# Patient Record
Sex: FEMALE | Race: WHITE | NOT HISPANIC OR LATINO | Employment: FULL TIME | URBAN - METROPOLITAN AREA
[De-identification: names, ages, dates, MRNs, and addresses within clinical notes are randomized per-mention and may not be internally consistent; named-entity substitution may affect disease eponyms.]

---

## 2017-02-08 ENCOUNTER — OFFICE VISIT (OUTPATIENT)
Dept: INTERNAL MEDICINE | Facility: CLINIC | Age: 32
End: 2017-02-08
Payer: COMMERCIAL

## 2017-02-08 VITALS
TEMPERATURE: 98 F | BODY MASS INDEX: 41.73 KG/M2 | DIASTOLIC BLOOD PRESSURE: 90 MMHG | SYSTOLIC BLOOD PRESSURE: 154 MMHG | HEIGHT: 63 IN | WEIGHT: 235.5 LBS | OXYGEN SATURATION: 97 % | HEART RATE: 100 BPM

## 2017-02-08 DIAGNOSIS — I10 HYPERTENSION GOAL BP (BLOOD PRESSURE) < 140/90: ICD-10-CM

## 2017-02-08 PROCEDURE — 99214 OFFICE O/P EST MOD 30 MIN: CPT | Performed by: INTERNAL MEDICINE

## 2017-02-08 ASSESSMENT — PAIN SCALES - GENERAL: PAINLEVEL: NO PAIN (0)

## 2017-02-08 NOTE — PROGRESS NOTES
"INTERNAL MEDICINE    Medical Weight Loss - Follow-up Visit    Chief Complaint:   Chief Complaint   Patient presents with     Weight Problem     follow-up       Last visit on 12/9/16:  Start adding a little more food to your diet in the mid morning - 200 calorie protein/carb snack.  Start consuming between 1700 and 2000 calories per day rather than the 1500 to 1800 you have been doing.    Research High Intensity Interval Training (HIIT) online (google \"free beginner online HIIT workouts\") that you can start doing in 15 minutes or less.    Fitness  and Be Fit have a lot of workout ideas for HIIT.  Keep Topamax at 50 mg twice daily.    Wt Readings from Last 5 Encounters:   02/08/17 106.822 kg (235 lb 8 oz)   12/09/16 106.595 kg (235 lb)   11/02/16 107.276 kg (236 lb 8 oz)   08/10/16 107.049 kg (236 lb)   08/10/16 107.276 kg (236 lb 8 oz)       Update - Has been doing HIIT workouts for 20-25 minutes. When she eats she is full but still does not feel like she is eating a lot or getting enough calories in. She is food logging and eats a lot of the same foods.    Food Log Averages:  20-40% carbohydrates  40-50% fat  25-35% protein   3860-7844 calories daily    Blood pressure - Feels her BP is elevated due to stress at work and just coming from work. She takes her BP medication at night so has yet to take it today. Denies chest pain, SOB, or headache.      HISTORY OF PRESENT ILLNESS (HPI):    Patient  returns today for medical weight loss follow-up visit. Patient was last seen by me on 12/9/2016 and has gained 0.8 oz and is down 2% body fat.    Patient is not seeing dietitian    Patient is not seeing PT       Today we reviewed patient's lab results for labs ordered at last visit and answered patient's questions regarding lab results.     Today patient expresses interest in beginning weight loss medication.  Discussed risks vs benefits of  Victoza  with patient in detail. Also discussed dosing of this medication. " "    She didn't increase her calories as we discussed.  She has not increased her carbohydrates in the form of fruits/veggies/grains as suggested in her last appointment with sanju.      Patient denies any exertional chest pain, dyspnea, palpitations, syncope, orthopnea, edema or paroxysmal nocturnal dyspnea.     MEDICATIONS:   Current Outpatient Prescriptions   Medication Sig Dispense Refill     traMADol (ULTRAM) 50 MG tablet Take 1-2 tablets ( mg) by mouth every 6 hours as needed for pain 20 tablet 0     hydrochlorothiazide (HYDRODIURIL) 25 MG tablet Take 1 tablet (25 mg) by mouth daily 90 tablet 3     citalopram (CELEXA) 20 MG tablet Take 1 tablet (20 mg) by mouth daily 90 tablet 3     etonogestrel (NEXPLANON) 68 MG IMPL One device, subdermally, for up to 3 years. 1 each 0       ALLERGIES:   Allergies   Allergen Reactions     Lisinopril Cough     Zoloft      tired       PHYSICAL EXAMINATION:    VITALS: /90 mmHg  Pulse 100  Temp(Src) 98  F (36.7  C) (Oral)  Ht 1.6 m (5' 3\")  Wt 106.822 kg (235 lb 8 oz)  BMI 41.73 kg/m2  SpO2 97%  LMP 01/16/2017 (Approximate)  Breastfeeding? No  GENERAL: Patient is a 31 year old year old female  in no acute distress.  Patient is alert and orientated x 4, pleasant and cooperative with exam.     CARDIOVASCULAR:  Tachycardia and regular rhythm without murmurs, rubs, or gallops.  RESPIRATORY:  Lungs are clear to auscultation bilaterally, respiratory effort is normal.   LOWER EXTREMITIES:  No edema noted bilaterally  PSYCH: mentation appears normal, affect normal/bright    LAB RESULTS:   Office Visit on 12/09/2016   Component Date Value Ref Range Status     Sodium 12/09/2016 140  133 - 144 mmol/L Final     Potassium 12/09/2016 4.3  3.4 - 5.3 mmol/L Final     Chloride 12/09/2016 109  94 - 109 mmol/L Final     Carbon Dioxide 12/09/2016 21  20 - 32 mmol/L Final     Anion Gap 12/09/2016 10  3 - 14 mmol/L Final     Glucose 12/09/2016 91  70 - 99 mg/dL Final     Urea " Nitrogen 12/09/2016 12  7 - 30 mg/dL Final     Creatinine 12/09/2016 0.77  0.52 - 1.04 mg/dL Final     GFR Estimate 12/09/2016 88  >60 mL/min/1.7m2 Final    Non  GFR Calc     GFR Estimate If Black 12/09/2016   >60 mL/min/1.7m2 Final                    Value:>90   GFR Calc       Calcium 12/09/2016 9.0  8.5 - 10.1 mg/dL Final       ASSESSMENT/PLAN:    1. Hypertension goal BP (blood pressure) < 140/90  Uncontrolled. Patient's BP was high twice today in clinic. She will return for a BP check in clinic and follow up with her primary care provider.    2. BMI 40.0-44.9, adult (H)  Ongoing. No weight loss success on Topamax, will taper off. Discussed risks and benefits of Victoza and I don't feel it would be helpful since she is already underconsuming calories. Her BP and pulse is too high for a stimulant. She will continues to monitor her diet, exercise, and meet with Ambar Solano to discuss dietary modifications. I didn't schedule a follow up with me.  It truly depends on how she does with dietary modification.      PLAN:    Discontinue  Topiramate. Patient is to return to the clinic in Other: after you see your primary care provider. Patient is advised to call clinic if she experiences any adverse reaction(s).       ORDERS PLACED IN TODAY'S VISIT:  No orders of the defined types were placed in this encounter.       Patient Instructions     You can add in more carbs if you want and try eating more fruits, vegetables, and whole grains.  Snack Ideas: half an apple + nuts    half an apple + peanut butter    cracker, tuna fish, cheese  For now taper off the Topamax: one tablet at bedtime for 2 weeks then stop it.  I recommend meeting with Ambar Solano again to help redefine your diet.  Continue doing your HIIT workouts.  Return to the pharmacy for a BP check and consider following up with your primary.        [facility]    If you have any questions regarding to your visit  please contact your care team:     Team Pink:   Clinic Hours Telephone Number   Internal Medicine:  Dr. Lorraine Villegas, NP       7am-7pm  Monday - Thursday   7am-5pm  Fridays  [phone]  (Appointment scheduling available 24/7)    Questions about your visit?  Team Line  [phone]   [facility] [place] [facility] [place] 11am-9pm Monday-Friday Saturday-Sunday- 9am-5pm   [place] - 5pm-9pm Monday-Friday Saturday-Sunday- 9am-5pm  [phone] - [place]  [phone] - [place]       What options do I have for visits at the clinic other than the traditional office visit?  To expand how we care for you, many of our providers are utilizing electronic visits (e-visits) and telephone visits, when medically appropriate, for interactions with their patients rather than a visit in the clinic.   We also offer nurse visits for many medical concerns. Just like any other service, we will bill your insurance company for this type of visit based on time spent on the phone with your provider. Not all insurance companies cover these visits. Please check with your medical insurance if this type of visit is covered. You will be responsible for any charges that are not paid by your insurance.      E-visits via "[employer]hart:  generally incur a $35.00 fee.  Telephone visits:  Time spent on the phone: *charged based on time that is spent on the phone in increments of 10 minutes. Estimated cost:   5-10 mins $30.00   11-20 mins. $59.00   21-30 mins. $85.00   Use "[employer]hart (secure email communication and access to your chart) to send your primary care provider a message or make an appointment. Ask someone on your Team how to sign up for [employer].    For a Price Quote for your services, please call our Consumer Price Line at [phone].    As always, Thank you for trusting us with your health care needs!    Discharged by Fadia SOARES CMA ([facility])        I spent 18 minutes of time with the  patient and >50% of it was in education and counseling regarding weight management.     The information in this document, created by the medical scribe for me, accurately reflects the services I personally performed and the decisions made by me. I have reviewed and approved this document for accuracy prior to leaving the patient care area.  Lorraine Johnson MD  2:44 PM, 02/08/20    Start 2:38 PM  End 2:56 PM

## 2017-02-08 NOTE — MR AVS SNAPSHOT
After Visit Summary   2/8/2017    Екатерина Ramon    MRN: [id number]           Patient Information     Date Of Birth          1985        Visit Information        Provider Department      2/8/2017 2:30 PM Lorraine Johnson MD [facility]        Today's Diagnoses     BMI 40.0-44.9, adult (H)    -  1     Hypertension goal BP (blood pressure) < 140/90           Care Instructions    You can add in more carbs if you want and try eating more fruits, vegetables, and whole grains.  Snack Ideas: half an apple + nuts    half an apple + peanut butter    cracker, tuna fish, cheese  For now taper off the Topamax: one tablet at bedtime for 2 weeks then stop it.  I recommend meeting with Ambar Solano again to help redefine your diet.  Continue doing your HIIT workouts.  Return to the pharmacy for a BP check and consider following up with your primary.        [facility]    If you have any questions regarding to your visit please contact your care team:     Team Pink:   Clinic Hours Telephone Number   Internal Medicine:  Dr. Lorraine Villegas, NP       7am-7pm  Monday - Thursday   7am-5pm  Fridays  [phone]  (Appointment scheduling available 24/7)    Questions about your visit?  Team Line  [phone]   Urgent Care - [place] and [place] - 11am-9pm Monday-Friday Saturday-Sunday- 9am-5pm   [place] - 5pm-9pm Monday-Friday Saturday-Sunday- 9am-5pm  [phone] - [place]   [phone] - [place]       What options do I have for visits at the clinic other than the traditional office visit?  To expand how we care for you, many of our providers are utilizing electronic visits (e-visits) and telephone visits, when medically appropriate, for interactions with their patients rather than a visit in the clinic.   We also offer nurse visits for many medical concerns. Just like any other service, we will bill your insurance company for  this type of visit based on time spent on the phone with your provider. Not all insurance companies cover these visits. Please check with your medical insurance if this type of visit is covered. You will be responsible for any charges that are not paid by your insurance.      E-visits via [employer]:  generally incur a $35.00 fee.  Telephone visits:  Time spent on the phone: *charged based on time that is spent on the phone in increments of 10 minutes. Estimated cost:   5-10 mins $30.00   11-20 mins. $59.00   21-30 mins. $85.00   Use [employer] (secure email communication and access to your chart) to send your primary care provider a message or make an appointment. Ask someone on your Team how to sign up for [employer].    For a Price Quote for your services, please call our [employer] Line at [phone].    As always, Thank you for trusting us with your health care needs!    Discharged by Fadia SOARES CMA ([facility])          Follow-ups after your visit        Who to contact     If you have questions or need follow up information about today's clinic visit or your schedule please contact [facility] directly at [phone].  Normal or non-critical lab and imaging results will be communicated to you by MyChart, letter or phone within 4 business days after the clinic has received the results. If you do not hear from us within 7 days, please contact the clinic through MyChart or phone. If you have a critical or abnormal lab result, we will notify you by phone as soon as possible.  Submit refill requests through [employer] or call your pharmacy and they will forward the refill request to us. Please allow 3 business days for your refill to be completed.          Additional Information About Your Visit        [employer] Information     [employer] gives you secure access to your electronic health record. If you see a primary care provider, you can also send messages to your care team and make appointments. If you have  "questions, please call your primary care clinic.  If you do not have a primary care provider, please call [phone] and they will assist you.        Care EveryWhere ID     This is your Care EveryWhere ID. This could be used by other organizations to access your [place] medical records  [id number]        Your Vitals Were     Pulse Temperature Height    100 98  F (36.7  C) (Oral) 5' 3\" (1.6 m)    BMI (Body Mass Index) Pulse Oximetry Last Period    41.73 kg/m2 97% 01/16/2017 (Approximate)    Breastfeeding?          No         Blood Pressure from Last 3 Encounters:   02/08/17 154/90   12/09/16 124/86   11/02/16 130/76    Weight from Last 3 Encounters:   02/08/17 235 lb 8 oz (106.822 kg)   12/09/16 235 lb (106.595 kg)   11/02/16 236 lb 8 oz (107.276 kg)              Today, you had the following     No orders found for display         Today's Medication Changes          These changes are accurate as of: 2/8/17  2:57 PM.  If you have any questions, ask your nurse or doctor.               Stop taking these medicines if you haven't already. Please contact your care team if you have questions.     topiramate 50 MG tablet   Commonly known as:  TOPAMAX   Stopped by:  Lorraine Johnson MD                    Primary Care Provider Office Phone # Fax #    Anusha ESTUARDO [facility] [phone]       [address] [address]        Thank you!     Thank you for choosing [facility]  for your care. Our goal is always to provide you with excellent care. Hearing back from our patients is one way we can continue to improve our services. Please take a few minutes to complete the written survey that you may receive in the mail after your visit with us. Thank you!             Your Updated Medication List - Protect others around you: Learn how to safely use, store and throw away your medicines at www.disposemymeds.org.          This list is accurate as of: 2/8/17  " 2:57 PM.  Always use your most recent med list.                   Brand Name Dispense Instructions for use    citalopram 20 MG tablet    celeXA    90 tablet    Take 1 tablet (20 mg) by mouth daily       etonogestrel 68 MG Impl    NEXPLANON    1 each    One device, subdermally, for up to 3 years.       hydrochlorothiazide 25 MG tablet    HYDRODIURIL    90 tablet    Take 1 tablet (25 mg) by mouth daily       traMADol 50 MG tablet    ULTRAM    20 tablet    Take 1-2 tablets ( mg) by mouth every 6 hours as needed for pain

## 2017-02-08 NOTE — Clinical Note
I asked her to follow up with you for blood pressure.  Can you make sure she does this? Thanks, Lorraine

## 2017-02-08 NOTE — PATIENT INSTRUCTIONS
You can add in more carbs if you want and try eating more fruits, vegetables, and whole grains.  Snack Ideas: half an apple + nuts    half an apple + peanut butter    cracker, tuna fish, cheese  For now taper off the Topamax: one tablet at bedtime for 2 weeks then stop it.  I recommend meeting with Ambarvirgen Solano again to help redefine your diet.  Continue doing your HIIT workouts.  Return to the pharmacy for a BP check and consider following up with your primary.        [facility]    If you have any questions regarding to your visit please contact your care team:     Team Pink:   Clinic Hours Telephone Number   Internal Medicine:  Dr. Lorraine Villegas, NP       7am-7pm  Monday - Thursday   7am-5pm  Fridays  [phone]  (Appointment scheduling available 24/7)    Questions about your visit?  Team Line  [phone]   Urgent [facility] - [place] [facility] - 11am-9pm Monday-Friday Saturday-Sunday- 9am-5pm   [place] - 5pm-9pm Monday-Friday Saturday-Sunday- 9am-5pm  [phone] - [facility]  [phone] - [place]       What options do I have for visits at the clinic other than the traditional office visit?  To expand how we care for you, many of our providers are utilizing electronic visits (e-visits) and telephone visits, when medically appropriate, for interactions with their patients rather than a visit in the clinic.   We also offer nurse visits for many medical concerns. Just like any other service, we will bill your insurance company for this type of visit based on time spent on the phone with your provider. Not all insurance companies cover these visits. Please check with your medical insurance if this type of visit is covered. You will be responsible for any charges that are not paid by your insurance.      E-visits via [employer]:  generally incur a $35.00 fee.  Telephone visits:  Time spent on the phone: *charged based on time that is spent on  the phone in increments of 10 minutes. Estimated cost:   5-10 mins $30.00   11-20 mins. $59.00   21-30 mins. $85.00   Use [employer] (secure email communication and access to your chart) to send your primary care provider a message or make an appointment. Ask someone on your Team how to sign up for [employer].    For a Price Quote for your services, please call our Predect Line at [phone].    As always, Thank you for trusting us with your health care needs!    Discharged by Fadia SOARES CMA ([facility])

## 2017-02-20 ENCOUNTER — TELEPHONE (OUTPATIENT)
Dept: FAMILY MEDICINE | Facility: CLINIC | Age: 32
End: 2017-02-20

## 2017-02-20 NOTE — TELEPHONE ENCOUNTER
Please call patient- her BP was high when she saw Dr. Johnson. Please assist her to schedule OV with me.    Anusha Wolff, CNP

## 2017-02-20 NOTE — LETTER
[facility]  [address] [place] [address]. [place] [address]    February 24, 2017    Екатерина Ramon  [address]  [facility] [address]      Dear Екатерина,    We have been unsuccessful reaching you by telephone. I saw your Blood Presser was high when she saw Dr. Johnson. Please schedule an office visit regarding this with me.     You can schedule this one of a few ways. First using My Chart Scheduling, second is calling our Main number [phone] this line is open for scheduling 24 hours 7 days a week.       Sincerely,        Anusha Wolff CNP/dt

## 2017-02-24 NOTE — TELEPHONE ENCOUNTER
Left message to call TC line at [phone] to schedule a follow up. Please help the patient schedule for Blood Presser/ Hypertension follow up. Rosemarie Mitchell,     A letter was sent in the mail and a My Chart message was sent for Екатерина to follow up with Anusha Wolff CNP.

## 2017-03-03 ENCOUNTER — OFFICE VISIT (OUTPATIENT)
Dept: FAMILY MEDICINE | Facility: CLINIC | Age: 32
End: 2017-03-03
Payer: COMMERCIAL

## 2017-03-03 VITALS
BODY MASS INDEX: 41.82 KG/M2 | TEMPERATURE: 98.6 F | WEIGHT: 236 LBS | HEIGHT: 63 IN | RESPIRATION RATE: 28 BRPM | DIASTOLIC BLOOD PRESSURE: 93 MMHG | HEART RATE: 105 BPM | OXYGEN SATURATION: 100 % | SYSTOLIC BLOOD PRESSURE: 143 MMHG

## 2017-03-03 DIAGNOSIS — G43.109 MIGRAINE WITH AURA AND WITHOUT STATUS MIGRAINOSUS, NOT INTRACTABLE: ICD-10-CM

## 2017-03-03 DIAGNOSIS — I10 HYPERTENSION GOAL BP (BLOOD PRESSURE) < 140/90: Primary | ICD-10-CM

## 2017-03-03 PROCEDURE — 99213 OFFICE O/P EST LOW 20 MIN: CPT | Performed by: NURSE PRACTITIONER

## 2017-03-03 RX ORDER — VERAPAMIL HYDROCHLORIDE 120 MG/1
120 CAPSULE, EXTENDED RELEASE ORAL DAILY
Qty: 30 CAPSULE | Refills: 0 | Status: SHIPPED | OUTPATIENT
Start: 2017-03-03 | End: 2017-08-29

## 2017-03-03 NOTE — PATIENT INSTRUCTIONS
[facility]    If you have any questions regarding to your visit please contact your care team:       Team Red:   Clinic Hours Telephone Number   Dr. Ernestina Wolff, NP   7am-7pm  Monday - Thursday   7am-5pm  Fridays  [phone]  (Appointment scheduling available 24/7)    Questions about your visit?   Team Line  [phone]   Urgent Care - [place] - 11am-9pm Monday-Friday Saturday-Sunday- 9am-5pm   [place] - 5pm-9pm Monday-Friday Saturday-Sunday- 9am-5pm  [phone] - [place]   [phone] - [place]       What options do I have for visits at the clinic other than the traditional office visit?  To expand how we care for you, many of our providers are utilizing electronic visits (e-visits) and telephone visits, when medically appropriate, for interactions with their patients rather than a visit in the clinic.   We also offer nurse visits for many medical concerns. Just like any other service, we will bill your insurance company for this type of visit based on time spent on the phone with your provider. Not all insurance companies cover these visits. Please check with your medical insurance if this type of visit is covered. You will be responsible for any charges that are not paid by your insurance.      E-visits via [employer]:  generally incur a $35.00 fee.  Telephone visits:  Time spent on the phone: *charged based on time that is spent on the phone in increments of 10 minutes. Estimated cost:   5-10 mins $30.00   11-20 mins. $59.00   21-30 mins. $85.00     Use [employer] (secure email communication and access to your chart) to send your primary care provider a message or make an appointment. Ask someone on your Team how to sign up for [employer].  For a Price Quote for your services, please call our Consumer Price Line at [phone].      As always, Thank you for trusting us with your health care needs!  Davi ESTES  [place]

## 2017-03-03 NOTE — NURSING NOTE
"Chief Complaint   Patient presents with     Hypertension       Initial BP (!) 143/93 (BP Location: Left arm, Patient Position: Chair, Cuff Size: Adult Regular)  Pulse 105  Temp 98.6  F (37  C) (Oral)  Resp 28  Ht 5' 3\" (1.6 m)  Wt 236 lb (107 kg)  LMP 01/16/2017 (Approximate)  SpO2 100%  Breastfeeding? No  BMI 41.81 kg/m2 Estimated body mass index is 41.81 kg/(m^2) as calculated from the following:    Height as of this encounter: 5' 3\" (1.6 m).    Weight as of this encounter: 236 lb (107 kg).  Medication Reconciliation: complete    "

## 2017-03-03 NOTE — MR AVS SNAPSHOT
After Visit Summary   3/3/2017    Екатерина Ramon    MRN: [id number]           Patient Information     Date Of Birth          1985        Visit Information        Provider Department      3/3/2017 4:00 PM Anusha Wolff APRN [facility]        Today's Diagnoses     Hypertension goal BP (blood pressure) < 140/90    -  1    Migraine with aura and without status migrainosus, not intractable          Care Instructions    [facility]    If you have any questions regarding to your visit please contact your care team:       Team Red:   Clinic Hours Telephone Number   Dr. Ernestina Wolff, NP   7am-7pm  Monday - Thursday   7am-5pm  Fridays  [phone]  (Appointment scheduling available 24/7)    Questions about your visit?   Team Line  [phone]   [facility] [place] [facility] 11am-9pm Monday-Friday Saturday-Sunday- 9am-5pm   [place] - 5pm-9pm Monday-Friday Saturday-Sunday- 9am-5pm  [phone] - [facility]  [phone] - [place]       What options do I have for visits at the clinic other than the traditional office visit?  To expand how we care for you, many of our providers are utilizing electronic visits (e-visits) and telephone visits, when medically appropriate, for interactions with their patients rather than a visit in the clinic.   We also offer nurse visits for many medical concerns. Just like any other service, we will bill your insurance company for this type of visit based on time spent on the phone with your provider. Not all insurance companies cover these visits. Please check with your medical insurance if this type of visit is covered. You will be responsible for any charges that are not paid by your insurance.      E-visits via [employer]:  generally incur a $35.00 fee.  Telephone visits:  Time spent on the phone: *charged based on time that is spent on the phone in  "increments of 10 minutes. Estimated cost:   5-10 mins $30.00   11-20 mins. $59.00   21-30 mins. $85.00     Use IDX Corpt (secure email communication and access to your chart) to send your primary care provider a message or make an appointment. Ask someone on your Team how to sign up for [employer].  For a Price Quote for your services, please call our CareFlash Line at [phone].      As always, Thank you for trusting us with your health care needs!  Davi Monge            Follow-ups after your visit        Who to contact     If you have questions or need follow up information about today's clinic visit or your schedule please contact [facility] directly at [phone].  Normal or non-critical lab and imaging results will be communicated to you by [employer], letter or phone within 4 business days after the clinic has received the results. If you do not hear from us within 7 days, please contact the clinic through [employer] or phone. If you have a critical or abnormal lab result, we will notify you by phone as soon as possible.  Submit refill requests through [employer] or call your pharmacy and they will forward the refill request to us. Please allow 3 business days for your refill to be completed.          Additional Information About Your Visit        [employer] Information     [employer] gives you secure access to your electronic health record. If you see a primary care provider, you can also send messages to your care team and make appointments. If you have questions, please call your primary care clinic.  If you do not have a primary care provider, please call [phone] and they will assist you.        Care EveryWhere ID     This is your Care EveryWhere ID. This could be used by other organizations to access your [facility] medical records  [id number]        Your Vitals Were     Pulse Temperature Respirations Height Last Period Pulse Oximetry    105 98.6  F (37  C) (Oral) 28 5' 3\" (1.6 m) " 01/16/2017 (Approximate) 100%    Breastfeeding? BMI (Body Mass Index)                No 41.81 kg/m2           Blood Pressure from Last 3 Encounters:   03/03/17 (!) 143/93   02/08/17 154/90   12/09/16 124/86    Weight from Last 3 Encounters:   03/03/17 236 lb (107 kg)   02/08/17 235 lb 8 oz (106.8 kg)   12/09/16 235 lb (106.6 kg)              Today, you had the following     No orders found for display         Today's Medication Changes          These changes are accurate as of: 3/3/17  4:32 PM.  If you have any questions, ask your nurse or doctor.               Start taking these medicines.        Dose/Directions    verapamil 120 MG 24 hr capsule   Commonly known as:  VERELAN   Used for:  Hypertension goal BP (blood pressure) < 140/90, Migraine with aura and without status migrainosus, not intractable   Started by:  Anusha Wolff APRN CNP        Dose:  120 mg   Take 1 capsule (120 mg) by mouth daily   Quantity:  30 capsule   Refills:  0            Where to get your medicines      These medications were sent to [id number] - [facility] [address] [facility]  [address] [facility] [address]     Phone:  [phone]     verapamil 120 MG 24 hr capsule                Primary Care Provider Office Phone # Fax #    ESTUARDO Jain [phone]       [address] [address]        Thank you!     Thank you for choosing [facility]  for your care. Our goal is always to provide you with excellent care. Hearing back from our patients is one way we can continue to improve our services. Please take a few minutes to complete the written survey that you may receive in the mail after your visit with us. Thank you!             Your Updated Medication List - Protect others around you: Learn how to safely use, store and throw away your medicines at www.disposemymeds.org.          This list is accurate as of: 3/3/17   4:32 PM.  Always use your most recent med list.                   Brand Name Dispense Instructions for use    citalopram 20 MG tablet    celeXA    90 tablet    Take 1 tablet (20 mg) by mouth daily       etonogestrel 68 MG Impl    NEXPLANON    1 each    One device, subdermally, for up to 3 years.       hydrochlorothiazide 25 MG tablet    HYDRODIURIL    90 tablet    Take 1 tablet (25 mg) by mouth daily       traMADol 50 MG tablet    ULTRAM    20 tablet    Take 1-2 tablets ( mg) by mouth every 6 hours as needed for pain       verapamil 120 MG 24 hr capsule    VERELAN    30 capsule    Take 1 capsule (120 mg) by mouth daily

## 2017-03-03 NOTE — PROGRESS NOTES
SUBJECTIVE:                                                    Екатерина Ramon is a 31 year old female who presents to clinic today for the following health issues:    Hypertension Follow-up      Outpatient blood pressures are not being checked.    Low Salt Diet: no added salt       Amount of exercise or physical activity: 4-5 days/week for an average of 15-30 minutes    Problems taking medications regularly: No    Medication side effects: none  Diet: regular (no restrictions) and low salt    Was on Topamax prescribed by Dr. Johnson for weight loss- was discontinued and now her migraines have worsened.    Problem list and histories reviewed & adjusted, as indicated.  Additional history: as documented    Patient Active Problem List   Diagnosis     Adult BMI 30+     CARDIOVASCULAR SCREENING; LDL GOAL LESS THAN 160     Family history of diabetes mellitus     History of depression     Sprain and strain of shoulder and upper arm     Hypertension goal BP (blood pressure) < 140/90     CTS (carpal tunnel syndrome)     S/P carpal tunnel release     Anxiety     BMI 40.0-44.9, adult (H)     Chronic bilateral thoracic back pain     Chronic midline low back pain without sciatica     Migraine with aura and without status migrainosus, not intractable     Morbid obesity due to excess calories (H)     Low serum HDL     Past Surgical History   Procedure Laterality Date     Hc revise median n/carpal tunnel surg Left 6/5/15     Primary, not revision     Release carpal tunnel Left 6/5/2015     Procedure: RELEASE CARPAL TUNNEL;  Surgeon: Juan Jose Joaquin MD;  Location:  OR       Social History   Substance Use Topics     Smoking status: Former Smoker     Types: Cigarettes     Quit date: 5/1/2010     Smokeless tobacco: Never Used     Alcohol use Yes      Comment: rare     Family History   Problem Relation Age of Onset     DIABETES Mother      Hypertension Mother      CANCER Mother      skin     DIABETES Father      CEREBROVASCULAR  "DISEASE No family hx of      Thyroid Disease No family hx of      Glaucoma No family hx of      Macular Degeneration No family hx of            Reviewed and updated as needed this visit by clinical staff  Tobacco  Allergies  Meds  Med Hx  Surg Hx  Fam Hx  Soc Hx      Reviewed and updated as needed this visit by Provider         ROS:  Constitutional, HEENT, cardiovascular, pulmonary systems are negative, except as otherwise noted.    OBJECTIVE:                                                    BP (!) 143/93 (BP Location: Left arm, Patient Position: Chair, Cuff Size: Adult Regular)  Pulse 105  Temp 98.6  F (37  C) (Oral)  Resp 28  Ht 5' 3\" (1.6 m)  Wt 236 lb (107 kg)  LMP 01/16/2017 (Approximate)  SpO2 100%  Breastfeeding? No  BMI 41.81 kg/m2  Body mass index is 41.81 kg/(m^2).  GENERAL: healthy, alert and no distress  RESP: lungs clear to auscultation - no rales, rhonchi or wheezes  CV: regular rate and rhythm, normal S1 S2, no S3 or S4, no murmur, click or rub, no peripheral edema and peripheral pulses strong    Diagnostic Test Results:  none      ASSESSMENT/PLAN:                                                        1. Hypertension goal BP (blood pressure) < 140/90  Above goal- Add verapamil to achieve dual benefit with better migraine control. Side effects discussed  - verapamil (VERELAN) 120 MG 24 hr capsule; Take 1 capsule (120 mg) by mouth daily  Dispense: 30 capsule; Refill: 0    2. Migraine with aura and without status migrainosus, not intractable    - verapamil (VERELAN) 120 MG 24 hr capsule; Take 1 capsule (120 mg) by mouth daily  Dispense: 30 capsule; Refill: 0    Follow up ancillary BP check in 2 weeks    ESTUARDO Jain [facility]    "

## 2017-03-17 ENCOUNTER — ALLIED HEALTH/NURSE VISIT (OUTPATIENT)
Dept: NURSING | Facility: CLINIC | Age: 32
End: 2017-03-17

## 2017-03-17 VITALS — SYSTOLIC BLOOD PRESSURE: 132 MMHG | HEART RATE: 86 BPM | DIASTOLIC BLOOD PRESSURE: 82 MMHG

## 2017-03-17 DIAGNOSIS — I10 HYPERTENSION GOAL BP (BLOOD PRESSURE) < 140/90: Primary | ICD-10-CM

## 2017-03-17 NOTE — MR AVS SNAPSHOT
After Visit Summary   3/17/2017    Екатерина Ramon    MRN: [id number]           Patient Information     Date Of Birth          1985        Visit Information        Provider Department      3/17/2017 4:15 PM [facility] ANCILLARY [facility]        Today's Diagnoses     Hypertension goal BP (blood pressure) < 140/90    -  1       Follow-ups after your visit        Who to contact     If you have questions or need follow up information about today's clinic visit or your schedule please contact [facility] directly at [phone].  Normal or non-critical lab and imaging results will be communicated to you by InLive Interactivehart, letter or phone within 4 business days after the clinic has received the results. If you do not hear from us within 7 days, please contact the clinic through HiringSolvedt or phone. If you have a critical or abnormal lab result, we will notify you by phone as soon as possible.  Submit refill requests through Let[employer] or call your pharmacy and they will forward the refill request to us. Please allow 3 business days for your refill to be completed.          Additional Information About Your Visit        MyChart Information     [employer] gives you secure access to your electronic health record. If you see a primary care provider, you can also send messages to your care team and make appointments. If you have questions, please call your primary care clinic.  If you do not have a primary care provider, please call [phone] and they will assist you.        Care EveryWhere ID     This is your Care EveryWhere ID. This could be used by other organizations to access your [place] medical records  [id number]        Your Vitals Were     Pulse                   86            Blood Pressure from Last 3 Encounters:   03/17/17 132/82   03/03/17 (!) 143/93   02/08/17 154/90    Weight from Last 3 Encounters:   03/03/17 236 lb (107 kg)   02/08/17 235 lb 8 oz (106.8 kg)   12/09/16 235 lb  (106.6 kg)              Today, you had the following     No orders found for display       Primary Care Provider Office Phone # Fax #    ESTUARDO Jain [facility] [phone]       [facility] [address] [place] [address]        Thank you!     Thank you for choosing [facility]  for your care. Our goal is always to provide you with excellent care. Hearing back from our patients is one way we can continue to improve our services. Please take a few minutes to complete the written survey that you may receive in the mail after your visit with us. Thank you!             Your Updated Medication List - Protect others around you: Learn how to safely use, store and throw away your medicines at www.disposemymeds.org.          This list is accurate as of: 3/17/17  4:27 PM.  Always use your most recent med list.                   Brand Name Dispense Instructions for use    citalopram 20 MG tablet    celeXA    90 tablet    Take 1 tablet (20 mg) by mouth daily       etonogestrel 68 MG Impl    NEXPLANON    1 each    One device, subdermally, for up to 3 years.       hydrochlorothiazide 25 MG tablet    HYDRODIURIL    90 tablet    Take 1 tablet (25 mg) by mouth daily       traMADol 50 MG tablet    ULTRAM    20 tablet    Take 1-2 tablets ( mg) by mouth every 6 hours as needed for pain       verapamil 120 MG 24 hr capsule    VERELAN    30 capsule    Take 1 capsule (120 mg) by mouth daily

## 2017-03-17 NOTE — PROGRESS NOTES
Chief Complaint   Patient presents with     Allied Health Visit     Hypertension     Екатерина Ramon is a 31 year old female who comes in today for a Blood Pressure check because of medication change.    *Document pulse and BP  *Use new set of vitals button for multiple readings.  *Use extended vitals for orthostatic    Vitals as recorded, a large cuff was used.    Patient is taking medication as prescribed  Patient is tolerating medications well.  Patient is not monitoring Blood Pressure at home.  Average readings if yes are na    Current complaints: none    Disposition: follow-up as indicated by MD/AP and patient to continue with the same medication

## 2017-04-24 ENCOUNTER — MYC MEDICAL ADVICE (OUTPATIENT)
Dept: INTERNAL MEDICINE | Facility: CLINIC | Age: 32
End: 2017-04-24

## 2017-05-16 ENCOUNTER — OFFICE VISIT (OUTPATIENT)
Dept: INTERNAL MEDICINE | Facility: CLINIC | Age: 32
End: 2017-05-16
Payer: COMMERCIAL

## 2017-05-16 ENCOUNTER — TELEPHONE (OUTPATIENT)
Dept: FAMILY MEDICINE | Facility: CLINIC | Age: 32
End: 2017-05-16

## 2017-05-16 VITALS
HEART RATE: 105 BPM | HEIGHT: 63 IN | OXYGEN SATURATION: 96 % | DIASTOLIC BLOOD PRESSURE: 90 MMHG | WEIGHT: 237.5 LBS | TEMPERATURE: 97.3 F | SYSTOLIC BLOOD PRESSURE: 134 MMHG | BODY MASS INDEX: 42.08 KG/M2

## 2017-05-16 DIAGNOSIS — E66.01 MORBID OBESITY DUE TO EXCESS CALORIES (H): Primary | ICD-10-CM

## 2017-05-16 DIAGNOSIS — E66.01 MORBID OBESITY DUE TO EXCESS CALORIES (H): ICD-10-CM

## 2017-05-16 DIAGNOSIS — Z83.3 FAMILY HISTORY OF DIABETES MELLITUS: ICD-10-CM

## 2017-05-16 DIAGNOSIS — I10 HYPERTENSION GOAL BP (BLOOD PRESSURE) < 140/90: ICD-10-CM

## 2017-05-16 PROCEDURE — 99213 OFFICE O/P EST LOW 20 MIN: CPT | Performed by: INTERNAL MEDICINE

## 2017-05-16 RX ORDER — LIRAGLUTIDE 6 MG/ML
INJECTION SUBCUTANEOUS
Qty: 3 ML | Refills: 1 | Status: SHIPPED | OUTPATIENT
Start: 2017-05-16 | End: 2017-06-19

## 2017-05-16 NOTE — TELEPHONE ENCOUNTER
Per Dr. Johnson: this was for 3ml of 1 pen. Just to get her started and see how she does on the med. Please ask pharmacy to dispense per prescription     Keyon Milton RN

## 2017-05-16 NOTE — NURSING NOTE
"Chief Complaint   Patient presents with     Weight Problem       Initial /88 (BP Location: Left arm, Patient Position: Chair, Cuff Size: Adult Regular)  Pulse 105  Temp 97.3  F (36.3  C) (Oral)  Ht 5' 3\" (1.6 m)  Wt 237 lb 8 oz (107.7 kg)  LMP 05/02/2017  SpO2 96%  BMI 42.07 kg/m2 Estimated body mass index is 42.07 kg/(m^2) as calculated from the following:    Height as of this encounter: 5' 3\" (1.6 m).    Weight as of this encounter: 237 lb 8 oz (107.7 kg).  Medication Reconciliation: complete   Fadia SOARES CMA ([facility])      "

## 2017-05-16 NOTE — PROGRESS NOTES
Medical Weight Loss - Return Visit      CHIEF COMPLAINT:     Chief Complaint   Patient presents with     Weight Problem       HISTORY OF PRESENT ILLNESS (HPI):    Patient  returns today for medical weight loss follow-up visit. Patient was last seen by me on 2/8/2017 and has gained 1 pound and is at 49.8% body fat.    She continues dietary efforts and exercise program.       Diet: Her food diary today indicates that she has been eating around 17-35% protein, 40-50% carbs, and 25-35% fat, with 8998-2644 calories per day. She explains that she has been feeling full, and that meeting her caloric requirement can be hard because she is not used to eating so much. She feels that she eats less on the weekends than she does on the weekdays because she keeps herself busier on those days.      Exercise: She has been using the treadmill and doing regular HIIT workouts. She has also enrolled herself in a 30 day challenge.     WL Surgery: She has been looking into surgical options for weight loss, but would like to reserve this as a last resort. She is concerned for her risk of developing diabetes like her parents.       MEDICATIONS:   Current Outpatient Prescriptions   Medication Sig Dispense Refill     verapamil (VERELAN) 120 MG 24 hr capsule Take 1 capsule (120 mg) by mouth daily 30 capsule 0     traMADol (ULTRAM) 50 MG tablet Take 1-2 tablets ( mg) by mouth every 6 hours as needed for pain 20 tablet 0     hydrochlorothiazide (HYDRODIURIL) 25 MG tablet Take 1 tablet (25 mg) by mouth daily 90 tablet 3     citalopram (CELEXA) 20 MG tablet Take 1 tablet (20 mg) by mouth daily 90 tablet 3     etonogestrel (NEXPLANON) 68 MG IMPL One device, subdermally, for up to 3 years. 1 each 0       REVIEW OF SYSTEMS:   10 point ROS of systems including Constitutional, Eyes, Respiratory, Cardiovascular, Gastroenterology, Genitourinary, Integumentary, Muscularskeletal, Psychiatric were all negative except for pertinent positives noted in  "my HPI.    This document serves as a record of the services and decisions personally performed and made by Lorraine Johnson MD. It was created on his/her behalf by Reta Abbott, a trained medical scribe. The creation of this document is based the provider's statements to the medical scribe.    Danny Abbott 11:00 AM, May 16, 2017      ALLERGIES:   Allergies   Allergen Reactions     Lisinopril Cough     Zoloft      tired       PHYSICAL EXAMINATION:    VITALS: /90 (BP Location: Left arm, Patient Position: Chair, Cuff Size: Adult Large)  Pulse 105  Temp 97.3  F (36.3  C) (Oral)  Ht 1.6 m (5' 3\")  Wt 107.7 kg (237 lb 8 oz)  LMP 05/02/2017  SpO2 96%  BMI 42.07 kg/m2  GENERAL: Patient is a 31 year old year old female  in no acute distress.  Patient is alert and orientated x 4, pleasant and cooperative with exam.     CARDIOVASCULAR:  Regular rate and rhythm without murmurs, rubs, or gallops.  RESPIRATORY:  Lungs are clear to auscultation bilaterally, respiratory effort is normal.   ABDOMEN: soft, nontender, no hepatosplenomegaly, no masses and bowel sounds normal  No edema    LAB RESULTS:   Office Visit on 12/09/2016   Component Date Value Ref Range Status     Sodium 12/09/2016 140  133 - 144 mmol/L Final     Potassium 12/09/2016 4.3  3.4 - 5.3 mmol/L Final     Chloride 12/09/2016 109  94 - 109 mmol/L Final     Carbon Dioxide 12/09/2016 21  20 - 32 mmol/L Final     Anion Gap 12/09/2016 10  3 - 14 mmol/L Final     Glucose 12/09/2016 91  70 - 99 mg/dL Final     Urea Nitrogen 12/09/2016 12  7 - 30 mg/dL Final     Creatinine 12/09/2016 0.77  0.52 - 1.04 mg/dL Final     GFR Estimate 12/09/2016 88  >60 mL/min/1.7m2 Final    Non  GFR Calc     GFR Estimate If Black 12/09/2016   >60 mL/min/1.7m2 Final                    Value:>90   GFR Calc       Calcium 12/09/2016 9.0  8.5 - 10.1 mg/dL Final       ASSESSMENT/PLAN:    I spent 17 minutes of time with the patient and >50% of it " was in education and counseling regarding weight loss management.     1. Morbid obesity due to excess calories (H)  Liraglutide is her last medication option.  Her current medications and elevated blood pressure make other choices not an option.    She is aware and agrees that surgery is her last option if liraglutide doesn't work for her.      She will meet with [facility] pharmacist to learn how to use medication. She will follow up with me three weeks after starting, so I can see her on the highest dose.     No family history or personal history of MTC or pancreatic cancer or pancreatitis.    Per patient instructions.       - liraglutide (VICTOZA) 18 MG/3ML soln; Inject 0.6 mg daily for 1 week, then 1.2 mg daily for 1 week and then 1.8 mg daily.  Dispense: 3 mL; Refill: 1  - insulin pen needle (BD TALHA U/F) 32G X 4 MM; Use once daily or as directed.  Dispense: 100 each; Refill: 3    2. Hypertension goal BP (blood pressure) < 140/90  Borderline control remains    3. Family history of diabetes mellitus  Patient understands the need for weight loss to prevent diabetes.       Patient is to call the clinic if she experiences any adverse reactions.     Patient Instructions   - Start taking Victoza injections daily in the abdomen. The [facility] pharmacist will teach you how to correctly administer the injections.   - You can consider the ketogenic diet. This involves an excessive amount of fat, with moderate protein. We would do this if Liraglutide doesn't work out.  I will send you to a colleague for this diet.    - Follow up with me in about 3 weeks after starting Victoza (so about 4 weeks from now)       The information in this document, created by the medical scribe for me, accurately reflects the services I personally performed and the decisions made by me. I have reviewed and approved this document for accuracy prior to leaving the patient care area.  Lorraine Johnson MD  10:59 AM, 05/16/17    Lorraine Johnson MD  Internal Medicine     American Board of Obesity Medicine Diplomate     Start: 11:03 AM   End: 11:20 AM

## 2017-05-16 NOTE — PATIENT INSTRUCTIONS
- Start taking Victoza injections daily in the abdomen. The [facility] pharmacist will teach you how to correctly administer the injections.   - You can consider the ketogenic diet. This involves an excessive amount of fat, with moderate protein. We would do this if Liraglutide doesn't work out.  I will send you to a colleague for this diet.    - Follow up with me in about 3 weeks after starting Victoza (so about 4 weeks from now)      [facility]    If you have any questions regarding to your visit please contact your care team:     Team Pink:   Clinic Hours Telephone Number   Internal Medicine:  Dr. Lorraine Villegas, NP       7am-7pm  Monday - Thursday   7am-5pm  Fridays  [phone]  (Appointment scheduling available 24/7)    Questions about your visit?  Team Line  [phone]   Urgent Care - [place] - 11am-9pm Monday-Friday Saturday-Sunday- 9am-5pm   [place] - 5pm-9pm Monday-Friday Saturday-Sunday- 9am-5pm  [phone] - [place]   [phone] - [place]       What options do I have for visits at the clinic other than the traditional office visit?  To expand how we care for you, many of our providers are utilizing electronic visits (e-visits) and telephone visits, when medically appropriate, for interactions with their patients rather than a visit in the clinic.   We also offer nurse visits for many medical concerns. Just like any other service, we will bill your insurance company for this type of visit based on time spent on the phone with your provider. Not all insurance companies cover these visits. Please check with your medical insurance if this type of visit is covered. You will be responsible for any charges that are not paid by your insurance.      E-visits via [employer]:  generally incur a $35.00 fee.  Telephone visits:  Time spent on the phone: *charged based on time that is spent on the phone in increments of 10 minutes.  Estimated cost:   5-10 mins $30.00   11-20 mins. $59.00   21-30 mins. $85.00   Use Pocket Conciergehart (secure email communication and access to your chart) to send your primary care provider a message or make an appointment. Ask someone on your Team how to sign up for [employer] Wellnes[employer].    For a Price Quote for your services, please call our AppBrick Line at [phone].    As always, Thank you for trusting us with your health care needs!    Discharged by Fadia SOARES CMA ([facility])

## 2017-05-16 NOTE — MR AVS SNAPSHOT
After Visit Summary   5/16/2017    Екатерина Ramon    MRN: [id number]           Patient Information     Date Of Birth          1985        Visit Information        Provider Department      5/16/2017 10:45 AM Lorraine Johnson MD [facility]        Today's Diagnoses     Morbid obesity due to excess calories (H)    -  1    Hypertension goal BP (blood pressure) < 140/90        Family history of diabetes mellitus          Care Instructions    - Start taking Victoza injections daily in the abdomen. The [facility] pharmacist will teach you how to correctly administer the injections.   - You can consider the ketogenic diet. This involves an excessive amount of fat, with moderate protein. We would do this if Liraglutide doesn't work out.  I will send you to a colleague for this diet.    - Follow up with me in about 3 weeks after starting Victoza (so about 4 weeks from now)      [facility]    If you have any questions regarding to your visit please contact your care team:     Team Pink:   Clinic Hours Telephone Number   Internal Medicine:  Dr. Lorraine Villegas NP       7am-7pm  Monday - Thursday   7am-5pm  Fridays  [phone]  (Appointment scheduling available 24/7)    Questions about your visit?  Team Line  [phone]   Urgent Care - [place] - 11am-9pm Monday-Friday Saturday-Sunday- 9am-5pm   [place] - 5pm-9pm Monday-Friday Saturday-Sunday- 9am-5pm  [phone] - [place]   [phone] - [place]       What options do I have for visits at the clinic other than the traditional office visit?  To expand how we care for you, many of our providers are utilizing electronic visits (e-visits) and telephone visits, when medically appropriate, for interactions with their patients rather than a visit in the clinic.   We also offer nurse visits for many medical concerns. Just like any other service, we will bill your  insurance company for this type of visit based on time spent on the phone with your provider. Not all insurance companies cover these visits. Please check with your medical insurance if this type of visit is covered. You will be responsible for any charges that are not paid by your insurance.      E-visits via [employer]:  generally incur a $35.00 fee.  Telephone visits:  Time spent on the phone: *charged based on time that is spent on the phone in increments of 10 minutes. Estimated cost:   5-10 mins $30.00   11-20 mins. $59.00   21-30 mins. $85.00   Use [employer] (secure email communication and access to your chart) to send your primary care provider a message or make an appointment. Ask someone on your Team how to sign up for [employer].    For a Price Quote for your services, please call our [email or web address] Price Line at [phone].    As always, Thank you for trusting us with your health care needs!    Discharged by Fadia SOARES CMA ([facility])          Follow-ups after your visit        Additional Services     MED THERAPY MANAGE REFERRAL       Your provider has referred you to: **[place] [facility] Management Scheduling (numerous locations) [phone]   [email or web address]    Reason for Referral: liraglutide start for weight loss but max dose of 1.8 mg (starting at 0.6 mg and titrating up weekly)    The [place] Medication Therapy Management department will contact you to schedule an appointment.  You may also schedule the appointment by calling [phone].  For [facility]   [place] patients, please call [phone] to confirm/schedule your appointment on the next business day.    This service is designed to help you get the most from your medications.  A specially trained Pharmacist will work closely with you and your providers to solve any questions, concerns, issues or problems related to your medications.    Please bring all of your prescription and  "non-prescription medications (such as vitamins, over-the-counter medications, and herbals) or a detailed medication list to your appointment.    If you have a glucose meter or other home monitoring information, please also bring this to your appointment (i.e. blood glucose log, blood pressure log, pain log, etc.).                  Who to contact     If you have questions or need follow up information about today's clinic visit or your schedule please contact [facility] directly at [phone].  Normal or non-critical lab and imaging results will be communicated to you by Post-ihart, letter or phone within 4 business days after the clinic has received the results. If you do not hear from us within 7 days, please contact the clinic through [employer] or phone. If you have a critical or abnormal lab result, we will notify you by phone as soon as possible.  Submit refill requests through [employer] or call your pharmacy and they will forward the refill request to us. Please allow 3 business days for your refill to be completed.          Additional Information About Your Visit        Post-ihart Information     [employer] gives you secure access to your electronic health record. If you see a primary care provider, you can also send messages to your care team and make appointments. If you have questions, please call your primary care clinic.  If you do not have a primary care provider, please call [phone] and they will assist you.        Care EveryWhere ID     This is your Care EveryWhere ID. This could be used by other organizations to access your [place] medical records  [id number]        Your Vitals Were     Pulse Temperature Height Last Period Pulse Oximetry BMI (Body Mass Index)    105 97.3  F (36.3  C) (Oral) 5' 3\" (1.6 m) 05/02/2017 96% 42.07 kg/m2       Blood Pressure from Last 3 Encounters:   05/16/17 134/90   03/17/17 132/82   03/03/17 (!) 143/93    Weight from Last 3 Encounters:   05/16/17 237 lb 8 oz " (107.7 kg)   03/03/17 236 lb (107 kg)   02/08/17 235 lb 8 oz (106.8 kg)              We Performed the Following     MED THERAPY MANAGE REFERRAL          Today's Medication Changes          These changes are accurate as of: 5/16/17 11:22 AM.  If you have any questions, ask your nurse or doctor.               Start taking these medicines.        Dose/Directions    insulin pen needle 32G X 4 MM   Commonly known as:  BD TALHA U/F   Used for:  Morbid obesity due to excess calories (H)   Started by:  Lorraine Johnson MD        Use once daily or as directed.   Quantity:  100 each   Refills:  3       liraglutide 18 MG/3ML soln   Commonly known as:  VICTOZA   Used for:  Morbid obesity due to excess calories (H)   Started by:  Lorraine Johnson MD        Inject 0.6 mg daily for 1 week, then 1.2 mg daily for 1 week and then 1.8 mg daily.   Quantity:  3 mL   Refills:  1            Where to get your medicines      These medications were sent to [id number] - [facility] [address]  [address] [place] [id number]     Phone:  [phone]     insulin pen needle 32G X 4 MM    liraglutide 18 MG/3ML soln                Primary Care Provider Office Phone # Fax #    ESTUARDO Jain [facility] [phone]       [address] [address]        Thank you!     Thank you for choosing [facility]  for your care. Our goal is always to provide you with excellent care. Hearing back from our patients is one way we can continue to improve our services. Please take a few minutes to complete the written survey that you may receive in the mail after your visit with us. Thank you!             Your Updated Medication List - Protect others around you: Learn how to safely use, store and throw away your medicines at www.disposemymeds.org.          This list is accurate as of: 5/16/17 11:22 AM.  Always use your most recent med list.                    Brand Name Dispense Instructions for use    citalopram 20 MG tablet    celeXA    90 tablet    Take 1 tablet (20 mg) by mouth daily       etonogestrel 68 MG Impl    NEXPLANON    1 each    One device, subdermally, for up to 3 years.       hydrochlorothiazide 25 MG tablet    HYDRODIURIL    90 tablet    Take 1 tablet (25 mg) by mouth daily       insulin pen needle 32G X 4 MM    BD TALHA U/F    100 each    Use once daily or as directed.       liraglutide 18 MG/3ML soln    VICTOZA    3 mL    Inject 0.6 mg daily for 1 week, then 1.2 mg daily for 1 week and then 1.8 mg daily.       traMADol 50 MG tablet    ULTRAM    20 tablet    Take 1-2 tablets ( mg) by mouth every 6 hours as needed for pain       verapamil 120 MG 24 hr capsule    VERELAN    30 capsule    Take 1 capsule (120 mg) by mouth daily

## 2017-05-16 NOTE — TELEPHONE ENCOUNTER
Called pharmacy and cleared with Dr. Johnson that 6ml for 2 pen pack is ok for now.     Fadia SOARES CMA ([facility])

## 2017-05-27 DIAGNOSIS — F41.9 ANXIETY: ICD-10-CM

## 2017-05-30 ENCOUNTER — OFFICE VISIT (OUTPATIENT)
Dept: PHARMACY | Facility: CLINIC | Age: 32
End: 2017-05-30
Payer: COMMERCIAL

## 2017-05-30 VITALS — DIASTOLIC BLOOD PRESSURE: 84 MMHG | BODY MASS INDEX: 42.58 KG/M2 | WEIGHT: 240.4 LBS | SYSTOLIC BLOOD PRESSURE: 128 MMHG

## 2017-05-30 DIAGNOSIS — G89.29 CHRONIC BILATERAL THORACIC BACK PAIN: ICD-10-CM

## 2017-05-30 DIAGNOSIS — M54.6 CHRONIC BILATERAL THORACIC BACK PAIN: ICD-10-CM

## 2017-05-30 DIAGNOSIS — I10 HYPERTENSION GOAL BP (BLOOD PRESSURE) < 140/90: ICD-10-CM

## 2017-05-30 DIAGNOSIS — E66.01 MORBID OBESITY DUE TO EXCESS CALORIES (H): Primary | ICD-10-CM

## 2017-05-30 DIAGNOSIS — F41.9 ANXIETY: ICD-10-CM

## 2017-05-30 PROCEDURE — 99607 MTMS BY PHARM ADDL 15 MIN: CPT | Performed by: PHARMACIST

## 2017-05-30 PROCEDURE — 99605 MTMS BY PHARM NP 15 MIN: CPT | Performed by: PHARMACIST

## 2017-05-30 RX ORDER — CITALOPRAM HYDROBROMIDE 20 MG/1
TABLET ORAL
Qty: 30 TABLET | Refills: 4 | Status: SHIPPED | OUTPATIENT
Start: 2017-05-30 | End: 2017-08-29

## 2017-05-30 NOTE — MR AVS SNAPSHOT
After Visit Summary   5/30/2017    Екатерина Ramon    MRN: [id number]           Patient Information     Date Of Birth          1985        Visit Information        Provider Department      5/30/2017 8:00 AM Williams Diaz, [facility] MTM        Care Instructions    Recommendations from today's MTM visit:                                                    MTM (medication therapy management) is a service provided by a clinical pharmacist designed to help you get the most of out of your medicines.   Today we reviewed what your medicines are for, how to know if they are working, that your medicines are safe and how to make your medicine regimen as easy as possible.     1. Start Victoza 0.6 mg for one week, if tolerating increase to 1.2 mg daily for at least 1-2 weeks, then if tolerating the 1.2 mg dose you can increase to 1.8 mg daily.    2.  If you need a refresher on how to use the pen you can find videos for this on www.GoGoPin.org/diabetes    3. https://www.[employer]/get-started-using-victoza-/activate-your-victoza--instant-savings-card.html    Next MTM visit: I will MyChart you in 1 month to see how things are going.      To schedule another MTM appointment, please call the clinic directly or you may call the MTM scheduling line at [phone] or toll-free at [phone].     My Clinical Pharmacist's contact information:                                                      It was a pleasure seeing you today!  Please feel free to contact me with any questions or concerns you have.      Jorge Diaz, Shobha  Medication Therapy Management Provider  Pager (voicemail): [phone]    You may receive a survey about the MTM services you received.  I would appreciate your feedback to help me serve you better in the future. Please fill it out and return it when you can. Your comments will be anonymous.                  Follow-ups after your visit        Your next 10  appointments already scheduled     Jun 13, 2017 10:30 AM CDT   SHORT with Lorraine Johnson MD   [facility] ([facility])    [address] [address] [phone]              Who to contact     If you have questions or need follow up information about today's clinic visit or your schedule please contact [facility] MTM directly at [phone].  Normal or non-critical lab and imaging results will be communicated to you by Open Garden[employer], letter or phone within 4 business days after the clinic has received the results. If you do not hear from us within 7 days, please contact the clinic through [employer] or phone. If you have a critical or abnormal lab result, we will notify you by phone as soon as possible.  Submit refill requests through [employer] or call your pharmacy and they will forward the refill request to us. Please allow 3 business days for your refill to be completed.          Additional Information About Your Visit        Buz Information     [employer] gives you secure access to your electronic health record. If you see a primary care provider, you can also send messages to your care team and make appointments. If you have questions, please call your primary care clinic.  If you do not have a primary care provider, please call [phone] and they will assist you.        Care EveryWhere ID     This is your Care EveryWhere ID. This could be used by other organizations to access your [place] medical records  [id number]        Your Vitals Were     Last Period                   05/02/2017            Blood Pressure from Last 3 Encounters:   05/16/17 134/90   03/17/17 132/82   03/03/17 (!) 143/93    Weight from Last 3 Encounters:   05/16/17 237 lb 8 oz (107.7 kg)   03/03/17 236 lb (107 kg)   02/08/17 235 lb 8 oz (106.8 kg)              Today, you had the following     No orders found for display       Primary Care Provider Office Phone # Fax #    [place]  Makayla Wolff, ESTUARDO DELGADILLO [phone]       [facility] [address] [address]        Thank you!     Thank you for choosing [facility]  for your care. Our goal is always to provide you with excellent care. Hearing back from our patients is one way we can continue to improve our services. Please take a few minutes to complete the written survey that you may receive in the mail after your visit with us. Thank you!             Your Updated Medication List - Protect others around you: Learn how to safely use, store and throw away your medicines at www.disposemymeds.org.          This list is accurate as of: 5/30/17  8:24 AM.  Always use your most recent med list.                   Brand Name Dispense Instructions for use    citalopram 20 MG tablet    celeXA    90 tablet    Take 1 tablet (20 mg) by mouth daily       etonogestrel 68 MG Impl    NEXPLANON    1 each    One device, subdermally, for up to 3 years.       hydrochlorothiazide 25 MG tablet    HYDRODIURIL    90 tablet    Take 1 tablet (25 mg) by mouth daily       insulin pen needle 32G X 4 MM    BD TALHA U/F    100 each    Use once daily or as directed.       liraglutide 18 MG/3ML soln    VICTOZA    3 mL    Inject 0.6 mg daily for 1 week, then 1.2 mg daily for 1 week and then 1.8 mg daily.       traMADol 50 MG tablet    ULTRAM    20 tablet    Take 1-2 tablets ( mg) by mouth every 6 hours as needed for pain       verapamil 120 MG 24 hr capsule    VERELAN    30 capsule    Take 1 capsule (120 mg) by mouth daily

## 2017-05-30 NOTE — TELEPHONE ENCOUNTER
Prescription approved per [facility] Refill Protocol.  Ghislaine Hwang, RN - BC

## 2017-05-30 NOTE — PROGRESS NOTES
SUBJECTIVE/OBJECTIVE:                           Екатерина Ramon is a 31 year old female coming in for an initial visit for Medication Therapy Management.  She was referred to me from Dr. Johnson.     Chief Complaint: Weight loss - going to start Victoza.  Personal Healthcare Goals: lose at least 50 pounds/get under 200 pounds    Allergies/ADRs: Reviewed in Epic  Tobacco: History of tobacco dependence - quit 8 years ago   Alcohol: Less than 1 beverages / week  Caffeine: 1 large cups/day of coffee  Activity: Four days of week - working out at home.  PMH: Reviewed in Epic    Medication Adherence: no issues reported    Obesity: Pt was prescribed Victoza pen for weight loss, but has yet to start the medication.  She is limited for other medications due to her other medications/elevated blood pressure.  No family history or personal history of MTC or pancreatitic cancer or pancreatitis.  Here today to learn how to use pen - states that her father uses insulin pens so she has some familiarity.    Hypertension: Current medications include verapamil 120 mg daily and hydrochlorothiazide 25 mg daily.  Patient does not self-monitor BP.  Patient reports no current medication side effects.    Anxiety: Pt is taking citalopram 20 mg daily. States she can tell her anxiety is much better controlled on this than before she started it. Denies any issues.    Pain: Pt is taking tramadol PRN (very rare use). Pt finds effective when used and denies any issues.    Current labs include:  BP Readings from Last 3 Encounters:   05/16/17 134/90   03/17/17 132/82   03/03/17 (!) 143/93     Today's Vitals: /84  Wt 240 lb 6.4 oz (109 kg)  LMP 05/02/2017  BMI 42.58 kg/m2    Most Recent Immunizations   Administered Date(s) Administered     HPV Quadrivalent 08/13/2009     Hepatitis A Vac Ped/Adol-2 Dose 02/11/2010     Influenza (IIV3) 09/25/2015     Influenza vaccine ages 6-35 months 10/01/2016     TDAP Vaccine (Adacel) 02/11/2010     ASSESSMENT:                              Current medications were reviewed today.     Medication Adherence: no issues identified    Obesity: Needs Improvement. Pt would benefit from further education on Victoza pen/what to expect from starting medication.    Hypertension: Stable. Patient is meeting BP goal of < 140/90mmHg.     Anxiety: Stable per patient.    Pain: Stable.     PLAN:                            1. Educated patient on Victoza pen use and side effects.      I spent 30 minutes with this patient today. A copy of the visit note was provided to the patient's primary care provider.    Will follow up in 1 month or sooner if needed.    The patient was given a summary of these recommendations as an after visit summary.     Jorge Diaz, Shobha  Medication Therapy Management Provider  Pager (voicemail): [phone]

## 2017-05-30 NOTE — TELEPHONE ENCOUNTER
citalopram (CELEXA) 20 MG tablet     Last Written Prescription Date: 5/6/16  Last Fill Quantity: 90, # refills: 3  [facility] Office Visit with [facility] care provider:  5/16/17   Next 5 appointments (look out 90 days)     Jun 13, 2017 10:30 AM CDT   SHORT with Lorraine Johnson MD   [facility]    [address] [address] [phone]                   Last PHQ-9 score on record=   PHQ-9 SCORE 5/6/2016   Total Score -   Total Score 2

## 2017-05-30 NOTE — PATIENT INSTRUCTIONS
Recommendations from today's MTM visit:                                                    MTM (medication therapy management) is a service provided by a clinical pharmacist designed to help you get the most of out of your medicines.   Today we reviewed what your medicines are for, how to know if they are working, that your medicines are safe and how to make your medicine regimen as easy as possible.     1. Start Victoza 0.6 mg for one week, if tolerating increase to 1.2 mg daily for at least 1-2 weeks, then if tolerating the 1.2 mg dose you can increase to 1.8 mg daily.    2.  If you need a refresher on how to use the pen you can find videos for this on www.SafetyTat.org/diabetes    3. [email or web address]/get-[email or web address]-using-victoza-/activate-your-victoza--instant-savings-card.html    Next MTM visit: I will MyChart you in 1 month to see how things are going.      To schedule another MTM appointment, please call the clinic directly or you may call the MTM scheduling line at [phone] or toll-free at [phone].     My Clinical Pharmacist's contact information:                                                      It was a pleasure seeing you today!  Please feel free to contact me with any questions or concerns you have.      Jorge Diaz, Shobha  Medication Therapy Management Provider  Pager (voicemail): [phone]    You may receive a survey about the MTM services you received.  I would appreciate your feedback to help me serve you better in the future. Please fill it out and return it when you can. Your comments will be anonymous.

## 2017-06-19 ENCOUNTER — OFFICE VISIT (OUTPATIENT)
Dept: INTERNAL MEDICINE | Facility: CLINIC | Age: 32
End: 2017-06-19
Payer: COMMERCIAL

## 2017-06-19 VITALS
BODY MASS INDEX: 41.37 KG/M2 | TEMPERATURE: 98.3 F | HEIGHT: 63 IN | HEART RATE: 110 BPM | WEIGHT: 233.5 LBS | OXYGEN SATURATION: 98 % | SYSTOLIC BLOOD PRESSURE: 148 MMHG | DIASTOLIC BLOOD PRESSURE: 92 MMHG

## 2017-06-19 DIAGNOSIS — Z79.899 HIGH RISK MEDICATION USE: ICD-10-CM

## 2017-06-19 DIAGNOSIS — E66.01 MORBID OBESITY DUE TO EXCESS CALORIES (H): Primary | ICD-10-CM

## 2017-06-19 PROCEDURE — 99213 OFFICE O/P EST LOW 20 MIN: CPT | Performed by: INTERNAL MEDICINE

## 2017-06-19 PROCEDURE — 36415 COLL VENOUS BLD VENIPUNCTURE: CPT | Performed by: INTERNAL MEDICINE

## 2017-06-19 PROCEDURE — 82565 ASSAY OF CREATININE: CPT | Performed by: INTERNAL MEDICINE

## 2017-06-19 PROCEDURE — 84443 ASSAY THYROID STIM HORMONE: CPT | Performed by: INTERNAL MEDICINE

## 2017-06-19 RX ORDER — LIRAGLUTIDE 6 MG/ML
1.8 INJECTION SUBCUTANEOUS DAILY
Qty: 36 ML | Refills: 1 | Status: SHIPPED | OUTPATIENT
Start: 2017-06-19 | End: 2017-09-11

## 2017-06-19 NOTE — NURSING NOTE
"Chief Complaint   Patient presents with     Weight Check     follow-up for weight meds       Initial BP (!) 148/92 (BP Location: Right arm, Patient Position: Chair, Cuff Size: Adult Regular)  Pulse 110  Temp 98.3  F (36.8  C) (Oral)  Ht 5' 3\" (1.6 m)  Wt 233 lb 8 oz (105.9 kg)  LMP 05/29/2017 (Approximate)  SpO2 98%  BMI 41.36 kg/m2 Estimated body mass index is 41.36 kg/(m^2) as calculated from the following:    Height as of this encounter: 5' 3\" (1.6 m).    Weight as of this encounter: 233 lb 8 oz (105.9 kg).  Medication Reconciliation: complete   Fadia SOARES CMA ([facility])      "

## 2017-06-19 NOTE — MR AVS SNAPSHOT
After Visit Summary   6/19/2017    Екатерина Ramon    MRN: [id number]           Patient Information     Date Of Birth          1985        Visit Information        Provider Department      6/19/2017 2:45 PM Lorraine Johnson MD [facility]        Today's Diagnoses     Morbid obesity due to excess calories (H)    -  1    High risk medication use          Care Instructions    Continue Victoza at 1.8 mg daily.  Follow up in 6 weeks.    [facility]    If you have any questions regarding to your visit please contact your care team:     Team Pink:   Clinic Hours Telephone Number   Internal Medicine:  Dr. Lorraine Villegas, NP       7am-7pm  Monday - Thursday   7am-5pm  Fridays  [phone]  (Appointment scheduling available 24/7)    Questions about your visit?  Team Line  [phone]   [facility] [place] [facility] 11am-9pm Monday-Friday Saturday-Sunday- 9am-5pm   [place] - 5pm-9pm Monday-Friday Saturday-Sunday- 9am-5pm  [phone] - [facility]  [phone] - [place]       What options do I have for visits at the clinic other than the traditional office visit?  To expand how we care for you, many of our providers are utilizing electronic visits (e-visits) and telephone visits, when medically appropriate, for interactions with their patients rather than a visit in the clinic.   We also offer nurse visits for many medical concerns. Just like any other service, we will bill your insurance company for this type of visit based on time spent on the phone with your provider. Not all insurance companies cover these visits. Please check with your medical insurance if this type of visit is covered. You will be responsible for any charges that are not paid by your insurance.      E-visits via [employer]:  generally incur a $35.00 fee.  Telephone visits:  Time spent on the phone: *charged based on time that is spent on the  phone in increments of 10 minutes. Estimated cost:   5-10 mins $30.00   11-20 mins. $59.00   21-30 mins. $85.00   Use [employer] (secure email communication and access to your chart) to send your primary care provider a message or make an appointment. Ask someone on your Team how to sign up for [employer].    For a Price Quote for your services, please call our [employer] Line at [phone].    As always, Thank you for trusting us with your health care needs!      Alessandra Gonzalez, [facility]            Follow-ups after your visit        Who to contact     If you have questions or need follow up information about today's clinic visit or your schedule please contact [facility] directly at [phone].  Normal or non-critical lab and imaging results will be communicated to you by [employer], letter or phone within 4 business days after the clinic has received the results. If you do not hear from us within 7 days, please contact the clinic through [employer] or phone. If you have a critical or abnormal lab result, we will notify you by phone as soon as possible.  Submit refill requests through [employer] or call your pharmacy and they will forward the refill request to us. Please allow 3 business days for your refill to be completed.          Additional Information About Your Visit        Desal[employer]chhart Information     [employer] gives you secure access to your electronic health record. If you see a primary care provider, you can also send messages to your care team and make appointments. If you have questions, please call your primary care clinic.  If you do not have a primary care provider, please call [phone] and they will assist you.        Care EveryWhere ID     This is your Care EveryWhere ID. This could be used by other organizations to access your [place] medical records  [id number]        Your Vitals Were     Pulse Temperature Height Last Period Pulse Oximetry BMI (Body Mass Index)    110 98.3  F (36.8  C) (Oral)  "5' 3\" (1.6 m) 05/29/2017 (Approximate) 98% 41.36 kg/m2       Blood Pressure from Last 3 Encounters:   06/19/17 (!) 148/92   05/30/17 128/84   05/16/17 134/90    Weight from Last 3 Encounters:   06/19/17 233 lb 8 oz (105.9 kg)   05/30/17 240 lb 6.4 oz (109 kg)   05/16/17 237 lb 8 oz (107.7 kg)              We Performed the Following     Creatinine     TSH with free T4 reflex          Today's Medication Changes          These changes are accurate as of: 6/19/17  3:17 PM.  If you have any questions, ask your nurse or doctor.               These medicines have changed or have updated prescriptions.        Dose/Directions    liraglutide 18 MG/3ML soln   Commonly known as:  VICTOZA   This may have changed:    - how much to take  - how to take this  - when to take this  - additional instructions   Used for:  Morbid obesity due to excess calories (H)   Changed by:  Lorraine Johnson MD        Dose:  1.8 mg   Inject 1.8 mg Subcutaneous daily   Quantity:  36 mL   Refills:  1            Where to get your medicines      These medications were sent to [id number] - [facility] [address]  [address] [address]     Phone:  [phone]     liraglutide 18 MG/3ML soln                Primary Care Provider Office Phone # Fax #    Anuhsa Wolff, [facility] [phone]       [address] [address]        Thank you!     Thank you for choosing [facility]  for your care. Our goal is always to provide you with excellent care. Hearing back from our patients is one way we can continue to improve our services. Please take a few minutes to complete the written survey that you may receive in the mail after your visit with us. Thank you!             Your Updated Medication List - Protect others around you: Learn how to safely use, store and throw away your medicines at www.disposemymeds.org.          This list is accurate as " of: 6/19/17  3:17 PM.  Always use your most recent med list.                   Brand Name Dispense Instructions for use    citalopram 20 MG tablet    celeXA    30 tablet    TAKE ONE TABLET BY MOUTH EVERY DAY       etonogestrel 68 MG Impl    NEXPLANON    1 each    One device, subdermally, for up to 3 years.       hydrochlorothiazide 25 MG tablet    HYDRODIURIL    90 tablet    Take 1 tablet (25 mg) by mouth daily       insulin pen needle 32G X 4 MM    BD TALHA U/F    100 each    Use once daily or as directed.       liraglutide 18 MG/3ML soln    VICTOZA    36 mL    Inject 1.8 mg Subcutaneous daily       traMADol 50 MG tablet    ULTRAM    20 tablet    Take 1-2 tablets ( mg) by mouth every 6 hours as needed for pain       verapamil 120 MG 24 hr capsule    VERELAN    30 capsule    Take 1 capsule (120 mg) by mouth daily

## 2017-06-19 NOTE — PROGRESS NOTES
"INTERNAL MEDICINE  Medical Weight Loss - Follow-up Visit    Chief Complaint:   Chief Complaint   Patient presents with     Weight Check     follow-up for weight meds     Wt Readings from Last 5 Encounters:   06/19/17 105.9 kg (233 lb 8 oz)   05/30/17 109 kg (240 lb 6.4 oz)   05/16/17 107.7 kg (237 lb 8 oz)   03/03/17 107 kg (236 lb)   02/08/17 106.8 kg (235 lb 8 oz)     Last visit 5/16/17:   \"- Start taking Victoza injections daily in the abdomen. The [facility] pharmacist will teach you how to correctly administer the injections.   - You can consider the ketogenic diet. This involves an excessive amount of fat, with moderate protein. We would do this if Liraglutide doesn't work out.  I will send you to a colleague for this diet.    - Follow up with me in about 3 weeks after starting Victoza (so about 4 weeks from now)\"      HISTORY OF PRESENT ILLNESS (HPI):    Patient  returns today for medical weight loss follow-up visit. Patient was last seen by me on 5/16/2017 and has lost 4 pounds and roughly 1.5 % body fat.    Update - She is on Victoza 1.8 mg daily and is tolerating the dose. She feels less hungry, is eating smaller portions, and is not eating as often. She still has energy to do things. Denies change in bowel movements, or feeling nauseated. Patient has not been food logging.    Exercise - She is walking more now that it's nice out. In the past she's done online workout videos. She thinks she will be able to keep her workouts going through the colder months.      Patient is not seeing dietitian    Patient is not seeing PT       Today we reviewed patient's lab results for labs ordered at last visit and answered patient's questions regarding lab results.     Today patient expresses interest in beginning weight loss medication.  Discussed risks vs benefits of  Victoza with patient in detail. Also discussed dosing of this medication.     This document serves as a record of the services and decisions personally " "performed and made by Lorraine Johnson MD. It was created on his/her behalf by Gretchen Gabriel, trained medical scribe. The creation of this document is based the provider's statements to the medical scribes.    Scribshireen Gabriel 3:09 PM, June 19, 2017    MEDICATIONS:   Current Outpatient Prescriptions   Medication Sig Dispense Refill     liraglutide (VICTOZA) 18 MG/3ML soln Inject 1.8 mg Subcutaneous daily 36 mL 1     citalopram (CELEXA) 20 MG tablet TAKE ONE TABLET BY MOUTH EVERY DAY 30 tablet 4     insulin pen needle (BD TALHA U/F) 32G X 4 MM Use once daily or as directed. 100 each 3     verapamil (VERELAN) 120 MG 24 hr capsule Take 1 capsule (120 mg) by mouth daily 30 capsule 0     traMADol (ULTRAM) 50 MG tablet Take 1-2 tablets ( mg) by mouth every 6 hours as needed for pain 20 tablet 0     hydrochlorothiazide (HYDRODIURIL) 25 MG tablet Take 1 tablet (25 mg) by mouth daily 90 tablet 3     etonogestrel (NEXPLANON) 68 MG IMPL One device, subdermally, for up to 3 years. 1 each 0     [DISCONTINUED] liraglutide (VICTOZA) 18 MG/3ML soln Inject 0.6 mg daily for 1 week, then 1.2 mg daily for 1 week and then 1.8 mg daily. 3 mL 1       ALLERGIES:   Allergies   Allergen Reactions     Lisinopril Cough     Zoloft      tired       PHYSICAL EXAMINATION:    VITALS: BP (!) 148/92 (BP Location: Right arm, Patient Position: Chair, Cuff Size: Adult Regular)  Pulse 110  Temp 98.3  F (36.8  C) (Oral)  Ht 1.6 m (5' 3\")  Wt 105.9 kg (233 lb 8 oz)  LMP 05/29/2017 (Approximate)  SpO2 98%  BMI 41.36 kg/m2  GENERAL: Patient is a 31 year old year old female  in no acute distress.  Patient is alert and orientated x 4, pleasant and cooperative with exam.     CARDIOVASCULAR:  Regular rate and rhythm without murmurs, rubs, or gallops.  RESPIRATORY:  Lungs are clear to auscultation bilaterally, respiratory effort is normal.   LOWER EXTREMITIES:  No edema noted bilaterally  PSYCH: mentation appears normal, affect " normal/bright    LAB RESULTS:   Office Visit on 12/09/2016   Component Date Value Ref Range Status     Sodium 12/09/2016 140  133 - 144 mmol/L Final     Potassium 12/09/2016 4.3  3.4 - 5.3 mmol/L Final     Chloride 12/09/2016 109  94 - 109 mmol/L Final     Carbon Dioxide 12/09/2016 21  20 - 32 mmol/L Final     Anion Gap 12/09/2016 10  3 - 14 mmol/L Final     Glucose 12/09/2016 91  70 - 99 mg/dL Final     Urea Nitrogen 12/09/2016 12  7 - 30 mg/dL Final     Creatinine 12/09/2016 0.77  0.52 - 1.04 mg/dL Final     GFR Estimate 12/09/2016 88  >60 mL/min/1.7m2 Final    Non  GFR Calc     GFR Estimate If Black 12/09/2016   >60 mL/min/1.7m2 Final                    Value:>90   GFR Calc       Calcium 12/09/2016 9.0  8.5 - 10.1 mg/dL Final       ASSESSMENT/PLAN:    1. Morbid obesity due to excess calories (H)  Victoza is working well to control her cravings and food intake. She is going on long walks for exercise. Discussed if her weight loss stalls she will have to start food logging again.  Continues on Vicoza 1.8 MG daily. Follow up in 6 weeks.   - liraglutide (VICTOZA) 18 MG/3ML soln; Inject 1.8 mg Subcutaneous daily  Dispense: 36 mL; Refill: 1  - TSH with free T4 reflex    2. High risk medication use  Pt aware of risks and benefits of medication.  - Creatinine      PLAN:    Continue  Victoza.  Prescription was given for Victoza Patient is to return to the clinic in 4-6 Weeks. Patient is advised to call clinic if she experiences any adverse reaction(s).       ORDERS PLACED IN TODAY'S VISIT:  Orders Placed This Encounter   Procedures     Creatinine     TSH with free T4 reflex       Patient Instructions     Continue Victoza at 1.8 mg daily.  Follow up in 6 weeks.    [facility]    If you have any questions regarding to your visit please contact your care team:     Team Pink:   Clinic Hours Telephone Number   Internal Medicine:  Dr. Lorraine Villegas,  NP       7am-7pm  Monday - Thursday   7am-5pm  Fridays  [phone]  (Appointment scheduling available 24/7)    Questions about your visit?  Team Line  [phone]   Urgent Care - [place] and [place] - 11am-9pm Monday-Friday Saturday-Sunday- 9am-5pm   [place] - 5pm-9pm Monday-Friday Saturday-Sunday- 9am-5pm  [phone] - [place]   [phone] - [place]       What options do I have for visits at the clinic other than the traditional office visit?  To expand how we care for you, many of our providers are utilizing electronic visits (e-visits) and telephone visits, when medically appropriate, for interactions with their patients rather than a visit in the clinic.   We also offer nurse visits for many medical concerns. Just like any other service, we will bill your insurance company for this type of visit based on time spent on the phone with your provider. Not all insurance companies cover these visits. Please check with your medical insurance if this type of visit is covered. You will be responsible for any charges that are not paid by your insurance.      E-visits via [employer]:  generally incur a $35.00 fee.  Telephone visits:  Time spent on the phone: *charged based on time that is spent on the phone in increments of 10 minutes. Estimated cost:   5-10 mins $30.00   11-20 mins. $59.00   21-30 mins. $85.00   Use [employer] (secure email communication and access to your chart) to send your primary care provider a message or make an appointment. Ask someone on your Team how to sign up for [employer].    For a Price Quote for your services, please call our Consumer Price Line at [phone].    As always, Thank you for trusting us with your health care needs!      Alessandra Gonzalez CMA        I spent 7 minutes of time with the patient and >50% of it was in education and counseling regarding weight management.     The information in this document, created by the medical scribe for me, accurately  reflects the services I personally performed and the decisions made by me. I have reviewed and approved this document for accuracy prior to leaving the patient care area.  Lorraine Johnson MD  3:07 PM, 06/19/17    Start 3:07 PM  End 3:14 PM

## 2017-06-19 NOTE — PATIENT INSTRUCTIONS
Continue Victoza at 1.8 mg daily.  Follow up in 6 weeks.    [facility]    If you have any questions regarding to your visit please contact your care team:     Team Pink:   Clinic Hours Telephone Number   Internal Medicine:  Dr. Lorraine Villegas NP       7am-7pm  Monday - Thursday   7am-5pm  Fridays  [phone]  (Appointment scheduling available 24/7)    Questions about your visit?  Team Line  [phone]   Urgent Care - [place] and [place] - 11am-9pm Monday-Friday Saturday-Sunday- 9am-5pm   [place] - 5pm-9pm Monday-Friday Saturday-Sunday- 9am-5pm  [phone] - [place]   [phone] - [place]       What options do I have for visits at the clinic other than the traditional office visit?  To expand how we care for you, many of our providers are utilizing electronic visits (e-visits) and telephone visits, when medically appropriate, for interactions with their patients rather than a visit in the clinic.   We also offer nurse visits for many medical concerns. Just like any other service, we will bill your insurance company for this type of visit based on time spent on the phone with your provider. Not all insurance companies cover these visits. Please check with your medical insurance if this type of visit is covered. You will be responsible for any charges that are not paid by your insurance.      E-visits via [employer]:  generally incur a $35.00 fee.  Telephone visits:  Time spent on the phone: *charged based on time that is spent on the phone in increments of 10 minutes. Estimated cost:   5-10 mins $30.00   11-20 mins. $59.00   21-30 mins. $85.00   Use [employer] (secure email communication and access to your chart) to send your primary care provider a message or make an appointment. Ask someone on your Team how to sign up for [employer].    For a Price Quote for your services, please call our Consumer Price Line at [phone].    As always,  Thank you for trusting us with your health care needs!      Alessandra Gonzalez, CMA

## 2017-06-20 LAB
CREAT SERPL-MCNC: 0.7 MG/DL (ref 0.52–1.04)
GFR SERPL CREATININE-BSD FRML MDRD: >90 ML/MIN/1.7M2
TSH SERPL DL<=0.005 MIU/L-ACNC: 0.55 MU/L (ref 0.4–4)

## 2017-07-21 ENCOUNTER — TELEPHONE (OUTPATIENT)
Dept: INTERNAL MEDICINE | Facility: CLINIC | Age: 32
End: 2017-07-21

## 2017-07-21 ENCOUNTER — TELEPHONE (OUTPATIENT)
Dept: FAMILY MEDICINE | Facility: CLINIC | Age: 32
End: 2017-07-21

## 2017-07-21 NOTE — LETTER
[address]  [place] [address]  Phone: [phone]            July 21, 2017          Екатерина Ramon,  [address]  [facility] [address]        Dear Екатерина Ramon      Monitoring and managing your preventative and chronic health conditions are very important to us. Our records indicate that you have not scheduled for Appointment with your provider and Depression Check Blood pressurewhich was recommended by Anusha Wolff      If you have received your health care elsewhere, please call the clinic so the information can be documented in your chart.    Please call [phone] or message us through your [employer] account to schedule an appointment or provide information for your chart.     Feel free to contact us if you have any questions or concerns!    I look forward to seeing you and working with you on your health care needs.     Sincerely,         Anusha Wolff / TIAN/MA

## 2017-07-21 NOTE — TELEPHONE ENCOUNTER
Panel Management Review      Patient has the following on her problem list:     Depression / Dysthymia review  PHQ-9 SCORE 2/25/2016 3/31/2016 5/6/2016   Total Score - - -   Total Score 2 2 2      Patient is due for:  PHQ9 and DAP    Hypertension   Last three blood pressure readings:  BP Readings from Last 3 Encounters:   06/19/17 (!) 148/92   05/30/17 128/84   05/16/17 134/90     Blood pressure: FAILED    HTN Guidelines:  Age 18-59 BP range:  Less than 140/90  Age 60-85 with Diabetes:  Less than 140/90  Age 60-85 without Diabetes:  less than 150/90          Composite cancer screening  Chart review shows that this patient is due/due soon for the following None  Summary:    Patient is due/failing the following:   BP CHECK, DAP and PHQ9    Action needed:   Patient needs office visit forBP CHECK, DAP and PHQ9 .    Type of outreach:    Sent letter.    Questions for provider review:    None                                                                                                                                    TIAN/MA       Chart routed to.

## 2017-08-29 RX ORDER — LIRAGLUTIDE 6 MG/ML
1.8 INJECTION SUBCUTANEOUS DAILY
Qty: 36 ML | Refills: 1 | Status: CANCELLED | OUTPATIENT
Start: 2017-08-29

## 2017-08-29 NOTE — PATIENT INSTRUCTIONS
[facility]    If you have any questions regarding to your visit please contact your care team:       Team Red:   Clinic Hours Telephone Number   Dr. Ernestina Coulter  (pediatrics)  Anusha Wolff NP 7am-7pm  Monday - Thursday   7am-5pm  Fridays  [phone] (fax)    Shahram HIDALGO  [phone]   Urgent Care - [place] and [place] Monday-Friday  [place] - 11am-8pm  Saturday-Sunday  Both sites - 9am-5pm  [phone] - [facility]  [phone] - [place]       What options do I have for visits at the clinic other than the traditional office visit?  To expand how we care for you, many of our providers are utilizing electronic visits (e-visits) and telephone visits, when medically appropriate, for interactions with their patients rather than a visit in the clinic.   We also offer nurse visits for many medical concerns. Just like any other service, we will bill your insurance company for this type of visit based on time spent on the phone with your provider. Not all insurance companies cover these visits. Please check with your medical insurance if this type of visit is covered. You will be responsible for any charges that are not paid by your insurance.      E-visits via [employer]:  generally incur a $35.00 fee.  Telephone visits:  Time spent on the phone: *charged based on time that is spent on the phone in increments of 10 minutes. Estimated cost:   5-10 mins $30.00   11-20 mins. $59.00   21-30 mins. $85.00     As always, Thank you for trusting us with your health care needs!              Preventive Health Recommendations  Female Ages 26 - 39  Yearly exam:   See your health care provider every year in order to    Review health changes.     Discuss preventive care.      Review your medicines if you your doctor has prescribed any.    Until age 30: Get a Pap test every three years (more often if you have had an abnormal result).    After age 30:  Talk to your doctor about whether you should have a Pap test every 3 years or have a Pap test with HPV screening every 5 years.   You do not need a Pap test if your uterus was removed (hysterectomy) and you have not had cancer.  You should be tested each year for STDs (sexually transmitted diseases), if you're at risk.   Talk to your provider about how often to have your cholesterol checked.  If you are at risk for diabetes, you should have a diabetes test (fasting glucose).  Shots: Get a flu shot each year. Get a tetanus shot every 10 years.   Nutrition:     Eat at least 5 servings of fruits and vegetables each day.    Eat whole-grain bread, whole-wheat pasta and brown rice instead of white grains and rice.    Talk to your provider about Calcium and Vitamin D.     Lifestyle    Exercise at least 150 minutes a week (30 minutes a day, 5 days of the week). This will help you control your weight and prevent disease.    Limit alcohol to one drink per day.    No smoking.     Wear sunscreen to prevent skin cancer.    See your dentist every six months for an exam and cleaning.    Discharged by Tara Lee MA.

## 2017-09-08 NOTE — PROGRESS NOTES
"INTERNAL MEDICINE  Medical Weight Loss - Follow-up Visit    Chief Complaint:   Chief Complaint   Patient presents with     Weight Problem     Wt Readings from Last 5 Encounters:   09/11/17 98 kg (216 lb)   09/11/17 99.7 kg (219 lb 12.8 oz)   06/19/17 105.9 kg (233 lb 8 oz)   05/30/17 109 kg (240 lb 6.4 oz)   05/16/17 107.7 kg (237 lb 8 oz)     Clinic on 6/19/17:  \"Continue Victoza at 1.8 mg daily.  Follow up in 6 weeks.\"    HISTORY OF PRESENT ILLNESS (HPI):    Patient  returns today for medical weight loss follow-up visit. Patient was last seen by me on 6/19/2017 and has lost 17 pounds since then. Her body fat has also decreased.     updates: she has been increasing her exercise. She continues to not eat very much. Calorie intake are under 1000 typically.     Meds: she denies abdominal pain with the injections.     Patient is not seeing dietitian    Patient is not seeing PT       Today we reviewed patient's lab results for labs ordered at last visit and answered patient's questions regarding lab results.     Today patient expresses interest in continuing weight loss medication.  Discussed risks vs benefits of  liraglutide  with patient in detail. Also discussed dosing of this medication.     MEDICATIONS:   Current Outpatient Prescriptions   Medication Sig Dispense Refill     citalopram (CELEXA) 20 MG tablet Take 1 tablet (20 mg) by mouth daily 90 tablet 3     verapamil (VERELAN) 120 MG 24 hr capsule Take 1 capsule (120 mg) by mouth daily 90 capsule 0     hydrochlorothiazide (HYDRODIURIL) 25 MG tablet Take 1 tablet (25 mg) by mouth daily 90 tablet 3     liraglutide (VICTOZA) 18 MG/3ML soln Inject 1.8 mg Subcutaneous daily Patient doesn't need this right now. 36 mL 1     insulin pen needle (BD TALHA U/F) 32G X 4 MM Use once daily or as directed. 100 each 3     etonogestrel (NEXPLANON) 68 MG IMPL One device, subdermally, for up to 3 years. 1 each 0     [DISCONTINUED] liraglutide (VICTOZA) 18 MG/3ML soln Inject 1.8 mg " "Subcutaneous daily 36 mL 1     traMADol (ULTRAM) 50 MG tablet Take 1-2 tablets ( mg) by mouth every 6 hours as needed for pain (Patient not taking: Reported on 9/11/2017) 20 tablet 0       ALLERGIES:   Allergies   Allergen Reactions     Lisinopril Cough     Zoloft      tired       PHYSICAL EXAMINATION:    VITALS: /84  Pulse 96  Temp 99.7  F (37.6  C) (Oral)  Ht 1.588 m (5' 2.5\")  Wt 98 kg (216 lb)  LMP 09/01/2017 (Exact Date)  SpO2 98%  BMI 38.88 kg/m2  GENERAL: Patient is a 31 year old year old female  in no acute distress.  Patient is alert and orientated x 4, pleasant and cooperative with exam.     ABDOMINAL: soft, non tender, small lump at site of last liraglutide injection.  PSYCH: mentation appears normal, affect normal/bright    LAB RESULTS:   Office Visit on 06/19/2017   Component Date Value Ref Range Status     Creatinine 06/19/2017 0.70  0.52 - 1.04 mg/dL Final     GFR Estimate 06/19/2017 >90  >60 mL/min/1.7m2 Final     GFR Estimate If Black 06/19/2017 >90  >60 mL/min/1.7m2 Final     TSH 06/19/2017 0.55  0.40 - 4.00 mU/L Final       ASSESSMENT/PLAN:    1. BMI 40.0-44.9, adult (H)  Continues to lose weight  Recommended increasing protein intake to 80 g   Recommended increasing HIIT or other strength training to 2x per week    2. High risk medication use  Discussed risks    3. Morbid obesity due to excess calories (H)  Will continue with current dose.   - liraglutide (VICTOZA) 18 MG/3ML soln; Inject 1.8 mg Subcutaneous daily Patient doesn't need this right now.  Dispense: 36 mL; Refill: 1    PLAN:    Continue  With liraglutide.  Prescription was given for liraglutide.  Patient is to return to the clinic in . Patient is advised to call clinic if she experiences any adverse reaction(s).     ORDERS PLACED IN TODAY'S VISIT:  No orders of the defined types were placed in this encounter.    Follow up in 6-8 weeks.     I spent 18 minutes of time with the patient and >50% of it was in education " and counseling regarding weight loss, medications, exercise, and diet.       The information in this document, created by the medical scribe for me, accurately reflects the services I personally performed and the decisions made by me. I have reviewed and approved this document for accuracy prior to leaving the patient care area.  9/11/2017 2:53 PM     Patient Instructions     I would like you to eat approx 80 grams of protein daily (more is ok). You can try eating a mozzarella cheese stick, some nuts, Fairlife milk, tuna, etc. This might be difficult, but you can slowly work your way up to it. Protein powder or Premier Protein shakes are fine too.    Try doing strength training 2x per week whether this is HIIT or yoga or pilaties    Follow up in 6 weeks, or sooner if needed.         Start 2:34 PM   End 2:52 PM

## 2017-09-11 ENCOUNTER — OFFICE VISIT (OUTPATIENT)
Dept: INTERNAL MEDICINE | Facility: CLINIC | Age: 32
End: 2017-09-11
Payer: COMMERCIAL

## 2017-09-11 ENCOUNTER — OFFICE VISIT (OUTPATIENT)
Dept: FAMILY MEDICINE | Facility: CLINIC | Age: 32
End: 2017-09-11
Payer: COMMERCIAL

## 2017-09-11 VITALS
BODY MASS INDEX: 38.27 KG/M2 | WEIGHT: 216 LBS | DIASTOLIC BLOOD PRESSURE: 84 MMHG | SYSTOLIC BLOOD PRESSURE: 122 MMHG | HEART RATE: 96 BPM | TEMPERATURE: 99.7 F | HEIGHT: 63 IN | OXYGEN SATURATION: 98 %

## 2017-09-11 VITALS
SYSTOLIC BLOOD PRESSURE: 131 MMHG | WEIGHT: 219.8 LBS | BODY MASS INDEX: 38.95 KG/M2 | TEMPERATURE: 98.2 F | HEIGHT: 63 IN | RESPIRATION RATE: 26 BRPM | HEART RATE: 105 BPM | DIASTOLIC BLOOD PRESSURE: 91 MMHG | OXYGEN SATURATION: 99 %

## 2017-09-11 DIAGNOSIS — Z12.4 SCREENING FOR MALIGNANT NEOPLASM OF CERVIX: ICD-10-CM

## 2017-09-11 DIAGNOSIS — E66.01 MORBID OBESITY DUE TO EXCESS CALORIES (H): ICD-10-CM

## 2017-09-11 DIAGNOSIS — Z00.00 ROUTINE GENERAL MEDICAL EXAMINATION AT A HEALTH CARE FACILITY: Primary | ICD-10-CM

## 2017-09-11 DIAGNOSIS — G43.109 MIGRAINE WITH AURA AND WITHOUT STATUS MIGRAINOSUS, NOT INTRACTABLE: ICD-10-CM

## 2017-09-11 DIAGNOSIS — Z79.899 HIGH RISK MEDICATION USE: ICD-10-CM

## 2017-09-11 DIAGNOSIS — I10 HYPERTENSION GOAL BP (BLOOD PRESSURE) < 140/90: ICD-10-CM

## 2017-09-11 DIAGNOSIS — Z23 NEED FOR PROPHYLACTIC VACCINATION AND INOCULATION AGAINST INFLUENZA: ICD-10-CM

## 2017-09-11 DIAGNOSIS — F41.9 ANXIETY: ICD-10-CM

## 2017-09-11 DIAGNOSIS — Z11.3 SCREEN FOR STD (SEXUALLY TRANSMITTED DISEASE): ICD-10-CM

## 2017-09-11 LAB
ANION GAP SERPL CALCULATED.3IONS-SCNC: 6 MMOL/L (ref 3–14)
BUN SERPL-MCNC: 11 MG/DL (ref 7–30)
CALCIUM SERPL-MCNC: 9.3 MG/DL (ref 8.5–10.1)
CHLORIDE SERPL-SCNC: 104 MMOL/L (ref 94–109)
CO2 SERPL-SCNC: 27 MMOL/L (ref 20–32)
CREAT SERPL-MCNC: 0.8 MG/DL (ref 0.52–1.04)
GFR SERPL CREATININE-BSD FRML MDRD: 83 ML/MIN/1.7M2
GLUCOSE SERPL-MCNC: 79 MG/DL (ref 70–99)
POTASSIUM SERPL-SCNC: 4 MMOL/L (ref 3.4–5.3)
SODIUM SERPL-SCNC: 137 MMOL/L (ref 133–144)

## 2017-09-11 PROCEDURE — G0145 SCR C/V CYTO,THINLAYER,RESCR: HCPCS | Performed by: NURSE PRACTITIONER

## 2017-09-11 PROCEDURE — 36415 COLL VENOUS BLD VENIPUNCTURE: CPT | Performed by: NURSE PRACTITIONER

## 2017-09-11 PROCEDURE — 87591 N.GONORRHOEAE DNA AMP PROB: CPT | Performed by: NURSE PRACTITIONER

## 2017-09-11 PROCEDURE — 99213 OFFICE O/P EST LOW 20 MIN: CPT | Mod: 25 | Performed by: NURSE PRACTITIONER

## 2017-09-11 PROCEDURE — 80048 BASIC METABOLIC PNL TOTAL CA: CPT | Performed by: NURSE PRACTITIONER

## 2017-09-11 PROCEDURE — 99213 OFFICE O/P EST LOW 20 MIN: CPT | Performed by: INTERNAL MEDICINE

## 2017-09-11 PROCEDURE — 99395 PREV VISIT EST AGE 18-39: CPT | Performed by: NURSE PRACTITIONER

## 2017-09-11 PROCEDURE — 87624 HPV HI-RISK TYP POOLED RSLT: CPT | Performed by: NURSE PRACTITIONER

## 2017-09-11 PROCEDURE — 87491 CHLMYD TRACH DNA AMP PROBE: CPT | Performed by: NURSE PRACTITIONER

## 2017-09-11 RX ORDER — LIRAGLUTIDE 6 MG/ML
1.8 INJECTION SUBCUTANEOUS DAILY
Qty: 36 ML | Refills: 1 | Status: SHIPPED | OUTPATIENT
Start: 2017-09-11 | End: 2017-11-14

## 2017-09-11 RX ORDER — VERAPAMIL HYDROCHLORIDE 120 MG/1
120 CAPSULE, EXTENDED RELEASE ORAL DAILY
Qty: 90 CAPSULE | Refills: 0 | Status: SHIPPED | OUTPATIENT
Start: 2017-09-11 | End: 2017-09-20

## 2017-09-11 RX ORDER — HYDROCHLOROTHIAZIDE 25 MG/1
25 TABLET ORAL DAILY
Qty: 90 TABLET | Refills: 3 | Status: SHIPPED | OUTPATIENT
Start: 2017-09-11 | End: 2018-08-01

## 2017-09-11 RX ORDER — CITALOPRAM HYDROBROMIDE 20 MG/1
20 TABLET ORAL DAILY
Qty: 90 TABLET | Refills: 3 | Status: SHIPPED | OUTPATIENT
Start: 2017-09-11 | End: 2018-01-26 | Stop reason: ALTCHOICE

## 2017-09-11 ASSESSMENT — PATIENT HEALTH QUESTIONNAIRE - PHQ9
SUM OF ALL RESPONSES TO PHQ QUESTIONS 1-9: 4
5. POOR APPETITE OR OVEREATING: NOT AT ALL

## 2017-09-11 ASSESSMENT — PAIN SCALES - GENERAL: PAINLEVEL: NO PAIN (0)

## 2017-09-11 ASSESSMENT — ANXIETY QUESTIONNAIRES
1. FEELING NERVOUS, ANXIOUS, OR ON EDGE: NOT AT ALL
5. BEING SO RESTLESS THAT IT IS HARD TO SIT STILL: NOT AT ALL
2. NOT BEING ABLE TO STOP OR CONTROL WORRYING: NOT AT ALL
GAD7 TOTAL SCORE: 0
IF YOU CHECKED OFF ANY PROBLEMS ON THIS QUESTIONNAIRE, HOW DIFFICULT HAVE THESE PROBLEMS MADE IT FOR YOU TO DO YOUR WORK, TAKE CARE OF THINGS AT HOME, OR GET ALONG WITH OTHER PEOPLE: NOT DIFFICULT AT ALL
6. BECOMING EASILY ANNOYED OR IRRITABLE: NOT AT ALL
7. FEELING AFRAID AS IF SOMETHING AWFUL MIGHT HAPPEN: NOT AT ALL
3. WORRYING TOO MUCH ABOUT DIFFERENT THINGS: NOT AT ALL

## 2017-09-11 NOTE — MR AVS SNAPSHOT
After Visit Summary   9/11/2017    Екатерина Ramon    MRN: [id number]           Patient Information     Date Of Birth          1985        Visit Information        Provider Department      9/11/2017 2:30 PM Lorraine Johnson MD [facility]        Today's Diagnoses     BMI 40.0-44.9, adult (H)    -  1    High risk medication use        Morbid obesity due to excess calories (H)          Care Instructions    I would like you to eat approx 80 grams of protein daily (more is ok). You can try eating a mozzarella cheese stick, some nuts, Fairlife milk, tuna, etc. This might be difficult, but you can slowly work your way up to it. Protein powder or Premier Protein shakes are fine too.    Try doing strength training 2x per week whether this is HIIT or yoga or pilaties    Follow up in 6 weeks, or sooner if needed.     [facility]    If you have any questions regarding to your visit please contact your care team:     Team Pink:   Clinic Hours Telephone Number   Internal Medicine:  Dr. Lorraine Villegas, NP       7am-7pm  Monday - Thursday   7am-5pm  Fridays  [phone]  (Appointment scheduling available 24/7)    Questions about your visit?  Team Line  [phone]   Urgent Care - [place] - 11am-9pm Monday-Friday Saturday-Sunday- 9am-5pm   [place] - 5pm-9pm Monday-Friday Saturday-Sunday- 9am-5pm  [phone] - [place]   [phone] - [place]       What options do I have for visits at the clinic other than the traditional office visit?  To expand how we care for you, many of our providers are utilizing electronic visits (e-visits) and telephone visits, when medically appropriate, for interactions with their patients rather than a visit in the clinic.   We also offer nurse visits for many medical concerns. Just like any other service, we will bill your insurance company for this type of visit based on time  spent on the phone with your provider. Not all insurance companies cover these visits. Please check with your medical insurance if this type of visit is covered. You will be responsible for any charges that are not paid by your insurance.      E-visits via [employer]:  generally incur a $35.00 fee.  Telephone visits:  Time spent on the phone: *charged based on time that is spent on the phone in increments of 10 minutes. Estimated cost:   5-10 mins $30.00   11-20 mins. $59.00   21-30 mins. $85.00   Use [employer] (secure email communication and access to your chart) to send your primary care provider a message or make an appointment. Ask someone on your Team how to sign up for [employer].    For a Price Quote for your services, please call our [employer] Line at [phone].    As always, Thank you for trusting us with your health care needs!    Discharged By:  SARAH DEAN            Follow-ups after your visit        Your next 10 appointments already scheduled     Sep 12, 2017  3:00 PM CDT   Office Visit with Joel Michelle MD   [facility] ([facility])    [address] [place] [id number] [phone]           Bring a current list of meds and any records pertaining to this visit. For Physicals, please bring immunization records and any forms needing to be filled out. Please arrive 10 minutes early to complete paperwork.              Who to contact     If you have questions or need follow up information about today's clinic visit or your schedule please contact [facility] directly at [phone].  Normal or non-critical lab and imaging results will be communicated to you by MyChart, letter or phone within 4 business days after the clinic has received the results. If you do not hear from us within 7 days, please contact the clinic through MyChart or phone. If you have a critical or abnormal lab result, we will notify you by phone as soon as possible.  Submit  "refill requests through [employer] or call your pharmacy and they will forward the refill request to us. Please allow 3 business days for your refill to be completed.          Additional Information About Your Visit        [employer] Information     [employer] gives you secure access to your electronic health record. If you see a primary care provider, you can also send messages to your care team and make appointments. If you have questions, please call your primary care clinic.  If you do not have a primary care provider, please call [phone] and they will assist you.        Care EveryWhere ID     This is your Care EveryWhere ID. This could be used by other organizations to access your [facility] medical records  [id number]        Your Vitals Were     Pulse Temperature Height Last Period Pulse Oximetry BMI (Body Mass Index)    96 99.7  F (37.6  C) (Oral) 5' 2.5\" (1.588 m) 09/01/2017 (Exact Date) 98% 38.88 kg/m2       Blood Pressure from Last 3 Encounters:   09/11/17 122/84   09/11/17 (!) 131/91   06/19/17 (!) 148/92    Weight from Last 3 Encounters:   09/11/17 216 lb (98 kg)   09/11/17 219 lb 12.8 oz (99.7 kg)   06/19/17 233 lb 8 oz (105.9 kg)              Today, you had the following     No orders found for display         Today's Medication Changes          These changes are accurate as of: 9/11/17  2:52 PM.  If you have any questions, ask your nurse or doctor.               These medicines have changed or have updated prescriptions.        Dose/Directions    citalopram 20 MG tablet   Commonly known as:  celeXA   This may have changed:  See the new instructions.   Used for:  Anxiety   Changed by:  Anusha Wolff APRN CNP        Dose:  20 mg   Take 1 tablet (20 mg) by mouth daily   Quantity:  90 tablet   Refills:  3       liraglutide 18 MG/3ML soln   Commonly known as:  VICTOZA   This may have changed:  additional instructions   Used for:  Morbid obesity due to excess calories (H)   Changed by:  Lorraine Johnson " MD Marisa        Dose:  1.8 mg   Inject 1.8 mg Subcutaneous daily Patient doesn't need this right now.   Quantity:  36 mL   Refills:  1            Where to get your medicines      These medications were sent to [id number] - [place], [place] - [address]  [address], [facility] [address]     Phone:  [phone]     citalopram 20 MG tablet    hydrochlorothiazide 25 MG tablet    liraglutide 18 MG/3ML soln    verapamil 120 MG 24 hr capsule                Primary Care Provider Office Phone # Fax #    Anusha Benavides New, APRN [phone] [address] [address]        Equal Access to Services     [facility] : Hadii flori whitney hadasho Sohardyali, waaxda luqadaha, qaybta kaalmada adeegyada, lasha georgein dillon lin . So [facility] [phone].    ATENCIÓN: Si habla español, tiene a ramirez disposición servicios gratuitos de asistencia lingüística. Ll[facility]tering [facility] Hamilton [phone].    We comply with applicable federal civil rights laws and [place] laws. We do not discriminate on the basis of race, color, national origin, age, disability sex, sexual orientation or gender identity.            Thank you!     Thank you for choosing [facility]  for your care. Our goal is always to provide you with excellent care. Hearing back from our patients is one way we can continue to improve our services. Please take a few minutes to complete the written survey that you may receive in the mail after your visit with us. Thank you!             Your Updated Medication List - Protect others around you: Learn how to safely use, store and throw away your medicines at www.disposemymeds.org.          This list is accurate as of: 9/11/17  2:52 PM.  Always use your most recent med list.                   Brand Name Dispense Instructions for use Diagnosis    citalopram 20 MG tablet    celeXA    90 tablet    Take 1 tablet (20 mg) by mouth daily    Anxiety       etonogestrel  68 MG Impl    NEXPLANON    1 each    One device, subdermally, for up to 3 years.    Insertion of Nexplanon, Encounter for counseling regarding contraception       hydrochlorothiazide 25 MG tablet    HYDRODIURIL    90 tablet    Take 1 tablet (25 mg) by mouth daily    Hypertension goal BP (blood pressure) < 140/90       insulin pen needle 32G X 4 MM    BD TALHA U/F    100 each    Use once daily or as directed.    Morbid obesity due to excess calories (H)       liraglutide 18 MG/3ML soln    VICTOZA    36 mL    Inject 1.8 mg Subcutaneous daily Patient doesn't need this right now.    Morbid obesity due to excess calories (H)       traMADol 50 MG tablet    ULTRAM    20 tablet    Take 1-2 tablets ( mg) by mouth every 6 hours as needed for pain    Chronic midline low back pain without sciatica, Chronic bilateral thoracic back pain       verapamil 120 MG 24 hr capsule    VERELAN    90 capsule    Take 1 capsule (120 mg) by mouth daily    Hypertension goal BP (blood pressure) < 140/90, Migraine with aura and without status migrainosus, not intractable

## 2017-09-11 NOTE — NURSING NOTE
"Chief Complaint   Patient presents with     Physical       Initial BP (!) 131/91 (BP Location: Left arm, Patient Position: Chair, Cuff Size: Adult Regular)  Pulse 105  Temp 98.2  F (36.8  C) (Oral)  Resp 26  Ht 5' 3\" (1.6 m)  Wt 219 lb 12.8 oz (99.7 kg)  LMP 09/01/2017 (Exact Date)  SpO2 99%  Breastfeeding? No  BMI 38.94 kg/m2 Estimated body mass index is 38.94 kg/(m^2) as calculated from the following:    Height as of this encounter: 5' 3\" (1.6 m).    Weight as of this encounter: 219 lb 12.8 oz (99.7 kg).  Medication Reconciliation: complete     Davi Monge      "

## 2017-09-11 NOTE — PROGRESS NOTES
SUBJECTIVE:   CC: Екатерина Ramon is an 31 year old woman who presents for preventive health visit.     Physical   Annual:     Getting at least 3 servings of Calcium per day::  NO    Bi-annual eye exam::  NO    Dental care twice a year::  Yes    Sleep apnea or symptoms of sleep apnea::  Daytime drowsiness    Diet::  Other    Frequency of exercise::  4-5 days/week    Duration of exercise::  30-45 minutes    Taking medications regularly::  Yes    Medication side effects::  None    Additional concerns today::  No    Concerns: none   Depression and Anxiety Follow-Up    Status since last visit: No change    Other associated symptoms:None    Complicating factors:     Significant life event: No     Current substance abuse: None    PHQ-9 SCORE 2/25/2016 3/31/2016 5/6/2016   Total Score - - -   Total Score 2 2 2     BERENICE-7 SCORE 2/25/2016 3/31/2016 5/6/2016   Total Score - - -   Total Score 6 4 1       PHQ-9  English  PHQ-9   Any Language  GAD7    History of migraines and has been having headaches twice/week, but she has been out of her Verapamil for several months. BPs are also elevated today due to this.    Today's PHQ-2 Score: PHQ-2 ( 1999 Pfizer) 9/8/2017   Q1: Little interest or pleasure in doing things 0   Q2: Feeling down, depressed or hopeless 0   PHQ-2 Score 0   Q1: Little interest or pleasure in doing things Not at all   Q2: Feeling down, depressed or hopeless Not at all   PHQ-2 Score 0     Abuse: Current or Past(Physical, Sexual or Emotional)- No  Do you feel safe in your environment - Yes    Social History   Substance Use Topics     Smoking status: Former Smoker     Types: Cigarettes     Quit date: 5/1/2010     Smokeless tobacco: Never Used     Alcohol use Yes      Comment: rare     The patient does not drink >3 drinks per day nor >7 drinks per week.    Reviewed orders with patient.  Reviewed health maintenance and updated orders accordingly - Yes  Davi Monge    Labs reviewed in EPIC    Mammogram not  "appropriate for this patient based on age.    Pertinent mammograms are reviewed under the imaging tab.  History of abnormal Pap smear: NO - age 30-65 PAP every 5 years with negative HPV co-testing recommended    Reviewed and updated as needed this visit by clinical staff  Tobacco  Allergies  Meds  Med Hx  Surg Hx  Fam Hx  Soc Hx      Reviewed and updated as needed this visit by Provider              ROS:  C: NEGATIVE for fever, chills, change in weight  I: NEGATIVE for worrisome rashes, moles or lesions  E: NEGATIVE for vision changes or irritation  ENT: NEGATIVE for ear, mouth and throat problems  R: NEGATIVE for significant cough or SOB  B: NEGATIVE for masses, tenderness or discharge  CV: NEGATIVE for chest pain, palpitations or peripheral edema  GI: NEGATIVE for nausea, abdominal pain, heartburn, or change in bowel habits  : NEGATIVE for unusual urinary or vaginal symptoms. Periods are irregular due to Nexplanon implant.  M: NEGATIVE for significant arthralgias or myalgia  NEURO: as above  PSYCHIATRIC: as above     OBJECTIVE:   BP (!) 131/91 (BP Location: Left arm, Patient Position: Chair, Cuff Size: Adult Regular)  Pulse 105  Temp 98.2  F (36.8  C) (Oral)  Resp 26  Ht 5' 3\" (1.6 m)  Wt 219 lb 12.8 oz (99.7 kg)  LMP 09/01/2017 (Exact Date)  SpO2 99%  Breastfeeding? No  BMI 38.94 kg/m2  EXAM:  GENERAL: healthy, alert and no distress  EYES: Eyes grossly normal to inspection, PERRL and conjunctivae and sclerae normal  HENT: ear canals and TM's normal, nose and mouth without ulcers or lesions  NECK: no adenopathy, no asymmetry, masses, or scars and thyroid normal to palpation  RESP: lungs clear to auscultation - no rales, rhonchi or wheezes  BREAST: normal without masses, tenderness or nipple discharge and no palpable axillary masses or adenopathy  CV: regular rate and rhythm, normal S1 S2, no S3 or S4, no murmur, click or rub, no peripheral edema and peripheral pulses strong  ABDOMEN: soft, " nontender, no hepatosplenomegaly, no masses and bowel sounds normal   (female): normal female external genitalia, normal urethral meatus, vaginal mucosa pink, moist, well rugated, and normal cervix/adnexa/uterus without masses or discharge  MS: no gross musculoskeletal defects noted, no edema  SKIN: no suspicious lesions or rashes  NEURO: Normal strength and tone, mentation intact and speech normal  PSYCH: mentation appears normal, affect normal/bright    ASSESSMENT/PLAN:   1. Routine general medical examination at a health care facility      2. Hypertension goal BP (blood pressure) < 140/90  Restart Verapamil. Follow up in 2-4 weeks for ancillary BP check. If controlled, will send additional refills of medication.  - verapamil (VERELAN) 120 MG 24 hr capsule; Take 1 capsule (120 mg) by mouth daily  Dispense: 90 capsule; Refill: 0  - hydrochlorothiazide (HYDRODIURIL) 25 MG tablet; Take 1 tablet (25 mg) by mouth daily  Dispense: 90 tablet; Refill: 3    3. Migraine with aura and without status migrainosus, not intractable  Uncontrolled- restart Verapamil.  - verapamil (VERELAN) 120 MG 24 hr capsule; Take 1 capsule (120 mg) by mouth daily  Dispense: 90 capsule; Refill: 0    4. Anxiety  Stable, patient desires to continue medication  - citalopram (CELEXA) 20 MG tablet; Take 1 tablet (20 mg) by mouth daily  Dispense: 90 tablet; Refill: 3    5. Morbid obesity due to excess calories (H)  Following with Dr. Johnson- has lost 20 lbs!    6. Screening for malignant neoplasm of cervix    - Basic metabolic panel  - Pap imaged thin layer screen with HPV - recommended age 30 - 65 years (select HPV order below)  - HPV High Risk Types DNA Cervical    7. Need for prophylactic vaccination and inoculation against influenza        COUNSELING:  Reviewed preventive health counseling, as reflected in patient instructions       Regular exercise       Healthy diet/nutrition                 reports that she quit smoking about 7 years ago. Her  "smoking use included Cigarettes. She has never used smokeless tobacco.    Estimated body mass index is 38.94 kg/(m^2) as calculated from the following:    Height as of this encounter: 5' 3\" (1.6 m).    Weight as of this encounter: 219 lb 12.8 oz (99.7 kg).   Weight management plan: Patient referred to endocrine and/or weight management specialty    Counseling Resources:  ATP IV Guidelines  Pooled Cohorts Equation Calculator  Breast Cancer Risk Calculator  FRAX Risk Assessment  ICSI Preventive Guidelines  Dietary Guidelines for Americans, 2010  USDA's MyPlate  ASA Prophylaxis  Lung CA Screening    ESTUARDO [facility] [place]  Answers for HPI/ROS submitted by the patient on 9/8/2017   PHQ-2 Score: 0    "

## 2017-09-11 NOTE — NURSING NOTE
"Chief Complaint   Patient presents with     Weight Problem       Initial /84  Pulse 96  Temp 99.7  F (37.6  C) (Oral)  Ht 5' 2.5\" (1.588 m)  Wt 216 lb (98 kg)  LMP 09/01/2017 (Exact Date)  SpO2 98%  BMI 38.88 kg/m2 Estimated body mass index is 38.88 kg/(m^2) as calculated from the following:    Height as of this encounter: 5' 2.5\" (1.588 m).    Weight as of this encounter: 216 lb (98 kg).  Medication Reconciliation: complete   Farida Power MA    "

## 2017-09-11 NOTE — MR AVS SNAPSHOT
After Visit Summary   9/11/2017    Екатерина Ramon    MRN: [id number]           Patient Information     Date Of Birth          1985        Visit Information        Provider Department      9/11/2017 1:40 PM Anusha Wolff APRN [facility]        Today's Diagnoses     Routine general medical examination at a health care facility    -  1    Hypertension goal BP (blood pressure) < 140/90        Migraine with aura and without status migrainosus, not intractable        Anxiety        Morbid obesity due to excess calories (H)        Screening for malignant neoplasm of cervix        Need for prophylactic vaccination and inoculation against influenza          Care Instructions    [facility]    If you have any questions regarding to your visit please contact your care team:       Team Red:   Clinic Hours Telephone Number   Dr. Ernestina Coulter  (pediatrics)  Anusha Wolff NP 7am-7pm  Monday - Thursday   7am-5pm  Fridays  [phone] (fax)    Shahram HIDALGO  [phone]   Urgent Care - [place] and [place] Monday-Friday  [place] - 11am-8pm  Saturday-Sunday  Both sites - 9am-5pm  [phone] - [facility]  [phone] [facility]       What options do I have for visits at the clinic other than the traditional office visit?  To expand how we care for you, many of our providers are utilizing electronic visits (e-visits) and telephone visits, when medically appropriate, for interactions with their patients rather than a visit in the clinic.   We also offer nurse visits for many medical concerns. Just like any other service, we will bill your insurance company for this type of visit based on time spent on the phone with your provider. Not all insurance companies cover these visits. Please check with your medical insurance if this type of visit is covered. You will be responsible for any charges that are not  paid by your insurance.      E-visits via [employer]:  generally incur a $35.00 fee.  Telephone visits:  Time spent on the phone: *charged based on time that is spent on the phone in increments of 10 minutes. Estimated cost:   5-10 mins $30.00   11-20 mins. $59.00   21-30 mins. $85.00     As always, Thank you for trusting us with your health care needs!              Preventive Health Recommendations  Female Ages 26 - 39  Yearly exam:   See your health care provider every year in order to    Review health changes.     Discuss preventive care.      Review your medicines if you your doctor has prescribed any.    Until age 30: Get a Pap test every three years (more often if you have had an abnormal result).    After age 30: Talk to your doctor about whether you should have a Pap test every 3 years or have a Pap test with HPV screening every 5 years.   You do not need a Pap test if your uterus was removed (hysterectomy) and you have not had cancer.  You should be tested each year for STDs (sexually transmitted diseases), if you're at risk.   Talk to your provider about how often to have your cholesterol checked.  If you are at risk for diabetes, you should have a diabetes test (fasting glucose).  Shots: Get a flu shot each year. Get a tetanus shot every 10 years.   Nutrition:     Eat at least 5 servings of fruits and vegetables each day.    Eat whole-grain bread, whole-wheat pasta and brown rice instead of white grains and rice.    Talk to your provider about Calcium and Vitamin D.     Lifestyle    Exercise at least 150 minutes a week (30 minutes a day, 5 days of the week). This will help you control your weight and prevent disease.    Limit alcohol to one drink per day.    No smoking.     Wear sunscreen to prevent skin cancer.    See your dentist every six months for an exam and cleaning.    Discharged by Tara Lee MA.            Follow-ups after your visit        Your next 10 appointments already scheduled     Sep  11, 2017  2:30 PM CDT   [facility]raine Johnson MD   [facility]    [address] [address] [phone]            Sep 12, 2017  3:00 PM CDT   Office Visit with Joel Michelle MD   [facility]    [facility] [facility] [address] [phone]           Bring a current list of meds and any records pertaining to this visit. For Physicals, please bring immunization records and any forms needing to be filled out. Please arrive 10 minutes early to complete paperwork.              Who to contact     If you have questions or need follow up information about today's clinic visit or your schedule please contact [facility] directly at [phone].  Normal or non-critical lab and imaging results will be communicated to you by MyChart, letter or phone within 4 business days after the clinic has received the results. If you do not hear from us within 7 days, please contact the clinic through [employer] or phone. If you have a critical or abnormal lab result, we will notify you by phone as soon as possible.  Submit refill requests through [employer] or call your pharmacy and they will forward the refill request to us. Please allow 3 business days for your refill to be completed.          Additional Information About Your Visit        MyChart Information     [employer] gives you secure access to your electronic health record. If you see a primary care provider, you can also send messages to your care team and make appointments. If you have questions, please call your primary care clinic.  If you do not have a primary care provider, please call [phone] and they will assist you.        Care EveryWhere ID     This is your Care EveryWhere ID. This could be used by other organizations to access your [place] medical records  [id number]        Your Vitals Were     Pulse Temperature  "Respirations Height Last Period Pulse Oximetry    105 98.2  F (36.8  C) (Oral) 26 5' 3\" (1.6 m) 09/01/2017 (Exact Date) 99%    Breastfeeding? BMI (Body Mass Index)                No 38.94 kg/m2           Blood Pressure from Last 3 Encounters:   09/11/17 (!) 131/91   06/19/17 (!) 148/92   05/30/17 128/84    Weight from Last 3 Encounters:   09/11/17 219 lb 12.8 oz (99.7 kg)   06/19/17 233 lb 8 oz (105.9 kg)   05/30/17 240 lb 6.4 oz (109 kg)              We Performed the Following     Basic metabolic panel     HPV High Risk Types DNA Cervical     Pap imaged thin layer screen with HPV - recommended age 30 - 65 years (select HPV order below)          Today's Medication Changes          These changes are accurate as of: 9/11/17  2:21 PM.  If you have any questions, ask your nurse or doctor.               These medicines have changed or have updated prescriptions.        Dose/Directions    citalopram 20 MG tablet   Commonly known as:  celeXA   This may have changed:  See the new instructions.   Used for:  Anxiety   Changed by:  Anusha Wolff APRN CNP        Dose:  20 mg   Take 1 tablet (20 mg) by mouth daily   Quantity:  90 tablet   Refills:  3            Where to get your medicines      These medications were sent to [id number] [address]  [address] [facility] [address]     Phone:  [phone]     citalopram 20 MG tablet    hydrochlorothiazide 25 MG tablet    verapamil 120 MG 24 hr capsule                Primary Care Provider Office Phone # Fax #    ESTUARDO Jain [phone]       [address] [facility] [address]        Equal Access to Services     CHRIS WALTERS : Susi Isabel, ky bourne, qazeina kaalmada renay, lasha apyne. Viji [facility] [phone].    ATENCIÓN: Si habla español, tiene a ramirez disposición servicios gratuitos de asistencia lingüística. Llame al " [phone].    We comply with applicable federal civil rights laws and [place] laws. We do not discriminate on the basis of race, color, national origin, age, disability sex, sexual orientation or gender identity.            Thank you!     Thank you for choosing [facility]  for your care. Our goal is always to provide you with excellent care. Hearing back from our patients is one way we can continue to improve our services. Please take a few minutes to complete the written survey that you may receive in the mail after your visit with us. Thank you!             Your Updated Medication List - Protect others around you: Learn how to safely use, store and throw away your medicines at www.disposemymeds.org.          This list is accurate as of: 9/11/17  2:21 PM.  Always use your most recent med list.                   Brand Name Dispense Instructions for use Diagnosis    citalopram 20 MG tablet    celeXA    90 tablet    Take 1 tablet (20 mg) by mouth daily    Anxiety       etonogestrel 68 MG Impl    NEXPLANON    1 each    One device, subdermally, for up to 3 years.    Insertion of Nexplanon, Encounter for counseling regarding contraception       hydrochlorothiazide 25 MG tablet    HYDRODIURIL    90 tablet    Take 1 tablet (25 mg) by mouth daily    Hypertension goal BP (blood pressure) < 140/90       insulin pen needle 32G X 4 MM    BD TALHA U/F    100 each    Use once daily or as directed.    Morbid obesity due to excess calories (H)       liraglutide 18 MG/3ML soln    VICTOZA    36 mL    Inject 1.8 mg Subcutaneous daily    Morbid obesity due to excess calories (H)       traMADol 50 MG tablet    ULTRAM    20 tablet    Take 1-2 tablets ( mg) by mouth every 6 hours as needed for pain    Chronic midline low back pain without sciatica, Chronic bilateral thoracic back pain       verapamil 120 MG 24 hr capsule    VERELAN    90 capsule    Take 1 capsule (120 mg) by mouth daily    Hypertension goal BP  (blood pressure) < 140/90, Migraine with aura and without status migrainosus, not intractable

## 2017-09-12 ENCOUNTER — OFFICE VISIT (OUTPATIENT)
Dept: OBGYN | Facility: CLINIC | Age: 32
End: 2017-09-12
Payer: COMMERCIAL

## 2017-09-12 VITALS
OXYGEN SATURATION: 96 % | HEART RATE: 108 BPM | SYSTOLIC BLOOD PRESSURE: 126 MMHG | DIASTOLIC BLOOD PRESSURE: 82 MMHG | WEIGHT: 218 LBS | BODY MASS INDEX: 39.24 KG/M2

## 2017-09-12 DIAGNOSIS — Z30.46 ENCOUNTER FOR NEXPLANON REMOVAL: Primary | ICD-10-CM

## 2017-09-12 DIAGNOSIS — Z30.017 NEXPLANON INSERTION: ICD-10-CM

## 2017-09-12 LAB
C TRACH DNA SPEC QL NAA+PROBE: NEGATIVE
N GONORRHOEA DNA SPEC QL NAA+PROBE: NEGATIVE
SPECIMEN SOURCE: NORMAL
SPECIMEN SOURCE: NORMAL

## 2017-09-12 PROCEDURE — 11983 REMOVE/INSERT DRUG IMPLANT: CPT | Performed by: OBSTETRICS & GYNECOLOGY

## 2017-09-12 PROCEDURE — 99213 OFFICE O/P EST LOW 20 MIN: CPT | Mod: 25 | Performed by: OBSTETRICS & GYNECOLOGY

## 2017-09-12 ASSESSMENT — ANXIETY QUESTIONNAIRES: GAD7 TOTAL SCORE: 0

## 2017-09-12 NOTE — PROGRESS NOTES
Екатерина is a 31 year old  female  who presents today for consultation regarding contraceptive options.  She has been using the Nexplanon for the past two years.    Together, we reviewed the contraceptive options available.  We reviewed the success rates and the pregnancy rates of the options.  These include but are not limited to the male and female sterilization procedures, barrier methods and spermicides. Hormonal methods were reviewed: Nexplanon, Mirena IUD (as well as the ParaGard IUD, although not hormonal), Injections, Rings, Oral contraceptives. Natural family planning also discussed.  We reviewed the R/B/A for abstinence, OCP, Patch, Nuva Ring, Nexplanon, Depo-Provera, IUD, condoms, and permanent sterilization.  I reviewed with her, the potential side effects of hormonal contraception including but not limited to thromboembolic events, hypertension, breakthrough bleeding, GI upset, and headaches.  Proper usage was also reviewed. We also reviewed need for back-up contraception for the first month of hormonal methods. Combination hormone contraception are relatively contraindicated due to the hypertension diagnosis.  Questions seemed to be answered  Patient desires to use the Nexplanon for birth control.      Past Medical History:   Diagnosis Date     Family history of diabetes mellitus      Hypertension      Migraine with aura and without status migrainosus, not intractable 2016     PID (acute pelvic inflammatory disease) 2010     Past Surgical History:   Procedure Laterality Date     HC REVISE MEDIAN N/CARPAL TUNNEL SURG Left 6/5/15    Primary, not revision     RELEASE CARPAL TUNNEL Left 2015    Procedure: RELEASE CARPAL TUNNEL;  Surgeon: Juan Jose Joaquin MD;  Location: [facility] OR        Allergies   Allergen Reactions     Lisinopril Cough     Zoloft      tired     Current Outpatient Prescriptions   Medication Sig Dispense Refill     levonorgestrel (MIRENA) 20 MCG/24HR IUD One device,  subdermally, for 2 years. 1 each 0     citalopram (CELEXA) 20 MG tablet Take 1 tablet (20 mg) by mouth daily 90 tablet 3     verapamil (VERELAN) 120 MG 24 hr capsule Take 1 capsule (120 mg) by mouth daily 90 capsule 0     hydrochlorothiazide (HYDRODIURIL) 25 MG tablet Take 1 tablet (25 mg) by mouth daily 90 tablet 3     liraglutide (VICTOZA) 18 MG/3ML soln Inject 1.8 mg Subcutaneous daily Patient doesn't need this right now. 36 mL 1     insulin pen needle (BD TALHA U/F) 32G X 4 MM Use once daily or as directed. 100 each 3     etonogestrel (NEXPLANON) 68 MG IMPL One device, subdermally, for up to 3 years. 1 each 0     traMADol (ULTRAM) 50 MG tablet Take 1-2 tablets ( mg) by mouth every 6 hours as needed for pain (Patient not taking: Reported on 9/11/2017) 20 tablet 0     Social History     Social History     Marital status: Single     Spouse name: N/A     Number of children: 1     Years of education: 12     Occupational History     [employer]     Social History Main Topics     Smoking status: Former Smoker     Types: Cigarettes     Quit date: 5/1/2010     Smokeless tobacco: Never Used     Alcohol use Yes      Comment: rare     Drug use: No     Sexual activity: Not Currently     Partners: Male     Birth control/ protection: Implant     Other Topics Concern     Parent/Sibling W/ Cabg, Mi Or Angioplasty Before 65f 55m? No     Social History Narrative       Family History   Problem Relation Age of Onset     DIABETES Mother      Hypertension Mother      CANCER Mother      skin     DIABETES Father      CEREBROVASCULAR DISEASE No family hx of      Thyroid Disease No family hx of      Glaucoma No family hx of      Macular Degeneration No family hx of        Review of Systems:  10 point ROS of systems including Constitutional, Eyes, Respiratory, Cardiovascular, Gastroenterology, Genitourinary, Integumentary, Muscularskeletal, Psychiatric were all negative except for pertinent positives noted in my HPI and in the  PMH.      Exam  /82 (BP Location: Right arm, Cuff Size: Adult Large)  Pulse 108  Wt 218 lb (98.9 kg)  LMP 09/01/2017 (Exact Date)  SpO2 96%  BMI 39.24 kg/m2  General:  WNWD female, NAD  Alert  Oriented x 3  Gait:  Normal  Skin:  Normal skin turgor  HEENT:  NC/AT, EOMI  Abdomen:  Non-tender, non-distended.  Pelvic exam:  Not performed  Extremities:  No clubbing, no cyanosis and no edema.      Assessment  Encounter for counseling regarding contraception  Encounter for Nexplanon removal and reinsertion    Plan  Remove and insert Nexplanon  She has been working on weight loss.  We discussed the time for replacement; if her weight decreases to less than 200 pounds, then she may use the product for up to 3 years.      Joel Michelle MD      PROCEDURE:    Екатерина was counseled about removal. She voiced her understanding and informed consent was obtained.  Patient was placed in a supine position. Left arm was flexed at the elbow and externally rotated. The implant was located by palpation and marked at the end closest to the elbow with a sterile marker. The implant was palpated by both myself and the patient.    The area was cleaned and antiseptic applied. I injected sufficient anesthetic, 1cc of 1% Lidocaine just underneath the end of the implant closest to the elbow.  I pressed down on the end of the implant closest to the axilla and made a 2 mm incision in the longitudinal direction  Of the arm at the tip of the implant closest to the elbow. I gently pushed the implant toward the incision until the tip was visible and grasped the implant with sterile mosquito forceps and gently removed it.  The Lidocaine 1% was used along the planned insertion site. The Nexplanon was removed from its packaging. The skin was stretched at the insertion site. The needle was inserted with beveled side up just underneath the skin. Tenting was undertaken while the insertion was being performed. The seal of the applicator was broken  and the obturator was retracted. After the insertion, the implant was palpated by both myself and the patient. The betadine was removed from her arm. Benzoin and Steri Strips were applied.  Telfa was applied to site, along with pressure dressing and sterile gauze.  Aseptic conditions were maintained throughout the procedure  The patient was given aftercare instructions, her user card with the lot number. She tolerated the procedure well.  No apparent complications.    Lot Number [id number]  Expiration date 08/2019  NDC 0009-0463-55    Joel Michelle MD

## 2017-09-12 NOTE — MR AVS SNAPSHOT
After Visit Summary   9/12/2017    Екатерина Ramon    MRN: [id number]           Patient Information     Date Of Birth          1985        Visit Information        Provider Department      9/12/2017 3:00 PM Joel Michelle MD [facility]        Today's Diagnoses     Encounter for Nexplanon removal    -  1    Nexplanon insertion           Follow-ups after your visit        Your next 10 appointments already scheduled     Oct 04, 2017  7:15 AM CDT   Nurse Only with [facility] ANCILLARY   [facility] ([facility])    [address] [id number] [phone]              Who to contact     If you have questions or need follow up information about today's clinic visit or your schedule please contact [facility] directly at [phone].  Normal or non-critical lab and imaging results will be communicated to you by [employer], letter or phone within 4 business days after the clinic has received the results. If you do not hear from us within 7 days, please contact the clinic through MyChart or phone. If you have a critical or abnormal lab result, we will notify you by phone as soon as possible.  Submit refill requests through [employer] or call your pharmacy and they will forward the refill request to us. Please allow 3 business days for your refill to be completed.          Additional Information About Your Visit        MyChart Information     [employer] gives you secure access to your electronic health record. If you see a primary care provider, you can also send messages to your care team and make appointments. If you have questions, please call your primary care clinic.  If you do not have a primary care provider, please call [phone] and they will assist you.        Care EveryWhere ID     This is your Care EveryWhere ID. This could be used by other organizations to access your [place] medical records  [id number]        Your Vitals  Were     Pulse Last Period Pulse Oximetry BMI (Body Mass Index)          108 09/01/2017 (Exact Date) 96% 39.24 kg/m2         Blood Pressure from Last 3 Encounters:   09/12/17 126/82   09/11/17 122/84   09/11/17 (!) 131/91    Weight from Last 3 Encounters:   09/12/17 218 lb (98.9 kg)   09/11/17 216 lb (98 kg)   09/11/17 219 lb 12.8 oz (99.7 kg)              We Performed the Following     INSERTION NON-BIODEGRADABLE DRUG DELIVERY IMPLANT     REMOVAL IMPLATABLE CONTRACEPTIVE CAPSULE          Today's Medication Changes          These changes are accurate as of: 9/12/17  4:03 PM.  If you have any questions, ask your nurse or doctor.               Start taking these medicines.        Dose/Directions    levonorgestrel 20 MCG/24HR IUD   Commonly known as:  MIRENA   Used for:  Encounter for Nexplanon removal, Nexplanon insertion   Started by:  Joel Michelle MD        One device, subdermally, for 2 years.   Quantity:  1 each   Refills:  0            Where to get your medicines      Some of these will need a paper prescription and others can be bought over the counter.  Ask your nurse if you have questions.     You don't need a prescription for these medications     levonorgestrel 20 MCG/24HR IUD                Primary Care Provider Office Phone # Fax #    ESTUARDO Jain [facility] [phone] [address] [facility] [address]        Equal Access to Services     [facility]DAV[facility] AH: Hadii flori ku hadasho Soomaali, waaxda luqadaha, qaybta kaalmada adeegyada, waxay joseph lin ah. So [facility] [phone].    ATENCIÓN: Si habla español, tiene a ramirez disposición servicios gratuitos de asistencia lingüística. Llame al [phone].    We comply with applicable federal civil rights laws and [place] laws. We do not discriminate on the basis of race, color, national origin, age, disability sex, sexual orientation or gender identity.            Thank you!     Thank you for choosing  [facility]  for your care. Our goal is always to provide you with excellent care. Hearing back from our patients is one way we can continue to improve our services. Please take a few minutes to complete the written survey that you may receive in the mail after your visit with us. Thank you!             Your Updated Medication List - Protect others around you: Learn how to safely use, store and throw away your medicines at www.disposemymeds.org.          This list is accurate as of: 9/12/17  4:03 PM.  Always use your most recent med list.                   Brand Name Dispense Instructions for use Diagnosis    citalopram 20 MG tablet    celeXA    90 tablet    Take 1 tablet (20 mg) by mouth daily    Anxiety       etonogestrel 68 MG Impl    NEXPLANON    1 each    One device, subdermally, for up to 3 years.    Insertion of Nexplanon, Encounter for counseling regarding contraception       hydrochlorothiazide 25 MG tablet    HYDRODIURIL    90 tablet    Take 1 tablet (25 mg) by mouth daily    Hypertension goal BP (blood pressure) < 140/90       insulin pen needle 32G X 4 MM    BD TALHA U/F    100 each    Use once daily or as directed.    Morbid obesity due to excess calories (H)       levonorgestrel 20 MCG/24HR IUD    MIRENA    1 each    One device, subdermally, for 2 years.    Encounter for Nexplanon removal, Nexplanon insertion       liraglutide 18 MG/3ML soln    VICTOZA    36 mL    Inject 1.8 mg Subcutaneous daily Patient doesn't need this right now.    Morbid obesity due to excess calories (H)       traMADol 50 MG tablet    ULTRAM    20 tablet    Take 1-2 tablets ( mg) by mouth every 6 hours as needed for pain    Chronic midline low back pain without sciatica, Chronic bilateral thoracic back pain       verapamil 120 MG 24 hr capsule    VERELAN    90 capsule    Take 1 capsule (120 mg) by mouth daily    Hypertension goal BP (blood pressure) < 140/90, Migraine with aura and without status migrainosus,  not intractable

## 2017-09-12 NOTE — NURSING NOTE
"Chief Complaint   Patient presents with     Procedure     Nexplanon remove and re insert       Initial /82 (BP Location: Right arm, Cuff Size: Adult Large)  Pulse 108  Wt 218 lb (98.9 kg)  LMP 09/01/2017 (Exact Date)  SpO2 96%  BMI 39.24 kg/m2 Estimated body mass index is 39.24 kg/(m^2) as calculated from the following:    Height as of 9/11/17: 5' 2.5\" (1.588 m).    Weight as of this encounter: 218 lb (98.9 kg).  Medication Reconciliation: complete   SARAH Merino 9/12/2017         "

## 2017-09-13 LAB
COPATH REPORT: NORMAL
PAP: NORMAL

## 2017-09-14 ENCOUNTER — MYC MEDICAL ADVICE (OUTPATIENT)
Dept: FAMILY MEDICINE | Facility: CLINIC | Age: 32
End: 2017-09-14

## 2017-09-15 LAB
FINAL DIAGNOSIS: NORMAL
HPV HR 12 DNA CVX QL NAA+PROBE: NEGATIVE
HPV16 DNA SPEC QL NAA+PROBE: NEGATIVE
HPV18 DNA SPEC QL NAA+PROBE: NEGATIVE
SPECIMEN DESCRIPTION: NORMAL

## 2017-09-20 RX ORDER — LIRAGLUTIDE 6 MG/ML
1.8 INJECTION SUBCUTANEOUS DAILY
Qty: 36 ML | Refills: 1 | Status: CANCELLED | OUTPATIENT
Start: 2017-09-20

## 2017-09-20 NOTE — PATIENT INSTRUCTIONS
[facility]    If you have any questions regarding to your visit please contact your care team:       Team Red:   Clinic Hours Telephone Number   Dr. Ernestina Coulter  (pediatrics)  Anusha Wolff NP 7am-7pm  Monday - Thursday   7am-5pm  Fridays  [phone] (fax)    Shahram HIDALGO  [phone]   Urgent Care - [place] and [place] Monday-Friday  [place] - 11am-8pm  Saturday-Sunday  Both sites - 9am-5pm  [phone] - [facility]  [phone] - [place]       What options do I have for visits at the clinic other than the traditional office visit?  To expand how we care for you, many of our providers are utilizing electronic visits (e-visits) and telephone visits, when medically appropriate, for interactions with their patients rather than a visit in the clinic.   We also offer nurse visits for many medical concerns. Just like any other service, we will bill your insurance company for this type of visit based on time spent on the phone with your provider. Not all insurance companies cover these visits. Please check with your medical insurance if this type of visit is covered. You will be responsible for any charges that are not paid by your insurance.      E-visits via [employer]:  generally incur a $35.00 fee.  Telephone visits:  Time spent on the phone: *charged based on time that is spent on the phone in increments of 10 minutes. Estimated cost:   5-10 mins $30.00   11-20 mins. $59.00   21-30 mins. $85.00     As always, Thank you for trusting us with your health care needs!    Davi Monge

## 2017-09-20 NOTE — PROGRESS NOTES
SUBJECTIVE:   Екатерина Ramon is a 31 year old female who presents to clinic today for the following health issues:    Chief Complaint   Patient presents with     Letter Request     Hypertension Follow-up      Outpatient blood pressures are not being checked.    Low Salt Diet: no added salt    Anxiety Follow-Up    Status since last visit: No change    Other associated symptoms:None    Complicating factors:   Significant life event: Yes-  school   Current substance abuse: None  Depression symptoms: No  BERENICE-7 SCORE 3/31/2016 5/6/2016 9/11/2017   Total Score - - -   Total Score 4 1 0       GAD7      Anxiety has been stable on Celexa, but patient notes that test-taking in her college courses triggers her anxiety and limits her performance on exams. She wonders about having a letter written to low her accommodations in school.      Problem list and histories reviewed & adjusted, as indicated.  Additional history: as documented    Patient Active Problem List   Diagnosis     Adult BMI 30+     CARDIOVASCULAR SCREENING; LDL GOAL LESS THAN 160     Family history of diabetes mellitus     History of depression     Sprain and strain of shoulder and upper arm     Hypertension goal BP (blood pressure) < 140/90     CTS (carpal tunnel syndrome)     S/P carpal tunnel release     Anxiety     BMI 40.0-44.9, adult (H)     Chronic bilateral thoracic back pain     Chronic midline low back pain without sciatica     Migraine with aura and without status migrainosus, not intractable     Morbid obesity due to excess calories (H)     Low serum HDL     Past Surgical History:   Procedure Laterality Date     HC REVISE MEDIAN N/CARPAL TUNNEL SURG Left 6/5/15    Primary, not revision     RELEASE CARPAL TUNNEL Left 6/5/2015    Procedure: RELEASE CARPAL TUNNEL;  Surgeon: Juan Jose Joaquin MD;  Location:  OR       Social History   Substance Use Topics     Smoking status: Former Smoker     Types: Cigarettes     Quit date: 5/1/2010     Smokeless  "tobacco: Never Used     Alcohol use Yes      Comment: rare     Family History   Problem Relation Age of Onset     DIABETES Mother      Hypertension Mother      CANCER Mother      skin     DIABETES Father      CEREBROVASCULAR DISEASE No family hx of      Thyroid Disease No family hx of      Glaucoma No family hx of      Macular Degeneration No family hx of              Reviewed and updated as needed this visit by clinical staff       Reviewed and updated as needed this visit by Provider       ROS:  Constitutional, cardiovascular, pulmonary, psych systems are negative, except as otherwise noted.      OBJECTIVE:   /82 (BP Location: Left arm, Patient Position: Chair, Cuff Size: Adult Regular)  Pulse 105  Temp 97.6  F (36.4  C) (Oral)  Resp 15  Ht 5' 3\" (1.6 m)  Wt 218 lb (98.9 kg)  LMP 09/01/2017 (Exact Date)  SpO2 96%  Breastfeeding? No  BMI 38.62 kg/m2  Body mass index is 38.62 kg/(m^2).  GENERAL: healthy, alert and no distress  PSYCH: mentation appears normal, affect normal/bright    Diagnostic Test Results:  none     ASSESSMENT/PLAN:       1. Anxiety  No medication changes today.  Letter written for school to support her diagnosis and allow for accommodations for exams.    2. Hypertension goal BP (blood pressure) < 140/90  Controlled on medications, continue without change.  - verapamil (VERELAN) 120 MG 24 hr capsule; Take 1 capsule (120 mg) by mouth daily  Dispense: 90 capsule; Refill: 3    3. Migraine with aura and without status migrainosus, not intractable  Controlled on medications, continue without change.  - verapamil (VERELAN) 120 MG 24 hr capsule; Take 1 capsule (120 mg) by mouth daily  Dispense: 90 capsule; Refill: 3    Follow up as needed    ESTUARDO Jain [facility]  "

## 2017-09-22 ENCOUNTER — OFFICE VISIT (OUTPATIENT)
Dept: FAMILY MEDICINE | Facility: CLINIC | Age: 32
End: 2017-09-22
Payer: COMMERCIAL

## 2017-09-22 VITALS
HEART RATE: 105 BPM | HEIGHT: 63 IN | TEMPERATURE: 97.6 F | SYSTOLIC BLOOD PRESSURE: 124 MMHG | RESPIRATION RATE: 15 BRPM | WEIGHT: 218 LBS | OXYGEN SATURATION: 96 % | BODY MASS INDEX: 38.62 KG/M2 | DIASTOLIC BLOOD PRESSURE: 82 MMHG

## 2017-09-22 DIAGNOSIS — I10 HYPERTENSION GOAL BP (BLOOD PRESSURE) < 140/90: ICD-10-CM

## 2017-09-22 DIAGNOSIS — G43.109 MIGRAINE WITH AURA AND WITHOUT STATUS MIGRAINOSUS, NOT INTRACTABLE: ICD-10-CM

## 2017-09-22 DIAGNOSIS — F41.9 ANXIETY: Primary | ICD-10-CM

## 2017-09-22 PROCEDURE — 99213 OFFICE O/P EST LOW 20 MIN: CPT | Performed by: NURSE PRACTITIONER

## 2017-09-22 RX ORDER — VERAPAMIL HYDROCHLORIDE 120 MG/1
120 CAPSULE, EXTENDED RELEASE ORAL DAILY
Qty: 90 CAPSULE | Refills: 3 | Status: SHIPPED | OUTPATIENT
Start: 2017-09-22 | End: 2018-01-26

## 2017-09-22 NOTE — LETTER
[facility]  [address]  [place] [address]  [phone]          September 22, 2017    Екатерина Ramon                                                                                                                     [address]  [place] [address]            To Whom It May Concern,    Екатерина Ramon is under my care for the treatment of her Anxiety. Test-taking triggers anxiety symptoms worsening her performance. Anxiety causes impairment in concentration and social relationships. Anxiety has been present for year and is anticipated to be life-long. Appropriate accommodations may include additional time to take tests, taking tests early, or taking exams in a low stimulation environment as arranged between student and professor.        Sincerely,         Anusha Wolff CNP

## 2017-09-22 NOTE — NURSING NOTE
"Chief Complaint   Patient presents with     Letter Request       Initial /82 (BP Location: Left arm, Patient Position: Chair, Cuff Size: Adult Regular)  Pulse 105  Temp 97.6  F (36.4  C) (Oral)  Resp 15  Ht 5' 3\" (1.6 m)  Wt 218 lb (98.9 kg)  LMP 09/01/2017 (Exact Date)  SpO2 96%  Breastfeeding? No  BMI 38.62 kg/m2 Estimated body mass index is 38.62 kg/(m^2) as calculated from the following:    Height as of this encounter: 5' 3\" (1.6 m).    Weight as of this encounter: 218 lb (98.9 kg).  Medication Reconciliation: complete     Davi Monge      "

## 2017-09-22 NOTE — MR AVS SNAPSHOT
After Visit Summary   9/22/2017    Екатерина Ramon    MRN: [id number]           Patient Information     Date Of Birth          1985        Visit Information        Provider Department      9/22/2017 4:00 PM Anusha Wolff APRN [facility]        Today's Diagnoses     Anxiety    -  1    Hypertension goal BP (blood pressure) < 140/90        Migraine with aura and without status migrainosus, not intractable        Need for prophylactic vaccination and inoculation against influenza          Care Instructions    [facility]    If you have any questions regarding to your visit please contact your care team:       Team Red:   Clinic Hours Telephone Number   Dr. Ernestina Coulter  (pediatrics)  Anusha Wolff NP 7am-7pm  Monday - Thursday   7am-5pm  Fridays  [phone] (fax)    Shahram HIDALGO  [phone]   Urgent Care - [place] and [place] Monday-Friday  [place] - 11am-8pm  Saturday-Sunday  Both sites - 9am-5pm  [phone] - [facility]  [phone] - [place]       What options do I have for visits at the clinic other than the traditional office visit?  To expand how we care for you, many of our providers are utilizing electronic visits (e-visits) and telephone visits, when medically appropriate, for interactions with their patients rather than a visit in the clinic.   We also offer nurse visits for many medical concerns. Just like any other service, we will bill your insurance company for this type of visit based on time spent on the phone with your provider. Not all insurance companies cover these visits. Please check with your medical insurance if this type of visit is covered. You will be responsible for any charges that are not paid by your insurance.      E-visits via [employer]:  generally incur a $35.00 fee.  Telephone visits:  Time spent on the phone: *charged based on time that is spent on the  "phone in increments of 10 minutes. Estimated cost:   5-10 mins $30.00   11-20 mins. $59.00   21-30 mins. $85.00     As always, Thank you for trusting us with your health care needs!    Davi Monge            Follow-ups after your visit        Your next 10 appointments already scheduled     Oct 04, 2017  7:15 AM CDT   Nurse Only with [facility]   [facility] ([facility])    [address] [address] [phone]              Who to contact     If you have questions or need follow up information about today's clinic visit or your schedule please contact [facility] directly at [phone].  Normal or non-critical lab and imaging results will be communicated to you by MyChart, letter or phone within 4 business days after the clinic has received the results. If you do not hear from us within 7 days, please contact the clinic through [employer] or phone. If you have a critical or abnormal lab result, we will notify you by phone as soon as possible.  Submit refill requests through [employer] or call your pharmacy and they will forward the refill request to us. Please allow 3 business days for your refill to be completed.          Additional Information About Your Visit        [employer] Information     [employer] gives you secure access to your electronic health record. If you see a primary care provider, you can also send messages to your care team and make appointments. If you have questions, please call your primary care clinic.  If you do not have a primary care provider, please call [phone] and they will assist you.        Care EveryWhere ID     This is your Care EveryWhere ID. This could be used by other organizations to access your [facility] medical records  [id number]        Your Vitals Were     Pulse Temperature Respirations Height Last Period Pulse Oximetry    105 97.6  F (36.4  C) (Oral) 15 5' 3\" (1.6 m) 09/01/2017 (Exact Date) 96%    " Breastfeeding? BMI (Body Mass Index)                No 38.62 kg/m2           Blood Pressure from Last 3 Encounters:   09/22/17 124/82   09/12/17 126/82   09/11/17 122/84    Weight from Last 3 Encounters:   09/22/17 218 lb (98.9 kg)   09/12/17 218 lb (98.9 kg)   09/11/17 216 lb (98 kg)              Today, you had the following     No orders found for display         Where to get your medicines      These medications were sent to [id number] - [place], [place] - [address]  [address], [place] [address]     Phone:  [phone]     verapamil 120 MG 24 hr capsule          Primary Care Provider Office Phone # Fax #    ESTUARDO Jain [facility] [phone]       [address] [address]        Equal Access to Services     [facility]: Hadii aad ku hadasho Sobelkys, waaxda luqadaha, qaybta kaalmada adeegyada, lasha lin . So [facility] [phone].    ATENCIÓN: Si habla español, tiene a ramirez disposición servicios gratuitos de asistencia lingüística. Llame al [phone].    We comply with applicable federal civil rights laws and [place] laws. We do not discriminate on the basis of race, color, national origin, age, disability sex, sexual orientation or gender identity.            Thank you!     Thank you for choosing [facility]  for your care. Our goal is always to provide you with excellent care. Hearing back from our patients is one way we can continue to improve our services. Please take a few minutes to complete the written survey that you may receive in the mail after your visit with us. Thank you!             Your Updated Medication List - Protect others around you: Learn how to safely use, store and throw away your medicines at www.disposemymeds.org.          This list is accurate as of: 9/22/17  4:25 PM.  Always use your most recent med list.                   Brand Name Dispense Instructions for use  Diagnosis    citalopram 20 MG tablet    celeXA    90 tablet    Take 1 tablet (20 mg) by mouth daily    Anxiety       hydrochlorothiazide 25 MG tablet    HYDRODIURIL    90 tablet    Take 1 tablet (25 mg) by mouth daily    Hypertension goal BP (blood pressure) < 140/90       insulin pen needle 32G X 4 MM    BD TALHA U/F    100 each    Use once daily or as directed.    Morbid obesity due to excess calories (H)       levonorgestrel 20 MCG/24HR IUD    MIRENA    1 each    One device, subdermally, for 2 years.    Encounter for Nexplanon removal, Nexplanon insertion       liraglutide 18 MG/3ML soln    VICTOZA    36 mL    Inject 1.8 mg Subcutaneous daily Patient doesn't need this right now.    Morbid obesity due to excess calories (H)       traMADol 50 MG tablet    ULTRAM    20 tablet    Take 1-2 tablets ( mg) by mouth every 6 hours as needed for pain    Chronic midline low back pain without sciatica, Chronic bilateral thoracic back pain       verapamil 120 MG 24 hr capsule    VERELAN    90 capsule    Take 1 capsule (120 mg) by mouth daily    Hypertension goal BP (blood pressure) < 140/90, Migraine with aura and without status migrainosus, not intractable

## 2017-10-03 ENCOUNTER — TELEPHONE (OUTPATIENT)
Dept: NURSING | Facility: CLINIC | Age: 32
End: 2017-10-03

## 2017-10-03 DIAGNOSIS — I10 HYPERTENSION GOAL BP (BLOOD PRESSURE) < 140/90: ICD-10-CM

## 2017-10-03 NOTE — TELEPHONE ENCOUNTER
Per Dr. Johnson: ok to sign the Health  referral as joanne'd up by Health     Referral signed.    Keyon Milton RN

## 2017-10-30 ENCOUNTER — TELEPHONE (OUTPATIENT)
Dept: NURSING | Facility: CLINIC | Age: 32
End: 2017-10-30

## 2017-11-14 ENCOUNTER — OFFICE VISIT (OUTPATIENT)
Dept: INTERNAL MEDICINE | Facility: CLINIC | Age: 32
End: 2017-11-14
Payer: COMMERCIAL

## 2017-11-14 VITALS
SYSTOLIC BLOOD PRESSURE: 116 MMHG | OXYGEN SATURATION: 98 % | DIASTOLIC BLOOD PRESSURE: 86 MMHG | WEIGHT: 206 LBS | TEMPERATURE: 98.5 F | HEART RATE: 110 BPM | BODY MASS INDEX: 36.49 KG/M2

## 2017-11-14 DIAGNOSIS — I10 HYPERTENSION GOAL BP (BLOOD PRESSURE) < 140/90: ICD-10-CM

## 2017-11-14 DIAGNOSIS — E66.01 MORBID OBESITY DUE TO EXCESS CALORIES (H): ICD-10-CM

## 2017-11-14 PROCEDURE — 99213 OFFICE O/P EST LOW 20 MIN: CPT | Performed by: INTERNAL MEDICINE

## 2017-11-14 RX ORDER — LIRAGLUTIDE 6 MG/ML
1.8 INJECTION SUBCUTANEOUS DAILY
Qty: 36 ML | Refills: 1 | Status: SHIPPED | OUTPATIENT
Start: 2017-11-14 | End: 2018-03-02

## 2017-11-14 NOTE — PROGRESS NOTES
INTERNAL MEDICINE  Medical Weight Loss - Return Visit    CHIEF COMPLAINT:  Recheck weight      Wt Readings from Last 5 Encounters:   11/14/17 93.4 kg (206 lb)   09/22/17 98.9 kg (218 lb)   09/12/17 98.9 kg (218 lb)   09/11/17 98 kg (216 lb)   09/11/17 99.7 kg (219 lb 12.8 oz)     HISTORY OF PRESENT ILLNESS (HPI):    Patient returns today for medical weight loss follow-up visit. Patient was last seen by me on 10/3/2017 and has lost 12 pounds and 6 % body fat.     Food logging - She has not been eating as much due to taking care of her son and trauma that he has been going through. Calories consistently under 1000. 50% carbs, 24% fat, 27% protein  Exercise - Still doing the treadmill and wants to start HIIT workouts again. Health  reached out to her.   Medication - Has missed a dose a couple times. Denies feeling sick when she started back up. Denies stomach pain, nausea, vomiting. No family history of pancreatitis.       MEDICATIONS:   Current Outpatient Prescriptions   Medication Sig Dispense Refill     liraglutide (VICTOZA) 18 MG/3ML soln Inject 1.8 mg Subcutaneous daily 36 mL 1     insulin pen needle (BD TALHA U/F) 32G X 4 MM Use once daily or as directed. 100 each 3     verapamil (VERELAN) 120 MG 24 hr capsule Take 1 capsule (120 mg) by mouth daily 90 capsule 3     levonorgestrel (MIRENA) 20 MCG/24HR IUD One device, subdermally, for 2 years. 1 each 0     citalopram (CELEXA) 20 MG tablet Take 1 tablet (20 mg) by mouth daily 90 tablet 3     hydrochlorothiazide (HYDRODIURIL) 25 MG tablet Take 1 tablet (25 mg) by mouth daily 90 tablet 3     traMADol (ULTRAM) 50 MG tablet Take 1-2 tablets ( mg) by mouth every 6 hours as needed for pain 20 tablet 0     [DISCONTINUED] liraglutide (VICTOZA) 18 MG/3ML soln Inject 1.8 mg Subcutaneous daily Patient doesn't need this right now. 36 mL 1       ALLERGIES:   Allergies   Allergen Reactions     Lisinopril Cough     Zoloft      tired     This document serves as a record  of the services and decisions personally performed and made by Lorraine Johnson MD. It was created on his/her behalf by Alberta Pratt, trained medical scribe. The creation of this document is based the provider's statements to the medical scribes.    Danny Pratt 2:51 PM, November 14, 2017    PHYSICAL EXAMINATION:    VITALS: /86  Pulse 110  Temp 98.5  F (36.9  C) (Oral)  Wt 93.4 kg (206 lb)  SpO2 98%  BMI 36.49 kg/m2  GENERAL: Patient is a 32 year old year old female  in no acute distress.  Patient is alert and orientated x 4, pleasant and cooperative with exam.     CARDIOVASCULAR:  Regular rate and rhythm without murmurs, rubs, or gallops.  RESPIRATORY:  Lungs are clear to auscultation bilaterally, respiratory effort is normal.   No edema  GASTROINTESTINAL:  Abdomen is obese, soft, nontender, without obvious organomegaly or masses.  PSYCH: mentation appears normal, affect normal and bright.    ASSESSMENT/PLAN:    1. BMI 36.0-36.9,adult  Doing great with the Victoza.  Needs to increase her workouts.  Needs to eat at least 1000 calories per day.  Ratios of macronutrients are in line however.      2. Hypertension goal BP (blood pressure) < 140/90  The current medical regimen is effective;  continue present plan and medications.     3. Morbid obesity due to excess calories (H)    - liraglutide (VICTOZA) 18 MG/3ML soln; Inject 1.8 mg Subcutaneous daily  Dispense: 36 mL; Refill: 1  - insulin pen needle (BD TALHA U/F) 32G X 4 MM; Use once daily or as directed.  Dispense: 100 each; Refill: 3       Patient is to call the clinic if she experiences any adverse reactions.     Patient Instructions   Continue Victoza unchanged.    Start trying some HIIT workouts.    Utilize the health .    Follow up with me in 2 months.    The information in this document, created by a scribe for me, accurately reflects the services I personally performed and the decisions made by me. I have reviewed and approved this document for  accuracy.     I spent 9 minutes of time with the patient and >50% of it was in education and counseling regarding weight management.     Start: 2:48 PM  End 2:57 PM      Lorraine Johnson MD  Internal Medicine    American Board of Obesity Medicine Diplomate

## 2017-11-14 NOTE — MR AVS SNAPSHOT
After Visit Summary   11/14/2017    Екатерина Ramon    MRN: [id number]           Patient Information     Date Of Birth          1985        Visit Information        Provider Department      11/14/2017 2:30 PM Lorraine Johnson MD [facility]        Today's Diagnoses     BMI 36.0-36.9,adult    -  1    Hypertension goal BP (blood pressure) < 140/90        Morbid obesity due to excess calories (H)          Care Instructions    Continue Victoza unchanged.    Start trying some HIIT workouts.    Utilize the health .    Follow up with me in 2 months.    [facility]    If you have any questions regarding to your visit please contact your care team:     Team Pink:   Clinic Hours Telephone Number   Internal Medicine:  Dr. Lorraine Villegas, NP       7am-7pm  Monday - Thursday   7am-5pm  Fridays  [phone]  (Appointment scheduling available 24/7)    Questions about your visit?  Team Line  [phone]   Urgent Care - [place] - 11am-9pm Monday-Friday Saturday-Sunday- 9am-5pm   [place] - 5pm-9pm Monday-Friday Saturday-Sunday- 9am-5pm  [phone] - [place]   [phone] - [place]       What options do I have for visits at the clinic other than the traditional office visit?  To expand how we care for you, many of our providers are utilizing electronic visits (e-visits) and telephone visits, when medically appropriate, for interactions with their patients rather than a visit in the clinic.   We also offer nurse visits for many medical concerns. Just like any other service, we will bill your insurance company for this type of visit based on time spent on the phone with your provider. Not all insurance companies cover these visits. Please check with your medical insurance if this type of visit is covered. You will be responsible for any charges that are not paid by your insurance.      E-visits via "[employer]:   generally incur a $35.00 fee.  Telephone visits:  Time spent on the phone: *charged based on time that is spent on the phone in increments of 10 minutes. Estimated cost:   5-10 mins $30.00   11-20 mins. $59.00   21-30 mins. $85.00   Use myContactCardhart (secure email communication and access to your chart) to send your primary care provider a message or make an appointment. Ask someone on your Team how to sign up for [employer].    For a Price Quote for your services, please call our TeraFirrma Line at [phone].    As always, Thank you for trusting us with your health care needs!    Hollie Rosas CMA          Follow-ups after your visit        Follow-up notes from your care team     Return in about 2 months (around 1/14/2018).      Who to contact     If you have questions or need follow up information about today's clinic visit or your schedule please contact [facility] directly at [phone].  Normal or non-critical lab and imaging results will be communicated to you by MyChart, letter or phone within 4 business days after the clinic has received the results. If you do not hear from us within 7 days, please contact the clinic through myContactCardhart or phone. If you have a critical or abnormal lab result, we will notify you by phone as soon as possible.  Submit refill requests through CaseRev or call your pharmacy and they will forward the refill request to us. Please allow 3 business days for your refill to be completed.          Additional Information About Your Visit        myContactCardhart Information     CaseRev gives you secure access to your electronic health record. If you see a primary care provider, you can also send messages to your care team and make appointments. If you have questions, please call your primary care clinic.  If you do not have a primary care provider, please call [phone] and they will assist you.        Care EveryWhere ID     This is your Care EveryWhere ID. This could be used by other  organizations to access your [place] medical records  [id number]        Your Vitals Were     Pulse Temperature Pulse Oximetry BMI (Body Mass Index)          110 98.5  F (36.9  C) (Oral) 98% 36.49 kg/m2         Blood Pressure from Last 3 Encounters:   11/14/17 116/86   09/22/17 124/82   09/12/17 126/82    Weight from Last 3 Encounters:   11/14/17 206 lb (93.4 kg)   09/22/17 218 lb (98.9 kg)   09/12/17 218 lb (98.9 kg)              Today, you had the following     No orders found for display         Today's Medication Changes          These changes are accurate as of: 11/14/17  3:03 PM.  If you have any questions, ask your nurse or doctor.               These medicines have changed or have updated prescriptions.        Dose/Directions    liraglutide 18 MG/3ML soln   Commonly known as:  VICTOZA   This may have changed:  additional instructions   Used for:  Morbid obesity due to excess calories (H)   Changed by:  Lorraine Johnson MD        Dose:  1.8 mg   Inject 1.8 mg Subcutaneous daily   Quantity:  36 mL   Refills:  1            Where to get your medicines      These medications were sent to [id number] - [facility] [address]  [address] [address]     Phone:  [phone]     insulin pen needle 32G X 4 MM    liraglutide 18 MG/3ML soln                Primary Care Provider Office Phone # Fax #    Anusha Makayla Wolff, APRN [facility] [phone]       [address] [place] [address]        Equal Access to Services     [facility]: Hadii flori whitney hadasho Soomaali, waaxda luqadaha, qaybta kaalmada adeegyaalisson, lasha idiin hayaan adeeg kharash la'aan . So [facility] [phone].    ATENCIÓN: Si habla español, tiene a ramirez disposición servicios gratuitos de asistencia lingüística. Llame al [phone].    We comply with applicable federal civil rights laws and [place] laws. We do not discriminate on the basis of race, color, national origin, age, disability,  sex, sexual orientation, or gender identity.            Thank you!     Thank you for choosing [facility]  for your care. Our goal is always to provide you with excellent care. Hearing back from our patients is one way we can continue to improve our services. Please take a few minutes to complete the written survey that you may receive in the mail after your visit with us. Thank you!             Your Updated Medication List - Protect others around you: Learn how to safely use, store and throw away your medicines at www.disposemymeds.org.          This list is accurate as of: 11/14/17  3:03 PM.  Always use your most recent med list.                   Brand Name Dispense Instructions for use Diagnosis    citalopram 20 MG tablet    celeXA    90 tablet    Take 1 tablet (20 mg) by mouth daily    Anxiety       hydrochlorothiazide 25 MG tablet    HYDRODIURIL    90 tablet    Take 1 tablet (25 mg) by mouth daily    Hypertension goal BP (blood pressure) < 140/90       insulin pen needle 32G X 4 MM    BD TALHA U/F    100 each    Use once daily or as directed.    Morbid obesity due to excess calories (H)       levonorgestrel 20 MCG/24HR IUD    MIRENA    1 each    One device, subdermally, for 2 years.    Encounter for Nexplanon removal, Nexplanon insertion       liraglutide 18 MG/3ML soln    VICTOZA    36 mL    Inject 1.8 mg Subcutaneous daily    Morbid obesity due to excess calories (H)       traMADol 50 MG tablet    ULTRAM    20 tablet    Take 1-2 tablets ( mg) by mouth every 6 hours as needed for pain    Chronic midline low back pain without sciatica, Chronic bilateral thoracic back pain       verapamil 120 MG 24 hr capsule    VERELAN    90 capsule    Take 1 capsule (120 mg) by mouth daily    Hypertension goal BP (blood pressure) < 140/90, Migraine with aura and without status migrainosus, not intractable

## 2017-11-14 NOTE — PATIENT INSTRUCTIONS
Continue Victoza unchanged.    Start trying some HIIT workouts.    Utilize the health .    Follow up with me in 2 months.    [facility]    If you have any questions regarding to your visit please contact your care team:     Team Pink:   Clinic Hours Telephone Number   Internal Medicine:  Dr. Lorraine Villegas, NP       7am-7pm  Monday - Thursday   7am-5pm  Fridays  [phone]  (Appointment scheduling available 24/7)    Questions about your visit?  Team Line  [phone]   Urgent Care - [place] and [facility] - 11am-9pm Monday-Friday Saturday-Sunday- 9am-5pm   [place] - 5pm-9pm Monday-Friday Saturday-Sunday- 9am-5pm  [phone] - [facility]  [phone] - [place]       What options do I have for visits at the clinic other than the traditional office visit?  To expand how we care for you, many of our providers are utilizing electronic visits (e-visits) and telephone visits, when medically appropriate, for interactions with their patients rather than a visit in the clinic.   We also offer nurse visits for many medical concerns. Just like any other service, we will bill your insurance company for this type of visit based on time spent on the phone with your provider. Not all insurance companies cover these visits. Please check with your medical insurance if this type of visit is covered. You will be responsible for any charges that are not paid by your insurance.      E-visits via [employer]:  generally incur a $35.00 fee.  Telephone visits:  Time spent on the phone: *charged based on time that is spent on the phone in increments of 10 minutes. Estimated cost:   5-10 mins $30.00   11-20 mins. $59.00   21-30 mins. $85.00   Use [employer] (secure email communication and access to your chart) to send your primary care provider a message or make an appointment. Ask someone on your Team how to sign up for [employer].    For a Price Quote for your services,  please call our Consumer Price Line at [phone].    As always, Thank you for trusting us with your health care needs!    Hollie Rosas, CMA

## 2017-12-27 ENCOUNTER — TELEPHONE (OUTPATIENT)
Dept: NURSING | Facility: CLINIC | Age: 32
End: 2017-12-27

## 2018-01-02 ENCOUNTER — ALLIED HEALTH/NURSE VISIT (OUTPATIENT)
Dept: NURSING | Facility: CLINIC | Age: 33
End: 2018-01-02

## 2018-01-02 DIAGNOSIS — E66.01 MORBID OBESITY DUE TO EXCESS CALORIES (H): Primary | ICD-10-CM

## 2018-01-02 PROCEDURE — 99207 ZZC HEALTH COACHING, NO CHARGE: CPT

## 2018-01-02 NOTE — PROGRESS NOTES
January 2, 2018    [facility]  [address]  [place] [address]  [phone]  Health Coaching Progress Note    Patient Name: Екатерина Ramon Date: January 2, 2018      Session Length: 60      DATA    PRM Master Survey Scores Reviewed: Yes    Core Healthy Days Survey:    Would you say that in general your health is: : Fair    JO ANN Score (Last Two) 9/11/2017 1/2/2018   JO ANN Raw Score 35 32   Activation Score 72.1 62.6   JO ANN Level 3 3       PHQ-2 Score 1/2/2018 9/8/2017   PHQ-2 Total Score Interpretation - Positive if 3 or more points; Administer PHQ-9 if positive 0 0   MyC PHQ-2 Score - 0       PHQ-9 SCORE 5/6/2016 9/11/2017   Total Score - -   Total Score 2 4       Treatment Objective(s) Addressed in This Session:  Target Behavior(s): diet/weight loss    Current Stressors / Issues:  Екатерина shares that it is hard to balance all that she has to manage with work, school, being a mom, and still having time/energy to exercise.    What Patient Does Well:   Екатерина has already begun making changes to her diet and has started working to increase her exercise on her own but is not having a lot of success so far.    Previous Successes:   Екатерина has lost over 10# recently.     Areas in Need of Improvement:   Екатерина wants to become more consistent with her exercise habits.    Barriers to Change:   Екатерина has a lot of demands on her time. We talk today about how easily this can become a barrier if we don't make a plan for when exercise happens.    Reasons for Change:   Екатерина wants to make changes for her health and self-esteem and also to be a good role model for her son.    Plan/Goal for the Next 4 Weeks:     GOAL #1: Begin trying to exercise 3x/week  GOAL #1 Progress Toward Goal: 0%    Intervention:  Motivational Interviewing    MI Intervention: Expressed Empathy/Understanding, Supported Autonomy, Collaboration, Evocation, Permission to raise concern  or advise, Open-ended questions, Reflections: simple and complex, Importance Scale (1-10) 10 Confidence Scale (1-10) 7 , Change talk (evoked) and Reframe     Change Talk Expressed by the Patient: Desire to change Ability to change Reasons to change Committment to change Activation Taking steps    Provider Response to Change Talk: E - Evoked more info from patient about behavior change, A - Affirmed patient's thoughts, decisions, or attempts at behavior change, R - Reflected patient's change talk and S - Summarized patient's change talk statements    Assessment / Progress on Treatment Objective(s) / Homework:    New Objective established this session - ACTION (Actively working towards change); Intervened by reinforcing change plan / affirming steps taken         Plan: (Homework, other):  Patient was encouraged to continue to seek condition-related information and education, as well as schedule a follow up appointment with the Health  in 4 weeks. Patient has set self-identified goals and will monitor progress until the next appointment.  Next visit scheduled for February 6 at 11AM over the phone.      Eb Blackburn

## 2018-01-02 NOTE — MR AVS SNAPSHOT
After Visit Summary   1/2/2018    Екатерина Ramon    MRN: [id number]           Patient Information     Date Of Birth          1985        Visit Information        Provider Department      1/2/2018 11:00 AM HEALTH  - REGION 2 [facility]        Today's Diagnoses     Morbid obesity due to excess calories (H)    -  1       Follow-ups after your visit        Your next 10 appointments already scheduled     Jan 16, 2018  2:45 PM CST   SHORT with Lorraine Johnson MD   [facility] ([facility])    [address] [id number] [phone]              Who to contact     If you have questions or need follow up information about today's clinic visit or your schedule please contact [facility] directly at [phone].  Normal or non-critical lab and imaging results will be communicated to you by [employer], letter or phone within 4 business days after the clinic has received the results. If you do not hear from us within 7 days, please contact the clinic through [employer] or phone. If you have a critical or abnormal lab result, we will notify you by phone as soon as possible.  Submit refill requests through Feed[employer]fm or call your pharmacy and they will forward the refill request to us. Please allow 3 business days for your refill to be completed.          Additional Information About Your Visit        MyChart Information     Feed[employer] gives you secure access to your electronic health record. If you see a primary care provider, you can also send messages to your care team and make appointments. If you have questions, please call your primary care clinic.  If you do not have a primary care provider, please call [phone] and they will assist you.        Care EveryWhere ID     This is your Care EveryWhere ID. This could be used by other organizations to access your [place] medical records  [id number]         Blood Pressure from Last 3  Encounters:   11/14/17 116/86   09/22/17 124/82   09/12/17 126/82    Weight from Last 3 Encounters:   11/14/17 206 lb (93.4 kg)   09/22/17 218 lb (98.9 kg)   09/12/17 218 lb (98.9 kg)              Today, you had the following     No orders found for display       Primary Care Provider Office Phone # Fax #    Anusha Wolff, ESTUARDO [facility] [phone] [address] [address]        Equal Access to Services     [facility]: Hadii aad ku hadasho Soomaali, waaxda luqadaha, qaybta kaalmada adeegyada, lasha lin . So [facility] [phone].    ATENCIÓN: Si habla español, tiene a ramirez disposición servicios gratuitos de asistencia lingüística. LlSelect [facility] [phone].    We comply with applicable federal civil rights laws and [place] laws. We do not discriminate on the basis of race, color, national origin, age, disability, sex, sexual orientation, or gender identity.            Thank you!     Thank you for choosing [facility]  for your care. Our goal is always to provide you with excellent care. Hearing back from our patients is one way we can continue to improve our services. Please take a few minutes to complete the written survey that you may receive in the mail after your visit with us. Thank you!             Your Updated Medication List - Protect others around you: Learn how to safely use, store and throw away your medicines at www.disposemymeds.org.          This list is accurate as of: 1/2/18  3:14 PM.  Always use your most recent med list.                   Brand Name Dispense Instructions for use Diagnosis    citalopram 20 MG tablet    celeXA    90 tablet    Take 1 tablet (20 mg) by mouth daily    Anxiety       hydrochlorothiazide 25 MG tablet    HYDRODIURIL    90 tablet    Take 1 tablet (25 mg) by mouth daily    Hypertension goal BP (blood pressure) < 140/90       insulin pen needle 32G X 4 MM    BD TALHA U/F    100 each    Use once daily  or as directed.    Morbid obesity due to excess calories (H)       levonorgestrel 20 MCG/24HR IUD    MIRENA    1 each    One device, subdermally, for 2 years.    Encounter for Nexplanon removal, Nexplanon insertion       liraglutide 18 MG/3ML soln    VICTOZA    36 mL    Inject 1.8 mg Subcutaneous daily    Morbid obesity due to excess calories (H)       traMADol 50 MG tablet    ULTRAM    20 tablet    Take 1-2 tablets ( mg) by mouth every 6 hours as needed for pain    Chronic midline low back pain without sciatica, Chronic bilateral thoracic back pain       verapamil 120 MG 24 hr capsule    VERELAN    90 capsule    Take 1 capsule (120 mg) by mouth daily    Hypertension goal BP (blood pressure) < 140/90, Migraine with aura and without status migrainosus, not intractable

## 2018-01-10 NOTE — PROGRESS NOTES
INTERNAL MEDICINE  Medical Weight Loss - Follow-up Visit    Chief Complaint:   Chief Complaint   Patient presents with     Follow Up     2 month      Health Maintenance     DAP, Migraine Action Plan, Urine     Wt Readings from Last 5 Encounters:   01/16/18 93.4 kg (206 lb)   11/14/17 93.4 kg (206 lb)   09/22/17 98.9 kg (218 lb)   09/12/17 98.9 kg (218 lb)   09/11/17 98 kg (216 lb)     HISTORY OF PRESENT ILLNESS (HPI):    Patient  returns today for medical weight loss follow-up visit. Patient was last seen by me on 11/14/2017 and has lost 0 pounds and 0.7% body fat.    Update - She met with health  and they made a workout plan for her while she is in school and getting her son involved. She will be working out on Tuesday, Thursday, and Friday. It is a quick workout on Tuesday and downloaded another workout juanito.     Diet - She had a pizza and had a terrible stomachache. This has happened a few times in the past month when she eats. She will either throw up or have diarrhea but feels better afterwards. She has not had other side effects from Victoza. Also inquires about phentermine. Patient was also food prepping and making overnight oatmeal.      Patient is not seeing dietitian.   Patient is not seeing PT.     Today we reviewed patient's lab results for labs ordered at last visit and answered patient's questions regarding lab results.     Today patient expresses interest in beginning weight loss medication.  Discussed risks vs benefits of  Victoza  with patient in detail. Also discussed dosing of this medication.     MEDICATIONS:   Current Outpatient Prescriptions   Medication Sig Dispense Refill     liraglutide (VICTOZA) 18 MG/3ML soln Inject 1.8 mg Subcutaneous daily 36 mL 1     insulin pen needle (BD TALHA U/F) 32G X 4 MM Use once daily or as directed. 100 each 3     verapamil (VERELAN) 120 MG 24 hr capsule Take 1 capsule (120 mg) by mouth daily 90 capsule 3     levonorgestrel (MIRENA) 20 MCG/24HR IUD One  "device, subdermally, for 2 years. 1 each 0     citalopram (CELEXA) 20 MG tablet Take 1 tablet (20 mg) by mouth daily 90 tablet 3     hydrochlorothiazide (HYDRODIURIL) 25 MG tablet Take 1 tablet (25 mg) by mouth daily 90 tablet 3     traMADol (ULTRAM) 50 MG tablet Take 1-2 tablets ( mg) by mouth every 6 hours as needed for pain 20 tablet 0       ALLERGIES:   Allergies   Allergen Reactions     Lisinopril Cough     Zoloft      tired     This document serves as a record of the services and decisions personally performed and made by Lorraine Johnson MD. It was created on his/her behalf by Alberta Pratt, trained medical scribe. The creation of this document is based the provider's statements to the medical scribes.    Scribshireen Pratt 3:06 PM, January 16, 2018    PHYSICAL EXAMINATION:    VITALS: /86  Pulse 120  Temp 98.4  F (36.9  C) (Oral)  Resp 14  Ht 1.6 m (5' 3\")  Wt 93.4 kg (206 lb)  LMP 01/13/2018  SpO2 100%  BMI 36.49 kg/m2  GENERAL: Patient is a 32 year old year old female  in no acute distress.  Patient is alert and orientated x 4, pleasant and cooperative with exam.     CARDIOVASCULAR:  Tachycardia, regular rhythm without murmurs, rubs, or gallops.  RESPIRATORY:  Lungs are clear to auscultation bilaterally, respiratory effort is normal.   LOWER EXTREMITIES:  No edema noted bilaterally  PSYCH: mentation appears normal, affect normal/bright    LAB RESULTS:   Office Visit on 09/11/2017   Component Date Value Ref Range Status     Sodium 09/11/2017 137  133 - 144 mmol/L Final     Potassium 09/11/2017 4.0  3.4 - 5.3 mmol/L Final     Chloride 09/11/2017 104  94 - 109 mmol/L Final     Carbon Dioxide 09/11/2017 27  20 - 32 mmol/L Final     Anion Gap 09/11/2017 6  3 - 14 mmol/L Final     Glucose 09/11/2017 79  70 - 99 mg/dL Final     Urea Nitrogen 09/11/2017 11  7 - 30 mg/dL Final     Creatinine 09/11/2017 0.80  0.52 - 1.04 mg/dL Final     GFR Estimate 09/11/2017 83  >60 mL/min/1.7m2 Final    Non  " American GFR Calc     GFR Estimate If Black 09/11/2017 >90  >60 mL/min/1.7m2 Final    African American GFR Calc     Calcium 09/11/2017 9.3  8.5 - 10.1 mg/dL Final     PAP 09/11/2017 NIL   Final     Copath Report 09/11/2017    Final                    Value:  Patient Name: MED MELÉNDEZ  MR#: [id number]  Specimen #: [id number]  Collected: 9/11/2017  Received: 9/12/2017  Reported: 9/13/2017 15:31  Ordering Phy(s): CARLENE CARRASCO    For improved result formatting, select 'View Enhanced Report Format'  under Linked Documents section.    SPECIMEN/STAIN PROCESS:  Pap imaged thin layer prep screening (Surepath, FocalPoint with guided  screening)       Pap-Cyto x 1, HPV ordered x 1    SOURCE: Cervical, endocervical  ----------------------------------------------------------------   Pap imaged thin layer prep screening (Surepath, FocalPoint with guided  screening)  SPECIMEN ADEQUACY:  Satisfactory for evaluation.  -Transformation zone component present.    CYTOLOGIC INTERPRETATION:    Negative for intraepithelial lesion or malignancy    Electronically signed out by:  RESHMA Viveros (ASCP)    Processed and screened at [facility]    CLINICAL HISTORY:  LMP: 9/1/2017  Previous nor                          mal pap  Date of Last Pap: 3/3/2014,    Papanicolaou Test Limitations:  Cervical cytology is a screening test  with limited sensitivity; regular screening is critical for cancer  prevention; Pap tests are primarily effective for the  diagnosis/prevention of squamous cell carcinoma, not adenocarcinomas or  other cancers.    TESTING LAB LOCATION:  [address]  [phone]    COLLECTION SITE:  Client:  [facility]  Location: [facility]Z (B)       HPV 16 DNA 09/11/2017 Negative  NEG^Negative Final     HPV 18 DNA 09/11/2017 Negative  NEG^Negative Final     Other HR  HPV 09/11/2017 Negative  NEG^Negative Final     Final Diagnosis 09/11/2017 This patient's sample is negative for HPV DNA.   Final    Comment: (Note)  METHODOLOGY:  The Roche lizzie 4800 system uses automated extraction,   simultaneous amplification of HPV (L1 region) and beta-globin,    followed by  real time detection of fluorescent labeled HPV and beta   globin using specific oligonucleotide probes . The test specifically   identifies types HPV 16 DNA and HPV 18 DNA while concurrently   detecting the rest of the high risk types (31, 33, 35, 39, 45, 51,   52, 56, 58, 59, 66 or 68).  COMMENTS:  This test is not intended for use as a screening device   for women under age 30 with normal cervical cytology.  Results should   be correlated with cytologic and histologic findings. Close clinical   followup is recommended.  This test was developed and its performance characteristics   determined by the [facility], Molecular   Diagnostics Laboratory. It has not been cleared or approved by the   FDA. The laboratory is regulated under CLIA as qualified to perform   high-complexity testing. This test is used                            for clinical purposes. It   should not be regarded as investigational or for research.       Specimen Description 09/11/2017 Cervical Cells   Final    [id number]     Specimen Descrip 09/11/2017 Cervix   Final     N Gonorrhea PCR 09/11/2017 Negative  NEG^Negative Final    Comment: Negative for N. gonorrhoeae rRNA by transcription mediated amplification.  A negative result by transcription mediated amplification does not preclude   the presence of N. gonorrhoeae infection because results are dependent on   proper and adequate collection, absence of inhibitors, and sufficient rRNA to   be detected.       Specimen Description 09/11/2017 Cervix   Final     Chlamydia Trachomatis PCR 09/11/2017 Negative  NEG^Negative Final    Comment: Negative for C. trachomatis rRNA by  transcription mediated amplification.  A negative result by transcription mediated amplification does not preclude   the presence of C. trachomatis infection because results are dependent on   proper and adequate collection, absence of inhibitors, and sufficient rRNA to   be detected.         ASSESSMENT/PLAN:    1. BMI 36.0-36.9,adult  Patient has established a workout plan with the health  and will work on continuing the frequency of her exercise. She will consider switching to Saxenda but doesn't appear it is covered by insurance.   - Liraglutide -Weight Management (SAXENDA) 18 MG/3ML pen; Inject 3 mg Subcutaneous daily  Dispense: 3 pen; Refill: 3    2. Tachycardia  Patient was asking about phentermine.  Given her tachycardia, phentermine is contraindicated.      PLAN:    Continue  liraglutide.  Prescription was given for liraglutide Patient is to return to the clinic in Other: 6 weeks. Patient is advised to call clinic if she experiences any adverse reaction(s).       ORDERS PLACED IN TODAY'S VISIT:  No orders of the defined types were placed in this encounter.      Patient Instructions     Consider Saxenda. You can go to their website and print out coupons too.    Try putting tofu in your stir ayon.    Saxenda would be started at 2.4 mg daily for 1 week and then up to the 3 mg dose.    Continue to increase the frequency of your workouts.    Follow up in 6 weeks.    [facility]    If you have any questions regarding to your visit please contact your care team:     Team Pink:   Clinic Hours Telephone Number   Internal Medicine:  Dr. Lorraine Villegas, NP       7am-7pm  Monday - Thursday   7am-5pm  Fridays  [phone]  (Appointment scheduling available 24/7)    Questions about your visit?  Team Line  [phone]   Urgent Care - [place] - 11am-9pm Monday-Friday Saturday-Sunday- 9am-5pm   [place] - 5pm-9pm Monday-Friday  Saturday-Sunday- 9am-5pm  [phone] - [place]   [phone] - [place]       What options do I have for visits at the clinic other than the traditional office visit?  To expand how we care for you, many of our providers are utilizing electronic visits (e-visits) and telephone visits, when medically appropriate, for interactions with their patients rather than a visit in the clinic.   We also offer nurse visits for many medical concerns. Just like any other service, we will bill your insurance company for this type of visit based on time spent on the phone with your provider. Not all insurance companies cover these visits. Please check with your medical insurance if this type of visit is covered. You will be responsible for any charges that are not paid by your insurance.      E-visits via [employer]:  generally incur a $35.00 fee.  Telephone visits:  Time spent on the phone: *charged based on time that is spent on the phone in increments of 10 minutes. Estimated cost:   5-10 mins $30.00   11-20 mins. $59.00   21-30 mins. $85.00   Use [employer] (secure email communication and access to your chart) to send your primary care provider a message or make an appointment. Ask someone on your Team how to sign up for [employer].    For a Price Quote for your services, please call our Consumer Price Line at [phone].    As always, Thank you for trusting us with your health care needs!    Hollie Rosas CMA          The information in this document, created by a scribe for me, accurately reflects the services I personally performed and the decisions made by me. I have reviewed and approved this document for accuracy.     I spent 17 minutes of time with the patient and >50% of it was in education and counseling regarding weight management.     Start: 2:57 PM  End 3:14 PM      Lorraine Johnson MD  Internal Medicine    American Board of Obesity Medicine Diplomate

## 2018-01-16 ENCOUNTER — OFFICE VISIT (OUTPATIENT)
Dept: INTERNAL MEDICINE | Facility: CLINIC | Age: 33
End: 2018-01-16
Payer: COMMERCIAL

## 2018-01-16 VITALS
OXYGEN SATURATION: 100 % | RESPIRATION RATE: 14 BRPM | SYSTOLIC BLOOD PRESSURE: 130 MMHG | BODY MASS INDEX: 36.5 KG/M2 | DIASTOLIC BLOOD PRESSURE: 86 MMHG | HEART RATE: 120 BPM | TEMPERATURE: 98.4 F | HEIGHT: 63 IN | WEIGHT: 206 LBS

## 2018-01-16 DIAGNOSIS — R00.0 TACHYCARDIA: ICD-10-CM

## 2018-01-16 PROCEDURE — 99213 OFFICE O/P EST LOW 20 MIN: CPT | Performed by: INTERNAL MEDICINE

## 2018-01-16 NOTE — MR AVS SNAPSHOT
After Visit Summary   1/16/2018    Екатерина Ramon    MRN: [id number]           Patient Information     Date Of Birth          1985        Visit Information        Provider Department      1/16/2018 2:45 PM Lorraine Johnson MD [facility]        Today's Diagnoses     BMI 36.0-36.9,adult    -  1    Tachycardia          Care Instructions    Consider Saxenda. You can go to their website and print out coupons too.    Try putting tofu in your stir ayon.    Saxenda would be started at 2.4 mg daily for 1 week and then up to the 3 mg dose.    Continue to increase the frequency of your workouts.    Follow up in 6 weeks.    [facility]    If you have any questions regarding to your visit please contact your care team:     Team Pink:   Clinic Hours Telephone Number   Internal Medicine:  Dr. Lorraine Villegas, NP       7am-7pm  Monday - Thursday   7am-5pm  Fridays  [phone]  (Appointment scheduling available 24/7)    Questions about your visit?  Team Line  [phone]   Urgent Care - [place] [facility] - 11am-9pm Monday-Friday Saturday-Sunday- 9am-5pm   [place] - 5pm-9pm Monday-Friday Saturday-Sunday- 9am-5pm  [phone] - [place]   [phone] - [place]       What options do I have for visits at the clinic other than the traditional office visit?  To expand how we care for you, many of our providers are utilizing electronic visits (e-visits) and telephone visits, when medically appropriate, for interactions with their patients rather than a visit in the clinic.   We also offer nurse visits for many medical concerns. Just like any other service, we will bill your insurance company for this type of visit based on time spent on the phone with your provider. Not all insurance companies cover these visits. Please check with your medical insurance if this type of visit is covered. You will be responsible for any charges  that are not paid by your insurance.      E-visits via discoapihart:  generally incur a $35.00 fee.  Telephone visits:  Time spent on the phone: *charged based on time that is spent on the phone in increments of 10 minutes. Estimated cost:   5-10 mins $30.00   11-20 mins. $59.00   21-30 mins. $85.00   Use discoapihart (secure email communication and access to your chart) to send your primary care provider a message or make an appointment. Ask someone on your Team how to sign up for Smartiot.    For a Price Quote for your services, please call our [employer] Line at [phone].    As always, Thank you for trusting us with your health care needs!    Hollie Rosas, CMA            Follow-ups after your visit        Who to contact     If you have questions or need follow up information about today's clinic visit or your schedule please contact [facility] directly at [phone].  Normal or non-critical lab and imaging results will be communicated to you by discoapihart, letter or phone within 4 business days after the clinic has received the results. If you do not hear from us within 7 days, please contact the clinic through Smartiot or phone. If you have a critical or abnormal lab result, we will notify you by phone as soon as possible.  Submit refill requests through PlaceFull or call your pharmacy and they will forward the refill request to us. Please allow 3 business days for your refill to be completed.          Additional Information About Your Visit        discoapihart Information     PlaceFull gives you secure access to your electronic health record. If you see a primary care provider, you can also send messages to your care team and make appointments. If you have questions, please call your primary care clinic.  If you do not have a primary care provider, please call [phone] and they will assist you.        Care EveryWhere ID     This is your Care EveryWhere ID. This could be used by other organizations to access  "your [place] medical records  [id number]        Your Vitals Were     Pulse Temperature Respirations Height Last Period Pulse Oximetry    120 98.4  F (36.9  C) (Oral) 14 5' 3\" (1.6 m) 01/13/2018 100%    BMI (Body Mass Index)                   36.49 kg/m2            Blood Pressure from Last 3 Encounters:   01/16/18 130/86   11/14/17 116/86   09/22/17 124/82    Weight from Last 3 Encounters:   01/16/18 206 lb (93.4 kg)   11/14/17 206 lb (93.4 kg)   09/22/17 218 lb (98.9 kg)              Today, you had the following     No orders found for display         Today's Medication Changes          These changes are accurate as of: 1/16/18  3:15 PM.  If you have any questions, ask your nurse or doctor.               Start taking these medicines.        Dose/Directions    Liraglutide -Weight Management 18 MG/3ML pen   Commonly known as:  SAXENDA   Used for:  BMI 36.0-36.9,adult   Started by:  Lorraine Johnson MD        Dose:  3 mg   Inject 3 mg Subcutaneous daily   Quantity:  3 pen   Refills:  3            Where to get your medicines      Some of these will need a paper prescription and others can be bought over the counter.  Ask your nurse if you have questions.     Bring a paper prescription for each of these medications     Liraglutide -Weight Management 18 MG/3ML pen                Primary Care Provider Office Phone # Fax #    Anusha Makayla Wolff, APRN [facility] [phone] [address] [facility] [address]        Equal Access to Services     DOUGLAS WALTERS AH: Hadii flori burcho Sohardyali, waaxda luqadaha, qaybta kaalmada adeegyada, lasha payne. So [facility] [phone].    ATENCIÓN: Si habla español, tiene a ramirez disposición servicios gratuitos de asistencia lingüística. Llame al [phone].    We comply with applicable federal civil rights laws and [place] laws. We do not discriminate on the basis of race, color, national origin, age, disability, sex, sexual orientation, " or gender identity.            Thank you!     Thank you for choosing [facility]  for your care. Our goal is always to provide you with excellent care. Hearing back from our patients is one way we can continue to improve our services. Please take a few minutes to complete the written survey that you may receive in the mail after your visit with us. Thank you!             Your Updated Medication List - Protect others around you: Learn how to safely use, store and throw away your medicines at www.disposemymeds.org.          This list is accurate as of: 1/16/18  3:15 PM.  Always use your most recent med list.                   Brand Name Dispense Instructions for use Diagnosis    citalopram 20 MG tablet    celeXA    90 tablet    Take 1 tablet (20 mg) by mouth daily    Anxiety       hydrochlorothiazide 25 MG tablet    HYDRODIURIL    90 tablet    Take 1 tablet (25 mg) by mouth daily    Hypertension goal BP (blood pressure) < 140/90       insulin pen needle 32G X 4 MM    BD TALHA U/F    100 each    Use once daily or as directed.    Morbid obesity due to excess calories (H)       levonorgestrel 20 MCG/24HR IUD    MIRENA    1 each    One device, subdermally, for 2 years.    Encounter for Nexplanon removal, Nexplanon insertion       Liraglutide -Weight Management 18 MG/3ML pen    SAXENDA    3 pen    Inject 3 mg Subcutaneous daily    BMI 36.0-36.9,adult       liraglutide 18 MG/3ML soln    VICTOZA    36 mL    Inject 1.8 mg Subcutaneous daily    Morbid obesity due to excess calories (H)       traMADol 50 MG tablet    ULTRAM    20 tablet    Take 1-2 tablets ( mg) by mouth every 6 hours as needed for pain    Chronic midline low back pain without sciatica, Chronic bilateral thoracic back pain       verapamil 120 MG 24 hr capsule    VERELAN    90 capsule    Take 1 capsule (120 mg) by mouth daily    Hypertension goal BP (blood pressure) < 140/90, Migraine with aura and without status migrainosus, not intractable

## 2018-01-16 NOTE — PATIENT INSTRUCTIONS
Consider Saxenda. You can go to their website and print out coupons too.    Try putting tofu in your stir ayon.    Saxenda would be started at 2.4 mg daily for 1 week and then up to the 3 mg dose.    Continue to increase the frequency of your workouts.    Follow up in 6 weeks.    [facility]    If you have any questions regarding to your visit please contact your care team:     Team Pink:   Clinic Hours Telephone Number   Internal Medicine:  Dr. Lorraine Villegas, NP       7am-7pm  Monday - Thursday   7am-5pm  Fridays  [phone]  (Appointment scheduling available 24/7)    Questions about your visit?  Team Line  [phone]   Urgent Care - [place] and [facility] - 11am-9pm Monday-Friday Saturday-Sunday- 9am-5pm   [place] - 5pm-9pm Monday-Friday Saturday-Sunday- 9am-5pm  [phone] - [place]   [phone] - [place]       What options do I have for visits at the clinic other than the traditional office visit?  To expand how we care for you, many of our providers are utilizing electronic visits (e-visits) and telephone visits, when medically appropriate, for interactions with their patients rather than a visit in the clinic.   We also offer nurse visits for many medical concerns. Just like any other service, we will bill your insurance company for this type of visit based on time spent on the phone with your provider. Not all insurance companies cover these visits. Please check with your medical insurance if this type of visit is covered. You will be responsible for any charges that are not paid by your insurance.      E-visits via [email or web address]:  generally incur a $35.00 fee.  Telephone visits:  Time spent on the phone: *charged based on time that is spent on the phone in increments of 10 minutes. Estimated cost:   5-10 mins $30.00   11-20 mins. $59.00   21-30 mins. $85.00   Use [email or web address] (secure email communication and access to your chart) to send  your primary care provider a message or make an appointment. Ask someone on your Team how to sign up for TwtBkshart.    For a Price Quote for your services, please call our Consumer Price Line at [phone].    As always, Thank you for trusting us with your health care needs!    Hollie Rosas CMA

## 2018-01-24 ENCOUNTER — TELEPHONE (OUTPATIENT)
Dept: FAMILY MEDICINE | Facility: CLINIC | Age: 33
End: 2018-01-24

## 2018-01-24 ENCOUNTER — MYC MEDICAL ADVICE (OUTPATIENT)
Dept: FAMILY MEDICINE | Facility: CLINIC | Age: 33
End: 2018-01-24

## 2018-01-24 NOTE — TELEPHONE ENCOUNTER
Reason for Call:  Other appointment    Detailed comments:  Patient calling to schedule a phone visit. Please let her know via my chart.     Phone Number Patient can be reached at: Home number on file [phone] (home)    Best Time:  Any     Can we leave a detailed message on this number? YES    Call taken on 1/24/2018 at 3:05 PM by Nancy Barger

## 2018-01-25 ENCOUNTER — TELEPHONE (OUTPATIENT)
Dept: PHARMACY | Facility: CLINIC | Age: 33
End: 2018-01-25

## 2018-01-25 NOTE — TELEPHONE ENCOUNTER
Yes, a phone visit with me is fine. Can schedule this for tomorrow- please put is in on my schedule in one of my provider slots (don't use a regular appointment slot).  Anusha Wolff, CNP

## 2018-01-25 NOTE — TELEPHONE ENCOUNTER
We have been unable to reach this patient for MTM follow-up after several attempts. We will stop reaching out to the patient at this time. Please let us know if we can assist in this patient's care in the future.    Routing to PCP as Kashmir McgowanD, [place]  Medication Therapy Management Provider  Pager: [phone]

## 2018-01-25 NOTE — TELEPHONE ENCOUNTER
Called patient and informed her that Anusha Wolff is out today, and that I wanted to make sure that she (Anusha Wolff) would be okay with just doing a phone visit before scheduling.  Informed patient that we would call her tomorrow and give her further direction, patient is fine with this.  Tara Lee MA

## 2018-01-25 NOTE — TELEPHONE ENCOUNTER
Spoke to patient schedule her with Anusha for a telephone visit 1/26/18 @ 8:40am    Agnes Arevalo MA

## 2018-01-26 ENCOUNTER — VIRTUAL VISIT (OUTPATIENT)
Dept: FAMILY MEDICINE | Facility: CLINIC | Age: 33
End: 2018-01-26
Payer: COMMERCIAL

## 2018-01-26 DIAGNOSIS — F41.9 ANXIETY: Primary | ICD-10-CM

## 2018-01-26 DIAGNOSIS — G43.109 MIGRAINE WITH AURA AND WITHOUT STATUS MIGRAINOSUS, NOT INTRACTABLE: ICD-10-CM

## 2018-01-26 DIAGNOSIS — I10 HYPERTENSION GOAL BP (BLOOD PRESSURE) < 140/90: ICD-10-CM

## 2018-01-26 PROCEDURE — 99441 ZZC PHYSICIAN TELEPHONE EVALUATION 5-10 MIN: CPT | Performed by: NURSE PRACTITIONER

## 2018-01-26 RX ORDER — VERAPAMIL HYDROCHLORIDE 120 MG/1
120 CAPSULE, EXTENDED RELEASE ORAL DAILY
Qty: 90 CAPSULE | Refills: 3 | Status: SHIPPED | OUTPATIENT
Start: 2018-01-26 | End: 2019-03-08

## 2018-01-26 ASSESSMENT — ANXIETY QUESTIONNAIRES
5. BEING SO RESTLESS THAT IT IS HARD TO SIT STILL: SEVERAL DAYS
1. FEELING NERVOUS, ANXIOUS, OR ON EDGE: NEARLY EVERY DAY
GAD7 TOTAL SCORE: 16
2. NOT BEING ABLE TO STOP OR CONTROL WORRYING: MORE THAN HALF THE DAYS
3. WORRYING TOO MUCH ABOUT DIFFERENT THINGS: NEARLY EVERY DAY
6. BECOMING EASILY ANNOYED OR IRRITABLE: NEARLY EVERY DAY
7. FEELING AFRAID AS IF SOMETHING AWFUL MIGHT HAPPEN: SEVERAL DAYS
IF YOU CHECKED OFF ANY PROBLEMS ON THIS QUESTIONNAIRE, HOW DIFFICULT HAVE THESE PROBLEMS MADE IT FOR YOU TO DO YOUR WORK, TAKE CARE OF THINGS AT HOME, OR GET ALONG WITH OTHER PEOPLE: VERY DIFFICULT

## 2018-01-26 ASSESSMENT — PATIENT HEALTH QUESTIONNAIRE - PHQ9: 5. POOR APPETITE OR OVEREATING: NEARLY EVERY DAY

## 2018-01-26 NOTE — MR AVS SNAPSHOT
After Visit Summary   1/26/2018    Екатерина Ramon    MRN: [id number]           Patient Information     Date Of Birth          1985        Visit Information        Provider Department      1/26/2018 8:40 AM Anusha Wolff APRN CNP [facility]        Today's Diagnoses     Anxiety    -  1    Hypertension goal BP (blood pressure) < 140/90        Migraine with aura and without status migrainosus, not intractable           Follow-ups after your visit        Your next 10 appointments already scheduled     Mar 02, 2018  2:45 PM CST   SHORT with Lorraine Johnson MD   [facility] ([facility])    [address] [address] [phone]              Who to contact     If you have questions or need follow up information about today's clinic visit or your schedule please contact [facility] directly at [phone].  Normal or non-critical lab and imaging results will be communicated to you by MyChart, letter or phone within 4 business days after the clinic has received the results. If you do not hear from us within 7 days, please contact the clinic through [employer] or phone. If you have a critical or abnormal lab result, we will notify you by phone as soon as possible.  Submit refill requests through [employer] or call your pharmacy and they will forward the refill request to us. Please allow 3 business days for your refill to be completed.          Additional Information About Your Visit        StayClass[employer]hart Information     [employer] gives you secure access to your electronic health record. If you see a primary care provider, you can also send messages to your care team and make appointments. If you have questions, please call your primary care clinic.  If you do not have a primary care provider, please call [phone] and they will assist you.        Care EveryWhere ID     This is your Care EveryWhere ID. This could be used by  other organizations to access your [place] medical records  [id number]        Your Vitals Were     Last Period                   01/13/2018            Blood Pressure from Last 3 Encounters:   01/16/18 130/86   11/14/17 116/86   09/22/17 124/82    Weight from Last 3 Encounters:   01/16/18 206 lb (93.4 kg)   11/14/17 206 lb (93.4 kg)   09/22/17 218 lb (98.9 kg)              Today, you had the following     No orders found for display         Today's Medication Changes          These changes are accurate as of 1/26/18  1:13 PM.  If you have any questions, ask your nurse or doctor.               Start taking these medicines.        Dose/Directions    FLUoxetine 20 MG capsule   Commonly known as:  PROzac   Used for:  Anxiety   Started by:  Anusha Wolff APRN CNP        Dose:  20 mg   Take 1 capsule (20 mg) by mouth daily   Quantity:  90 capsule   Refills:  1         Stop taking these medicines if you haven't already. Please contact your care team if you have questions.     citalopram 20 MG tablet   Commonly known as:  celeXA   Stopped by:  Anusha Wolff APRN CNP                Where to get your medicines      These medications were sent to [id number] - [facility] [address]  [address] [address] [address]     Phone:  [phone]     FLUoxetine 20 MG capsule    verapamil 120 MG 24 hr capsule                Primary Care Provider Office Phone # Fax #    ESTUARDO Jain [phone]       [address] [facility] [address]        Equal Access to Services     [facility]: Hadii aad ku hadasho Soomaali, waaxda luqadaha, qaybta kaalmada adeegyaalisson, waxay idiin hayaan adeeg kharash la'aan . So [facility] [phone].    ATENCIÓN: Si habla español, tiene a ramirez disposición servicios gratuitos de asistencia lingüística. Llame al [phone].    We comply with applicable federal civil rights laws and [place] laws. We do not discriminate  on the basis of race, color, national origin, age, disability, sex, sexual orientation, or gender identity.            Thank you!     Thank you for choosing [facility] [place]  for your care. Our goal is always to provide you with excellent care. Hearing back from our patients is one way we can continue to improve our services. Please take a few minutes to complete the written survey that you may receive in the mail after your visit with us. Thank you!             Your Updated Medication List - Protect others around you: Learn how to safely use, store and throw away your medicines at www.disposemymeds.org.          This list is accurate as of 1/26/18  1:13 PM.  Always use your most recent med list.                   Brand Name Dispense Instructions for use Diagnosis    FLUoxetine 20 MG capsule    PROzac    90 capsule    Take 1 capsule (20 mg) by mouth daily    Anxiety       hydrochlorothiazide 25 MG tablet    HYDRODIURIL    90 tablet    Take 1 tablet (25 mg) by mouth daily    Hypertension goal BP (blood pressure) < 140/90       insulin pen needle 32G X 4 MM    BD TALHA U/F    100 each    Use once daily or as directed.    Morbid obesity due to excess calories (H)       levonorgestrel 20 MCG/24HR IUD    MIRENA    1 each    One device, subdermally, for 2 years.    Encounter for Nexplanon removal, Nexplanon insertion       Liraglutide -Weight Management 18 MG/3ML pen    SAXENDA    3 pen    Inject 3 mg Subcutaneous daily    BMI 36.0-36.9,adult       liraglutide 18 MG/3ML soln    VICTOZA    36 mL    Inject 1.8 mg Subcutaneous daily    Morbid obesity due to excess calories (H)       traMADol 50 MG tablet    ULTRAM    20 tablet    Take 1-2 tablets ( mg) by mouth every 6 hours as needed for pain    Chronic midline low back pain without sciatica, Chronic bilateral thoracic back pain       verapamil 120 MG 24 hr capsule    VERELAN    90 capsule    Take 1 capsule (120 mg) by mouth daily    Hypertension goal BP (blood  pressure) < 140/90, Migraine with aura and without status migrainosus, not intractable

## 2018-01-26 NOTE — PROGRESS NOTES
"Екатерина Ramon is a 32 year old female who is being evaluated via a telephone visit.      The patient has been notified of following (by CHIO Rosenberg. MA       \"We have found that certain health care needs can be provided without the need for a physical exam.  This service lets us provide the care you need with a short phone conversation.  If a prescription is necessary we can send it directly to your pharmacy.  If lab work is needed we can place an order for that and you can then stop by our lab to have the test done at a later time.    This telephone visit will be conducted via 3 way call with the you (the patient) , the physician/provider, and a me all on the line at the same time.  This allows your physician/provider to have the phone conversation with you while I will be taking notes for your medical record.  We will have full access to your [place] medical record during this entire phone call.    Since this is like an office visit,  will bill your insurance company for this service.  Please check with your medical insurance if this type of telephone/virtual is covered . You may be responsible for the cost of this service if insurance coverage is denied.  The typical cost is $30 (10min), $59(11-20min) and $85 (21-30min)     If during the course of the call the physician/provider feels a telephone visit is not appropriate, you will not be charged for this service\"    Consent has been obtained for this service by 2 care team members: no. See the scanned image in the medical record.    S:     Pertinent parts of the the patient's medical history reviewed and confirmed by the provider included :    Patient complains of worsening anxiety and daily headache for the last 3 weeks. Coworker is talking nonstop all day long; other employees are coming to her with problems at work and this environment is triggering her anxiety. Remodel project at work is coming and will be in tight spaces so she anticipates this " will worsen. Talked to her supervisor about the situation but this is not helpful. Taking college courses and notes school going ok- Passed class at school but has 2 more to go.  A nurse at work told her she uses Prozac and patient wonders if this might be more effective than her Celexa.   Notes that she has been off her Verapamil and is taking Naproxen daily for headaches.    Assessment/Plan:    (F41.9) Anxiety  (primary encounter diagnosis)  Comment: Stop Celexa and start Prozac the following day. Reviewed common side effects. Allow up to 6 weeks for full benefit of the medication. Follow up with me via [employer] message in 1-2 months.  Plan: FLUoxetine (PROZAC) 20 MG capsule            (I10) Hypertension goal BP (blood pressure) < 140/90  Comment:   Plan: verapamil (VERELAN) 120 MG 24 hr capsule        Restart Verapamil.     (G43.109) Migraine with aura and without status migrainosus, not intractable  Comment: as above  Plan: verapamil (VERELAN) 120 MG 24 hr capsule            Total time of call between patient and provider was 6 minutes

## 2018-01-27 ASSESSMENT — ANXIETY QUESTIONNAIRES: GAD7 TOTAL SCORE: 16

## 2018-02-06 ENCOUNTER — VIRTUAL VISIT (OUTPATIENT)
Dept: NURSING | Facility: CLINIC | Age: 33
End: 2018-02-06

## 2018-02-06 DIAGNOSIS — E66.01 MORBID OBESITY DUE TO EXCESS CALORIES (H): Primary | ICD-10-CM

## 2018-02-06 PROCEDURE — 99207 ZZC HEALTH COACHING, NO CHARGE: CPT

## 2018-02-06 NOTE — PROGRESS NOTES
February 6, 2018    [facility]  [address]  [place] [address]  [phone]  [phone]  Health Coaching Progress Note    Patient Name: Екатерина Ramon Date: February 6, 2018      Session Length: 20      DATA    PRM Master Survey Scores Reviewed: Yes    Core Healthy Days Survey:         JO ANN Score (Last Two) 9/11/2017 1/2/2018   JO ANN Raw Score 35 32   Activation Score 72.1 62.6   JO ANN Level 3 3       PHQ-2 Score 1/2/2018 9/8/2017   PHQ-2 Total Score Interpretation - Positive if 3 or more points; Administer PHQ-9 if positive 0 0   MyC PHQ-2 Score - 0       PHQ-9 SCORE 5/6/2016 9/11/2017   Total Score - -   Total Score 2 4       Treatment Objective(s) Addressed in This Session:  Target Behavior(s): diet/weight loss and disease management/lifestyle changes increase exercise    Current Stressors / Issues:  Екатерина shares that Tuesdays didn't work out well for her to exercise so she only exercised 2x/week.    What Patient Does Well:   Екатерина has been working on her goal since our last visit.  Previous Successes:   Екатерина figured out Tuesdays don't work well with her current schedule  Areas in Need of Improvement:   Екатерина states the area in need of improvement at this time is trying another day to get in 3x/week for exercise. Екатерина thinks that Maybe Sunday would work and thinks maybe she can even do it in the morning that day (would prefer this she thinks). Екатерина plans to give this a try until our next follow up.  Barriers to Change:   Tuesday didn't work well because Екатерина didn't feel she could pull away from school work those days.  Reasons for Change:   Екатерина wants to lose weight for her health and self-esteem.  Plan/Goal for the Next 4 Weeks:     GOAL #1: Continue trying to exercise 3x/week-happening 2x now  GOAL #1 Progress Toward Goal: 25%    Intervention:  Motivational Interviewing    MI Intervention: Expressed Empathy/Understanding,  Supported Autonomy, Collaboration, Evocation, Permission to raise concern or advise, Open-ended questions, Reflections: simple and complex, Change talk (evoked) and Reframe     Change Talk Expressed by the Patient: Desire to change Ability to change Committment to change Activation Taking steps    Provider Response to Change Talk: E - Evoked more info from patient about behavior change, A - Affirmed patient's thoughts, decisions, or attempts at behavior change, R - Reflected patient's change talk and S - Summarized patient's change talk statements    Assessment / Progress on Treatment Objective(s) / Homework:    Satisfactory progress - ACTION (Actively working towards change); Intervened by reinforcing change plan / affirming steps taken         Plan: (Homework, other):  Patient was encouraged to continue to seek condition-related information and education, as well as schedule a follow up appointment with the Health  in 4 weeks. Patient has set self-identified goals and will monitor progress until the next appointment.  Next visit scheduled for March 6 at 11AM.      Eb Blackburn

## 2018-02-06 NOTE — MR AVS SNAPSHOT
After Visit Summary   2/6/2018    Екатерина Ramon    MRN: [id number]           Patient Information     Date Of Birth          1985        Visit Information        Provider Department      2/6/2018 11:00 AM HEALTH  - [address]        Today's Diagnoses     Morbid obesity due to excess calories (H)    -  1       Follow-ups after your visit        Your next 10 appointments already scheduled     Mar 02, 2018  2:45 PM CST   SHORT with Lorraine Johnson MD   [facility] ([address] [address] [phone]            Mar 06, 2018 11:00 AM CST   Telephone Visit with HEALTH  - [address] ([address] [id number] [phone]           Note: this is not an onsite visit; there is no need to come to the facility.              Who to contact     If you have questions or need follow up information about today's clinic visit or your schedule please contact [facility] directly at [phone].  Normal or non-critical lab and imaging results will be communicated to you by [employer], letter or phone within 4 business days after the clinic has received the results. If you do not hear from us within 7 days, please contact the clinic through [employer] or phone. If you have a critical or abnormal lab result, we will notify you by phone as soon as possible.  Submit refill requests through [employer] or call your pharmacy and they will forward the refill request to us. Please allow 3 business days for your refill to be completed.          Additional Information About Your Visit        MyChart Information     [employer] gives you secure access to your electronic health record. If you see a primary care provider, you can also send messages to your care team and make appointments. If you have questions, please call your primary care clinic.   If you do not have a primary care provider, please call [phone] and they will assist you.        Care EveryWhere ID     This is your Care EveryWhere ID. This could be used by other organizations to access your [place] medical records  [id number]        Your Vitals Were     Last Period                   01/13/2018            Blood Pressure from Last 3 Encounters:   01/16/18 130/86   11/14/17 116/86   09/22/17 124/82    Weight from Last 3 Encounters:   01/16/18 206 lb (93.4 kg)   11/14/17 206 lb (93.4 kg)   09/22/17 218 lb (98.9 kg)              Today, you had the following     No orders found for display       Primary Care Provider Office Phone # Fax #    ESTUARDO Jain [facility] [phone]       [address] [facility] [address]        Equal Access to Services     [facility]: Hadii aad ku hadasho Soomaali, waaxda luqadaha, qaybta kaalmada adeegyada, waxay idiin hayaan adejeffry kharash la'aan . So [facility] [phone].    ATENCIÓN: Si habla español, tiene a ramirez disposición servicios gratuitos de asistencia lingüística. Karina al [phone].    We comply with applicable federal civil rights laws and [place] laws. We do not discriminate on the basis of race, color, national origin, age, disability, sex, sexual orientation, or gender identity.            Thank you!     Thank you for choosing [facility]  for your care. Our goal is always to provide you with excellent care. Hearing back from our patients is one way we can continue to improve our services. Please take a few minutes to complete the written survey that you may receive in the mail after your visit with us. Thank you!             Your Updated Medication List - Protect others around you: Learn how to safely use, store and throw away your medicines at www.disposemymeds.org.          This list is accurate as of 2/6/18  2:00 PM.  Always use your most recent med list.                   Brand Name Dispense  Instructions for use Diagnosis    FLUoxetine 20 MG capsule    PROzac    90 capsule    Take 1 capsule (20 mg) by mouth daily    Anxiety       hydrochlorothiazide 25 MG tablet    HYDRODIURIL    90 tablet    Take 1 tablet (25 mg) by mouth daily    Hypertension goal BP (blood pressure) < 140/90       insulin pen needle 32G X 4 MM    BD TALHA U/F    100 each    Use once daily or as directed.    Morbid obesity due to excess calories (H)       levonorgestrel 20 MCG/24HR IUD    MIRENA    1 each    One device, subdermally, for 2 years.    Encounter for Nexplanon removal, Nexplanon insertion       Liraglutide -Weight Management 18 MG/3ML pen    SAXENDA    3 pen    Inject 3 mg Subcutaneous daily    BMI 36.0-36.9,adult       liraglutide 18 MG/3ML soln    VICTOZA    36 mL    Inject 1.8 mg Subcutaneous daily    Morbid obesity due to excess calories (H)       traMADol 50 MG tablet    ULTRAM    20 tablet    Take 1-2 tablets ( mg) by mouth every 6 hours as needed for pain    Chronic midline low back pain without sciatica, Chronic bilateral thoracic back pain       verapamil 120 MG 24 hr capsule    VERELAN    90 capsule    Take 1 capsule (120 mg) by mouth daily    Hypertension goal BP (blood pressure) < 140/90, Migraine with aura and without status migrainosus, not intractable

## 2018-02-28 NOTE — PROGRESS NOTES
INTERNAL MEDICINE  Medical Weight Loss - Follow-up Visit    Chief Complaint:   Chief Complaint   Patient presents with     Weight Check     Wt Readings from Last 5 Encounters:   03/02/18 90 kg (198 lb 5 oz)   01/16/18 93.4 kg (206 lb)   11/14/17 93.4 kg (206 lb)   09/22/17 98.9 kg (218 lb)   09/12/17 98.9 kg (218 lb)     HISTORY OF PRESENT ILLNESS (HPI):    Patient  returns today for medical weight loss follow-up visit. Patient was last seen by me on 1/16/2018 and has lost 8 pounds and down 0.5% body fat.    Update - She said Saxenda still would have cost her $700 so she is not taking it. She had some constipation on Victoza but is stable now. She had an irritated hemorrhoid but it is fine now.    Exercise - She still struggles with working out. She has been walking outside and walks during her lunch breaks. After coming home from work, she feels tired and has no motivation to work out. She has considered working out in the morning but makes excuses.    Diet - She did not change her diet. She is full after eating breakfast. Usually eats oatmeal or drinks a shake. She tries to eat a smaller lunch.     Additional notes  She was prescribed tramadol for her headaches but they helped her back as well. She has not had it refilled yet.       Patient is not seeing dietitian.  Patient is not seeing PT.     MEDICATIONS:   Current Outpatient Prescriptions   Medication Sig Dispense Refill     liraglutide (VICTOZA) 18 MG/3ML soln Inject 1.8 mg Subcutaneous daily 36 mL 1     verapamil (VERELAN) 120 MG 24 hr capsule Take 1 capsule (120 mg) by mouth daily 90 capsule 3     FLUoxetine (PROZAC) 20 MG capsule Take 1 capsule (20 mg) by mouth daily 90 capsule 1     insulin pen needle (BD TALHA U/F) 32G X 4 MM Use once daily or as directed. 100 each 3     levonorgestrel (MIRENA) 20 MCG/24HR IUD One device, subdermally, for 2 years. 1 each 0     hydrochlorothiazide (HYDRODIURIL) 25 MG tablet Take 1 tablet (25 mg) by mouth daily 90 tablet 3  "    [DISCONTINUED] liraglutide (VICTOZA) 18 MG/3ML soln Inject 1.8 mg Subcutaneous daily 36 mL 1       ALLERGIES:   Allergies   Allergen Reactions     Lisinopril Cough     Zoloft      tired     This document serves as a record of the services and decisions personally performed and made by Lorraine Johnson MD. It was created on his/her behalf by Alberta Pratt, trained medical scribe. The creation of this document is based the provider's statements to the medical scribes.    Scribe Alberta Pratt 2:58 PM, March 2, 2018    PHYSICAL EXAMINATION:    VITALS: /80  Pulse 114  Temp 97.7  F (36.5  C) (Oral)  Resp 18  Ht 1.6 m (5' 3\")  Wt 90 kg (198 lb 5 oz)  SpO2 97%  BMI 35.13 kg/m2  GENERAL: Patient is a 32 year old year old female  in no acute distress.  Patient is alert and orientated x 4, pleasant and cooperative with exam.     CARDIOVASCULAR: Regular rate and rhythm without murmurs, rubs, or gallops.  RESPIRATORY:  Lungs are clear to auscultation bilaterally, respiratory effort is normal.   LOWER EXTREMITIES:  No edema noted bilaterally  PSYCH: mentation appears normal, affect normal/bright      ASSESSMENT/PLAN:    1. Morbid obesity due to excess calories (H)  She was not able to get Saxenda fully covered and will continue on Victoza. She tolerates Victoza well. She has been walking during her lunch break but still has a hard time going to the gym and incorporating strength training. I gave her some strength training options to look into.  - liraglutide (VICTOZA) 18 MG/3ML soln; Inject 1.8 mg Subcutaneous daily  Dispense: 36 mL; Refill: 1  - Basic metabolic panel    2. Hypertension goal BP (blood pressure) < 140/90    - Basic metabolic panel    3. High risk medication use    - Basic metabolic panel        PLAN:    Continue  Victoza.  Prescription was given for Victoza. Patient is to return to the clinic in Other: 3 months. Patient is advised to call clinic if she experiences any adverse reaction(s).       ORDERS PLACED " IN TODAY'S VISIT:  Orders Placed This Encounter   Procedures     Basic metabolic panel       Patient Instructions     Buy a resistance band. You can find free workout videos online or get strength training apps (BeFit). Some workouts you can consider are Lavonne and Tamy Raymundo.    Come in for a monthly weigh-in with the nurse. Send me exercise updates at that time.    See me in 3 months.    [facility]    If you have any questions regarding to your visit please contact your care team:     Team Pink:   Clinic Hours Telephone Number   Internal Medicine:  Dr. Lorraine Villegas, NP       7am-7pm  Monday - Thursday   7am-5pm  Fridays  [phone]  (Appointment scheduling available 24/7)    Questions about your visit?  Team Line  [phone]   Urgent Care - [place] [facility] - 11am-9pm Monday-Friday Saturday-Sunday- 9am-5pm   [place] - 5pm-9pm Monday-Friday Saturday-Sunday- 9am-5pm  [phone] - [facility]  [phone] - [place]       What options do I have for visits at the clinic other than the traditional office visit?  To expand how we care for you, many of our providers are utilizing electronic visits (e-visits) and telephone visits, when medically appropriate, for interactions with their patients rather than a visit in the clinic.   We also offer nurse visits for many medical concerns. Just like any other service, we will bill your insurance company for this type of visit based on time spent on the phone with your provider. Not all insurance companies cover these visits. Please check with your medical insurance if this type of visit is covered. You will be responsible for any charges that are not paid by your insurance.      E-visits via [employer]:  generally incur a $35.00 fee.  Telephone visits:  Time spent on the phone: *charged based on time that is spent on the phone in increments of 10 minutes. Estimated cost:   5-10 mins $30.00    11-20 mins. $59.00   21-30 mins. $85.00   Use Ti Knighthart (secure email communication and access to your chart) to send your primary care provider a message or make an appointment. Ask someone on your Team how to sign up for Ti Knighthart.    For a Price Quote for your services, please call our Consumer Price Line at [phone].    As always, Thank you for trusting us with your health care needs!    Discharged By:  SARAH DEAN      I spent 14 minutes of time with the patient and >50% of it was in education and counseling regarding weight management.     The information in this document, created by a scribe for me, accurately reflects the services I personally performed and the decisions made by me. I have reviewed and approved this document for accuracy.     Lorraine Johnson MD  Internal Medicine    American Board of Obesity Medicine Diplomate     Start 2:49 PM  End 3:03 PM

## 2018-03-02 ENCOUNTER — OFFICE VISIT (OUTPATIENT)
Dept: INTERNAL MEDICINE | Facility: CLINIC | Age: 33
End: 2018-03-02
Payer: COMMERCIAL

## 2018-03-02 VITALS
TEMPERATURE: 97.7 F | BODY MASS INDEX: 35.14 KG/M2 | OXYGEN SATURATION: 97 % | RESPIRATION RATE: 18 BRPM | SYSTOLIC BLOOD PRESSURE: 138 MMHG | HEART RATE: 114 BPM | WEIGHT: 198.31 LBS | HEIGHT: 63 IN | DIASTOLIC BLOOD PRESSURE: 80 MMHG

## 2018-03-02 DIAGNOSIS — Z79.899 HIGH RISK MEDICATION USE: ICD-10-CM

## 2018-03-02 DIAGNOSIS — E66.01 MORBID OBESITY DUE TO EXCESS CALORIES (H): Primary | ICD-10-CM

## 2018-03-02 DIAGNOSIS — I10 HYPERTENSION GOAL BP (BLOOD PRESSURE) < 140/90: ICD-10-CM

## 2018-03-02 LAB
ANION GAP SERPL CALCULATED.3IONS-SCNC: 10 MMOL/L (ref 3–14)
BUN SERPL-MCNC: 15 MG/DL (ref 7–30)
CALCIUM SERPL-MCNC: 9.1 MG/DL (ref 8.5–10.1)
CHLORIDE SERPL-SCNC: 105 MMOL/L (ref 94–109)
CO2 SERPL-SCNC: 25 MMOL/L (ref 20–32)
CREAT SERPL-MCNC: 1.08 MG/DL (ref 0.52–1.04)
GFR SERPL CREATININE-BSD FRML MDRD: 59 ML/MIN/1.7M2
GLUCOSE SERPL-MCNC: 86 MG/DL (ref 70–99)
POTASSIUM SERPL-SCNC: 4.1 MMOL/L (ref 3.4–5.3)
SODIUM SERPL-SCNC: 140 MMOL/L (ref 133–144)

## 2018-03-02 PROCEDURE — 80048 BASIC METABOLIC PNL TOTAL CA: CPT | Performed by: INTERNAL MEDICINE

## 2018-03-02 PROCEDURE — 99213 OFFICE O/P EST LOW 20 MIN: CPT | Performed by: INTERNAL MEDICINE

## 2018-03-02 PROCEDURE — 36415 COLL VENOUS BLD VENIPUNCTURE: CPT | Performed by: INTERNAL MEDICINE

## 2018-03-02 RX ORDER — LIRAGLUTIDE 6 MG/ML
1.8 INJECTION SUBCUTANEOUS DAILY
Qty: 36 ML | Refills: 1 | Status: SHIPPED | OUTPATIENT
Start: 2018-03-02 | End: 2018-12-14

## 2018-03-02 ASSESSMENT — PAIN SCALES - GENERAL: PAINLEVEL: NO PAIN (1)

## 2018-03-02 NOTE — PATIENT INSTRUCTIONS
Buy a resistance band. You can find free workout videos online or get strength training apps (BeFit). Some workouts you can consider are Lavonne and Tamy Raymundo.    Come in for a monthly weigh-in with the nurse. Send me exercise updates at that time.    See me in 3 months.    [facility]    If you have any questions regarding to your visit please contact your care team:     Team Pink:   Clinic Hours Telephone Number   Internal Medicine:  Dr. Lorraine Villegas NP       7am-7pm  Monday - Thursday   7am-5pm  Fridays  [phone]  (Appointment scheduling available 24/7)    Questions about your visit?  Team Line  [phone]   Urgent Care - [place] and [facility] - 11am-9pm Monday-Friday Saturday-Sunday- 9am-5pm   [place] - 5pm-9pm Monday-Friday Saturday-Sunday- 9am-5pm  [phone] - [place]   [phone] - [place]       What options do I have for visits at the clinic other than the traditional office visit?  To expand how we care for you, many of our providers are utilizing electronic visits (e-visits) and telephone visits, when medically appropriate, for interactions with their patients rather than a visit in the clinic.   We also offer nurse visits for many medical concerns. Just like any other service, we will bill your insurance company for this type of visit based on time spent on the phone with your provider. Not all insurance companies cover these visits. Please check with your medical insurance if this type of visit is covered. You will be responsible for any charges that are not paid by your insurance.      E-visits via [employer]:  generally incur a $35.00 fee.  Telephone visits:  Time spent on the phone: *charged based on time that is spent on the phone in increments of 10 minutes. Estimated cost:   5-10 mins $30.00   11-20 mins. $59.00   21-30 mins. $85.00   Use [employer] (secure email communication and access to your chart) to send  your primary care provider a message or make an appointment. Ask someone on your Team how to sign up for [employer].    For a Price Quote for your services, please call our Consumer Price Line at [phone].    As always, Thank you for trusting us with your health care needs!    Discharged By:  SARAH DEAN

## 2018-03-02 NOTE — MR AVS SNAPSHOT
After Visit Summary   3/2/2018    Екатерина Ramon    MRN: [id number]           Patient Information     Date Of Birth          1985        Visit Information        Provider Department      3/2/2018 2:45 PM Lorraine Johnson MD [facility]        Today's Diagnoses     Morbid obesity due to excess calories (H)    -  1    Hypertension goal BP (blood pressure) < 140/90        High risk medication use          Care Instructions    Buy a resistance band. You can find free workout videos online or get strength training apps (BeFit). Some workouts you can consider are [place] and Tamy Raymundo.    Come in for a monthly weigh-in with the nurse. Send me exercise updates at that time.    See me in 3 months.    [facility]    If you have any questions regarding to your visit please contact your care team:     Team Pink:   Clinic Hours Telephone Number   Internal Medicine:  Dr. Lorraine Villegas, NP       7am-7pm  Monday - Thursday   7am-5pm  Fridays  [phone]  (Appointment scheduling available 24/7)    Questions about your visit?  Team Line  [phone]   Urgent Care - [place] and [place] - 11am-9pm Monday-Friday Saturday-Sunday- 9am-5pm   [place] - 5pm-9pm Monday-Friday Saturday-Sunday- 9am-5pm  [phone] - [place]   [phone] - [place]       What options do I have for visits at the clinic other than the traditional office visit?  To expand how we care for you, many of our providers are utilizing electronic visits (e-visits) and telephone visits, when medically appropriate, for interactions with their patients rather than a visit in the clinic.   We also offer nurse visits for many medical concerns. Just like any other service, we will bill your insurance company for this type of visit based on time spent on the phone with your provider. Not all insurance companies cover these visits. Please check with your  medical insurance if this type of visit is covered. You will be responsible for any charges that are not paid by your insurance.      E-visits via [employer]bo [employer]hart:  generally incur a $35.00 fee.  Telephone visits:  Time spent on the phone: *charged based on time that is spent on the phone in increments of 10 minutes. Estimated cost:   5-10 mins $30.00   11-20 mins. $59.00   21-30 mins. $85.00   Use Fridget (secure email communication and access to your chart) to send your primary care provider a message or make an appointment. Ask someone on your Team how to sign up for Fridget.    For a Price Quote for your services, please call our Indix Line at [phone].    As always, Thank you for trusting us with your health care needs!    Discharged By:  SARAH DEAN            Follow-ups after your visit        Your next 10 appointments already scheduled     Mar 06, 2018 11:00 AM CST   Telephone Visit with HEALTH  - REG[facility]   [facility] [place] ([address]  [place] [id number] [phone]           Note: this is not an onsite visit; there is no need to come to the facility.              Who to contact     If you have questions or need follow up information about today's clinic visit or your schedule please contact [facility] directly at [phone].  Normal or non-critical lab and imaging results will be communicated to you by MyChart, letter or phone within 4 business days after the clinic has received the results. If you do not hear from us within 7 days, please contact the clinic through MyChart or phone. If you have a critical or abnormal lab result, we will notify you by phone as soon as possible.  Submit refill requests through [employer] Kang or call your pharmacy and they will forward the refill request to us. Please allow 3 business days for your refill to be completed.          Additional Information About Your Visit        [email or web address] Information      "[employer] gives you secure access to your electronic health record. If you see a primary care provider, you can also send messages to your care team and make appointments. If you have questions, please call your primary care clinic.  If you do not have a primary care provider, please call [phone] and they will assist you.        Care EveryWhere ID     This is your Care EveryWhere ID. This could be used by other organizations to access your [facility] medical records  [id number]        Your Vitals Were     Pulse Temperature Respirations Height Pulse Oximetry BMI (Body Mass Index)    114 97.7  F (36.5  C) (Oral) 18 5' 3\" (1.6 m) 97% 35.13 kg/m2       Blood Pressure from Last 3 Encounters:   03/02/18 138/80   01/16/18 130/86   11/14/17 116/86    Weight from Last 3 Encounters:   03/02/18 198 lb 5 oz (90 kg)   01/16/18 206 lb (93.4 kg)   11/14/17 206 lb (93.4 kg)              We Performed the Following     Basic metabolic panel          Today's Medication Changes          These changes are accurate as of 3/2/18  3:04 PM.  If you have any questions, ask your nurse or doctor.               Stop taking these medicines if you haven't already. Please contact your care team if you have questions.     Liraglutide -Weight Management 18 MG/3ML pen   Commonly known as:  SAXENDA   Stopped by:  Lorraine Johnson MD                Where to get your medicines      These medications were sent to [id number] [address] [facility]  [address] [facility] [address]     Phone:  [phone]     liraglutide 18 MG/3ML soln                Primary Care Provider Office Phone # Fax #    ESTUARDO Jain [phone] [address] [facility] [address]        Equal Access to Services     CHRIS WALTERS : Susi Isabel, ky bourne, qazeina kalasha malcolm. [facility] [phone].    ATENCIÓN: Si habla " español, tiene a ramirez disposición servicios gratuitos de asistencia lingüística. Karina macias [phone].    We comply with applicable federal civil rights laws and [place] laws. We do not discriminate on the basis of race, color, national origin, age, disability, sex, sexual orientation, or gender identity.            Thank you!     Thank you for choosing [facility]  for your care. Our goal is always to provide you with excellent care. Hearing back from our patients is one way we can continue to improve our services. Please take a few minutes to complete the written survey that you may receive in the mail after your visit with us. Thank you!             Your Updated Medication List - Protect others around you: Learn how to safely use, store and throw away your medicines at www.disposemymeds.org.          This list is accurate as of 3/2/18  3:04 PM.  Always use your most recent med list.                   Brand Name Dispense Instructions for use Diagnosis    FLUoxetine 20 MG capsule    PROzac    90 capsule    Take 1 capsule (20 mg) by mouth daily    Anxiety       hydrochlorothiazide 25 MG tablet    HYDRODIURIL    90 tablet    Take 1 tablet (25 mg) by mouth daily    Hypertension goal BP (blood pressure) < 140/90       insulin pen needle 32G X 4 MM    BD TALHA U/F    100 each    Use once daily or as directed.    Morbid obesity due to excess calories (H)       levonorgestrel 20 MCG/24HR IUD    MIRENA    1 each    One device, subdermally, for 2 years.    Encounter for Nexplanon removal, Nexplanon insertion       liraglutide 18 MG/3ML soln    VICTOZA    36 mL    Inject 1.8 mg Subcutaneous daily    Morbid obesity due to excess calories (H)       verapamil 120 MG 24 hr capsule    VERELAN    90 capsule    Take 1 capsule (120 mg) by mouth daily    Hypertension goal BP (blood pressure) < 140/90, Migraine with aura and without status migrainosus, not intractable

## 2018-03-05 ENCOUNTER — TELEPHONE (OUTPATIENT)
Dept: INTERNAL MEDICINE | Facility: CLINIC | Age: 33
End: 2018-03-05

## 2018-03-05 ENCOUNTER — MYC MEDICAL ADVICE (OUTPATIENT)
Dept: INTERNAL MEDICINE | Facility: CLINIC | Age: 33
End: 2018-03-05

## 2018-03-05 DIAGNOSIS — N28.9 RENAL INSUFFICIENCY: Primary | ICD-10-CM

## 2018-03-05 DIAGNOSIS — M54.6 CHRONIC BILATERAL THORACIC BACK PAIN: ICD-10-CM

## 2018-03-05 DIAGNOSIS — G89.29 CHRONIC BILATERAL THORACIC BACK PAIN: ICD-10-CM

## 2018-03-05 DIAGNOSIS — G89.29 CHRONIC MIDLINE LOW BACK PAIN WITHOUT SCIATICA: Primary | ICD-10-CM

## 2018-03-05 DIAGNOSIS — M54.50 CHRONIC MIDLINE LOW BACK PAIN WITHOUT SCIATICA: Primary | ICD-10-CM

## 2018-03-05 NOTE — TELEPHONE ENCOUNTER
Patient notified of Provider's message as written.  Patient verbalized understanding.    Lab appointment made for 3/20/18    Keyon Milton RN

## 2018-03-05 NOTE — PROGRESS NOTES
Normal electrolytes. Slight decline in kidney function.  Please increase your water intake and avoid all NSAID use (ibuprofen, aleve, Advil, etc) as this can be harmful to the kidneys.  I would like a repeat kidney test in 2 weeks at the lab.    Dr. Johnson    BMP - renal insufficiency

## 2018-03-05 NOTE — TELEPHONE ENCOUNTER
Per Dr. Johnson: needs to avoid Excedrin. Tramadol refill request to be sent to PCP  MAINtag message sent    Routing to Anusha Wolff CNP regarding Tramadol refill request    Keyon Milton RN

## 2018-03-05 NOTE — TELEPHONE ENCOUNTER
Patient was advised to avoid NSAIDs d/t recent labs showing decline in kidney funtion.  Per patient, Excedrin seems to be the only thing that works for her headaches.    Keyon Milton RN

## 2018-03-05 NOTE — TELEPHONE ENCOUNTER
Notes Recorded by Krystle Peñaloza RN on 3/5/2018 at 1:32 PM  Message left on  to return call to  hotline at [phone].   Krystle Peñaloza RN     Telephone encounter started.  ------    Notes Recorded by Krystle Peñaloza RN on 3/5/2018 at 1:31 PM  Lab order placed.    Krystle Peñaloza RN     ------    Notes Recorded by Lorraine Johnson MD on 3/5/2018 at 9:53 AM  Normal electrolytes. Slight decline in kidney function.  Please increase your water intake and avoid all NSAID use (ibuprofen, aleve, Advil, etc) as this can be harmful to the kidneys.  I would like a repeat kidney test in 2 weeks at the lab.    Dr. Johnson    BMP - renal insufficiency

## 2018-03-06 ENCOUNTER — VIRTUAL VISIT (OUTPATIENT)
Dept: NURSING | Facility: CLINIC | Age: 33
End: 2018-03-06

## 2018-03-06 ENCOUNTER — E-VISIT (OUTPATIENT)
Dept: FAMILY MEDICINE | Facility: CLINIC | Age: 33
End: 2018-03-06
Payer: COMMERCIAL

## 2018-03-06 ENCOUNTER — MYC MEDICAL ADVICE (OUTPATIENT)
Dept: FAMILY MEDICINE | Facility: CLINIC | Age: 33
End: 2018-03-06

## 2018-03-06 DIAGNOSIS — M54.6 CHRONIC BILATERAL THORACIC BACK PAIN: ICD-10-CM

## 2018-03-06 DIAGNOSIS — G89.29 CHRONIC MIDLINE LOW BACK PAIN WITHOUT SCIATICA: ICD-10-CM

## 2018-03-06 DIAGNOSIS — M54.50 CHRONIC MIDLINE LOW BACK PAIN WITHOUT SCIATICA: ICD-10-CM

## 2018-03-06 DIAGNOSIS — G89.29 CHRONIC BILATERAL THORACIC BACK PAIN: ICD-10-CM

## 2018-03-06 DIAGNOSIS — F41.9 ANXIETY: ICD-10-CM

## 2018-03-06 DIAGNOSIS — E66.01 MORBID OBESITY DUE TO EXCESS CALORIES (H): Primary | ICD-10-CM

## 2018-03-06 PROCEDURE — 99444 ZZC PHYSICIAN ONLINE EVALUATION & MANAGEMENT SERVICE: CPT | Performed by: NURSE PRACTITIONER

## 2018-03-06 PROCEDURE — 99207 ZZC HEALTH COACHING, NO CHARGE: CPT

## 2018-03-06 RX ORDER — TRAMADOL HYDROCHLORIDE 50 MG/1
50-100 TABLET ORAL EVERY 6 HOURS PRN
Qty: 20 TABLET | Refills: 0 | Status: CANCELLED | OUTPATIENT
Start: 2018-03-06

## 2018-03-06 ASSESSMENT — ANXIETY QUESTIONNAIRES
6. BECOMING EASILY ANNOYED OR IRRITABLE: NEARLY EVERY DAY
4. TROUBLE RELAXING: MORE THAN HALF THE DAYS
2. NOT BEING ABLE TO STOP OR CONTROL WORRYING: MORE THAN HALF THE DAYS
7. FEELING AFRAID AS IF SOMETHING AWFUL MIGHT HAPPEN: SEVERAL DAYS
GAD7 TOTAL SCORE: 13
5. BEING SO RESTLESS THAT IT IS HARD TO SIT STILL: SEVERAL DAYS
3. WORRYING TOO MUCH ABOUT DIFFERENT THINGS: MORE THAN HALF THE DAYS
7. FEELING AFRAID AS IF SOMETHING AWFUL MIGHT HAPPEN: SEVERAL DAYS
GAD7 TOTAL SCORE: 13
1. FEELING NERVOUS, ANXIOUS, OR ON EDGE: MORE THAN HALF THE DAYS

## 2018-03-06 NOTE — TELEPHONE ENCOUNTER
[employer] message sent and routing to provider with Tramadol T'd up for provider to sign.     Krystle Peñaloza RN

## 2018-03-06 NOTE — MR AVS SNAPSHOT
After Visit Summary   3/6/2018    Екатерина Ramon    MRN: [id number]           Patient Information     Date Of Birth          1985        Visit Information        Provider Department      3/6/2018 11:00 AM HEALTH  - REGION 2 [facility]        Today's Diagnoses     Morbid obesity due to excess calories (H)    -  1       Follow-ups after your visit        Follow-up notes from your care team     Return in 4 weeks (on 4/3/2018).      Your next 10 appointments already scheduled     Mar 20, 2018  5:45 PM CDT   LAB with FZ LAB   [facility] ([facility])    [address] [place] [id number]   [phone]           Please do not eat 10-12 hours before your appointment if you are coming in fasting for labs on lipids, cholesterol, or glucose (sugar). This does not apply to pregnant women. Water, hot tea and black coffee (with nothing added) are okay. Do not drink other fluids, diet soda or chew gum.            Apr 06, 2018  9:00 AM CDT   Nurse Only with FZ ANCILLARY   [facility] ([facility])    [address] [place] [address]   [phone]            May 04, 2018  4:15 PM CDT   Nurse Only with FZ ANCILLARY   [facility] ([facility])    [address] [place] [id number] [phone]            Jun 01, 2018  2:45 PM CDT   SHORT with Lorraine Johnson MD   [facility] ([facility])    [address] [place] [id number] [phone]              Who to contact     If you have questions or need follow up information about today's clinic visit or your schedule please contact [facility] directly at [phone].  Normal or non-critical lab and imaging results will be communicated to you by MyChart, letter or phone within 4 business days after the clinic has received the results. If you do not hear from us within 7 days,  please contact the clinic through Derby[employer] or phone. If you have a critical or abnormal lab result, we will notify you by phone as soon as possible.  Submit refill requests through Derby[employer] or call your pharmacy and they will forward the refill request to us. Please allow 3 business days for your refill to be completed.          Additional Information About Your Visit        [employer] Information     [employer]by[employer] gives you secure access to your electronic health record. If you see a primary care provider, you can also send messages to your care team and make appointments. If you have questions, please call your primary care clinic.  If you do not have a primary care provider, please call [phone] and they will assist you.        Care EveryWhere ID     This is your Care EveryWhere ID. This could be used by other organizations to access your [place] medical records  [id number]         Blood Pressure from Last 3 Encounters:   03/02/18 138/80   01/16/18 130/86   11/14/17 116/86    Weight from Last 3 Encounters:   03/02/18 198 lb 5 oz (90 kg)   01/16/18 206 lb (93.4 kg)   11/14/17 206 lb (93.4 kg)              Today, you had the following     No orders found for display         Today's Medication Changes          These changes are accurate as of 3/6/18 11:59 PM.  If you have any questions, ask your nurse or doctor.               These medicines have changed or have updated prescriptions.        Dose/Directions    FLUoxetine 10 MG capsule   Commonly known as:  PROzac   This may have changed:    - medication strength  - how much to take   Used for:  Anxiety   Changed by:  Anusha Wolff APRN CNP        Dose:  30 mg   Take 3 capsules (30 mg) by mouth daily   Quantity:  90 capsule   Refills:  1            Where to get your medicines      These medications were sent to [id number] - [place], [place] - [address]  [address] [facility] [place] [address]     Phone:  [phone]     FLUoxetine 10 MG  capsule                Primary Care Provider Office Phone # Fax #    Anusha Wolff, ESTUARDO [facility] [phone] [address] [facility] [address]        Equal Access to Services     CHRIS WALTERS : Hadii aad ku hadtayloro Soomaali, waaxda luqadaha, qaybta kaalmada adeegyada, waxollie idiin hayoliverion jimmy case laVenitagavino payne. So [facility] [phone].    ATENCIÓN: Si habla español, tiene a ramirez disposición servicios gratuitos de asistencia lingüística. Llame al [phone].    We comply with applicable federal civil rights laws and [place] laws. We do not discriminate on the basis of race, color, national origin, age, disability, sex, sexual orientation, or gender identity.            Thank you!     Thank you for choosing [facility]  for your care. Our goal is always to provide you with excellent care. Hearing back from our patients is one way we can continue to improve our services. Please take a few minutes to complete the written survey that you may receive in the mail after your visit with us. Thank you!             Your Updated Medication List - Protect others around you: Learn how to safely use, store and throw away your medicines at www.disposemymeds.org.          This list is accurate as of 3/6/18 11:59 PM.  Always use your most recent med list.                   Brand Name Dispense Instructions for use Diagnosis    FLUoxetine 10 MG capsule    PROzac    90 capsule    Take 3 capsules (30 mg) by mouth daily    Anxiety       hydrochlorothiazide 25 MG tablet    HYDRODIURIL    90 tablet    Take 1 tablet (25 mg) by mouth daily    Hypertension goal BP (blood pressure) < 140/90       insulin pen needle 32G X 4 MM    BD TALHA U/F    100 each    Use once daily or as directed.    Morbid obesity due to excess calories (H)       levonorgestrel 20 MCG/24HR IUD    MIRENA    1 each    One device, subdermally, for 2 years.    Encounter for Nexplanon removal, Nexplanon insertion       liraglutide 18  MG/3ML soln    VICTOZA    36 mL    Inject 1.8 mg Subcutaneous daily    Morbid obesity due to excess calories (H)       verapamil 120 MG 24 hr capsule    VERELAN    90 capsule    Take 1 capsule (120 mg) by mouth daily    Hypertension goal BP (blood pressure) < 140/90, Migraine with aura and without status migrainosus, not intractable

## 2018-03-06 NOTE — TELEPHONE ENCOUNTER
Please see patients MyChart message.    Per E-Visit note with Anusha:  Assessment/Plan:     (F41.9) Anxiety  (primary encounter diagnosis)  Comment: Stop Celexa and start Prozac the following day. Reviewed common side effects. Allow up to 6 weeks for full benefit of the medication. Follow up with me via Logi-Servet message in 1-2 months.  Plan: FLUoxetine (PROZAC) 20 MG capsule     (G43.109) Migraine with aura and without status migrainosus, not intractable  Comment: as above  Plan: verapamil (VERELAN) 120 MG 24 hr capsule    Advise on prozac and tramadol.  OV? Phone visit?    Krystle Peñaloza RN

## 2018-03-06 NOTE — TELEPHONE ENCOUNTER
Ok for Tramadol- can have another provider sign if she needs today. Otherwise route back to me to do tomorrow if she can wait.    She will need OV with me prior to getting additional refills.    Anusha Wolff, CNP

## 2018-03-06 NOTE — PROGRESS NOTES
"March 6, 2018    [address]  [place] [address] [phone]  Health Coaching Progress Note    Patient Name: Екатерина Ramon Date: March 6, 2018      Session Length: 30      DATA    PRM Master Survey Scores Reviewed: Yes    Core Healthy Days Survey:         JO ANN Score (Last Two) 9/11/2017 1/2/2018   JO ANN Raw Score 35 32   Activation Score 72.1 62.6   JO ANN Level 3 3       PHQ-2 Score 1/2/2018 9/8/2017   PHQ-2 Total Score Interpretation - Positive if 3 or more points; Administer PHQ-9 if positive 0 0   MyC PHQ-2 Score - 0       PHQ-9 SCORE 5/6/2016 9/11/2017   Total Score - -   Total Score 2 4       Treatment Objective(s) Addressed in This Session:  Target Behavior(s): diet/weight loss    Current Stressors / Issues:  Екатерина reports she is still just exercising 2x per week and has not increased to 3 as planned.    What Patient Does Well:   Екатерина states that she continues to exercise 2x/week consistently.  Previous Successes:   Екатерина lost 8# and has decreased her body fat percentage by 0.5%. Екатерина attributes this mainly to consistently exercising 2x/week.  Areas in Need of Improvement:   Екатерина shares that she continues to struggle to fit in a third workout during the week with all of her other responsibilities to balance also and we discuss this more today. Writer asks if there may be potential to \"kill 2 birds with 1 stone\" some how and gives the example of cleaning house Saturday steadily-1 task to the next with an urgency that raises the heart rate some, and Екатерина responds that she has not thought of this and that she could give it a try as she usually does her housework on the weekend.     Екатерина also shares that she did get some resistance bands at [employer] recently and plans to use them as recommended by Dr. Johnson. Writer affirms this plan and asks Екатерина if she needs any resources around using these and we discuss some " ideas/places to look for band workouts (Sitem[employer]her, [employer], [employer], [employer] befit channel, etc). Екатерина feels confident that she will be able to work with some of these or others to get started.  Barriers to Change:   See above.  Reasons for Change:   Екатерина wants to lose weight for her health and self-esteem.  Plan/Goal for the Next 4 Weeks:     GOAL #1: Continue trying to exercise 3x/week-happening 2x currently-still working on this 3/6/18  GOAL #1 Progress Toward Goal: 50%    Intervention:  Motivational Interviewing    MI Intervention: Expressed Empathy/Understanding, Supported Autonomy, Collaboration, Evocation, Permission to raise concern or advise, Open-ended questions, Reflections: simple and complex, Change talk (evoked) and Reframe     Change Talk Expressed by the Patient: Desire to change Ability to change Reasons to change Need to change Committment to change Activation Taking steps    Provider Response to Change Talk: E - Evoked more info from patient about behavior change, A - Affirmed patient's thoughts, decisions, or attempts at behavior change, R - Reflected patient's change talk and S - Summarized patient's change talk statements    Assessment / Progress on Treatment Objective(s) / Homework:    Satisfactory progress - ACTION (Actively working towards change); Intervened by reinforcing change plan / affirming steps taken         Plan: (Homework, other):  Patient was encouraged to continue to seek condition-related information and education, as well as schedule a follow up appointment with the Health  in 4 weeks. Patient has set self-identified goals and will monitor progress until the next appointment.  Next visit scheduled for April 3 at 11AM.      Eb Blackburn

## 2018-03-06 NOTE — TELEPHONE ENCOUNTER
Needs e-visit, phone visit, or, OV for the anxiety.  I addressed the tramadol in a separate encounter (ok for 1 refill but needs OFFICE visit for addt'l fills).  Anusha Wolff, CNP

## 2018-03-07 RX ORDER — TRAMADOL HYDROCHLORIDE 50 MG/1
50-100 TABLET ORAL EVERY 6 HOURS PRN
Qty: 20 TABLET | Refills: 0 | Status: SHIPPED | OUTPATIENT
Start: 2018-03-07 | End: 2018-12-14

## 2018-03-07 RX ORDER — FLUOXETINE 10 MG/1
30 CAPSULE ORAL DAILY
Qty: 90 CAPSULE | Refills: 1 | Status: SHIPPED | OUTPATIENT
Start: 2018-03-07 | End: 2018-04-27

## 2018-03-07 ASSESSMENT — ANXIETY QUESTIONNAIRES: GAD7 TOTAL SCORE: 13

## 2018-03-07 NOTE — TELEPHONE ENCOUNTER
ibabybox message sent to patient updating her that Anusha Wolff CNP will refill Tramadol but will need appointment for any additional refills    Routing to Anusha Wolff CNP to please refill Tramadol    Keyon Milton RN

## 2018-03-07 NOTE — TELEPHONE ENCOUNTER
Confirmed prescription faxed to Pharmacy. Rosemarie Mitchell,     traMADol (ULTRAM) 50 MG tablet    [id number] - [address]

## 2018-03-23 DIAGNOSIS — N28.9 RENAL INSUFFICIENCY: ICD-10-CM

## 2018-03-23 LAB
ANION GAP SERPL CALCULATED.3IONS-SCNC: 8 MMOL/L (ref 3–14)
BUN SERPL-MCNC: 13 MG/DL (ref 7–30)
CALCIUM SERPL-MCNC: 9.3 MG/DL (ref 8.5–10.1)
CHLORIDE SERPL-SCNC: 103 MMOL/L (ref 94–109)
CO2 SERPL-SCNC: 27 MMOL/L (ref 20–32)
CREAT SERPL-MCNC: 0.73 MG/DL (ref 0.52–1.04)
GFR SERPL CREATININE-BSD FRML MDRD: >90 ML/MIN/1.7M2
GLUCOSE SERPL-MCNC: 83 MG/DL (ref 70–99)
POTASSIUM SERPL-SCNC: 3.8 MMOL/L (ref 3.4–5.3)
SODIUM SERPL-SCNC: 138 MMOL/L (ref 133–144)

## 2018-03-23 PROCEDURE — 36415 COLL VENOUS BLD VENIPUNCTURE: CPT | Performed by: INTERNAL MEDICINE

## 2018-03-23 PROCEDURE — 80048 BASIC METABOLIC PNL TOTAL CA: CPT | Performed by: INTERNAL MEDICINE

## 2018-03-26 NOTE — PROGRESS NOTES
Dear Екатерина,    Your recent test results are attached.      Normal kidney function and electrolytes.      If you have any questions please feel free to contact [phone] or myself via DCITSt.    Sincerely,  Magaly Villegas, CNP

## 2018-04-06 ENCOUNTER — ALLIED HEALTH/NURSE VISIT (OUTPATIENT)
Dept: NURSING | Facility: CLINIC | Age: 33
End: 2018-04-06
Payer: COMMERCIAL

## 2018-04-06 VITALS — WEIGHT: 192.31 LBS | HEIGHT: 63 IN | BODY MASS INDEX: 34.07 KG/M2

## 2018-04-06 DIAGNOSIS — E66.01 MORBID OBESITY DUE TO EXCESS CALORIES (H): Primary | ICD-10-CM

## 2018-04-24 ENCOUNTER — TRANSFERRED RECORDS (OUTPATIENT)
Dept: HEALTH INFORMATION MANAGEMENT | Facility: CLINIC | Age: 33
End: 2018-04-24

## 2018-04-24 ENCOUNTER — MYC MEDICAL ADVICE (OUTPATIENT)
Dept: FAMILY MEDICINE | Facility: CLINIC | Age: 33
End: 2018-04-24

## 2018-04-27 ENCOUNTER — TELEPHONE (OUTPATIENT)
Dept: NURSING | Facility: CLINIC | Age: 33
End: 2018-04-27

## 2018-04-27 ENCOUNTER — OFFICE VISIT (OUTPATIENT)
Dept: FAMILY MEDICINE | Facility: CLINIC | Age: 33
End: 2018-04-27
Payer: COMMERCIAL

## 2018-04-27 VITALS
RESPIRATION RATE: 15 BRPM | HEIGHT: 63 IN | HEART RATE: 107 BPM | OXYGEN SATURATION: 99 % | BODY MASS INDEX: 34.91 KG/M2 | DIASTOLIC BLOOD PRESSURE: 84 MMHG | SYSTOLIC BLOOD PRESSURE: 122 MMHG | WEIGHT: 197 LBS | TEMPERATURE: 98.2 F

## 2018-04-27 DIAGNOSIS — F41.9 ANXIETY: ICD-10-CM

## 2018-04-27 DIAGNOSIS — N93.8 DYSFUNCTIONAL UTERINE BLEEDING: Primary | ICD-10-CM

## 2018-04-27 LAB
BASOPHILS # BLD AUTO: 0.1 10E9/L (ref 0–0.2)
BASOPHILS NFR BLD AUTO: 0.6 %
DIFFERENTIAL METHOD BLD: ABNORMAL
EOSINOPHIL # BLD AUTO: 0.1 10E9/L (ref 0–0.7)
EOSINOPHIL NFR BLD AUTO: 1.1 %
ERYTHROCYTE [DISTWIDTH] IN BLOOD BY AUTOMATED COUNT: 13.3 % (ref 10–15)
HCT VFR BLD AUTO: 37.4 % (ref 35–47)
HGB BLD-MCNC: 12.4 G/DL (ref 11.7–15.7)
LYMPHOCYTES # BLD AUTO: 2.9 10E9/L (ref 0.8–5.3)
LYMPHOCYTES NFR BLD AUTO: 23.7 %
MCH RBC QN AUTO: 30.2 PG (ref 26.5–33)
MCHC RBC AUTO-ENTMCNC: 33.2 G/DL (ref 31.5–36.5)
MCV RBC AUTO: 91 FL (ref 78–100)
MONOCYTES # BLD AUTO: 1.1 10E9/L (ref 0–1.3)
MONOCYTES NFR BLD AUTO: 9.1 %
NEUTROPHILS # BLD AUTO: 8.1 10E9/L (ref 1.6–8.3)
NEUTROPHILS NFR BLD AUTO: 65.5 %
PLATELET # BLD AUTO: 330 10E9/L (ref 150–450)
RBC # BLD AUTO: 4.1 10E12/L (ref 3.8–5.2)
WBC # BLD AUTO: 12.4 10E9/L (ref 4–11)

## 2018-04-27 PROCEDURE — 36415 COLL VENOUS BLD VENIPUNCTURE: CPT | Performed by: NURSE PRACTITIONER

## 2018-04-27 PROCEDURE — 99214 OFFICE O/P EST MOD 30 MIN: CPT | Performed by: NURSE PRACTITIONER

## 2018-04-27 PROCEDURE — 85025 COMPLETE CBC W/AUTO DIFF WBC: CPT | Performed by: NURSE PRACTITIONER

## 2018-04-27 RX ORDER — NORGESTIMATE AND ETHINYL ESTRADIOL 7DAYSX3 28
2 KIT ORAL DAILY
COMMUNITY
Start: 2018-04-24 | End: 2018-06-11

## 2018-04-27 RX ORDER — FLUOXETINE 10 MG/1
30 CAPSULE ORAL DAILY
Qty: 90 CAPSULE | Refills: 5 | Status: SHIPPED | OUTPATIENT
Start: 2018-04-27 | End: 2018-12-23

## 2018-04-27 NOTE — MR AVS SNAPSHOT
After Visit Summary   4/27/2018    Екатерина Ramon    MRN: [id number]           Patient Information     Date Of Birth          1985        Visit Information        Provider Department      4/27/2018 2:00 PM Anusha Wolff APRN CNP [facility]        Today's Diagnoses     Dysfunctional uterine bleeding    -  1    Anxiety          Care Instructions    [facility]    If you have any questions regarding to your visit please contact your care team:       Team Red:   Clinic Hours Telephone Number   Dr. Ernestina Wolff, NP   7am-7pm  Monday - Thursday   7am-5pm  Fridays  [phone]  (Appointment scheduling available 24/7)    Questions about your recent visit?   Team Line  [phone]   Urgent Care - [place] [facility] - 11am-9pm Monday-Friday Saturday-Sunday- 9am-5pm   [place] - 5pm-9pm Monday-Friday Saturday-Sunday- 9am-5pm  [phone] - [place]  [phone] - [place]       What options do I have for a visit other than an office visit? We offer electronic visits (e-visits) and telephone visits, when medically appropriate.  Please check with your medical insurance to see if these types of visits are covered, as you will be responsible for any charges that are not paid by your insurance.      You can use [employer] (secure electronic communication) to access to your chart, send your primary care provider a message, or make an appointment. Ask a team member how to get started.     For a price quote for your services, please call our Consumer Price Line at [phone] or our Imaging Cost estimation line at [phone] (for imaging tests).    Discharged by Tara Lee MA.            Follow-ups after your visit        Your next 10 appointments already scheduled     May 03, 2018 11:00 AM CDT   Telephone Visit with HEALTH  - REGION 3   [facility] ([facility])     [address] [place] [address]   [phone]           Note: this is not an onsite visit; there is no need to come to the facility.            May 04, 2018  4:15 PM CDT   Nurse Only with FZ ANCILLARY   [facility] ([facility])    [address]  [place] [address] [phone]            Jun 01, 2018  2:45 PM CDT   SHORT with Lorraine Johnson MD   [facility] [place] ([facility])    [address] [place] [address]  [place] [address] [phone]              Future tests that were ordered for you today     Open Future Orders        Priority Expected Expires Ordered    US Pelvic Complete with Transvaginal Routine 7/26/2018 10/24/2018 4/27/2018            Who to contact     If you have questions or need follow up information about today's clinic visit or your schedule please contact [facility] directly at [phone].  Normal or non-critical lab and imaging results will be communicated to you by TickPickhart, letter or phone within 4 business days after the clinic has received the results. If you do not hear from us within 7 days, please contact the clinic through Zymeworkst or phone. If you have a critical or abnormal lab result, we will notify you by phone as soon as possible.  Submit refill requests through [employer] or call your pharmacy and they will forward the refill request to us. Please allow 3 business days for your refill to be completed.          Additional Information About Your Visit        [employer] Information     [employer] gives you secure access to your electronic health record. If you see a primary care provider, you can also send messages to your care team and make appointments. If you have questions, please call your primary care clinic.  If you do not have a primary care provider, please call [phone] and they will assist you.        Care EveryWhere ID     This is your Care EveryWhere ID. This could be used by  "other organizations to access your [place] medical records  [id number]        Your Vitals Were     Pulse Temperature Respirations Height Pulse Oximetry BMI (Body Mass Index)    107 98.2  F (36.8  C) 15 5' 3\" (1.6 m) 99% 34.9 kg/m2       Blood Pressure from Last 3 Encounters:   04/27/18 122/84   03/02/18 138/80   01/16/18 130/86    Weight from Last 3 Encounters:   04/27/18 197 lb (89.4 kg)   04/06/18 192 lb 5 oz (87.2 kg)   03/02/18 198 lb 5 oz (90 kg)              We Performed the Following     CBC with platelets differential          Where to get your medicines      These medications were sent to [id number] - [place], [place] - [address]  [address] [facility] [address]     Phone:  [phone]     FLUoxetine 10 MG capsule          Primary Care Provider Office Phone # Fax #    Anusha Makayla Wolff, APRN [facility] [phone]       [address] [place] [address]        Equal Access to Services     [facility]OUGL[facility] : Hadii aad ku hadasho Soomaali, waaxda luqadaha, qaybta kaalmada adeegyada, lasha lin . So [facility] [phone].    ATENCIÓN: Si habla español, tiene a ramirez disposición servicios gratuitos de asistencia lingüística. [facility] [phone].    We comply with applicable federal civil rights laws and [place] laws. We do not discriminate on the basis of race, color, national origin, age, disability, sex, sexual orientation, or gender identity.            Thank you!     Thank you for choosing [facility]  for your care. Our goal is always to provide you with excellent care. Hearing back from our patients is one way we can continue to improve our services. Please take a few minutes to complete the written survey that you may receive in the mail after your visit with us. Thank you!             Your Updated Medication List - Protect others around you: Learn how to safely use, store and throw away your medicines at " www.disposemymeds.org.          This list is accurate as of 4/27/18  2:54 PM.  Always use your most recent med list.                   Brand Name Dispense Instructions for use Diagnosis    FLUoxetine 10 MG capsule    PROzac    90 capsule    Take 3 capsules (30 mg) by mouth daily    Anxiety       hydrochlorothiazide 25 MG tablet    HYDRODIURIL    90 tablet    Take 1 tablet (25 mg) by mouth daily    Hypertension goal BP (blood pressure) < 140/90       insulin pen needle 32G X 4 MM    BD TALHA U/F    100 each    Use once daily or as directed.    Morbid obesity due to excess calories (H)       levonorgestrel 20 MCG/24HR IUD    MIRENA    1 each    One device, subdermally, for 2 years.    Encounter for Nexplanon removal, Nexplanon insertion       liraglutide 18 MG/3ML soln    VICTOZA    36 mL    Inject 1.8 mg Subcutaneous daily    Morbid obesity due to excess calories (H)       norgestim-eth estrad triphasic 0.18/0.215/0.25 MG-35 MCG per tablet    ORTHO TRI-CYCLEN, TRI-SPRINTEC     Take 2 tablets by mouth daily        traMADol 50 MG tablet    ULTRAM    20 tablet    Take 1-2 tablets ( mg) by mouth every 6 hours as needed for pain Max of 4 tabs per day    Chronic midline low back pain without sciatica, Chronic bilateral thoracic back pain       verapamil 120 MG 24 hr capsule    VERELAN    90 capsule    Take 1 capsule (120 mg) by mouth daily    Hypertension goal BP (blood pressure) < 140/90, Migraine with aura and without status migrainosus, not intractable

## 2018-04-27 NOTE — PATIENT INSTRUCTIONS
[facility]    If you have any questions regarding to your visit please contact your care team:       Team Red:   Clinic Hours Telephone Number   Dr. Ernestina Wolff, NP   7am-7pm  Monday - Thursday   7am-5pm  Fridays  [phone]  (Appointment scheduling available 24/7)    Questions about your recent visit?   Team Line  [phone]   Urgent Care - [place] and [facility] - 11am-9pm Monday-Friday Saturday-Sunday- 9am-5pm   [place] - 5pm-9pm Monday-Friday Saturday-Sunday- 9am-5pm  [phone] - [place]  [phone] - [place]       What options do I have for a visit other than an office visit? We offer electronic visits (e-visits) and telephone visits, when medically appropriate.  Please check with your medical insurance to see if these types of visits are covered, as you will be responsible for any charges that are not paid by your insurance.      You can use [employer] (secure electronic communication) to access to your chart, send your primary care provider a message, or make an appointment. Ask a team member how to get started.     For a price quote for your services, please call our Consumer Price Line at [phone] or our Imaging Cost estimation line at [phone] (for imaging tests).    Discharged by Tara Lee MA.

## 2018-04-27 NOTE — PROGRESS NOTES
SUBJECTIVE:   Екатерина Ramon is a 32 year old female who presents to clinic today for the following health issues:      ED/UC Followup:    Facility:  [facility]  Date of visit: 04/24/2018  Reason for visit: Vaginal Bleeding  Current Status: still bleeding      Has had heavy vaginal bleeding for 10 days. Nexplanon in place. Does feel mildly SOB, fatigued. Using 2 tampons/hour- has been through 2 boxes of super plus tampons. Uses an overnight pad and changing it at least once overnight. Has some small clots. Was seen in Emergency Room 3 days ago and started on OCP although bleeding has not improved yet.    Anxiety Follow-Up    Status since last visit: Improved     Other associated symptoms:None    Complicating factors:   Significant life event: No   Current substance abuse: None  Depression symptoms: No  BERENICE-7 SCORE 9/11/2017 1/26/2018 3/6/2018   Total Score - - -   Total Score - - 13 (moderate anxiety)   Total Score 0 16 13       BERENICE-7    Problem list and histories reviewed & adjusted, as indicated.  Additional history: as documented    Patient Active Problem List   Diagnosis     Adult BMI 30+     CARDIOVASCULAR SCREENING; LDL GOAL LESS THAN 160     Family history of diabetes mellitus     History of depression     Sprain and strain of shoulder and upper arm     Hypertension goal BP (blood pressure) < 140/90     CTS (carpal tunnel syndrome)     S/P carpal tunnel release     Anxiety     BMI 40.0-44.9, adult (H)     Chronic bilateral thoracic back pain     Chronic midline low back pain without sciatica     Migraine with aura and without status migrainosus, not intractable     Morbid obesity due to excess calories (H)     Low serum HDL     Past Surgical History:   Procedure Laterality Date     HC REVISE MEDIAN N/CARPAL TUNNEL SURG Left 6/5/15    Primary, not revision     RELEASE CARPAL TUNNEL Left 6/5/2015    Procedure: RELEASE CARPAL TUNNEL;  Surgeon: Juan Jose Joaquin MD;  Location:  OR       Social History  "  Substance Use Topics     Smoking status: Former Smoker     Types: Cigarettes     Quit date: 5/1/2010     Smokeless tobacco: Never Used     Alcohol use Yes      Comment: rare     Family History   Problem Relation Age of Onset     DIABETES Mother      Hypertension Mother      CANCER Mother      skin     DIABETES Father      CEREBROVASCULAR DISEASE No family hx of      Thyroid Disease No family hx of      Glaucoma No family hx of      Macular Degeneration No family hx of            Reviewed and updated as needed this visit by clinical staff       Reviewed and updated as needed this visit by Provider         ROS:  Constitutional, gi, gu, psych systems are negative, except as otherwise noted.    OBJECTIVE:     /84  Pulse 107  Temp 98.2  F (36.8  C)  Resp 15  Ht 5' 3\" (1.6 m)  Wt 197 lb (89.4 kg)  SpO2 99%  BMI 34.9 kg/m2  Body mass index is 34.9 kg/(m^2).  GENERAL: healthy, alert and no distress  ABDOMEN: soft, nontender, no hepatosplenomegaly, no masses and bowel sounds normal  PSYCH: mentation appears normal, affect normal/bright    Diagnostic Test Results:  No results found for this or any previous visit (from the past 24 hour(s)).    ASSESSMENT/PLAN:       1. Dysfunctional uterine bleeding  Repeat labs today. If stable, continue on the birth control. Recommend US to r/o fibroid or other causes of bleeding. Then follow up with GYN to review results. May need to discontinue Nexplanon if bleeding does not respond to the OCPs.  - CBC with platelets differential  - US Pelvic Complete with Transvaginal; Future    2. Anxiety  Doing well- continue current medications without change.  - FLUoxetine (PROZAC) 10 MG capsule; Take 3 capsules (30 mg) by mouth daily  Dispense: 90 capsule; Refill: 5    Follow up as 6 months for anxiety    ESTUARDO Jain [facility]    "

## 2018-05-04 ENCOUNTER — ALLIED HEALTH/NURSE VISIT (OUTPATIENT)
Dept: NURSING | Facility: CLINIC | Age: 33
End: 2018-05-04
Payer: COMMERCIAL

## 2018-05-04 ENCOUNTER — RADIANT APPOINTMENT (OUTPATIENT)
Dept: ULTRASOUND IMAGING | Facility: CLINIC | Age: 33
End: 2018-05-04
Attending: NURSE PRACTITIONER
Payer: COMMERCIAL

## 2018-05-04 VITALS — BODY MASS INDEX: 34.55 KG/M2 | WEIGHT: 195 LBS | HEIGHT: 63 IN

## 2018-05-04 DIAGNOSIS — Z00.00 PREVENTATIVE HEALTH CARE: Primary | ICD-10-CM

## 2018-05-04 DIAGNOSIS — N93.8 DYSFUNCTIONAL UTERINE BLEEDING: ICD-10-CM

## 2018-05-04 PROCEDURE — 76856 US EXAM PELVIC COMPLETE: CPT

## 2018-05-04 PROCEDURE — 76830 TRANSVAGINAL US NON-OB: CPT

## 2018-05-04 NOTE — MR AVS SNAPSHOT
After Visit Summary   5/4/2018    Екатерина Ramon    MRN: [id number]           Patient Information     Date Of Birth          1985        Visit Information        Provider Department      5/4/2018 4:15 PM [facility]        Today's Diagnoses     Preventative health care    -  1       Follow-ups after your visit        Your next 10 appointments already scheduled     May 04, 2018  4:15 PM CDT   [facility]    [address] [place] [id number] [phone]            May 09, 2018 11:15 AM CDT   [facility]    [address] [place] [id number] [phone]           Bring a current list of meds and any records pertaining to this visit. For Physicals, please bring immunization records and any forms needing to be filled out. Please arrive 10 minutes early to complete paperwork.            Jun 01, 2018  2:45 PM CDT   [facility]    [address] [place] [id number] [phone]              Who to contact     If you have questions or need follow up information about today's clinic visit or your schedule please contact [facility] directly at [phone].  Normal or non-critical lab and imaging results will be communicated to you by MyChart, letter or phone within 4 business days after the clinic has received the results. If you do not hear from us within 7 days, please contact the clinic through MyChart or phone. If you have a critical or abnormal lab result, we will notify you by phone as soon as possible.  Submit refill requests through [employer] or call your pharmacy and they will forward the refill request to us. Please allow 3 business days for your refill to be completed.          Additional  "Information About Your Visit        MyChart Information     [employer] gives you secure access to your electronic health record. If you see a primary care provider, you can also send messages to your care team and make appointments. If you have questions, please call your primary care clinic.  If you do not have a primary care provider, please call [phone] and they will assist you.        Care EveryWhere ID     This is your Care EveryWhere ID. This could be used by other organizations to access your [place] medical records  [id number]        Your Vitals Were     Height BMI (Body Mass Index)                5' 3\" (1.6 m) 34.54 kg/m2           Blood Pressure from Last 3 Encounters:   04/27/18 122/84   03/02/18 138/80   01/16/18 130/86    Weight from Last 3 Encounters:   05/04/18 195 lb (88.5 kg)   04/27/18 197 lb (89.4 kg)   04/06/18 192 lb 5 oz (87.2 kg)              Today, you had the following     No orders found for display       Primary Care Provider Office Phone # Fax #    Anusha Makayla Wolff, APRN [facility] [phone]       [address] [facility] [address]        Equal Access to Services     DOUGLAS WALTERS : Hadii aad ku hadasho Soomaali, waaxda luqadaha, qaybta kaalmada adeegyada, lasha georgein hayoliverion jimmy case lajessenia . So [facility] [place] [facility] [phone].    ATENCIÓN: Si habla español, tiene a ramirez disposición servicios gratuitos de asistencia lingüística. Llame al [phone].    We comply with applicable federal civil rights laws and [place] laws. We do not discriminate on the basis of race, color, national origin, age, disability, sex, sexual orientation, or gender identity.            Thank you!     Thank you for choosing [facility]  for your care. Our goal is always to provide you with excellent care. Hearing back from our patients is one way we can continue to improve our services. Please take a few minutes to complete the written survey that you may receive in the mail after " your visit with us. Thank you!             Your Updated Medication List - Protect others around you: Learn how to safely use, store and throw away your medicines at www.disposemymeds.org.          This list is accurate as of 5/4/18  4:12 PM.  Always use your most recent med list.                   Brand Name Dispense Instructions for use Diagnosis    etonogestrel 68 MG Impl    IMPLANON/NEXPLANON     1 each by Subdermal route once        FLUoxetine 10 MG capsule    PROzac    90 capsule    Take 3 capsules (30 mg) by mouth daily    Anxiety       hydrochlorothiazide 25 MG tablet    HYDRODIURIL    90 tablet    Take 1 tablet (25 mg) by mouth daily    Hypertension goal BP (blood pressure) < 140/90       insulin pen needle 32G X 4 MM    BD TALHA U/F    100 each    Use once daily or as directed.    Morbid obesity due to excess calories (H)       liraglutide 18 MG/3ML soln    VICTOZA    36 mL    Inject 1.8 mg Subcutaneous daily    Morbid obesity due to excess calories (H)       norgestim-eth estrad triphasic 0.18/0.215/0.25 MG-35 MCG per tablet    ORTHO TRI-CYCLEN, TRI-SPRINTEC     Take 2 tablets by mouth daily        traMADol 50 MG tablet    ULTRAM    20 tablet    Take 1-2 tablets ( mg) by mouth every 6 hours as needed for pain Max of 4 tabs per day    Chronic midline low back pain without sciatica, Chronic bilateral thoracic back pain       verapamil 120 MG 24 hr capsule    VERELAN    90 capsule    Take 1 capsule (120 mg) by mouth daily    Hypertension goal BP (blood pressure) < 140/90, Migraine with aura and without status migrainosus, not intractable

## 2018-05-04 NOTE — NURSING NOTE
Patient came in for weight check. Weight check completed using tanita body composition analyzer.  Erin COLLIER CMA ([facility])

## 2018-05-07 ENCOUNTER — MYC MEDICAL ADVICE (OUTPATIENT)
Dept: FAMILY MEDICINE | Facility: CLINIC | Age: 33
End: 2018-05-07

## 2018-05-09 ENCOUNTER — OFFICE VISIT (OUTPATIENT)
Dept: OBGYN | Facility: CLINIC | Age: 33
End: 2018-05-09
Payer: COMMERCIAL

## 2018-05-09 VITALS
SYSTOLIC BLOOD PRESSURE: 126 MMHG | OXYGEN SATURATION: 96 % | DIASTOLIC BLOOD PRESSURE: 88 MMHG | BODY MASS INDEX: 35.61 KG/M2 | WEIGHT: 201 LBS | HEART RATE: 89 BPM

## 2018-05-09 DIAGNOSIS — N92.1 BREAKTHROUGH BLEEDING ON NEXPLANON: Primary | ICD-10-CM

## 2018-05-09 DIAGNOSIS — Z97.5 BREAKTHROUGH BLEEDING ON NEXPLANON: Primary | ICD-10-CM

## 2018-05-09 PROCEDURE — 99214 OFFICE O/P EST MOD 30 MIN: CPT | Performed by: OBSTETRICS & GYNECOLOGY

## 2018-05-09 NOTE — MR AVS SNAPSHOT
After Visit Summary   5/9/2018    Екатерина Ramon    MRN: [id number]           Patient Information     Date Of Birth          1985        Visit Information        Provider Department      5/9/2018 11:15 AM Joel Michelle MD [facility]        Today's Diagnoses     Breakthrough bleeding on Nexplanon    -  1       Follow-ups after your visit        Follow-up notes from your care team     Return if symptoms worsen or fail to improve.      Your next 10 appointments already scheduled     Jun 01, 2018  2:45 PM CDT   SHORT with Lorraine Johnson MD   [facility]    [address] [facility] [address] [phone]              Who to contact     If you have questions or need follow up information about today's clinic visit or your schedule please contact [facility] directly at [phone].  Normal or non-critical lab and imaging results will be communicated to you by MyChart, letter or phone within 4 business days after the clinic has received the results. If you do not hear from us within 7 days, please contact the clinic through MyChart or phone. If you have a critical or abnormal lab result, we will notify you by phone as soon as possible.  Submit refill requests through [employer] or call your pharmacy and they will forward the refill request to us. Please allow 3 business days for your refill to be completed.          Additional Information About Your Visit        MyChart Information     [employer] gives you secure access to your electronic health record. If you see a primary care provider, you can also send messages to your care team and make appointments. If you have questions, please call your primary care clinic.  If you do not have a primary care provider, please call [phone] and they will assist you.        Care EveryWhere ID     This is your Care EveryWhere ID. This could be used by other organizations  to access your [place] medical records  [id number]        Your Vitals Were     Pulse Last Period Pulse Oximetry Breastfeeding? BMI (Body Mass Index)       89 04/17/2018 96% No 35.61 kg/m2        Blood Pressure from Last 3 Encounters:   05/09/18 126/88   04/27/18 122/84   03/02/18 138/80    Weight from Last 3 Encounters:   05/09/18 201 lb (91.2 kg)   05/04/18 195 lb (88.5 kg)   04/27/18 197 lb (89.4 kg)              Today, you had the following     No orders found for display       Primary Care Provider Office Phone # Fax #    Anusha Makaylakelly Wolff, APRN [facility] [phone]       [address] [facility] [address]        Equal Access to Services     [facility] Center[facility] : Hadii aad chelo hadasho Soomaali, waaxda luqadaha, qaybta kaalmada adeegyada, waxay joseph haygavino lin . So [facility] [phone].    ATENCIÓN: Si habla español, tiene a ramirez disposición servicios gratuitos de asistencia lingüística. Karina al [phone].    We comply with applicable federal civil rights laws and [place] laws. We do not discriminate on the basis of race, color, national origin, age, disability, sex, sexual orientation, or gender identity.            Thank you!     Thank you for choosing [facility]  for your care. Our goal is always to provide you with excellent care. Hearing back from our patients is one way we can continue to improve our services. Please take a few minutes to complete the written survey that you may receive in the mail after your visit with us. Thank you!             Your Updated Medication List - Protect others around you: Learn how to safely use, store and throw away your medicines at www.disposemymeds.org.          This list is accurate as of 5/9/18 11:59 PM.  Always use your most recent med list.                   Brand Name Dispense Instructions for use Diagnosis    etonogestrel 68 MG Impl    IMPLANON/NEXPLANON     1 each by Subdermal route once        FLUoxetine 10 MG  capsule    PROzac    90 capsule    Take 3 capsules (30 mg) by mouth daily    Anxiety       hydrochlorothiazide 25 MG tablet    HYDRODIURIL    90 tablet    Take 1 tablet (25 mg) by mouth daily    Hypertension goal BP (blood pressure) < 140/90       insulin pen needle 32G X 4 MM    BD TALHA U/F    100 each    Use once daily or as directed.    Morbid obesity due to excess calories (H)       liraglutide 18 MG/3ML soln    VICTOZA    36 mL    Inject 1.8 mg Subcutaneous daily    Morbid obesity due to excess calories (H)       norgestim-eth estrad triphasic 0.18/0.215/0.25 MG-35 MCG per tablet    ORTHO TRI-CYCLEN, TRI-SPRINTEC     Take 2 tablets by mouth daily        traMADol 50 MG tablet    ULTRAM    20 tablet    Take 1-2 tablets ( mg) by mouth every 6 hours as needed for pain Max of 4 tabs per day    Chronic midline low back pain without sciatica, Chronic bilateral thoracic back pain       verapamil 120 MG 24 hr capsule    VERELAN    90 capsule    Take 1 capsule (120 mg) by mouth daily    Hypertension goal BP (blood pressure) < 140/90, Migraine with aura and without status migrainosus, not intractable

## 2018-05-13 NOTE — PROGRESS NOTES
Екатерина Ramon is a 32 year old female, , who has been on Nexplanon since .  She had been doing well with it, until about 3 weeks ago when she started having heavy bleeding, with clots.  She has used 4 boxes super plus tampons.  In addition to the tampons, she uses regular pads and regular overnight pads.  She using 2 tampons an hour and she then went to [facility] and she was placed on OCPs.  She was Ortho Tri-cyclen and she was to use the 2 packs simultaneously.  She was to take a tablet from each pack each day.  Once the bleeding stops, she was to discard one of the packs and then continue with one tablet a day from the remaining pack.    Over the past 3 weeks, she has had some shortness of breath, that has resolved.  She has not had any abdominal pain, fever, chills, pelvic pain.    She states that today, her bleeding has improved.      She did not have anemia when seen at [facility]:  CBC with differential: WBC 12.6 (high), RBC 3.95 (low) o/w WNL (HGB 12.0, )    She has had an ultrasound and we reviewed the ultrasound report and the films:  PELVIC ULTRASOUND WITH ENDOVAGINAL TRANSDUCER IMAGING   2018 1:57 PM   HISTORY: Dysfunctional uterine bleeding.  TECHNIQUE:  Endovaginal sonography was added to the transabdominal exam to better evaluate the uterus and ovaries.  COMPARISON: None.  FINDINGS:   Endometrium: Endometrium is 2 mm thick.  Uterus: 7.4 x 3 x 3.2 cm.  Right ovary: Normal.  Left ovary: Normal.  Additional findings: None.  IMPRESSION:  Normal examination. Normal-appearing endometrium.      Past Medical History:   Diagnosis Date     Family history of diabetes mellitus      Hypertension      Migraine with aura and without status migrainosus, not intractable 2016     PID (acute pelvic inflammatory disease) 2010       Past Surgical History:   Procedure Laterality Date     HC REVISE MEDIAN N/CARPAL TUNNEL SURG Left 6/5/15    Primary, not revision     RELEASE CARPAL TUNNEL Left 2015     Procedure: RELEASE CARPAL TUNNEL;  Surgeon: Juan Jose Joaquin MD;  Location: [facility] OR          Allergies   Allergen Reactions     Lisinopril Cough     Zoloft      tired       Current Outpatient Prescriptions   Medication Sig Dispense Refill     etonogestrel (IMPLANON/NEXPLANON) 68 MG IMPL 1 each by Subdermal route once       FLUoxetine (PROZAC) 10 MG capsule Take 3 capsules (30 mg) by mouth daily 90 capsule 5     hydrochlorothiazide (HYDRODIURIL) 25 MG tablet Take 1 tablet (25 mg) by mouth daily 90 tablet 3     insulin pen needle (BD TALHA U/F) 32G X 4 MM Use once daily or as directed. 100 each 3     liraglutide (VICTOZA) 18 MG/3ML soln Inject 1.8 mg Subcutaneous daily 36 mL 1     norgestim-eth estrad triphasic (ORTHO TRI-CYCLEN, TRI-SPRINTEC) 0.18/0.215/0.25 MG-35 MCG per tablet Take 2 tablets by mouth daily       verapamil (VERELAN) 120 MG 24 hr capsule Take 1 capsule (120 mg) by mouth daily 90 capsule 3     traMADol (ULTRAM) 50 MG tablet Take 1-2 tablets ( mg) by mouth every 6 hours as needed for pain Max of 4 tabs per day 20 tablet 0       Social History     Social History     Marital status: Single     Spouse name: N/A     Number of children: 1     Years of education: 12     Occupational History     [employer]     Social History Main Topics     Smoking status: Former Smoker     Types: Cigarettes     Quit date: 5/1/2010     Smokeless tobacco: Never Used     Alcohol use Yes      Comment: rare     Drug use: No     Sexual activity: Not Currently     Partners: Male     Birth control/ protection: Implant     Other Topics Concern     Parent/Sibling W/ Cabg, Mi Or Angioplasty Before 65f 55m? No     Social History Narrative       Family History   Problem Relation Age of Onset     DIABETES Mother      Hypertension Mother      CANCER Mother      skin     DIABETES Father      CEREBROVASCULAR DISEASE No family hx of      Thyroid Disease No family hx of      Glaucoma No family hx of      Macular Degeneration  No family hx of        Review of Systems:  10 point ROS of systems including Constitutional, Eyes, Respiratory, Cardiovascular, Gastroenterology, Genitourinary, Integumentary, Muscularskeletal, Psychiatric were all negative except for pertinent positives noted in my HPI and in the PMH.      Exam  /88 (BP Location: Right arm, Cuff Size: Adult Regular)  Pulse 89  Wt 201 lb (91.2 kg)  LMP 04/17/2018  SpO2 96%  Breastfeeding? No  BMI 35.61 kg/m2  General:  WNWD female, NAD  Alert  Oriented x 3  Gait:  Normal  Skin:  Normal skin turgor  HEENT:  NC/AT, EOMI  Abdomen:  Non-tender, non-distended.  Pelvic exam:  Not performed  Extremities:  No clubbing, no cyanosis and no edema.      Assessment  Breakthrough bleeding on Nexplanon      Plan  We discussed options.  Since the bleeding has improved, she is willing to continue with the plan that was developed at [facility].  If the bleeding stops, she will monitor and if needed another prescription for OCPs may be given.  I would suggest to use a monophasic OCP for the breakthrough bleeding in the future.    If the bleeding continues, then she might consider discontinuation of the Nexplanon.      TT 30 min  CT and review of records and films:  Greater than 50%    Joel Michelle MD

## 2018-05-31 ENCOUNTER — TELEPHONE (OUTPATIENT)
Dept: NURSING | Facility: CLINIC | Age: 33
End: 2018-05-31

## 2018-06-07 ENCOUNTER — MYC MEDICAL ADVICE (OUTPATIENT)
Dept: FAMILY MEDICINE | Facility: CLINIC | Age: 33
End: 2018-06-07

## 2018-06-08 NOTE — PROGRESS NOTES
SUBJECTIVE:   Екатерина Ramon is a 32 year old female who presents to clinic today for the following health issues:      Chief Complaint   Patient presents with     Bunion     Discuss Surgery     Patient presents for evaluation of bilateral bunions ongoing for years. Work recently implemented a closed-toe shoe policy and all of her shoes are rubbing on the bunions, causing pain. Wears wide shoes. Family history of bunions and grandmother had surgery.    Problem list and histories reviewed & adjusted, as indicated.  Additional history: as documented    Patient Active Problem List   Diagnosis     Adult BMI 30+     CARDIOVASCULAR SCREENING; LDL GOAL LESS THAN 160     Family history of diabetes mellitus     History of depression     Sprain and strain of shoulder and upper arm     Hypertension goal BP (blood pressure) < 140/90     CTS (carpal tunnel syndrome)     S/P carpal tunnel release     Anxiety     BMI 40.0-44.9, adult (H)     Chronic bilateral thoracic back pain     Chronic midline low back pain without sciatica     Migraine with aura and without status migrainosus, not intractable     Morbid obesity due to excess calories (H)     Low serum HDL     Past Surgical History:   Procedure Laterality Date     HC REVISE MEDIAN N/CARPAL TUNNEL SURG Left 6/5/15    Primary, not revision     RELEASE CARPAL TUNNEL Left 6/5/2015    Procedure: RELEASE CARPAL TUNNEL;  Surgeon: Juan Jose Joaquin MD;  Location:  OR       Social History   Substance Use Topics     Smoking status: Former Smoker     Types: Cigarettes     Quit date: 5/1/2010     Smokeless tobacco: Never Used     Alcohol use Yes      Comment: rare     Family History   Problem Relation Age of Onset     DIABETES Mother      Hypertension Mother      CANCER Mother      skin     DIABETES Father      CEREBROVASCULAR DISEASE No family hx of      Thyroid Disease No family hx of      Glaucoma No family hx of      Macular Degeneration No family hx of            Reviewed  "and updated as needed this visit by clinical staff  Tobacco  Allergies  Meds  Med Hx  Surg Hx  Fam Hx  Soc Hx      Reviewed and updated as needed this visit by Provider         ROS:  Constitutional, MS, skin, neuro systems are negative, except as otherwise noted.    OBJECTIVE:     /88 (BP Location: Left arm, Patient Position: Chair, Cuff Size: Adult Regular)  Pulse 92  Temp 98.1  F (36.7  C) (Oral)  Resp 16  Ht 5' 3\" (1.6 m)  Wt 202 lb (91.6 kg)  SpO2 100%  BMI 35.78 kg/m2  Body mass index is 35.78 kg/(m^2).  GENERAL: healthy, alert and no distress  MS: Bilateral bunion deformity of feet of moderate severity  SKIN: no suspicious lesions or rashes  NEURO: Normal strength and tone, mentation intact and speech normal    Diagnostic Test Results:  none     ASSESSMENT/PLAN:     1. Bilateral bunions  Patient primarily interested in surgery, but will need a trial of conservative therapy before this can be considered. Recommend shoes with low heel, wide toe-box; the ones she is wearing today are much too narrow. Gave suggestions for shoe stores that can help find better fitting shoes. Use ice and tylenol/ibuprofen as needed.  If not improving with this measures, follow up with podiatry.    - PODIATRY/FOOT & ANKLE SURGERY REFERRAL    Follow up as needed    EST[facility]ARDO [facility]ain [facility]    "

## 2018-06-11 ENCOUNTER — OFFICE VISIT (OUTPATIENT)
Dept: FAMILY MEDICINE | Facility: CLINIC | Age: 33
End: 2018-06-11
Payer: COMMERCIAL

## 2018-06-11 VITALS
HEART RATE: 92 BPM | SYSTOLIC BLOOD PRESSURE: 120 MMHG | BODY MASS INDEX: 35.79 KG/M2 | TEMPERATURE: 98.1 F | OXYGEN SATURATION: 100 % | HEIGHT: 63 IN | RESPIRATION RATE: 16 BRPM | WEIGHT: 202 LBS | DIASTOLIC BLOOD PRESSURE: 88 MMHG

## 2018-06-11 DIAGNOSIS — M21.611 BILATERAL BUNIONS: Primary | ICD-10-CM

## 2018-06-11 DIAGNOSIS — M21.612 BILATERAL BUNIONS: Primary | ICD-10-CM

## 2018-06-11 PROCEDURE — 99213 OFFICE O/P EST LOW 20 MIN: CPT | Performed by: NURSE PRACTITIONER

## 2018-06-11 ASSESSMENT — PAIN SCALES - GENERAL: PAINLEVEL: NO PAIN (0)

## 2018-06-11 NOTE — PATIENT INSTRUCTIONS
"Go to [employer] and find shoes with a wide toebox      Treating Bunions  Although a bunion won t go away, wearing shoes that fit properly will often relieve the pain. Padding and icing the bunion may also help. Bunions that remain painful may need surgery.     Heels: Heel height should be low. The back of the shoe should  your heel firmly so the shoe doesn't flop when you walk.       Toes: There should be 1/2\" between your longest toe and the tip of the shoe. The shoe should be wide enough for you to wiggle your toes.    Shoes  To relieve a bunion, you don t have to buy shoes that are ugly or out of fashion. But follow these tips:    Shop for shoes late in the day. This is when your feet are the largest.    Have both feet measured. Fit shoes to your larger foot.    Look for shoes that have the same shape as your foot but are slightly wider across the toes.    Choose low-heeled shoes.    Always try shoes on. Stand up and walk around. If the shoes aren t comfortable, don t buy them.  Ice massage  To help relieve a painful bunion, put an ice cube in a plastic bag. Rub the ice on the bunion for 5 minutes. Repeat 2 to 3 times a day.  Pads  You may want to put a pad over the bunion to cushion it. You can buy bunion pads at most [employer].  Surgery  Wearing wider shoes and padding the bunion may not relieve the pain. Your healthcare provider may then suggest surgery. During surgery, the bunion is shaved away and the bones are put back in a straight line.   Date Last Reviewed: 1/1/2018 2000-2017 The [employer]. [address] [place] [address], [place] [address]. All rights reserved. This information is not intended as a substitute for professional medical care. Always follow your healthcare professional's instructions.        What Are Bunions?  The bone at the base of your big toe connects to a bone in the ball of your foot. The joint is where the 2 bones connect. Normally, the 2 bones lie almost in a " straight line, with your big toe pointing straight ahead. But sometimes the big toe starts to turn in towards the smaller toes. This pushes the joint out to the side, causing a bony bump called a bunion. Bunions can be mild, moderate, or severe.     A bunion is a small bump on the side of the foot at the base of the big toe. It forms when the big toe turns in toward the second toe. This pushes the joint at the base of the big toe out to the inside.      Symptoms of bunions  A bunion often causes pain and swelling around the joint at the base of the big toe. The skin may become red or warm. If the big toe pushes under the second toe, a painful corn may form on the top of or inside the second toe. In some cases, bunions cause no symptoms other than making the foot harder to fit in a shoe.  What can be done    Wear comfortable shoes. Wearing shoes that are roomy across the toes and that have low heels (less than 2 inches) can help keep a bunion from getting worse or causing pain.    Ask your healthcare provider about pads and inserts to help prevent corns and to cushion the bunion.    Talk to your healthcare provider about bunion surgery and whether it is right for you.  Note  Your feet tend to get larger as you age. That means you may need to increase your shoe size from time to time to make sure that your shoes fit your feet comfortably.   Date Last Reviewed: 10/1/2017    5910-7350 [employer]. [address] [place] [address], [place] [address]. All rights reserved. This information is not intended as a substitute for professional medical care. Always follow your healthcare professional's instructions.      Discharged by Tara Lee MA.

## 2018-06-11 NOTE — MR AVS SNAPSHOT
"              After Visit Summary   6/11/2018    Екатерина Ramon    MRN: [id number]           Patient Information     Date Of Birth          1985        Visit Information        Provider Department      6/11/2018 7:20 AM Anusha Wolff APRN [facility]        Today's Diagnoses     Bilateral bunions    -  1      Care Instructions    Go to [employer] and find shoes with a wide toebox      Treating Bunions  Although a bunion won t go away, wearing shoes that fit properly will often relieve the pain. Padding and icing the bunion may also help. Bunions that remain painful may need surgery.     Heels: Heel height should be low. The back of the shoe should  your heel firmly so the shoe doesn't flop when you walk.       Toes: There should be 1/2\" between your longest toe and the tip of the shoe. The shoe should be wide enough for you to wiggle your toes.    Shoes  To relieve a bunion, you don t have to buy shoes that are ugly or out of fashion. But follow these tips:    Shop for shoes late in the day. This is when your feet are the largest.    Have both feet measured. Fit shoes to your larger foot.    Look for shoes that have the same shape as your foot but are slightly wider across the toes.    Choose low-heeled shoes.    Always try shoes on. Stand up and walk around. If the shoes aren t comfortable, don t buy them.  Ice massage  To help relieve a painful bunion, put an ice cube in a plastic bag. Rub the ice on the bunion for 5 minutes. Repeat 2 to 3 times a day.  Pads  You may want to put a pad over the bunion to cushion it. You can buy bunion pads at most [employer].  Surgery  Wearing wider shoes and padding the bunion may not relieve the pain. Your healthcare provider may then suggest surgery. During surgery, the bunion is shaved away and the bones are put back in a straight line.   Date Last Reviewed: 1/1/2018 2000-2017 The [employer]. [address] [facility], " [place] [address]. All rights reserved. This information is not intended as a substitute for professional medical care. Always follow your healthcare professional's instructions.        What Are Bunions?  The bone at the base of your big toe connects to a bone in the ball of your foot. The joint is where the 2 bones connect. Normally, the 2 bones lie almost in a straight line, with your big toe pointing straight ahead. But sometimes the big toe starts to turn in towards the smaller toes. This pushes the joint out to the side, causing a bony bump called a bunion. Bunions can be mild, moderate, or severe.     A bunion is a small bump on the side of the foot at the base of the big toe. It forms when the big toe turns in toward the second toe. This pushes the joint at the base of the big toe out to the inside.      Symptoms of bunions  A bunion often causes pain and swelling around the joint at the base of the big toe. The skin may become red or warm. If the big toe pushes under the second toe, a painful corn may form on the top of or inside the second toe. In some cases, bunions cause no symptoms other than making the foot harder to fit in a shoe.  What can be done    Wear comfortable shoes. Wearing shoes that are roomy across the toes and that have low heels (less than 2 inches) can help keep a bunion from getting worse or causing pain.    Ask your healthcare provider about pads and inserts to help prevent corns and to cushion the bunion.    Talk to your healthcare provider about bunion surgery and whether it is right for you.  Note  Your feet tend to get larger as you age. That means you may need to increase your shoe size from time to time to make sure that your shoes fit your feet comfortably.   Date Last Reviewed: 10/1/2017    3743-3889 [employer]. [address] [facility], [place] [address]. All rights reserved. This information is not intended as a substitute for professional medical care. Always  follow your healthcare professional's instructions.      Discharged by Tara Lee MA.            Follow-ups after your visit        Additional Services     PODIATRY/FOOT & ANKLE SURGERY REFERRAL       Your provider has referred you to: [facility]: [facility] [phone]   [email or web address]    Please be aware that coverage of these services is subject to the terms and limitations of your health insurance plan.  Call member services at your health plan with any benefit or coverage questions.      Please bring the following to your appointment:  >>   Any x-rays, CTs or MRIs which have been performed.  Contact the facility where they were done to arrange for  prior to your scheduled appointment.    >>   List of current medications   >>   This referral request   >>   Any documents/labs given to you for this referral                  Your next 10 appointments already scheduled     Aug 01, 2018 11:30 AM CDT   SHORT with Lorraine Johnson MD   [facility] ([facility])    [address] [place] [id number] [phone]              Who to contact     If you have questions or need follow up information about today's clinic visit or your schedule please contact [facility] directly at [phone].  Normal or non-critical lab and imaging results will be communicated to you by MyChart, letter or phone within 4 business days after the clinic has received the results. If you do not hear from us within 7 days, please contact the clinic through MyChart or phone. If you have a critical or abnormal lab result, we will notify you by phone as soon as possible.  Submit refill requests through [employer] or call your pharmacy and they will forward the refill request to us. Please allow 3 business days for your refill to be completed.          Additional Information About Your Visit        [employer] Information     [employer] gives you  "secure access to your electronic health record. If you see a primary care provider, you can also send messages to your care team and make appointments. If you have questions, please call your primary care clinic.  If you do not have a primary care provider, please call [phone] and they will assist you.        Care EveryWhere ID     This is your Care EveryWhere ID. This could be used by other organizations to access your [facility] medical records  [id number]        Your Vitals Were     Pulse Temperature Respirations Height Pulse Oximetry BMI (Body Mass Index)    92 98.1  F (36.7  C) (Oral) 16 5' 3\" (1.6 m) 100% 35.78 kg/m2       Blood Pressure from Last 3 Encounters:   06/11/18 120/88   05/09/18 126/88   04/27/18 122/84    Weight from Last 3 Encounters:   06/11/18 202 lb (91.6 kg)   05/09/18 201 lb (91.2 kg)   05/04/18 195 lb (88.5 kg)              We Performed the Following     PODIATRY/FOOT & ANKLE SURGERY REFERRAL        Primary Care Provider Office Phone # Fax #    Anusha Wolff, ESTUARDO [facility] [phone]       [address] [place] [address]        Equal Access to Services     [facility] Center[facility] : Hadii aad ku hadasho Soomaali, waaxda luqadaha, qaybta kaalmada adeegyada, waxay idiin hayaan adeeg kharash la'oliverion . So [facility] [phone].    ATENCIÓN: Si habla español, tiene a ramirez disposición servicios gratuitos de asistencia lingüística. Llame al [phone].    We comply with applicable federal civil rights laws and [place] laws. We do not discriminate on the basis of race, color, national origin, age, disability, sex, sexual orientation, or gender identity.            Thank you!     Thank you for choosing [facility]  for your care. Our goal is always to provide you with excellent care. Hearing back from our patients is one way we can continue to improve our services. Please take a few minutes to complete the written survey that you may receive in the mail after your visit " with us. Thank you!             Your Updated Medication List - Protect others around you: Learn how to safely use, store and throw away your medicines at www.disposemymeds.org.          This list is accurate as of 6/11/18  7:40 AM.  Always use your most recent med list.                   Brand Name Dispense Instructions for use Diagnosis    etonogestrel 68 MG Impl    IMPLANON/NEXPLANON     1 each by Subdermal route once        FLUoxetine 10 MG capsule    PROzac    90 capsule    Take 3 capsules (30 mg) by mouth daily    Anxiety       hydrochlorothiazide 25 MG tablet    HYDRODIURIL    90 tablet    Take 1 tablet (25 mg) by mouth daily    Hypertension goal BP (blood pressure) < 140/90       insulin pen needle 32G X 4 MM    BD TALHA U/F    100 each    Use once daily or as directed.    Morbid obesity due to excess calories (H)       liraglutide 18 MG/3ML soln    VICTOZA    36 mL    Inject 1.8 mg Subcutaneous daily    Morbid obesity due to excess calories (H)       traMADol 50 MG tablet    ULTRAM    20 tablet    Take 1-2 tablets ( mg) by mouth every 6 hours as needed for pain Max of 4 tabs per day    Chronic midline low back pain without sciatica, Chronic bilateral thoracic back pain       verapamil 120 MG 24 hr capsule    VERELAN    90 capsule    Take 1 capsule (120 mg) by mouth daily    Hypertension goal BP (blood pressure) < 140/90, Migraine with aura and without status migrainosus, not intractable

## 2018-06-12 ASSESSMENT — PATIENT HEALTH QUESTIONNAIRE - PHQ9: SUM OF ALL RESPONSES TO PHQ QUESTIONS 1-9: 4

## 2018-06-19 ENCOUNTER — RADIANT APPOINTMENT (OUTPATIENT)
Dept: GENERAL RADIOLOGY | Facility: CLINIC | Age: 33
End: 2018-06-19
Attending: PODIATRIST
Payer: COMMERCIAL

## 2018-06-19 ENCOUNTER — OFFICE VISIT (OUTPATIENT)
Dept: PODIATRY | Facility: CLINIC | Age: 33
End: 2018-06-19
Payer: COMMERCIAL

## 2018-06-19 VITALS
HEART RATE: 90 BPM | DIASTOLIC BLOOD PRESSURE: 92 MMHG | OXYGEN SATURATION: 100 % | TEMPERATURE: 98.3 F | RESPIRATION RATE: 18 BRPM | WEIGHT: 204 LBS | BODY MASS INDEX: 36.14 KG/M2 | SYSTOLIC BLOOD PRESSURE: 127 MMHG

## 2018-06-19 DIAGNOSIS — M21.6X2 PRONATION OF BOTH FEET: ICD-10-CM

## 2018-06-19 DIAGNOSIS — M21.619 BUNION: ICD-10-CM

## 2018-06-19 DIAGNOSIS — M21.6X1 PRONATION OF BOTH FEET: ICD-10-CM

## 2018-06-19 DIAGNOSIS — M21.619 BUNION: Primary | ICD-10-CM

## 2018-06-19 DIAGNOSIS — M25.376 INSTABILITY OF FOOT JOINT, UNSPECIFIED LATERALITY: ICD-10-CM

## 2018-06-19 PROCEDURE — 73630 X-RAY EXAM OF FOOT: CPT | Mod: LT

## 2018-06-19 PROCEDURE — 99244 OFF/OP CNSLTJ NEW/EST MOD 40: CPT | Performed by: PODIATRIST

## 2018-06-19 PROCEDURE — 73630 X-RAY EXAM OF FOOT: CPT | Mod: RT

## 2018-06-19 ASSESSMENT — PAIN SCALES - GENERAL: PAINLEVEL: MODERATE PAIN (4)

## 2018-06-19 NOTE — LETTER
6/19/2018         RE: Екатерина Ramon  [address]  [place] [address]        Dear Colleague,    Thank you for referring your patient, Екатерина Ramon, to the [facility] (formerly [facility] [place]. Please see a copy of my visit note below.    Subjective:    Pt is seen today in consult from Anusha Wolff with the chief complaint of right and left foot pain.  Points to medial bump of bunion and states that has been bothersome for years.  Has tried shoegear changes with no improvement.  Bothersome both in joint and along medial aspect of 1st MPJ right foot.  Describes as burning pain.  Pain with ambulation and shoewear and difficulty working secondary to pain.  Positive family history of eliz and her grandma had surgery.  Has noticed deformity worsening over the years.  At times standing at work.  Denies numbness.  Denies weakness.  Denies erythema.  Quit smoking in 2010.  Family history of diabetes.        ROS:  A 10-point review of systems was performed and is positive for that noted in the HPI and as seen below.  All other areas are negative.          Allergies   Allergen Reactions     Lisinopril Cough     Zoloft      tired       Current Outpatient Prescriptions   Medication Sig Dispense Refill     etonogestrel (IMPLANON/NEXPLANON) 68 MG IMPL 1 each by Subdermal route once       FLUoxetine (PROZAC) 10 MG capsule Take 3 capsules (30 mg) by mouth daily 90 capsule 5     hydrochlorothiazide (HYDRODIURIL) 25 MG tablet Take 1 tablet (25 mg) by mouth daily 90 tablet 3     insulin pen needle (BD TALHA U/F) 32G X 4 MM Use once daily or as directed. 100 each 3     liraglutide (VICTOZA) 18 MG/3ML soln Inject 1.8 mg Subcutaneous daily 36 mL 1     traMADol (ULTRAM) 50 MG tablet Take 1-2 tablets ( mg) by mouth every 6 hours as needed for pain Max of 4 tabs per day 20 tablet 0     verapamil (VERELAN) 120 MG 24 hr capsule Take 1 capsule (120 mg) by mouth daily 90 capsule 3       Patient Active Problem List   Diagnosis      Adult BMI 30+     CARDIOVASCULAR SCREENING; LDL GOAL LESS THAN 160     Family history of diabetes mellitus     History of depression     Sprain and strain of shoulder and upper arm     Hypertension goal BP (blood pressure) < 140/90     CTS (carpal tunnel syndrome)     S/P carpal tunnel release     Anxiety     BMI 40.0-44.9, adult (H)     Chronic bilateral thoracic back pain     Chronic midline low back pain without sciatica     Migraine with aura and without status migrainosus, not intractable     Morbid obesity due to excess calories (H)     Low serum HDL       Past Medical History:   Diagnosis Date     Family history of diabetes mellitus      Hypertension      Migraine with aura and without status migrainosus, not intractable 11/2/2016     PID (acute pelvic inflammatory disease) 5/2010       Past Surgical History:   Procedure Laterality Date     HC REVISE MEDIAN N/CARPAL TUNNEL SURG Left 6/5/15    Primary, not revision     RELEASE CARPAL TUNNEL Left 6/5/2015    Procedure: RELEASE CARPAL TUNNEL;  Surgeon: Juan Jose Joaquin MD;  Location: [facility] OR       Family History   Problem Relation Age of Onset     Diabetes Mother      Hypertension Mother      Cancer Mother      skin     Diabetes Father      Cerebrovascular Disease No family hx of      Thyroid Disease No family hx of      Glaucoma No family hx of      Macular Degeneration No family hx of        Social History   Substance Use Topics     Smoking status: Former Smoker     Types: Cigarettes     Quit date: 5/1/2010     Smokeless tobacco: Never Used     Alcohol use Yes      Comment: rare         Objective:    Vitals: BP (!) 127/92  Pulse 90  Temp 98.3  F (36.8  C) (Oral)  Resp 18  Wt 204 lb (92.5 kg)  SpO2 100%  BMI 36.14 kg/m2  BMI: Body mass index is 36.14 kg/(m^2).  Height: Data Unavailable    Constitutional/ general:  Pt is in no apparent distress, appears well-nourished.  Cooperative with history and physical exam.     Psych:  The patient answered  questions appropriately.  Normal affect.  Seems to have reasonable expectations, in terms of treatment.    Eyes:  Visual scanning/ tracking without deficit.    Ears:  Response to auditory stimuli is normal.   negative hearing aid devices.  Auricles in proper alignment.     Lymphatic:  Popliteal lymph nodes not enlarged.     Lungs:  Non labored breathing, non labored speech. No cough.  No audible wheezing. Even, quiet breathing.      Vascular:  Pedal pulses are palpable bilaterally for both the DP and PT arteries.  CFT < 3 sec.  negative edema.  negative varicosities.  positive pedal hair growth noted.     Neuro:  Alert and oriented x 3. Coordinated gait.  Light touch sensation is intact to the L4, L5, S1 distributions. No obvious deficits.  No evidence of neurological-based weakness, spasticity, or contracture in the lower extremities.     Derm: Normal texture and turgor.  No erythema, ecchymosis, or cyanosis.  No open lesions.     Musculoskeletal:    Lower extremity muscle strength is normal.  Patient is ambulatory without an assistive device or brace.   Mild reactive hyperemia over 1st MPJ of the right and left foot.  No underlying bursa noted.  Pronated arch with weightbearing.  Hypermobility noted at TMTJ.  Valgus rotation of hallux at MPJs. negative crepitus with range of motion of the first MPJ right and left foot.          Radiographic Exam:  Xrays, three views right and left foot weight bearing obtained today.  This demonstrated an intermetatarsal angle of 16 degrees bilateral.  Sesamoid position appears to be a 4 bilateral.  First MPJ joint space deviated.  positive metatarsus adductus.      ASSESSMENT:    Painful Hallux Valgus deformity right and left foot.  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  Discussed with patient a bunion is caused by a muscle imbalance. The big toe is pulled toward the smaller toes. The lump is created by a bone pushing outward.  Also  explained that hypermobility and midfoot instability can contribute to this and other problems such as pronation.     Bunion pain is usually a combination of shoes rubbing on the skin, nerve irritation, compression between the toes, joint misalignment, arthritis, and altered gait.   Most bunion pain can be improved by wearing compatible shoes. People with bunions cannot choose footwear just because they like the style. Your bunion should determine what shoe should be worn. Wide shoes with non-irritating seams, soft leather, and a square toe box are most compatible. Shoes should fit well out of the box but may need to be professionally stretched and modified to accommodate the bump. Heels, dress shoes, and pointed toes will not provide comfort.   Shoe inserts or orthotics will often times help with the bunion pain, however, inserts make a shoe fit more challenging. Pads placed around the bunion may help.   Bunion surgery involves cutting and repositioning the bones surrounding the bunion. Pins and screws are used to hold the bones in place during the healing process. The goal of bunion surgery is to reduce the size of the bunion bump. Realignment of the toe and joint is attempted. Most first toes can not be forced back into a normal alignment even with surgery.   Healing after surgery requires about 6 weeks of protection. This allows the bone to heal. Maximum recovery takes about one year. The scar tissue and joint structures require this amount of time to finish the healing process. Expect stiffness, swelling, and numbness during that time frame.   Bunion surgery does involve side effects. Some side effects are predictable and others are less common but do occur. A scar will be visible and may be irritated by shoes. The shoe may rub on the screw or internal pin requiring surgical removal of the fixation devices. The screw and pin would like be in place for one year. The first toe may loose motion after surgery. The  amount of stiffness is variable. Some people never regain motion of their big toe. This is due to scar tissue that develops with any surgery. The first toe may drift towards or away from the second toe. Spreading of the first and second toe is a rare occurrence after bunion surgery. This can be bothersome and may require surgical repair. Toe drift toward the second toe may result in a recurrent bunion and revision surgery. Joint fusion is one option to correct and unstable, drifting toe. This procedure straightens the toe, however, no motion remains. Fusion may be necessary to correct complications of bunion surgery or as the original procedure in severe cases.   All surgical procedures involve the risk of infection, numbness, pain, delayed bone healing, dislocation, blood clots, and continued foot pain. Bunion surgery is complex and should not be taken lightly.  Also discussed improving midfoot instability would involve fusing midfoot joint.     Any skin incision can cause infection. Deep infection might involve the bone. If this occurs, repeat surgery and antibiotics through an IV for 6 weeks may be needed. Scar tissue from dissection and retraction can cause nerve pain or numbness. This is generally temporary but may be permanent. We do not have treatments that cure nerve problems. Pain could develop in the second toe due to a change in pressure. In addition, the cut in the bone may displace and require additional surgery.   Delayed healing would lengthen the healing time. Some bones occasionally do not heal. If this occurs, repeat surgery, extended protection, or electronic bone stimulation may be needed. Smokers have a 20 % of the bone not healing; this is 2 times the risk of people who do not smoke.   Foot pain is complex. Most feet hurt for more than one reason. Fixing the bunion would not necessarily create a pain free foot. Appropriate shoes, healthy body weight, avoidance of bare foot walking, and  moderation of activity will always be necessary to enjoy foot comfort. Your bunion may involve arthritis, or loss of the cartilage in the joint. This is incurable even with surgery. Also, long standing bunions often involve chronic irritation to the surrounding nerves. Nerve pain may not resolve even with reducing the bunion bump since permanent nerve damage may be present.   Bunion surgery is actually quite successful. Most surgical patients are pleased with their foot following bunion surgery. Many of the issues described above can be controlled by taking proper care of your foot during the healing process.   Discussed surgical as well as conservative options for her current foot pathology at length.  She will get better shoes and I made suggestions.  Also discussed orthotics and toe spreaders.        Patient wondering about surgical correction and wheat this would involve.  A right and left modified Jackosn bunionectomy as well as a midfoot fusion will be done on an outpatient basis under MAC sedation.  Benefits and risks again discussed at length. These include, but are not limited to overcorrection, undercorrection, infection, numbness, scar formation, decreased ROM or painful ROM, loss of limb, chronic pain,need for further procedures.   NWB for 6 weeks, cam walker for three weeks, and then will start walking in a shoe.  She would need a Rx for knee walker and crutches.  No guarantees of outcome were given. Patient gave verbal understanding. Will need pre-op history and physical within 7 days of the planned procedure. After care instructions, and the procedure were discussed at length.  If patient decides to have this done she will call to set this up and will do right foot first.  Thank you for allowing me participate in the care of this patient.        Zack Healy DPM, FACFAS          Again, thank you for allowing me to participate in the care of your patient.        Sincerely,        Zack Healy,  JOAN

## 2018-06-19 NOTE — PATIENT INSTRUCTIONS
We wish you continued good healing. If you have any questions or concerns, please do not hesitate to contact us at [phone]    Please remember to call and schedule a follow up appointment if one was recommended at your earliest convenience.   PODIATRY CLINIC HOURS  TELEPHONE NUMBER    Dr. Zack Healy D.P.M [facility]    Clinics:  [facility]    Carlita Handy [facility]   Tuesday 1PM-6PM  [place]  Wednesday 7AM-2PM  [facility]  Thursday 10AM-6PM  [place]  Friday 7AM-3PM  [place]  Specialty schedulers:   [phone] to make an appointment with any Specialty Provider.        Urgent Care locations:    [facility] Monday-Friday 5 pm - 9 pm. Saturday-Sunday 9 am -5pm    Monday-Friday 11 am - 9 pm Saturday 9 am - 5 pm     Monday-Sunday 12 noon-8PM [phone]     If you need a medication refill, please contact us you may need lab work and/or a follow up visit prior to your refill (i.e. Antifungal medications).    [employer] (secure e-mail communication and access to your chart) to send a message or to make an appointment.    If MRI needed please call [facility] at [phone]        Weight management plan: Patient was referred to their PCP to discuss a diet and exercise plan.    Patient to follow up with Primary Care provider regarding elevated blood pressure.

## 2018-06-20 ENCOUNTER — TELEPHONE (OUTPATIENT)
Dept: PODIATRY | Facility: CLINIC | Age: 33
End: 2018-06-20

## 2018-06-20 NOTE — PROGRESS NOTES
Subjective:    Pt is seen today in consult from Anusha Wolff with the chief complaint of right and left foot pain.  Points to medial bump of bunion and states that has been bothersome for years.  Has tried shoegear changes with no improvement.  Bothersome both in joint and along medial aspect of 1st MPJ right foot.  Describes as burning pain.  Pain with ambulation and shoewear and difficulty working secondary to pain.  Positive family history of eliz and her grandma had surgery.  Has noticed deformity worsening over the years.  At times standing at work.  Denies numbness.  Denies weakness.  Denies erythema.  Quit smoking in 2010.  Family history of diabetes.        ROS:  A 10-point review of systems was performed and is positive for that noted in the HPI and as seen below.  All other areas are negative.          Allergies   Allergen Reactions     Lisinopril Cough     Zoloft      tired       Current Outpatient Prescriptions   Medication Sig Dispense Refill     etonogestrel (IMPLANON/NEXPLANON) 68 MG IMPL 1 each by Subdermal route once       FLUoxetine (PROZAC) 10 MG capsule Take 3 capsules (30 mg) by mouth daily 90 capsule 5     hydrochlorothiazide (HYDRODIURIL) 25 MG tablet Take 1 tablet (25 mg) by mouth daily 90 tablet 3     insulin pen needle (BD TALHA U/F) 32G X 4 MM Use once daily or as directed. 100 each 3     liraglutide (VICTOZA) 18 MG/3ML soln Inject 1.8 mg Subcutaneous daily 36 mL 1     traMADol (ULTRAM) 50 MG tablet Take 1-2 tablets ( mg) by mouth every 6 hours as needed for pain Max of 4 tabs per day 20 tablet 0     verapamil (VERELAN) 120 MG 24 hr capsule Take 1 capsule (120 mg) by mouth daily 90 capsule 3       Patient Active Problem List   Diagnosis     Adult BMI 30+     CARDIOVASCULAR SCREENING; LDL GOAL LESS THAN 160     Family history of diabetes mellitus     History of depression     Sprain and strain of shoulder and upper arm     Hypertension goal BP (blood pressure) < 140/90     CTS  (carpal tunnel syndrome)     S/P carpal tunnel release     Anxiety     BMI 40.0-44.9, adult (H)     Chronic bilateral thoracic back pain     Chronic midline low back pain without sciatica     Migraine with aura and without status migrainosus, not intractable     Morbid obesity due to excess calories (H)     Low serum HDL       Past Medical History:   Diagnosis Date     Family history of diabetes mellitus      Hypertension      Migraine with aura and without status migrainosus, not intractable 11/2/2016     PID (acute pelvic inflammatory disease) 5/2010       Past Surgical History:   Procedure Laterality Date     HC REVISE MEDIAN N/CARPAL TUNNEL SURG Left 6/5/15    Primary, not revision     RELEASE CARPAL TUNNEL Left 6/5/2015    Procedure: RELEASE CARPAL TUNNEL;  Surgeon: Juan Jose Joaquin MD;  Location: [facility] OR       Family History   Problem Relation Age of Onset     Diabetes Mother      Hypertension Mother      Cancer Mother      skin     Diabetes Father      Cerebrovascular Disease No family hx of      Thyroid Disease No family hx of      Glaucoma No family hx of      Macular Degeneration No family hx of        Social History   Substance Use Topics     Smoking status: Former Smoker     Types: Cigarettes     Quit date: 5/1/2010     Smokeless tobacco: Never Used     Alcohol use Yes      Comment: rare         Objective:    Vitals: BP (!) 127/92  Pulse 90  Temp 98.3  F (36.8  C) (Oral)  Resp 18  Wt 204 lb (92.5 kg)  SpO2 100%  BMI 36.14 kg/m2  BMI: Body mass index is 36.14 kg/(m^2).  Height: Data Unavailable    Constitutional/ general:  Pt is in no apparent distress, appears well-nourished.  Cooperative with history and physical exam.     Psych:  The patient answered questions appropriately.  Normal affect.  Seems to have reasonable expectations, in terms of treatment.    Eyes:  Visual scanning/ tracking without deficit.    Ears:  Response to auditory stimuli is normal.   negative hearing aid devices.   Auricles in proper alignment.     Lymphatic:  Popliteal lymph nodes not enlarged.     Lungs:  Non labored breathing, non labored speech. No cough.  No audible wheezing. Even, quiet breathing.      Vascular:  Pedal pulses are palpable bilaterally for both the DP and PT arteries.  CFT < 3 sec.  negative edema.  negative varicosities.  positive pedal hair growth noted.     Neuro:  Alert and oriented x 3. Coordinated gait.  Light touch sensation is intact to the L4, L5, S1 distributions. No obvious deficits.  No evidence of neurological-based weakness, spasticity, or contracture in the lower extremities.     Derm: Normal texture and turgor.  No erythema, ecchymosis, or cyanosis.  No open lesions.     Musculoskeletal:    Lower extremity muscle strength is normal.  Patient is ambulatory without an assistive device or brace.   Mild reactive hyperemia over 1st MPJ of the right and left foot.  No underlying bursa noted.  Pronated arch with weightbearing.  Hypermobility noted at TMTJ.  Valgus rotation of hallux at MPJs. negative crepitus with range of motion of the first MPJ right and left foot.          Radiographic Exam:  Xrays, three views right and left foot weight bearing obtained today.  This demonstrated an intermetatarsal angle of 16 degrees bilateral.  Sesamoid position appears to be a 4 bilateral.  First MPJ joint space deviated.  positive metatarsus adductus.      ASSESSMENT:    Painful Hallux Valgus deformity right and left foot.  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  Discussed with patient a bunion is caused by a muscle imbalance. The big toe is pulled toward the smaller toes. The lump is created by a bone pushing outward.  Also explained that hypermobility and midfoot instability can contribute to this and other problems such as pronation.     Bunion pain is usually a combination of shoes rubbing on the skin, nerve irritation, compression between the toes, joint  misalignment, arthritis, and altered gait.   Most bunion pain can be improved by wearing compatible shoes. People with bunions cannot choose footwear just because they like the style. Your bunion should determine what shoe should be worn. Wide shoes with non-irritating seams, soft leather, and a square toe box are most compatible. Shoes should fit well out of the box but may need to be professionally stretched and modified to accommodate the bump. Heels, dress shoes, and pointed toes will not provide comfort.   Shoe inserts or orthotics will often times help with the bunion pain, however, inserts make a shoe fit more challenging. Pads placed around the bunion may help.   Bunion surgery involves cutting and repositioning the bones surrounding the bunion. Pins and screws are used to hold the bones in place during the healing process. The goal of bunion surgery is to reduce the size of the bunion bump. Realignment of the toe and joint is attempted. Most first toes can not be forced back into a normal alignment even with surgery.   Healing after surgery requires about 6 weeks of protection. This allows the bone to heal. Maximum recovery takes about one year. The scar tissue and joint structures require this amount of time to finish the healing process. Expect stiffness, swelling, and numbness during that time frame.   Bunion surgery does involve side effects. Some side effects are predictable and others are less common but do occur. A scar will be visible and may be irritated by shoes. The shoe may rub on the screw or internal pin requiring surgical removal of the fixation devices. The screw and pin would like be in place for one year. The first toe may loose motion after surgery. The amount of stiffness is variable. Some people never regain motion of their big toe. This is due to scar tissue that develops with any surgery. The first toe may drift towards or away from the second toe. Spreading of the first and second  toe is a rare occurrence after bunion surgery. This can be bothersome and may require surgical repair. Toe drift toward the second toe may result in a recurrent bunion and revision surgery. Joint fusion is one option to correct and unstable, drifting toe. This procedure straightens the toe, however, no motion remains. Fusion may be necessary to correct complications of bunion surgery or as the original procedure in severe cases.   All surgical procedures involve the risk of infection, numbness, pain, delayed bone healing, dislocation, blood clots, and continued foot pain. Bunion surgery is complex and should not be taken lightly.  Also discussed improving midfoot instability would involve fusing midfoot joint.     Any skin incision can cause infection. Deep infection might involve the bone. If this occurs, repeat surgery and antibiotics through an IV for 6 weeks may be needed. Scar tissue from dissection and retraction can cause nerve pain or numbness. This is generally temporary but may be permanent. We do not have treatments that cure nerve problems. Pain could develop in the second toe due to a change in pressure. In addition, the cut in the bone may displace and require additional surgery.   Delayed healing would lengthen the healing time. Some bones occasionally do not heal. If this occurs, repeat surgery, extended protection, or electronic bone stimulation may be needed. Smokers have a 20 % of the bone not healing; this is 2 times the risk of people who do not smoke.   Foot pain is complex. Most feet hurt for more than one reason. Fixing the bunion would not necessarily create a pain free foot. Appropriate shoes, healthy body weight, avoidance of bare foot walking, and moderation of activity will always be necessary to enjoy foot comfort. Your bunion may involve arthritis, or loss of the cartilage in the joint. This is incurable even with surgery. Also, long standing bunions often involve chronic irritation to  the surrounding nerves. Nerve pain may not resolve even with reducing the bunion bump since permanent nerve damage may be present.   Bunion surgery is actually quite successful. Most surgical patients are pleased with their foot following bunion surgery. Many of the issues described above can be controlled by taking proper care of your foot during the healing process.   Discussed surgical as well as conservative options for her current foot pathology at length.  She will get better shoes and I made suggestions.  Also discussed orthotics and toe spreaders.        Patient wondering about surgical correction and wheat this would involve.  A right and left modified Jackson bunionectomy as well as a midfoot fusion will be done on an outpatient basis under MAC sedation.  Benefits and risks again discussed at length. These include, but are not limited to overcorrection, undercorrection, infection, numbness, scar formation, decreased ROM or painful ROM, loss of limb, chronic pain,need for further procedures.   NWB for 6 weeks, cam walker for three weeks, and then will start walking in a shoe.  She would need a Rx for knee walker and crutches.  No guarantees of outcome were given. Patient gave verbal understanding. Will need pre-op history and physical within 7 days of the planned procedure. After care instructions, and the procedure were discussed at length.  If patient decides to have this done she will call to set this up and will do right foot first.  Thank you for allowing me participate in the care of this patient.        Zack Healy DPM, FACFAS

## 2018-07-30 ENCOUNTER — MYC MEDICAL ADVICE (OUTPATIENT)
Dept: PODIATRY | Facility: CLINIC | Age: 33
End: 2018-07-30

## 2018-07-30 NOTE — PROGRESS NOTES
SUBJECTIVE:   Екатерина Ramon is a 32 year old female who presents to clinic today for the following health issues:      She has been busy with school and this affected her exercise.  She didn't do much.  Weight is up 2 pounds and fat percentage is the same.  Eating is small, she eats breakfast and dinner.  She isn't hungry at all.  She continues on Victoza as well.  She thinks it has been helpful overall with weight loss but really hasn't had any appetite from the very beginning  She has exercised more in the past but wasn't more hungry then either.  Tried topamax in the past alone but didn't find it helpful.     Patient denies any exertional chest pain, dyspnea, palpitations, syncope, orthopnea, edema or paroxysmal nocturnal dyspnea.   The patient denies abdominal or flank pain, anorexia, nausea or vomiting, dysphagia, change in bowel habits or black or bloody stools or weight loss.         Problem list and histories reviewed & adjusted, as indicated.  Additional history: as documented    Patient Active Problem List   Diagnosis     Adult BMI 30+     CARDIOVASCULAR SCREENING; LDL GOAL LESS THAN 160     Family history of diabetes mellitus     History of depression     Sprain and strain of shoulder and upper arm     Hypertension goal BP (blood pressure) < 140/90     CTS (carpal tunnel syndrome)     S/P carpal tunnel release     Anxiety     BMI 40.0-44.9, adult (H)     Chronic bilateral thoracic back pain     Chronic midline low back pain without sciatica     Migraine with aura and without status migrainosus, not intractable     Morbid obesity due to excess calories (H)     Low serum HDL     Past Surgical History:   Procedure Laterality Date     HC REVISE MEDIAN N/CARPAL TUNNEL SURG Left 6/5/15    Primary, not revision     RELEASE CARPAL TUNNEL Left 6/5/2015    Procedure: RELEASE CARPAL TUNNEL;  Surgeon: Juan Jose Joaquin MD;  Location:  OR       Social History   Substance Use Topics     Smoking status: Former  "Smoker     Types: Cigarettes     Quit date: 5/1/2010     Smokeless tobacco: Never Used     Alcohol use Yes      Comment: rare     Family History   Problem Relation Age of Onset     Diabetes Mother      Hypertension Mother      Cancer Mother      skin     Diabetes Father      Cerebrovascular Disease No family hx of      Thyroid Disease No family hx of      Glaucoma No family hx of      Macular Degeneration No family hx of          Current Outpatient Prescriptions   Medication Sig Dispense Refill     etonogestrel (IMPLANON/NEXPLANON) 68 MG IMPL 1 each by Subdermal route once       FLUoxetine (PROZAC) 10 MG capsule Take 3 capsules (30 mg) by mouth daily 90 capsule 5     insulin pen needle (BD TALHA U/F) 32G X 4 MM Use once daily or as directed. 100 each 3     liraglutide (VICTOZA) 18 MG/3ML soln Inject 1.8 mg Subcutaneous daily 36 mL 1     topiramate (TOPAMAX) 25 MG tablet Take 1 tablet (25 mg) at bedtime for 1 week, then 1 tablet twice daily for 1 week, then 1 tablet in AM and 2 in PM for 1 week, then 2 tablets twice daily. 70 tablet 3     traMADol (ULTRAM) 50 MG tablet Take 1-2 tablets ( mg) by mouth every 6 hours as needed for pain Max of 4 tabs per day 20 tablet 0     verapamil (VERELAN) 120 MG 24 hr capsule Take 1 capsule (120 mg) by mouth daily 90 capsule 3     Allergies   Allergen Reactions     Lisinopril Cough     Zoloft      tired     BP Readings from Last 3 Encounters:   08/01/18 124/86   08/01/18 124/86   06/19/18 (!) 127/92    Wt Readings from Last 3 Encounters:   08/01/18 197 lb 8 oz (89.6 kg)   08/01/18 197 lb 8 oz (89.6 kg)   06/19/18 204 lb (92.5 kg)                  Labs reviewed in EPIC    Reviewed and updated as needed this visit by clinical staff       Reviewed and updated as needed this visit by Provider         ROS:   ROS: 4 point ROS neg other than the symptoms noted above in the HPI.      OBJECTIVE:     /86  Pulse 91  Temp 98.5  F (36.9  C) (Oral)  Resp 19  Ht 5' 3\" (1.6 m)  Wt " 197 lb 8 oz (89.6 kg)  SpO2 99%  BMI 34.99 kg/m2  Body mass index is 34.99 kg/(m^2).  GENERAL APPEARANCE: healthy, alert and no distress  RESP: lungs clear to auscultation - no rales, rhonchi or wheezes  CV: regular rates and rhythm and normal S1 S2, no S3 or S4  PSYCH: mentation appears normal. and affect normal/bright  The abdomen is soft without tenderness, guarding, mass, rebound or organomegaly. Bowel sounds are normal. Diagnostic Test Results:  Results for orders placed or performed in visit on 06/19/18   XR Foot Right G/E 3 Views    Narrative    FOOT THREE VIEWS RIGHT  6/19/2018 5:57 PM     HISTORY: Bunion.    COMPARISON: None.    FINDINGS: Mild-moderate hallux valgus deformity. The Lisfranc joint  appears unremarkable in these views. The plantar arch is unremarkable  in these views. There is no acute fracture or dislocation. There are  no worrisome bony lesions.      Impression    IMPRESSION: No acute osseous abnormality demonstrated.    FILEMON WYATT MD       ASSESSMENT/PLAN:             1. BMI 35.0-35.9,adult  Her weight is stabilized.  She had good weight loss with the victoza initially but now has stalled.  Patient is fearful she will regain her lost weight if she stops victoza and that is likely correct.  She would like to see if she has any benefit with the addition of Topamax to Victoza.  Unfortunately she continues to not have any appetite and I think that part of this is because she is largely sedentary.  She will continue to work on increasing her exercise levels.  She is skipping meals and knows that she needs to try to eat on a regular basis.  - topiramate (TOPAMAX) 25 MG tablet; Take 1 tablet (25 mg) at bedtime for 1 week, then 1 tablet twice daily for 1 week, then 1 tablet in AM and 2 in PM for 1 week, then 2 tablets twice daily.  Dispense: 70 tablet; Refill: 3    2. High risk medication use  She is aware that Victoza and Topamax are not to use during pregnancy.  She does use a regular form  of birth control.  - **Basic metabolic panel FUTURE anytime; Future    Patient Instructions     Semaglutide would possibly be an off label choice for your to replace Liraglutide.  Keep Liraglutide the same for now.    Topamax per bottle directions.  Kidney blood test (non fasting) in 1 month.  Increase exercise.  Follow up a week later with myself or Magaly Villegas.     [facility] (formerly [facility]3]    If you have any questions regarding to your visit please contact your care team:     Team Pink:   Clinic Hours Telephone Number   Internal Medicine:  Dr. Lorraine Villegas, NP       7am-7pm  Monday - Thursday   7am-5pm  Fridays  [phone]  (Appointment scheduling available 24/7)    Questions about your recent visit?  Team Line  [phone]   Urgent Care - [place] and [facility] - 11am-9pm Monday-Friday Saturday-Sunday- 9am-5pm   [place] - 5pm-9pm Monday-Friday Saturday-Sunday- 9am-5pm  [phone] - [place]  [phone] - [place]       What options do I have for a visit other than an office visit? We offer electronic visits (e-visits) and telephone visits, when medically appropriate.  Please check with your medical insurance to see if these types of visits are covered, as you will be responsible for any charges that are not paid by your insurance.      You can use [employer] (secure electronic communication) to access to your chart, send your primary care provider a message, or make an appointment. Ask a team member how to get started.     For a price quote for your services, please call our Consumer Price Line at [phone] or our Imaging Cost estimation line at [phone] (for imaging tests).        Lorraine Johnson MD  [facility]

## 2018-08-01 ENCOUNTER — OFFICE VISIT (OUTPATIENT)
Dept: FAMILY MEDICINE | Facility: CLINIC | Age: 33
End: 2018-08-01
Payer: COMMERCIAL

## 2018-08-01 ENCOUNTER — OFFICE VISIT (OUTPATIENT)
Dept: INTERNAL MEDICINE | Facility: CLINIC | Age: 33
End: 2018-08-01
Payer: COMMERCIAL

## 2018-08-01 ENCOUNTER — TELEPHONE (OUTPATIENT)
Dept: PODIATRY | Facility: CLINIC | Age: 33
End: 2018-08-01

## 2018-08-01 ENCOUNTER — MYC MEDICAL ADVICE (OUTPATIENT)
Dept: PODIATRY | Facility: CLINIC | Age: 33
End: 2018-08-01

## 2018-08-01 VITALS
RESPIRATION RATE: 19 BRPM | OXYGEN SATURATION: 99 % | HEIGHT: 63 IN | DIASTOLIC BLOOD PRESSURE: 86 MMHG | SYSTOLIC BLOOD PRESSURE: 124 MMHG | HEART RATE: 91 BPM | BODY MASS INDEX: 34.99 KG/M2 | WEIGHT: 197.5 LBS | TEMPERATURE: 98.5 F

## 2018-08-01 VITALS
RESPIRATION RATE: 19 BRPM | BODY MASS INDEX: 34.99 KG/M2 | HEIGHT: 63 IN | DIASTOLIC BLOOD PRESSURE: 86 MMHG | HEART RATE: 91 BPM | OXYGEN SATURATION: 99 % | SYSTOLIC BLOOD PRESSURE: 124 MMHG | WEIGHT: 197.5 LBS | TEMPERATURE: 98.5 F

## 2018-08-01 DIAGNOSIS — M25.376 INSTABILITY OF FOOT JOINT, UNSPECIFIED LATERALITY: ICD-10-CM

## 2018-08-01 DIAGNOSIS — Z79.899 HIGH RISK MEDICATION USE: ICD-10-CM

## 2018-08-01 DIAGNOSIS — Z01.818 PREOP GENERAL PHYSICAL EXAM: Primary | ICD-10-CM

## 2018-08-01 DIAGNOSIS — I10 HYPERTENSION GOAL BP (BLOOD PRESSURE) < 140/90: ICD-10-CM

## 2018-08-01 PROCEDURE — 99214 OFFICE O/P EST MOD 30 MIN: CPT | Performed by: FAMILY MEDICINE

## 2018-08-01 PROCEDURE — 99213 OFFICE O/P EST LOW 20 MIN: CPT | Performed by: INTERNAL MEDICINE

## 2018-08-01 RX ORDER — TOPIRAMATE 25 MG/1
TABLET, FILM COATED ORAL
Qty: 70 TABLET | Refills: 3 | Status: SHIPPED | OUTPATIENT
Start: 2018-08-01 | End: 2018-12-14

## 2018-08-01 NOTE — NURSING NOTE
"Chief Complaint   Patient presents with     Pre-Op Exam     Initial /86 (BP Location: Right arm, Patient Position: Chair, Cuff Size: Adult Large)  Pulse 91  Temp 98.5  F (36.9  C) (Oral)  Resp 19  Ht 5' 3\" (1.6 m)  Wt 197 lb 8 oz (89.6 kg)  LMP  (LMP Unknown)  SpO2 99%  Breastfeeding? No  BMI 34.99 kg/m2 Estimated body mass index is 34.99 kg/(m^2) as calculated from the following:    Height as of this encounter: 5' 3\" (1.6 m).    Weight as of this encounter: 197 lb 8 oz (89.6 kg).  BP completed using cuff size: leanna Monge  "

## 2018-08-01 NOTE — PATIENT INSTRUCTIONS
Semaglutide would possibly be an off label choice for your to replace Liraglutide.  Keep Liraglutide the same for now.    Topamax per bottle directions.  Kidney blood test (non fasting) in 1 month.  Increase exercise.  Follow up a week later with myself or Magaly Villegas.     [facility] (formerly [facility])    If you have any questions regarding to your visit please contact your care team:     Team Pink:   Clinic Hours Telephone Number   Internal Medicine:  Dr. Lorraine Villegas, NP       7am-7pm  Monday - Thursday   7am-5pm  Fridays  [phone]  (Appointment scheduling available 24/7)    Questions about your recent visit?  Team Line  [phone]   Urgent Care - [place] and [place] - 11am-9pm Monday-Friday Saturday-Sunday- 9am-5pm   [place] - 5pm-9pm Monday-Friday Saturday-Sunday- 9am-5pm  [phone] - [place]  [phone] - [place]       What options do I have for a visit other than an office visit? We offer electronic visits (e-visits) and telephone visits, when medically appropriate.  Please check with your medical insurance to see if these types of visits are covered, as you will be responsible for any charges that are not paid by your insurance.      You can use [employer] (secure electronic communication) to access to your chart, send your primary care provider a message, or make an appointment. Ask a team member how to get started.     For a price quote for your services, please call our Consumer Price Line at [phone] or our Imaging Cost estimation line at [phone] (for imaging tests).

## 2018-08-01 NOTE — MR AVS SNAPSHOT
After Visit Summary   8/1/2018    Екатерина Ramon    MRN: [id number]           Patient Information     Date Of Birth          1985        Visit Information        Provider Department      8/1/2018 11:30 AM Lorraine Johnson MD [facility]        Today's Diagnoses     BMI 35.0-35.9,adult    -  1    High risk medication use          Care Instructions    Semaglutide would possibly be an off label choice for your to replace Liraglutide.  Keep Liraglutide the same for now.    Topamax per bottle directions.  Kidney blood test (non fasting) in 1 month.  Increase exercise.  Follow up a week later with myself or Magaly Villegas.     [facility]    If you have any questions regarding to your visit please contact your care team:     Team Pink:   Clinic Hours Telephone Number   Internal Medicine:  Dr. Lorraine Villegas, NP       7am-7pm  Monday - Thursday   7am-5pm  Fridays  [phone]  (Appointment scheduling available 24/7)    Questions about your recent visit?  Team Line  [phone]   Urgent Care - [place] and [facility] - 11am-9pm Monday-Friday Saturday-Sunday- 9am-5pm   [place] - 5pm-9pm Monday-Friday Saturday-Sunday- 9am-5pm  [phone] - [place]  [phone] - [place]       What options do I have for a visit other than an office visit? We offer electronic visits (e-visits) and telephone visits, when medically appropriate.  Please check with your medical insurance to see if these types of visits are covered, as you will be responsible for any charges that are not paid by your insurance.      You can use [employer] (secure electronic communication) to access to your chart, send your primary care provider a message, or make an appointment. Ask a team member how to get started.     For a price quote for your services, please call our Consumer Price Line at [phone] or our Imaging Cost estimation line at  [phone] (for imaging tests).            Follow-ups after your visit        Your next 10 appointments already scheduled     Aug 01, 2018 12:20 PM CDT   Pre-Op physical with Kevon Corbett MD   [facility] ([facility]    [address]  [place] [address] [phone]            Aug 07, 2018   Procedure with Zack Healy DPM   [facility] (--)    [address] [place] [address]   [phone]            Aug 10, 2018  9:00 AM CDT   Return Visit with Zack Healy DPM   [facility] ([facility])    [address] [facility] [id number] [phone]              Future tests that were ordered for you today     Open Future Orders        Priority Expected Expires Ordered    **Basic metabolic panel FUTURE anytime Routine 9/1/2018 8/1/2019 8/1/2018            Who to contact     If you have questions or need follow up information about today's clinic visit or your schedule please contact [facility] directly at [phone].  Normal or non-critical lab and imaging results will be communicated to you by MyChart, letter or phone within 4 business days after the clinic has received the results. If you do not hear from us within 7 days, please contact the clinic through [employer] or phone. If you have a critical or abnormal lab result, we will notify you by phone as soon as possible.  Submit refill requests through Ex[employer] or call your pharmacy and they will forward the refill request to us. Please allow 3 business days for your refill to be completed.          Additional Information About Your Visit        [email or web address] Information     Ex[employer]et gives you secure access to your electronic health record. If you see a primary care provider, you can also send messages to your care team and make appointments. If you have questions, please call your primary care clinic.  If  "you do not have a primary care provider, please call [phone] and they will assist you.        Care EveryWhere ID     This is your Care EveryWhere ID. This could be used by other organizations to access your [facility] medical records  [id number]        Your Vitals Were     Pulse Temperature Respirations Height Pulse Oximetry BMI (Body Mass Index)    91 98.5  F (36.9  C) (Oral) 19 5' 3\" (1.6 m) 99% 34.99 kg/m2       Blood Pressure from Last 3 Encounters:   08/01/18 124/86   06/19/18 (!) 127/92   06/11/18 120/88    Weight from Last 3 Encounters:   08/01/18 197 lb 8 oz (89.6 kg)   06/19/18 204 lb (92.5 kg)   06/11/18 202 lb (91.6 kg)                 Today's Medication Changes          These changes are accurate as of 8/1/18 12:14 PM.  If you have any questions, ask your nurse or doctor.               Start taking these medicines.        Dose/Directions    topiramate 25 MG tablet   Commonly known as:  TOPAMAX   Used for:  BMI 35.0-35.9,adult   Started by:  Lorraine Johnson MD        Take 1 tablet (25 mg) at bedtime for 1 week, then 1 tablet twice daily for 1 week, then 1 tablet in AM and 2 in PM for 1 week, then 2 tablets twice daily.   Quantity:  70 tablet   Refills:  3            Where to get your medicines      Some of these will need a paper prescription and others can be bought over the counter.  Ask your nurse if you have questions.     Bring a paper prescription for each of these medications     topiramate 25 MG tablet                Primary Care Provider Office Phone # Fax #    ESTUARDO Jain [phone] [address] [facility]  [facility] [address]        Equal Access to Services     CHRIS WALTERS AH: Susi Isabel, ky bourne, qazeina kaaljoe niño, lasha payne. So [facility] [phone].    ATENCIÓN: Si habla español, tiene a ramirez disposición servicios gratuitos de asistencia lingüística. Llame al [phone].    We comply " with applicable federal civil rights laws and [place] laws. We do not discriminate on the basis of race, color, national origin, age, disability, sex, sexual orientation, or gender identity.            Thank you!     Thank you for choosing [facility]  for your care. Our goal is always to provide you with excellent care. Hearing back from our patients is one way we can continue to improve our services. Please take a few minutes to complete the written survey that you may receive in the mail after your visit with us. Thank you!             Your Updated Medication List - Protect others around you: Learn how to safely use, store and throw away your medicines at www.disposemymeds.org.          This list is accurate as of 8/1/18 12:14 PM.  Always use your most recent med list.                   Brand Name Dispense Instructions for use Diagnosis    etonogestrel 68 MG Impl    IMPLANON/NEXPLANON     1 each by Subdermal route once        FLUoxetine 10 MG capsule    PROzac    90 capsule    Take 3 capsules (30 mg) by mouth daily    Anxiety       insulin pen needle 32G X 4 MM    BD TALHA U/F    100 each    Use once daily or as directed.    Morbid obesity due to excess calories (H)       liraglutide 18 MG/3ML soln    VICTOZA    36 mL    Inject 1.8 mg Subcutaneous daily    Morbid obesity due to excess calories (H)       topiramate 25 MG tablet    TOPAMAX    70 tablet    Take 1 tablet (25 mg) at bedtime for 1 week, then 1 tablet twice daily for 1 week, then 1 tablet in AM and 2 in PM for 1 week, then 2 tablets twice daily.    BMI 35.0-35.9,adult       traMADol 50 MG tablet    ULTRAM    20 tablet    Take 1-2 tablets ( mg) by mouth every 6 hours as needed for pain Max of 4 tabs per day    Chronic midline low back pain without sciatica, Chronic bilateral thoracic back pain       verapamil 120 MG 24 hr capsule    VERELAN    90 capsule    Take 1 capsule (120 mg) by mouth daily    Hypertension goal BP (blood  pressure) < 140/90, Migraine with aura and without status migrainosus, not intractable

## 2018-08-01 NOTE — MR AVS SNAPSHOT
After Visit Summary   8/1/2018    Екатерина Ramon    MRN: [id number]           Patient Information     Date Of Birth          1985        Visit Information        Provider Department      8/1/2018 12:20 PM Kevon Corbett MD [facility]        Today's Diagnoses     Preop general physical exam    -  1    Instability of foot joint, unspecified laterality        Hypertension goal BP (blood pressure) < 140/90        BMI 34.0-34.9,adult          Care Instructions      Before Your Surgery      Call your surgeon if there is any change in your health. This includes signs of a cold or flu (such as a sore throat, runny nose, cough, rash or fever).    Do not smoke, drink alcohol or take over the counter medicine (unless your surgeon or primary care doctor tells you to) for the 24 hours before and after surgery.    If you take prescribed drugs: Follow your doctor s orders about which medicines to take and which to stop until after surgery.    Eating and drinking prior to surgery: follow the instructions from your surgeon    Take a shower or bath the night before surgery. Use the soap your surgeon gave you to gently clean your skin. If you do not have soap from your surgeon, use your regular soap. Do not shave or scrub the surgery site.  Wear clean pajamas and have clean sheets on your bed.     Recommendation:    --Patient is to take all scheduled medications on the day of surgery EXCEPT for modifications listed below.     None.    APPROVAL GIVEN to proceed with proposed procedure, without further diagnostic evaluation       Signed Electronically by: Kevon Corbett MD        [facility]    If you have any questions regarding to your visit please contact your care team:       Team Purple:   Clinic Hours Telephone Number   Dr. Candy Gaspar   7am-7pm  Monday - Thursday   7am-5pm  Fridays  [phone]  (Appointment scheduling  available 24/7)    Questions about your recent visit?   Team Line:  [phone]   Urgent Care - [place] - 11am-9pm Monday-Friday Saturday-Sunday- 9am-5pm   [place] - 5pm-9pm Monday-Friday Saturday-Sunday- 9am-5pm  [phone] - [place]  [phone] - [place]       What options do I have for a visit other than an office visit? We offer electronic visits (e-visits) and telephone visits, when medically appropriate.  Please check with your medical insurance to see if these types of visits are covered, as you will be responsible for any charges that are not paid by your insurance.      You can use [facility] (secure electronic communication) to access to your chart, send your primary care provider a message, or make an appointment. Ask a team member how to get started.     For a price quote for your services, please call our Consumer Price Line at [phone] or our Imaging Cost estimation line at [phone] (for imaging tests).      SARAH Zazueta            Follow-ups after your visit        Your next 10 appointments already scheduled     Aug 07, 2018   Procedure with Zack Healy DPM   [facility] (--)    [address]  [facility] [address] [phone]            Aug 10, 2018  9:00 AM CDT   [facility]    [address] [place] [address] [phone]              Future tests that were ordered for you today     Open Future Orders        Priority Expected Expires Ordered    **Basic metabolic panel FUTURE anytime Routine 9/1/2018 8/1/2019 8/1/2018            Who to contact     If you have questions or need follow up information about today's clinic visit or your schedule please contact [facility] directly at [phone].  Normal or non-critical lab and imaging results will be communicated to you by  "MyChart, letter or phone within 4 business days after the clinic has received the results. If you do not hear from us within 7 days, please contact the clinic through [employer] or phone. If you have a critical or abnormal lab result, we will notify you by phone as soon as possible.  Submit refill requests through [employer]erTrack or call your pharmacy and they will forward the refill request to us. Please allow 3 business days for your refill to be completed.          Additional Information About Your Visit        [employer] Information     [employer] gives you secure access to your electronic health record. If you see a primary care provider, you can also send messages to your care team and make appointments. If you have questions, please call your primary care clinic.  If you do not have a primary care provider, please call [phone] and they will assist you.        Care EveryWhere ID     This is your Care EveryWhere ID. This could be used by other organizations to access your [place] medical records  [id number]        Your Vitals Were     Pulse Temperature Respirations Height Last Period Pulse Oximetry    91 98.5  F (36.9  C) (Oral) 19 5' 3\" (1.6 m) (LMP Unknown) 99%    Breastfeeding? BMI (Body Mass Index)                No 34.99 kg/m2           Blood Pressure from Last 3 Encounters:   08/01/18 124/86   08/01/18 124/86   06/19/18 (!) 127/92    Weight from Last 3 Encounters:   08/01/18 197 lb 8 oz (89.6 kg)   08/01/18 197 lb 8 oz (89.6 kg)   06/19/18 204 lb (92.5 kg)              Today, you had the following     No orders found for display         Today's Medication Changes          These changes are accurate as of 8/1/18  1:01 PM.  If you have any questions, ask your nurse or doctor.               Start taking these medicines.        Dose/Directions    topiramate 25 MG tablet   Commonly known as:  TOPAMAX   Used for:  BMI 35.0-35.9,adult   Started by:  Lorraine Johnson MD        Take 1 tablet (25 mg) at bedtime for 1 week, " then 1 tablet twice daily for 1 week, then 1 tablet in AM and 2 in PM for 1 week, then 2 tablets twice daily.   Quantity:  70 tablet   Refills:  3            Where to get your medicines      Some of these will need a paper prescription and others can be bought over the counter.  Ask your nurse if you have questions.     Bring a paper prescription for each of these medications     topiramate 25 MG tablet                Primary Care Provider Office Phone # Fax #    Anusha Wolff, APRRUSS [phone] [address] [facility] [address]        Equal Access to Services     [facility]: Hadii aad ku hadasho Soomaali, waaxda luqadaha, qaybta kaalmada adeegyada, waxollie ruiz haygavino lin . So [facility] [phone].    ATENCIÓN: Si habla español, tiene a ramirez disposición servicios gratuitos de asistencia lingüística. LlSelect Medical [facility] [place] [phone].    We comply with applicable federal civil rights laws and [place] laws. We do not discriminate on the basis of race, color, national origin, age, disability, sex, sexual orientation, or gender identity.            Thank you!     Thank you for choosing [facility]  for your care. Our goal is always to provide you with excellent care. Hearing back from our patients is one way we can continue to improve our services. Please take a few minutes to complete the written survey that you may receive in the mail after your visit with us. Thank you!             Your Updated Medication List - Protect others around you: Learn how to safely use, store and throw away your medicines at www.disposemymeds.org.          This list is accurate as of 8/1/18  1:01 PM.  Always use your most recent med list.                   Brand Name Dispense Instructions for use Diagnosis    etonogestrel 68 MG Impl    IMPLANON/NEXPLANON     1 each by Subdermal route once        FLUoxetine 10 MG capsule    PROzac    90 capsule    Take 3 capsules (30 mg) by mouth daily     Anxiety       insulin pen needle 32G X 4 MM    BD TALHA U/F    100 each    Use once daily or as directed.    Morbid obesity due to excess calories (H)       liraglutide 18 MG/3ML soln    VICTOZA    36 mL    Inject 1.8 mg Subcutaneous daily    Morbid obesity due to excess calories (H)       topiramate 25 MG tablet    TOPAMAX    70 tablet    Take 1 tablet (25 mg) at bedtime for 1 week, then 1 tablet twice daily for 1 week, then 1 tablet in AM and 2 in PM for 1 week, then 2 tablets twice daily.    BMI 35.0-35.9,adult       traMADol 50 MG tablet    ULTRAM    20 tablet    Take 1-2 tablets ( mg) by mouth every 6 hours as needed for pain Max of 4 tabs per day    Chronic midline low back pain without sciatica, Chronic bilateral thoracic back pain       verapamil 120 MG 24 hr capsule    VERELAN    90 capsule    Take 1 capsule (120 mg) by mouth daily    Hypertension goal BP (blood pressure) < 140/90, Migraine with aura and without status migrainosus, not intractable

## 2018-08-01 NOTE — TELEPHONE ENCOUNTER
Type of surgery: right first tarsometatarsal joint fusion,right Jackson bunionectomy CPT code 08300  69468  Bunion [M21.619]  - Primary       Instability of foot joint, unspecified laterality [M25.376]       [facility] of surgery:  [facility]  Date and time of surgery: 8-7-18  TBD  Surgeon: Dr Healy  Pre-Op Appt Date: 8-1-18  Post-Op Appt Date: 8-10-18   Packet sent out: Yes  Pre-cert/Authorization completed:  Sent to [employer] and is pending.   Date: 08/01/2018    Called [facility] federal on surgery. Still pending on [employer] but nothing is showing as needed as far as prior auth when I called on it.   Nothing on [facility] federal list at [facility] site either.     Insurance is valid for August

## 2018-08-01 NOTE — PROGRESS NOTES
[facility]  [address] [place] [id number]  [phone]  Dept: [phone]    PRE-OP EVALUATION:  Today's date: 2018    Екатерина Ramon (: 1985) presents for pre-operative evaluation assessment as requested by Dr. Healy.  She requires evaluation and anesthesia risk assessment prior to undergoing surgery/procedure for treatment of Left foot.    Proposed Surgery/ Procedure: Left foot treatment, cutting bone and repairing: right first tarsometatarsal joint fusion,right Jackson bunionectomy  Date of Surgery/ Procedure: 2018  Time of Surgery/ Procedure: 600am arrival, 700 am surgery  Hospital/Surgical Facility: [facility]  Fax number for surgical facility:   Primary Physician: Anusha Wolff  Type of Anesthesia Anticipated: to be determined    Patient has a Health Care Directive or Living Will:  NO    1. NO - Do you have a history of heart attack, stroke, stent, bypass or surgery on an artery in the head, neck, heart or legs?  2. NO - Do you ever have any pain or discomfort in your chest?  3. NO - Do you have a history of  Heart Failure?  4. NO - Are you troubled by shortness of breath when: walking on the level, up a slight hill or at night?  5. NO - Do you currently have a cold, bronchitis or other respiratory infection?  6. NO - Do you have a cough, shortness of breath or wheezing?  7. NO - Do you sometimes get pains in the calves of your legs when you walk?  8. NO - Do you or anyone in your family have previous history of blood clots?  9. NO - Do you or does anyone in your family have a serious bleeding problem such as prolonged bleeding following surgeries or cuts?  10. NO - Have you ever had problems with anemia or been told to take iron pills?  11. NO - Have you had any abnormal blood loss such as black, tarry or bloody stools, or abnormal vaginal bleeding?  12. NO - Have you ever had a blood transfusion?  13. NO - Have you or any of  your relatives ever had problems with anesthesia?  14. NO - Do you have sleep apnea, excessive snoring or daytime drowsiness?  15. NO - Do you have any prosthetic heart valves?  16. NO - Do you have prosthetic joints?  17. NO - Is there any chance that you may be pregnant?  HPI:     HPI related to upcoming procedure:   -  Painful Hallux Valgus deformity right and left foot.  -  Midfoot instability right and left foot with pronation     MEDICAL HISTORY:     Patient Active Problem List    Diagnosis Date Noted     Migraine with aura and without status migrainosus, not intractable 11/02/2016     Priority: Medium     Morbid obesity due to excess calories (H) 11/02/2016     Priority: Medium     Low serum HDL 11/02/2016     Priority: Medium     Chronic bilateral thoracic back pain 08/10/2016     Priority: Medium     Chronic midline low back pain without sciatica 08/10/2016     Priority: Medium     BMI 40.0-44.9, adult (H) 07/20/2016     Priority: Medium     Anxiety 02/25/2016     Priority: Medium     S/P carpal tunnel release 07/06/2015     Priority: Medium     CTS (carpal tunnel syndrome) 04/27/2015     Priority: Medium     Hypertension goal BP (blood pressure) < 140/90 02/04/2015     Priority: Medium     Sprain and strain of shoulder and upper arm 06/09/2014     Priority: Medium     History of depression 02/20/2014     Priority: Medium     Around age 26, has been in remission since then       Family history of diabetes mellitus 02/22/2012     Priority: Medium     CARDIOVASCULAR SCREENING; LDL GOAL LESS THAN 160 10/31/2010     Priority: Medium     Adult BMI 30+ 05/05/2010     Priority: Medium      Past Medical History:   Diagnosis Date     Family history of diabetes mellitus      Hypertension      Migraine with aura and without status migrainosus, not intractable 11/2/2016     PID (acute pelvic inflammatory disease) 5/2010     Past Surgical History:   Procedure Laterality Date     HC REVISE MEDIAN N/CARPAL TUNNEL SURG  "Left 6/5/15    Primary, not revision     RELEASE CARPAL TUNNEL Left 6/5/2015    Procedure: RELEASE CARPAL TUNNEL;  Surgeon: Juan Jose Joaquin MD;  Location: [facility] OR     Current Outpatient Prescriptions   Medication Sig Dispense Refill     etonogestrel (IMPLANON/NEXPLANON) 68 MG IMPL 1 each by Subdermal route once       FLUoxetine (PROZAC) 10 MG capsule Take 3 capsules (30 mg) by mouth daily 90 capsule 5     insulin pen needle (BD TALHA U/F) 32G X 4 MM Use once daily or as directed. 100 each 3     liraglutide (VICTOZA) 18 MG/3ML soln Inject 1.8 mg Subcutaneous daily 36 mL 1     topiramate (TOPAMAX) 25 MG tablet Take 1 tablet (25 mg) at bedtime for 1 week, then 1 tablet twice daily for 1 week, then 1 tablet in AM and 2 in PM for 1 week, then 2 tablets twice daily. 70 tablet 3     traMADol (ULTRAM) 50 MG tablet Take 1-2 tablets ( mg) by mouth every 6 hours as needed for pain Max of 4 tabs per day 20 tablet 0     verapamil (VERELAN) 120 MG 24 hr capsule Take 1 capsule (120 mg) by mouth daily 90 capsule 3     OTC products: None, except as noted above    Allergies   Allergen Reactions     Lisinopril Cough     Zoloft      tired      Latex Allergy: NO    Social History   Substance Use Topics     Smoking status: Former Smoker     Types: Cigarettes     Quit date: 5/1/2010     Smokeless tobacco: Never Used     Alcohol use Yes      Comment: rare     History   Drug Use No       REVIEW OF SYSTEMS:   Constitutional, HEENT, cardiovascular, pulmonary, gi and gu systems are negative, except as otherwise noted.    EXAM:   /86 (BP Location: Right arm, Patient Position: Chair, Cuff Size: Adult Large)  Pulse 91  Temp 98.5  F (36.9  C) (Oral)  Resp 19  Ht 5' 3\" (1.6 m)  Wt 197 lb 8 oz (89.6 kg)  LMP  (LMP Unknown)  SpO2 99%  Breastfeeding? No  BMI 34.99 kg/m2    GENERAL APPEARANCE: healthy, alert and no distress     EYES: EOMI, PERRL     HENT: ear canals and TM's normal and nose and mouth without ulcers or lesions    "  NECK: no adenopathy and thyroid normal to palpation     RESP: lungs clear to auscultation - no rales, rhonchi or wheezes     CV: regular rates and rhythm, normal S1 S2, no S3 or S4 and no murmur, click or rub     ABDOMEN:  soft, nontender, no HSM or masses and bowel sounds wei     SKIN: no suspicious lesions or rashes     NEURO: Normal strength and tone, mentation intact and speech normal     PSYCH: mentation appears normal. and affect normal/bright    DIAGNOSTICS:   No labs or EKG required for low risk surgery (cataract, skin procedure, breast biopsy, etc)    Recent Labs   Lab Test  04/27/18   1422  03/23/18   1423  03/02/18   1511   05/06/16   1415   09/20/13   1539   HGB  12.4   --    --    --    --    --   13.5   PLT  330   --    --    --    --    --   300   NA   --   138  140   < >  139   < >   --    POTASSIUM   --   3.8  4.1   < >  4.0   < >   --    CR   --   0.73  1.08*   < >  0.69   < >   --    A1C   --    --    --    --   5.4   --    --     < > = values in this interval not displayed.     IMPRESSION:   Reason for surgery/procedure: Instability of foot joint, unspecified laterality /right first tarsometatarsal joint fusion,right Jackson bunionectomy  Diagnosis/reason for consult: Instability of foot joint, unspecified laterality/Pre OP    The proposed surgical procedure is considered LOW risk.    REVISED CARDIAC RISK INDEX  The patient has the following serious cardiovascular risks for perioperative complications such as (MI, PE, VFib and 3  AV Block):  No serious cardiac risks  INTERPRETATION: 0 risks: Class I (very low risk - 0.4% complication rate)    The patient has the following additional risks for perioperative complications:  No identified additional risks      ICD-10-CM    1. Preop general physical exam Z01.818    2. Instability of foot joint, unspecified laterality M25.376    3. Hypertension goal BP (blood pressure) < 140/90 I10    4. BMI 34.0-34.9,adult Z68.34        RECOMMENDATIONS:      --Patient is to take all scheduled medications on the day of surgery EXCEPT for modifications listed below.     None.    APPROVAL GIVEN to proceed with proposed procedure, without further diagnostic evaluation       Signed Electronically by: Kevon Corbett MD    Copy of this evaluation report is provided to requesting physician.    [facility] Preop Guidelines    Revised Cardiac Risk Index

## 2018-08-01 NOTE — PATIENT INSTRUCTIONS
Before Your Surgery      Call your surgeon if there is any change in your health. This includes signs of a cold or flu (such as a sore throat, runny nose, cough, rash or fever).    Do not smoke, drink alcohol or take over the counter medicine (unless your surgeon or primary care doctor tells you to) for the 24 hours before and after surgery.    If you take prescribed drugs: Follow your doctor s orders about which medicines to take and which to stop until after surgery.    Eating and drinking prior to surgery: follow the instructions from your surgeon    Take a shower or bath the night before surgery. Use the soap your surgeon gave you to gently clean your skin. If you do not have soap from your surgeon, use your regular soap. Do not shave or scrub the surgery site.  Wear clean pajamas and have clean sheets on your bed.     Recommendation:    --Patient is to take all scheduled medications on the day of surgery EXCEPT for modifications listed below.     None.    APPROVAL GIVEN to proceed with proposed procedure, without further diagnostic evaluation       Signed Electronically by: Kevon Corbett MD        [facility]    If you have any questions regarding to your visit please contact your care team:       Team Purple:   Clinic Hours Telephone Number   Dr. Candy Gaspar   7am-7pm  Monday - Thursday   7am-5pm  Fridays  [phone]  (Appointment scheduling available 24/7)    Questions about your recent visit?   Team Line:  [phone]   Urgent [facility] - [facility] - 11am-9pm Monday-Friday Saturday-Sunday- 9am-5pm   [place] - 5pm-9pm Monday-Friday Saturday-Sunday- 9am-5pm  [phone] - [place]  [phone] - [place]       What options do I have for a visit other than an office visit? We offer electronic visits (e-visits) and telephone visits, when medically appropriate.  Please check with your medical insurance to  see if these types of visits are covered, as you will be responsible for any charges that are not paid by your insurance.      You can use [employer] (secure electronic communication) to access to your chart, send your primary care provider a message, or make an appointment. Ask a team member how to get started.     For a price quote for your services, please call our Consumer Price Line at [phone] or our Imaging Cost estimation line at [phone] (for imaging tests).      SARAH Zazueta

## 2018-08-01 NOTE — TELEPHONE ENCOUNTER
Reason for Call:  Form, our goal is to have forms completed with 72 hours, however, some forms may require a visit or additional information.    Type of letter, form or note:  medical    Who is the form from?: Patient    Where did the form come from: Patient or family brought in       What clinic location was the form placed at?: [facility]    Where the form was placed: 's Box    What number is listed as a contact on the form?: [phone]       Additional comments: none    Call taken on 8/1/2018 at 11:34 AM by Yocasta Hills

## 2018-08-02 NOTE — TELEPHONE ENCOUNTER
Surgery date change due to a vacation     New surgery date is 08/14/2018  New postop is 08/17/2018

## 2018-08-03 ENCOUNTER — MYC MEDICAL ADVICE (OUTPATIENT)
Dept: PODIATRY | Facility: CLINIC | Age: 33
End: 2018-08-03

## 2018-08-03 NOTE — TELEPHONE ENCOUNTER
Form completed for: Екатерина Ramon  What was done with form: Patient will  form at  [facility]  patient informed of forms    Carlita Handy CMA

## 2018-08-06 ENCOUNTER — MYC MEDICAL ADVICE (OUTPATIENT)
Dept: PODIATRY | Facility: CLINIC | Age: 33
End: 2018-08-06

## 2018-08-09 NOTE — TELEPHONE ENCOUNTER
[facility] has sent documentation stating items do not need authorization. No authorization is required for new date of services.

## 2018-08-10 ENCOUNTER — TELEPHONE (OUTPATIENT)
Dept: PODIATRY | Facility: CLINIC | Age: 33
End: 2018-08-10

## 2018-08-10 DIAGNOSIS — M21.619 BUNION: Primary | ICD-10-CM

## 2018-08-10 NOTE — TELEPHONE ENCOUNTER
Patient returning call. Please contact isac Hines Her  Patient Representative   [facility]

## 2018-08-10 NOTE — TELEPHONE ENCOUNTER
[employer](left mess to call)  Have paper work for her work, and needs a knee walker    Carlita Handy, CMA

## 2018-08-10 NOTE — TELEPHONE ENCOUNTER
Will fax pt's work FMLA papers for her. Will bring Knee walker Rx to her at the surgery Center. Pt will get scooter next week.    Carlita Handy, CMA

## 2018-08-13 ENCOUNTER — MYC MEDICAL ADVICE (OUTPATIENT)
Dept: PODIATRY | Facility: CLINIC | Age: 33
End: 2018-08-13

## 2018-08-13 ENCOUNTER — ANESTHESIA EVENT (OUTPATIENT)
Dept: SURGERY | Facility: AMBULATORY SURGERY CENTER | Age: 33
End: 2018-08-13

## 2018-08-14 ENCOUNTER — RADIANT APPOINTMENT (OUTPATIENT)
Dept: GENERAL RADIOLOGY | Facility: CLINIC | Age: 33
End: 2018-08-14
Attending: PODIATRIST
Payer: COMMERCIAL

## 2018-08-14 ENCOUNTER — ANESTHESIA (OUTPATIENT)
Dept: SURGERY | Facility: AMBULATORY SURGERY CENTER | Age: 33
End: 2018-08-14

## 2018-08-14 ENCOUNTER — SURGERY (OUTPATIENT)
Age: 33
End: 2018-08-14

## 2018-08-14 ENCOUNTER — HOSPITAL ENCOUNTER (OUTPATIENT)
Facility: AMBULATORY SURGERY CENTER | Age: 33
Discharge: HOME | End: 2018-08-14
Attending: PODIATRIST | Admitting: PODIATRIST
Payer: COMMERCIAL

## 2018-08-14 VITALS
OXYGEN SATURATION: 99 % | RESPIRATION RATE: 16 BRPM | TEMPERATURE: 97.8 F | DIASTOLIC BLOOD PRESSURE: 76 MMHG | SYSTOLIC BLOOD PRESSURE: 120 MMHG

## 2018-08-14 DIAGNOSIS — M21.619 BUNION: Primary | ICD-10-CM

## 2018-08-14 DIAGNOSIS — M79.671 RIGHT FOOT PAIN: ICD-10-CM

## 2018-08-14 LAB — BETA HCG QUAL IFA URINE: NEGATIVE

## 2018-08-14 PROCEDURE — 84703 CHORIONIC GONADOTROPIN ASSAY: CPT | Performed by: ANESTHESIOLOGY

## 2018-08-14 PROCEDURE — G8916 PT W IV AB GIVEN ON TIME: HCPCS

## 2018-08-14 PROCEDURE — 28292 COR HLX VLGS RSC PRX PHLX BS: CPT | Mod: 59 | Performed by: PODIATRIST

## 2018-08-14 PROCEDURE — G8907 PT DOC NO EVENTS ON DISCHARG: HCPCS

## 2018-08-14 PROCEDURE — 28740 FUSION OF FOOT BONES: CPT | Mod: RT | Performed by: PODIATRIST

## 2018-08-14 PROCEDURE — 28292 COR HLX VLGS RSC PRX PHLX BS: CPT | Mod: T5

## 2018-08-14 PROCEDURE — 28740 FUSION OF FOOT BONES: CPT | Mod: RT

## 2018-08-14 RX ORDER — PROPOFOL 10 MG/ML
INJECTION, EMULSION INTRAVENOUS CONTINUOUS PRN
Status: DISCONTINUED | OUTPATIENT
Start: 2018-08-14 | End: 2018-08-14

## 2018-08-14 RX ORDER — CEFAZOLIN SODIUM 2 G/100ML
2 INJECTION, SOLUTION INTRAVENOUS
Status: COMPLETED | OUTPATIENT
Start: 2018-08-14 | End: 2018-08-14

## 2018-08-14 RX ORDER — SODIUM CHLORIDE, SODIUM LACTATE, POTASSIUM CHLORIDE, CALCIUM CHLORIDE 600; 310; 30; 20 MG/100ML; MG/100ML; MG/100ML; MG/100ML
INJECTION, SOLUTION INTRAVENOUS CONTINUOUS
Status: DISCONTINUED | OUTPATIENT
Start: 2018-08-14 | End: 2018-08-15 | Stop reason: HOSPADM

## 2018-08-14 RX ORDER — CEFAZOLIN SODIUM 1 G/3ML
1 INJECTION, POWDER, FOR SOLUTION INTRAMUSCULAR; INTRAVENOUS SEE ADMIN INSTRUCTIONS
Status: DISCONTINUED | OUTPATIENT
Start: 2018-08-14 | End: 2018-08-15 | Stop reason: HOSPADM

## 2018-08-14 RX ORDER — ONDANSETRON 2 MG/ML
4 INJECTION INTRAMUSCULAR; INTRAVENOUS EVERY 30 MIN PRN
Status: DISCONTINUED | OUTPATIENT
Start: 2018-08-14 | End: 2018-08-15 | Stop reason: HOSPADM

## 2018-08-14 RX ORDER — NALOXONE HYDROCHLORIDE 0.4 MG/ML
.1-.4 INJECTION, SOLUTION INTRAMUSCULAR; INTRAVENOUS; SUBCUTANEOUS
Status: DISCONTINUED | OUTPATIENT
Start: 2018-08-14 | End: 2018-08-15 | Stop reason: HOSPADM

## 2018-08-14 RX ORDER — FENTANYL CITRATE 50 UG/ML
25-50 INJECTION, SOLUTION INTRAMUSCULAR; INTRAVENOUS
Status: DISCONTINUED | OUTPATIENT
Start: 2018-08-14 | End: 2018-08-15 | Stop reason: HOSPADM

## 2018-08-14 RX ORDER — DEXAMETHASONE SODIUM PHOSPHATE 4 MG/ML
INJECTION, SOLUTION INTRA-ARTICULAR; INTRALESIONAL; INTRAMUSCULAR; INTRAVENOUS; SOFT TISSUE PRN
Status: DISCONTINUED | OUTPATIENT
Start: 2018-08-14 | End: 2018-08-14

## 2018-08-14 RX ORDER — BUPIVACAINE HYDROCHLORIDE 5 MG/ML
INJECTION, SOLUTION PERINEURAL PRN
Status: DISCONTINUED | OUTPATIENT
Start: 2018-08-14 | End: 2018-08-14 | Stop reason: HOSPADM

## 2018-08-14 RX ORDER — LIDOCAINE 40 MG/G
CREAM TOPICAL
Status: DISCONTINUED | OUTPATIENT
Start: 2018-08-14 | End: 2018-08-15 | Stop reason: HOSPADM

## 2018-08-14 RX ORDER — HYDROMORPHONE HYDROCHLORIDE 1 MG/ML
.3-.5 INJECTION, SOLUTION INTRAMUSCULAR; INTRAVENOUS; SUBCUTANEOUS EVERY 10 MIN PRN
Status: DISCONTINUED | OUTPATIENT
Start: 2018-08-14 | End: 2018-08-15 | Stop reason: HOSPADM

## 2018-08-14 RX ORDER — HYDROCODONE BITARTRATE AND ACETAMINOPHEN 5; 325 MG/1; MG/1
1-2 TABLET ORAL EVERY 4 HOURS PRN
Qty: 30 TABLET | Refills: 0 | Status: SHIPPED | OUTPATIENT
Start: 2018-08-14 | End: 2018-12-14

## 2018-08-14 RX ORDER — KETOROLAC TROMETHAMINE 30 MG/ML
30 INJECTION, SOLUTION INTRAMUSCULAR; INTRAVENOUS EVERY 6 HOURS PRN
Status: DISCONTINUED | OUTPATIENT
Start: 2018-08-14 | End: 2018-08-15 | Stop reason: HOSPADM

## 2018-08-14 RX ORDER — OXYCODONE HCL 5 MG/5 ML
5-10 SOLUTION, ORAL ORAL EVERY 4 HOURS PRN
Status: DISCONTINUED | OUTPATIENT
Start: 2018-08-14 | End: 2018-08-15 | Stop reason: HOSPADM

## 2018-08-14 RX ORDER — DEXAMETHASONE SODIUM PHOSPHATE 4 MG/ML
4 INJECTION, SOLUTION INTRA-ARTICULAR; INTRALESIONAL; INTRAMUSCULAR; INTRAVENOUS; SOFT TISSUE EVERY 10 MIN PRN
Status: DISCONTINUED | OUTPATIENT
Start: 2018-08-14 | End: 2018-08-15 | Stop reason: HOSPADM

## 2018-08-14 RX ORDER — ALBUTEROL SULFATE 0.83 MG/ML
2.5 SOLUTION RESPIRATORY (INHALATION) EVERY 4 HOURS PRN
Status: DISCONTINUED | OUTPATIENT
Start: 2018-08-14 | End: 2018-08-15 | Stop reason: HOSPADM

## 2018-08-14 RX ORDER — ACETAMINOPHEN 325 MG/1
975 TABLET ORAL ONCE
Status: COMPLETED | OUTPATIENT
Start: 2018-08-14 | End: 2018-08-14

## 2018-08-14 RX ORDER — PROPOFOL 10 MG/ML
INJECTION, EMULSION INTRAVENOUS PRN
Status: DISCONTINUED | OUTPATIENT
Start: 2018-08-14 | End: 2018-08-14

## 2018-08-14 RX ORDER — FENTANYL CITRATE 50 UG/ML
INJECTION, SOLUTION INTRAMUSCULAR; INTRAVENOUS PRN
Status: DISCONTINUED | OUTPATIENT
Start: 2018-08-14 | End: 2018-08-14

## 2018-08-14 RX ORDER — GABAPENTIN 300 MG/1
300 CAPSULE ORAL ONCE
Status: COMPLETED | OUTPATIENT
Start: 2018-08-14 | End: 2018-08-14

## 2018-08-14 RX ORDER — HYDROXYZINE HYDROCHLORIDE 25 MG/1
25 TABLET, FILM COATED ORAL EVERY 4 HOURS PRN
Qty: 30 TABLET | Refills: 0 | Status: SHIPPED | OUTPATIENT
Start: 2018-08-14 | End: 2018-12-14

## 2018-08-14 RX ORDER — MEPERIDINE HYDROCHLORIDE 25 MG/ML
12.5 INJECTION INTRAMUSCULAR; INTRAVENOUS; SUBCUTANEOUS
Status: DISCONTINUED | OUTPATIENT
Start: 2018-08-14 | End: 2018-08-15 | Stop reason: HOSPADM

## 2018-08-14 RX ORDER — KETOROLAC TROMETHAMINE 30 MG/ML
INJECTION, SOLUTION INTRAMUSCULAR; INTRAVENOUS PRN
Status: DISCONTINUED | OUTPATIENT
Start: 2018-08-14 | End: 2018-08-14

## 2018-08-14 RX ORDER — ONDANSETRON 2 MG/ML
INJECTION INTRAMUSCULAR; INTRAVENOUS PRN
Status: DISCONTINUED | OUTPATIENT
Start: 2018-08-14 | End: 2018-08-14

## 2018-08-14 RX ORDER — ONDANSETRON 4 MG/1
4 TABLET, ORALLY DISINTEGRATING ORAL EVERY 30 MIN PRN
Status: DISCONTINUED | OUTPATIENT
Start: 2018-08-14 | End: 2018-08-15 | Stop reason: HOSPADM

## 2018-08-14 RX ADMIN — FENTANYL CITRATE 50 MCG: 50 INJECTION, SOLUTION INTRAMUSCULAR; INTRAVENOUS at 10:30

## 2018-08-14 RX ADMIN — GABAPENTIN 300 MG: 300 CAPSULE ORAL at 09:50

## 2018-08-14 RX ADMIN — SODIUM CHLORIDE, SODIUM LACTATE, POTASSIUM CHLORIDE, CALCIUM CHLORIDE: 600; 310; 30; 20 INJECTION, SOLUTION INTRAVENOUS at 10:37

## 2018-08-14 RX ADMIN — FENTANYL CITRATE 50 MCG: 50 INJECTION, SOLUTION INTRAMUSCULAR; INTRAVENOUS at 10:40

## 2018-08-14 RX ADMIN — SODIUM CHLORIDE, SODIUM LACTATE, POTASSIUM CHLORIDE, CALCIUM CHLORIDE: 600; 310; 30; 20 INJECTION, SOLUTION INTRAVENOUS at 10:05

## 2018-08-14 RX ADMIN — FENTANYL CITRATE 50 MCG: 50 INJECTION, SOLUTION INTRAMUSCULAR; INTRAVENOUS at 12:55

## 2018-08-14 RX ADMIN — ONDANSETRON 4 MG: 2 INJECTION INTRAMUSCULAR; INTRAVENOUS at 10:57

## 2018-08-14 RX ADMIN — PROPOFOL 150 MCG/KG/MIN: 10 INJECTION, EMULSION INTRAVENOUS at 10:41

## 2018-08-14 RX ADMIN — BUPIVACAINE HYDROCHLORIDE 26 ML: 5 INJECTION, SOLUTION PERINEURAL at 11:12

## 2018-08-14 RX ADMIN — FENTANYL CITRATE 50 MCG: 50 INJECTION, SOLUTION INTRAMUSCULAR; INTRAVENOUS at 12:30

## 2018-08-14 RX ADMIN — CEFAZOLIN SODIUM 2 G: 2 INJECTION, SOLUTION INTRAVENOUS at 10:37

## 2018-08-14 RX ADMIN — ACETAMINOPHEN 975 MG: 325 TABLET ORAL at 09:50

## 2018-08-14 RX ADMIN — KETOROLAC TROMETHAMINE 30 MG: 30 INJECTION, SOLUTION INTRAMUSCULAR; INTRAVENOUS at 11:59

## 2018-08-14 RX ADMIN — PROPOFOL 100 MG: 10 INJECTION, EMULSION INTRAVENOUS at 10:37

## 2018-08-14 RX ADMIN — CEFAZOLIN SODIUM 1 G: 1 INJECTION, POWDER, FOR SOLUTION INTRAMUSCULAR; INTRAVENOUS at 12:37

## 2018-08-14 RX ADMIN — SODIUM CHLORIDE, SODIUM LACTATE, POTASSIUM CHLORIDE, CALCIUM CHLORIDE: 600; 310; 30; 20 INJECTION, SOLUTION INTRAVENOUS at 11:35

## 2018-08-14 RX ADMIN — DEXAMETHASONE SODIUM PHOSPHATE 4 MG: 4 INJECTION, SOLUTION INTRA-ARTICULAR; INTRALESIONAL; INTRAMUSCULAR; INTRAVENOUS; SOFT TISSUE at 10:57

## 2018-08-14 NOTE — IP AVS SNAPSHOT
[facility]    [address] [place] [address]    Phone:  [phone]                                       After Visit Summary   8/14/2018    Екатерина Ramon    MRN: [id number]           After Visit Summary Signature Page     I have received my discharge instructions, and my questions have been answered. I have discussed any challenges I see with this plan with the nurse or doctor.    ..........................................................................................................................................  Patient/Patient Representative Signature      ..........................................................................................................................................  Patient Representative Print Name and Relationship to Patient    ..................................................               ................................................  Date                                            Time    ..........................................................................................................................................  Reviewed by Signature/Title    ...................................................              ..............................................  Date                                                            Time

## 2018-08-14 NOTE — ANESTHESIA POSTPROCEDURE EVALUATION
Patient: Екатерина Ramon    Procedure(s):  Right first tarsometatarsal joint fusion, right Jackson bunionectomy - Wound Class: I-Clean    Diagnosis:Right mid foot instability, right bunion  Diagnosis Additional Information: No value filed.    Anesthesia Type:  MAC    Note:  Anesthesia Post Evaluation    Patient location during evaluation: PACU  Patient participation: Able to fully participate in evaluation  Level of consciousness: awake  Pain management: adequate  Airway patency: patent  Cardiovascular status: acceptable  Respiratory status: acceptable  Hydration status: acceptable  PONV: none     Anesthetic complications: None    Comments: Stable recovery noted.        Last vitals:  Vitals:    08/14/18 0956 08/14/18 1407   BP: 128/71    Resp: 16 16   Temp: 98  F (36.7  C) 98  F (36.7  C)   SpO2: 98%          Electronically Signed By: Juan Jose Shen MD  August 14, 2018  2:25 PM

## 2018-08-14 NOTE — ANESTHESIA CARE TRANSFER NOTE
Patient: Екатерина Ramon    Procedure(s):  Right first tarsometatarsal joint fusion, right Jackson bunionectomy - Wound Class: I-Clean    Diagnosis: Right mid foot instability, right bunion  Diagnosis Additional Information: No value filed.    Anesthesia Type:   MAC     Note:  Airway :Room Air  Patient transferred to:Phase II  Comments: Care of Transfer report given to RN.  Spont Respirations. Responds   Easy.Handoff Report: Identifed the Patient, Identified the Reponsible Provider, Reviewed the pertinent medical history, Discussed the surgical course, Reviewed Intra-OP anesthesia mangement and issues during anesthesia, Set expectations for post-procedure period and Allowed opportunity for questions and acknowledgement of understanding      Vitals: (Last set prior to Anesthesia Care Transfer)    CRNA VITALS  8/14/2018 1334 - 8/14/2018 1410      8/14/2018             Pulse: 105    SpO2: 98 %                Electronically Signed By: ESTUARDO JEAN-BAPTISTE CRNA  August 14, 2018  2:10 PM

## 2018-08-14 NOTE — IP AVS SNAPSHOT
MRN:[id number]                      After Visit Summary   8/14/2018    Екатерина Ramon    MRN: [id number]           Thank you!     Thank you for choosing [facility] for your care. Our goal is always to provide you with excellent care. Hearing back from our patients is one way we can continue to improve our services. Please take a few minutes to complete the written survey that you may receive in the mail after you visit with us. Thank you!        Patient Information     Date Of Birth          1985        About your hospital stay     You were admitted on:  August 14, 2018 You last received care in the:  [facility]    You were discharged on:  August 14, 2018       Who to Call     For medical emergencies, please call 911.  For non-urgent questions about your medical care, please call your primary care provider or clinic, [phone]  For questions related to your surgery, please call your surgery clinic        Attending Provider     Provider Specialty    Zack Healy DPM Podiatry       Primary Care Provider Office Phone # Fax #    Anusha ESTUARDO Cain [phone]      After Care Instructions     Discharge Instructions       Review discharge instructions as directed by Provider.            Elevate affected extremity           Ice to affected area       Ice pack to affected area PRN (as needed).            No dressing change       until follow up clinic appointment.            No weight bearing                 Your next 10 appointments already scheduled     Aug 17, 2018  9:00 AM CDT   Return Visit with Zack Healy DPM   [facility]    [address] [place] [address] [id number] [phone]              Further instructions from your care team       [facility] [place]  Same-Day Surgery   Adult Discharge Orders & Instructions   For 24 hours after  surgery  1. Get plenty of rest.  A responsible adult must stay with you for at least 24 hours after you leave the hospital.   2. Do not drive or use heavy equipment.  If you have weakness or tingling, don't drive or use heavy equipment until this feeling goes away.  3. Do not drink alcohol.  4. Avoid strenuous or risky activities.  Ask for help when climbing stairs.   5. You may feel lightheaded.  IF so, sit for a few minutes before standing.  Have someone help you get up.   6. If you have nausea (feel sick to your stomach): Drink only clear liquids such as apple juice, ginger ale, broth or 7-Up.  Rest may also help.  Be sure to drink enough fluids.  Move to a regular diet as you feel able.  7. You may have a slight fever. Call the doctor if your fever is over 100 F (37.7 C) (taken under the tongue) or lasts longer than 24 hours.  8. You may have a dry mouth, a sore throat, muscle aches or trouble sleeping.  These should go away after 24 hours.  9. Do not make important or legal decisions.   Call your doctor for any of the followin.  Signs of infection (fever, growing tenderness at the surgery site, a large amount of drainage or bleeding, severe pain, foul-smelling drainage, redness, swelling).    2. It has been over 8 to 10 hours since surgery and you are still not able to urinate (pass water).    3.  Headache for over 24 hours.   Tylenol 975 mg given at 10 AM       Pending Results     No orders found from 2018 to 8/15/2018.            Admission Information     Date & Time Provider Department Dept. Phone    2018 Zack Healy, JOAN [facility] [phone]      Your Vitals Were     Blood Pressure Temperature Respirations Last Period Pulse Oximetry       113/66 98  F (36.7  C) (Temporal) 16 2018 98%       MyChart Information     [employer] gives you secure access to your electronic health record. If you see a primary care provider, you can also send messages to your care team and  make appointments. If you have questions, please call your primary care clinic.  If you do not have a primary care provider, please call [phone] and they will assist you.        Care EveryWhere ID     This is your Care EveryWhere ID. This could be used by other organizations to access your [place] medical records  [id number]        Equal Access to Services     CHRIS WALTERS : Hadii flori ku hadasho Soomaali, waaxda luqadaha, qaybta kaalmada adejeffrylathada, lasha marquesmaryisaiah lin . So [facility] [phone].    ATENCIÓN: Si habla español, tiene a ramirez disposición servicios gratuitos de asistencia lingüística. Llame al [phone].    We comply with applicable federal civil rights laws and [place] laws. We do not discriminate on the basis of race, color, national origin, age, disability, sex, sexual orientation, or gender identity.               Review of your medicines      UNREVIEWED medicines. Ask your doctor about these medicines        Dose / Directions    etonogestrel 68 MG Impl   Commonly known as:  IMPLANON/NEXPLANON        Dose:  1 each   1 each by Subdermal route once   Refills:  0       FLUoxetine 10 MG capsule   Commonly known as:  PROzac   Used for:  Anxiety        Dose:  30 mg   Take 3 capsules (30 mg) by mouth daily   Quantity:  90 capsule   Refills:  5       liraglutide 18 MG/3ML soln   Commonly known as:  VICTOZA   Used for:  Morbid obesity due to excess calories (H)        Dose:  1.8 mg   Inject 1.8 mg Subcutaneous daily   Quantity:  36 mL   Refills:  1       topiramate 25 MG tablet   Commonly known as:  TOPAMAX   Used for:  BMI 35.0-35.9,adult        Take 1 tablet (25 mg) at bedtime for 1 week, then 1 tablet twice daily for 1 week, then 1 tablet in AM and 2 in PM for 1 week, then 2 tablets twice daily.   Quantity:  70 tablet   Refills:  3       traMADol 50 MG tablet   Commonly known as:  ULTRAM   Used for:  Chronic midline low back pain without sciatica, Chronic bilateral thoracic back  pain        Dose:   mg   Take 1-2 tablets ( mg) by mouth every 6 hours as needed for pain Max of 4 tabs per day   Quantity:  20 tablet   Refills:  0       verapamil 120 MG 24 hr capsule   Commonly known as:  VERELAN   Used for:  Hypertension goal BP (blood pressure) < 140/90, Migraine with aura and without status migrainosus, not intractable        Dose:  120 mg   Take 1 capsule (120 mg) by mouth daily   Quantity:  90 capsule   Refills:  3         START taking        Dose / Directions    HYDROcodone-acetaminophen 5-325 MG per tablet   Commonly known as:  NORCO   Used for:  Bunion        Dose:  1-2 tablet   Take 1-2 tablets by mouth every 4 hours as needed for other (Moderate to Severe Pain)   Quantity:  30 tablet   Refills:  0       hydrOXYzine 25 MG tablet   Commonly known as:  ATARAX   Used for:  Bunion        Dose:  25 mg   Take 1 tablet (25 mg) by mouth every 4 hours as needed for itching (and nausea)   Quantity:  30 tablet   Refills:  0         CONTINUE these medicines which have NOT CHANGED        Dose / Directions    insulin pen needle 32G X 4 MM   Commonly known as:  BD TALHA U/F   Used for:  Morbid obesity due to excess calories (H)        Use once daily or as directed.   Quantity:  100 each   Refills:  3       order for DME   Used for:  Bunion        Equipment being ordered: Knee Walker  HECTOR 6 weeks   Quantity:  1 Device   Refills:  0            Where to get your medicines      These medications were sent to [facility] - [place], [place] - [address], [address]  [address], [address], [facility] [address]     Phone:  [phone]     hydrOXYzine 25 MG tablet         Some of these will need a paper prescription and others can be bought over the counter. Ask your nurse if you have questions.     Bring a paper prescription for each of these medications     HYDROcodone-acetaminophen 5-325 MG per tablet                Protect others around you: Learn how to  safely use, store and throw away your medicines at www.disposemymeds.org.        Information about OPIOIDS     PRESCRIPTION OPIOIDS: WHAT YOU NEED TO KNOW   We gave you an opioid (narcotic) pain medicine. It is important to manage your pain, but opioids are not always the best choice. You should first try all the other options your care team gave you. Take this medicine for as short a time (and as few doses) as possible.    Some activities can increase your pain, such as bandage changes or therapy sessions. It may help to take your pain medicine 30 to 60 minutes before these activities. Reduce your stress by getting enough sleep, working on hobbies you enjoy and practicing relaxation or meditation. Talk to your care team about ways to manage your pain beyond prescription opioids.    These medicines have risks:    DO NOT drive when on new or higher doses of pain medicine. These medicines can affect your alertness and reaction times, and you could be arrested for driving under the influence (DUI). If you need to use opioids long-term, talk to your care team about driving.    DO NOT operate heavy machinery    DO NOT do any other dangerous activities while taking these medicines.    DO NOT drink any alcohol while taking these medicines.     If the opioid prescribed includes acetaminophen, DO NOT take with any other medicines that contain acetaminophen. Read all labels carefully. Look for the word  acetaminophen  or  Tylenol.  Ask your pharmacist if you have questions or are unsure.    You can get addicted to pain medicines, especially if you have a history of addiction (chemical, alcohol or substance dependence). Talk to your care team about ways to reduce this risk.    All opioids tend to cause constipation. Drink plenty of water and eat foods that have a lot of fiber, such as fruits, vegetables, prune juice, apple juice and high-fiber cereal. Take a laxative (Miralax, milk of magnesia, Colace, Senna) if you don t move  your bowels at least every other day. Other side effects include upset stomach, sleepiness, dizziness, throwing up, tolerance (needing more of the medicine to have the same effect), physical dependence and slowed breathing.    Store your pills in a secure place, locked if possible. We will not replace any lost or stolen medicine. If you don t finish your medicine, please throw away (dispose) as directed by your pharmacist. The [place] [employer] has more information about safe disposal: https://www.pca.[facility].mn.us/living-green/managing-unwanted-medications             Medication List: This is a list of all your medications and when to take them. Check marks below indicate your daily home schedule. Keep this list as a reference.      Medications           Morning Afternoon Evening Bedtime As Needed    etonogestrel 68 MG Impl   Commonly known as:  IMPLANON/NEXPLANON   1 each by Subdermal route once                                FLUoxetine 10 MG capsule   Commonly known as:  PROzac   Take 3 capsules (30 mg) by mouth daily                                HYDROcodone-acetaminophen 5-325 MG per tablet   Commonly known as:  NORCO   Take 1-2 tablets by mouth every 4 hours as needed for other (Moderate to Severe Pain)                                hydrOXYzine 25 MG tablet   Commonly known as:  ATARAX   Take 1 tablet (25 mg) by mouth every 4 hours as needed for itching (and nausea)                                insulin pen needle 32G X 4 MM   Commonly known as:  BD TALHA U/F   Use once daily or as directed.                                liraglutide 18 MG/3ML soln   Commonly known as:  VICTOZA   Inject 1.8 mg Subcutaneous daily                                order for DME   Equipment being ordered: Knee Walker  HECTOR 6 weeks                                topiramate 25 MG tablet   Commonly known as:  TOPAMAX   Take 1 tablet (25 mg) at bedtime for 1 week, then 1 tablet twice daily for 1 week, then 1 tablet  in AM and 2 in PM for 1 week, then 2 tablets twice daily.                                traMADol 50 MG tablet   Commonly known as:  ULTRAM   Take 1-2 tablets ( mg) by mouth every 6 hours as needed for pain Max of 4 tabs per day                                verapamil 120 MG 24 hr capsule   Commonly known as:  VERELAN   Take 1 capsule (120 mg) by mouth daily

## 2018-08-14 NOTE — ANESTHESIA PREPROCEDURE EVALUATION
Anesthesia Evaluation     . Pt has had prior anesthetic. Type: General and MAC    No history of anesthetic complications          ROS/MED HX    ENT/Pulmonary:  - neg pulmonary ROS     Neurologic:     (+)migraines,     Cardiovascular:     (+) hypertension----. : . . . :. .       METS/Exercise Tolerance:     Hematologic:  - neg hematologic  ROS       Musculoskeletal:  - neg musculoskeletal ROS       GI/Hepatic:  - neg GI/hepatic ROS       Renal/Genitourinary:  - ROS Renal section negative       Endo:  - neg endo ROS       Psychiatric:  - neg psychiatric ROS       Infectious Disease:  - neg infectious disease ROS       Malignancy:      - no malignancy   Other:    (+) No chance of pregnancy   - neg other ROS                 Physical Exam  Normal systems: cardiovascular, pulmonary and dental    Airway   Mallampati: I  TM distance: >3 FB  Neck ROM: full    Dental     Cardiovascular       Pulmonary    breath sounds clear to auscultation                    Anesthesia Plan      History & Physical Review  History and physical reviewed and following examination; no interval change.    ASA Status:  1 .    NPO Status:  > 8 hours    Plan for MAC with Intravenous and Propofol induction. Maintenance will be TIVA.  Reason for MAC:  Deep or markedly invasive procedure (G8)  PONV prophylaxis:  Ondansetron (or other 5HT-3) and Dexamethasone or Solumedrol       Postoperative Care  Postoperative pain management:  Multi-modal analgesia.      Consents                          .

## 2018-08-14 NOTE — OP NOTE
Surgeon:  Dr. Zack Healy    Date of Surgery: 8/14/18    Preopp diagnosis:   Right midfoot instability  [id number] bunion    Postopp diagnosis:   same    Procedure:   Right first TMTJ fusion  Right Jackson bunionectomy    Anesthesia: mac    Specimens: none    Findings: none    EBL: 5cc    Zack Healy DPM, FACFAS

## 2018-08-17 ENCOUNTER — OFFICE VISIT (OUTPATIENT)
Dept: PODIATRY | Facility: CLINIC | Age: 33
End: 2018-08-17
Payer: COMMERCIAL

## 2018-08-17 VITALS — DIASTOLIC BLOOD PRESSURE: 84 MMHG | OXYGEN SATURATION: 100 % | SYSTOLIC BLOOD PRESSURE: 130 MMHG | HEART RATE: 90 BPM

## 2018-08-17 DIAGNOSIS — M21.619 BUNION: Primary | ICD-10-CM

## 2018-08-17 DIAGNOSIS — M25.376 INSTABILITY OF FOOT JOINT, UNSPECIFIED LATERALITY: ICD-10-CM

## 2018-08-17 PROCEDURE — 99024 POSTOP FOLLOW-UP VISIT: CPT | Performed by: PODIATRIST

## 2018-08-17 NOTE — LETTER
8/17/2018         RE: Екатерина Ramon  [address]  [place] [address]        Dear Colleague,    Thank you for referring your patient, Екатерина Ramon, to the [facility]. Please see a copy of my visit note below.    See dictation      Again, thank you for allowing me to participate in the care of your patient.        Sincerely,        Zack Healy DPM

## 2018-08-17 NOTE — MR AVS SNAPSHOT
After Visit Summary   8/17/2018    Екатерина Ramon    MRN: [id number]           Patient Information     Date Of Birth          1985        Visit Information        Provider Department      8/17/2018 9:00 AM Zack Healy DPM [facility]        Today's Diagnoses     Bunion    -  1    Instability of foot joint, unspecified laterality          Care Instructions    We wish you continued good healing. If you have any questions or concerns, please do not hesitate to contact us at [phone]    Please remember to call and schedule a follow up appointment if one was recommended at your earliest convenience.   PODIATRY CLINIC HOURS  TELEPHONE NUMBER    Dr. Zack Healy D.P.M FAC FAS    Clinics:  [facility]    [facility]   Tuesday 1PM-6PM  [place]  Wednesday 7AM-2PM  [place]  Thursday 10AM-6PM  [place]  Friday 7AM-3PM  [place]  Specialty schedulers:   [phone] to make an appointment with any Specialty Provider.        Urgent Care locations:    [facility] Monday-Friday 5 pm - 9 pm. Saturday-Sunday 9 am -5pm    Monday-Friday 11 am - 9 pm Saturday 9 am - 5 pm     Monday-Sunday 12 noon-8PM [phone]     If you need a medication refill, please contact us you may need lab work and/or a follow up visit prior to your refill (i.e. Antifungal medications).    [employer] (secure e-mail communication and access to your chart) to send a message or to make an appointment.    If MRI needed please call Jai Chandra at [phone]        Weight management plan: Patient was referred to their PCP to discuss a diet and exercise plan.            Follow-ups after your visit        Your next 10 appointments already scheduled     Aug 28, 2018  5:00 PM CDT   [facility]    [address]  [place] [id number] [phone]              Who to contact     If you have questions or need follow up information about today's clinic visit or your schedule please contact [facility] directly at [phone].  Normal or non-critical lab and imaging results will be communicated to you by MyChart, letter or phone within 4 business days after the clinic has received the results. If you do not hear from us within 7 days, please contact the clinic through MyChart or phone. If you have a critical or abnormal lab result, we will notify you by phone as soon as possible.  Submit refill requests through [employer] or call your pharmacy and they will forward the refill request to us. Please allow 3 business days for your refill to be completed.          Additional Information About Your Visit        MyChart Information     [employer] gives you secure access to your electronic health record. If you see a primary care provider, you can also send messages to your care team and make appointments. If you have questions, please call your primary care clinic.  If you do not have a primary care provider, please call [phone] and they will assist you.        Care EveryWhere ID     This is your Care EveryWhere ID. This could be used by other organizations to access your [place] medical records  [id number]        Your Vitals Were     Pulse Last Period Pulse Oximetry             90 08/04/2018 100%          Blood Pressure from Last 3 Encounters:   08/17/18 130/84   08/14/18 120/76   08/01/18 124/86    Weight from Last 3 Encounters:   08/01/18 197 lb 8 oz (89.6 kg)   08/01/18 197 lb 8 oz (89.6 kg)   06/19/18 204 lb (92.5 kg)              Today, you had the following     No orders found for display       Primary Care Provider Office Phone # Fax #    ESTUARDO Jain [phone] [address]  [facility] [address]        Equal  Access to Services     [facility]: Hadii aad ku hadtaylormohit Pieroali, wafabiánda luqadaha, qaybta kaalmaalisson niño, lasha payne. So [facility] [phone].    ATENCIÓN: Si habla español, tiene a ramirez disposición servicios gratuitos de asistencia lingüística. Llame al [phone].    We comply with applicable federal civil rights laws and [place] laws. We do not discriminate on the basis of race, color, national origin, age, disability, sex, sexual orientation, or gender identity.            Thank you!     Thank you for choosing [facility]  for your care. Our goal is always to provide you with excellent care. Hearing back from our patients is one way we can continue to improve our services. Please take a few minutes to complete the written survey that you may receive in the mail after your visit with us. Thank you!             Your Updated Medication List - Protect others around you: Learn how to safely use, store and throw away your medicines at www.disposemymeds.org.          This list is accurate as of 8/17/18  9:46 AM.  Always use your most recent med list.                   Brand Name Dispense Instructions for use Diagnosis    etonogestrel 68 MG Impl    IMPLANON/NEXPLANON     1 each by Subdermal route once        FLUoxetine 10 MG capsule    PROzac    90 capsule    Take 3 capsules (30 mg) by mouth daily    Anxiety       HYDROcodone-acetaminophen 5-325 MG per tablet    NORCO    30 tablet    Take 1-2 tablets by mouth every 4 hours as needed for other (Moderate to Severe Pain)    Bunion       hydrOXYzine 25 MG tablet    ATARAX    30 tablet    Take 1 tablet (25 mg) by mouth every 4 hours as needed for itching (and nausea)    Bunion       insulin pen needle 32G X 4 MM    BD TALHA U/F    100 each    Use once daily or as directed.    Morbid obesity due to excess calories (H)       liraglutide 18 MG/3ML soln    VICTOZA    36 mL    Inject 1.8 mg Subcutaneous daily    Morbid  obesity due to excess calories (H)       order for DME     1 Device    Equipment being ordered: Knee Walker  HECTOR 6 weeks    Bunion       topiramate 25 MG tablet    TOPAMAX    70 tablet    Take 1 tablet (25 mg) at bedtime for 1 week, then 1 tablet twice daily for 1 week, then 1 tablet in AM and 2 in PM for 1 week, then 2 tablets twice daily.    BMI 35.0-35.9,adult       traMADol 50 MG tablet    ULTRAM    20 tablet    Take 1-2 tablets ( mg) by mouth every 6 hours as needed for pain Max of 4 tabs per day    Chronic midline low back pain without sciatica, Chronic bilateral thoracic back pain       verapamil 120 MG 24 hr capsule    VERELAN    90 capsule    Take 1 capsule (120 mg) by mouth daily    Hypertension goal BP (blood pressure) < 140/90, Migraine with aura and without status migrainosus, not intractable

## 2018-08-17 NOTE — PATIENT INSTRUCTIONS
We wish you continued good healing. If you have any questions or concerns, please do not hesitate to contact us at [phone]    Please remember to call and schedule a follow up appointment if one was recommended at your earliest convenience.   PODIATRY CLINIC HOURS  TELEPHONE NUMBER    Dr. Zack Healy D.P.M [facility]    Clinics:  [facility]    [facility]   Tuesday 1PM-6PM  [place]  Wednesday 7AM-2PM  [facility]  Thursday 10AM-6PM  [place]  Friday 7AM-3PM  [place]  Specialty schedulers:   [phone] to make an appointment with any Specialty Provider.        Urgent Care locations:    [facility] Monday-Friday 5 pm - 9 pm. Saturday-Sunday 9 am -5pm    Monday-Friday 11 am - 9 pm Saturday 9 am - 5 pm     Monday-Sunday 12 noon-8PM [phone]     If you need a medication refill, please contact us you may need lab work and/or a follow up visit prior to your refill (i.e. Antifungal medications).    [employer] (secure e-mail communication and access to your chart) to send a message or to make an appointment.    If MRI needed please call [facility] Chandra at [phone]        Weight management plan: Patient was referred to their PCP to discuss a diet and exercise plan.

## 2018-08-17 NOTE — PROGRESS NOTES
Visit Date:   2018      HISTORY OF PRESENT ILLNESS:  This patient is 3 days postop right first tarsometatarsal joint fusion, right Jackson bunionectomy.  She has been doing well.  She says she has only taken 3 pain pills.  She has been keeping it elevated.  She has been nonweightbearing on this.  Otherwise, she has been feeling good and has no new complaints.      PHYSICAL EXAMINATION:  Her incision is dry with no drainage.  There is very minimal bleeding on the dressing.  Her bunion is nicely reduced.  There is no more postoperative edema and ecchymosis.  There is no erythema.  She has no calf pain.  Hallux and good alignment.     ASSESSMENT:  Postop right first ray surgery.      PLAN:  We put a new dressing on her foot.  She will start range of motion of her right first MTPJ, and we instructed her on how to do this.  She will continue to be nonweightbearing at all times.  We gave her a plantar flexed CAM walker just to protect her foot.  She will move her ankle around.  We discussed the signs of infection.  If she notices any of these, she will call us right away.  We will have her return to clinic in 11 days for an x-ray and suture removal.         RACHANA PLATA DPM             D: 2018   T: 2018   MT: BHAVANI      Name:     MED MELÉNDEZ   MRN:      [id number]        Account:      [id number]   :      1985           Visit Date:   2018      Document: [id number]

## 2018-08-17 NOTE — OP NOTE
Procedure Date: 08/14/2018      PREOPERATIVE DIAGNOSES:   1.  Right midfoot instability.   2.  Right bunion.      POSTOPERATIVE DIAGNOSES:     1.  Right midfoot instability.   2.  Right bunion.      PROCEDURES:   1.  Right first tarsometatarsal joint fusion.   2.  Right Jackson bunionectomy.      ANESTHESIA:  MAC.      HEMOSTASIS:  Pneumatic ankle tourniquet at 250 mmHg.      INDICATIONS:  This patient has right foot deformities she would like to have surgically corrected.  She understands complications include continued pain, return of deformity, infection and numbness.      DESCRIPTION OF PROCEDURE:  The patient was brought to the operating table in a supine position.  She was given sedation by the anesthesiologist, and a right medial foot block was done.  The foot was then prepped and draped in the normal sterile manner.  Pneumatic ankle tourniquet was placed around the ankle with copious amounts of Webril padding.  The foot was then elevated for complete exsanguination.  The tourniquet was inflated.  The foot was brought on the operating table, where the following procedure was performed:  At this time, an incision was made on the dorsum of the right first tarsometatarsal joint.  This was brought down to the deep fascia utilizing blunt and sharp dissection techniques.  All neurovascular structures that were encountered were either Bovie tied or retracted to the side.  We dissected down to this joint, pulling the extensor hallucis longus tendon out of the way.  All soft tissue was removed off this joint and we resected this in toto.  We ensured that this was in the corrected position.  We confirmed this with fluoroscan.  Once good position was achieved, we did subchondral drilling to aid in bone healing.  We then fixated this with two 3.5 mm cortical screws from dorsal distal to proximal plantar and proximal dorsal to distal plantar, utilizing standard AO fixation techniques.  Placement was confirmed by  fluoroscan.  We then noted the bunion was still present.  We elected to do a Jackson bunionectomy.  We made an incision over the medial portion of the first MTPJ.  This was brought down to the deep fascia utilizing blunt and sharp dissection techniques.  All neurovascular structures that were encountered were either Bovie tied or retracted to the side.  At this time, we released the first interspace, only cutting the ligament.  With doing this, the sesamoids are in good position.  We then incised the periosteum over the first MTPJ and reflected this off medially and dorsally.  The medial bump was removed with a saw.  All sharp bony prominences were burred smooth.  We then flushed the wound.  We loaded the foot.  Excellent reduction of all deformities was noted.  Final pictures were taken with the fluoroscan.  Standard layered closure was done at this time.  We dressed this with Adaptic, 4 x 4s, Vidal, Kerlix and Coban.  The tourniquet was deflated.  All digits were noted to show preoperative levels of perfusion.      COMPLICATIONS:  None.      ESTIMATED BLOOD LOSS:  5 mL        The patient tolerated the procedure and anesthesia well.  She has then wheeled from the operating room to the recovery room with vital signs stable and an intact sterile dressing.         RACHANA PLATA DPM             D: 2018   T: 2018   MT: TRUNG      Name:     MED MELÉNDEZ   MRN:      [id number]        Account:        [id number]   :      1985           Procedure Date: 2018      Document: [id number]

## 2018-08-28 ENCOUNTER — RADIANT APPOINTMENT (OUTPATIENT)
Dept: GENERAL RADIOLOGY | Facility: CLINIC | Age: 33
End: 2018-08-28
Attending: PODIATRIST
Payer: COMMERCIAL

## 2018-08-28 ENCOUNTER — OFFICE VISIT (OUTPATIENT)
Dept: PODIATRY | Facility: CLINIC | Age: 33
End: 2018-08-28
Payer: COMMERCIAL

## 2018-08-28 VITALS — DIASTOLIC BLOOD PRESSURE: 86 MMHG | HEART RATE: 99 BPM | SYSTOLIC BLOOD PRESSURE: 126 MMHG | OXYGEN SATURATION: 100 %

## 2018-08-28 DIAGNOSIS — M21.619 BUNION: Primary | ICD-10-CM

## 2018-08-28 DIAGNOSIS — M25.376 INSTABILITY OF FOOT JOINT, UNSPECIFIED LATERALITY: ICD-10-CM

## 2018-08-28 DIAGNOSIS — M21.619 BUNION: ICD-10-CM

## 2018-08-28 PROCEDURE — 99024 POSTOP FOLLOW-UP VISIT: CPT | Performed by: PODIATRIST

## 2018-08-28 PROCEDURE — 73630 X-RAY EXAM OF FOOT: CPT | Mod: RT

## 2018-08-28 ASSESSMENT — PAIN SCALES - GENERAL: PAINLEVEL: NO PAIN (0)

## 2018-08-28 NOTE — PROGRESS NOTES
Visit Date:   08/17/2018      HISTORY OF PRESENT ILLNESS:  This patient is 14 days postop right first tarsometatarsal joint fusion, right Jackson bunionectomy done on August 14, 2018.  She has been doing well.  She is no longer taking any pain pills.  She has been keeping it elevated.  She has been nonweightbearing on this.  Has been doing range of motion.  Otherwise, she has been feeling good and has no new complaints.      PHYSICAL EXAMINATION:  Her incision is dry with no drainage or dehiscence with suture removal.    Her bunion is nicely reduced.  There is normal postoperative edema.  There is no erythema or ecchymosis.  She has no calf pain.  Hallux and good alignment.  Range of motion is 60-70% normal.  X-rays show the fusion site tight with the hardware in place and the joint well aligned     ASSESSMENT:  Postop right first ray surgery.      PLAN: Sutures removed.  Patient will continue range of motion.  Will call for physical therapy if she needs any help with this.  X-rays taken of her foot.  Gave patient 2 week warning.  Discussed importance of nonweightbearing and protecting her foot.  She will now be able to work out of her home but nonweightbearing at all times.  Return to the clinic in 4 weeks for x-rays.          RACHANA PLATA DPM

## 2018-08-28 NOTE — LETTER
[address]  [place] [address]  Phone: [phone]    August 28, 2018        Екатерина Ramon  [address]  [facility] [address]          To whom it may concern:    RE: Екатерина Ramon    Patient was seen and treated today at our clinic.  Patient can work from home for the next 4 weeks. She has an appointment in 4 weeks for a recheck.    Please contact me for questions or concerns.      Sincerely,        Zack Healy DPM

## 2018-08-28 NOTE — MR AVS SNAPSHOT
After Visit Summary   8/28/2018    Екатерина Ramon    MRN: [id number]           Patient Information     Date Of Birth          1985        Visit Information        Provider Department      8/28/2018 5:00 PM Zack Healy DPM [facility]        Today's Diagnoses     Bunion    -  1    Instability of foot joint, unspecified laterality          Care Instructions    Weight management plan: Patient was referred to their PCP to discuss a diet and exercise plan.            Follow-ups after your visit        Your next 10 appointments already scheduled     Sep 25, 2018  5:00 PM CDT   Return Visit with Zack Healy DPM   [facility] ([facility])    [address] [facility] [address] [phone]              Who to contact     If you have questions or need follow up information about today's clinic visit or your schedule please contact [facility] directly at [phone].  Normal or non-critical lab and imaging results will be communicated to you by Carticipatehart, letter or phone within 4 business days after the clinic has received the results. If you do not hear from us within 7 days, please contact the clinic through Fo[employer] or phone. If you have a critical or abnormal lab result, we will notify you by phone as soon as possible.  Submit refill requests through [employer] or call your pharmacy and they will forward the refill request to us. Please allow 3 business days for your refill to be completed.          Additional Information About Your Visit        MyChart Information     [employer] gives you secure access to your electronic health record. If you see a primary care provider, you can also send messages to your care team and make appointments. If you have questions, please call your primary care clinic.  If you do not have a primary care provider, please call [phone] and they will assist you.        Care EveryWhere ID     This is your  Care EveryWhere ID. This could be used by other organizations to access your [facility] medical records  [id number]        Your Vitals Were     Pulse Last Period Pulse Oximetry             99 08/04/2018 100%          Blood Pressure from Last 3 Encounters:   08/28/18 126/86   08/17/18 130/84   08/14/18 120/76    Weight from Last 3 Encounters:   08/01/18 197 lb 8 oz (89.6 kg)   08/01/18 197 lb 8 oz (89.6 kg)   06/19/18 204 lb (92.5 kg)               Primary Care Provider Office Phone # Fax #    Anusha Makayla Wolff, APRN [phone]       [address] [facility] [address]        Equal Access to Services     CHRIS WALTERS : Hadii flori whitney hadasho Soomaali, waaxda luqadaha, qaybta kaalmada adeegyada, waxay idiin haygavino lin . So [facility] [phone].    ATENCIÓN: Si habla español, tiene a ramirez disposición servicios gratuitos de asistencia lingüística. Llame al [phone].    We comply with applicable federal civil rights laws and [place] laws. We do not discriminate on the basis of race, color, national origin, age, disability, sex, sexual orientation, or gender identity.            Thank you!     Thank you for choosing [facility]  for your care. Our goal is always to provide you with excellent care. Hearing back from our patients is one way we can continue to improve our services. Please take a few minutes to complete the written survey that you may receive in the mail after your visit with us. Thank you!             Your Updated Medication List - Protect others around you: Learn how to safely use, store and throw away your medicines at www.disposemymeds.org.          This list is accurate as of 8/28/18  6:00 PM.  Always use your most recent med list.                   Brand Name Dispense Instructions for use Diagnosis    etonogestrel 68 MG Impl    IMPLANON/NEXPLANON     1 each by Subdermal route once        FLUoxetine 10 MG capsule    PROzac    90 capsule    Take 3  capsules (30 mg) by mouth daily    Anxiety       HYDROcodone-acetaminophen 5-325 MG per tablet    NORCO    30 tablet    Take 1-2 tablets by mouth every 4 hours as needed for other (Moderate to Severe Pain)    Bunion       hydrOXYzine 25 MG tablet    ATARAX    30 tablet    Take 1 tablet (25 mg) by mouth every 4 hours as needed for itching (and nausea)    Bunion       insulin pen needle 32G X 4 MM    BD TALHA U/F    100 each    Use once daily or as directed.    Morbid obesity due to excess calories (H)       liraglutide 18 MG/3ML soln    VICTOZA    36 mL    Inject 1.8 mg Subcutaneous daily    Morbid obesity due to excess calories (H)       order for DME     1 Device    Equipment being ordered: Knee Walker  HECTOR 6 weeks    Bunion       topiramate 25 MG tablet    TOPAMAX    70 tablet    Take 1 tablet (25 mg) at bedtime for 1 week, then 1 tablet twice daily for 1 week, then 1 tablet in AM and 2 in PM for 1 week, then 2 tablets twice daily.    BMI 35.0-35.9,adult       traMADol 50 MG tablet    ULTRAM    20 tablet    Take 1-2 tablets ( mg) by mouth every 6 hours as needed for pain Max of 4 tabs per day    Chronic midline low back pain without sciatica, Chronic bilateral thoracic back pain       verapamil 120 MG 24 hr capsule    VERELAN    90 capsule    Take 1 capsule (120 mg) by mouth daily    Hypertension goal BP (blood pressure) < 140/90, Migraine with aura and without status migrainosus, not intractable

## 2018-08-28 NOTE — LETTER
8/28/2018         RE: Екатерина Ramon  [address]  [place] [address]        Dear Colleague,    Thank you for referring your patient, Екатерина Ramon, to the [facility]. Please see a copy of my visit note below.    Visit Date:   08/17/2018      HISTORY OF PRESENT ILLNESS:  This patient is 14 days postop right first tarsometatarsal joint fusion, right Jackson bunionectomy done on August 14, 2018.  She has been doing well.  She is no longer taking any pain pills.  She has been keeping it elevated.  She has been nonweightbearing on this.  Has been doing range of motion.  Otherwise, she has been feeling good and has no new complaints.      PHYSICAL EXAMINATION:  Her incision is dry with no drainage or dehiscence with suture removal.    Her bunion is nicely reduced.  There is normal postoperative edema.  There is no erythema or ecchymosis.  She has no calf pain.  Hallux and good alignment.  Range of motion is 60-70% normal.  X-rays show the fusion site tight with the hardware in place and the joint well aligned     ASSESSMENT:  Postop right first ray surgery.      PLAN: Sutures removed.  Patient will continue range of motion.  Will call for physical therapy if she needs any help with this.  X-rays taken of her foot.  Gave patient 2 week warning.  Discussed importance of nonweightbearing and protecting her foot.  She will now be able to work out of her home but nonweightbearing at all times.  Return to the clinic in 4 weeks for x-rays.          RACHANA HEALY DPM                   Again, thank you for allowing me to participate in the care of your patient.        Sincerely,        Rachana Healy DPM

## 2018-09-12 ENCOUNTER — MYC MEDICAL ADVICE (OUTPATIENT)
Dept: PODIATRY | Facility: CLINIC | Age: 33
End: 2018-09-12

## 2018-09-25 ENCOUNTER — OFFICE VISIT (OUTPATIENT)
Dept: PODIATRY | Facility: CLINIC | Age: 33
End: 2018-09-25
Payer: COMMERCIAL

## 2018-09-25 ENCOUNTER — RADIANT APPOINTMENT (OUTPATIENT)
Dept: GENERAL RADIOLOGY | Facility: CLINIC | Age: 33
End: 2018-09-25
Attending: PODIATRIST
Payer: COMMERCIAL

## 2018-09-25 VITALS — SYSTOLIC BLOOD PRESSURE: 124 MMHG | HEART RATE: 84 BPM | DIASTOLIC BLOOD PRESSURE: 82 MMHG

## 2018-09-25 DIAGNOSIS — M21.619 BUNION: ICD-10-CM

## 2018-09-25 DIAGNOSIS — M25.376 INSTABILITY OF FOOT JOINT, UNSPECIFIED LATERALITY: Primary | ICD-10-CM

## 2018-09-25 DIAGNOSIS — M25.376 INSTABILITY OF FOOT JOINT, UNSPECIFIED LATERALITY: ICD-10-CM

## 2018-09-25 PROCEDURE — 73630 X-RAY EXAM OF FOOT: CPT | Mod: RT

## 2018-09-25 PROCEDURE — 99024 POSTOP FOLLOW-UP VISIT: CPT | Performed by: PODIATRIST

## 2018-09-25 NOTE — PATIENT INSTRUCTIONS
We wish you continued good healing. If you have any questions or concerns, please do not hesitate to contact us at [phone]    Please remember to call and schedule a follow up appointment if one was recommended at your earliest convenience.   PODIATRY CLINIC HOURS  TELEPHONE NUMBER    Dr. Zack Healy D.P.M [facility]    Clinics:  [facility]    [facility]   Tuesday 1PM-6PM  [place]  Wednesday 7AM-2PM  [facility]  Thursday 10AM-6PM  [place]  Friday 7AM-3PM  [place]  Specialty schedulers:   [phone] to make an appointment with any Specialty Provider.        Urgent Care locations:    [facility] Monday-Friday 5 pm - 9 pm. Saturday-Sunday 9 am -5pm    Monday-Friday 11 am - 9 pm Saturday 9 am - 5 pm     Monday-Sunday 12 noon-8PM [phone]     If you need a medication refill, please contact us you may need lab work and/or a follow up visit prior to your refill (i.e. Antifungal medications).    [employer] (secure e-mail communication and access to your chart) to send a message or to make an appointment.    If MRI needed please call [facility] at [phone]        Weight management plan: Patient was referred to their PCP to discuss a diet and exercise plan.

## 2018-09-25 NOTE — LETTER
9/25/2018         RE: Екатерина Ramon  [address]  [place] [address]        Dear Colleague,    Thank you for referring your patient, Екатерина Ramon, to the [facility]. Please see a copy of my visit note below.    Visit Date:   08/17/2018      HISTORY OF PRESENT ILLNESS:  This patient is 6 weeks postop right first tarsometatarsal joint fusion, right Jackson bunionectomy done on August 14, 2018.  She has been doing well.  She is no longer taking any pain pills.  She has been keeping it elevated.  She has been nonweightbearing on this.  Has been doing range of motion.  Otherwise, she has been feeling good and has no new complaints.      PHYSICAL EXAMINATION:  Her incision is well healed.    Her bunion is nicely reduced.  There is normal postoperative edema.  There is no erythema or ecchymosis.  She has no calf pain.  Hallux and good alignment.  Range of motion is 60-70% normal, especially dorsiflexion.  X-rays show the fusion site tight with the hardware in place and the joint well aligned.  No change since last x-ray     ASSESSMENT:  Postop right first ray surgery.      PLAN:  Patient will continue range of motion.  Will refer to physical therapy to help with this.  X-rays taken of her foot.  Patient will start tomorrow walking in cam walker.  If there is no pain or edema then will slowly increase time in cam walker 1-2 hours per day until walking all day.  If patient has pain or edema back to NWB for 5 days and then will try again.   Return to the clinic in 3 weeks for x-rays.          RACHANA HEALY DPM                   Again, thank you for allowing me to participate in the care of your patient.        Sincerely,        Rachana Healy DPM

## 2018-09-25 NOTE — PROGRESS NOTES
Visit Date:   08/17/2018      HISTORY OF PRESENT ILLNESS:  This patient is 6 weeks postop right first tarsometatarsal joint fusion, right Jackson bunionectomy done on August 14, 2018.  She has been doing well.  She is no longer taking any pain pills.  She has been keeping it elevated.  She has been nonweightbearing on this.  Has been doing range of motion.  Otherwise, she has been feeling good and has no new complaints.      PHYSICAL EXAMINATION:  Her incision is well healed.    Her bunion is nicely reduced.  There is normal postoperative edema.  There is no erythema or ecchymosis.  She has no calf pain.  Hallux and good alignment.  Range of motion is 60-70% normal, especially dorsiflexion.  X-rays show the fusion site tight with the hardware in place and the joint well aligned.  No change since last x-ray     ASSESSMENT:  Postop right first ray surgery.      PLAN:  Patient will continue range of motion.  Will refer to physical therapy to help with this.  X-rays taken of her foot.  Patient will start tomorrow walking in cam walker.  If there is no pain or edema then will slowly increase time in cam walker 1-2 hours per day until walking all day.  If patient has pain or edema back to NWB for 5 days and then will try again.   Return to the clinic in 3 weeks for x-rays.          RACHANA PLATA DPM

## 2018-09-25 NOTE — LETTER
[address]  [place] [address]  Phone: [phone]    September 25, 2018        Екатерина Ramon  [address] [facility] [address]          To whom it may concern:    RE: Екатерина Ramon    Patient was seen and treated today at our clinic and missed work.    When the patient returns to work, the following restrictions apply until 10/1/18-10/21/18:      SEATED WORK ONLY    Please contact me for questions or concerns.      Sincerely,        Dr. Zack Healy D.P.M [facility]

## 2018-09-25 NOTE — MR AVS SNAPSHOT
After Visit Summary   9/25/2018    Екатерина Ramon    MRN: [id number]           Patient Information     Date Of Birth          1985        Visit Information        Provider Department      9/25/2018 5:00 PM Zack Healy, DPM [facility]        Today's Diagnoses     Instability of foot joint, unspecified laterality    -  1    Bunion          Care Instructions    We wish you continued good healing. If you have any questions or concerns, please do not hesitate to contact us at [phone]    Please remember to call and schedule a follow up appointment if one was recommended at your earliest convenience.   PODIATRY CLINIC HOURS  TELEPHONE NUMBER    Dr. Zack IRENEPPEMA FAC FAS    Clinics:  [facility]    Carlita Handy [facility]   Tuesday 1PM-6PM  [place]  Wednesday 7AM-2PM  [facility]  Thursday 10AM-6PM  [place]  Friday 7AM-3PM  [place]  Specialty schedulers:   [phone] to make an appointment with any Specialty Provider.        Urgent Care locations:    [facility] Monday-Friday 5 pm - 9 pm. Saturday-Sunday 9 am -5pm    Monday-Friday 11 am - 9 pm Saturday 9 am - 5 pm     Monday-Sunday 12 noon-8PM [phone]     If you need a medication refill, please contact us you may need lab work and/or a follow up visit prior to your refill (i.e. Antifungal medications).    [employer] (secure e-mail communication and access to your chart) to send a message or to make an appointment.    If MRI needed please call [facility] at [phone]        Weight management plan: Patient was referred to their PCP to discuss a diet and exercise plan.               Follow-ups after your visit        Additional Services     [facility] PT, HAND, AND CHIROPRACTIC REFERRAL       Physical Therapy, Hand Therapy and Chiropractic Care are available through:  *[facility]  *Hand Therapy (Occupational Therapy or Physical Therapy)  *[facility]    Call one number to schedule at any of the above locations: [phone].    Physical therapy, Hand therapy and/or Chiropractic care has been recommended by your physician as an excellent treatment option to reduce pain and help people return to normal activities, including sports.  Therapy and/or chiropractic care services are a great complement or alternative to expensive and invasive surgery, injections, or long-term use of prescription medications. The primary goal is to identify the underlying problem and provide you the tools to manage your condition on your own.     Please be aware that coverage of these services is subject to the terms and limitations of your health insurance plan.  Call member services at your health plan with any benefit or coverage questions.      Please bring the following to your appointment:  *Your personal calendar for scheduling future appointments  *Comfortable clothing                  Future tests that were ordered for you today     Open Future Orders        Priority Expected Expires Ordered    AIDE PT, HAND, AND CHIROPRACTIC REFERRAL Routine  9/25/2019 9/25/2018            Who to contact     If you have questions or need follow up information about today's clinic visit or your schedule please contact [facility] directly at [phone].  Normal or non-critical lab and imaging results will be communicated to you by MyChart, letter or phone within 4 business days after the clinic has received the results. If you do not hear from us within 7 days, please contact the clinic through MyChart or phone. If you have a critical or abnormal lab result, we will notify you by phone as soon as possible.  Submit refill requests through [email or web address] or call your pharmacy and they will forward the refill request to us. Please allow 3 business days for your refill to be  completed.          Additional Information About Your Visit        D[employer] Information     [employer] gives you secure access to your electronic health record. If you see a primary care provider, you can also send messages to your care team and make appointments. If you have questions, please call your primary care clinic.  If you do not have a primary care provider, please call [phone] and they will assist you.        Care EveryWhere ID     This is your Care EveryWhere ID. This could be used by other organizations to access your [facility] medical records  [id number]        Your Vitals Were     Pulse                   84            Blood Pressure from Last 3 Encounters:   09/25/18 124/82   08/28/18 126/86   08/17/18 130/84    Weight from Last 3 Encounters:   08/01/18 197 lb 8 oz (89.6 kg)   08/01/18 197 lb 8 oz (89.6 kg)   06/19/18 204 lb (92.5 kg)               Primary Care Provider Office Phone # Fax #    Anusha Makayla Wolff, ESTUARDO [phone]       [address] [facility] [address]        Equal Access to Services     [facility]: Hadii aad ku hadasho Soomaali, waaxda luqadaha, qaybta kaalmada adeegyada, lasha lin . So [facility] [phone].    ATENCIÓN: Si habla español, tiene a ramirez disposición servicios gratuitos de asistencia lingüística. Karina al [phone].    We comply with applicable federal civil rights laws and [place] laws. We do not discriminate on the basis of race, color, national origin, age, disability, sex, sexual orientation, or gender identity.            Thank you!     Thank you for choosing [facility]  for your care. Our goal is always to provide you with excellent care. Hearing back from our patients is one way we can continue to improve our services. Please take a few minutes to complete the written survey that you may receive in the mail after your visit with us. Thank you!             Your Updated Medication List -  Protect others around you: Learn how to safely use, store and throw away your medicines at www.disposemymeds.org.          This list is accurate as of 9/25/18  5:33 PM.  Always use your most recent med list.                   Brand Name Dispense Instructions for use Diagnosis    etonogestrel 68 MG Impl    IMPLANON/NEXPLANON     1 each by Subdermal route once        FLUoxetine 10 MG capsule    PROzac    90 capsule    Take 3 capsules (30 mg) by mouth daily    Anxiety       HYDROcodone-acetaminophen 5-325 MG per tablet    NORCO    30 tablet    Take 1-2 tablets by mouth every 4 hours as needed for other (Moderate to Severe Pain)    Bunion       hydrOXYzine 25 MG tablet    ATARAX    30 tablet    Take 1 tablet (25 mg) by mouth every 4 hours as needed for itching (and nausea)    Bunion       insulin pen needle 32G X 4 MM    BD TALHA U/F    100 each    Use once daily or as directed.    Morbid obesity due to excess calories (H)       liraglutide 18 MG/3ML soln    VICTOZA    36 mL    Inject 1.8 mg Subcutaneous daily    Morbid obesity due to excess calories (H)       order for DME     1 Device    Equipment being ordered: Knee Walker  HECTOR 6 weeks    Bunion       topiramate 25 MG tablet    TOPAMAX    70 tablet    Take 1 tablet (25 mg) at bedtime for 1 week, then 1 tablet twice daily for 1 week, then 1 tablet in AM and 2 in PM for 1 week, then 2 tablets twice daily.    BMI 35.0-35.9,adult       traMADol 50 MG tablet    ULTRAM    20 tablet    Take 1-2 tablets ( mg) by mouth every 6 hours as needed for pain Max of 4 tabs per day    Chronic midline low back pain without sciatica, Chronic bilateral thoracic back pain       verapamil 120 MG 24 hr capsule    VERELAN    90 capsule    Take 1 capsule (120 mg) by mouth daily    Hypertension goal BP (blood pressure) < 140/90, Migraine with aura and without status migrainosus, not intractable

## 2018-10-12 ENCOUNTER — THERAPY VISIT (OUTPATIENT)
Dept: PHYSICAL THERAPY | Facility: CLINIC | Age: 33
End: 2018-10-12
Attending: PODIATRIST
Payer: COMMERCIAL

## 2018-10-12 DIAGNOSIS — M25.376 INSTABILITY OF FOOT JOINT, UNSPECIFIED LATERALITY: ICD-10-CM

## 2018-10-12 DIAGNOSIS — M21.619 BUNION: ICD-10-CM

## 2018-10-12 PROCEDURE — 97161 PT EVAL LOW COMPLEX 20 MIN: CPT | Mod: GP | Performed by: PHYSICAL THERAPIST

## 2018-10-12 PROCEDURE — 97110 THERAPEUTIC EXERCISES: CPT | Mod: GP | Performed by: PHYSICAL THERAPIST

## 2018-10-12 NOTE — MR AVS SNAPSHOT
After Visit Summary   10/12/2018    Екатерина Ramon    MRN: [id number]           Patient Information     Date Of Birth          1985        Visit Information        Provider Department      10/12/2018 4:40 PM Andrea Sherman, PT [facility]        Today's Diagnoses     Instability of foot joint, unspecified laterality        Bunion           Follow-ups after your visit        Your next 10 appointments already scheduled     Oct 18, 2018  5:00 PM CDT   Return Visit with Zack Healy DPM   [facility]    [address] [address]   [phone]            Oct 19, 2018  4:40 PM CDT   AIDE Extremity with Andrea Sherman, PT   [facility] ([facility] [place]    [address]  [address]  [place] [address] [phone]            Oct 25, 2018  4:40 PM CDT   AIDE Extremity with Andrea Sherman, PT   [facility] ([facility] [place])    [address]  [address]  [place] [address] [phone]              Who to contact     If you have questions or need follow up information about today's clinic visit or your schedule please contact [facility] directly at [phone].  Normal or non-critical lab and imaging results will be communicated to you by [employer], letter or phone within 4 business days after the clinic has received the results. If you do not hear from us within 7 days, please contact the clinic through [employer] or phone. If you have a critical or abnormal lab result, we will notify you by phone as soon as possible.  Submit refill requests through [employer] or call your pharmacy and they will forward the refill request to us. Please allow 3 business days for your refill to be completed.          Additional Information About Your Visit        [employer] Information     [employer] gives you secure access to your electronic health record. If you see a primary care provider, you can also send messages to your care  team and make appointments. If you have questions, please call your primary care clinic.  If you do not have a primary care provider, please call [phone] and they will assist you.        Care EveryWhere ID     This is your Care EveryWhere ID. This could be used by other organizations to access your [facility] medical records  [id number]         Blood Pressure from Last 3 Encounters:   09/25/18 124/82   08/28/18 126/86   08/17/18 130/84    Weight from Last 3 Encounters:   08/01/18 89.6 kg (197 lb 8 oz)   08/01/18 89.6 kg (197 lb 8 oz)   06/19/18 92.5 kg (204 lb)              We Performed the Following     HC PT EVAL, LOW COMPLEXITY     AIDE INITIAL EVAL REPORT     [facility] PT, HAND, AND CHIROPRACTIC REFERRAL     THERAPEUTIC EXERCISES        Primary Care Provider Office Phone # Fax #    Anusha Makayla Wolff, APRN [phone]       [address]  [place] [address]        Equal Access to Services     [facility] : Hadii aad ku hadasho Soomaali, waaxda luqadaha, qaybta kaalmada adeegyada, waxay idiin hayaan jimmy lin . So [facility] [phone].    ATENCIÓN: Si habla español, tiene a ramirez disposición servicios gratuitos de asistencia lingüística. Llame al [phone].    We comply with applicable federal civil rights laws and [place] laws. We do not discriminate on the basis of race, color, national origin, age, disability, sex, sexual orientation, or gender identity.            Thank you!     Thank you for choosing [facility] [place] [facility]  for your care. Our goal is always to provide you with excellent care. Hearing back from our patients is one way we can continue to improve our services. Please take a few minutes to complete the written survey that you may receive in the mail after your visit with us. Thank you!             Your Updated Medication List - Protect others around you: Learn how to safely use, store and throw away your medicines at www.disposemymeds.org.          This list is  accurate as of 10/12/18  5:18 PM.  Always use your most recent med list.                   Brand Name Dispense Instructions for use Diagnosis    etonogestrel 68 MG Impl    IMPLANON/NEXPLANON     1 each by Subdermal route once        FLUoxetine 10 MG capsule    PROzac    90 capsule    Take 3 capsules (30 mg) by mouth daily    Anxiety       HYDROcodone-acetaminophen 5-325 MG per tablet    NORCO    30 tablet    Take 1-2 tablets by mouth every 4 hours as needed for other (Moderate to Severe Pain)    Bunion       hydrOXYzine 25 MG tablet    ATARAX    30 tablet    Take 1 tablet (25 mg) by mouth every 4 hours as needed for itching (and nausea)    Bunion       insulin pen needle 32G X 4 MM    BD TALHA U/F    100 each    Use once daily or as directed.    Morbid obesity due to excess calories (H)       liraglutide 18 MG/3ML soln    VICTOZA    36 mL    Inject 1.8 mg Subcutaneous daily    Morbid obesity due to excess calories (H)       order for DME     1 Device    Equipment being ordered: Knee Walker  HECTOR 6 weeks    Bunion       topiramate 25 MG tablet    TOPAMAX    70 tablet    Take 1 tablet (25 mg) at bedtime for 1 week, then 1 tablet twice daily for 1 week, then 1 tablet in AM and 2 in PM for 1 week, then 2 tablets twice daily.    BMI 35.0-35.9,adult       traMADol 50 MG tablet    ULTRAM    20 tablet    Take 1-2 tablets ( mg) by mouth every 6 hours as needed for pain Max of 4 tabs per day    Chronic midline low back pain without sciatica, Chronic bilateral thoracic back pain       verapamil 120 MG 24 hr capsule    VERELAN    90 capsule    Take 1 capsule (120 mg) by mouth daily    Hypertension goal BP (blood pressure) < 140/90, Migraine with aura and without status migrainosus, not intractable

## 2018-10-12 NOTE — PROGRESS NOTES
[facility] Initial Evaluation  Subjective:  Patient is a 32 year old female presenting with rehab right ankle/foot hpi. The history is provided by the patient.   Екатерина Ramon is a 32 year old female with a right foot condition.  Condition occurred with:  Degenerative joint disease.  Condition occurred: other.  This is a new condition  Pt had Rt bunionectomy first ray, 8/14/18 by Dr Healy. She is ordered to PT for first ray ROM. She arrives today w/ walker boot in place.    Scar is well healed, minimal edema and good AROM. .    Patient reports pain:  Great toe (Pt denies pain today ).  Radiates to:  No radiation.      Associated symptoms:  Loss of motion/stiffness. Pain is the same all the time.  Exacerbated by: in boot  and relieved by rest.  Since onset symptoms are unchanged.        General health as reported by patient is fair.  Pertinent medical history includes:  High blood pressure, implanted devices, migraines/headaches and depression.    Other surgeries include:  Orthopedic surgery (CTS ).  Current medications:  High blood pressure medication.  Current occupation is  .  Patient is working in normal job with restrictions.      Barriers include:  None as reported by patient.    Red flags:  None as reported by patient.                        Objective:  System    Ankle/Foot Evaluation  ROM:    AROM:            Great toe flexion: Left:      Right:  4  Great Toe Extension: Left:      Right: 11  PROM:            Great Toe Flexion: Left:      Right:  7    Great Toe Extension: Left:        Right: 15      Strength is not assessed.        EDEMA: normal                                                              General     ROS    Assessment/Plan:    Patient is a 32 year old female with toe complaints.    Patient has the following significant findings with corresponding treatment plan.                Diagnosis 1:  S/p bunionectomy Rt first   Decreased ROM/flexibility  - manual therapy, therapeutic exercise and home program  Decreased joint mobility - manual therapy, therapeutic exercise and home program  Impaired muscle performance - neuro re-education  Decreased function - therapeutic activities    Therapy Evaluation Codes:   1) History comprised of:   Personal factors that impact the plan of care:      Coping style, Overall behavior pattern and Past/current experiences.    Comorbidity factors that impact the plan of care are:      Depression, High blood pressure, Implanted device and Overweight.     Medications impacting care: High blood pressure.  2) Examination of Body Systems comprised of:   Body structures and functions that impact the plan of care:      Toes.   Activity limitations that impact the plan of care are:      Cooking, Dressing, Running, Sports, Stairs, Standing, Walking and Working.  3) Clinical presentation characteristics are:   Stable/Uncomplicated.  4) Decision-Making    Low complexity using standardized patient assessment instrument and/or measureable assessment of functional outcome.  Cumulative Therapy Evaluation is: Low complexity.    Previous and current functional limitations:  (See Goal Flow Sheet for this information)    Short term and Long term goals: (See Goal Flow Sheet for this information)     Communication ability:  Patient appears to be able to clearly communicate and understand verbal and written communication and follow directions correctly.  Treatment Explanation - The following has been discussed with the patient:   RX ordered/plan of care  Anticipated outcomes  Possible risks and side effects  This patient would benefit from PT intervention to resume normal activities.   Rehab potential is good.    Frequency:  1 X week, once daily  Duration:  for 4 weeks  Discharge Plan:  Achieve all LTG.  Independent in home treatment program.  Reach maximal therapeutic benefit.    Please refer to the daily flowsheet for treatment today, total treatment  time and time spent performing 1:1 timed codes.

## 2018-10-12 NOTE — LETTER
[facility]  [address]  [address]  [place] [address]  [phone]    2018    Re: Екатерина Ramon   :   1985  MRN:  [id number]   REFERRING PHYSICIAN:   MD [facility]  Date of Initial Evaluation:  10/12/2018  Visits:  Rxs Used: 1  Reason for Referral:     Instability of foot joint, unspecified laterality  Bunion    EVALUATION SUMMARY    [facility] Initial Evaluation  Subjective:  Patient is a 32 year old female presenting with rehab right ankle/foot hpi. The history is provided by the patient.   Екатерина Ramon is a 32 year old female with a right foot condition.  Condition occurred with:  Degenerative joint disease.  Condition occurred: other.  This is a new condition  Pt had Rt bunionectomy first ray, 18 by Dr Healy. She is ordered to PT for first ray ROM. She arrives today w/ walker boot in place.  Scar is well healed, minimal edema and good AROM. .    Patient reports pain:  Great toe (Pt denies pain today ).  Radiates to:  No radiation.      Associated symptoms:  Loss of motion/stiffness. Pain is the same all the time.  Exacerbated by: in boot  and relieved by rest.  Since onset symptoms are unchanged.     General health as reported by patient is fair.  Pertinent medical history includes:  High blood pressure, implanted devices, migraines/headaches and depression.    Other surgeries include:  Orthopedic surgery (CTS ).  Current medications:  High blood pressure medication.  Current occupation is  .  Patient is working in normal job with restrictions.      Barriers include:  None as reported by patient.  Red flags:  None as reported by patient.    Objective:  Ankle/Foot Evaluation  ROM:    AROM:    Great toe flexion: Left:      Right:  4  Great Toe Extension: Left:      Right: 11    PROM:    Great Toe Flexion: Left:      Right:  7    Great Toe Extension: Left:        Right: 15  Strength is not  assessed.    Re: Екатерина Ramon   :   1985    EDEMA: normal    Assessment/Plan:    Patient is a 32 year old female with toe complaints.    Patient has the following significant findings with corresponding treatment plan.                Diagnosis 1:  S/p bunionectomy Rt first   Decreased ROM/flexibility - manual therapy, therapeutic exercise and home program  Decreased joint mobility - manual therapy, therapeutic exercise and home program  Impaired muscle performance - neuro re-education  Decreased function - therapeutic activities    Therapy Evaluation Codes:   1) History comprised of:   Personal factors that impact the plan of care:      Coping style, Overall behavior pattern and Past/current experiences.    Comorbidity factors that impact the plan of care are:      Depression, High blood pressure, Implanted device and Overweight.     Medications impacting care: High blood pressure.  2) Examination of Body Systems comprised of:   Body structures and functions that impact the plan of care:      Toes.   Activity limitations that impact the plan of care are:      Cooking, Dressing, Running, Sports, Stairs, Standing, Walking and Working.  3) Clinical presentation characteristics are:   Stable/Uncomplicated.  4) Decision-Making    Low complexity using standardized patient assessment instrument and/or measureable assessment of functional outcome.  Cumulative Therapy Evaluation is: Low complexity.    Previous and current functional limitations:  (See Goal Flow Sheet for this information)    Short term and Long term goals: (See Goal Flow Sheet for this information)     Communication ability:  Patient appears to be able to clearly communicate and understand verbal and written communication and follow directions correctly.  Treatment Explanation - The following has been discussed with the patient:   RX ordered/plan of care  Anticipated outcomes  Possible risks and side effects  This patient would benefit from PT  intervention to resume normal activities.   Rehab potential is good.    Frequency:  1 X week, once daily  Duration:  for 4 weeks  Discharge Plan:  Achieve all LTG.  Independent in home treatment program.  Reach maximal therapeutic benefit.    Please refer to the daily flowsheet for treatment today, total treatment time and time spent performing 1:1 timed codes.   Re: Екатерина Ramon   :   1985      Thank you for your referral.    INQUIRIES  Therapist: Andrea Sherman PT  [facility]  [address]  [address]  [place] [address]  Phone: [phone]  Fax: [phone]

## 2018-10-18 ENCOUNTER — OFFICE VISIT (OUTPATIENT)
Dept: PODIATRY | Facility: CLINIC | Age: 33
End: 2018-10-18
Payer: COMMERCIAL

## 2018-10-18 ENCOUNTER — MYC MEDICAL ADVICE (OUTPATIENT)
Dept: FAMILY MEDICINE | Facility: CLINIC | Age: 33
End: 2018-10-18

## 2018-10-18 ENCOUNTER — RADIANT APPOINTMENT (OUTPATIENT)
Dept: GENERAL RADIOLOGY | Facility: CLINIC | Age: 33
End: 2018-10-18
Attending: PODIATRIST
Payer: COMMERCIAL

## 2018-10-18 VITALS
SYSTOLIC BLOOD PRESSURE: 136 MMHG | BODY MASS INDEX: 35.07 KG/M2 | HEART RATE: 82 BPM | WEIGHT: 198 LBS | DIASTOLIC BLOOD PRESSURE: 86 MMHG

## 2018-10-18 DIAGNOSIS — M21.619 BUNION: Primary | ICD-10-CM

## 2018-10-18 DIAGNOSIS — M25.376: ICD-10-CM

## 2018-10-18 PROCEDURE — 73630 X-RAY EXAM OF FOOT: CPT | Mod: RT

## 2018-10-18 PROCEDURE — 99024 POSTOP FOLLOW-UP VISIT: CPT | Performed by: PODIATRIST

## 2018-10-18 NOTE — LETTER
10/18/2018         RE: Екатерина Ramon  [address]  [place] [address]        Dear Colleague,    Thank you for referring your patient, Екатерина Ramon, to the [facility]. Please see a copy of my visit note below.         HISTORY OF PRESENT ILLNESS:  This patient is 9 weeks 2 days postop right first tarsometatarsal joint fusion, right Jackson bunionectomy done on August 14, 2018.  She has been doing well.  She has no pain.  Walking in cam walker with no problems.  She states edema continues to reduce.  She has been going to physical therapy for her first MTPJ range of motion and she states this is going well.  She has no other new complaints.      PHYSICAL EXAMINATION:  Her incision is well healed.    Her bunion is nicely reduced.  There is normal postoperative edema, and almost no edema now.  There is no erythema or ecchymosis.  She has no calf pain.  Hallux and good alignment.  Range of motion is 80% normal, especially dorsiflexion.  No pain on palpation around the fusion site.  With loading the first metatarsal head vigorously there is no discomfort in all.  X-rays show the fusion site tight with the hardware in place and the joint well aligned.  No change since last x-ray     ASSESSMENT:  Postop right first ray surgery.      PLAN:  Patient will continue range of motion and has 2 more sessions of physical therapy for this.  X-rays taken of her foot.  Patient will start tomorrow walking in good supportive shoe.  If there is no pain or edema then will slowly increase time in 1-2 hours per day until walking all day.  If patient has pain or edema back to Cam walker for 5 days and then will try again.   Patient doing quite well.  She will return the clinic in 5 weeks if she has any other further concerns however she was walking with no problems then as needed        RACHANA PLATA DPM                   Again, thank you for allowing me to participate in the care of your patient.         Sincerely,        Zack Healy, JOAN

## 2018-10-18 NOTE — PROGRESS NOTES
HISTORY OF PRESENT ILLNESS:  This patient is 9 weeks 2 days postop right first tarsometatarsal joint fusion, right Jackson bunionectomy done on August 14, 2018.  She has been doing well.  She has no pain.  Walking in cam walker with no problems.  She states edema continues to reduce.  She has been going to physical therapy for her first MTPJ range of motion and she states this is going well.  She has no other new complaints.      PHYSICAL EXAMINATION:  Her incision is well healed.    Her bunion is nicely reduced.  There is normal postoperative edema, and almost no edema now.  There is no erythema or ecchymosis.  She has no calf pain.  Hallux and good alignment.  Range of motion is 80% normal, especially dorsiflexion.  No pain on palpation around the fusion site.  With loading the first metatarsal head vigorously there is no discomfort in all.  X-rays show the fusion site tight with the hardware in place and the joint well aligned.  No change since last x-ray     ASSESSMENT:  Postop right first ray surgery.      PLAN:  Patient will continue range of motion and has 2 more sessions of physical therapy for this.  X-rays taken of her foot.  Patient will start tomorrow walking in good supportive shoe.  If there is no pain or edema then will slowly increase time in 1-2 hours per day until walking all day.  If patient has pain or edema back to Cam walker for 5 days and then will try again.   Patient doing quite well.  She will return the clinic in 5 weeks if she has any other further concerns however she was walking with no problems then as needed        RACHANA PLATA DPM

## 2018-10-18 NOTE — PATIENT INSTRUCTIONS
We wish you continued good healing. If you have any questions or concerns, please do not hesitate to contact us at [phone]    Please remember to call and schedule a follow up appointment if one was recommended at your earliest convenience.   PODIATRY CLINIC HOURS  TELEPHONE NUMBER    Dr. Zack Healy D.P.M [facility]    Clinics:  [facility]    Carlita Handy [facility]   Tuesday 1PM-6PM  [place]  Wednesday 7AM-2PM  [facility]  Thursday 10AM-6PM  [place]  Friday 7AM-3PM  [place]  Specialty schedulers:   [phone] to make an appointment with any Specialty Provider.        Urgent Care locations:    [facility] Monday-Friday 5 pm - 9 pm. Saturday-Sunday 9 am -5pm    Monday-Friday 11 am - 9 pm Saturday 9 am - 5 pm     Monday-Sunday 12 noon-8PM [phone]     If you need a medication refill, please contact us you may need lab work and/or a follow up visit prior to your refill (i.e. Antifungal medications).    [employer] (secure e-mail communication and access to your chart) to send a message or to make an appointment.    If MRI needed please call [facility] Chandra at [phone]        Weight management plan: Patient was referred to their PCP to discuss a diet and exercise plan.     Patient to follow up with Primary Care provider regarding elevated blood pressure.

## 2018-10-18 NOTE — TELEPHONE ENCOUNTER
Patient sent messages:   Ricky Tavares,   My parents said I should message you and see what I should do. My vision has been blurry in my right eye, especially in the mornings. My left eye seems ok maybe a little blurry but not much. I know back when I had my vision checked for my headaches there was a slight change but nothing alarming. They wondered if I should make an appointment with you or with an eye doctor to have it checked out.   Thanks.  No I am not having any other symptoms. No injury s no pain, and pupils are the same size.     Please advise   Zofia Shields RN

## 2018-10-18 NOTE — MR AVS SNAPSHOT
After Visit Summary   10/18/2018    Екатерина Ramon    MRN: [id number]           Patient Information     Date Of Birth          1985        Visit Information        Provider Department      10/18/2018 5:00 PM Zack Healy, DPM [facility]        Today's Diagnoses     Bunion    -  1      Care Instructions    We wish you continued good healing. If you have any questions or concerns, please do not hesitate to contact us at [phone]    Please remember to call and schedule a follow up appointment if one was recommended at your earliest convenience.   PODIATRY CLINIC HOURS  TELEPHONE NUMBER    Dr. Zack Healy DIanPPEMA FAC FAS    Clinics:  [facility]    Carlita Handy [facility]   Tuesday 1PM-6PM  [place]  Wednesday 7AM-2PM  [place]  Thursday 10AM-6PM  [place]  Friday 7AM-3PM  [place]  Specialty schedulers:   [phone] to make an appointment with any Specialty Provider.        Urgent Care locations:    [facility] Monday-Friday 5 pm - 9 pm. Saturday-Sunday 9 am -5pm    Monday-Friday 11 am - 9 pm Saturday 9 am - 5 pm     Monday-Sunday 12 noon-8PM [phone]     If you need a medication refill, please contact us you may need lab work and/or a follow up visit prior to your refill (i.e. Antifungal medications).    [employer] (secure e-mail communication and access to your chart) to send a message or to make an appointment.    If MRI needed please call [facility] Chandra at [phone]        Weight management plan: Patient was referred to their PCP to discuss a diet and exercise plan.     Patient to follow up with Primary Care provider regarding elevated blood pressure.              Follow-ups after your visit        Your next 10 appointments already scheduled     Oct 19, 2018  4:40 PM CDT   AIDE Extremity with Andrea Sherman, PT   [facility]    [address] [facility]  [address]  [place] [address]   [phone]            Oct 25, 2018  4:40 PM CDT   AIDE Extremity with Andrea Sherman, PT   [facility] [place] ([facility] [place])    [address] [facility]  [address]  [place] [address]   [phone]            Nov 07, 2018  5:00 PM CST   New Visit with Kimber Power OD   [facility] ([facility])    [address]  [address]  [facility] [id number] [phone]              Who to contact     If you have questions or need follow up information about today's clinic visit or your schedule please contact [facility] directly at [phone].  Normal or non-critical lab and imaging results will be communicated to you by MyChart, letter or phone within 4 business days after the clinic has received the results. If you do not hear from us within 7 days, please contact the clinic through [employer] or phone. If you have a critical or abnormal lab result, we will notify you by phone as soon as possible.  Submit refill requests through [employer] or call your pharmacy and they will forward the refill request to us. Please allow 3 business days for your refill to be completed.          Additional Information About Your Visit        MyChart Information     [employer] gives you secure access to your electronic health record. If you see a primary care provider, you can also send messages to your care team and make appointments. If you have questions, please call your primary care clinic.  If you do not have a primary care provider, please call [phone] and they will assist you.        Care EveryWhere ID     This is your Care EveryWhere ID. This could be used by other organizations to access your [place] medical records  [id number]        Your Vitals Were     Pulse BMI (Body Mass Index)                82 35.07 kg/m2           Blood Pressure from Last 3 Encounters:   10/18/18 136/86    09/25/18 124/82   08/28/18 126/86    Weight from Last 3 Encounters:   10/18/18 198 lb (89.8 kg)   08/01/18 197 lb 8 oz (89.6 kg)   08/01/18 197 lb 8 oz (89.6 kg)               Primary Care Provider Office Phone # Fax #    Anusha Wolff, APRN [phone]       [address] [address]        Equal Access to Services     CHRIS WALTERS : Hadii aad ku hadasho Soomaali, waaxda luqadaha, qaybta kaalmada adeegyada, waxay idiin hayaan adeeg kharash riley . So [facility] [phone].    ATENCIÓN: Si habla español, tiene a ramirez disposición servicios gratuitos de asistencia lingüística. LlACMC Healthcare System [phone].    We comply with applicable federal civil rights laws and [place] laws. We do not discriminate on the basis of race, color, national origin, age, disability, sex, sexual orientation, or gender identity.            Thank you!     Thank you for choosing [facility]  for your care. Our goal is always to provide you with excellent care. Hearing back from our patients is one way we can continue to improve our services. Please take a few minutes to complete the written survey that you may receive in the mail after your visit with us. Thank you!             Your Updated Medication List - Protect others around you: Learn how to safely use, store and throw away your medicines at www.disposemymeds.org.          This list is accurate as of 10/18/18 11:59 PM.  Always use your most recent med list.                   Brand Name Dispense Instructions for use Diagnosis    etonogestrel 68 MG Impl    IMPLANON/NEXPLANON     1 each by Subdermal route once        FLUoxetine 10 MG capsule    PROzac    90 capsule    Take 3 capsules (30 mg) by mouth daily    Anxiety       HYDROcodone-acetaminophen 5-325 MG per tablet    NORCO    30 tablet    Take 1-2 tablets by mouth every 4 hours as needed for other (Moderate to Severe Pain)    Bunion       hydrOXYzine 25 MG tablet    ATARAX    30 tablet     Take 1 tablet (25 mg) by mouth every 4 hours as needed for itching (and nausea)    Bunion       insulin pen needle 32G X 4 MM    BD TALHA U/F    100 each    Use once daily or as directed.    Morbid obesity due to excess calories (H)       liraglutide 18 MG/3ML soln    VICTOZA    36 mL    Inject 1.8 mg Subcutaneous daily    Morbid obesity due to excess calories (H)       order for DME     1 Device    Equipment being ordered: Knee Walker  HECTOR 6 weeks    Bunion       topiramate 25 MG tablet    TOPAMAX    70 tablet    Take 1 tablet (25 mg) at bedtime for 1 week, then 1 tablet twice daily for 1 week, then 1 tablet in AM and 2 in PM for 1 week, then 2 tablets twice daily.    BMI 35.0-35.9,adult       traMADol 50 MG tablet    ULTRAM    20 tablet    Take 1-2 tablets ( mg) by mouth every 6 hours as needed for pain Max of 4 tabs per day    Chronic midline low back pain without sciatica, Chronic bilateral thoracic back pain       verapamil 120 MG 24 hr capsule    VERELAN    90 capsule    Take 1 capsule (120 mg) by mouth daily    Hypertension goal BP (blood pressure) < 140/90, Migraine with aura and without status migrainosus, not intractable

## 2018-10-19 ENCOUNTER — THERAPY VISIT (OUTPATIENT)
Dept: PHYSICAL THERAPY | Facility: CLINIC | Age: 33
End: 2018-10-19
Attending: PODIATRIST
Payer: COMMERCIAL

## 2018-10-19 DIAGNOSIS — Z98.890 S/P BUNIONECTOMY: Primary | ICD-10-CM

## 2018-10-19 PROCEDURE — 97140 MANUAL THERAPY 1/> REGIONS: CPT | Mod: GP | Performed by: PHYSICAL THERAPIST

## 2018-10-19 NOTE — PROGRESS NOTES
Subjective:  HPI                    Objective:  System    Physical Exam    General     ROS    Assessment/Plan:    DISCHARGE REPORT    Progress reporting period is from 10.12 to 10.19.     SUBJECTIVE  Subjective: No pain saw MD very pleased    Current Pain level: 0/10   Initial Pain level: 0/10   Changes in function: Yes, see goal flow sheet for change in function   Adverse reactions: None;   ,     The subjective and objective information are from the last SOAP note on this patient.    OBJECTIVE  Objective: hard end feel flexion, pt indep in HEP and scar management,       ASSESSMENT/PLAN  Updated problem list and treatment plan: Diagnosis 1:  S/p bunionectomy Rt first     STG/LTGs have been met or progress has been made towards goals:  Yes (See Goal flow sheet completed today.)  Assessment of Progress: The patient's condition is improving.  The patient has met all of their long term goals.  Self Management Plans:  Patient is independent in a home treatment program.  Patient is independent in self management of symptoms.    Екатерина continues to require the following intervention to meet STG and LTG's:   We will discharge this patient from PT.    Recommendations:  This patient is ready to be discharged from therapy and continue their home treatment program.  Please refer to the daily flowsheet for treatment today, total treatment time and time spent performing 1:1 timed codes.

## 2018-10-19 NOTE — MR AVS SNAPSHOT
After Visit Summary   10/19/2018    Екатерина Ramon    MRN: [id number]           Patient Information     Date Of Birth          1985        Visit Information        Provider Department      10/19/2018 4:40 PM Andrea Sherman PT [facility]        Today's Diagnoses     S/P bunionectomy    -  1       Follow-ups after your visit        Your next 10 appointments already scheduled     Oct 25, 2018  4:40 PM CDT   AIDE Extremity with Andrea Sherman PT   [facility] ([facility]    [address]  [address]  [place] [id number] [phone]            Nov 07, 2018  5:00 PM CST   New Visit with Kimber Power OD   [facility]    [address]  [address]  [facility] [address] [phone]              Who to contact     If you have questions or need follow up information about today's clinic visit or your schedule please contact [facility] directly at [phone].  Normal or non-critical lab and imaging results will be communicated to you by [employer], letter or phone within 4 business days after the clinic has received the results. If you do not hear from us within 7 days, please contact the clinic through [employer] or phone. If you have a critical or abnormal lab result, we will notify you by phone as soon as possible.  Submit refill requests through [employer] or call your pharmacy and they will forward the refill request to us. Please allow 3 business days for your refill to be completed.          Additional Information About Your Visit        [employer] Guest[employer] Information     [employer] gives you secure access to your electronic health record. If you see a primary care provider, you can also send messages to your care team and make appointments. If you have questions, please call your primary care clinic.  If you do not have a primary care provider, please call [phone] and they will assist you.        Care EveryWhere ID      This is your Care EveryWhere ID. This could be used by other organizations to access your [place] medical records  [id number]         Blood Pressure from Last 3 Encounters:   10/18/18 136/86   09/25/18 124/82   08/28/18 126/86    Weight from Last 3 Encounters:   10/18/18 89.8 kg (198 lb)   08/01/18 89.6 kg (197 lb 8 oz)   08/01/18 89.6 kg (197 lb 8 oz)              We Performed the Following     MANUAL THER TECH,1+REGIONS,EA 15 MIN        Primary Care Provider Office Phone # Fax #    Anusha Makayla Wolff, APRN [phone]       [address] [place] [address]        Equal Access to Services     CHRIS WALTERS : Hadii aad ku hadasho Soomaali, waaxda luqadaha, qaybta kaalmada adeegyada, waxay idiin hayaan jimmy kharaisaiah lin . So [facility] [phone].    ATENCIÓN: Si habla español, tiene a ramirez disposición servicios gratuitos de asistencia lingüística. Llame al [phone].    We comply with applicable federal civil rights laws and [place] laws. We do not discriminate on the basis of race, color, national origin, age, disability, sex, sexual orientation, or gender identity.            Thank you!     Thank you for choosing [facility]  for your care. Our goal is always to provide you with excellent care. Hearing back from our patients is one way we can continue to improve our services. Please take a few minutes to complete the written survey that you may receive in the mail after your visit with us. Thank you!             Your Updated Medication List - Protect others around you: Learn how to safely use, store and throw away your medicines at www.disposemymeds.org.          This list is accurate as of 10/19/18  5:13 PM.  Always use your most recent med list.                   Brand Name Dispense Instructions for use Diagnosis    etonogestrel 68 MG Impl    IMPLANON/NEXPLANON     1 each by Subdermal route once        FLUoxetine 10 MG capsule    PROzac    90 capsule    Take 3 capsules (30  mg) by mouth daily    Anxiety       HYDROcodone-acetaminophen 5-325 MG per tablet    NORCO    30 tablet    Take 1-2 tablets by mouth every 4 hours as needed for other (Moderate to Severe Pain)    Bunion       hydrOXYzine 25 MG tablet    ATARAX    30 tablet    Take 1 tablet (25 mg) by mouth every 4 hours as needed for itching (and nausea)    Bunion       insulin pen needle 32G X 4 MM    BD TALHA U/F    100 each    Use once daily or as directed.    Morbid obesity due to excess calories (H)       liraglutide 18 MG/3ML soln    VICTOZA    36 mL    Inject 1.8 mg Subcutaneous daily    Morbid obesity due to excess calories (H)       order for DME     1 Device    Equipment being ordered: Knee Walker  HECTOR 6 weeks    Bunion       topiramate 25 MG tablet    TOPAMAX    70 tablet    Take 1 tablet (25 mg) at bedtime for 1 week, then 1 tablet twice daily for 1 week, then 1 tablet in AM and 2 in PM for 1 week, then 2 tablets twice daily.    BMI 35.0-35.9,adult       traMADol 50 MG tablet    ULTRAM    20 tablet    Take 1-2 tablets ( mg) by mouth every 6 hours as needed for pain Max of 4 tabs per day    Chronic midline low back pain without sciatica, Chronic bilateral thoracic back pain       verapamil 120 MG 24 hr capsule    VERELAN    90 capsule    Take 1 capsule (120 mg) by mouth daily    Hypertension goal BP (blood pressure) < 140/90, Migraine with aura and without status migrainosus, not intractable

## 2018-11-07 ENCOUNTER — OFFICE VISIT (OUTPATIENT)
Dept: OPTOMETRY | Facility: CLINIC | Age: 33
End: 2018-11-07
Payer: COMMERCIAL

## 2018-11-07 DIAGNOSIS — Z01.00 EXAMINATION OF EYES AND VISION: Primary | ICD-10-CM

## 2018-11-07 DIAGNOSIS — H04.129 DRY EYE: ICD-10-CM

## 2018-11-07 PROCEDURE — 92004 COMPRE OPH EXAM NEW PT 1/>: CPT | Performed by: OPTOMETRIST

## 2018-11-07 PROCEDURE — 92015 DETERMINE REFRACTIVE STATE: CPT | Performed by: OPTOMETRIST

## 2018-11-07 ASSESSMENT — REFRACTION_MANIFEST
OD_CYLINDER: +0.25
OD_AXIS: 137
OS_CYLINDER: +0.25
OD_SPHERE: +0.25
OD_AXIS: 150
OS_AXIS: 31
METHOD_AUTOREFRACTION: 1
OS_SPHERE: PLANO
OD_SPHERE: -0.25
OD_CYLINDER: +0.25
OS_SPHERE: PLANO

## 2018-11-07 ASSESSMENT — VISUAL ACUITY
OD_SC: 20/20
METHOD: SNELLEN - LINEAR
OS_SC: 20/20
OS_SC: 20/20
OD_SC: 20/20
OS_SC+: -1

## 2018-11-07 ASSESSMENT — EXTERNAL EXAM - RIGHT EYE: OD_EXAM: NORMAL

## 2018-11-07 ASSESSMENT — TONOMETRY
IOP_METHOD: APPLANATION
OS_IOP_MMHG: 15
OD_IOP_MMHG: 15

## 2018-11-07 ASSESSMENT — EXTERNAL EXAM - LEFT EYE: OS_EXAM: NORMAL

## 2018-11-07 ASSESSMENT — CUP TO DISC RATIO
OD_RATIO: 0.2
OS_RATIO: 0.1

## 2018-11-07 ASSESSMENT — SLIT LAMP EXAM - LIDS
COMMENTS: MEIBOMIAN GLAND DYSFUNCTION
COMMENTS: MEIBOMIAN GLAND DYSFUNCTION

## 2018-11-07 ASSESSMENT — CONF VISUAL FIELD
OD_NORMAL: 1
OS_NORMAL: 1
METHOD: COUNTING FINGERS

## 2018-11-07 NOTE — PROGRESS NOTES
Chief Complaint   Patient presents with     COMPREHENSIVE EYE EXAM     4-5+ Years since last exam        Last Eye Exam: 4-5 years  Dilated Previously: No, side effects of dilation explained today    What are you currently using to see?  does not use glasses or contacts       Distance Vision Acuity: Noticed gradual change in right eye    Near Vision Acuity: Satisfied with vision while reading  unaided    Eye Comfort: dry  Do you use eye drops? : Yes: Visine as need  Occupation or Hobbies:     Latoya Robertson, Optometric Assistant          Medical, surgical and family histories reviewed and updated 11/7/2018.       OBJECTIVE: See Ophthalmology exam    ASSESSMENT:    ICD-10-CM    1. Examination of eyes and vision Z01.00 EYE EXAM (SIMPLE-NONBILLABLE)     REFRACTION   2. Dry eye H04.129 EYE EXAM (SIMPLE-NONBILLABLE)     REFRACTION      PLAN:   Discontinue visine  Warm compresses/ lid hygiene / artificial tears      Kimber Power OD

## 2018-11-07 NOTE — PATIENT INSTRUCTIONS
DRY EYE TREATMENT  Discontinue visine  I recommend using artificial tears for your dry eye. There are over the counter drops that work well and may be used up to 4x daily. ( systane balance, refresh optive, soothe xp)   If you need more than 4 drops daily, use a preservative free product which come in individual vials which may be used for 24 hours and discarded.     Artificial tears work best as a preventative and not as well after your eyes are starting to bother you.  It may take 4- 6 weeks of using the drops before you notice improvement.  If after that time you are still having problems schedule an appointment for an evaluation and discussion of different treatments.  Dry eyes are a chronic condition and you may have more symptoms at certain times of the year.      Additional recommended treatment:  Warm compresses once to twice daily for 5-10 minutes    Directions for warm soaks  There are few methods for hot compresses. Moisten a washcloth with hot water, or microwave for 10 seconds, being careful to not get the cloth too hot.   Then put the washcloth onto your eyelids for 5 minutes. It will cool quickly so a rice pack or eyemask that can be heated and laid on top of the washcloth will help retain the heat.          Omega 3 fatty acid supplements taken 1-2x daily  Recommend  at least  2000mg omega 3  800 EPA  600 DHA    Blink regularly  Stay hydrated/ increase humidity  Wear sunglasses

## 2018-11-07 NOTE — LETTER
11/7/2018         RE: Екатерина Ramon  [address] [place] [address]        Dear Colleague,    Thank you for referring your patient, [facility]ат[facility]ина [facility]. Please see a copy of my visit note below.    Chief Complaint   Patient presents with     COMPREHENSIVE EYE EXAM     4-5+ Years since last exam        Last Eye Exam: 4-5 years  Dilated Previously: No, side effects of dilation explained today    What are you currently using to see?  does not use glasses or contacts       Distance Vision Acuity: Noticed gradual change in right eye    Near Vision Acuity: Satisfied with vision while reading  unaided    Eye Comfort: dry  Do you use eye drops? : Yes: Visine as need  Occupation or Hobbies:     Latoya Robertson, Optometric Assistant          Medical, surgical and family histories reviewed and updated 11/7/2018.       OBJECTIVE: See Ophthalmology exam    ASSESSMENT:    ICD-10-CM    1. Examination of eyes and vision Z01.00 EYE EXAM (SIMPLE-NONBILLABLE)     REFRACTION   2. Dry eye H04.129 EYE EXAM (SIMPLE-NONBILLABLE)     REFRACTION      PLAN:   Discontinue visine  Warm compresses/ lid hygiene / artificial tears      Kimber Power OD     Again, thank you for allowing me to participate in the care of your patient.        Sincerely,        Kimber Power, OD

## 2018-11-07 NOTE — MR AVS SNAPSHOT
After Visit Summary   11/7/2018    Екатерина Ramon    MRN: [id number]           Patient Information     Date Of Birth          1985        Visit Information        Provider Department      11/7/2018 5:00 PM Kimber Power, [facility]        Today's Diagnoses     Examination of eyes and vision    -  1    Dry eye          Care Instructions     DRY EYE TREATMENT  Discontinue visine  I recommend using artificial tears for your dry eye. There are over the counter drops that work well and may be used up to 4x daily. ( systane balance, refresh optive, soothe xp)   If you need more than 4 drops daily, use a preservative free product which come in individual vials which may be used for 24 hours and discarded.     Artificial tears work best as a preventative and not as well after your eyes are starting to bother you.  It may take 4- 6 weeks of using the drops before you notice improvement.  If after that time you are still having problems schedule an appointment for an evaluation and discussion of different treatments.  Dry eyes are a chronic condition and you may have more symptoms at certain times of the year.      Additional recommended treatment:  Warm compresses once to twice daily for 5-10 minutes    Directions for warm soaks  There are few methods for hot compresses. Moisten a washcloth with hot water, or microwave for 10 seconds, being careful to not get the cloth too hot.   Then put the washcloth onto your eyelids for 5 minutes. It will cool quickly so a rice pack or eyemask that can be heated and laid on top of the washcloth will help retain the heat.          Omega 3 fatty acid supplements taken 1-2x daily  Recommend  at least  2000mg omega 3  800 EPA  600 DHA    Blink regularly  Stay hydrated/ increase humidity  Wear sunglasses            Follow-ups after your visit        Who to contact     If you have questions or need follow up information about today's clinic visit  or your schedule please contact [facility] directly at [phone].  Normal or non-critical lab and imaging results will be communicated to you by MyChart, letter or phone within 4 business days after the clinic has received the results. If you do not hear from us within 7 days, please contact the clinic through [employer] or phone. If you have a critical or abnormal lab result, we will notify you by phone as soon as possible.  Submit refill requests through [employer] or call your pharmacy and they will forward the refill request to us. Please allow 3 business days for your refill to be completed.          Additional Information About Your Visit        [employer] Information     The [employer] gives you secure access to your electronic health record. If you see a primary care provider, you can also send messages to your care team and make appointments. If you have questions, please call your primary care clinic.  If you do not have a primary care provider, please call [phone] and they will assist you.        Care EveryWhere ID     This is your Care EveryWhere ID. This could be used by other organizations to access your [place] medical records  [id number]         Blood Pressure from Last 3 Encounters:   10/18/18 136/86   09/25/18 124/82   08/28/18 126/86    Weight from Last 3 Encounters:   10/18/18 89.8 kg (198 lb)   08/01/18 89.6 kg (197 lb 8 oz)   08/01/18 89.6 kg (197 lb 8 oz)              We Performed the Following     EYE EXAM (SIMPLE-NONBILLABLE)     REFRACTION        Primary Care Provider Office Phone # Fax #    Anusha Makayla Wolff, ESTUARDO [phone]       [address] [place] [address] [place] [address]        Equal Access to Services     [facility]: Hadii aad ku hadasho Soomaali, waaxda luqadaha, qaybta kaalmada adeegyada, lasha lin . So [facility] [phone].    ATENCIÓN: Si habla español, tiene a ramirez disposición servicios gratuitos de asistencia lingüística.  Kehindesarina macias [phone].    We comply with applicable federal civil rights laws and [place] laws. We do not discriminate on the basis of race, color, national origin, age, disability, sex, sexual orientation, or gender identity.            Thank you!     Thank you for choosing [facility]  for your care. Our goal is always to provide you with excellent care. Hearing back from our patients is one way we can continue to improve our services. Please take a few minutes to complete the written survey that you may receive in the mail after your visit with us. Thank you!             Your Updated Medication List - Protect others around you: Learn how to safely use, store and throw away your medicines at www.disposemymeds.org.          This list is accurate as of 11/7/18  5:35 PM.  Always use your most recent med list.                   Brand Name Dispense Instructions for use Diagnosis    etonogestrel 68 MG Impl    IMPLANON/NEXPLANON     1 each by Subdermal route once        FLUoxetine 10 MG capsule    PROzac    90 capsule    Take 3 capsules (30 mg) by mouth daily    Anxiety       HYDROcodone-acetaminophen 5-325 MG per tablet    NORCO    30 tablet    Take 1-2 tablets by mouth every 4 hours as needed for other (Moderate to Severe Pain)    Bunion       hydrOXYzine 25 MG tablet    ATARAX    30 tablet    Take 1 tablet (25 mg) by mouth every 4 hours as needed for itching (and nausea)    Bunion       insulin pen needle 32G X 4 MM    BD TALHA U/F    100 each    Use once daily or as directed.    Morbid obesity due to excess calories (H)       liraglutide 18 MG/3ML soln    VICTOZA    36 mL    Inject 1.8 mg Subcutaneous daily    Morbid obesity due to excess calories (H)       order for DME     1 Device    Equipment being ordered: Knee Walker  HECTOR 6 weeks    Bunion       topiramate 25 MG tablet    TOPAMAX    70 tablet    Take 1 tablet (25 mg) at bedtime for 1 week, then 1 tablet twice daily for 1 week, then 1 tablet in  AM and 2 in PM for 1 week, then 2 tablets twice daily.    BMI 35.0-35.9,adult       traMADol 50 MG tablet    ULTRAM    20 tablet    Take 1-2 tablets ( mg) by mouth every 6 hours as needed for pain Max of 4 tabs per day    Chronic midline low back pain without sciatica, Chronic bilateral thoracic back pain       verapamil 120 MG 24 hr capsule    VERELAN    90 capsule    Take 1 capsule (120 mg) by mouth daily    Hypertension goal BP (blood pressure) < 140/90, Migraine with aura and without status migrainosus, not intractable

## 2018-12-14 ENCOUNTER — OFFICE VISIT (OUTPATIENT)
Dept: INTERNAL MEDICINE | Facility: CLINIC | Age: 33
End: 2018-12-14
Payer: COMMERCIAL

## 2018-12-14 VITALS
DIASTOLIC BLOOD PRESSURE: 96 MMHG | RESPIRATION RATE: 16 BRPM | OXYGEN SATURATION: 100 % | HEART RATE: 88 BPM | TEMPERATURE: 98.4 F | WEIGHT: 194 LBS | BODY MASS INDEX: 34.37 KG/M2 | SYSTOLIC BLOOD PRESSURE: 118 MMHG

## 2018-12-14 DIAGNOSIS — E66.01 MORBID OBESITY DUE TO EXCESS CALORIES (H): Primary | ICD-10-CM

## 2018-12-14 PROCEDURE — 99213 OFFICE O/P EST LOW 20 MIN: CPT | Performed by: INTERNAL MEDICINE

## 2018-12-14 RX ORDER — LIRAGLUTIDE 6 MG/ML
1.8 INJECTION SUBCUTANEOUS DAILY
Qty: 36 ML | Refills: 1 | Status: SHIPPED | OUTPATIENT
Start: 2018-12-14 | End: 2020-02-04

## 2018-12-14 RX ORDER — TOPIRAMATE 50 MG/1
50 TABLET, FILM COATED ORAL 2 TIMES DAILY
Qty: 180 TABLET | Refills: 1 | Status: SHIPPED | OUTPATIENT
Start: 2018-12-14 | End: 2019-05-23

## 2018-12-14 ASSESSMENT — PAIN SCALES - GENERAL: PAINLEVEL: NO PAIN (0)

## 2018-12-14 NOTE — PROGRESS NOTES
INTERNAL MEDICINE  Medical Weight Loss - Return Visit    CHIEF COMPLAINT:  Recheck weight      Wt Readings from Last 5 Encounters:   12/14/18 88 kg (194 lb)   10/18/18 89.8 kg (198 lb)   08/01/18 89.6 kg (197 lb 8 oz)   08/01/18 89.6 kg (197 lb 8 oz)   06/19/18 92.5 kg (204 lb)     HISTORY OF PRESENT ILLNESS (HPI):    Patient returns today for medical weight loss follow-up visit. Patient was last seen by me on 8/1/2018 and has lost 3 pounds and gained 1.2 % body fat.    Medications - Continues on Topamax and verapamil. Doing well. Notes that she had a 1 week period where her verapamil wasn't able to be filled because the pharmacy didn't have it.   Diet - Trying to eat breakfast during the day, occasionally not eating lunch. She notes just not being hungry.  Exercise - She has not been doing much. Had bunionectomy surgery in August 2018 and was told she had to wait 3 months before doing strenuous activities.      ROS: 4 point ROS neg other than the symptoms noted above in the HPI.      MEDICATIONS:   Current Outpatient Medications   Medication Sig Dispense Refill     etonogestrel (IMPLANON/NEXPLANON) 68 MG IMPL 1 each by Subdermal route once       FLUoxetine (PROZAC) 10 MG capsule Take 3 capsules (30 mg) by mouth daily 90 capsule 5     insulin pen needle (BD TALHA U/F) 32G X 4 MM Use once daily or as directed. 100 each 3     liraglutide (VICTOZA) 18 MG/3ML solution Inject 1.8 mg Subcutaneous daily 36 mL 1     topiramate (TOPAMAX) 50 MG tablet Take 1 tablet (50 mg) by mouth 2 times daily 180 tablet 1     verapamil (VERELAN) 120 MG 24 hr capsule Take 1 capsule (120 mg) by mouth daily 90 capsule 3       ALLERGIES:   Allergies   Allergen Reactions     Lisinopril Cough     Zoloft      tired       PHYSICAL EXAMINATION:    VITALS: BP (!) 118/96 (BP Location: Left arm, Cuff Size: Adult Regular)   Pulse 88   Temp 98.4  F (36.9  C) (Oral)   Resp 16   Wt 88 kg (194 lb)   LMP 12/08/2018 (Exact Date)   SpO2 100%    Breastfeeding? No   BMI 34.37 kg/m    GENERAL: Patient is a 33 year old year old female  in no acute distress.  Patient is alert and orientated x 4, pleasant and cooperative with exam.     CARDIOVASCULAR:  Regular rate and rhythm without murmurs, rubs, or gallops.  RESPIRATORY:  Lungs are clear to auscultation bilaterally, respiratory effort is normal.   No edema  PSYCH: mentation appears normal, affect normal and bright.    LAB RESULTS: None    ASSESSMENT/PLAN:    1. Morbid obesity due to excess calories (H)  Well controlled with medications without side effects. Her lack of exercise is a problem.    - liraglutide (VICTOZA) 18 MG/3ML solution; Inject 1.8 mg Subcutaneous daily  Dispense: 36 mL; Refill: 1    2. BMI 35.0-35.9,adult    - topiramate (TOPAMAX) 50 MG tablet; Take 1 tablet (50 mg) by mouth 2 times daily  Dispense: 180 tablet; Refill: 1        Patient is to call the clinic if she experiences any adverse reactions.     Patient Instructions     Diet and exercise  Continue working on eating nutritious meals.  Work on exercise:   Try free weights.    Upper body: Push ups, sit ups, triceps dips   Lower body: Squats and lunges   Try other exercises such as Yoga, Pilates, barre exercises etc.    Medication  - Continue on your Topamax medication. Will switch to 50 mg tablets. Take this 1 tablet, twice daily.  - Check to see if in 2019, Saxenda will be covered.  - If your pharmacy does not have your medication in stock, ask them to look if another pharmacy has it and send the prescription there.     I spent 11 minutes of time with the patient and >50% of it was in education and counseling regarding weight loss follow up.     Start: 11:56 AM   End 12:07 PM    Lorraine Johnson MD  Internal Medicine    American Board of Obesity Medicine Diplomate

## 2018-12-14 NOTE — PATIENT INSTRUCTIONS
Diet and exercise  Continue working on eating nutritious meals.  Work on exercise:   Try free weights.    Upper body: Push ups, sit ups, triceps dips   Lower body: Squats and lunges   Try other exercises such as Yoga, Pilates, barre exercises etc.    Medication  - Continue on your Topamax medication. Will switch to 50 mg tablets. Take this 1 tablet, twice daily.  - Check to see if in 2019, Saxenda will be covered.  - If your pharmacy does not have your medication in stock, ask them to look if another pharmacy has it and send the prescription there.     [facility]    If you have any questions regarding to your visit please contact your care team:     Team Pink:   Clinic Hours Telephone Number   Internal Medicine:  Dr. Lorraine Villegas NP 7am-7pm  Monday - Thursday   7am-5pm  Fridays  [phone]  (Appointment scheduling available 24/7)   Urgent Care - [place] and [facility] - 11am-9pm Monday-Friday Saturday-Sunday- 9am-5pm   [place] - 5pm-9pm Monday-Friday Saturday-Sunday- 9am-5pm  [phone] - [place]  [phone] - [place]       What options do I have for a visit other than an office visit? We offer electronic visits (e-visits) and telephone visits, when medically appropriate.  Please check with your medical insurance to see if these types of visits are covered, as you will be responsible for any charges that are not paid by your insurance.      You can use Optasite (secure electronic communication) to access to your chart, send your primary care provider a message, or make an appointment. Ask a team member how to get started.     For a price quote for your services, please call our Consumer Price Line at [phone] or our Imaging Cost estimation line at [phone] (for imaging tests).    Hollie Rosas, [facility]

## 2018-12-23 ENCOUNTER — MYC REFILL (OUTPATIENT)
Dept: FAMILY MEDICINE | Facility: CLINIC | Age: 33
End: 2018-12-23

## 2018-12-23 DIAGNOSIS — F41.9 ANXIETY: ICD-10-CM

## 2018-12-26 RX ORDER — FLUOXETINE 10 MG/1
30 CAPSULE ORAL DAILY
Qty: 90 CAPSULE | Refills: 0 | Status: SHIPPED | OUTPATIENT
Start: 2018-12-26 | End: 2019-05-29

## 2018-12-26 NOTE — TELEPHONE ENCOUNTER
Pending Prescriptions:                       Disp   Refills    FLUoxetine (PROZAC) 10 MG capsule         90 cap*5            Sig: Take 3 capsules (30 mg) by mouth daily    RN refilled medication per [facility] Refill Protocol.     Betsy Chatman RN

## 2019-02-06 ENCOUNTER — OFFICE VISIT (OUTPATIENT)
Dept: FAMILY MEDICINE | Facility: CLINIC | Age: 34
End: 2019-02-06
Payer: COMMERCIAL

## 2019-02-06 VITALS
HEIGHT: 63 IN | DIASTOLIC BLOOD PRESSURE: 84 MMHG | TEMPERATURE: 98 F | OXYGEN SATURATION: 100 % | WEIGHT: 194 LBS | SYSTOLIC BLOOD PRESSURE: 118 MMHG | BODY MASS INDEX: 34.38 KG/M2 | HEART RATE: 114 BPM

## 2019-02-06 DIAGNOSIS — A08.4 VIRAL GASTROENTERITIS: ICD-10-CM

## 2019-02-06 DIAGNOSIS — J06.9 VIRAL URI WITH COUGH: Primary | ICD-10-CM

## 2019-02-06 LAB
FLUAV+FLUBV AG SPEC QL: NEGATIVE
FLUAV+FLUBV AG SPEC QL: NEGATIVE
SPECIMEN SOURCE: NORMAL

## 2019-02-06 PROCEDURE — 99213 OFFICE O/P EST LOW 20 MIN: CPT | Performed by: NURSE PRACTITIONER

## 2019-02-06 PROCEDURE — 87804 INFLUENZA ASSAY W/OPTIC: CPT | Performed by: NURSE PRACTITIONER

## 2019-02-06 ASSESSMENT — PAIN SCALES - GENERAL: PAINLEVEL: MILD PAIN (3)

## 2019-02-06 ASSESSMENT — MIFFLIN-ST. JEOR: SCORE: 1559.11

## 2019-02-06 ASSESSMENT — PATIENT HEALTH QUESTIONNAIRE - PHQ9: SUM OF ALL RESPONSES TO PHQ QUESTIONS 1-9: 1

## 2019-02-06 NOTE — PROGRESS NOTES
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 Drinking ok, no appetite.   Frequent, watery diarrhea for last 2 days.  Son also had a few days of diarrhea.      Problem list and histories reviewed & adjusted, as indicated.  Additional history: as documented    Patient Active Problem List   Diagnosis     Adult BMI 30+     CARDIOVASCULAR SCREENING; LDL GOAL LESS THAN 160     Family history of diabetes mellitus     History of depression     Sprain and strain of shoulder and upper arm     Hypertension goal BP (blood pressure) < 140/90     CTS (carpal tunnel syndrome)     S/P carpal tunnel release     Anxiety     BMI 40.0-44.9, adult (H)     Chronic bilateral thoracic back pain     Chronic midline low back pain without sciatica     Migraine with aura and without status migrainosus, not intractable     Morbid obesity due to excess calories (H)     Low serum HDL     Counseling for parent-child problem     Dysthymic disorder     Past Surgical History:   Procedure Laterality Date     BUNIONECTOMY LAPIDUS WITH TARSAL METATARSAL (TMT) FUSION Right 8/14/2018    Procedure: BUNIONECTOMY LAPIDUS WITH TARSAL METATARSAL (TMT) FUSION;  Right first tarsometatarsal joint fusion, right Jackson bunionectomy;  Surgeon: Zack Healy DPM;  Location:  OR      REVISE MEDIAN N/CARPAL TUNNEL SURG Left 6/5/15    Primary, not revision     RELEASE CARPAL TUNNEL Left 6/5/2015    Procedure: RELEASE CARPAL TUNNEL;  Surgeon: Juan Jose Joaquin MD;  Location:  OR      "  Social History     Tobacco Use     Smoking status: Former Smoker     Types: Cigarettes     Last attempt to quit: 2010     Years since quittin.7     Smokeless tobacco: Never Used   Substance Use Topics     Alcohol use: Yes     Comment: rare     Family History   Problem Relation Age of Onset     Diabetes Mother      Hypertension Mother      Cancer Mother         skin     Diabetes Father      Glaucoma No family hx of      Macular Degeneration No family hx of            Reviewed and updated as needed this visit by clinical staff       Reviewed and updated as needed this visit by Provider         ROS:  Constitutional, HEENT, cardiovascular, pulmonary, gi and gu systems are negative, except as otherwise noted.    OBJECTIVE:     /84 (BP Location: Right arm, Patient Position: Chair, Cuff Size: Adult Large)   Pulse 114   Temp 98  F (36.7  C) (Oral)   Ht 1.6 m (5' 3\")   Wt 88 kg (194 lb)   SpO2 100%   BMI 34.37 kg/m    Body mass index is 34.37 kg/m .  GENERAL: healthy, alert and no distress  EYES: Eyes grossly normal to inspection, PERRL and conjunctivae and sclerae normal  HENT: ear canals and TM's normal, nose and mouth without ulcers or lesions  NECK: no adenopathy, no asymmetry, masses, or scars and thyroid normal to palpation  RESP: lungs clear to auscultation - no rales, rhonchi or wheezes  CV: regular rate and rhythm, normal S1 S2, no S3 or S4, no murmur, click or rub, no peripheral edema and peripheral pulses strong  ABDOMEN: mild tenderness throughout, no organomegaly or masses and bowel sounds normal    Diagnostic Test Results:  Results for orders placed or performed in visit on 19   Influenza A/B antigen   Result Value Ref Range    Influenza A/B Agn Specimen Nasopharyngeal     Influenza A Negative NEG^Negative    Influenza B Negative NEG^Negative       ASSESSMENT/PLAN:     1. Viral URI with cough  Supportive cares- push fluids and rest.  - Influenza A/B antigen    2. Viral " gastroenteritis  Mildly tachycardiac but tolerating PO fluids. If diarrhea not improving by tomorrow morning, start Imodium. Rehydrate with water, Gatorade, Pedialyte. Reviewed warning signs of dehydration and when to present to Emergency Room for IV fluids if needed.  - Influenza A/B antigen    See Patient Instructions    ESTUARDO Jain [facility]

## 2019-02-06 NOTE — LETTER
February 6, 2019      Екатерина Ramon  [address]  [place] [address]        To Whom It May Concern:    Екатерина Ramon  was seen on 02/06/19.  Please excuse her  until Friday due to illness.        Sincerely,        ESTUARDO Jain CNP

## 2019-02-08 ENCOUNTER — MYC MEDICAL ADVICE (OUTPATIENT)
Dept: FAMILY MEDICINE | Facility: CLINIC | Age: 34
End: 2019-02-08

## 2019-02-08 NOTE — TELEPHONE ENCOUNTER
Calling to check to see if patient already received this. She has checked the [employer] message.     There is no documentation after Anusha Wolff CNP sending it to the pool that it was completed.     Awaiting a call from the patient.    A copy has been placed a the [facility] . Rosemarie Mitchell,

## 2019-02-08 NOTE — LETTER
February 6, 2019      Екатерина Ramon  [address]  [place] [address]        To Whom It May Concern:    Екатерина Ramon  was seen on 02/06/19.  Please excuse her  until Monday due to illness.        Sincerely,        ESTUARDO Jain CNP

## 2019-02-08 NOTE — TELEPHONE ENCOUNTER
Huddled with the provider an MA brought it down to the [facility]  and gave it to the patient. Rosemarie Mitchell,       Called and spoke with Екатерина. Let her know to disregard the message I left. She understood. Rosemarie Mitchell,

## 2019-02-10 ENCOUNTER — OFFICE VISIT (OUTPATIENT)
Dept: URGENT CARE | Facility: URGENT CARE | Age: 34
End: 2019-02-10
Payer: COMMERCIAL

## 2019-02-10 VITALS
WEIGHT: 197.6 LBS | OXYGEN SATURATION: 96 % | SYSTOLIC BLOOD PRESSURE: 118 MMHG | TEMPERATURE: 98.7 F | BODY MASS INDEX: 35 KG/M2 | DIASTOLIC BLOOD PRESSURE: 84 MMHG | HEART RATE: 96 BPM

## 2019-02-10 DIAGNOSIS — R05.8 PRODUCTIVE COUGH: Primary | ICD-10-CM

## 2019-02-10 PROCEDURE — 99213 OFFICE O/P EST LOW 20 MIN: CPT | Performed by: FAMILY MEDICINE

## 2019-02-10 RX ORDER — DOXYCYCLINE 100 MG/1
100 CAPSULE ORAL 2 TIMES DAILY
Qty: 20 CAPSULE | Refills: 0 | Status: SHIPPED | OUTPATIENT
Start: 2019-02-10 | End: 2019-06-05

## 2019-02-10 RX ORDER — ALBUTEROL SULFATE 90 UG/1
1-2 AEROSOL, METERED RESPIRATORY (INHALATION) EVERY 4 HOURS PRN
Qty: 1 INHALER | Refills: 0 | Status: SHIPPED | OUTPATIENT
Start: 2019-02-10 | End: 2019-12-13

## 2019-02-10 NOTE — PROGRESS NOTES
SUBJECTIVE:  Chief Complaint   Patient presents with     Cough     Patient is here for cough      Екатерина Ramon is a 33 year old female who presents to the clinic today with a chief complaint of cough , shortness of breath. and central chest pain. for 1 week(s).  Patient denies pleuritic chest pain and wheezing.  Her cough is described as persistent, daytime, nightime and productive mucoid.    The patient's symptoms are moderate and worsening.  Associated symptoms include fever, malaise and had diarrhea 2 days, now resolved. The patient's symptoms are exacerbated by exercise  Patient has been using OTC cough suppressants  to improve symptoms.    Past Medical History:   Diagnosis Date     Family history of diabetes mellitus      Hypertension      Migraine with aura and without status migrainosus, not intractable 11/2/2016     PID (acute pelvic inflammatory disease) 5/2010     Patient Active Problem List   Diagnosis     Adult BMI 30+     CARDIOVASCULAR SCREENING; LDL GOAL LESS THAN 160     Family history of diabetes mellitus     History of depression     Sprain and strain of shoulder and upper arm     Hypertension goal BP (blood pressure) < 140/90     CTS (carpal tunnel syndrome)     S/P carpal tunnel release     Anxiety     BMI 40.0-44.9, adult (H)     Chronic bilateral thoracic back pain     Chronic midline low back pain without sciatica     Migraine with aura and without status migrainosus, not intractable     Morbid obesity due to excess calories (H)     Low serum HDL     Counseling for parent-child problem     Dysthymic disorder       ALLERGIES:  Lisinopril and Zoloft      Current Outpatient Medications on File Prior to Visit:  etonogestrel (IMPLANON/NEXPLANON) 68 MG IMPL 1 each by Subdermal route once   FLUoxetine (PROZAC) 10 MG capsule Take 3 capsules (30 mg) by mouth daily   insulin pen needle (BD TALHA U/F) 32G X 4 MM Use once daily or as directed.   liraglutide (VICTOZA) 18 MG/3ML solution Inject 1.8 mg  Subcutaneous daily   topiramate (TOPAMAX) 50 MG tablet Take 1 tablet (50 mg) by mouth 2 times daily   verapamil (VERELAN) 120 MG 24 hr capsule Take 1 capsule (120 mg) by mouth daily     No current facility-administered medications on file prior to visit.     Social History     Tobacco Use     Smoking status: Former Smoker     Types: Cigarettes     Last attempt to quit: 2010     Years since quittin.7     Smokeless tobacco: Never Used   Substance Use Topics     Alcohol use: Yes     Comment: rare       Family History   Problem Relation Age of Onset     Diabetes Mother      Hypertension Mother      Cancer Mother         skin     Diabetes Father      Glaucoma No family hx of      Macular Degeneration No family hx of          ROS  CONSTITUTIONAL:NEGATIVE for fever, chills, currently  INTEGUMENTARY/SKIN: NEGATIVE for worrisome rashes,   EYES: NEGATIVE for vision changes or irritation  ENT/MOUTH: NEGATIVE for ear, mouth and throat problems  GI: NEGATIVE for nausea, abdominal pain,     OBJECTIVE:  BP (!) 144/91 (BP Location: Left arm, Patient Position: Chair, Cuff Size: Adult Regular)   Pulse 96   Temp 98.7  F (37.1  C) (Oral)   Wt 89.6 kg (197 lb 9.6 oz)   SpO2 96%   BMI 35.00 kg/m    GENERAL APPEARANCE: alert, moderate distress and cooperative, frequent congested cough  EYES: EOMI,  PERRL, conjunctiva clear  HENT: ear canals and TM's normal.  Nose and mouth without ulcers, erythema or lesions  NECK: supple, nontender, no lymphadenopathy  RESP: lungs clear to auscultation - no rales, rhonchi or wheezes  CV: regular rates and rhythm, normal S1 S2, no murmur noted  NEURO: Normal strength and tone, sensory exam grossly normal,  normal speech and mentation  SKIN: no suspicious lesions or rashes       ASSESSMENT:    Productive cough     - doxycycline hyclate (VIBRAMYCIN) 100 MG capsule; Take 1 capsule (100 mg) by mouth 2 times daily for 10 days  - albuterol (PROAIR HFA/PROVENTIL HFA/VENTOLIN HFA) 108 (90 Base)  MCG/ACT inhaler; Inhale 1-2 puffs into the lungs every 4 hours as needed for shortness of breath / dyspnea or wheezing      We discussed that based on the patient's description of symptoms, history and physical exam, that a bacterial infection has likely developed in the chest requiring treatment with antibiotics.     We discussed that the chest infection often starts as a viral illness, however, over time, the secretions in the chest can start to grow bacteria, with increased chest discomfort , productive sputum.  Antibiotics are only helpful when bacteria has started to grow in the respiratory secretions.   Any residual symptoms from a viral illness will not respond to the antibiotic.    The patient is advised to monitor the symptoms of the illness, and if worsening,  worse chest pain, increased productive sputum, persistent fevers/ chills, shortness of breath, then seek re-evaluation with primary care, UC or ER     Symptomatic measures encouraged, humidified air, plenty of fluids.  Patient may consider OTC expectorant and/or cough suppressant to treat symptoms.   acetaminophen, ibuprofen for pain and fever    Return if worsening

## 2019-02-10 NOTE — PATIENT INSTRUCTIONS

## 2019-03-08 DIAGNOSIS — G43.109 MIGRAINE WITH AURA AND WITHOUT STATUS MIGRAINOSUS, NOT INTRACTABLE: ICD-10-CM

## 2019-03-08 DIAGNOSIS — I10 HYPERTENSION GOAL BP (BLOOD PRESSURE) < 140/90: ICD-10-CM

## 2019-03-08 RX ORDER — VERAPAMIL HYDROCHLORIDE 120 MG/1
120 CAPSULE, EXTENDED RELEASE ORAL DAILY
Qty: 90 CAPSULE | Refills: 2 | Status: SHIPPED | OUTPATIENT
Start: 2019-03-08 | End: 2019-06-05

## 2019-03-08 NOTE — TELEPHONE ENCOUNTER
"Routing refill request to provider for review/approval because:  Labs not current:        Requested Prescriptions   Pending Prescriptions Disp Refills     verapamil ER (VERELAN) 120 MG 24 hr capsule [Pharmacy Med Name: Verapamil HCl ER Oral Capsule Extended Release 24 Hour 120 MG]  Last Written Prescription Date:  1/26/18  Last Fill Quantity: 90,  # refills: 3   Last office visit: 2/6/2019 with prescribing provider:     Future Office Visit:     90 capsule 2     Sig: Take 1 capsule (120 mg) by mouth daily    Calcium Channel Blockers Protocol  Failed - 3/8/2019  8:12 AM       Failed - Normal ALT in past 12 months    No lab results found.         Passed - Blood pressure under 140/90 in past 12 months    BP Readings from Last 3 Encounters:   02/10/19 118/84   02/06/19 118/84   12/14/18 (!) 118/96                Passed - Recent (12 mo) or future (30 days) visit within the authorizing provider's specialty    Patient had office visit in the last 12 months or has a visit in the next 30 days with authorizing provider or within the authorizing provider's specialty.  See \"Patient Info\" tab in inbasket, or \"Choose Columns\" in Meds & Orders section of the refill encounter.             Passed - Medication is active on med list       Passed - Patient is age 18 or older       Passed - No active pregnancy on record       Passed - Normal serum creatinine on file in past 12 months    Recent Labs   Lab Test 03/23/18  1423   CR 0.73            Passed - No positive pregnancy test in past 12 months        Ghislaine Hwang RN - [facility]      "

## 2019-05-24 NOTE — TELEPHONE ENCOUNTER
Patient is due for appointment. Last seen 12/14/18 and was to be seen in 2 months (around 2/14/19). Please call and schedule.    Marla Macdonald RN

## 2019-05-28 RX ORDER — TOPIRAMATE 50 MG/1
50 TABLET, FILM COATED ORAL 2 TIMES DAILY
Qty: 60 TABLET | Refills: 0 | Status: SHIPPED | OUTPATIENT
Start: 2019-05-28 | End: 2019-12-13

## 2019-05-29 NOTE — PATIENT INSTRUCTIONS
The anxiety and phobia workbook, by Eren Salvador      Preventive Health Recommendations  Female Ages 26 - 39  Yearly exam:   See your health care provider every year in order to    Review health changes.     Discuss preventive care.      Review your medicines if you your doctor has prescribed any.    Until age 30: Get a Pap test every three years (more often if you have had an abnormal result).    After age 30: Talk to your doctor about whether you should have a Pap test every 3 years or have a Pap test with HPV screening every 5 years.   You do not need a Pap test if your uterus was removed (hysterectomy) and you have not had cancer.  You should be tested each year for STDs (sexually transmitted diseases), if you're at risk.   Talk to your provider about how often to have your cholesterol checked.  If you are at risk for diabetes, you should have a diabetes test (fasting glucose).  Shots: Get a flu shot each year. Get a tetanus shot every 10 years.   Nutrition:     Eat at least 5 servings of fruits and vegetables each day.    Eat whole-grain bread, whole-wheat pasta and brown rice instead of white grains and rice.    Get adequate Calcium and Vitamin D.     Lifestyle    Exercise at least 150 minutes a week (30 minutes a day, 5 days of the week). This will help you control your weight and prevent disease.    Limit alcohol to one drink per day.    No smoking.     Wear sunscreen to prevent skin cancer.    See your dentist every six months for an exam and cleaning.

## 2019-06-05 ENCOUNTER — OFFICE VISIT (OUTPATIENT)
Dept: FAMILY MEDICINE | Facility: CLINIC | Age: 34
End: 2019-06-05
Payer: COMMERCIAL

## 2019-06-05 VITALS
OXYGEN SATURATION: 99 % | SYSTOLIC BLOOD PRESSURE: 118 MMHG | HEART RATE: 84 BPM | TEMPERATURE: 99.1 F | WEIGHT: 193 LBS | DIASTOLIC BLOOD PRESSURE: 76 MMHG | HEIGHT: 63 IN | BODY MASS INDEX: 34.2 KG/M2

## 2019-06-05 DIAGNOSIS — G43.109 MIGRAINE WITH AURA AND WITHOUT STATUS MIGRAINOSUS, NOT INTRACTABLE: ICD-10-CM

## 2019-06-05 DIAGNOSIS — I10 HYPERTENSION GOAL BP (BLOOD PRESSURE) < 140/90: ICD-10-CM

## 2019-06-05 DIAGNOSIS — Z13.6 CARDIOVASCULAR SCREENING; LDL GOAL LESS THAN 160: ICD-10-CM

## 2019-06-05 DIAGNOSIS — F41.9 ANXIETY: ICD-10-CM

## 2019-06-05 DIAGNOSIS — Z00.00 ROUTINE HISTORY AND PHYSICAL EXAMINATION OF ADULT: Primary | ICD-10-CM

## 2019-06-05 PROCEDURE — 99213 OFFICE O/P EST LOW 20 MIN: CPT | Mod: 25 | Performed by: NURSE PRACTITIONER

## 2019-06-05 PROCEDURE — 36415 COLL VENOUS BLD VENIPUNCTURE: CPT | Performed by: NURSE PRACTITIONER

## 2019-06-05 PROCEDURE — 90714 TD VACC NO PRESV 7 YRS+ IM: CPT | Performed by: NURSE PRACTITIONER

## 2019-06-05 PROCEDURE — 90471 IMMUNIZATION ADMIN: CPT | Performed by: NURSE PRACTITIONER

## 2019-06-05 PROCEDURE — 99395 PREV VISIT EST AGE 18-39: CPT | Mod: 25 | Performed by: NURSE PRACTITIONER

## 2019-06-05 PROCEDURE — 80061 LIPID PANEL: CPT | Performed by: NURSE PRACTITIONER

## 2019-06-05 PROCEDURE — 80048 BASIC METABOLIC PNL TOTAL CA: CPT | Performed by: NURSE PRACTITIONER

## 2019-06-05 RX ORDER — VERAPAMIL HYDROCHLORIDE 180 MG/1
180 CAPSULE, EXTENDED RELEASE ORAL DAILY
Qty: 90 CAPSULE | Refills: 3 | Status: SHIPPED | OUTPATIENT
Start: 2019-06-05 | End: 2021-02-05

## 2019-06-05 ASSESSMENT — ANXIETY QUESTIONNAIRES
5. BEING SO RESTLESS THAT IT IS HARD TO SIT STILL: NEARLY EVERY DAY
3. WORRYING TOO MUCH ABOUT DIFFERENT THINGS: NEARLY EVERY DAY
IF YOU CHECKED OFF ANY PROBLEMS ON THIS QUESTIONNAIRE, HOW DIFFICULT HAVE THESE PROBLEMS MADE IT FOR YOU TO DO YOUR WORK, TAKE CARE OF THINGS AT HOME, OR GET ALONG WITH OTHER PEOPLE: EXTREMELY DIFFICULT
GAD7 TOTAL SCORE: 21
1. FEELING NERVOUS, ANXIOUS, OR ON EDGE: NEARLY EVERY DAY
2. NOT BEING ABLE TO STOP OR CONTROL WORRYING: NEARLY EVERY DAY
6. BECOMING EASILY ANNOYED OR IRRITABLE: NEARLY EVERY DAY
7. FEELING AFRAID AS IF SOMETHING AWFUL MIGHT HAPPEN: NEARLY EVERY DAY

## 2019-06-05 ASSESSMENT — ENCOUNTER SYMPTOMS
CONSTIPATION: 0
PALPITATIONS: 0
FEVER: 0
BREAST MASS: 0
DYSURIA: 0
WEAKNESS: 0
EYE PAIN: 0
HEMATURIA: 0
SHORTNESS OF BREATH: 0
DIARRHEA: 0
HEMATOCHEZIA: 0
DIZZINESS: 0
ARTHRALGIAS: 0
NAUSEA: 0
ABDOMINAL PAIN: 0
HEADACHES: 1
COUGH: 0
MYALGIAS: 1
FREQUENCY: 0
SORE THROAT: 0
HEARTBURN: 0
PARESTHESIAS: 0
JOINT SWELLING: 0
CHILLS: 0
NERVOUS/ANXIOUS: 1

## 2019-06-05 ASSESSMENT — MIFFLIN-ST. JEOR: SCORE: 1554.57

## 2019-06-05 ASSESSMENT — PATIENT HEALTH QUESTIONNAIRE - PHQ9: 5. POOR APPETITE OR OVEREATING: NEARLY EVERY DAY

## 2019-06-05 ASSESSMENT — PAIN SCALES - GENERAL: PAINLEVEL: NO PAIN (0)

## 2019-06-05 NOTE — PROGRESS NOTES
SUBJECTIVE:   CC: Екатерина Ramon is an 33 year old woman who presents for preventive health visit.     Healthy Habits:     Getting at least 3 servings of Calcium per day:  NO    Bi-annual eye exam:  Yes    Dental care twice a year:  Yes    Sleep apnea or symptoms of sleep apnea:  Daytime drowsiness    Diet:  Regular (no restrictions)    Frequency of exercise:  2-3 days/week    Duration of exercise:  30-45 minutes    Taking medications regularly:  Yes    Medication side effects:  None    PHQ-2 Total Score: 2    Additional concerns today:  No    Anxiety is severe- increased Prozac 1 week ago. Work very stressful lately.    Migraines occur once/month and less severe headaches once/week. Tylenol is effective for the less severe headaches. Excedrin Migraine for migraines is helpful.      Today's PHQ-2 Score:   PHQ-2 (  [employer]) 2019   Q1: Little interest or pleasure in doing things 1   Q2: Feeling down, depressed or hopeless 1   PHQ-2 Score 2   Q1: Little interest or pleasure in doing things Several days   Q2: Feeling down, depressed or hopeless Several days   PHQ-2 Score 2       Abuse: Current or Past(Physical, Sexual or Emotional)- No  Do you feel safe in your environment? Yes    Social History     Tobacco Use     Smoking status: Former Smoker     Types: Cigarettes     Last attempt to quit: 2010     Years since quittin.1     Smokeless tobacco: Never Used   Substance Use Topics     Alcohol use: Yes     Comment: rare     If you drink alcohol do you typically have >3 drinks per day or >7 drinks per week? No    Alcohol Use 2019   Prescreen: >3 drinks/day or >7 drinks/week? No   Prescreen: >3 drinks/day or >7 drinks/week? -       Reviewed orders with patient.  Reviewed health maintenance and updated orders accordingly - Yes  Labs reviewed in EPIC    Mammogram not appropriate for this patient based on age.    Pertinent mammograms are reviewed under the imaging tab.  History of abnormal Pap smear: NO -  "age 30-65 PAP every 5 years with negative HPV co-testing recommended  PAP / HPV Latest Ref Rng & Units 9/11/2017 3/3/2014 2/22/2012   PAP - NIL NIL NIL   HPV 16 DNA NEG:Negative Negative - -   HPV 18 DNA NEG:Negative Negative - -   OTHER HR HPV NEG:Negative Negative - -     Reviewed and updated as needed this visit by clinical staff  Tobacco  Allergies  Meds         Reviewed and updated as needed this visit by Provider            Review of Systems   Constitutional: Negative for chills and fever.   HENT: Negative for congestion, ear pain, hearing loss and sore throat.    Eyes: Negative for pain and visual disturbance.   Respiratory: Negative for cough and shortness of breath.    Cardiovascular: Negative for chest pain, palpitations and peripheral edema.   Gastrointestinal: Negative for abdominal pain, constipation, diarrhea, heartburn, hematochezia and nausea.   Breasts:  Negative for tenderness, breast mass and discharge.   Genitourinary: Negative for dysuria, frequency, genital sores, hematuria, pelvic pain, urgency, vaginal bleeding and vaginal discharge.   Musculoskeletal: Positive for myalgias. Negative for arthralgias and joint swelling.   Skin: Negative for rash.   Neurological: Positive for headaches. Negative for dizziness, weakness and paresthesias.   Psychiatric/Behavioral: Negative for mood changes. The patient is nervous/anxious.           OBJECTIVE:   /76 (BP Location: Right arm, Patient Position: Chair, Cuff Size: Adult Large)   Pulse 84   Temp 99.1  F (37.3  C) (Oral)   Ht 1.6 m (5' 3\")   Wt 87.5 kg (193 lb)   LMP 05/25/2019   SpO2 99%   BMI 34.19 kg/m    Physical Exam  GENERAL: healthy, alert and no distress  EYES: Eyes grossly normal to inspection, PERRL and conjunctivae and sclerae normal  HENT: ear canals and TM's normal, nose and mouth without ulcers or lesions  NECK: no adenopathy, no asymmetry, masses, or scars and thyroid normal to palpation  RESP: lungs clear to auscultation - " no rales, rhonchi or wheezes  BREAST: normal without masses, tenderness or nipple discharge and no palpable axillary masses or adenopathy  CV: regular rate and rhythm, normal S1 S2, no S3 or S4, no murmur, click or rub, no peripheral edema and peripheral pulses strong  ABDOMEN: soft, nontender, no hepatosplenomegaly, no masses and bowel sounds normal  MS: no gross musculoskeletal defects noted, no edema  SKIN: no suspicious lesions or rashes  NEURO: Normal strength and tone, mentation intact and speech normal  PSYCH: mentation appears normal, affect normal/bright    Diagnostic Test Results:  Labs reviewed in Epic  No results found for this or any previous visit (from the past 24 hour(s)).    ASSESSMENT/PLAN:   1. Routine history and physical examination of adult      2. Anxiety  Poorly controlled- Prozac increased one week ago so will defer further med changes. Encouraged patient to consider mental health therapy. Can call for referral if she opts to pursue. Consider Anxiety and Phobia Workbook in the meantime.    3. Hypertension goal BP (blood pressure) < 140/90  Well controlled, labs today  - BASIC METABOLIC PANEL  - verapamil ER (VERELAN) 180 MG 24 hr capsule; Take 1 capsule (180 mg) by mouth daily  Dispense: 90 capsule; Refill: 3    4. Migraine with aura and without status migrainosus, not intractable  Has both migraines and milder chronic headaches. Migraines adequately controlled but continues to have frequent headaches- will increase Verapamil dose for better headache prophylaxis.   - verapamil ER (VERELAN) 180 MG 24 hr capsule; Take 1 capsule (180 mg) by mouth daily  Dispense: 90 capsule; Refill: 3    5. CARDIOVASCULAR SCREENING; LDL GOAL LESS THAN 160    - Lipid panel reflex to direct LDL Non-fasting    COUNSELING:  Reviewed preventive health counseling, as reflected in patient instructions       Regular exercise       Healthy diet/nutrition       Vision screening       Contraception       Family  "planning    Estimated body mass index is 34.19 kg/m  as calculated from the following:    Height as of this encounter: 1.6 m (5' 3\").    Weight as of this encounter: 87.5 kg (193 lb).    Weight management plan: Discussed healthy diet and exercise guidelines     reports that she quit smoking about 9 years ago. Her smoking use included cigarettes. She has never used smokeless tobacco.      Counseling Resources:  ATP IV Guidelines  Pooled Cohorts Equation Calculator  Breast Cancer Risk Calculator  FRAX Risk Assessment  ICSI Preventive Guidelines  Dietary Guidelines for Americans, 2010  USDA's MyPlate  ASA Prophylaxis  Lung CA Screening    ESTUARDO Jain [facility]  "

## 2019-06-06 ENCOUNTER — MYC MEDICAL ADVICE (OUTPATIENT)
Dept: FAMILY MEDICINE | Facility: CLINIC | Age: 34
End: 2019-06-06

## 2019-06-06 LAB
ANION GAP SERPL CALCULATED.3IONS-SCNC: 6 MMOL/L (ref 3–14)
BUN SERPL-MCNC: 13 MG/DL (ref 7–30)
CALCIUM SERPL-MCNC: 9.1 MG/DL (ref 8.5–10.1)
CHLORIDE SERPL-SCNC: 105 MMOL/L (ref 94–109)
CHOLEST SERPL-MCNC: 204 MG/DL
CO2 SERPL-SCNC: 27 MMOL/L (ref 20–32)
CREAT SERPL-MCNC: 0.87 MG/DL (ref 0.52–1.04)
GFR SERPL CREATININE-BSD FRML MDRD: 88 ML/MIN/{1.73_M2}
GLUCOSE SERPL-MCNC: 76 MG/DL (ref 70–99)
HDLC SERPL-MCNC: 56 MG/DL
LDLC SERPL CALC-MCNC: 113 MG/DL
NONHDLC SERPL-MCNC: 148 MG/DL
POTASSIUM SERPL-SCNC: 3.8 MMOL/L (ref 3.4–5.3)
SODIUM SERPL-SCNC: 138 MMOL/L (ref 133–144)
TRIGL SERPL-MCNC: 176 MG/DL

## 2019-06-06 ASSESSMENT — ANXIETY QUESTIONNAIRES: GAD7 TOTAL SCORE: 21

## 2019-06-06 NOTE — TELEPHONE ENCOUNTER
Please notify pt via mychart. Thanks.    Erin Bass RN  [facility], [place]

## 2019-06-06 NOTE — TELEPHONE ENCOUNTER
Sent [employer] message. Awaiting to hear back from patient on how she would like this to be returned to her.     Letter in [employer] out box. Rosemarie Mitchell,

## 2019-06-06 NOTE — TELEPHONE ENCOUNTER
Anusha,  Please see [employer] message below and advise.    Erin Bass RN  [facility], [place]

## 2019-06-06 NOTE — LETTER
June 6, 2019      Екатерина Ramon  [address]  [place] [address]        To Whom It May Concern:    Екатерина Ramon  was seen on 6/5/19.  Please excuse her on 6/7/19 and 06/10/19 due to illness.        Sincerely,        ESTUARDO Jain CNP

## 2019-06-06 NOTE — TELEPHONE ENCOUNTER
That's fine- letter written. Follow up asap if unable to return to work as planned.  Anusha Wolff, CNP

## 2019-06-10 NOTE — TELEPHONE ENCOUNTER
I can extend through today. Needs to be seen for any further requested days off.  Anusha Wolff, CNP

## 2019-06-10 NOTE — TELEPHONE ENCOUNTER
Let patient know via [employer] message that her letter was updated and the providers message. Patient prints letters from [employer]. Rosemarie Mitchell,

## 2019-06-14 ENCOUNTER — VIRTUAL VISIT (OUTPATIENT)
Dept: FAMILY MEDICINE | Facility: CLINIC | Age: 34
End: 2019-06-14
Payer: COMMERCIAL

## 2019-06-14 DIAGNOSIS — F51.04 PSYCHOPHYSIOLOGICAL INSOMNIA: ICD-10-CM

## 2019-06-14 DIAGNOSIS — F41.9 ANXIETY: Primary | ICD-10-CM

## 2019-06-14 PROCEDURE — 99441 ZZC PHYSICIAN TELEPHONE EVALUATION 5-10 MIN: CPT | Performed by: NURSE PRACTITIONER

## 2019-06-14 RX ORDER — TRAZODONE HYDROCHLORIDE 50 MG/1
50 TABLET, FILM COATED ORAL
Qty: 30 TABLET | Refills: 5 | Status: SHIPPED | OUTPATIENT
Start: 2019-06-14 | End: 2019-11-15

## 2019-06-14 RX ORDER — TRAMADOL HYDROCHLORIDE 50 MG/1
50 TABLET ORAL EVERY 6 HOURS PRN
Qty: 30 TABLET | Refills: 5 | Status: SHIPPED | OUTPATIENT
Start: 2019-06-14 | End: 2019-06-14

## 2019-06-14 NOTE — PROGRESS NOTES
"Екатерина Ramon is a 33 year old female who is being evaluated via a telephone visit.      The patient has been notified of following (by CHIO Hutchinson     \"We have found that certain health care needs can be provided without the need for a physical exam.  This service lets us provide the care you need with a short phone conversation.  If a prescription is necessary we can send it directly to your pharmacy.  If lab work is needed we can place an order for that and you can then stop by our lab to have the test done at a later time.    This telephone visit will be conducted via 3 way call with the you (the patient) , the physician/provider, and a me all on the line at the same time.  This allows your physician/provider to have the phone conversation with you while I will be taking notes for your medical record.  We will have full access to your [facility] medical record during this entire phone call.    Since this is like an office visit,  will bill your insurance company for this service.  Please check with your medical insurance if this type of telephone/virtual is covered . You may be responsible for the cost of this service if insurance coverage is denied.  The typical cost is $30 (10min), $59(11-20min) and $85 (21-30min)     If during the course of the call the physician/provider feels a telephone visit is not appropriate, you will not be charged for this service\"    Consent has been obtained for this service by care team member: yes.  See the scanned image in the medical record.    S: The history as provided by the patient to the provider during this call include:    No change since increasing Prozac.  Work is main trigger for anxiety.  [employer] denied leave despite work note from me. Boss blaming her for things outside her control.   School stressful- did take the summer off.  Not sleeping much at night. Tried Melatonin without improvement.   Through work found out she can get 5 free mental health therapy sessions & " has contacted them to schedule.    Pertinent parts of the the patient's medical history reviewed and confirmed by the provider included : Problem list, medication list     Total time of call between patient and provider was 10 minutes     Anusha Wolff CNP   (MD signature)  ===================================================    I have reviewed the note as documented above.  This accurately captures the substance of my conversation with the patient,    Additional provider notes: as above    Assessment/Plan:  (F41.9) Anxiety  (primary encounter diagnosis)  Comment: Continue Prozac at current dose, as dose was just increased 2 weeks ago. Will add Trazodone for insomnia.   Plan: FMLA forms filled out for leave for intermittent flare-ups and therapy sessions.

## 2019-06-24 ENCOUNTER — OFFICE VISIT (OUTPATIENT)
Dept: URGENT CARE | Facility: URGENT CARE | Age: 34
End: 2019-06-24
Payer: COMMERCIAL

## 2019-06-24 VITALS
TEMPERATURE: 97.4 F | WEIGHT: 198.8 LBS | BODY MASS INDEX: 35.22 KG/M2 | SYSTOLIC BLOOD PRESSURE: 122 MMHG | HEART RATE: 80 BPM | OXYGEN SATURATION: 100 % | DIASTOLIC BLOOD PRESSURE: 81 MMHG

## 2019-06-24 DIAGNOSIS — M62.830 BACK MUSCLE SPASM: Primary | ICD-10-CM

## 2019-06-24 PROCEDURE — 99213 OFFICE O/P EST LOW 20 MIN: CPT | Performed by: PHYSICIAN ASSISTANT

## 2019-06-24 RX ORDER — CYCLOBENZAPRINE HCL 10 MG
10 TABLET ORAL 3 TIMES DAILY PRN
Qty: 30 TABLET | Refills: 0 | Status: SHIPPED | OUTPATIENT
Start: 2019-06-24 | End: 2019-12-13

## 2019-06-24 ASSESSMENT — ENCOUNTER SYMPTOMS
HEADACHES: 0
MYALGIAS: 0
RESPIRATORY NEGATIVE: 1
FEVER: 0
NAUSEA: 0
JOINT SWELLING: 0
ARTHRALGIAS: 0
EYES NEGATIVE: 1
ENDOCRINE NEGATIVE: 1
NECK PAIN: 0
DIARRHEA: 0
BACK PAIN: 1
WOUND: 0
PALPITATIONS: 0
COUGH: 0
RHINORRHEA: 0
DIZZINESS: 0
WEAKNESS: 0
HEMATOLOGIC/LYMPHATIC NEGATIVE: 1
LIGHT-HEADEDNESS: 0
SORE THROAT: 0
BRUISES/BLEEDS EASILY: 0
CARDIOVASCULAR NEGATIVE: 1
VOMITING: 0
SHORTNESS OF BREATH: 0
NECK STIFFNESS: 0
CHILLS: 0
ALLERGIC/IMMUNOLOGIC NEGATIVE: 1

## 2019-06-24 NOTE — PROGRESS NOTES
Chief Complaint:    Chief Complaint   Patient presents with     Back Pain     Middle to lower back pain x 2 weeks.        HPI: Екатерина Ramon is an 33 year old female who presents for evaluation and treatment of low and mid back pain.  Patient has had this happen in the past.  She does not recall any injury.  She has tried ibuprofen and this has helped to s small extent.  She denies any numbness or tingling in the extremities.  No loss of bowel or bladder control.  No weakness in the extremities.      ROS:      Review of Systems   Constitutional: Negative for chills and fever.   HENT: Negative for congestion, ear pain, rhinorrhea and sore throat.    Eyes: Negative.    Respiratory: Negative.  Negative for cough and shortness of breath.    Cardiovascular: Negative.  Negative for chest pain and palpitations.   Gastrointestinal: Negative for diarrhea, nausea and vomiting.   Endocrine: Negative.    Genitourinary: Negative.    Musculoskeletal: Positive for back pain. Negative for arthralgias, joint swelling, myalgias, neck pain and neck stiffness.   Skin: Negative.  Negative for rash and wound.   Allergic/Immunologic: Negative.  Negative for immunocompromised state.   Neurological: Negative for dizziness, weakness, light-headedness and headaches.   Hematological: Negative.  Does not bruise/bleed easily.        Family History   Family History   Problem Relation Age of Onset     Diabetes Mother      Hypertension Mother      Cancer Mother         skin     Diabetes Father      Glaucoma No family hx of      Macular Degeneration No family hx of        Social History  Social History     Socioeconomic History     Marital status: Single     Spouse name: Not on file     Number of children: 1     Years of education: 12     Highest education level: Not on file   Occupational History     Occupation: retail     Employer: [employer]   Social Needs     Financial resource strain: Not on file     Food insecurity:     Worry: Not on file      Inability: Not on file     Transportation needs:     Medical: Not on file     Non-medical: Not on file   Tobacco Use     Smoking status: Former Smoker     Types: Cigarettes     Last attempt to quit: 2010     Years since quittin.1     Smokeless tobacco: Never Used   Substance and Sexual Activity     Alcohol use: Yes     Comment: rare     Drug use: No     Sexual activity: Not Currently     Partners: Male     Birth control/protection: Implant   Lifestyle     Physical activity:     Days per week: Not on file     Minutes per session: Not on file     Stress: Not on file   Relationships     Social connections:     Talks on phone: Not on file     Gets together: Not on file     Attends Sikhism service: Not on file     Active member of club or organization: Not on file     Attends meetings of clubs or organizations: Not on file     Relationship status: Not on file     Intimate partner violence:     Fear of current or ex partner: Not on file     Emotionally abused: Not on file     Physically abused: Not on file     Forced sexual activity: Not on file   Other Topics Concern     Parent/sibling w/ CABG, MI or angioplasty before 65F 55M? No   Social History Narrative     Not on file        Surgical History:  Past Surgical History:   Procedure Laterality Date     BUNIONECTOMY LAPIDUS WITH TARSAL METATARSAL (TMT) FUSION Right 2018    Procedure: BUNIONECTOMY LAPIDUS WITH TARSAL METATARSAL (TMT) FUSION;  Right first tarsometatarsal joint fusion, right Jackson bunionectomy;  Surgeon: Zack Healy DPM;  Location:  OR     HC REVISE MEDIAN N/CARPAL TUNNEL SURG Left 6/5/15    Primary, not revision     RELEASE CARPAL TUNNEL Left 2015    Procedure: RELEASE CARPAL TUNNEL;  Surgeon: Juan Jose Joaquin MD;  Location:  OR        Problem List:  Patient Active Problem List   Diagnosis     Adult BMI 30+     CARDIOVASCULAR SCREENING; LDL GOAL LESS THAN 160     Family history of diabetes mellitus     History of  depression     Sprain and strain of shoulder and upper arm     Hypertension goal BP (blood pressure) < 140/90     CTS (carpal tunnel syndrome)     S/P carpal tunnel release     Anxiety     BMI 40.0-44.9, adult (H)     Chronic bilateral thoracic back pain     Chronic midline low back pain without sciatica     Migraine with aura and without status migrainosus, not intractable     Morbid obesity due to excess calories (H)     Low serum HDL     Counseling for parent-child problem     Dysthymic disorder        Allergies:  Allergies   Allergen Reactions     Lisinopril Cough     Zoloft      tired        Current Meds:    Current Outpatient Medications:      cyclobenzaprine (FLEXERIL) 10 MG tablet, Take 1 tablet (10 mg) by mouth 3 times daily as needed for muscle spasms, Disp: 30 tablet, Rfl: 0     etonogestrel (IMPLANON/NEXPLANON) 68 MG IMPL, 1 each by Subdermal route once, Disp: , Rfl:      FLUoxetine (PROZAC) 40 MG capsule, Take 1 capsule (40 mg) by mouth daily, Disp: 30 capsule, Rfl: 1     insulin pen needle (BD TALHA U/F) 32G X 4 MM, Use once daily or as directed., Disp: 100 each, Rfl: 3     liraglutide (VICTOZA) 18 MG/3ML solution, Inject 1.8 mg Subcutaneous daily, Disp: 36 mL, Rfl: 1     topiramate (TOPAMAX) 50 MG tablet, Take 1 tablet (50 mg) by mouth 2 times daily Need to see MD for further refills, Disp: 60 tablet, Rfl: 0     traZODone (DESYREL) 50 MG tablet, Take 1 tablet (50 mg) by mouth nightly as needed for sleep, Disp: 30 tablet, Rfl: 5     verapamil ER (VERELAN) 180 MG 24 hr capsule, Take 1 capsule (180 mg) by mouth daily, Disp: 90 capsule, Rfl: 3     albuterol (PROAIR HFA/PROVENTIL HFA/VENTOLIN HFA) 108 (90 Base) MCG/ACT inhaler, Inhale 1-2 puffs into the lungs every 4 hours as needed for shortness of breath / dyspnea or wheezing (Patient not taking: Reported on 6/14/2019), Disp: 1 Inhaler, Rfl: 0     PHYSICAL EXAM:     Vital signs noted and reviewed by Hilario Prince  /81   Pulse 80   Temp 97.4  F  (36.3  C) (Tympanic)   Wt 90.2 kg (198 lb 12.8 oz)   LMP 05/25/2019   SpO2 100%   BMI 35.22 kg/m       PEFR:    Physical Exam   Constitutional: She is oriented to person, place, and time. She appears well-developed and well-nourished. She is cooperative.  Non-toxic appearance. She does not have a sickly appearance. She does not appear ill. No distress.   HENT:   Head: Normocephalic and atraumatic.   Right Ear: Tympanic membrane and external ear normal. Tympanic membrane is not perforated, not erythematous, not retracted and not bulging.   Left Ear: Tympanic membrane and external ear normal. Tympanic membrane is not perforated, not erythematous, not retracted and not bulging.   Nose: No mucosal edema or rhinorrhea.   Mouth/Throat: Oropharynx is clear and moist. No oropharyngeal exudate, posterior oropharyngeal edema, posterior oropharyngeal erythema or tonsillar abscesses.   Eyes: Pupils are equal, round, and reactive to light. EOM are normal.   Neck: Trachea normal, normal range of motion and full passive range of motion without pain. Neck supple.   Cardiovascular: Normal rate, regular rhythm, S1 normal, S2 normal, normal heart sounds and intact distal pulses. Exam reveals no gallop and no friction rub.   No murmur heard.  Pulmonary/Chest: Effort normal and breath sounds normal. No respiratory distress. She has no decreased breath sounds. She has no wheezes. She has no rhonchi. She has no rales.   Abdominal: Soft. Bowel sounds are normal. She exhibits no distension and no mass. There is no hepatosplenomegaly. There is no tenderness. There is no rigidity, no rebound, no guarding and no CVA tenderness.   Musculoskeletal:        Thoracic back: She exhibits tenderness, pain and spasm.        Back:    Lymphadenopathy:     She has no cervical adenopathy.   Neurological: She is alert and oriented to person, place, and time. She has normal strength and normal reflexes. She displays no atrophy and normal reflexes. No  cranial nerve deficit or sensory deficit. She exhibits normal muscle tone. Coordination and gait normal.   Reflex Scores:       Tricep reflexes are 2+ on the right side and 2+ on the left side.       Bicep reflexes are 2+ on the right side and 2+ on the left side.       Brachioradialis reflexes are 2+ on the right side and 2+ on the left side.       Patellar reflexes are 2+ on the right side and 2+ on the left side.       Achilles reflexes are 2+ on the right side and 2+ on the left side.  Skin: Skin is warm and dry. She is not diaphoretic.   Psychiatric: She has a normal mood and affect. Her behavior is normal. Judgment and thought content normal.   Nursing note and vitals reviewed.       Labs:       Medical Decision Making:    Differential Diagnosis:  Muscle spasm     ASSESSMENT:     1. Back muscle spasm           PLAN:     Rx for Flexeril today.  Stay active,  Ice to the affected area.  Ibuprofen for pain.  Follow up with your chiropractor this week.  Worrisome symptoms discussed with instructions to go to the ED.  Patient verbalized understanding and agreed with this plan.     Hilario Prince  6/24/2019, 6:16 PM

## 2019-08-15 ENCOUNTER — MYC MEDICAL ADVICE (OUTPATIENT)
Dept: FAMILY MEDICINE | Facility: CLINIC | Age: 34
End: 2019-08-15

## 2019-08-15 DIAGNOSIS — F34.1 DYSTHYMIC DISORDER: ICD-10-CM

## 2019-08-15 DIAGNOSIS — Z62.820 COUNSELING FOR PARENT-CHILD PROBLEM: ICD-10-CM

## 2019-08-15 DIAGNOSIS — F41.9 ANXIETY: Primary | ICD-10-CM

## 2019-08-15 DIAGNOSIS — Z71.89 COUNSELING FOR PARENT-CHILD PROBLEM: ICD-10-CM

## 2019-09-03 ENCOUNTER — TRANSFERRED RECORDS (OUTPATIENT)
Dept: HEALTH INFORMATION MANAGEMENT | Facility: CLINIC | Age: 34
End: 2019-09-03

## 2019-10-10 ENCOUNTER — E-VISIT (OUTPATIENT)
Dept: FAMILY MEDICINE | Facility: CLINIC | Age: 34
End: 2019-10-10
Payer: COMMERCIAL

## 2019-10-10 ENCOUNTER — MYC MEDICAL ADVICE (OUTPATIENT)
Dept: FAMILY MEDICINE | Facility: CLINIC | Age: 34
End: 2019-10-10

## 2019-10-10 DIAGNOSIS — F41.9 ANXIETY: Primary | ICD-10-CM

## 2019-10-10 ASSESSMENT — ANXIETY QUESTIONNAIRES
6. BECOMING EASILY ANNOYED OR IRRITABLE: NEARLY EVERY DAY
3. WORRYING TOO MUCH ABOUT DIFFERENT THINGS: NEARLY EVERY DAY
4. TROUBLE RELAXING: NEARLY EVERY DAY
2. NOT BEING ABLE TO STOP OR CONTROL WORRYING: NEARLY EVERY DAY
GAD7 TOTAL SCORE: 19
GAD7 TOTAL SCORE: 19
5. BEING SO RESTLESS THAT IT IS HARD TO SIT STILL: MORE THAN HALF THE DAYS
7. FEELING AFRAID AS IF SOMETHING AWFUL MIGHT HAPPEN: MORE THAN HALF THE DAYS
7. FEELING AFRAID AS IF SOMETHING AWFUL MIGHT HAPPEN: MORE THAN HALF THE DAYS
1. FEELING NERVOUS, ANXIOUS, OR ON EDGE: NEARLY EVERY DAY

## 2019-10-10 NOTE — TELEPHONE ENCOUNTER
Please see [employer] message below and advise.    Erin Bass RN  [facility]

## 2019-10-11 ASSESSMENT — ANXIETY QUESTIONNAIRES: GAD7 TOTAL SCORE: 19

## 2019-10-25 ENCOUNTER — MYC MEDICAL ADVICE (OUTPATIENT)
Dept: FAMILY MEDICINE | Facility: CLINIC | Age: 34
End: 2019-10-25

## 2019-10-25 NOTE — TELEPHONE ENCOUNTER
Unable to respond as message is regarding another patient.   [facility]     Marla Brooks RN

## 2019-11-05 ENCOUNTER — MYC MEDICAL ADVICE (OUTPATIENT)
Dept: PODIATRY | Facility: CLINIC | Age: 34
End: 2019-11-05

## 2019-11-14 ENCOUNTER — E-VISIT (OUTPATIENT)
Dept: FAMILY MEDICINE | Facility: CLINIC | Age: 34
End: 2019-11-14
Payer: COMMERCIAL

## 2019-11-14 DIAGNOSIS — F41.9 ANXIETY: Primary | ICD-10-CM

## 2019-11-14 ASSESSMENT — ANXIETY QUESTIONNAIRES
GAD7 TOTAL SCORE: 18
7. FEELING AFRAID AS IF SOMETHING AWFUL MIGHT HAPPEN: NEARLY EVERY DAY

## 2019-11-14 NOTE — TELEPHONE ENCOUNTER
Patient needs to be seen for the iron levels/heavy menses- does she still want me to do e-visit for depression or wait and address both at OV?  Anusha Wolff, CNP

## 2019-11-14 NOTE — TELEPHONE ENCOUNTER
Next 5 appointments (look out 90 days)    Nov 15, 2019 10:00 AM CST  Office Visit with ESTUARDO Jain CNP  [facility] ([address] [address]  [phone]   Dec 05, 2019  2:45 PM CST  Office Visit with Joel Michelle MD  [facility] [place] [address] [place] [id number] [phone]       Rosemarie Mitchell,     Patient has been update via [employer].

## 2019-11-14 NOTE — TELEPHONE ENCOUNTER
Per other mychart message from today:       I can't get in until the 21st. I don't want to wait that long.  I will do the evisit till for the depression and anxiety. I have a consult appointment set with Dr. Landeros for epidermal ablation.

## 2019-11-15 ENCOUNTER — OFFICE VISIT (OUTPATIENT)
Dept: FAMILY MEDICINE | Facility: CLINIC | Age: 34
End: 2019-11-15
Payer: COMMERCIAL

## 2019-11-15 VITALS
BODY MASS INDEX: 37.21 KG/M2 | HEART RATE: 97 BPM | SYSTOLIC BLOOD PRESSURE: 110 MMHG | HEIGHT: 63 IN | DIASTOLIC BLOOD PRESSURE: 82 MMHG | TEMPERATURE: 98.1 F | OXYGEN SATURATION: 100 % | WEIGHT: 210 LBS

## 2019-11-15 DIAGNOSIS — F32.1 MODERATE MAJOR DEPRESSION (H): ICD-10-CM

## 2019-11-15 DIAGNOSIS — F51.04 PSYCHOPHYSIOLOGICAL INSOMNIA: ICD-10-CM

## 2019-11-15 DIAGNOSIS — N92.0 MENORRHAGIA WITH REGULAR CYCLE: ICD-10-CM

## 2019-11-15 DIAGNOSIS — D50.0 IRON DEFICIENCY ANEMIA DUE TO CHRONIC BLOOD LOSS: ICD-10-CM

## 2019-11-15 DIAGNOSIS — F41.9 ANXIETY: Primary | ICD-10-CM

## 2019-11-15 LAB
BASOPHILS # BLD AUTO: 0.1 10E9/L (ref 0–0.2)
BASOPHILS NFR BLD AUTO: 0.6 %
DIFFERENTIAL METHOD BLD: ABNORMAL
EOSINOPHIL # BLD AUTO: 0.1 10E9/L (ref 0–0.7)
EOSINOPHIL NFR BLD AUTO: 1.2 %
ERYTHROCYTE [DISTWIDTH] IN BLOOD BY AUTOMATED COUNT: 15.7 % (ref 10–15)
FERRITIN SERPL-MCNC: 7 NG/ML (ref 12–150)
HCT VFR BLD AUTO: 37.4 % (ref 35–47)
HGB BLD-MCNC: 11.9 G/DL (ref 11.7–15.7)
LYMPHOCYTES # BLD AUTO: 2.4 10E9/L (ref 0.8–5.3)
LYMPHOCYTES NFR BLD AUTO: 21.2 %
MCH RBC QN AUTO: 26.4 PG (ref 26.5–33)
MCHC RBC AUTO-ENTMCNC: 31.8 G/DL (ref 31.5–36.5)
MCV RBC AUTO: 83 FL (ref 78–100)
MONOCYTES # BLD AUTO: 0.8 10E9/L (ref 0–1.3)
MONOCYTES NFR BLD AUTO: 7 %
NEUTROPHILS # BLD AUTO: 8.1 10E9/L (ref 1.6–8.3)
NEUTROPHILS NFR BLD AUTO: 70 %
PLATELET # BLD AUTO: 339 10E9/L (ref 150–450)
RBC # BLD AUTO: 4.5 10E12/L (ref 3.8–5.2)
WBC # BLD AUTO: 11.5 10E9/L (ref 4–11)

## 2019-11-15 PROCEDURE — 99214 OFFICE O/P EST MOD 30 MIN: CPT | Performed by: NURSE PRACTITIONER

## 2019-11-15 PROCEDURE — 85025 COMPLETE CBC W/AUTO DIFF WBC: CPT | Performed by: NURSE PRACTITIONER

## 2019-11-15 PROCEDURE — 82728 ASSAY OF FERRITIN: CPT | Performed by: NURSE PRACTITIONER

## 2019-11-15 PROCEDURE — 36415 COLL VENOUS BLD VENIPUNCTURE: CPT | Performed by: NURSE PRACTITIONER

## 2019-11-15 RX ORDER — NORETHINDRONE ACETATE 5 MG
10 TABLET ORAL DAILY
Qty: 20 TABLET | Refills: 0 | Status: SHIPPED | OUTPATIENT
Start: 2019-11-15 | End: 2019-11-29

## 2019-11-15 RX ORDER — FERROUS SULFATE 325(65) MG
325 TABLET, DELAYED RELEASE (ENTERIC COATED) ORAL DAILY
Qty: 90 TABLET | Refills: 0 | Status: SHIPPED | OUTPATIENT
Start: 2019-11-15 | End: 2019-12-13

## 2019-11-15 RX ORDER — TRAZODONE HYDROCHLORIDE 50 MG/1
50-100 TABLET, FILM COATED ORAL
Qty: 60 TABLET | Refills: 5 | Status: SHIPPED | OUTPATIENT
Start: 2019-11-15 | End: 2020-10-02

## 2019-11-15 ASSESSMENT — MIFFLIN-ST. JEOR: SCORE: 1626.68

## 2019-11-15 NOTE — PROGRESS NOTES
Subjective     Екатерина Ramon is a 34 year old female who presents to clinic today for the following health issues:    HPI     Chief Complaint   Patient presents with     Depression     Anxiety     Heavy menses     requsting lab draw to check iron level       Depression and Anxiety Follow-Up    How are you doing with your depression since your last visit? Worsened- unsure why, seeing therapist weekly    How are you doing with your anxiety since your last visit?  Worsened as above    Are you having other symptoms that might be associated with depression or anxiety? Yes:  having trouble sleeping    Have you had a significant life event? No     Do you have any concerns with your use of alcohol or other drugs? No    Social History     Tobacco Use     Smoking status: Former Smoker     Types: Cigarettes     Last attempt to quit: 2010     Years since quittin.5     Smokeless tobacco: Never Used   Substance Use Topics     Alcohol use: Yes     Comment: rare     Drug use: No     PHQ 2017   PHQ-9 Total Score 4 4 1   Q9: Thoughts of better off dead/self-harm past 2 weeks Not at all Not at all Not at all     BERENICE-7 SCORE 2019 10/10/2019 2019   Total Score - - -   Total Score - 19 (severe anxiety) 18 (severe anxiety)   Total Score 21 19 -           Suicide Assessment Five-step Evaluation and Treatment (SAFE-T)      How many servings of fruits and vegetables do you eat daily?  2-3    On average, how many sweetened beverages do you drink each day (soda, juice, sweet tea, etc)?   1-2  How many days per week do you miss taking your medication? Maybe once    What makes it hard for you to take your medications?  remembering to take    Has had heavy periods for years- has her third Nexplanon in place. Menses last 3 weeks. Has heavy flow & changing a tampon every 1-2 hours- currently menses have lasted 2 weeks and heavy flow for the last week. Fatigued. No ankle swelling, SOB. Plan to see OBGYN  "about endometrial ablation.      Reviewed and updated as needed this visit by Provider         Review of Systems   ROS COMP: Constitutional, HEENT, cardiovascular, pulmonary, gi and gu systems are negative, except as otherwise noted.      Objective    /82 (BP Location: Left arm, Patient Position: Chair, Cuff Size: Adult Large)   Pulse 97   Temp 98.1  F (36.7  C) (Oral)   Ht 1.6 m (5' 3\")   Wt 95.3 kg (210 lb)   LMP 11/02/2019   SpO2 100%   BMI 37.20 kg/m    Body mass index is 37.2 kg/m .  Physical Exam   GENERAL: healthy, alert and no distress  RESP: lungs clear to auscultation - no rales, rhonchi or wheezes  CV: regular rate and rhythm, normal S1 S2, no S3 or S4, no murmur, click or rub, no peripheral edema and peripheral pulses strong  PSYCH: mentation appears normal, affect normal/bright    Diagnostic Test Results:  Labs reviewed in Epic  Results for orders placed or performed in visit on 11/15/19 (from the past 24 hour(s))   CBC with platelets differential   Result Value Ref Range    WBC 11.5 (H) 4.0 - 11.0 10e9/L    RBC Count 4.50 3.8 - 5.2 10e12/L    Hemoglobin 11.9 11.7 - 15.7 g/dL    Hematocrit 37.4 35.0 - 47.0 %    MCV 83 78 - 100 fl    MCH 26.4 (L) 26.5 - 33.0 pg    MCHC 31.8 31.5 - 36.5 g/dL    RDW 15.7 (H) 10.0 - 15.0 %    Platelet Count 339 150 - 450 10e9/L    % Neutrophils 70.0 %    % Lymphocytes 21.2 %    % Monocytes 7.0 %    % Eosinophils 1.2 %    % Basophils 0.6 %    Absolute Neutrophil 8.1 1.6 - 8.3 10e9/L    Absolute Lymphocytes 2.4 0.8 - 5.3 10e9/L    Absolute Monocytes 0.8 0.0 - 1.3 10e9/L    Absolute Eosinophils 0.1 0.0 - 0.7 10e9/L    Absolute Basophils 0.1 0.0 - 0.2 10e9/L    Diff Method Automated Method            Assessment & Plan     1. Anxiety  Ok to increase dose, continue seeing therapist  - FLUoxetine (PROZAC) 20 MG capsule; Take 3 capsules (60 mg) by mouth daily  Dispense: 270 capsule; Refill: 1    2. Psychophysiological insomnia  Ok to increase trazodone dose  - " traZODone (DESYREL) 50 MG tablet; Take 1-2 tablets ( mg) by mouth nightly as needed for sleep  Dispense: 60 tablet; Refill: 5    3. Moderate major depression (H)  As above    4. Menorrhagia with regular cycle  Labs today, start high dose progesterone to slow bleeding. If not improving over the next week, patient to contact me and will increase dose to two times daily. Follow-up with OBGYN as planned.  - CBC with platelets differential  - Ferritin  - norethindrone (AYGESTIN) 5 MG tablet; Take 2 tablets (10 mg) by mouth daily for 10 days  Dispense: 20 tablet; Refill: 0       Return in about 6 weeks (around 12/27/2019) for Depression, Anxiety.    ESTUARDO [facility]

## 2019-11-20 ENCOUNTER — MYC MEDICAL ADVICE (OUTPATIENT)
Dept: FAMILY MEDICINE | Facility: CLINIC | Age: 34
End: 2019-11-20

## 2019-11-28 ENCOUNTER — MYC MEDICAL ADVICE (OUTPATIENT)
Dept: FAMILY MEDICINE | Facility: CLINIC | Age: 34
End: 2019-11-28

## 2019-11-28 DIAGNOSIS — N92.0 MENORRHAGIA WITH REGULAR CYCLE: ICD-10-CM

## 2019-11-29 RX ORDER — NORETHINDRONE ACETATE 5 MG
10 TABLET ORAL DAILY
Qty: 20 TABLET | Refills: 0 | Status: SHIPPED | OUTPATIENT
Start: 2019-11-29 | End: 2019-12-13

## 2019-11-29 NOTE — TELEPHONE ENCOUNTER
Patient was seen on 11/15/19 for menorrhagia and given norethindrone to take for 10 days.  Per optionsXpresst message patient started bleeding again yesterday, her period stopped for about a week and a half.  She would like refill.   Zofia Shields RN

## 2019-12-05 ENCOUNTER — OFFICE VISIT (OUTPATIENT)
Dept: OBGYN | Facility: CLINIC | Age: 34
End: 2019-12-05
Payer: COMMERCIAL

## 2019-12-05 ENCOUNTER — PREP FOR PROCEDURE (OUTPATIENT)
Dept: FAMILY MEDICINE | Facility: CLINIC | Age: 34
End: 2019-12-05

## 2019-12-05 VITALS
BODY MASS INDEX: 36.87 KG/M2 | DIASTOLIC BLOOD PRESSURE: 77 MMHG | HEART RATE: 82 BPM | HEIGHT: 63 IN | OXYGEN SATURATION: 98 % | SYSTOLIC BLOOD PRESSURE: 124 MMHG | WEIGHT: 208.1 LBS

## 2019-12-05 DIAGNOSIS — Z30.9 CONTRACEPTIVE MANAGEMENT: Primary | ICD-10-CM

## 2019-12-05 DIAGNOSIS — N92.1 BREAKTHROUGH BLEEDING ON NEXPLANON: ICD-10-CM

## 2019-12-05 DIAGNOSIS — Z30.2 REQUEST FOR STERILIZATION: ICD-10-CM

## 2019-12-05 DIAGNOSIS — Z97.5 BREAKTHROUGH BLEEDING ON NEXPLANON: ICD-10-CM

## 2019-12-05 DIAGNOSIS — N93.9 ABNORMAL UTERINE BLEEDING: Primary | ICD-10-CM

## 2019-12-05 DIAGNOSIS — Z86.69 HISTORY OF MIGRAINE WITH AURA: ICD-10-CM

## 2019-12-05 PROCEDURE — 99215 OFFICE O/P EST HI 40 MIN: CPT | Performed by: OBSTETRICS & GYNECOLOGY

## 2019-12-05 ASSESSMENT — MIFFLIN-ST. JEOR: SCORE: 1610.13

## 2019-12-05 NOTE — PROGRESS NOTES
Екатерина is a 34 year old  referred here by Anusha Wolff with complaint of abnormal uterine bleeding.    She has had heavy bleeding for years. She has used the Nexplanon for contraception and bleeding issues. She had bad bleeding the end of October for about 3 weeks.   She was soaking through a super tampon in an hour or less  She has had clots, gumball size.    She did Hgb and she was mildly anemic.  She was given Fe and Aygestin to help stop bleeding.  It stopped and then it started up again and she was given another course of the Aygestin.  She does not want to use any OCPs for menstrual regulation.    The bleeding is always about 3 weeks long.    She has had associated fatigue.  No chest pain, shortness of breath.    She had the following ultrasound previously and we reviewed the report and the films.     PELVIC ULTRASOUND WITH ENDOVAGINAL TRANSDUCER IMAGING   2018 1:57 PM   HISTORY: Dysfunctional uterine bleeding.  TECHNIQUE:  Endovaginal sonography was added to the transabdominal exam to better evaluate the uterus and ovaries.  COMPARISON: None.  FINDINGS:   Endometrium: Endometrium is 2 mm thick.  Uterus: 7.4 x 3 x 3.2 cm.  Right ovary: Normal.  Left ovary: Normal.  Additional findings: None.                                                           IMPRESSION:  Normal examination. Normal-appearing endometrium.  Component      Latest Ref Rng & Units 2018 11/15/2019   WBC      4.0 - 11.0 10e9/L 12.4 (H) 11.5 (H)   RBC Count      3.8 - 5.2 10e12/L 4.10 4.50   Hemoglobin      11.7 - 15.7 g/dL 12.4 11.9   Hematocrit      35.0 - 47.0 % 37.4 37.4   MCV      78 - 100 fl 91 83   MCH      26.5 - 33.0 pg 30.2 26.4 (L)   MCHC      31.5 - 36.5 g/dL 33.2 31.8   RDW      10.0 - 15.0 % 13.3 15.7 (H)   Platelet Count      150 - 450 10e9/L 330 339   Ferritin      12 - 150 ng/mL  7 (L)       Past Medical History:   Diagnosis Date     Family history of diabetes mellitus      Hypertension      Migraine with aura  and without status migrainosus, not intractable 2016     PID (acute pelvic inflammatory disease) 2010       Past Surgical History:   Procedure Laterality Date     BUNIONECTOMY LAPIDUS WITH TARSAL METATARSAL (TMT) FUSION Right 2018    Procedure: BUNIONECTOMY LAPIDUS WITH TARSAL METATARSAL (TMT) FUSION;  Right first tarsometatarsal joint fusion, right Jackson bunionectomy;  Surgeon: Zack Healy DPM;  Location: [facility] OR     HC REVISE MEDIAN N/CARPAL TUNNEL SURG Left 6/5/15    Primary, not revision     RELEASE CARPAL TUNNEL Left 2015    Procedure: RELEASE CARPAL TUNNEL;  Surgeon: Juan Jose Joaquin MD;  Location: [facility] OR       OB History    Para Term  AB Living   2 1 1 0 1 1   SAB TAB Ectopic Multiple Live Births   0 1 0 0 1      # Outcome Date GA Lbr Barber/2nd Weight Sex Delivery Anes PTL Lv   2 TAB            1 Term         ANUJA       Gynecological History         Patient's last menstrual period was 2019 (exact date).     no STD/no PID/no IUD      see above HPI       Allergies   Allergen Reactions     Lisinopril Cough     Zoloft      tired       Current Outpatient Medications   Medication Sig Dispense Refill     etonogestrel (IMPLANON/NEXPLANON) 68 MG IMPL 1 each by Subdermal route once       FLUoxetine (PROZAC) 20 MG capsule Take 3 capsules (60 mg) by mouth daily 270 capsule 1     insulin pen needle (BD TALHA U/F) 32G X 4 MM Use once daily or as directed. 100 each 3     liraglutide (VICTOZA) 18 MG/3ML solution Inject 1.8 mg Subcutaneous daily 36 mL 1     norethindrone (AYGESTIN) 5 MG tablet Take 2 tablets (10 mg) by mouth daily 20 tablet 0     traZODone (DESYREL) 50 MG tablet Take 1-2 tablets ( mg) by mouth nightly as needed for sleep 60 tablet 5     verapamil ER (VERELAN) 180 MG 24 hr capsule Take 1 capsule (180 mg) by mouth daily 90 capsule 3     albuterol (PROAIR HFA/PROVENTIL HFA/VENTOLIN HFA) 108 (90 Base) MCG/ACT inhaler Inhale 1-2 puffs into the lungs every 4 hours as  needed for shortness of breath / dyspnea or wheezing (Patient not taking: Reported on 2019) 1 Inhaler 0     cyclobenzaprine (FLEXERIL) 10 MG tablet Take 1 tablet (10 mg) by mouth 3 times daily as needed for muscle spasms 30 tablet 0     ferrous sulfate (FE TABS) 325 (65 Fe) MG EC tablet Take 1 tablet (325 mg) by mouth daily (Patient not taking: Reported on 2019) 90 tablet 0     topiramate (TOPAMAX) 50 MG tablet Take 1 tablet (50 mg) by mouth 2 times daily Need to see MD for further refills (Patient not taking: Reported on 2019) 60 tablet 0       Social History     Socioeconomic History     Marital status: Single     Spouse name: Not on file     Number of children: 1     Years of education: 12     Highest education level: Not on file   Occupational History     Occupation: retail     Employer: [employer]   Social Needs     Financial resource strain: Not on file     Food insecurity:     Worry: Not on file     Inability: Not on file     Transportation needs:     Medical: Not on file     Non-medical: Not on file   Tobacco Use     Smoking status: Former Smoker     Types: Cigarettes     Last attempt to quit: 2010     Years since quittin.6     Smokeless tobacco: Never Used   Substance and Sexual Activity     Alcohol use: Yes     Comment: rare     Drug use: No     Sexual activity: Not Currently     Partners: Male     Birth control/protection: Implant   Lifestyle     Physical activity:     Days per week: Not on file     Minutes per session: Not on file     Stress: Not on file   Relationships     Social connections:     Talks on phone: Not on file     Gets together: Not on file     Attends Episcopal service: Not on file     Active member of club or organization: Not on file     Attends meetings of clubs or organizations: Not on file     Relationship status: Not on file     Intimate partner violence:     Fear of current or ex partner: Not on file     Emotionally abused: Not on file     Physically abused:  "Not on file     Forced sexual activity: Not on file   Other Topics Concern     Parent/sibling w/ CABG, MI or angioplasty before 65F 55M? No   Social History Narrative     Not on file       Family History   Problem Relation Age of Onset     Diabetes Mother      Hypertension Mother      Cancer Mother         skin     Diabetes Father      Glaucoma No family hx of      Macular Degeneration No family hx of          Review of Systems:  10 point ROS of systems including Constitutional, Eyes, Respiratory, Cardiovascular, Gastroenterology, Genitourinary, Integumentary, Muscularskeletal, Psychiatric were all negative except for pertinent positives noted in my HPI and in the PMH.          EXAM:  /77 (BP Location: Right arm, Cuff Size: Adult Large)   Pulse 82   Ht 1.588 m (5' 2.5\")   Wt 94.4 kg (208 lb 1.6 oz)   LMP 11/27/2019 (Exact Date)   SpO2 98%   Breastfeeding No   BMI 37.46 kg/m    Body mass index is 37.46 kg/m .  General Appearance:  healthy, alert, active, no distress  Skin:  Normal skin turgor  Neuro:  Alert, cranial nerves grossly intact  HEENT: NCAT  Neck:  No masses or lesions carotids are +2/4. No bruits heard  Lungs:  Good respiratory effort   Abdomen: Soft, nontender.  Normal bowel sounds.  No masses  Vulva: No external lesions, normal hair distribution, no adenopathy  BUS:  Normal, no masses noted  Urethral meatus:  No masses noted  Urethra:  No hypermobility  Vagina: Moist, pink, no abnormal discharge, well rugated, no lesions  Cervix: Smooth, pink, no visible lesions  Uterus: Normal size, anteverted, non-tender, mobile  Ovaries: No mass, non-tender, mobile  Extremities:  No clubbing, cyanosis or edema.        ASSESSMENT:  Abnormal uterine bleeding   Breakthrough bleeding with Nexplanon   Undesired fertility   History of migraines with auras      PLAN:  We discussed the bleeding profiles as people age.  Even though menstrual changes and irregularities are common and expected, they may also " indicate or precipitate endometrial abnormalities.  However, the use of the progestins help with decreasing endometrial abnormalities that are cancerous or precancerous.  The progestin therapy options often cause breakthrough bleeding.  Together we reviewed the risks and benefits of medical versus surgical therapy.  Medical therapy reviewed included hormonal manipulation with OCP's, Patch, Ring, Depo, or IUD.  Combination HT is contraindicated due to the history of migraines with auras. We reviewed endometrial ablation versus hysterectomy.  Discussed that endometrial ablation is minimally invasive compared to hysterectomy but may not be definitive.  Patient is desiring to have the endometrial ablation.  The [employer] endometrial ablation pamphlet is given to her and reviewed with her. We also reviewed the need for permanent contraception with the endometrial ablation.  The options include tubal ligation or vasectomy.  The tubal ligation is reviewed with her.  The ACOG pamphlet is given and reviewed with her.   The procedures were discussed and reviewed in detail with her.  The risks of the procedures are also reviewed and include, but are not limited to, death, brain damage, paralysis of any or all limbs, loss of an organ, function of an organ, disfiguring scars, bleeding, infection, damage to the uterus, tubes, ovaries, bowel, bladder, cervix and vagina.  In addition, there is a possibility of other procedures that might be deemed necessary at the time of the surgery, depending upon findings and/or complications.  This may also include laparoscopy, laparotomy, and rarely colostomy (which often times can be reversible in the future).  Risks with blood include but are not limited to HIV/AIDS, hepatitis and transfusion reactions, with transfusion reactions being the greatest risk.    TT 45 min face to face  CT greater than 80% as noted above in the HPI and in the Plan.     Joel Michelle MD

## 2019-12-06 ENCOUNTER — TELEPHONE (OUTPATIENT)
Dept: OBGYN | Facility: CLINIC | Age: 34
End: 2019-12-06

## 2019-12-06 ENCOUNTER — HOSPITAL ENCOUNTER (OUTPATIENT)
Facility: AMBULATORY SURGERY CENTER | Age: 34
End: 2019-12-06
Attending: OBSTETRICS & GYNECOLOGY | Admitting: OBSTETRICS & GYNECOLOGY
Payer: COMMERCIAL

## 2019-12-06 DIAGNOSIS — Z30.9 CONTRACEPTIVE MANAGEMENT: ICD-10-CM

## 2019-12-06 NOTE — TELEPHONE ENCOUNTER
Surgery Pre-Certification    Medical Record Number: [id number]  Екатерина Ramon  YOB: 1985   Phone: [phone] (home) [phone] (work)  Primary Provider: Anusha Wolff    Reason for Admit:  Abnormal uterine bleeding, undesired fertility    Surgeon: ADOLFO Michelle MD  Surgical Procedure: nova sure endometrial ablation/bilateral tubal sterilization  ICD-9 Coded: N93.9 - Z30.2  Date of Surgery: 01/23/2020  Consent signed? Yes    Date signed: 12/05/2019  Hospital: [facility]  Outpatient    Requestor:  Wilma Knutson     Location:  [place]      Surgery Scheduled.    Date of Surgery 01/23/2020 Time of Surgery 12:00  Type of Anesthesia Anticipated: General  Pre-Op: made with PCP  Post-Op:  2 weeks with Dr. Michelle  at   Pre-certification emailed: Yes    Surgery packet mailed to patient's home address: Yes  Patient instructed NPO 12 hours prior to surgery, arrive 10 hours prior to surgery, must  have a .  Patient understood and agrees to the plan.

## 2019-12-09 NOTE — TELEPHONE ENCOUNTER
No Pa required for patients COMBINED DILATE CERVIX, HYSTEROSCOPY, ABLATE ENDOMETRIUM NOVASURE [[id number]]  SALPINGO-OOPHORECTOMY, BILATERAL through her [facility] federal insurance.    Call reference: [id number]

## 2019-12-12 ENCOUNTER — MYC REFILL (OUTPATIENT)
Dept: FAMILY MEDICINE | Facility: CLINIC | Age: 34
End: 2019-12-12

## 2019-12-12 ENCOUNTER — MYC MEDICAL ADVICE (OUTPATIENT)
Dept: OBGYN | Facility: CLINIC | Age: 34
End: 2019-12-12

## 2019-12-12 ENCOUNTER — MYC MEDICAL ADVICE (OUTPATIENT)
Dept: FAMILY MEDICINE | Facility: CLINIC | Age: 34
End: 2019-12-12

## 2019-12-12 DIAGNOSIS — N92.0 MENORRHAGIA WITH REGULAR CYCLE: ICD-10-CM

## 2019-12-12 NOTE — TELEPHONE ENCOUNTER
Patient was seen on 12/5/2019 with Dr. Michelle for abnormal uterine bleeding.     Patient currently has Nexplanon and OCPs for breakthrough bleeding.    Patient is scheduled for surgical procedure: nova sure endometrial ablation/bilateral tubal sterilization on 01/23/2020.     Eleonora Cannon RN on 12/12/2019 at 9:54 AM

## 2019-12-12 NOTE — TELEPHONE ENCOUNTER
Anusha,  Please see [employer] message below. Pt notified that you are out of the office until 12/16.     Erin Bass RN  [facility]

## 2019-12-13 ENCOUNTER — OFFICE VISIT (OUTPATIENT)
Dept: OBGYN | Facility: OTHER | Age: 34
End: 2019-12-13
Payer: COMMERCIAL

## 2019-12-13 ENCOUNTER — MYC MEDICAL ADVICE (OUTPATIENT)
Dept: FAMILY MEDICINE | Facility: CLINIC | Age: 34
End: 2019-12-13

## 2019-12-13 VITALS
WEIGHT: 204.75 LBS | DIASTOLIC BLOOD PRESSURE: 76 MMHG | SYSTOLIC BLOOD PRESSURE: 112 MMHG | HEART RATE: 64 BPM | BODY MASS INDEX: 36.85 KG/M2

## 2019-12-13 DIAGNOSIS — Z30.430 ENCOUNTER FOR IUD INSERTION: ICD-10-CM

## 2019-12-13 DIAGNOSIS — Z30.46 NEXPLANON REMOVAL: Primary | ICD-10-CM

## 2019-12-13 DIAGNOSIS — Z97.5 IUD (INTRAUTERINE DEVICE) IN PLACE: ICD-10-CM

## 2019-12-13 PROCEDURE — 11982 REMOVE DRUG IMPLANT DEVICE: CPT | Performed by: ADVANCED PRACTICE MIDWIFE

## 2019-12-13 PROCEDURE — 58300 INSERT INTRAUTERINE DEVICE: CPT | Performed by: ADVANCED PRACTICE MIDWIFE

## 2019-12-13 RX ORDER — NORETHINDRONE ACETATE 5 MG
10 TABLET ORAL DAILY
Qty: 20 TABLET | Refills: 0 | Status: SHIPPED | OUTPATIENT
Start: 2019-12-13 | End: 2019-12-13

## 2019-12-13 NOTE — PROGRESS NOTES
The Nexplanon chevy was located in the left  arm.  The distal and proximal ends of the chevy were located and marked with a marking pen and the area cleansed with betadine. Approximately 0.3 cc's of 1% lidocaine with epinephrine was injected into the area under the Nexplanon chevy and a small skin incision made using a #11 blade.  The Nexplanon was gently manipulated until the tip was at the incision. The chevy tip was grasped with a  Lyn clamp and was gently removed from the incision.  Both chevy tips were inspected following removal and found to be completely intact.  The incision site was hemostatic and a steri-strip applied to the area along with a small pressure dressing.       CC/HPI: Екатерина Ramon is a 34 year old who presents to the clinic for an IUD insertion.   Patient's last menstrual period was 11/27/2019 (exact date)..   Current birth control method: implanon  Indication for insertion:   birth control and cycle control/menorrhagia   Patient has been provided with written information.  I have reviewed the risks of the IUD including pregnancy, PID, life threatening infection, perforation, expulsion, cramping, changes in bleeding and ovarian cysts. Benefits of the IUD and alternative birth control and cycle control methods have been discussed.  Patients questions have been answered.  Patient has verbalized understanding of risks and benefits and has signed the consent form.    Allergies   Allergen Reactions     Lisinopril Cough     Zoloft      tired     Current Outpatient Medications   Medication Sig Dispense Refill     etonogestrel (IMPLANON/NEXPLANON) 68 MG IMPL 1 each by Subdermal route once       FLUoxetine (PROZAC) 20 MG capsule Take 3 capsules (60 mg) by mouth daily 270 capsule 1     insulin pen needle (BD TALHA U/F) 32G X 4 MM Use once daily or as directed. 100 each 3     liraglutide (VICTOZA) 18 MG/3ML solution Inject 1.8 mg Subcutaneous daily 36 mL 1     traZODone (DESYREL) 50 MG tablet Take 1-2 tablets  ( mg) by mouth nightly as needed for sleep 60 tablet 5     verapamil ER (VERELAN) 180 MG 24 hr capsule Take 1 capsule (180 mg) by mouth daily 90 capsule 3     albuterol (PROAIR HFA/PROVENTIL HFA/VENTOLIN HFA) 108 (90 Base) MCG/ACT inhaler Inhale 1-2 puffs into the lungs every 4 hours as needed for shortness of breath / dyspnea or wheezing (Patient not taking: Reported on 6/14/2019) 1 Inhaler 0     cyclobenzaprine (FLEXERIL) 10 MG tablet Take 1 tablet (10 mg) by mouth 3 times daily as needed for muscle spasms 30 tablet 0     ferrous sulfate (FE TABS) 325 (65 Fe) MG EC tablet Take 1 tablet (325 mg) by mouth daily (Patient not taking: Reported on 12/5/2019) 90 tablet 0     norethindrone (AYGESTIN) 5 MG tablet Take 2 tablets (10 mg) by mouth daily (Patient not taking: Reported on 12/13/2019) 20 tablet 0     topiramate (TOPAMAX) 50 MG tablet Take 1 tablet (50 mg) by mouth 2 times daily Need to see MD for further refills (Patient not taking: Reported on 12/5/2019) 60 tablet 0      Past Medical History:   Diagnosis Date     Family history of diabetes mellitus      Hypertension      Migraine with aura and without status migrainosus, not intractable 11/2/2016     PID (acute pelvic inflammatory disease) 5/2010     Family History   Problem Relation Age of Onset     Diabetes Mother      Hypertension Mother      Cancer Mother         skin     Diabetes Father      Glaucoma No family hx of      Macular Degeneration No family hx of      Social History     Socioeconomic History     Marital status: Single     Spouse name: Not on file     Number of children: 1     Years of education: 12     Highest education level: Not on file   Occupational History     Occupation: retail     Employer: [employer]   Social Needs     Financial resource strain: Not on file     Food insecurity:     Worry: Not on file     Inability: Not on file     Transportation needs:     Medical: Not on file     Non-medical: Not on file   Tobacco Use     Smoking  status: Former Smoker     Types: Cigarettes     Last attempt to quit: 2010     Years since quittin.6     Smokeless tobacco: Never Used   Substance and Sexual Activity     Alcohol use: Yes     Comment: rare     Drug use: No     Sexual activity: Not Currently     Partners: Male     Birth control/protection: Implant   Lifestyle     Physical activity:     Days per week: Not on file     Minutes per session: Not on file     Stress: Not on file   Relationships     Social connections:     Talks on phone: Not on file     Gets together: Not on file     Attends Sikh service: Not on file     Active member of club or organization: Not on file     Attends meetings of clubs or organizations: Not on file     Relationship status: Not on file     Intimate partner violence:     Fear of current or ex partner: Not on file     Emotionally abused: Not on file     Physically abused: Not on file     Forced sexual activity: Not on file   Other Topics Concern     Parent/sibling w/ CABG, MI or angioplasty before 65F 55M? No   Social History Narrative     Not on file     Past Surgical History:   Procedure Laterality Date     BUNIONECTOMY LAPIDUS WITH TARSAL METATARSAL (TMT) FUSION Right 2018    Procedure: BUNIONECTOMY LAPIDUS WITH TARSAL METATARSAL (TMT) FUSION;  Right first tarsometatarsal joint fusion, right Jackson bunionectomy;  Surgeon: Zack Healy DPM;  Location: [facility] OR     HC REVISE MEDIAN N/CARPAL TUNNEL SURG Left 6/5/15    Primary, not revision     RELEASE CARPAL TUNNEL Left 2015    Procedure: RELEASE CARPAL TUNNEL;  Surgeon: Juan Jose Joaquin MD;  Location: [facility] OR         EXAM:  /76 (BP Location: Right arm, Patient Position: Sitting, Cuff Size: Adult Large)   Pulse 64   Wt 92.9 kg (204 lb 12 oz)   LMP 2019 (Exact Date)   BMI 36.85 kg/m    PELVIC EXAM:  Vulva: No external lesions, normal hair distribution, no adenopathy, BUS WNL  Vagina: Moist, pink, no abnormal discharge, well rugated, no  lesions  Cervix: smooth, pink, no visible lesions, neg CMT   Uterus: Normal size, Anteverted , non-tender, mobile  Ovaries: No mass, non-tender, mobile  Rectal exam: deferred    IUD type: Mirena  Lot # [id number]  NDC# 71405-573-05 Mirena    EXP DATE:   2/2022        Procedure:  Uterus assessed for position and is Anteverted.  Speculum inserted.  Betadine prep of cervix done.  Tenaculum applied at  10/2  o'clock position and gentle traction was appiled to elongate the cervical canal.  Uterus sounded to 7 cm's.   Cervical dilators were used.   IUD inserted in the usual fashion according to manufacture's instructions, without significant resistance, severe protracted pain or excessive bleeding. The tenaculum was removed with scant bleeding from the puncture sites   Strings trimmed to 3 cm's.  Patient  tolerated the procedure well without any prolonged pain or syncopy.    ASSESSMENT/ PLAN:  (Z30.46) Nexplanon removal  (primary encounter diagnosis)  Comment:   Plan:     (Z30.430) Encounter for IUD insertion  Comment:   Plan:     (Z97.5) IUD (intrauterine device) in place  Comment:   Plan:         Instructions given to patient regarding checking IUD strings, returning to the clinic if pain, fever, unusual vaginal discharge or inability to check strings and/or irregular bleeding and avoiding placing anything in her vagina for the next 24 hours.  BUM of birth control indicated and recommended for 7 days  Return to the clinic in 4-6 weeks for IUD follow up   See AVS for complete instructions

## 2019-12-13 NOTE — TELEPHONE ENCOUNTER
duplicate      Disp Refills Start End BETTE   norethindrone (AYGESTIN) 5 MG tablet 20 tablet 0 12/13/2019  No   Sig - Route: Take 2 tablets (10 mg) by mouth daily - Oral   Sent to pharmacy as: norethindrone (AYGESTIN) 5 MG tablet   Class: E-Prescribe   Order: [id number]   E-Prescribing Status: Receipt confirmed by pharmacy (12/13/2019  7:19 AM CST)

## 2019-12-13 NOTE — NURSING NOTE
"Chief Complaint   Patient presents with     Contraception     remove Nexplanon and IUD insertion       Initial /76 (BP Location: Right arm, Patient Position: Sitting, Cuff Size: Adult Large)   Pulse 64   Wt 92.9 kg (204 lb 12 oz)   LMP 11/27/2019 (Exact Date)   BMI 36.85 kg/m   Estimated body mass index is 36.85 kg/m  as calculated from the following:    Height as of 12/5/19: 1.588 m (5' 2.5\").    Weight as of this encounter: 92.9 kg (204 lb 12 oz).  Medication Reconciliation: complete    Julissa Agustin CMA    "

## 2019-12-13 NOTE — PATIENT INSTRUCTIONS
Leave the pressure dressing in place for 4-6 hours.  If you feel the dressing is too tight loosen it or remove it completely.  Leave the Band-Aid in place for 24 hours.  Change it if wet.   Leave the steri strip in place until it falls off by itself.  Call the clinic with fever, chills or bleeding from the site.   Use a back up method of birth control such as condoms or abstain from intercourse for 7 days.   Call the clinic for bleeding that is heavier than a normal period for you or if you experience severe pelvic pain.    What Mirena Users May Expect    What to watch for right after Mirena is placed  Some women may experience uterine cramps, bleeding, and/or dizziness during and right after Mirena is placed. To help minimize the cramps, you may taken ibuprofen 600 mg with food prior to and every 6 hours after your appointment if needed. These symptoms should improve over the next 24 hours.  Mild cramping may be present for a few days after your placement  As a follow up, you should visit your clinic once in the first 4 to 12 weeks after Mirena is placed to make sure it is in the right position. After that, Mirena can be checked once a year as part of your routine exam.    Please use a back-up method (abstinence or condoms) for 7 days after placement. Unless instructed differently at your appointment    Your periods may change  For the first 3 to 6 months, your monthly period may become irregular. You may also have frequent spotting or light bleeding. A few women have heavy bleeding during this time. After your body adjusts, the number of bleeding days is likely to decrease (but may remain irregular), and you may even find that your periods stop altogether for as long as Mirena is in place. Around the end of the third month of use, you may see up to a 75% reduction in the amount of menstrual bleeding. By one year, about 1 out of 5 users may hay have no period at all. At the end of two years, 70% have little or no  bleeding. Your periods will return rapidly once Mirena is removed.     Mirena Strings  You may check your own Mirena strings by inserting a finger into the vagina and feeling the strings as they exit the cervix.  The strings will initially feel firm, like fishing line, but will soften over a few weeks.  After the strings have softened, you or your partner should not be able to feel the strings during intercourse. If your partner continues to feel the strings you can have them shortened by your provider If you can feel the IUD, see your healthcare provider to have the position confirmed.  You may use tampons with Mirena in place.    Mirena does not protect against HIV or STDs.  Mirena does not prevent the formation of ovarian cysts.  Mirena does not typically reduce acne or cause weight gain or mood changes.    Call the clinic immediately if you:  Notice any change in the length of the strings or can feel part of the IUD  Have pain or bleeding with sex.  Have unusually heavy bleeding from the vagina.   Think you are pregnant  Have been or might have been exposed to a sexually transmitted infection  Have unusual pelvic pain, cramping or soreness in your abdomen  Have unexplained fever or chills.       Please call [facility] [place] [phone]  if you have questions or concerns.    For more information:  [email or web address]

## 2019-12-15 RX ORDER — NORETHINDRONE ACETATE 5 MG
10 TABLET ORAL DAILY
Qty: 20 TABLET | Refills: 0 | Status: SHIPPED | OUTPATIENT
Start: 2019-12-15 | End: 2020-03-11

## 2019-12-17 NOTE — TELEPHONE ENCOUNTER
RN attempted to reach patient via telephone. Dr. Michelle called RN to review change in birth control. Patient was seen in clinic on Friday 12/13/2019 for removal of her Nexplanon implant and insertion of an IUD to help with irregular/breakthrough bleeding. Patient is scheduled for a surgical Procedure: nova sure endometrial ablation/bilateral tubal sterilization on January 23, 2020. Dr. Michelle is concerned that changing her birth control a month prior to procedure was not an ideal choice and strongly discourages her from doing this procedure so soon after. He is recommending to wait 6 months and reevaluate and see how bleeding does on IUD.     Unable to reach patient via phone. Left message to call clinic back at [phone] and ask for Women's Health.    Eleonora Cannon RN on 12/17/2019 at 2:02 PM

## 2019-12-18 NOTE — TELEPHONE ENCOUNTER
Please see mychart communication, patient to move forward with cancelling procedure at this time. RN to notify /surgery scheduling team to arrange this.     Eleonora Cannon RN on 12/18/2019 at 9:08 AM

## 2019-12-18 NOTE — TELEPHONE ENCOUNTER
Since RN was unable to reach patient via phone, she sent a ALTHIA message to relay provider concerns and plans.     Eleonora Cannon RN on 12/18/2019 at 8:47 AM

## 2020-01-02 ENCOUNTER — E-VISIT (OUTPATIENT)
Dept: FAMILY MEDICINE | Facility: CLINIC | Age: 35
End: 2020-01-02
Payer: COMMERCIAL

## 2020-01-02 DIAGNOSIS — F34.1 DYSTHYMIC DISORDER: Primary | ICD-10-CM

## 2020-01-02 PROCEDURE — 98970 NQHP OL DIG ASSMT&MGMT 5-10: CPT | Performed by: NURSE PRACTITIONER

## 2020-01-02 RX ORDER — BUPROPION HYDROCHLORIDE 150 MG/1
150 TABLET ORAL DAILY
Qty: 30 TABLET | Refills: 1 | Status: SHIPPED | OUTPATIENT
Start: 2020-01-02 | End: 2020-09-04

## 2020-01-02 ASSESSMENT — ANXIETY QUESTIONNAIRES
GAD7 TOTAL SCORE: 20
7. FEELING AFRAID AS IF SOMETHING AWFUL MIGHT HAPPEN: NEARLY EVERY DAY
5. BEING SO RESTLESS THAT IT IS HARD TO SIT STILL: MORE THAN HALF THE DAYS
GAD7 TOTAL SCORE: 20
7. FEELING AFRAID AS IF SOMETHING AWFUL MIGHT HAPPEN: NEARLY EVERY DAY
GAD7 TOTAL SCORE: 20
3. WORRYING TOO MUCH ABOUT DIFFERENT THINGS: NEARLY EVERY DAY
1. FEELING NERVOUS, ANXIOUS, OR ON EDGE: NEARLY EVERY DAY
6. BECOMING EASILY ANNOYED OR IRRITABLE: NEARLY EVERY DAY
4. TROUBLE RELAXING: NEARLY EVERY DAY
2. NOT BEING ABLE TO STOP OR CONTROL WORRYING: NEARLY EVERY DAY

## 2020-01-02 ASSESSMENT — PATIENT HEALTH QUESTIONNAIRE - PHQ9
SUM OF ALL RESPONSES TO PHQ QUESTIONS 1-9: 27
10. IF YOU CHECKED OFF ANY PROBLEMS, HOW DIFFICULT HAVE THESE PROBLEMS MADE IT FOR YOU TO DO YOUR WORK, TAKE CARE OF THINGS AT HOME, OR GET ALONG WITH OTHER PEOPLE: EXTREMELY DIFFICULT
SUM OF ALL RESPONSES TO PHQ QUESTIONS 1-9: 27

## 2020-01-02 NOTE — PATIENT INSTRUCTIONS
Thank you for choosing us for your care. I have placed an order for a prescription so that you can start treatment. View your full visit summary for details by clicking on the link below. Your pharmacist will able to address any questions you may have about the medication.      If you're not feeling better within 4-6 weeks please schedule an appointment.  You can schedule an appointment right here in Clinic[facility], or call [phone]  If the visit is for the same symptoms as your e-visit, we'll refund the cost of your e-visit if seen within seven days.      Recognizing Suicide Warning Signs in Yourself    People who are thinking about suicide may not know they are depressed. Certain thoughts, feelings, and actions can be signals that let you know you may need help. The best thing you can do is watch for signs that you may be at risk. Then, ask for help. You can talk to your regular healthcare provider or seek help from a mental health provider.  Depression  Depression is a treatable illness. To know if depression is causing you to feel like ending your life, ask yourself:    Do I feel worthless, guilty, helpless, or hopeless?    Have I been feeling sad, down, or blue on most days?    Have I lost interest in my work or people I used to enjoy?    Do I have trouble sleeping or do I sleep too much?    Do I eat more or less than usual?    Do I feel tired, weak, and low on energy?    Do I feel restless and unable to sit still?    Do I have trouble thinking or making choices?    Do I cry more than usual?    Do I feel life isn't worth living?  Warning signs for suicide    Thinking often about taking your life    Planning how you may attempt it    Talking or writing about committing suicide    Feeling that death is the only solution to your problems    Feeling a pressing need to make out your will or arrange your     Giving away things you own    Participating in risky behaviors, such as sex with someone you don't know  "or drinking and driving    Buying a lethal weapon, such as a gun, or hoarding medicines that could be used in an over dose  If you notice any of these warning signs, call for help right away or go to your closest hospital emergency department. You can also call a mental health clinic or a 24-hour suicide crisis hotline for help and support. Search for local suicide prevention resources on your computer or look for the number in the white pages of your phone book under \"Suicide.\" In an emergency, if you are in immediate risk of harming yourself, call 911. For more information about depression:    National [place] of Mental Yncszb592-785-0845mjc.[facility].nih.gov    National Suicide Prevention Erlpgsdo304-789-1406 (527-165-SXOJ)www.suicidepreventionlifeline.org    National [place] on Mental Pvgtrej389-447-4267eal.ezio.org    Mental Health Etugvxx903-112-8728mux.[facility].org    National Suicide Cojwwrc673-873-9253 (800-SUICIDE)   Date Last Reviewed: 1/1/2017 2000-2018 [employer]. [address] [place] [address]. All rights reserved. This information is not intended as a substitute for professional medical care. Always follow your healthcare professional's instructions.        Екатерина I sent the Wellbutrin in to your pharmacy. Please follow up with me in 1 month- a face to face visit would be preferable, but E-visit would be ok too.    Anusha Wolff, ELIE    "

## 2020-01-03 ASSESSMENT — ANXIETY QUESTIONNAIRES: GAD7 TOTAL SCORE: 20

## 2020-01-03 ASSESSMENT — PATIENT HEALTH QUESTIONNAIRE - PHQ9: SUM OF ALL RESPONSES TO PHQ QUESTIONS 1-9: 27

## 2020-01-08 ENCOUNTER — MYC MEDICAL ADVICE (OUTPATIENT)
Dept: FAMILY MEDICINE | Facility: CLINIC | Age: 35
End: 2020-01-08

## 2020-01-08 NOTE — TELEPHONE ENCOUNTER
Forms have been addressed. Awaiting to hear back from the patient her preference of getting these forms returned to her. Rosemarie Mitchell,

## 2020-01-08 NOTE — TELEPHONE ENCOUNTER
Form came in via B[employer] message to be completed by the provider:    Who is the form from? Patient  This was received at [facility]  Where was the form placed? Given to physician  What number is listed as a contact on the form? Self        Additional comments: form started for the provider    Type of letter, form or note: COBY

## 2020-01-08 NOTE — TELEPHONE ENCOUNTER
Completed FMLA forms are down at [facility]  ready for . Rosemarie Mitchell,     Designated pick-up person will need to provided a photo ID at time of .    Designated pick-up person self      A copy has been sent to be added to the chart.

## 2020-02-03 ENCOUNTER — MYC REFILL (OUTPATIENT)
Dept: INTERNAL MEDICINE | Facility: CLINIC | Age: 35
End: 2020-02-03

## 2020-02-03 DIAGNOSIS — E66.01 MORBID OBESITY DUE TO EXCESS CALORIES (H): ICD-10-CM

## 2020-02-03 RX ORDER — LIRAGLUTIDE 6 MG/ML
1.8 INJECTION SUBCUTANEOUS DAILY
Qty: 36 ML | Refills: 1 | Status: CANCELLED | OUTPATIENT
Start: 2020-02-03

## 2020-02-04 RX ORDER — LIRAGLUTIDE 6 MG/ML
INJECTION SUBCUTANEOUS
Qty: 9 ML | Refills: 0 | Status: SHIPPED | OUTPATIENT
Start: 2020-02-04 | End: 2020-05-05

## 2020-02-04 NOTE — TELEPHONE ENCOUNTER
"Routing refill request to provider for review/approval because:  Failed protocol - see below    Requested Prescriptions   Pending Prescriptions Disp Refills     VICTOZA PEN 18 MG/3ML soln [Pharmacy Med Name: Victoza Subcutaneous Solution Pen-injector 18 MG/3ML] 9 mL 0     Sig: INJECT 1.8MG SUBCUTANEOUSLY EVERY DAY       GLP-1 Agonists Protocol Failed - 2/4/2020  7:15 AM        Failed - Microalbumin on file in past 12 months     No lab results found.          Failed - HgbA1C in past 3 or 6 months     If HgbA1C is 8 or greater, it needs to be on file within the past 3 months.  If less than 8, must be on file within the past 6 months.     Recent Labs   Lab Test 05/06/16  1415   A1C 5.4             Failed - Recent (6 mo) or future (30 days) visit within the authorizing provider's specialty     Patient had office visit in the last 6 months or has a visit in the next 30 days with authorizing provider.  See \"Patient Info\" tab in inbasket, or \"Choose Columns\" in Meds & Orders section of the refill encounter.            Passed - Blood pressure less than 140/90 in past 6 months     BP Readings from Last 3 Encounters:   12/13/19 112/76   12/05/19 124/77   11/15/19 110/82                 Passed - LDL on file in past 12 months     Recent Labs   Lab Test 06/05/19 1926   *             Passed - Medication is active on med list        Passed - Patient is age 18 or older        Passed - No active pregnancy on record        Passed - Normal serum creatinine on file in past 12 months     Recent Labs   Lab Test 06/05/19 1926   CR 0.87             Passed - No positive pregnancy test in past 12 months        Marla Brooks RN  "

## 2020-03-11 ENCOUNTER — E-VISIT (OUTPATIENT)
Dept: FAMILY MEDICINE | Facility: CLINIC | Age: 35
End: 2020-03-11
Payer: COMMERCIAL

## 2020-03-11 DIAGNOSIS — F34.1 DYSTHYMIC DISORDER: ICD-10-CM

## 2020-03-11 DIAGNOSIS — F41.9 ANXIETY: Primary | ICD-10-CM

## 2020-03-11 PROCEDURE — 98971 NQHP OL DIG ASSMT&MGMT 11-20: CPT | Performed by: NURSE PRACTITIONER

## 2020-03-11 RX ORDER — VENLAFAXINE HYDROCHLORIDE 225 MG/1
225 TABLET, EXTENDED RELEASE ORAL DAILY
Qty: 90 TABLET | Refills: 0 | Status: SHIPPED | OUTPATIENT
Start: 2020-03-11 | End: 2020-10-02

## 2020-03-11 RX ORDER — PROPRANOLOL HYDROCHLORIDE 10 MG/1
10-20 TABLET ORAL 2 TIMES DAILY PRN
Qty: 60 TABLET | Refills: 3 | Status: SHIPPED | OUTPATIENT
Start: 2020-03-11 | End: 2020-12-07 | Stop reason: ALTCHOICE

## 2020-03-11 ASSESSMENT — ANXIETY QUESTIONNAIRES
7. FEELING AFRAID AS IF SOMETHING AWFUL MIGHT HAPPEN: NEARLY EVERY DAY
GAD7 TOTAL SCORE: 21
GAD7 TOTAL SCORE: 21
5. BEING SO RESTLESS THAT IT IS HARD TO SIT STILL: NEARLY EVERY DAY
3. WORRYING TOO MUCH ABOUT DIFFERENT THINGS: NEARLY EVERY DAY
7. FEELING AFRAID AS IF SOMETHING AWFUL MIGHT HAPPEN: NEARLY EVERY DAY
GAD7 TOTAL SCORE: 21
4. TROUBLE RELAXING: NEARLY EVERY DAY
2. NOT BEING ABLE TO STOP OR CONTROL WORRYING: NEARLY EVERY DAY
1. FEELING NERVOUS, ANXIOUS, OR ON EDGE: NEARLY EVERY DAY
6. BECOMING EASILY ANNOYED OR IRRITABLE: NEARLY EVERY DAY

## 2020-03-11 ASSESSMENT — PATIENT HEALTH QUESTIONNAIRE - PHQ9
SUM OF ALL RESPONSES TO PHQ QUESTIONS 1-9: 12
SUM OF ALL RESPONSES TO PHQ QUESTIONS 1-9: 12
10. IF YOU CHECKED OFF ANY PROBLEMS, HOW DIFFICULT HAVE THESE PROBLEMS MADE IT FOR YOU TO DO YOUR WORK, TAKE CARE OF THINGS AT HOME, OR GET ALONG WITH OTHER PEOPLE: EXTREMELY DIFFICULT

## 2020-03-12 ASSESSMENT — PATIENT HEALTH QUESTIONNAIRE - PHQ9: SUM OF ALL RESPONSES TO PHQ QUESTIONS 1-9: 12

## 2020-03-12 ASSESSMENT — ANXIETY QUESTIONNAIRES: GAD7 TOTAL SCORE: 21

## 2020-03-17 ENCOUNTER — MYC MEDICAL ADVICE (OUTPATIENT)
Dept: FAMILY MEDICINE | Facility: CLINIC | Age: 35
End: 2020-03-17

## 2020-03-18 ENCOUNTER — MYC MEDICAL ADVICE (OUTPATIENT)
Dept: FAMILY MEDICINE | Facility: CLINIC | Age: 35
End: 2020-03-18

## 2020-03-30 ENCOUNTER — VIRTUAL VISIT (OUTPATIENT)
Dept: FAMILY MEDICINE | Facility: OTHER | Age: 35
End: 2020-03-30

## 2020-03-30 NOTE — PROGRESS NOTES
"Date: 2020 13:54:16  Clinician: Arlyn Cohen  Clinician NPI: [id number]  Patient: Екатерина Ramon  Patient : 1985  Patient Address: [address] [place] [address]  Patient Phone: [phone]  Visit Protocol: URI  Patient Summary:  Екатерина is a 34 year old ( : 1985 ) female who initiated a Visit for COVID-19 (Coronavirus) evaluation and screening. When asked the question \"Please sign me up to receive news, health information and promotions from [facility].\", Екатерина responded \"Yes\".    Екатерина states her symptoms started gradually 2-3 weeks ago.   Her symptoms consist of rhinitis, a headache, myalgia, chills, wheezing, a cough, nasal congestion, and malaise. She is experiencing mild difficulty breathing with activities but can speak normally in full sentences. Екатерина also feels feverish but was unable to measure her temperature.   Symptom details     Nasal secretions: The color of her mucus is yellow and green.    Cough: Екатерина coughs a few times an hour and her cough is more bothersome at night. Phlegm does not come into her throat when she coughs. She does not believe her cough is caused by post-nasal drip.     Wheezing: Екатерина has not ever been diagnosed with asthma. The wheezing does not interfere with her normal daily activities.    Headache: She states the headache is severe (7-9 on a 10 point pain scale).      Екатерина denies having teeth pain, facial pain or pressure, sore throat, enlarged lymph nodes, and ear pain. She also denies taking antibiotic medication for the symptoms, having recent facial or sinus surgery in the past 60 days, and double sickening (worsening symptoms after initial improvement).   Precipitating events  She has not recently been exposed to someone with influenza. Екатерина has been in close contact with the following high risk individuals: people with asthma, heart disease or diabetes, adults 65 or older, and immunocompromised people.   Pertinent " COVID-19 (Coronavirus) information  Екатерина has not traveled internationally or to the areas where COVID-19 (Coronavirus) is widespread, including cruise ship travel in the last 14 days before the start of her symptoms.   Екатерина has had a close contact with a laboratory-confirmed COVID-19 patient within 14 days of symptom onset. She also has had a close contact with a suspected COVID-19 patient within 14 days of symptom onset. Additional information about contact with COVID-19 (Coronavirus) patient as reported by the patient (free text): There are patients constantly walking in and out of my clinic with symptoms or potential symptoms coming to get tested.   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 She provides direct patient care. She does not live with a healthcare worker.   Pertinent medical history  Екатерина had 1 sinus infection within the past year.   Екатерина does not get yeast infections when she takes antibiotics.   Екатерина needs a return to work/school note.   Weight: 210 lbs   Екатерина does not smoke or use smokeless tobacco.   She denies pregnancy and denies breastfeeding. Her last period was over a month ago.   Additional information as reported by the patient (free text): I live with parents who are both high risk. 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   Weight: 210 lbs    MEDICATIONS: propranolol oral, bupropion HCl oral, verapamil oral, trazodone oral, venlafaxine oral, ALLERGIES: NKDA  Clinician Response:  Dear Екатерина,   Based on the information you have provided, you do have symptoms that are consistent with Coronavirus (COVID-19).   The coronavirus causes mild to severe respiratory illness with the most common symptoms including fever, cough and difficulty breathing. Unfortunately, many viruses cause similar symptoms and it can be difficult to distinguish between viruses, especially in mild cases, so we are presuming that anyone with cough or  fever has coronavirus at this time.  Coronavirus/COVID-19 has reached the point of community spread in [place], meaning that we are finding the virus in people with no known exposure risk for lio the virus. Given the increasing commonness of coronavirus in the community we are no longer testing patients who are not critically ill.  If you are a health care worker, you should refer to your employee health office for instructions about testing and returning to work.  For everyone else who has cough or fever, you should assume you are infected with coronavirus. Since you will not be tested but have symptoms that may be consistent with coronavirus, the CDC recommends you stay in self-isolation until these three things have happened:    You have had no fever for at least 72 hours (that is three full days of no fever without the use of medicine that reduces fevers)    AND   Other symptoms have improved (for example, when your cough or shortness of breath have improved)   AND   At least 7 days have passed since your symptoms first appeared.   How to Isolate:    Isolate yourself at home.   Do Not allow any visitors  Do Not go to work or school  Do Not go to Taoism,  centers, shopping, or other public places.  Do Not shake hands.  Avoid close contact with others (hugging, kissing).   Protect Others:    Cover Your Mouth and Nose with a mask, disposable tissue or wash cloth to avoid spreading germs to others.  Wash your hands and face frequently with soap and water.   Managing Symptoms:    At this time, we primarily recommend Tylenol (Acetaminophen) for fever or pain. If you have liver or kidney problems, contact your primary care provider for instructions on use of tylenol. Adults can take 650 mg (two 325 mg pills) by mouth every 4-6 hours as needed OR 1,000 mg (two 500 mg pills) every 8 hours as needed. MAXIMUM DAILY DOSE: 3,000mg. For children, refer to dosing on bottle based on age or weight.   If you  develop significant shortness of breath that prevents you from doing normal activities, please call 911 or proceed to the nearest emergency room and alert them immediately that you have been in self-isolation for possible coronavirus.   If you have a higher risk medical condition such as cancer, heart failure, end stage renal disease on dialysis or have a transplant, please reach out to your specialist's clinic to advise them of your [facility] visit should you not improve within the next two days.  For more information about COVID19 and options for caring for yourself at home, please visit the CDC website at https://www.cdc.gov/coronavirus/2019-ncov/about/steps-when-sick.htmlFor more options for care at [facility], please visit our website at [email or web address]     Diagnosis: Cough  Diagnosis ICD: R05  Additional Clinician Notes: Call your employee health services to get clearance to return to work.

## 2020-04-05 ENCOUNTER — E-VISIT (OUTPATIENT)
Dept: FAMILY MEDICINE | Facility: CLINIC | Age: 35
End: 2020-04-05
Payer: COMMERCIAL

## 2020-04-05 DIAGNOSIS — A05.9 FOOD POISONING: Primary | ICD-10-CM

## 2020-04-05 DIAGNOSIS — K62.5 HEMORRHAGE OF RECTUM AND ANUS: ICD-10-CM

## 2020-04-05 PROCEDURE — 99421 OL DIG E/M SVC 5-10 MIN: CPT | Performed by: NURSE PRACTITIONER

## 2020-04-06 NOTE — PATIENT INSTRUCTIONS
Patient Education     Foodborne Illness (Food Poisoning)  Foodborne disease occurs when foods aren't cooked, handled, or stored properly and become contaminated with harmful bacteria, viruses, parasites, or toxins (poisons). Foodborne disease can cause severe vomiting, diarrhea, and stomach cramps. Symptoms may not appear for 24 to 48 hours.  When to go to the emergency room (ER)  Call 911 or your local emergency number if:    You have severe symptoms, such as bloody vomit or diarrhea, or symptoms lasting more than 12 hours    Your heart is racing, pounding, or skipping    You are having trouble breathing    You are an elderly adult, have stomach or colon problems, chronic liver disease, or hemochromatosis, or have a suppressed immune system    You suspect botulism. This is a toxin found mainly in home-canned foods. Symptoms often start within 12 to 36 hours. They include headache, blurred vision, and muscle weakness. Botulism can be fatal. Don't delay getting help.    You have signs of dehydration such as excessive thirst, dizziness, or lightheadedness.  What to expect in the ER  A doctor will ask you about your illness and examine you carefully. Your blood pressure, pulse, breathing rate, and temperature will be checked. A sample of your stool may be tested for bacteria. There are many types of foodborne diseases, so treatment will depend on your symptoms. You ll likely be given fluids through a vein in your arm. This helps replace water and minerals lost with vomiting and diarrhea. You may be admitted to the hospital if your symptoms are very severe.  If you have a mild foodborne illness    Rest and drink plenty of liquids.    Don't have solid foods until you feel better.    Don t take medicines for diarrhea unless your doctor tells you to.   Date Last Reviewed: 6/1/2017 2000-2019 [employer]. [address] [place] [address], [place] [address]. All rights reserved. This information is not intended as a  substitute for professional medical care. Always follow your healthcare professional's instructions.           Patient Education     Lower Gastrointestinal (GI) Bleeding (Stable)  You have signs of blood in your stool. This is called rectal bleeding. The bleeding may have begun in another part of your gastrointestinal (GI) tract. If the blood is bright red, it is likely coming from the lower part of the GI tract. If the blood is black or dark, it might be coming from higher up in the GI tract. Very small amounts of GI bleeding may not be visible and can only be discovered during a test on your stool. Possible causes of lower GI bleeding include:    Swollen inflamed veins in the rectum (hemorrhoids)    Tear in the lining of the anus (anal fissures)    Inflammation of a small pouch in the intestine (diverticulitis)    Inflammatory bowel disease (Crohn's disease or ulcerative colitis)    Polyps (growths) in the intestine    Swelling and irritation of the colon (infectious colitis)    Colon cancer  Note: Iron supplements and medicines for diarrhea or upset stomach can cause black stools. Foods such as licorice and red beets can also discolor the stool and be mistaken for bleeding. These are not bleeding and are not a cause for alarm.  Home care  You have not lost a large amount of blood and your condition appears stable at this time. You may resume normal activity as long as you feel well.  Don't take NSAIDs, such as aspirin, ibuprofen, or naproxen. They can irritate the stomach and cause further bleeding. If you are taking these medicines for other medical reasons, talk to your healthcare provider before you stop them.   Follow-up care  Follow up with your healthcare provider, or as advised. Further tests may be needed to find the cause of your bleeding.  When to seek medical advice  Call your healthcare provider right away for any of the following:    Large amount of rectal bleeding     Increasing abdominal  pain    Weakness, dizziness  Call 911  Call 911 if any of the following occur:    Loss of consciousness    Vomiting blood  Date Last Reviewed: 3/1/2018    0519-5117 [employer]. [address] [place] [address], [place] [address]. All rights reserved. This information is not intended as a substitute for professional medical care. Always follow your healthcare professional's instructions.

## 2020-04-07 ENCOUNTER — MYC MEDICAL ADVICE (OUTPATIENT)
Dept: FAMILY MEDICINE | Facility: CLINIC | Age: 35
End: 2020-04-07

## 2020-04-07 NOTE — TELEPHONE ENCOUNTER
Please see [employer] messages.   Need OV or OK for telephone/virtual visit?    Marla Brooks RN

## 2020-04-09 ENCOUNTER — MYC MEDICAL ADVICE (OUTPATIENT)
Dept: FAMILY MEDICINE | Facility: CLINIC | Age: 35
End: 2020-04-09

## 2020-04-09 ENCOUNTER — VIRTUAL VISIT (OUTPATIENT)
Dept: FAMILY MEDICINE | Facility: CLINIC | Age: 35
End: 2020-04-09
Payer: COMMERCIAL

## 2020-04-09 DIAGNOSIS — G89.29 CHRONIC BILATERAL THORACIC BACK PAIN: ICD-10-CM

## 2020-04-09 DIAGNOSIS — M54.50 CHRONIC MIDLINE LOW BACK PAIN WITHOUT SCIATICA: Primary | ICD-10-CM

## 2020-04-09 DIAGNOSIS — M54.6 CHRONIC BILATERAL THORACIC BACK PAIN: ICD-10-CM

## 2020-04-09 DIAGNOSIS — G89.29 CHRONIC MIDLINE LOW BACK PAIN WITHOUT SCIATICA: Primary | ICD-10-CM

## 2020-04-09 PROCEDURE — 99213 OFFICE O/P EST LOW 20 MIN: CPT | Mod: TEL | Performed by: NURSE PRACTITIONER

## 2020-04-09 RX ORDER — NAPROXEN 500 MG/1
500 TABLET ORAL 2 TIMES DAILY PRN
Qty: 60 TABLET | Refills: 1 | Status: ON HOLD | OUTPATIENT
Start: 2020-04-09 | End: 2021-01-05

## 2020-04-09 RX ORDER — TRAMADOL HYDROCHLORIDE 50 MG/1
50 TABLET ORAL EVERY 6 HOURS PRN
Qty: 10 TABLET | Refills: 0 | Status: SHIPPED | OUTPATIENT
Start: 2020-04-09 | End: 2020-06-29

## 2020-04-09 RX ORDER — TIZANIDINE 2 MG/1
2-4 TABLET ORAL 3 TIMES DAILY
Qty: 30 TABLET | Refills: 2 | Status: SHIPPED | OUTPATIENT
Start: 2020-04-09 | End: 2020-05-05

## 2020-04-09 NOTE — PROGRESS NOTES
"Subjective     Екатерина Ramon is a 34 year old female who is being evaluated via a billable telephone visit.      The patient has been notified of following:     \"This telephone visit will be conducted via a call between you and your physician/provider. We have found that certain health care needs can be provided without the need for a physical exam.  This service lets us provide the care you need with a short phone conversation.  If a prescription is necessary we can send it directly to your pharmacy.  If lab work is needed we can place an order for that and you can then stop by our lab to have the test done at a later time.    Telephone visits are billed at different rates depending on your insurance coverage. During this emergency period, for some insurers they may be billed the same as an in-person visit.  Please reach out to your insurance provider with any questions.    If during the course of the call the physician/provider feels a telephone visit is not appropriate, you will not be charged for this service.\"    Patient has given verbal consent for Telephone visit?  Yes    How would you like to obtain your AVS?     Екатерина Ramon complains of   Chief Complaint   Patient presents with     Back Pain       ALLERGIES  Lisinopril and Zoloft     Chronic bilateral low and mid-back pain started flaring over the last 3 weeks. Also has some pinching pain in the shoulder. Pain rated 8/10. Occasional shooting pain down to right leg to the knee. Has also had right-sided SI joint pain.    Saw chiropractor yesterday, using a heating pad, working from home and able to change positions frequently. Has been taking ibuprofen 400 mg occasionally without improvment.     Had Lumbar MRI at [facility] 1 year ago- bulging discs at L5-S1 with subluxation per patient report- results not available at time of visit. Did take oral steroid, muscle relaxer (cyclobenzapine which caused restlessness), Physical Therapy at that time.       Reviewed " and updated as needed this visit by Provider         Review of Systems   ROS COMP: Constitutional, HEENT, cardiovascular, pulmonary, gi and gu systems are negative, except as otherwise noted.       Objective   Reported vitals:  There were no vitals taken for this visit.     Psych: Alert and oriented times 3; coherent speech, normal   rate and volume, able to articulate logical thoughts, able   to abstract reason, no tangential thoughts, no hallucinations   or delusions       Diagnostic Test Results:  Labs reviewed in Epic  No results found for this or any previous visit (from the past 24 hour(s)).        Assessment/Plan:  1. Chronic midline low back pain without sciatica  Patient requests Tramadol- ok for small quantity, but discussed need to limit use to short-term for moderate to severe pain only as this is controlled substance. Patient agreeable. She will try Naproxen (do not take with Ibuprofen) and Tizanidine. Recommend continuing home Physical Therapy exercises once pain is improving. Continue ice/heat, gentle activity.  - tiZANidine (ZANAFLEX) 2 MG tablet; Take 1-2 tablets (2-4 mg) by mouth 3 times daily  Dispense: 30 tablet; Refill: 2  - traMADol (ULTRAM) 50 MG tablet; Take 1 tablet (50 mg) by mouth every 6 hours as needed for severe pain  Dispense: 10 tablet; Refill: 0  - naproxen (NAPROSYN) 500 MG tablet; Take 1 tablet (500 mg) by mouth 2 times daily as needed for moderate pain  Dispense: 60 tablet; Refill: 1    2. Chronic bilateral thoracic back pain  As above  - tiZANidine (ZANAFLEX) 2 MG tablet; Take 1-2 tablets (2-4 mg) by mouth 3 times daily  Dispense: 30 tablet; Refill: 2  - traMADol (ULTRAM) 50 MG tablet; Take 1 tablet (50 mg) by mouth every 6 hours as needed for severe pain  Dispense: 10 tablet; Refill: 0  - naproxen (NAPROSYN) 500 MG tablet; Take 1 tablet (500 mg) by mouth 2 times daily as needed for moderate pain  Dispense: 60 tablet; Refill: 1    Return in about 6 weeks (around 5/21/2020) for if  not improving.      Phone call duration:  8 minutes    ESTUARDO Jain CNP

## 2020-04-21 ENCOUNTER — MYC MEDICAL ADVICE (OUTPATIENT)
Dept: INTERNAL MEDICINE | Facility: CLINIC | Age: 35
End: 2020-04-21

## 2020-04-22 NOTE — TELEPHONE ENCOUNTER
Patient has not been seen by Dr. Johnson for over a year  Will route to Dr. Johnson to see if this can be a virtual visit    Keyon Milton RN

## 2020-04-27 NOTE — TELEPHONE ENCOUNTER
My chart message sent to patient informing her OV is needed.  Erin COLLIER CMA ([facility])

## 2020-04-28 ENCOUNTER — MYC MEDICAL ADVICE (OUTPATIENT)
Dept: INTERNAL MEDICINE | Facility: CLINIC | Age: 35
End: 2020-04-28

## 2020-04-28 NOTE — TELEPHONE ENCOUNTER
She should continue to work aggressively on diet and exercise until she can be seen.  She would have to be an in person appointment and could see Magaly Villegas or myself post COVID (whenever she felt she was comfortable coming in).      She can do online programs like Weight Watchers or My Fitness Pal for accountability as well.

## 2020-05-04 ENCOUNTER — MYC MEDICAL ADVICE (OUTPATIENT)
Dept: INTERNAL MEDICINE | Facility: CLINIC | Age: 35
End: 2020-05-04

## 2020-05-04 NOTE — TELEPHONE ENCOUNTER
Weight clinic appointment was incorrectly scheduled with Anusha Wolff CNP for 5/6/2020  Has now been rescheduled to see Dr. Johnson on 6/2/2020 (per Dr. Johnson's previous note, this needed to be a face to face visit post COVID)    Keyon Milton RN

## 2020-05-04 NOTE — TELEPHONE ENCOUNTER
Called and spoke with the patient. She is scheduled.    Next 5 appointments (look out 90 days)    May 05, 2020  9:00 AM CDT  SHORT with Lorraine Johnson MD  [facility] ([address] [address]  [phone]   Jun 02, 2020 10:30 AM CDT  Return Visit with Lorraine Johnson MD  [facility] ([facility]) [address] [place] [id number]  [phone]       Rosemarie Mitchell,

## 2020-05-05 ENCOUNTER — OFFICE VISIT (OUTPATIENT)
Dept: INTERNAL MEDICINE | Facility: CLINIC | Age: 35
End: 2020-05-05
Payer: COMMERCIAL

## 2020-05-05 VITALS
BODY MASS INDEX: 41.13 KG/M2 | RESPIRATION RATE: 18 BRPM | HEIGHT: 62 IN | HEART RATE: 86 BPM | WEIGHT: 223.5 LBS | OXYGEN SATURATION: 100 % | DIASTOLIC BLOOD PRESSURE: 94 MMHG | SYSTOLIC BLOOD PRESSURE: 132 MMHG

## 2020-05-05 DIAGNOSIS — E66.01 MORBID OBESITY DUE TO EXCESS CALORIES (H): ICD-10-CM

## 2020-05-05 DIAGNOSIS — F32.1 MODERATE MAJOR DEPRESSION (H): ICD-10-CM

## 2020-05-05 DIAGNOSIS — I10 HYPERTENSION GOAL BP (BLOOD PRESSURE) < 140/90: ICD-10-CM

## 2020-05-05 PROCEDURE — 99214 OFFICE O/P EST MOD 30 MIN: CPT | Performed by: INTERNAL MEDICINE

## 2020-05-05 RX ORDER — PEN NEEDLE, DIABETIC 32GX 5/32"
NEEDLE, DISPOSABLE MISCELLANEOUS
Qty: 100 EACH | Refills: 3 | Status: ON HOLD | OUTPATIENT
Start: 2020-05-05 | End: 2021-01-04

## 2020-05-05 RX ORDER — LIRAGLUTIDE 6 MG/ML
INJECTION SUBCUTANEOUS
Qty: 9 ML | Refills: 1 | Status: SHIPPED | OUTPATIENT
Start: 2020-05-05 | End: 2020-06-29

## 2020-05-05 ASSESSMENT — MIFFLIN-ST. JEOR: SCORE: 1672.04

## 2020-05-05 NOTE — PROGRESS NOTES
INTERNAL MEDICINE  Medical Weight Loss - Return Visit      CHIEF COMPLAINT:  Recheck weight      Wt Readings from Last 5 Encounters:   05/05/20 101.4 kg (223 lb 8 oz)   12/13/19 92.9 kg (204 lb 12 oz)   12/05/19 94.4 kg (208 lb 1.6 oz)   11/15/19 95.3 kg (210 lb)   06/24/19 90.2 kg (198 lb 12.8 oz)         HISTORY OF PRESENT ILLNESS (HPI):    Patient returns today for medical weight loss follow-up visit. Patient was last seen by me on Visit date not found and has lost 0 pounds and 0 % body fat.    Last year she had a depression slump.  She sees a therapist now and is in good communication with Anusha Wolff.  She was drinking heavily but now is drinking just 1-2 times per month. She did have self harm thoughts but those are now resolved.    She is able to control her eating more.  She will eat breakfast and then lunch and dinner.  Avoiding snacking.    Exercises consists of walks.  She is doing a strength training workout about 3 times per week.     She is frustrated with her weight regain.  Doesn't have concerns about binging or lack of satiety.     Rare trazodone use.  Likes effexor better.  Was on prozac but this is better.      Doesn't check her blood pressure outside of here.    MEDICATIONS:   Current Outpatient Medications   Medication Sig Dispense Refill     buPROPion (WELLBUTRIN XL) 150 MG 24 hr tablet Take 1 tablet (150 mg) by mouth daily 30 tablet 1     insulin pen needle (BD TALHA U/F) 32G X 4 MM Use once daily or as directed. 100 each 3     naproxen (NAPROSYN) 500 MG tablet Take 1 tablet (500 mg) by mouth 2 times daily as needed for moderate pain 60 tablet 1     propranolol (INDERAL) 10 MG tablet Take 1-2 tablets (10-20 mg) by mouth 2 times daily as needed (anxiety) 60 tablet 3     SUMAtriptan (IMITREX) 25 MG tablet Take 1-2 tablets (25-50 mg) by mouth at onset of headache for migraine May repeat dose in 2 hours.  Do not exceed 200 mg in 24 hours 18 tablet 0     traZODone (DESYREL) 50 MG tablet Take 1-2  "tablets ( mg) by mouth nightly as needed for sleep 60 tablet 5     venlafaxine (EFFEXOR-ER) 225 MG 24 hr tablet Take 1 tablet (225 mg) by mouth daily 90 tablet 0     verapamil ER (VERELAN) 180 MG 24 hr capsule Take 1 capsule (180 mg) by mouth daily 90 capsule 3     tiZANidine (ZANAFLEX) 2 MG tablet Take 1-2 tablets (2-4 mg) by mouth 3 times daily (Patient not taking: Reported on 5/4/2020) 30 tablet 2     VICTOZA PEN 18 MG/3ML soln INJECT 1.8MG SUBCUTANEOUSLY EVERY DAY (Patient not taking: Reported on 5/4/2020) 9 mL 0       ALLERGIES:   Allergies   Allergen Reactions     Lisinopril Cough     Zoloft      tired       PHYSICAL EXAMINATION:    VITALS: BP (!) 142/96 (BP Location: Right arm, Cuff Size: Adult Large)   Pulse 86   Resp 18   Ht 1.575 m (5' 2\")   Wt 101.4 kg (223 lb 8 oz)   SpO2 100%   BMI 40.88 kg/m    GENERAL: Patient is a 34 year old year old female  in no acute distress.  Patient is alert and orientated x 4, pleasant and cooperative with exam.     S1 and S2 normal, no murmurs, clicks, gallops or rubs. Regular rate and rhythm. Chest is clear; no wheezes or rales. No edema or JVD.    PSYCH: mentation appears normal, affect normal and bright.    LAB RESULTS:   Reviewed in Epic    ASSESSMENT/PLAN:    1. BMI 40.0-44.9, adult (H)  Poor control with worsening due to stress.  Has failed topamax.  Would avoid phentermine due to anxiety and hypertension.  She also recently had a significant alcohol consumption issue so would like to avoid controlled substances.  Compounded naltrexone starting at 4 mg would be an option too but would only start this low due to depression and hypertension.  Insurance doesn't cover saxenda   - liraglutide (VICTOZA) 18 MG/3ML solution; Inject 0.6 mg Subcutaneous daily for 7 days, THEN 1.2 mg daily for 7 days, THEN 1.8 mg daily.  Dispense: 9 mL; Refill: 1    2. Moderate major depression (H)  Stable.  Sees therapy and pcp    3. Hypertension goal BP (blood pressure) < " 140/90  suboptimal control today.      4. Morbid obesity due to excess calories (H)    - insulin pen needle (BD TALHA U/F) 32G X 4 MM miscellaneous; Use once daily or as directed.  Dispense: 100 each; Refill: 3        Patient is to call the clinic if she experiences any adverse reactions.     Patient Instructions   Restart Victoza per package directions.  If your blood pressure is still elevated at your follow up then I will start you on new blood pressure medication (or increase the verapamil).         I spent 19 minutes of time with the patient and >50% of it was in education and counseling regarding weight management .     Lorraine Johnson MD  Internal Medicine    [employer] Board of [employer]esity [employer] Diplomate

## 2020-05-05 NOTE — PATIENT INSTRUCTIONS
Restart Victoza per package directions.  If your blood pressure is still elevated at your follow up then I will start you on new blood pressure medication (or increase the verapamil).

## 2020-06-02 ENCOUNTER — OFFICE VISIT (OUTPATIENT)
Dept: INTERNAL MEDICINE | Facility: CLINIC | Age: 35
End: 2020-06-02
Payer: COMMERCIAL

## 2020-06-02 VITALS
SYSTOLIC BLOOD PRESSURE: 122 MMHG | DIASTOLIC BLOOD PRESSURE: 78 MMHG | OXYGEN SATURATION: 100 % | WEIGHT: 221.5 LBS | TEMPERATURE: 98.2 F | HEIGHT: 62 IN | RESPIRATION RATE: 16 BRPM | HEART RATE: 96 BPM | BODY MASS INDEX: 40.76 KG/M2

## 2020-06-02 DIAGNOSIS — Z51.81 ENCOUNTER FOR THERAPEUTIC DRUG MONITORING: ICD-10-CM

## 2020-06-02 DIAGNOSIS — E61.1 IRON DEFICIENCY: ICD-10-CM

## 2020-06-02 LAB
CREAT SERPL-MCNC: 0.86 MG/DL (ref 0.52–1.04)
FERRITIN SERPL-MCNC: 30 NG/ML (ref 12–150)
GFR SERPL CREATININE-BSD FRML MDRD: 89 ML/MIN/{1.73_M2}
HGB BLD-MCNC: 12.6 G/DL (ref 11.7–15.7)

## 2020-06-02 PROCEDURE — 36415 COLL VENOUS BLD VENIPUNCTURE: CPT | Performed by: INTERNAL MEDICINE

## 2020-06-02 PROCEDURE — 99213 OFFICE O/P EST LOW 20 MIN: CPT | Performed by: INTERNAL MEDICINE

## 2020-06-02 PROCEDURE — 85018 HEMOGLOBIN: CPT | Performed by: INTERNAL MEDICINE

## 2020-06-02 PROCEDURE — 82565 ASSAY OF CREATININE: CPT | Performed by: INTERNAL MEDICINE

## 2020-06-02 PROCEDURE — 82728 ASSAY OF FERRITIN: CPT | Performed by: INTERNAL MEDICINE

## 2020-06-02 ASSESSMENT — MIFFLIN-ST. JEOR: SCORE: 1662.97

## 2020-06-02 NOTE — PROGRESS NOTES
INTERNAL MEDICINE  Medical Weight Loss - Return Visit      CHIEF COMPLAINT:  Recheck weight      Wt Readings from Last 5 Encounters:   06/02/20 100.5 kg (221 lb 8 oz)   05/05/20 101.4 kg (223 lb 8 oz)   12/13/19 92.9 kg (204 lb 12 oz)   12/05/19 94.4 kg (208 lb 1.6 oz)   11/15/19 95.3 kg (210 lb)         HISTORY OF PRESENT ILLNESS (HPI):    Patient returns today for medical weight loss follow-up visit. Patient was last seen by me on 5/5/2020 and has lost 2 pounds      She is on victoza 1.8 mg daily.  She is working out.  She isn't drinking alcohol.  If she eats breakfast then she is not hungry the rest of the day.      Anxiety is improved.    MEDICATIONS:   Current Outpatient Medications   Medication Sig Dispense Refill     buPROPion (WELLBUTRIN XL) 150 MG 24 hr tablet Take 1 tablet (150 mg) by mouth daily 30 tablet 1     insulin pen needle (BD TALHA U/F) 32G X 4 MM miscellaneous Use once daily or as directed. 100 each 3     liraglutide (VICTOZA) 18 MG/3ML solution Inject 0.6 mg Subcutaneous daily for 7 days, THEN 1.2 mg daily for 7 days, THEN 1.8 mg daily. 9 mL 1     naproxen (NAPROSYN) 500 MG tablet Take 1 tablet (500 mg) by mouth 2 times daily as needed for moderate pain 60 tablet 1     propranolol (INDERAL) 10 MG tablet Take 1-2 tablets (10-20 mg) by mouth 2 times daily as needed (anxiety) 60 tablet 3     SUMAtriptan (IMITREX) 25 MG tablet Take 1-2 tablets (25-50 mg) by mouth at onset of headache for migraine May repeat dose in 2 hours.  Do not exceed 200 mg in 24 hours 18 tablet 0     traZODone (DESYREL) 50 MG tablet Take 1-2 tablets ( mg) by mouth nightly as needed for sleep 60 tablet 5     venlafaxine (EFFEXOR-ER) 225 MG 24 hr tablet Take 1 tablet (225 mg) by mouth daily 90 tablet 0     verapamil ER (VERELAN) 180 MG 24 hr capsule Take 1 capsule (180 mg) by mouth daily 90 capsule 3       ALLERGIES:   Allergies   Allergen Reactions     Lisinopril Cough     Zoloft      tired       PHYSICAL EXAMINATION:   "  VITALS: /78 (BP Location: Right arm, Cuff Size: Adult Large)   Pulse 96   Temp 98.2  F (36.8  C) (Oral)   Resp 16   Ht 1.575 m (5' 2\")   Wt 100.5 kg (221 lb 8 oz)   SpO2 100%   BMI 40.51 kg/m    GENERAL: Patient is a 34 year old year old female  in no acute distress.  Patient is alert and orientated x 4, pleasant and cooperative with exam.     GENERAL APPEARANCE: healthy, alert and no distress  RESP: lungs clear to auscultation - no rales, rhonchi or wheezes  CV: regular rates and rhythm and normal S1 S2, no S3 or S4  PSYCH: mentation appears normal. and affect normal/bright    PSYCH: mentation appears normal, affect normal and bright.    LAB RESULTS:   Reviewed in Epic    ASSESSMENT/PLAN:    1. BMI 40.0-44.9, adult (H)  Doing well with liraglutide.  Insurance doesn't cover saxenda.  At follow up will have provider go over exercise log and make new diet and exercise goals for patient     2. Encounter for therapeutic drug monitoring    - Creatinine    3. Iron deficiency  Is not on iron.  Needs a recheck.    - Ferritin  - Hemoglobin        Patient is to call the clinic if she experiences any adverse reactions.     Patient Instructions   Evisit in 1 month.  Submit your exercise log (photo) from your calendar at that time.             Lorraine Johnson MD  Internal Medicine    American Board of Obesity Medicine Diplomate           "

## 2020-06-26 ASSESSMENT — PATIENT HEALTH QUESTIONNAIRE - PHQ9: SUM OF ALL RESPONSES TO PHQ QUESTIONS 1-9: 5

## 2020-06-26 NOTE — PROGRESS NOTES
"Екатерина Ramon is a 34 year old female who is being evaluated via a billable telephone visit.      The patient has been notified of following:     \"This telephone visit will be conducted via a call between you and your physician/provider. We have found that certain health care needs can be provided without the need for a physical exam.  This service lets us provide the care you need with a short phone conversation.  If a prescription is necessary we can send it directly to your pharmacy.  If lab work is needed we can place an order for that and you can then stop by our lab to have the test done at a later time.    Telephone visits are billed at different rates depending on your insurance coverage. During this emergency period, for some insurers they may be billed the same as an in-person visit.  Please reach out to your insurance provider with any questions.    If during the course of the call the physician/provider feels a telephone visit is not appropriate, you will not be charged for this service.\"    Patient has given verbal consent for Telephone visit?  Yes    What phone number would you like to be contacted at? [phone]     How would you like to obtain your AVS? Vilma Alejandra     Екатерина Ramon is a 34 year old female who presents via phone visit today for the following health issues:    HPI   Chief Complaint   Patient presents with     Weight Check     Patient is continuing on liraglutide.  She does not feel that it is effective.  She is not weighing herself at home.  She does not note the appetite suppression she experienced in the past.  She has been on medication for 2 months and is at the highest dose.    Patient notes exercise 5 days per week with walking and several strength training every couple of days.    Patient feels she is eating a protein rich diet and fruits and veggies.  She feels diet is balanced.  She feels portions are too big.    Patient Active Problem List   Diagnosis     " CARDIOVASCULAR SCREENING; LDL GOAL LESS THAN 160     Family history of diabetes mellitus     History of depression     Hypertension goal BP (blood pressure) < 140/90     CTS (carpal tunnel syndrome)     Anxiety     BMI 40.0-44.9, adult (H)     Chronic bilateral thoracic back pain     Chronic midline low back pain without sciatica     Migraine with aura and without status migrainosus, not intractable     Low serum HDL     Counseling for parent-child problem     Dysthymic disorder     Iron deficiency anemia due to chronic blood loss     Menorrhagia with regular cycle     Moderate major depression (H)     Psychophysiological insomnia     Contraceptive management     IUD (intrauterine device) in place     Past Surgical History:   Procedure Laterality Date     BUNIONECTOMY LAPIDUS WITH TARSAL METATARSAL (TMT) FUSION Right 8/14/2018    Procedure: BUNIONECTOMY LAPIDUS WITH TARSAL METATARSAL (TMT) FUSION;  Right first tarsometatarsal joint fusion, right Jackson bunionectomy;  Surgeon: Zack Healy DPM;  Location: [facility] OR     HC REVISE MEDIAN N/CARPAL TUNNEL SURG Left 6/5/15    Primary, not revision     RELEASE CARPAL TUNNEL Left 6/5/2015    Procedure: RELEASE CARPAL TUNNEL;  Surgeon: Juan Jose Joaquin MD;  Location: [facility] OR       Social History     Tobacco Use     Smoking status: Former Smoker     Types: Cigarettes     Last attempt to quit: 5/1/2010     Years since quitting: 10.1     Smokeless tobacco: Never Used   Substance Use Topics     Alcohol use: Yes     Comment: rare     Family History   Problem Relation Age of Onset     Diabetes Mother      Hypertension Mother      Cancer Mother         skin     Diabetes Father      Glaucoma No family hx of      Macular Degeneration No family hx of          Current Outpatient Medications   Medication Sig Dispense Refill     buPROPion (WELLBUTRIN XL) 150 MG 24 hr tablet Take 1 tablet (150 mg) by mouth daily 30 tablet 1     insulin pen needle (BD TALHA U/F) 32G X 4 MM  miscellaneous Use once daily or as directed. 100 each 3     naproxen (NAPROSYN) 500 MG tablet Take 1 tablet (500 mg) by mouth 2 times daily as needed for moderate pain 60 tablet 1     orlistat (LANI) 60 MG capsule Take 1 capsule (60 mg) by mouth 3 times daily as needed 90 capsule 1     propranolol (INDERAL) 10 MG tablet Take 1-2 tablets (10-20 mg) by mouth 2 times daily as needed (anxiety) 60 tablet 3     SUMAtriptan (IMITREX) 25 MG tablet Take 1-2 tablets (25-50 mg) by mouth at onset of headache for migraine May repeat dose in 2 hours.  Do not exceed 200 mg in 24 hours 18 tablet 0     traZODone (DESYREL) 50 MG tablet Take 1-2 tablets ( mg) by mouth nightly as needed for sleep 60 tablet 5     venlafaxine (EFFEXOR-ER) 225 MG 24 hr tablet Take 1 tablet (225 mg) by mouth daily 90 tablet 0     verapamil ER (VERELAN) 180 MG 24 hr capsule Take 1 capsule (180 mg) by mouth daily 90 capsule 3     Allergies   Allergen Reactions     Lisinopril Cough     Zoloft      tired     BP Readings from Last 3 Encounters:   06/02/20 122/78   05/05/20 (!) 132/94   12/13/19 112/76    Wt Readings from Last 3 Encounters:   06/02/20 100.5 kg (221 lb 8 oz)   05/05/20 101.4 kg (223 lb 8 oz)   12/13/19 92.9 kg (204 lb 12 oz)                    Reviewed and updated as needed this visit by Provider  Tobacco  Allergies  Meds  Problems  Med Hx  Surg Hx  Fam Hx         Review of Systems   Constitutional, HEENT, cardiovascular, pulmonary, gi and gu systems are negative, except as otherwise noted.       Objective   Reported vitals:  There were no vitals taken for this visit.   healthy, alert and no distress  PSYCH: Alert and oriented times 3; coherent speech, normal   rate and volume, able to articulate logical thoughts, able   to abstract reason, no tangential thoughts, no hallucinations   or delusions  Her affect is normal  RESP: No cough, no audible wheezing, able to talk in full sentences  Remainder of exam unable to be completed due to  telephone visits    Diagnostic Test Results:  none         Assessment/Plan:  1. BMI 34.0-34.9,adult  Patient to stop liraglutide.  We discussed naltrexone and orlistat.  She notes that her mood is not well controlled at this time.  She hesitates to start naltrexone due to depression side effects.  Patient to start orlistat.  She was advised of potential side effects.  Patient to continue diet and exercise.  - orlistat (LANI) 60 MG capsule; Take 1 capsule (60 mg) by mouth 3 times daily as needed  Dispense: 90 capsule; Refill: 1    Return in about 4 weeks (around 7/27/2020) for Weight Recheck.      Phone call duration:  11 minutes    ESTUARDO Mayer CNP

## 2020-06-29 ENCOUNTER — VIRTUAL VISIT (OUTPATIENT)
Dept: FAMILY MEDICINE | Facility: CLINIC | Age: 35
End: 2020-06-29
Payer: COMMERCIAL

## 2020-06-29 PROCEDURE — 99213 OFFICE O/P EST LOW 20 MIN: CPT | Mod: 95 | Performed by: NURSE PRACTITIONER

## 2020-06-29 NOTE — PATIENT INSTRUCTIONS
Common Weight Loss Medications:    Phentermine- promotes weight loss by targeting POMC neurons in the hypothalmus that cause hunger and cravings.     Side Effects: Dizziness, dry mouth, difficulty sleeping, irritability, nausea, vomiting, diarrhea, constipation.     Not to be used in patients who are pregnant, have a history of cardiac disease, hyperthyroidism, glaucoma, drug abuse, alcohol use, and anxiety.    Topiramate- promotes weight loss by neurotransmitter suppression that can decrease appetite.     Side Effects: Numbness and tingling, dizziness, taste changes, fatigue.     Not to be used in patients who are pregnant, have a history of glaucoma or kidney stones.    Liraglutide- promotes weight loss by affecting POMC neurons and reducing appetite and energy intake.     Side Effects: Nausea, low blood sugar, diarrhea, constipation, vomiting, headache, decreased appetite, stomach upset, dizziness, fatigue, abdominal pain.     Not to be used in patients who are pregnant, have a personal or family history of medullary thyroid cancer or Multiple Endocrine Neoplasia Syndrome Type 2.    Naltrexone HCl/Buproprion HCl- promotes weight loss by activating POMC neurons to release a hormone that suppresses appetite.     Side Effects: Nausea/Vomiting, constipation, headache, dizziness, difficulty sleeping, dry mouth, diarrhea.     Not to be used in patients who are pregnant, have uncontrolled high blood pressure, seizure disorder, a history of eating disorder, or history of suicidal behavior.    Orlistat- promotes weight loss by reducing fat absorption from the gastrointestinal tract.     Side Effects: Oily gas, gas with discharge, urgency with bowel movements, increased bowel movements, oily bowel movements, bowel incontinence.     Not to be used in patients who are pregnant, have chronic malabsorption, liver disease.

## 2020-07-08 ENCOUNTER — TELEPHONE (OUTPATIENT)
Dept: FAMILY MEDICINE | Facility: CLINIC | Age: 35
End: 2020-07-08

## 2020-07-08 NOTE — TELEPHONE ENCOUNTER
Reason for call:  Other   Patient called regarding (reason for call): prescription  Additional comments: Pharmacy would like to know if they can change the Saxenda to 15 ml amount? States the FDA will not let them break open packages. Please call.     Phone number to reach patient:  Other phone number: [phone]    Best Time:  any    Can we leave a detailed message on this number?  YES    Travel screening: Not Applicable

## 2020-07-10 ENCOUNTER — TELEPHONE (OUTPATIENT)
Dept: FAMILY MEDICINE | Facility: CLINIC | Age: 35
End: 2020-07-10

## 2020-07-10 NOTE — TELEPHONE ENCOUNTER
PRIOR AUTHORIZATION DENIED    Medication: orlistat (LANI) 60 MG capsule     Denial Date: 7/10/2020    Denial Rational:  Per insurance, medication is excluded from patient's benefit plan and will not be covered. Review and appeal are not available because of this exclusion.  According to insurance covered alternatives are: Adipex-P, Contrave and Qsymia.           Appeal Information:  N/A

## 2020-07-10 NOTE — TELEPHONE ENCOUNTER
Prior Authorization Retail Medication Request    Medication/Dose:orlistat (LANI) 60 MG capsule    ICD code (if different than what is on RX):  BMI 34.0-34.9,adult [Z68.34]  - Primary   Previously Tried and Failed:    Rationale:      Insurance Name:  [employer]   Insurance ID:  [id number]       Pharmacy Information (if different than what is on RX)  Name:  [facility]  Phone:  [phone]

## 2020-07-17 ENCOUNTER — TELEPHONE (OUTPATIENT)
Dept: FAMILY MEDICINE | Facility: CLINIC | Age: 35
End: 2020-07-17

## 2020-07-17 NOTE — TELEPHONE ENCOUNTER
Called pharmacy. They state that pen was designed to deliver 0.25mg or 0.5 mg and they just wanted to clarify what dose pt should be getting. Please advise.

## 2020-07-17 NOTE — TELEPHONE ENCOUNTER
Reason for call:  Other   Patient called regarding (reason for call): prescription  Additional comments: Ozempic pen dosage questions. Please call    Phone number to reach patient:  Other phone number:  [phone]    Best Time:  any    Can we leave a detailed message on this number?  NO    Travel screening: Not Applicable

## 2020-07-29 ENCOUNTER — TELEPHONE (OUTPATIENT)
Dept: SURGERY | Facility: CLINIC | Age: 35
End: 2020-07-29

## 2020-07-29 NOTE — TELEPHONE ENCOUNTER
Patient interested in weight loss surgery    Екатерина Ramon          Scheduled to see CNP or RL at 1230, followed by an appt with a dietitian at 1300.    Spoke to Patient: regarding appt in July 30th.    Can find information on bariatrix products at: [email or web address]    Instructed to view seminar and complete pre-visit questionnaire prior to visit at [email or web address].    [phone] contact center phone number      Suad Pollack EMT

## 2020-07-30 ENCOUNTER — VIRTUAL VISIT (OUTPATIENT)
Dept: SURGERY | Facility: CLINIC | Age: 35
End: 2020-07-30
Payer: COMMERCIAL

## 2020-07-30 VITALS — BODY MASS INDEX: 41.04 KG/M2 | HEIGHT: 62 IN | WEIGHT: 223 LBS

## 2020-07-30 DIAGNOSIS — E66.01 MORBID OBESITY (H): Primary | ICD-10-CM

## 2020-07-30 DIAGNOSIS — E66.01 OBESITY, CLASS III, BMI 40-49.9 (MORBID OBESITY) (H): Primary | ICD-10-CM

## 2020-07-30 DIAGNOSIS — Z71.3 NUTRITIONAL COUNSELING: ICD-10-CM

## 2020-07-30 ASSESSMENT — MIFFLIN-ST. JEOR: SCORE: 1669.77

## 2020-07-30 ASSESSMENT — PAIN SCALES - GENERAL: PAINLEVEL: NO PAIN (0)

## 2020-07-30 NOTE — PROGRESS NOTES
"Екатерина Ramon is a 34 year old female who is being evaluated via a billable video visit.      The patient has been notified of following:     \"This video visit will be conducted via a call between you and your physician/provider. We have found that certain health care needs can be provided without the need for an in-person physical exam.  This service lets us provide the care you need with a video conversation.  If a prescription is necessary we can send it directly to your pharmacy.  If lab work is needed we can place an order for that and you can then stop by our lab to have the test done at a later time.    Video visits are billed at different rates depending on your insurance coverage.  Please reach out to your insurance provider with any questions.    If during the course of the call the physician/provider feels a video visit is not appropriate, you will not be charged for this service.\"    Patient has given verbal consent for Video visit? Yes  How would you like to obtain your AVS? MyChart  If you are dropped from the video visit, the video invite should be resent to: Text to cell phone: [phone]  Will anyone else be joining your video visit? No      Video-Visit Details    Type of service:  Video Visit    Video Start Time: 12:41 PM  Video End Time: 1303    Originating Location (pt. Location): Home    Distant Location (provider location):   [facility]     Platform used for Video Visit: Brittney Gage NP      New Bariatric Surgery Consultation Note    2020    RE: Екатерина Ramon  MR#: [id number]  : 1985      Referring provider:       2020   Who referred you? Magaly Gonzalez       Chief Complaint/Reason for visit: evaluation for possible weight loss surgery    Dear Anusha Wolff, APRN CNP (General),    I had the pleasure of seeing your patient, Екатерина Ramon, to evaluate her obesity and consider her for possible weight loss surgery. As you " "know, Екатерина Ramon is 34 year old.  She has a height of 5' 2\"[Pt reported[, a weight of 223 lbs 0 oz, and calculated Body mass index is 40.79 kg/m .      HISTORY OF PRESENT ILLNESS:  Weight Loss History Reviewed with Patient 7/29/2020   How long have you been overweight? Since early childhood   What is the most that you have ever weighed? 230   What is the most weight you have lost? 50   I have tried the following methods to lose weight Watching portions or calories, Exercise, Atkins type diet (low carb/high protein), OTC Medications, Prescription Medications   I have tried the following weight loss medications? (Check all that apply) Xenical/Orlistat/Gamaliel, Topamax/Topiramate, Victoza/liraglutide   Have you ever had weight loss surgery? No     Has been working with Dr. Johnson doing medical weight management and has gained and lost weight over time     Started struggling with weight very young  Gradual weight gain over time       CO-MORBIDITIES OF OBESITY INCLUDE:     7/29/2020   I have the following health issues associated with obesity: High Blood Pressure     No GERD  No snoring, no hx RADHA  No hx blood clots     PAST MEDICAL HISTORY:  Past Medical History:   Diagnosis Date     Family history of diabetes mellitus      Hypertension      Migraine with aura and without status migrainosus, not intractable 11/2/2016     PID (acute pelvic inflammatory disease) 5/2010       PAST SURGICAL HISTORY:  Past Surgical History:   Procedure Laterality Date     BUNIONECTOMY LAPIDUS WITH TARSAL METATARSAL (TMT) FUSION Right 8/14/2018    Procedure: BUNIONECTOMY LAPIDUS WITH TARSAL METATARSAL (TMT) FUSION;  Right first tarsometatarsal joint fusion, right Jackson bunionectomy;  Surgeon: Zack Healy DPM;  Location:  OR      REVISE MEDIAN N/CARPAL TUNNEL SURG Left 6/5/15    Primary, not revision     RELEASE CARPAL TUNNEL Left 6/5/2015    Procedure: RELEASE CARPAL TUNNEL;  Surgeon: Juan Jose Joaquin MD;  Location:  OR "       FAMILY HISTORY:   Family History   Problem Relation Age of Onset     Diabetes Mother      Hypertension Mother      Cancer Mother         skin     Diabetes Father      Glaucoma No family hx of      Macular Degeneration No family hx of        SOCIAL HISTORY:   Social History Questions Reviewed With Patient 7/29/2020   Which best describes your employment status (select all that apply) I work full-time, I am a student   If you work, 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 Yes   Who do you have in your support network that can be available to help you for the first 2 weeks after surgery? parents   Who can you count on for support throughout your weight loss surgery journey? family/friends   Can you afford 3 meals a day?  Yes   Can you afford 50-60 dollars a month for vitamins? Yes     Working mostly from home, work in person 1 day a week  Desk job   13yo     HABITS:     7/29/2020   How often do you drink alcohol? Monthly or less   If you do drink alcohol, how many drinks might you have in a day? (one drink = 5 oz. wine, 1 can/bottle of beer, 1 shot liquor) 1 or 2   Have you ever used any of the following nicotine products? Cigarettes   If you previously used any of these products, what year did you quit? 2010   Have you or are you currently using street drugs or prescription strength medication for which you do not have a prescription for? No   Do you have a history of chemical dependency (alcohol or drug abuse)? No       PSYCHOLOGICAL HISTORY:   Psychological History Reviewed With Patient 7/29/2020   Have you ever attempted suicide? In the last 5 to 10 years.   Have you had thoughts of suicide in the past year? No   Have you ever been hospitalized for mental illness or a suicide attempt? Never.   Do you have a history of chronic pain? Yes   Have you ever been diagnosed with fibromyalgia? No   Are you currently being treated for any of the  "following? (select all that apply) Depression, Anxiety   Are you currently seeing a therapist or counselor?  Yes   Are you currently seeing a psychiatrist? No     Primary care manages mental health medication   Works with a therapist     ROS:     7/29/2020   Skin:  Leg swelling   HEENT: Headaches   Musculoskeletal: Back pain   Cardiovascular: Shortness of breath with activity   Pulmonary: Shortness of breath with activity, Experience morning headaches   Gastrointestinal: None of the above   Genitourinary: None of the above   Hematological: None of the above   Neurological: Migraine headaches   Female only: Loss of menstrual cycles, Birth control     Headaches from screen time  Migraines are \"random\" - rare naproxen     EATING BEHAVIORS:     7/29/2020   Have you or anyone else thought that you had an eating disorder? No   Do you currently binge eat (eat a large amount of food in a short time)? No   Are you an emotional eater? No   Do you get up to eat after falling asleep? No       EXERCISE:     7/29/2020   How often do you exercise? 3 to 4 times per week   What is the duration of your exercise (in minutes)? 30 Minutes   What types of exercise do you do? walking, other   What keeps you from being more active?  Shortness of breath, Too tired       MEDICATIONS:  Current Outpatient Medications   Medication Sig Dispense Refill     buPROPion (WELLBUTRIN XL) 150 MG 24 hr tablet Take 1 tablet (150 mg) by mouth daily 30 tablet 1     insulin pen needle (BD TALHA U/F) 32G X 4 MM miscellaneous Use once daily or as directed. 100 each 3     naproxen (NAPROSYN) 500 MG tablet Take 1 tablet (500 mg) by mouth 2 times daily as needed for moderate pain 60 tablet 1     propranolol (INDERAL) 10 MG tablet Take 1-2 tablets (10-20 mg) by mouth 2 times daily as needed (anxiety) 60 tablet 3     Semaglutide,0.25 or 0.5MG/DOS, 2 MG/1.5ML SOPN Inject 0.25 mg Subcutaneous every 7 days 3 mL 0     SUMAtriptan (IMITREX) 25 MG tablet Take 1-2 tablets " "(25-50 mg) by mouth at onset of headache for migraine May repeat dose in 2 hours.  Do not exceed 200 mg in 24 hours 18 tablet 0     traZODone (DESYREL) 50 MG tablet Take 1-2 tablets ( mg) by mouth nightly as needed for sleep 60 tablet 5     venlafaxine (EFFEXOR-ER) 225 MG 24 hr tablet Take 1 tablet (225 mg) by mouth daily 90 tablet 0     verapamil ER (VERELAN) 180 MG 24 hr capsule Take 1 capsule (180 mg) by mouth daily 90 capsule 3       ALLERGIES:  Allergies   Allergen Reactions     Lisinopril Cough     Zoloft      tired       LABS/IMAGING/MEDICAL RECORDS REVIEW:   Lipids in care everywhere from 6/2019    PHYSICAL EXAM:  Ht 1.575 m (5' 2\")   Wt 101.2 kg (223 lb)   BMI 40.79 kg/m        In summary, Екатерина Ramon has Class III obesity with a body mass index of Body mass index is 40.79 kg/m . kg/m2 and the comorbidities stated above. She completed an informational seminar and is a candidate for the laparoscopic gastric sleeve.  She will have to complete the following pre-requisites:    Received weight loss goal of 10 lb prior to surgery. 213   6 dietitian visits   Have preoperative laboratory tests drawn.  Psychological Evaluation with MMPI and clearance for weight loss surgery.  Letter of clearance from the following primary care, [facility] (Dr. Johnson), therapist     Today in the office we discussed gastric sleeve surgery. Preoperative, perioperative, and postoperative processes, management, and follow up were addressed.  Risks and benefits were outlined including the risk of death, staple line leak (1-2%), PE, DVT, ulcer, worsening GERD, N/V, stricture, hernia, wound infection, weight regain, and vitamin deficiencies. I emphasized exercise and activity along with appropriate food choice as the main foundation for weight loss with surgery providing surgical reinforcement of this.  All questions were answered.  A goal sheet and support group handout were given to the patient.    Once the patient has completed the " requirements in their task list and there are no further recommendations, the pt will be allowed to see the surgeon of her choice for consultation on the laparoscopic gastric sleeve surgery. Patient verbalizes understanding of the process to surgery and expectations for the postoperative period including the need for lifelong lifestyle changes, vitamin supplementation, and laboratory monitoring.  Sincerely,     Yvette Gage NP    Bariatric Task List  Status:  Is patient a candidate for bariatric surgery?:  patient is a candidate for bariatric surgery -     Cleared to schedule surgeon consult?:    -     Status:    -     Surgeon: Dr. Stein  -     Tentative surgery month/year: Jan 2020 -        Insurance: Insurance:  [facility] -     Cigna: PCP Recommendation and Medical Clearance:    -     HP Referral:    -     Other:    -        Patient Info: Initial Weight:  223 -     Date of Initial Weight/Height:  7/30/2020 -     Goal Weight (lbs):  213 -     Required Weight Loss:  10 -     Surgery Type:  sleeve gastrectomy -     Multidisciplinary Meeting:    -        Dietician Visits: Structured weight loss required by insurance?:  structured weight loss required -     Dietician Visit 1:  Needed -     Dietician Visit 2:  Needed -     Dietician Visit 3:  Needed -     Dietician Visit 4:  Needed -     Dietician Visit 5:  Needed -     Dietician Visit 6:  Needed -     Dietician Visit additional:    -     Clearance from dietician to see surgeon?:    -     Dietician Notes:    -        Psychological Evaluation: Psych eval:  Needed -     Therapist letter of support:  Needed -     Psychiatrist letter of support:    -     Establish care with therapist:    -     Complete eating disorder evaluation:    -     Letter of clearance from therapist/eating disorder program:    -     Other:    -        Lab Work: Complete Blood Count:  Needed -     Comprehensive Metabolic Panel:  Needed -     Vitamin D:  Needed -     Hgb A1c:  Needed -     PTH:  Needed -    "  H. pylori:    -     TSH:  Needed -     Nicotine Testing:    -     Other:    -        Consults/ Clearance: Sleep Medicine:    -     Cardiac:    -     Pain:   -     Dental:    -     Endocrine:    -     Gastroenterology:    -     Vascular Medicine:    -     Hematology:    -     Medical Weight Management: Needed -     Physical Therapy/Exercise:    -     Nephrology:    -     Neurology:    -     Pulmonology:    -     Rheumatology:    -     Other    -     Other    -     Other    -           Testing: UGI:    -     EGD:    -     Other:    -        PCP: Establish care with PCP:  Completed -     Follow up with PCP:    -     PCP letter of support:  Needed -        Smoking: Quit tobacco use (3 months smoke free)?:    -     Quit date:    -        Patient Education:  Information Session:  Completed -     Given \"Making your decision\" handout?:  Given \"\"Making your decision\"\" handout? -     Given support group information?:  support group contact information provided -     Attended support group?:    -     Support plan in place?:  Completed -     Research consents signed?:    -        Additional Surgery Requirements: Review Coag plan:    -     HgA1c <8:    -     Inpatient pain consult:    -     Final nicotine screen:    -     Dental work complete:    -     Birth control plan:    -     Other Requirements:    -     Other Requirements:    -        Final Tasks:  Before surgery online class:  Needed -     Before surgery online class website link:  [email or web address]   After surgery online class:  Needed -     After surgery online class website link:  [email or web address]   Nurse visit for weigh-in and information:  Needed -     Pre-assessment clinic visit with anesthesia team for H&P:  Needed -     Final labs (Hgb, plt, T&S, UA):  Needed -        Notes:   -            "

## 2020-07-30 NOTE — LETTER
"7/30/2020       RE: Екатерина Ramon  [address]  [place] [address]     Dear Colleague,    Thank you for referring your patient, Екатерина Ramon, to the [facility] MANAGEMENT at [facility]. Please see a copy of my visit note below.    Екатерина Ramon is a 34 year old female who is being evaluated via a billable video visit.      The patient has been notified of following:     \"This video visit will be conducted via a call between you and your physician/provider. We have found that certain health care needs can be provided without the need for an in-person physical exam.  This service lets us provide the care you need with a video conversation.  If a prescription is necessary we can send it directly to your pharmacy.  If lab work is needed we can place an order for that and you can then stop by our lab to have the test done at a later time.    Video visits are billed at different rates depending on your insurance coverage.  Please reach out to your insurance provider with any questions.    If during the course of the call the physician/provider feels a video visit is not appropriate, you will not be charged for this service.\"    Patient has given verbal consent for Video visit? Yes  How would you like to obtain your AVS? MyChart    Will anyone else be joining your video visit? No        Video-Visit Details    Type of service:  Video Visit    Video Start Time: 1:10 PM  Video End Time: 1:35 PM    Time spent with patient: 25 minutes.    Originating Location (pt. Location): Home    Distant Location (provider location):  [facility] SURGICAL WEIGHT MANAGEMENT     Platform used for Video Visit: [employer]    New Bariatric Nutrition Consultation Note    Reason For Visit: Nutrition Assessment    Екатерина Ramon is a 34 year old presenting today for new bariatric nutrition consult.   Pt is interested in laparoscopic sleeve gastrectomy with Dr. Stein expected surgery in Jan " "2021.  Patient is accompanied by self.  This is pt's first of 6 required nutrition visits prior to surgery.     Pt referred by Yvette Gage NP on July 30, 2020.  Patient with Co-morbidities of obesity including:  Type II DM no  Renal Failure no  Sleep apnea no  Hypertension yes   Dyslipidemia no  Joint pain no  Back pain no  GERD no     Support System Reviewed With Patient 7/29/2020   Who do you have in your support network that can be available to help you for the first 2 weeks after surgery? parents   Who can you count on for support throughout your weight loss surgery journey? family/friends       ANTHROPOMETRICS:  Estimated body mass index is 40.79 kg/m  as calculated from the following:    Height as of an earlier encounter on 7/30/20: 1.575 m (5' 2\").    Weight as of an earlier encounter on 7/30/20: 101.2 kg (223 lb).    Required weight loss goal pre-op: -10 lbs from initial consult weight (goal weight 213 lbs or less before surgery)       7/29/2020   I have tried the following methods to lose weight Watching portions or calories, Exercise, Atkins type diet (low carb/high protein), OTC Medications, Prescription Medications       Weight Loss Questions Reviewed With Patient 7/29/2020   How long have you been overweight? Since early childhood       SUPPLEMENT INFORMATION:  MVI     NUTRITION HISTORY:  - NKFA  - previous wt loss attempts - Ragini lost 50 lbs, started to regain.   - Dining out: every other week     Recall Diet Questions Reviewed With Patient 7/29/2020   Describe what you typically consume for breakfast (typical or most recent): 1-2/7 - cereal (Honey Nut Cheerios), eggs   Describe what you typically consume for lunch (typical or most recent): 12-1 pm - [place] (white bread with turkey and argueta and tomato); salad kit (Cesear); leftovers    PM snack: fruit, chips   Describe what you typically consume for supper (typical or most recent): 7pm - beef, chicken, pasta dishes, rice dishes; pizza   Describe " what you typically consume as snacks (typical or most recent): cheese, grapes, pretzels popcorn, nuts, chips   How many ounces of water, or other low calorie drinks, do you drink daily (8 oz=1 glass)? 40 oz water per day    How many ounces of caffeine (coffee, tea, pop) do you drink daily (8 oz=1 glass)? 8 oz (coffee with half and half)   How many ounces of carbonated (pop, beer, sparkling water) drinks do you drinky daily (8 oz=1 glass)? 8 oz (diet Dr. Pepper 2-3 times per week - mostly when on site at work)   How many ounces of juice, pop, sweet tea, sports drinks, protein drinks, other sweetened drinks, do you drink daily (8 oz=1 glass)? 8 oz   How often do you drink alcohol? Monthly or less   If you do drink alcohol, how many drinks might you have in a day? (one drink = 5 oz. wine, 1 can/bottle of beer, 1 shot liquor) 1 or 2       Eating Habits 7/29/2020   Do you have any dietary restrictions? No   Do you currently binge eat (eat a large amount of food in a short time)? No   Are you an emotional eater? No   Do you get up to eat after falling asleep? No   What foods do you crave? chips, chocolate       ADDITIONAL INFORMATION:  Works at [employer]   Has a walking connie at work    Dining Out History Reviewed With Patient 7/29/2020   How often do you dine out? Around once a week.   Where do you dine out? (select all that apply) sit-down restaurants   What types of food do you order when you dine out? burgers, Mexican, Chinese/brenton       Physical Activity Reviewed With Patient 7/29/2020   How often do you exercise? 3 to 4 times per week   What is the duration of your exercise (in minutes)? 30 Minutes   What types of exercise do you do? walking, other   What keeps you from being more active?  Shortness of breath, Too tired       MALNUTRITION  % Intake: No decreased intake noted  % Weight Loss: Weight loss does not meet criteria  Subcutaneous Fat Loss: None observed  Muscle Loss: None observed  Fluid Accumulation/Edema: None  "noted  Malnutrition Diagnosis: Patient does not meet two of the established criteria necessary for diagnosing malnutrition    NUTRITION DIAGNOSIS:  Obesity r/t long history of self-monitoring deficit and excessive energy intake aeb BMI >30 kg/m2.    INTERVENTION:  Intervention Provided/Education Provided on post-op diet guidelines, vitamins/minerals essential post-operatively, GI anatomy of bariatric surgeries, ways to help prepare for post-op diet guidelines pre-operatively, portion/calorie-control, mindful eating and sources of protein. Provided education on the Plate Method (healthy food choice and appropriate serving sizes) and meal replacements to help with pre-op weight loss. Provided pt with list of goals RD contact information.      Questions Reviewed With Patient 7/29/2020   How ready are you to make changes regarding your weight? Number 1 = Not ready at all to make changes up to 10 = very ready. 10   How confident are you that you can change? 1 = Not confident that you will be successful making changes up to 10 = very confident. 10       Patient Understanding: good  Expected Compliance: good    GOALS:  Relating To Eating:  - Eat slowly (20-30 minutes per meal), chewing foods well (25 chews per bite/applesauce consistency)  - Eat 3 meals per day  - 9\" Plate method (1/2 plate non-starchy vegetables/fruit, 1/4 plate lean protein, 1/4 plate whole grain starch - no more than 1/2 cup carb/meal)   - You may use meal replacements if healthy meal is not planned/prepared (see recommendations below)  - Avoid snacking    Relating to beverages:  - Separate fluids from meals by 30 minutes before, during, and after eating  - Reduce carbonated drinks by 50%  - Drink 48-64 ounces of fluid per day    Relating to dietary supplements:  Start a multivitamin containing iron daily    Relating to activity:  Increase activity as able     Nutrition Handouts:  My Plate Planner_English - Pt " Education  [email or web address]    Protein Sources for Weight Loss  [email or web address]     Mindful Eating  [email or web address]     Diet Guidelines after Weight Loss Surgery  [email or web address]     Meal Replacements:  You may purchase meal replacement products online at our e-store. Visit e-store at [email or web address]   - The one week starter kits is a great way to sample a variety of products.  - For recipes and ideas on how to use products, visit - "[employer]"    *Protein Shake Criteria: no more than 210 Calories, at least 20 grams of protein, and less than 10 grams of sugar     Meal Replacement Shake Options:    [employer]works [facility] smoothie (160 Calories, 20 g protein)   Premier Protein (160 Calories, 30 g protein)  Slim Fast Advanced Nutrition (180 Calories, 20 g protein)  Muscle Milk, lactose-free, 17 oz bottle (210 Calories, 30 g protein)  Integrated Supplements, no artificial sugars (110 Calories, 20 g protein)  Genepro, unflavored protein powder (60 Calories, 30 g protein)    Meal Replacement Bar Options:   Health C Protein Shake (160 Calories, 15 g protein)  Quest Protein Bars (190 Calories, 20 g protein)  Built Bar (170 Calories, 15-20 g protein)  One Protein Bar (210 calories, 20 g protein)  Kerrick Signature Protein Bar ([employer]) (190 Calories, 21 g protein)  Pure Protein Bars (180 Calories, 21 g protein)    Frozen Meal Replacements  Healthy Choice  Lean Cuisine  Atkins Meals  Smart Ones      Aida Martin, LIDIA, LD

## 2020-07-30 NOTE — PATIENT INSTRUCTIONS
"Thank you for allowing us the privilege of caring for you. We hope we provided you with the excellent service you deserve.   Please let us know if there is anything else we can do for you so that we can be sure you are completely satisfied with your care experience.    To ensure the quality of our services you may be receiving a patient satisfaction survey from an independent patient satisfaction monitoring company.    The greatest compliment you can give is a \"Likely to Recommend\"    Your visit was with Yvette Gage NP today.    Instructions per today's visit:   Dietitian Monthly x 6 months   Meet with Dr. Stein next month (surgeon)   Follow up with me if wanting to discuss medications   Schedule psych consult   Work on clearances     Please call our contact center at [phone] to schedule your next appointments.  For any nursing questions or concerns call Dahlia Le LPN at [phone] or Skylar Chase RN at [phone]  Please call during clinic hours Monday through Friday 8:00a - 4:00p if you have questions or you can contact us via Adom[employer] at anytime and we will reply during clinic hours.    Lab results will be communicated through My Chart or letter (if My Chart not used). Please call the clinic if you have not received communication after 1 week or if you have any questions.?  Clinic Fax: [phone]  ______________________________________________________________________________________________________________________  Meal Replacement Products:    Here is the link to our new e-store where you can purchase our meal replacement products    [facility] E-Store  [email or web address]    The one week starter kit is a great way to sample a variety of products and see what works for you.    If you want more information about the product go to: [employer]    Free Shipping for orders over $75     Benefits of meal replacements products:    Portion and calorie control  Improved " nutrition  Structured eating  Simplified food choices  Avoid contact with trigger foods  _________________________________________________________________________________________________________________________________  Interested in working with a health ?  Health coaches work with you to improve your overall health and wellbeing.  They look at the whole person, and may involve discussion of different areas of life, including, but not limited to the four pillars of health (sleep, exercise, nutrition, and stress management). Discuss with your care team if you would like to start working a health .  Health Coaching-3 Pack: Schedule by calling [phone]    $99 for three health coaching visits    Visits may be done in person or via phone    Coaching is a partnership between the  and the client; Coaches do not prescribe or diagnose    Coaching helps inspire the client to reach his/her personal goals   _________________________________________________________________________________________________________________________________  Healthy Lifestyle Support Group    [facility]  Virtual Support Group Now Available    60 minutes of small group guided discussion, support and resources    Led by Health , Briseyda Gould    For questions, and to receive the invite information, contact Briseyda at [email or web address]    All our welcome!    One Friday per month, 12:30pm to 1:30pm  SELF COMPASSION: July 31st  CREATING MY WELLNESS VISION: August 28th  MINDFULNESS PRACTICES FOR A CALM BODY & MIND: September 25th  MINDFUL EATING TOOLS: October 30th  HEALTHY& HAPPY HOLIDAYS: November 20th  OPEN FORUM: December 18th  _______________________________________________________________________________________________________________________________  Connections: Bariatric Care support group  Due to the Covid-19 restrictions, we will be doing our support group virtually using Microsoft Teams.  You will need to get an invitation to the group from Kenny Camilo, Ph.D., LP at [email or web address] and to learn about using Microsoft Teams. The next meeting is on Tuesday, July 14 between 6:30 and 8 PM and there will be no formal speaker.    This group meets the second Tuesday of each month from 6:30 to 8 PM and will be held again at [facility] in the [address] [place] [address] (A-B room) when the Covid-19 restrictions are lifted. The group is led by Kenny Camilo, Ph.D., Licensed Psychologist, [facility]. There is no cost for group participation and is open to all [facility] (and those external to this program) pre- and post-operative bariatric surgery patients as well as their support system.   _________________________________________________________________________________________________________________________________  [facility] Get Moving Program  Our team of physical therapists is trained to help you understand and take control of your condition. They will perform a thorough evaluation to determine your ability for activity and develop a customized plan to fit your goals and physical ability.  Scheduling: Unsure if the Get Moving program is right for you? Discuss the program with your medical provider or diabetes educator. You can also call us at [phone] to ask questions or schedule an appointment.   [facility] Get Moving Program  ______________________________________________________________________________________________________________________  Bluetooth Scale:    We hope to provide you with high quality telephone and virtual healthcare visits while social distancing for COVID-19 is necessary, as well as in the future when virtual visits may be more convenient for you.     Our technology team made it possible for Bluetooth scales to send weight measurements to our electronic medical record. This allows  weights from you weighing at home to securely flow into the medical record, which will improve telephone and virtual visits.   Additionally, studies have shown that adults actually lose more weight when their weights are automatically sent to someone else, and also that this process is not stressful for those adults.    Below is a link for purchasing the scale, with a discount code for our patients. You may call your insurance company to see if they will reimburse you for the cost of the scale, as a piece of durable medical equipment. The scales only go up to a weight of 400 pounds. This is an issue and we are working with the developer on increasing this. We found no scales that go over 400lb that have blue-tooth for connecting to [employer].    Scale to purchase: the One Month  Body  Scale: [email or web address].ds    Discount Code: We have a discount code for our patients to bring the cost down to $50, the code is:  [id number]     Steps to link the scale to [employer] via an Android Phone (you can always disconnect at any point in the future):  1. The order must be placed first before the patient can access Track My Health within [employer].  2. Download Google Fit juanito from the Google Play Store   a. Log in or register using your Google account   3. Download the [employer] juanito from Google Play Store  a. Select add organization   b. Search for [email or web address] and select it   c. Log into [employer]  d. Select Track My Health   e. Select the green connect my account button   f. When prompted log into your Google account   g. Select okay to confirm the account   4. Download the [facility] juanito from Google Play Store  a. [place] for One Month   b. Go to profile   c. Tap google fit under the Apps section  d. Select the option to activate Google Fit integration   e. Select the same Google account   f. Select okay to confirm the  account  g.   Steps to link the scale to [employer] via an iPhone (you can always disconnect at any point in the future):  **Note Real Time Content is not available for download on an iPad**  1. The order must be placed first before the patient can access Track My Health within [employer].  2. Locate the Health ruma on your iPhone.  a. Set up your Apple Health account as prompted  b. The Sources page will show Apps that communicate with your Health ruma. Once all steps are completed, you should see [employer] and [employer] listed under the Apps section and your iPhone under the devices section.  i. Select Health Miyaobabei  1. Under 'ALLOW  [employer]  TO WRITE DATA ensure the toggle is on for Weight.  2. This will allow the scale to add your weight to the Apple Health  ii. [employer]  1. Under 'ALLOW  [employer]  TO READ DATA ensure the toggle is on for Weight.  2. This allows [employer] to grab the weight from Real Time Content so your provider can see your weights.  3. Download the [employer] ruma from the Ruma Store   a. Select add organization                                                  b. Search for [employer]oom and select it  c. Log into [employer]  d. Select Track My Health   e. Select the green connect my account button   f. Follow prompts to link your device to [employer].  4. Download the [facility] mate ruma in the Ruma Store   a. [place] for ViRTUAL INTERACTiVE   b. Go to profile   c. SNADEC under the Apps section  d. If prompted to allow access with the Health Ruma, toggle weight on for read and write access.   ______________________________________________________________________________________________________________________    Thank you,  JUAN MIGUEL [employer] [facility] Team    Bariatric Task List  Status:  Is patient a candidate for bariatric surgery?:  patient is a candidate for bariatric surgery -     Cleared to schedule surgeon consult?:    -     Status:    -     Surgeon: Dr. Stein  -     Tentative surgery  month/year: Jan 2020 -        Insurance: Insurance:  [facility] -     Cigna: PCP Recommendation and Medical Clearance:    -     HP Referral:    -     Other:    -        Patient Info: Initial Weight:  223 -     Date of Initial Weight/Height:  7/30/2020 -     Goal Weight (lbs):  213 -     Required Weight Loss:  10 -     Surgery Type:  sleeve gastrectomy -     Multidisciplinary Meeting:    -        Dietician Visits: Structured weight loss required by insurance?:  structured weight loss required -     Dietician Visit 1:  Needed -     Dietician Visit 2:  Needed -     Dietician Visit 3:  Needed -     Dietician Visit 4:  Needed -     Dietician Visit 5:  Needed -     Dietician Visit 6:  Needed -     Dietician Visit additional:    -     Clearance from dietician to see surgeon?:    -     Dietician Notes:    -        Psychological Evaluation: Psych eval:  Needed -     Therapist letter of support:  Needed -     Psychiatrist letter of support:    -     Establish care with therapist:    -     Complete eating disorder evaluation:    -     Letter of clearance from therapist/eating disorder program:    -     Other:    -        Lab Work: Complete Blood Count:  Needed -     Comprehensive Metabolic Panel:  Needed -     Vitamin D:  Needed -     Hgb A1c:  Needed -     PTH:  Needed -     H. pylori:    -     TSH:  Needed -     Nicotine Testing:    -     Other:    -        Consults/ Clearance: Sleep Medicine:    -     Cardiac:    -     Pain:   -     Dental:    -     Endocrine:    -     Gastroenterology:    -     Vascular Medicine:    -     Hematology:    -     Medical Weight Management: Needed -     Physical Therapy/Exercise:    -     Nephrology:    -     Neurology:    -     Pulmonology:    -     Rheumatology:    -     Other    -     Other    -     Other    -           Testing: UGI:    -     EGD:    -     Other:    -        PCP: Establish care with PCP:  Completed -     Follow up with PCP:    -     PCP letter of support:  Needed -        Smoking:  "Quit tobacco use (3 months smoke free)?:    -     Quit date:    -        Patient Education:  Information Session:  Completed -     Given \"Making your decision\" handout?:  Given \"\"Making your decision\"\" handout? -     Given support group information?:  support group contact information provided -     Attended support group?:    -     Support plan in place?:  Completed -     Research consents signed?:    -        Additional Surgery Requirements: Review Coag plan:    -     HgA1c <8:    -     Inpatient pain consult:    -     Final nicotine screen:    -     Dental work complete:    -     Birth control plan:    -     Other Requirements:    -     Other Requirements:    -        Final Tasks:  Before surgery online class:  Needed -     Before surgery online class website link:  [email or web address]   After surgery online class:  Needed -     After surgery online class website link:  [email or web address]   Nurse visit for weigh-in and information:  Needed -     Pre-assessment clinic visit with anesthesia team for H&P:  Needed -     Final labs (Hgb, plt, T&S, UA):  Needed -        Notes:   -                                 "

## 2020-07-30 NOTE — LETTER
"2020       RE: Екатерина Ramon  [address]  [place] [address]     Dear Colleague,    Thank you for referring your patient, Екатерина Ramon, to the [facility]. Please see a copy of my visit note below.    Екатерина Ramon is a 34 year old female who is being evaluated via a billable video visit.      The patient has been notified of following:     \"This video visit will be conducted via a call between you and your physician/provider. We have found that certain health care needs can be provided without the need for an in-person physical exam.  This service lets us provide the care you need with a video conversation.  If a prescription is necessary we can send it directly to your pharmacy.  If lab work is needed we can place an order for that and you can then stop by our lab to have the test done at a later time.    Video visits are billed at different rates depending on your insurance coverage.  Please reach out to your insurance provider with any questions.    If during the course of the call the physician/provider feels a video visit is not appropriate, you will not be charged for this service.\"    Patient has given verbal consent for Video visit? Yes  How would you like to obtain your AVS? MyChart  If you are dropped from the video visit, the video invite should be resent to: Text to cell phone: [phone]  Will anyone else be joining your video visit? No      Video-Visit Details    Type of service:  Video Visit    Video Start Time: 12:41 PM  Video End Time: 1303    Originating Location (pt. Location): Home    Distant Location (provider location):   [facility]     Platform used for Video Visit: Brittney Gage NP      New Bariatric Surgery Consultation Note    2020    RE: Екатерина Ramon  MR#: [id number]  : 1985      Referring provider:       2020   Who referred you? " "Magaly Carlos       Chief Complaint/Reason for visit: evaluation for possible weight loss surgery    Dear Anusha Wolff, APRN CNP (General),    I had the pleasure of seeing your patient, Екатерина Ramon, to evaluate her obesity and consider her for possible weight loss surgery. As you know, Екатерина Ramon is 34 year old.  She has a height of 5' 2\"[Pt reported[, a weight of 223 lbs 0 oz, and calculated Body mass index is 40.79 kg/m .      HISTORY OF PRESENT ILLNESS:  Weight Loss History Reviewed with Patient 7/29/2020   How long have you been overweight? Since early childhood   What is the most that you have ever weighed? 230   What is the most weight you have lost? 50   I have tried the following methods to lose weight Watching portions or calories, Exercise, Atkins type diet (low carb/high protein), OTC Medications, Prescription Medications   I have tried the following weight loss medications? (Check all that apply) Xenical/Orlistat/Gamaliel, Topamax/Topiramate, Victoza/liraglutide   Have you ever had weight loss surgery? No     Has been working with Dr. Johnson doing medical weight management and has gained and lost weight over time     Started struggling with weight very young  Gradual weight gain over time       CO-MORBIDITIES OF OBESITY INCLUDE:     7/29/2020   I have the following health issues associated with obesity: High Blood Pressure     No GERD  No snoring, no hx RADHA  No hx blood clots     PAST MEDICAL HISTORY:  Past Medical History:   Diagnosis Date     Family history of diabetes mellitus      Hypertension      Migraine with aura and without status migrainosus, not intractable 11/2/2016     PID (acute pelvic inflammatory disease) 5/2010       PAST SURGICAL HISTORY:  Past Surgical History:   Procedure Laterality Date     BUNIONECTOMY LAPIDUS WITH TARSAL METATARSAL (TMT) FUSION Right 8/14/2018    Procedure: BUNIONECTOMY LAPIDUS WITH TARSAL METATARSAL (TMT) FUSION;  Right first tarsometatarsal joint " fusion, right Jackson bunionectomy;  Surgeon: Zack Healy DPM;  Location: [facility] OR     HC REVISE MEDIAN N/CARPAL TUNNEL SURG Left 6/5/15    Primary, not revision     RELEASE CARPAL TUNNEL Left 6/5/2015    Procedure: RELEASE CARPAL TUNNEL;  Surgeon: Juan Jose Joaquin MD;  Location: [facility] OR       FAMILY HISTORY:   Family History   Problem Relation Age of Onset     Diabetes Mother      Hypertension Mother      Cancer Mother         skin     Diabetes Father      Glaucoma No family hx of      Macular Degeneration No family hx of        SOCIAL HISTORY:   Social History Questions Reviewed With Patient 7/29/2020   Which best describes your employment status (select all that apply) I work full-time, I am a student   If you work, 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 Yes   Who do you have in your support network that can be available to help you for the first 2 weeks after surgery? parents   Who can you count on for support throughout your weight loss surgery journey? family/friends   Can you afford 3 meals a day?  Yes   Can you afford 50-60 dollars a month for vitamins? Yes     Working mostly from home, work in person 1 day a week  Desk job   13yo     HABITS:     7/29/2020   How often do you drink alcohol? Monthly or less   If you do drink alcohol, how many drinks might you have in a day? (one drink = 5 oz. wine, 1 can/bottle of beer, 1 shot liquor) 1 or 2   Have you ever used any of the following nicotine products? Cigarettes   If you previously used any of these products, what year did you quit? 2010   Have you or are you currently using street drugs or prescription strength medication for which you do not have a prescription for? No   Do you have a history of chemical dependency (alcohol or drug abuse)? No       PSYCHOLOGICAL HISTORY:   Psychological History Reviewed With Patient 7/29/2020   Have you ever attempted suicide? In the last 5 to  "10 years.   Have you had thoughts of suicide in the past year? No   Have you ever been hospitalized for mental illness or a suicide attempt? Never.   Do you have a history of chronic pain? Yes   Have you ever been diagnosed with fibromyalgia? No   Are you currently being treated for any of the following? (select all that apply) Depression, Anxiety   Are you currently seeing a therapist or counselor?  Yes   Are you currently seeing a psychiatrist? No     Primary care manages mental health medication   Works with a therapist     ROS:     7/29/2020   Skin:  Leg swelling   HEENT: Headaches   Musculoskeletal: Back pain   Cardiovascular: Shortness of breath with activity   Pulmonary: Shortness of breath with activity, Experience morning headaches   Gastrointestinal: None of the above   Genitourinary: None of the above   Hematological: None of the above   Neurological: Migraine headaches   Female only: Loss of menstrual cycles, Birth control     Headaches from screen time  Migraines are \"random\" - rare naproxen     EATING BEHAVIORS:     7/29/2020   Have you or anyone else thought that you had an eating disorder? No   Do you currently binge eat (eat a large amount of food in a short time)? No   Are you an emotional eater? No   Do you get up to eat after falling asleep? No       EXERCISE:     7/29/2020   How often do you exercise? 3 to 4 times per week   What is the duration of your exercise (in minutes)? 30 Minutes   What types of exercise do you do? walking, other   What keeps you from being more active?  Shortness of breath, Too tired       MEDICATIONS:  Current Outpatient Medications   Medication Sig Dispense Refill     buPROPion (WELLBUTRIN XL) 150 MG 24 hr tablet Take 1 tablet (150 mg) by mouth daily 30 tablet 1     insulin pen needle (BD TALHA U/F) 32G X 4 MM miscellaneous Use once daily or as directed. 100 each 3     naproxen (NAPROSYN) 500 MG tablet Take 1 tablet (500 mg) by mouth 2 times daily as needed for moderate " "pain 60 tablet 1     propranolol (INDERAL) 10 MG tablet Take 1-2 tablets (10-20 mg) by mouth 2 times daily as needed (anxiety) 60 tablet 3     Semaglutide,0.25 or 0.5MG/DOS, 2 MG/1.5ML SOPN Inject 0.25 mg Subcutaneous every 7 days 3 mL 0     SUMAtriptan (IMITREX) 25 MG tablet Take 1-2 tablets (25-50 mg) by mouth at onset of headache for migraine May repeat dose in 2 hours.  Do not exceed 200 mg in 24 hours 18 tablet 0     traZODone (DESYREL) 50 MG tablet Take 1-2 tablets ( mg) by mouth nightly as needed for sleep 60 tablet 5     venlafaxine (EFFEXOR-ER) 225 MG 24 hr tablet Take 1 tablet (225 mg) by mouth daily 90 tablet 0     verapamil ER (VERELAN) 180 MG 24 hr capsule Take 1 capsule (180 mg) by mouth daily 90 capsule 3       ALLERGIES:  Allergies   Allergen Reactions     Lisinopril Cough     Zoloft      tired       LABS/IMAGING/MEDICAL RECORDS REVIEW:   Lipids in care everywhere from 6/2019    PHYSICAL EXAM:  Ht 1.575 m (5' 2\")   Wt 101.2 kg (223 lb)   BMI 40.79 kg/m        In summary, Екатерина Ramon has Class III obesity with a body mass index of Body mass index is 40.79 kg/m . kg/m2 and the comorbidities stated above. She completed an informational seminar and is a candidate for the laparoscopic gastric sleeve.  She will have to complete the following pre-requisites:    Received weight loss goal of 10 lb prior to surgery. 213   6 dietitian visits   Have preoperative laboratory tests drawn.  Psychological Evaluation with MMPI and clearance for weight loss surgery.  Letter of clearance from the following primary care, [facility] (Dr. Johnson), therapist     Today in the office we discussed gastric sleeve surgery. Preoperative, perioperative, and postoperative processes, management, and follow up were addressed.  Risks and benefits were outlined including the risk of death, staple line leak (1-2%), PE, DVT, ulcer, worsening GERD, N/V, stricture, hernia, wound infection, weight regain, and vitamin deficiencies. I " emphasized exercise and activity along with appropriate food choice as the main foundation for weight loss with surgery providing surgical reinforcement of this.  All questions were answered.  A goal sheet and support group handout were given to the patient.    Once the patient has completed the requirements in their task list and there are no further recommendations, the pt will be allowed to see the surgeon of her choice for consultation on the laparoscopic gastric sleeve surgery. Patient verbalizes understanding of the process to surgery and expectations for the postoperative period including the need for lifelong lifestyle changes, vitamin supplementation, and laboratory monitoring.  Sincerely,     Yvette Gage NP    Bariatric Task List  Status:  Is patient a candidate for bariatric surgery?:  patient is a candidate for bariatric surgery -     Cleared to schedule surgeon consult?:    -     Status:    -     Surgeon: Dr. Stein  -     Tentative surgery month/year: Jan 2020 -        Insurance: Insurance:  Topic -     Cigna: PCP Recommendation and Medical Clearance:    -      Referral:    -     Other:    -        Patient Info: Initial Weight:  223 -     Date of Initial Weight/Height:  7/30/2020 -     Goal Weight (lbs):  213 -     Required Weight Loss:  10 -     Surgery Type:  sleeve gastrectomy -     Multidisciplinary Meeting:    -        Dietician Visits: Structured weight loss required by insurance?:  structured weight loss required -     Dietician Visit 1:  Needed -     Dietician Visit 2:  Needed -     Dietician Visit 3:  Needed -     Dietician Visit 4:  Needed -     Dietician Visit 5:  Needed -     Dietician Visit 6:  Needed -     Dietician Visit additional:    -     Clearance from dietician to see surgeon?:    -     Dietician Notes:    -        Psychological Evaluation: Psych eval:  Needed -     Therapist letter of support:  Needed -     Psychiatrist letter of support:    -     Establish care with therapist:    -  "    Complete eating disorder evaluation:    -     Letter of clearance from therapist/eating disorder program:    -     Other:    -        Lab Work: Complete Blood Count:  Needed -     Comprehensive Metabolic Panel:  Needed -     Vitamin D:  Needed -     Hgb A1c:  Needed -     PTH:  Needed -     H. pylori:    -     TSH:  Needed -     Nicotine Testing:    -     Other:    -        Consults/ Clearance: Sleep Medicine:    -     Cardiac:    -     Pain:   -     Dental:    -     Endocrine:    -     Gastroenterology:    -     Vascular Medicine:    -     Hematology:    -     Medical Weight Management: Needed -     Physical Therapy/Exercise:    -     Nephrology:    -     Neurology:    -     Pulmonology:    -     Rheumatology:    -     Other    -     Other    -     Other    -           Testing: UGI:    -     EGD:    -     Other:    -        PCP: Establish care with PCP:  Completed -     Follow up with PCP:    -     PCP letter of support:  Needed -        Smoking: Quit tobacco use (3 months smoke free)?:    -     Quit date:    -        Patient Education:  Information Session:  Completed -     Given \"Making your decision\" handout?:  Given \"\"Making your decision\"\" handout? -     Given support group information?:  support group contact information provided -     Attended support group?:    -     Support plan in place?:  Completed -     Research consents signed?:    -        Additional Surgery Requirements: Review Coag plan:    -     HgA1c <8:    -     Inpatient pain consult:    -     Final nicotine screen:    -     Dental work complete:    -     Birth control plan:    -     Other Requirements:    -     Other Requirements:    -        Final Tasks:  Before surgery online class:  Needed -     Before surgery online class website link:  [email or web address]   After surgery online class:  Needed -     After surgery online class website link:  [email or web address]   Nurse visit for weigh-in and " information:  Needed -     Pre-assessment clinic visit with anesthesia team for H&P:  Needed -     Final labs (Hgb, plt, T&S, UA):  Needed -        Notes:   -

## 2020-07-30 NOTE — PROGRESS NOTES
"Екатерина Ramon is a 34 year old female who is being evaluated via a billable video visit.      The patient has been notified of following:     \"This video visit will be conducted via a call between you and your physician/provider. We have found that certain health care needs can be provided without the need for an in-person physical exam.  This service lets us provide the care you need with a video conversation.  If a prescription is necessary we can send it directly to your pharmacy.  If lab work is needed we can place an order for that and you can then stop by our lab to have the test done at a later time.    Video visits are billed at different rates depending on your insurance coverage.  Please reach out to your insurance provider with any questions.    If during the course of the call the physician/provider feels a video visit is not appropriate, you will not be charged for this service.\"    Patient has given verbal consent for Video visit? Yes  How would you like to obtain your AVS? MyChart    Will anyone else be joining your video visit? No        Video-Visit Details    Type of service:  Video Visit    Video Start Time: 1:10 PM  Video End Time: 1:35 PM    Time spent with patient: 25 minutes.    Originating Location (pt. Location): Home    Distant Location (provider location):  [facility] SURG[facility] MANAGEMENT     Platform used for Video Visit: [employer]    New Bariatric Nutrition Consultation Note    Reason For Visit: Nutrition Assessment    Екатерина Ramon is a 34 year old presenting today for new bariatric nutrition consult.   Pt is interested in laparoscopic sleeve gastrectomy with Dr. Stein expected surgery in Jan 2021.  Patient is accompanied by self.  This is pt's first of 6 required nutrition visits prior to surgery.     Pt referred by Yvette Gage NP on July 30, 2020.  Patient with Co-morbidities of obesity including:  Type II DM no  Renal Failure no  Sleep apnea no  Hypertension yes   Dyslipidemia " "no  Joint pain no  Back pain no  GERD no     Support System Reviewed With Patient 7/29/2020   Who do you have in your support network that can be available to help you for the first 2 weeks after surgery? parents   Who can you count on for support throughout your weight loss surgery journey? family/friends       ANTHROPOMETRICS:  Estimated body mass index is 40.79 kg/m  as calculated from the following:    Height as of an earlier encounter on 7/30/20: 1.575 m (5' 2\").    Weight as of an earlier encounter on 7/30/20: 101.2 kg (223 lb).    Required weight loss goal pre-op: -10 lbs from initial consult weight (goal weight 213 lbs or less before surgery)       7/29/2020   I have tried the following methods to lose weight Watching portions or calories, Exercise, Atkins type diet (low carb/high protein), OTC Medications, Prescription Medications       Weight Loss Questions Reviewed With Patient 7/29/2020   How long have you been overweight? Since early childhood       SUPPLEMENT INFORMATION:  MVI     NUTRITION HISTORY:  - NKFA  - previous wt loss attempts - Ragini lost 50 lbs, started to regain.   - Dining out: every other week     Recall Diet Questions Reviewed With Patient 7/29/2020   Describe what you typically consume for breakfast (typical or most recent): 1-2/7 - cereal (Honey Nut Cheerios), eggs   Describe what you typically consume for lunch (typical or most recent): 12-1 pm - Pearson (white bread with turkey and argueta and tomato); salad kit (Cesear); leftovers    PM snack: fruit, chips   Describe what you typically consume for supper (typical or most recent): 7pm - beef, chicken, pasta dishes, rice dishes; pizza   Describe what you typically consume as snacks (typical or most recent): cheese, grapes, pretzels popcorn, nuts, chips   How many ounces of water, or other low calorie drinks, do you drink daily (8 oz=1 glass)? 40 oz water per day    How many ounces of caffeine (coffee, tea, pop) do you drink daily (8 " oz=1 glass)? 8 oz (coffee with half and half)   How many ounces of carbonated (pop, beer, sparkling water) drinks do you drinky daily (8 oz=1 glass)? 8 oz (diet Dr. Pepper 2-3 times per week - mostly when on site at work)   How many ounces of juice, pop, sweet tea, sports drinks, protein drinks, other sweetened drinks, do you drink daily (8 oz=1 glass)? 8 oz   How often do you drink alcohol? Monthly or less   If you do drink alcohol, how many drinks might you have in a day? (one drink = 5 oz. wine, 1 can/bottle of beer, 1 shot liquor) 1 or 2       Eating Habits 7/29/2020   Do you have any dietary restrictions? No   Do you currently binge eat (eat a large amount of food in a short time)? No   Are you an emotional eater? No   Do you get up to eat after falling asleep? No   What foods do you crave? chips, chocolate       ADDITIONAL INFORMATION:  Works at [employer]   Has a walking connie at work    Dining Out History Reviewed With Patient 7/29/2020   How often do you dine out? Around once a week.   Where do you dine out? (select all that apply) sit-down restaurants   What types of food do you order when you dine out? burgers, Mexican, Chinese/Serbian       Physical Activity Reviewed With Patient 7/29/2020   How often do you exercise? 3 to 4 times per week   What is the duration of your exercise (in minutes)? 30 Minutes   What types of exercise do you do? walking, other   What keeps you from being more active?  Shortness of breath, Too tired       MALNUTRITION  % Intake: No decreased intake noted  % Weight Loss: Weight loss does not meet criteria  Subcutaneous Fat Loss: None observed  Muscle Loss: None observed  Fluid Accumulation/Edema: None noted  Malnutrition Diagnosis: Patient does not meet two of the established criteria necessary for diagnosing malnutrition    NUTRITION DIAGNOSIS:  Obesity r/t long history of self-monitoring deficit and excessive energy intake aeb BMI >30 kg/m2.    INTERVENTION:  Intervention  "Provided/Education Provided on post-op diet guidelines, vitamins/minerals essential post-operatively, GI anatomy of bariatric surgeries, ways to help prepare for post-op diet guidelines pre-operatively, portion/calorie-control, mindful eating and sources of protein. Provided education on the Plate Method (healthy food choice and appropriate serving sizes) and meal replacements to help with pre-op weight loss. Provided pt with list of goals RD contact information.      Questions Reviewed With Patient 7/29/2020   How ready are you to make changes regarding your weight? Number 1 = Not ready at all to make changes up to 10 = very ready. 10   How confident are you that you can change? 1 = Not confident that you will be successful making changes up to 10 = very confident. 10       Patient Understanding: good  Expected Compliance: good    GOALS:  Relating To Eating:  - Eat slowly (20-30 minutes per meal), chewing foods well (25 chews per bite/applesauce consistency)  - Eat 3 meals per day  - 9\" Plate method (1/2 plate non-starchy vegetables/fruit, 1/4 plate lean protein, 1/4 plate whole grain starch - no more than 1/2 cup carb/meal)   - You may use meal replacements if healthy meal is not planned/prepared (see recommendations below)  - Avoid snacking    Relating to beverages:  - Separate fluids from meals by 30 minutes before, during, and after eating  - Reduce carbonated drinks by 50%  - Drink 48-64 ounces of fluid per day    Relating to dietary supplements:  Start a multivitamin containing iron daily    Relating to activity:  Increase activity as able     Nutrition Handouts:  My Plate Planner_English - Pt Education  [email or web address]    Protein Sources for Weight Loss  [email or web address]     Mindful Eating  [email or web address]     Diet Guidelines after Weight Loss Surgery  [email or web address]     Meal Replacements:  You may purchase meal replacement products online at our " e-store. Visit e-store at [email or web address]   - The one week starter kits is a great way to sample a variety of products.  - For recipes and ideas on how to use products, visit - UnFlete.com    *Protein Shake Criteria: no more than 210 Calories, at least 20 grams of protein, and less than 10 grams of sugar     Meal Replacement Shake Options:   [facility] smoothie (160 Calories, 20 g protein)   Premier Protein (160 Calories, 30 g protein)  Slim Fast Advanced Nutrition (180 Calories, 20 g protein)  Muscle Milk, lactose-free, 17 oz bottle (210 Calories, 30 g protein)  Integrated Supplements, no artificial sugars (110 Calories, 20 g protein)  Genepro, unflavored protein powder (60 Calories, 30 g protein)    Meal Replacement Bar Options:  [facility] Protein Shake (160 Calories, 15 g protein)  Quest Protein Bars (190 Calories, 20 g protein)  Built Bar (170 Calories, 15-20 g protein)  One Protein Bar (210 calories, 20 g protein)  [place] Signature Protein Bar ([employer]) (190 Calories, 21 g protein)  Pure Protein Bars (180 Calories, 21 g protein)    Frozen Meal Replacements  Healthy Choice  Lean Cuisine  Atkins Meals  Smart Ones      Aida Martin, LIDIA, LD

## 2020-07-30 NOTE — NURSING NOTE
"(   Chief Complaint   Patient presents with     Weight Problem     New bariatric surgery.    )    ( Weight: 101.2 kg (223 lb)(Pt reported) )  ( Height: 157.5 cm (5' 2\")(Pt reported) )  ( BMI (Calculated): 40.79 )  (   )  ( Cumulative weight loss (lbs): 0 )  (   )  (   )  (   )  (   )    (   )  (   )  (   )  (   )  (   )  (   )  (   )    (   Patient Active Problem List   Diagnosis     CARDIOVASCULAR SCREENING; LDL GOAL LESS THAN 160     Family history of diabetes mellitus     History of depression     Hypertension goal BP (blood pressure) < 140/90     CTS (carpal tunnel syndrome)     Anxiety     BMI 40.0-44.9, adult (H)     Chronic bilateral thoracic back pain     Chronic midline low back pain without sciatica     Migraine with aura and without status migrainosus, not intractable     Low serum HDL     Counseling for parent-child problem     Dysthymic disorder     Iron deficiency anemia due to chronic blood loss     Menorrhagia with regular cycle     Moderate major depression (H)     Psychophysiological insomnia     Contraceptive management     IUD (intrauterine device) in place    )  (   Current Outpatient Medications   Medication Sig Dispense Refill     buPROPion (WELLBUTRIN XL) 150 MG 24 hr tablet Take 1 tablet (150 mg) by mouth daily 30 tablet 1     insulin pen needle (BD TALHA U/F) 32G X 4 MM miscellaneous Use once daily or as directed. 100 each 3     naproxen (NAPROSYN) 500 MG tablet Take 1 tablet (500 mg) by mouth 2 times daily as needed for moderate pain 60 tablet 1     propranolol (INDERAL) 10 MG tablet Take 1-2 tablets (10-20 mg) by mouth 2 times daily as needed (anxiety) 60 tablet 3     Semaglutide,0.25 or 0.5MG/DOS, 2 MG/1.5ML SOPN Inject 0.25 mg Subcutaneous every 7 days 3 mL 0     SUMAtriptan (IMITREX) 25 MG tablet Take 1-2 tablets (25-50 mg) by mouth at onset of headache for migraine May repeat dose in 2 hours.  Do not exceed 200 mg in 24 hours 18 tablet 0     traZODone (DESYREL) 50 MG tablet Take 1-2 " tablets ( mg) by mouth nightly as needed for sleep 60 tablet 5     venlafaxine (EFFEXOR-ER) 225 MG 24 hr tablet Take 1 tablet (225 mg) by mouth daily 90 tablet 0     verapamil ER (VERELAN) 180 MG 24 hr capsule Take 1 capsule (180 mg) by mouth daily 90 capsule 3     liraglutide - Weight Management (SAXENDA) 18 MG/3ML pen Inject 2.4 mg Subcutaneous daily (Patient not taking: Reported on 7/30/2020) 15 mL 1    )  ( Diabetes Eval:    )    ( Pain Eval:  No Pain (0) )    ( Wound Eval:       )    (   History   Smoking Status     Former Smoker     Types: Cigarettes     Quit date: 5/1/2010   Smokeless Tobacco     Never Used    )    ( Signed By:  Shana Ness CMA; July 30, 2020; 12:08 PM )

## 2020-08-12 ENCOUNTER — E-VISIT (OUTPATIENT)
Dept: FAMILY MEDICINE | Facility: CLINIC | Age: 35
End: 2020-08-12
Payer: COMMERCIAL

## 2020-08-12 DIAGNOSIS — R19.7 DIARRHEA, UNSPECIFIED TYPE: Primary | ICD-10-CM

## 2020-08-12 PROCEDURE — 99421 OL DIG E/M SVC 5-10 MIN: CPT | Performed by: NURSE PRACTITIONER

## 2020-08-12 NOTE — PATIENT INSTRUCTIONS
"    Viral Gastroenteritis (Adult)    Gastroenteritis is commonly called the \"stomach flu,\" although it has nothing to do with influenza. It is most often caused by a virus that affects the stomach and intestinal tract and usually lasts from 2 to 7 days. Common viruses causing gastroenteritis include norovirus, rotavirus, and hepatitis A. Non-viral causes of gastroenteritis include bacteria, parasites, and toxins.  The danger from repeated vomiting or diarrhea is dehydration. This is the loss of too much fluid from the body. When this occurs, body fluids must be replaced. Antibiotics don't help with this illness because it is usually viral. Simple home treatment will be helpful.  Symptoms of viral gastroenteritis may include:    Watery, loose stools    Stomach pain or abdominal cramps    Fever and chills    Nausea and vomiting    Loss of bowel control    Headache  Home care  Gastroenteritis is transmitted by contact with the stool or vomit of an infected person. This can occur from person to person or from contact with a contaminated surface.  Follow these guidelines when caring for yourself at home:    If symptoms are severe, rest at home for the next 24 hours or until you are feeling better.    Wash your hands with soap and water or use alcohol-based  to prevent the spread of infection. Wash your hands after touching anyone who is sick.    Wash your hands or use alcohol-based  after using the toilet and before meals. Clean the toilet after each use.  Remember these tips when preparing food:    People with diarrhea should not prepare or serve food to others. When preparing foods, wash your hands before and after.    Wash your hands after using cutting boards, countertops, knives, or utensils that have been in contact with raw food.    Dry your hands with a single use towel.    Keep uncooked meats away from cooked and ready-to-eat foods.  Medicine  You may use acetaminophen or NSAID medicines like " ibuprofen or naproxen to control fever unless another medicine was given. If you have chronic liver or kidney disease, talk with your healthcare provider before using these medicines. Also talk with your provider if you've had a stomach ulcer or gastrointestinal bleeding. Don't give aspirin to anyone under 18 years of age who is ill with a fever. It may cause severe liver damage. Don't use NSAIDS is you are already taking one for another condition (like arthritis) or are on aspirin (such as for heart disease or after a stroke).  If medicine for vomiting or diarrhea are prescribed, take these only as directed. Nausea and diarrhea medicines are generally OK unless you have bleeding, fever, or severe abdominal pain.  Diet  Follow these guidelines for food:    Water and liquids are important so you don't get dehydrated. Drink a small amount at a time or suck on ice chips if you are vomiting.    If you eat, avoid fatty, greasy, spicy, or fried foods.    Don't eat dairy if you have diarrhea. This can make diarrhea worse.    Avoid tobacco, alcohol, and caffeine which may worsen symptoms.  During the first 24 hours (the first full day), follow the diet below:    Beverages. Sports drinks, soft drinks without caffeine, ginger ale, mineral water (plain or flavored), decaffeinated tea and coffee. If you are very dehydrated, sports drinks aren't a good choice. They have too much sugar and not enough electrolytes. In this case, commercially available products called oral rehydration solutions, are best.    Soups. Eat clear broth, consommé, and bouillon.    Desserts. Eat gelatin, ice pops, and fruit juice bars.  During the next 24 hours (the second day), you may add the following to the above:    Hot cereal, plain toast, bread, rolls, and crackers    Plain noodles, rice, mashed potatoes, chicken noodle or rice soup    Unsweetened canned fruit (avoid pineapple), bananas    Limit fat intake to less than 15 grams per day. Do this by  avoiding margarine, butter, oils, mayonnaise, sauces, gravies, fried foods, peanut butter, meat, poultry, and fish.    Limit fiber and avoid raw or cooked vegetables, fresh fruits (except bananas), and bran cereals.    Limit caffeine and chocolate. Don't use spices or seasonings other than salt.    Limit dairy products.    Avoid alcohol.  During the next 24 hours:    Gradually resume a normal diet as you feel better and your symptoms improve.    If at any time it starts getting worse again, go back to clear liquids until you feel better.  Follow-up care  Follow up with your healthcare provider, or as advised. Call your provider if you don't get better within 24 hours or if diarrhea lasts more than a week. Also follow up if you are unable to keep down liquids and get dehydrated. If a stool (diarrhea) sample was taken, call as directed for the results.  Call 911  Call 911 if any of these occur:    Trouble breathing    Chest pain    Confused    Severe drowsiness or trouble awakening    Fainting or loss of consciousness    Rapid heart rate    Seizure    Stiff neck  When to seek medical advice  Call your healthcare provider right away if any of these occur:    Abdominal pain that gets worse    Continued vomiting (unable to keep liquids down)    Frequent diarrhea (more than 5 times a day)    Blood in vomit or stool (black or red color)    Dark urine, reduced urine output, or extreme thirst    Weakness or dizziness    Drowsiness    Fever of 100.4 F (38 C) or higher, or as directed by your healthcare provider    New rash  Date Last Reviewed: 6/1/2018 2000-2019 [employer]. [address] [place] [address], [place] [address]. All rights reserved. This information is not intended as a substitute for professional medical care. Always follow your healthcare professional's instructions.

## 2020-08-15 DIAGNOSIS — R19.7 DIARRHEA, UNSPECIFIED TYPE: ICD-10-CM

## 2020-08-15 PROCEDURE — U0003 INFECTIOUS AGENT DETECTION BY NUCLEIC ACID (DNA OR RNA); SEVERE ACUTE RESPIRATORY SYNDROME CORONAVIRUS 2 (SARS-COV-2) (CORONAVIRUS DISEASE [COVID-19]), AMPLIFIED PROBE TECHNIQUE, MAKING USE OF HIGH THROUGHPUT TECHNOLOGIES AS DESCRIBED BY CMS-2020-01-R: HCPCS | Performed by: NURSE PRACTITIONER

## 2020-08-16 LAB
SARS-COV-2 RNA SPEC QL NAA+PROBE: ABNORMAL
SPECIMEN SOURCE: ABNORMAL

## 2020-08-24 NOTE — PROGRESS NOTES
"Екатерина Ramon is a 34 year old female who is being evaluated via a billable video visit.      The patient has been notified of following:     \"This video visit will be conducted via a call between you and your physician/provider. We have found that certain health care needs can be provided without the need for an in-person physical exam.  This service lets us provide the care you need with a video conversation.  If a prescription is necessary we can send it directly to your pharmacy.  If lab work is needed we can place an order for that and you can then stop by our lab to have the test done at a later time.    Video visits are billed at different rates depending on your insurance coverage.  Please reach out to your insurance provider with any questions.    If during the course of the call the physician/provider feels a video visit is not appropriate, you will not be charged for this service.\"    Patient has given verbal consent for Video visit? Yes  How would you like to obtain your AVS? MyChart    Will anyone else be joining your video visit? No        Video-Visit Details    Type of service:  Video Visit    Video Start Time: 1:03 PM  Video End Time: 1:17 PM    Time spent with patient: 14 minutes.    Originating Location (pt. Location): Home    Distant Location (provider location):  [facility]     Platform used for Video Visit: Matchbook    Bariatric Nutrition Consultation Note    Reason For Visit: Nutrition Reassessment    Екатерина Ramon is a 34 year old presenting today for return bariatric nutrition consult.   Pt is interested in laparoscopic sleeve gastrectomy with Dr. Stein expected surgery in Jan 2021.  Patient is accompanied by self.  This is pt's 2nd of 6 required nutrition visits prior to surgery.     Pt referred by Yvette Gage NP on July 30, 2020.  Patient with Co-morbidities of obesity including:  Type II DM no  Renal Failure no  Sleep apnea no  Hypertension yes   Dyslipidemia " "no  Joint pain no  Back pain no  GERD no     Support System Reviewed With Patient 7/29/2020   Who do you have in your support network that can be available to help you for the first 2 weeks after surgery? parents   Who can you count on for support throughout your weight loss surgery journey? family/friends       ANTHROPOMETRICS:  Estimated body mass index is 40.79 kg/m  as calculated from the following:    Height as of an earlier encounter on 7/30/20: 1.575 m (5' 2\").    Weight as of an earlier encounter on 7/30/20: 101.2 kg (223 lb).    Current weight: Has not been able to check weight recently. Plans to check once returns to work.     Required weight loss goal pre-op: -10 lbs from initial consult weight (goal weight 213 lbs or less before surgery)       7/29/2020   I have tried the following methods to lose weight Watching portions or calories, Exercise, Atkins type diet (low carb/high protein), OTC Medications, Prescription Medications       Weight Loss Questions Reviewed With Patient 7/29/2020   How long have you been overweight? Since early childhood       SUPPLEMENT INFORMATION:  None    NUTRITION HISTORY:  - NKFA  - previous wt loss attempts - Ragini lost 50 lbs, started to regain.   - Dining out: every other week     Pt had COVID19 this past month, sxs included abd cramping and diarrhea. Was not able to eat much for 10 days. Feeling better now. Before COVID, was focusong on eating breakfast more (protien shake).     Recent Diet Recall:  Breakfast: Protein shake (protein powder + 1% milk)  Lunch: Protein shake  Dinner: Meat + vegetables; steak and green beans; chicken and corn; BLT; chicken wraps      Snacks: peanuts   Beverages: coffee, powerade zero and water    Progress Towards Previous Goals:  Relating To Eating:  - Eat slowly (20-30 minutes per meal), chewing foods well (25 chews per bite/applesauce consistency) - Improving, sometimes forgets   - Eat 3 meals per day - Was working on this, before COVID " "infection. Focusing on consuming breakfast.   - 9\" Plate method (1/2 plate non-starchy vegetables/fruit, 1/4 plate lean protein, 1/4 plate whole grain starch - no more than 1/2 cup carb/meal) - Improving, focusing on lean meat and veggies at dinner, meal replacements for breakfast and lunch for convenience.    - You may use meal replacements if healthy meal is not planned/prepared (see recommendations below)  - Avoid snacking - Improving    Relating to beverages:  - Separate fluids from meals by 30 minutes before, during, and after eating - Improving  - Reduce carbonated drinks by 50% - Met, continues  - Drink 48-64 ounces of fluid per day - Not met     Relating to dietary supplements:  Start a multivitamin containing iron daily - No met, hasn't been able to leave house since     Relating to activity:  Increase activity as able - Decreased d/t COVID19 infection    Recall Diet Questions Reviewed With Patient 7/29/2020   Describe what you typically consume for breakfast (typical or most recent): 1-2/7 - cereal (Honey Nut Cheerios), eggs   Describe what you typically consume for lunch (typical or most recent): 12-1 pm - [place] (white bread with turkey and argueta and tomato); salad kit (Cesear); leftovers    PM snack: fruit, chips   Describe what you typically consume for supper (typical or most recent): 7pm - beef, chicken, pasta dishes, rice dishes; pizza   Describe what you typically consume as snacks (typical or most recent): cheese, grapes, pretzels popcorn, nuts, chips   How many ounces of water, or other low calorie drinks, do you drink daily (8 oz=1 glass)? 40 oz water per day    How many ounces of caffeine (coffee, tea, pop) do you drink daily (8 oz=1 glass)? 8 oz (coffee with half and half)   How many ounces of carbonated (pop, beer, sparkling water) drinks do you drinky daily (8 oz=1 glass)? 8 oz (diet Dr. Pepper 2-3 times per week - mostly when on site at work)   How many ounces of juice, pop, sweet tea, " sports drinks, protein drinks, other sweetened drinks, do you drink daily (8 oz=1 glass)? 8 oz   How often do you drink alcohol? Monthly or less   If you do drink alcohol, how many drinks might you have in a day? (one drink = 5 oz. wine, 1 can/bottle of beer, 1 shot liquor) 1 or 2       Eating Habits 7/29/2020   Do you have any dietary restrictions? No   Do you currently binge eat (eat a large amount of food in a short time)? No   Are you an emotional eater? No   Do you get up to eat after falling asleep? No   What foods do you crave? chips, chocolate       ADDITIONAL INFORMATION:  Works at [employer]   Has a walking connie at work    Dining Out History Reviewed With Patient 7/29/2020   How often do you dine out? Around once a week.   Where do you dine out? (select all that apply) sit-down restaurants   What types of food do you order when you dine out? burgers, Mexican, Chinese/Peruvian       Physical Activity Reviewed With Patient 7/29/2020   How often do you exercise? 3 to 4 times per week   What is the duration of your exercise (in minutes)? 30 Minutes   What types of exercise do you do? walking, other   What keeps you from being more active?  Shortness of breath, Too tired       MALNUTRITION  % Intake: No decreased intake noted  % Weight Loss: Weight loss does not meet criteria  Subcutaneous Fat Loss: None observed  Muscle Loss: None observed  Fluid Accumulation/Edema: None noted  Malnutrition Diagnosis: Patient does not meet two of the established criteria necessary for diagnosing malnutrition    NUTRITION DIAGNOSIS:  Obesity r/t long history of self-monitoring deficit and excessive energy intake aeb BMI >30 kg/m2.    INTERVENTION:  Intervention Provided/Education Provided:  Reviewed post-op diet guidelines, vitamins/minerals essential post-operatively, GI anatomy of bariatric surgeries, ways to help prepare for post-op diet guidelines pre-operatively, portion/calorie-control, mindful eating and sources of protein.  -  "Reviewed previous goals. Encouraged continued progress towards previous goals.   - Discussed starting to add increased activity now feeling better. Discussed may need to start slower as she build strength back from being sick.   - Reviewed the Plate Method (healthy food choice and appropriate serving sizes) and meal replacements to help with pre-op weight loss.   - Provided pt with list of goals RD contact information.      Questions Reviewed With Patient 7/29/2020   How ready are you to make changes regarding your weight? Number 1 = Not ready at all to make changes up to 10 = very ready. 10   How confident are you that you can change? 1 = Not confident that you will be successful making changes up to 10 = very confident. 10       Patient Understanding: good  Expected Compliance: good    GOALS:  Relating To Eating:  - Eat slowly (20-30 minutes per meal), chewing foods well (25 chews per bite/applesauce consistency)  - Eat 3 meals per day  - 9\" Plate method (1/2 plate non-starchy vegetables/fruit, 1/4 plate lean protein, 1/4 plate whole grain starch - no more than 1/2 cup carb/meal)   - You may use meal replacements if healthy meal is not planned/prepared (see recommendations below)  - Avoid snacking    Relating to beverages:  - Separate fluids from meals by 30 minutes before, during, and after eating  - Avoid carbonated drinks  - Drink 48-64 ounces of fluid per day    Relating to dietary supplements:  Start a multivitamin containing iron daily    Relating to activity:  Increase activity as able.      Nutrition Handouts:  My Plate Planner_English - Pt Education  [email or web address]    Protein Sources for Weight Loss  [email or web address]     Mindful Eating  [email or web address]     Diet Guidelines after Weight Loss Surgery  [email or web address]     Meal Replacements:  You may purchase meal replacement products online at our e-store. Visit e-store at " [email or web address]   - The one week starter kits is a great way to sample a variety of products.  - For recipes and ideas on how to use products, visit - [email or web address]    *Protein Shake Criteria: no more than 210 Calories, at least 20 grams of protein, and less than 10 grams of sugar     Meal Replacement Shake Options:   [facility] smoothie (160 Calories, 20 g protein)   Premier Protein (160 Calories, 30 g protein)  Slim Fast Advanced Nutrition (180 Calories, 20 g protein)  Muscle Milk, lactose-free, 17 oz bottle (210 Calories, 30 g protein)  Integrated Supplements, no artificial sugars (110 Calories, 20 g protein)  Genepro, unflavored protein powder (60 Calories, 30 g protein)    Meal Replacement Bar Options:  [facility] Protein Shake (160 Calories, 15 g protein)  Quest Protein Bars (190 Calories, 20 g protein)  Built Bar (170 Calories, 15-20 g protein)  One Protein Bar (210 calories, 20 g protein)  [place] Signature Protein Bar ([employer]) (190 Calories, 21 g protein)  Pure Protein Bars (180 Calories, 21 g protein)    Frozen Meal Replacements  Healthy Choice  Lean Cuisine  Atkins Meals  Smart Ones      Aida Mratin, LIDIA, LD

## 2020-08-25 ENCOUNTER — VIRTUAL VISIT (OUTPATIENT)
Dept: SURGERY | Facility: CLINIC | Age: 35
End: 2020-08-25
Payer: COMMERCIAL

## 2020-08-25 DIAGNOSIS — E66.01 OBESITY, CLASS III, BMI 40-49.9 (MORBID OBESITY) (H): Primary | ICD-10-CM

## 2020-08-25 DIAGNOSIS — Z71.3 NUTRITIONAL COUNSELING: ICD-10-CM

## 2020-08-25 NOTE — LETTER
"8/25/2020     RE: Екатерина Ramon  [address]  [place] [address]     Dear Colleague,    Thank you for referring your patient, Екатерина Ramon, to the [facility] SURGICAL WEIGHT MANAGEMENT at [facility]. Please see a copy of my visit note below.    Екатерина Ramon is a 34 year old female who is being evaluated via a billable video visit.      The patient has been notified of following:     \"This video visit will be conducted via a call between you and your physician/provider. We have found that certain health care needs can be provided without the need for an in-person physical exam.  This service lets us provide the care you need with a video conversation.  If a prescription is necessary we can send it directly to your pharmacy.  If lab work is needed we can place an order for that and you can then stop by our lab to have the test done at a later time.    Video visits are billed at different rates depending on your insurance coverage.  Please reach out to your insurance provider with any questions.    If during the course of the call the physician/provider feels a video visit is not appropriate, you will not be charged for this service.\"    Patient has given verbal consent for Video visit? Yes  How would you like to obtain your AVS? MyChart    Will anyone else be joining your video visit? No        Video-Visit Details    Type of service:  Video Visit    Video Start Time: 1:03 PM  Video End Time: 1:17 PM    Time spent with patient: 14 minutes.    Originating Location (pt. Location): Home    Distant Location (provider location):  [facility] SURGICAL WEIGHT MANAGEMENT     Platform used for Video Visit: StellaService    Bariatric Nutrition Consultation Note    Reason For Visit: Nutrition Reassessment    Екатерина Ramon is a 34 year old presenting today for return bariatric nutrition consult.   Pt is interested in laparoscopic sleeve gastrectomy with Dr. Stein expected surgery in Jan " "2021.  Patient is accompanied by self.  This is pt's 2nd of 6 required nutrition visits prior to surgery.     Pt referred by Yvette Gage NP on July 30, 2020.  Patient with Co-morbidities of obesity including:  Type II DM no  Renal Failure no  Sleep apnea no  Hypertension yes   Dyslipidemia no  Joint pain no  Back pain no  GERD no     Support System Reviewed With Patient 7/29/2020   Who do you have in your support network that can be available to help you for the first 2 weeks after surgery? parents   Who can you count on for support throughout your weight loss surgery journey? family/friends       ANTHROPOMETRICS:  Estimated body mass index is 40.79 kg/m  as calculated from the following:    Height as of an earlier encounter on 7/30/20: 1.575 m (5' 2\").    Weight as of an earlier encounter on 7/30/20: 101.2 kg (223 lb).    Current weight: Has not been able to check weight recently. Plans to check once returns to work.     Required weight loss goal pre-op: -10 lbs from initial consult weight (goal weight 213 lbs or less before surgery)       7/29/2020   I have tried the following methods to lose weight Watching portions or calories, Exercise, Atkins type diet (low carb/high protein), OTC Medications, Prescription Medications       Weight Loss Questions Reviewed With Patient 7/29/2020   How long have you been overweight? Since early childhood       SUPPLEMENT INFORMATION:  None    NUTRITION HISTORY:  - NKFA  - previous wt loss attempts - Ragini lost 50 lbs, started to regain.   - Dining out: every other week     Pt had COVID19 this past month, sxs included abd cramping and diarrhea. Was not able to eat much for 10 days. Feeling better now. Before COVID, was focusong on eating breakfast more (protien shake).     Recent Diet Recall:  Breakfast: Protein shake (protein powder + 1% milk)  Lunch: Protein shake  Dinner: Meat + vegetables; steak and green beans; chicken and corn; BLT; chicken wraps      Snacks: peanuts " "  Beverages: coffee, powerade zero and water    Progress Towards Previous Goals:  Relating To Eating:  - Eat slowly (20-30 minutes per meal), chewing foods well (25 chews per bite/applesauce consistency) - Improving, sometimes forgets   - Eat 3 meals per day - Was working on this, before COVID infection. Focusing on consuming breakfast.   - 9\" Plate method (1/2 plate non-starchy vegetables/fruit, 1/4 plate lean protein, 1/4 plate whole grain starch - no more than 1/2 cup carb/meal) - Improving, focusing on lean meat and veggies at dinner, meal replacements for breakfast and lunch for convenience.    - You may use meal replacements if healthy meal is not planned/prepared (see recommendations below)  - Avoid snacking - Improving    Relating to beverages:  - Separate fluids from meals by 30 minutes before, during, and after eating - Improving  - Reduce carbonated drinks by 50% - Met, continues  - Drink 48-64 ounces of fluid per day - Not met     Relating to dietary supplements:  Start a multivitamin containing iron daily - No met, hasn't been able to leave house since     Relating to activity:  Increase activity as able - Decreased d/t COVID19 infection    Recall Diet Questions Reviewed With Patient 7/29/2020   Describe what you typically consume for breakfast (typical or most recent): 1-2/7 - cereal (Honey Nut Cheerios), eggs   Describe what you typically consume for lunch (typical or most recent): 12-1 pm - Bement (white bread with turkey and argueta and tomato); salad kit (Cesear); leftovers    PM snack: fruit, chips   Describe what you typically consume for supper (typical or most recent): 7pm - beef, chicken, pasta dishes, rice dishes; pizza   Describe what you typically consume as snacks (typical or most recent): cheese, grapes, pretzels popcorn, nuts, chips   How many ounces of water, or other low calorie drinks, do you drink daily (8 oz=1 glass)? 40 oz water per day    How many ounces of caffeine (coffee, tea, " pop) do you drink daily (8 oz=1 glass)? 8 oz (coffee with half and half)   How many ounces of carbonated (pop, beer, sparkling water) drinks do you drinky daily (8 oz=1 glass)? 8 oz (diet Dr. Pepper 2-3 times per week - mostly when on site at work)   How many ounces of juice, pop, sweet tea, sports drinks, protein drinks, other sweetened drinks, do you drink daily (8 oz=1 glass)? 8 oz   How often do you drink alcohol? Monthly or less   If you do drink alcohol, how many drinks might you have in a day? (one drink = 5 oz. wine, 1 can/bottle of beer, 1 shot liquor) 1 or 2       Eating Habits 7/29/2020   Do you have any dietary restrictions? No   Do you currently binge eat (eat a large amount of food in a short time)? No   Are you an emotional eater? No   Do you get up to eat after falling asleep? No   What foods do you crave? chips, chocolate       ADDITIONAL INFORMATION:  Works at [employer]   Has a walking connie at work    Dining Out History Reviewed With Patient 7/29/2020   How often do you dine out? Around once a week.   Where do you dine out? (select all that apply) sit-down restaurants   What types of food do you order when you dine out? burgers, Mexican, Chinese/Telugu       Physical Activity Reviewed With Patient 7/29/2020   How often do you exercise? 3 to 4 times per week   What is the duration of your exercise (in minutes)? 30 Minutes   What types of exercise do you do? walking, other   What keeps you from being more active?  Shortness of breath, Too tired       MALNUTRITION  % Intake: No decreased intake noted  % Weight Loss: Weight loss does not meet criteria  Subcutaneous Fat Loss: None observed  Muscle Loss: None observed  Fluid Accumulation/Edema: None noted  Malnutrition Diagnosis: Patient does not meet two of the established criteria necessary for diagnosing malnutrition    NUTRITION DIAGNOSIS:  Obesity r/t long history of self-monitoring deficit and excessive energy intake aeb BMI >30  "kg/m2.    INTERVENTION:  Intervention Provided/Education Provided:  Reviewed post-op diet guidelines, vitamins/minerals essential post-operatively, GI anatomy of bariatric surgeries, ways to help prepare for post-op diet guidelines pre-operatively, portion/calorie-control, mindful eating and sources of protein.  - Reviewed previous goals. Encouraged continued progress towards previous goals.   - Discussed starting to add increased activity now feeling better. Discussed may need to start slower as she build strength back from being sick.   - Reviewed the Plate Method (healthy food choice and appropriate serving sizes) and meal replacements to help with pre-op weight loss.   - Provided pt with list of goals RD contact information.      Questions Reviewed With Patient 7/29/2020   How ready are you to make changes regarding your weight? Number 1 = Not ready at all to make changes up to 10 = very ready. 10   How confident are you that you can change? 1 = Not confident that you will be successful making changes up to 10 = very confident. 10       Patient Understanding: good  Expected Compliance: good    GOALS:  Relating To Eating:  - Eat slowly (20-30 minutes per meal), chewing foods well (25 chews per bite/applesauce consistency)  - Eat 3 meals per day  - 9\" Plate method (1/2 plate non-starchy vegetables/fruit, 1/4 plate lean protein, 1/4 plate whole grain starch - no more than 1/2 cup carb/meal)   - You may use meal replacements if healthy meal is not planned/prepared (see recommendations below)  - Avoid snacking    Relating to beverages:  - Separate fluids from meals by 30 minutes before, during, and after eating  - Avoid carbonated drinks  - Drink 48-64 ounces of fluid per day    Relating to dietary supplements:  Start a multivitamin containing iron daily    Relating to activity:  Increase activity as able.      Nutrition Handouts:  My Plate Planner_English - Pt " Education  http://[email or web address]    Protein Sources for Weight Loss  [email or web address]     Mindful Eating  http://[email or web address]     Diet Guidelines after Weight Loss Surgery  [email or web address]     Meal Replacements:  You may purchase meal replacement products online at our e-store. Visit e-store at [email or web address]   - The one week starter kits is a great way to sample a variety of products.  - For recipes and ideas on how to use products, visit - Naow    *Protein Shake Criteria: no more than 210 Calories, at least 20 grams of protein, and less than 10 grams of sugar     Meal Replacement Shake Options:    Notegraphy [facility] smoothie (160 Calories, 20 g protein)   Premier Protein (160 Calories, 30 g protein)  Slim Fast Advanced Nutrition (180 Calories, 20 g protein)  Muscle Milk, lactose-free, 17 oz bottle (210 Calories, 30 g protein)  Integrated Supplements, no artificial sugars (110 Calories, 20 g protein)  Genepro, unflavored protein powder (60 Calories, 30 g protein)    Meal Replacement Bar Options:   Health C Protein Shake (160 Calories, 15 g protein)  Quest Protein Bars (190 Calories, 20 g protein)  Built Bar (170 Calories, 15-20 g protein)  One Protein Bar (210 calories, 20 g protein)  [place] Signature Protein Bar ([employer]) (190 Calories, 21 g protein)  Pure Protein Bars (180 Calories, 21 g protein)    Frozen Meal Replacements  Healthy Choice  Lean Cuisine  Atkins Meals  Smart Ones      Aida Martin, LIDIA, LD

## 2020-08-30 ENCOUNTER — MYC MEDICAL ADVICE (OUTPATIENT)
Dept: FAMILY MEDICINE | Facility: CLINIC | Age: 35
End: 2020-08-30

## 2020-08-31 ENCOUNTER — PATIENT OUTREACH (OUTPATIENT)
Dept: NURSING | Facility: CLINIC | Age: 35
End: 2020-08-31
Payer: COMMERCIAL

## 2020-08-31 ASSESSMENT — ACTIVITIES OF DAILY LIVING (ADL): DEPENDENT_IADLS:: INDEPENDENT

## 2020-08-31 NOTE — LETTER
[place] CARE COORDINATION  [facility]  [address] [place] [address]  [phone]  August 31, 2020    Екатерина Ramon  [address]  [facility] [address] [address]      Dear Екатерина,    I am a clinic care coordinator who works with ESTUARDO Jain CNP at [facility]. I wanted to thank you for spending the time to talk with me.  Below is a description of clinic care coordination and how I can further assist you.      The clinic care coordination team is made up of a registered nurse,  and community health worker who understand the health care system. The goal of clinic care coordination is to help you manage your health and improve access to the health care system in the most efficient manner. The team can assist you in meeting your health care goals by providing education, coordinating services, strengthening the communication among your providers and supporting you with any resource needs.    Please feel free to contact me at [phone] with any questions or concerns. We are focused on providing you with the highest-quality healthcare experience possible and that all starts with you.     Sincerely,     Monty Corcoran RN  Clinic Care Coordinator

## 2020-08-31 NOTE — LETTER
[facility]  Complex Care Plan  About Me:    Patient Name:  Екатерина Ramon    YOB: 1985  Age:         34 year old   Mari MRN:    [id number] Telephone Information:  Home Phone [phone]   Mobile [phone]       Address:  [address]  [place] [address] Email address:  [email or web address]      Emergency Contact(s)    Name Relationship Lgl Grd Work Phone Home Phone Mobile Phone   1DIETER MORALES Mother   [phone]    2. NO SECONDARY C* Other   none [phone]           Primary language:  English     needed? No   [employer]:  [phone] op. 1  Other communication barriers: None  Preferred Method of Communication:  Mail  Current living arrangement: I live in a private home  Mobility Status/ Medical Equipment: Independent    Health Maintenance  Health Maintenance Reviewed:    Health Maintenance Due   Topic Date Due     URINE DRUG SCREEN  1985     MIGRAINE ACTION PLAN  1985     HIV SCREENING  10/29/2001     PREVENTIVE CARE VISIT  06/05/2020     BMP  06/05/2020     INFLUENZA VACCINE (1) 09/01/2020         My Access Plan  Medical Emergency 911   Primary Clinic Line [facility] - [phone]   24 Hour Appointment Line [phone] or  [phone]) (toll-free)   24 Hour Nurse Line [phone] (toll-free)   Preferred Urgent Care [facility], [phone]   [facility]  [phone]   Preferred Pharmacy [id number] [address]     Behavioral Health Crisis Line The National Suicide Prevention Lifeline at 1-180.543.1401 or 911             My Care Team Members  Patient Care Team       Relationship Specialty Notifications Start End    Anusha Wolff APRN CNP PCP - General Nurse Practitioner  2/2/15     Phone: [phone] Fax: [phone] [address] [facility] [address]    Anusha Wolff,  ESTUARDO CNP Assigned PCP   5/20/18     Phone: [phone] Fax: [phone] [address] [place] [address]    Davi Corcoran, RN Lead Care Coordinator Primary Care - CC  8/31/20     Phone: [phone] Fax: [phone]         [address] [place] [address]            My Care Plans  Self Management and Treatment Plan  Goals and (Comments)  Goals        General    Medical (pt-stated)     Notes - Note created  8/31/2020 11:33 AM by Davi Corcoran, RN    Goal Statement: I will monitor for s/s of infection and manage my pain to avoid hospitalization  Date Goal set: 8-31-20  Barriers: possible pain medication allergie   Strengths: motivated to heal rapidly  Date to Achieve By: 9-30-20  Patient expressed understanding of goal: Patient verbalized understanding.   Action steps to achieve this goal:  1. I will take my medications as directed.  2. I will monitor incisions for s/s of infection and follow up if observed  3. I will follow up with my providers as directed.                Action Plans on File:                       Advance Care Plans/Directives Type:        My Medical and Care Information  Problem List   Patient Active Problem List   Diagnosis     CARDIOVASCULAR SCREENING; LDL GOAL LESS THAN 160     Family history of diabetes mellitus     History of depression     Hypertension goal BP (blood pressure) < 140/90     CTS (carpal tunnel syndrome)     Anxiety     BMI 40.0-44.9, adult (H)     Chronic bilateral thoracic back pain     Chronic midline low back pain without sciatica     Migraine with aura and without status migrainosus, not intractable     Low serum HDL     Counseling for parent-child problem     Dysthymic disorder     Iron deficiency anemia due to chronic blood loss     Menorrhagia with regular cycle     Moderate major depression (H)     Psychophysiological insomnia     Contraceptive management     IUD (intrauterine device) in place      Current Medications and Allergies:  See printed  Medication Report.    Care Coordination Start Date: 8/31/2020   Frequency of Care Coordination: weekly   Form Last Updated: 08/31/2020

## 2020-08-31 NOTE — PROGRESS NOTES
Clinic Care Coordination Contact    Clinic Care Coordination Contact  OUTREACH    Referral Information:  Referral Source: IP Handoff    Primary Diagnosis: GI Disorders    Chief Complaint   Patient presents with     Clinic Care Coordination - Post Hospital     Care Team        [place] Utilization: Patient identified for Care Coordination follow up due to:  Hospitalization:   YES 8-29 surgery  Emergency Department utilization risk:  NO  Fall risk:   NO  Pain:  NO  Medication concerns:  NO  Social Determinants of Health risks:   NO    Clinic Utilization  Difficulty keeping appointments:: No  Compliance Concerns: No  No-Show Concerns: No  No PCP office visit in Past Year: No  Utilization    Last refreshed: 8/30/2020  1:19 PM:  Hospital Admissions 0           Last refreshed: 8/30/2020  1:19 PM:  ED Visits 0           Last refreshed: 8/30/2020  1:19 PM:  No Show Count (past year) 0              Current as of: 8/30/2020  1:19 PM              Clinical Concerns:  Current Medical Concerns:  Patient was identified for Care Coordination follow up by discharge reporting. Patient was admitted to [facility] on 8-29 for appendectomy. Patient was discharged to home on 8-30.   Home Care involved:  no   Patient has a history of   Patient Active Problem List   Diagnosis     CARDIOVASCULAR SCREENING; LDL GOAL LESS THAN 160     Family history of diabetes mellitus     History of depression     Hypertension goal BP (blood pressure) < 140/90     CTS (carpal tunnel syndrome)     Anxiety     BMI 40.0-44.9, adult (H)     Chronic bilateral thoracic back pain     Chronic midline low back pain without sciatica     Migraine with aura and without status migrainosus, not intractable     Low serum HDL     Counseling for parent-child problem     Dysthymic disorder     Iron deficiency anemia due to chronic blood loss     Menorrhagia with regular cycle     Moderate major depression (H)     Psychophysiological insomnia     Contraceptive management      IUD (intrauterine device) in place       Concerns identified on discharge:  Post surgical pain  Patient was contacted on 8-31 Care Coordinator spoke to Patient.     Clinic Care Coordinator reviewed current concerns and addressed follow up plan with Patient. Patient reported she is still experiencing itching. Patient has stopped taking her pain control medication and is using tylenol for pain. Patient reports tylenol does not control pain. Patient does not report any breathing issues or rash at this time. Patient has notes out to providers for a possible pain medication change. No s/s of infection reported. No other concerns reported at this time.           Current Behavioral Concerns: none    Education Provided to patient: Encouraged follow up appointment with PCP.     Pain  Pain (GOAL):: Yes  Type: Acute (<3mo)  Location of chronic pain:: post surgical pain  Radiating: No  Progression: Unchanged  Description of pain: Aching, Gnawing, Sharp  Chronic pain severity:: 7  Limitation of routine activities due to chronic pain:: No  Alleviating Factors: Rest, Pain Medication, Ice  Aggravating Factors: Activity, Positioning  Health Maintenance Reviewed:   Health Maintenance Due   Topic Date Due     URINE DRUG SCREEN  1985     MIGRAINE ACTION PLAN  1986     HIV SCREENING  10/29/2001     PREVENTIVE CARE VISIT  06/05/2020     BMP  06/05/2020     INFLUENZA VACCINE (1) 09/01/2020        Medication Management:  Medication reconciliation status: Medications reviewed and reconciled.  Continue medications without change.       Functional Status:  Dependent ADLs:: Independent  Dependent IADLs:: Independent  Bed or wheelchair confined:: No  Mobility Status: Independent    Living Situation:  Current living arrangement:: I live in a private home  Type of residence:: Private home - stairs    Lifestyle & Psychosocial Needs:        Diet:: Regular  Inadequate nutrition (GOAL):: No  Tube Feeding: No  Inadequate  activity/exercise (GOAL):: No  Significant changes in sleep pattern (GOAL): No  Transportation means:: Regular car     Orthodoxy or spiritual beliefs that impact treatment:: No  Mental health DX:: No  Mental health management concern (GOAL):: No  Informal Support system:: Family, Friends   Socioeconomic History     Marital status: Single     Spouse name: Not on file     Number of children: 1     Years of education: 12     Highest education level: Not on file   Occupational History     Occupation: retail     Employer: [employer]     Tobacco Use     Smoking status: Former Smoker     Types: Cigarettes     Last attempt to quit: 5/1/2010     Years since quitting: 10.3     Smokeless tobacco: Never Used   Substance and Sexual Activity     Alcohol use: Yes     Comment: rare     Drug use: No     Sexual activity: Not Currently     Partners: Male     Birth control/protection: Implant          Resources and Interventions:  Current Resources:      Community Resources: None  Supplies used at home:: None  Equipment Currently Used at Home: none    Advance Care Plan/Directive  Advanced Care Plan/Directive Status: Not Applicable    Referrals Placed: None     Goals:   Goals        General    Medical (pt-stated)     Notes - Note created  8/31/2020 11:33 AM by Davi Corcoran RN    Goal Statement: I will monitor for s/s of infection and manage my pain to avoid hospitalization  Date Goal set: 8-31-20  Barriers: possible pain medication allergie   Strengths: motivated to heal rapidly  Date to Achieve By: 9-30-20  Patient expressed understanding of goal: Patient verbalized understanding.   Action steps to achieve this goal:  1. I will take my medications as directed.  2. I will monitor incisions for s/s of infection and follow up if observed  3. I will follow up with my providers as directed.               Outreach Frequency: weekly  Future Appointments              In 3 weeks Aida Martin RD [facility]           Plan: 1) Patient will continue to follow treatment plan as directed and follow up with PCP with concerns ongoing.   2) Care Coordination to remain available for future needs. Follow up planned for later this week unless otherwise notified.     Monty Corcoran RN  Clinic Care Coordinator  [phone]

## 2020-09-01 NOTE — TELEPHONE ENCOUNTER
Patient was already given information to follow-up with surgical team. See other encounter.     Marla Brooks RN

## 2020-09-02 ENCOUNTER — MYC MEDICAL ADVICE (OUTPATIENT)
Dept: FAMILY MEDICINE | Facility: CLINIC | Age: 35
End: 2020-09-02

## 2020-09-02 ENCOUNTER — CARE COORDINATION (OUTPATIENT)
Dept: SURGERY | Facility: CLINIC | Age: 35
End: 2020-09-02

## 2020-09-02 NOTE — LETTER
September 2, 2020      Екатерина Ramon  [address] [address]        To Whom It May Concern,       I am the primary care provider for Екатерина Ramon, and I support her having weight loss surgery.     Sincerely,        ESTUARDO Jain CNP

## 2020-09-02 NOTE — PROGRESS NOTES
Tasklist updated and sent to patient via Agenda.    Bariatric Task List  Status:  Is patient a candidate for bariatric surgery?:  patient is a candidate for bariatric surgery -     Cleared to schedule surgeon consult?:  cleared to schedule surgeon consult - Call [facility] at [phone] to schedule visit with Dr Stein. bks   Status:  surgery evaluation in process -     Surgeon: Dr. Stein  -     Tentative surgery month/year: TBD, one month after tasks are done. -        Insurance: Insurance:  [facility] [employer] -        Patient Info: Initial Weight:  223 -     Date of Initial Weight/Height:  7/30/2020 -     Goal Weight (lbs):  213 -     Required Weight Loss:  10 -     Surgery Type:  sleeve gastrectomy -        Dietician Visits: Structured weight loss required by insurance?:  structured weight loss required - Need 3 consecutive months of dietitian visits. bks   Dietician Visit 1:  Completed - 7/30/20 KB   Dietician Visit 2:  Completed - 8/25/20 with    Dietician Visit 3:  Needed - scheduled 9/21 KB   Dietician Visit additional:  Needed - Monthly as needed to reach pre-surgery goal weight and for postop diet teaching. bks      Psychological Evaluation: Psych eval:  Needed - Fax to [phone], attn: Dr Stein. bks   Therapist letter of support:  Needed - Fax to [phone], attn: Dr Stein. bks      Lab Work: Complete Blood Count:  Completed - 8/29/20 in [facility]. bks   Comprehensive Metabolic Panel:  Completed - 8/29/20 in [facility]. bks   Vitamin D:  Needed - Ordered in Epic. Can be done at any [facility]. bks   Hgb A1c:  Needed - Ordered in Epic. Can be done at any [facility]. bks   PTH:  Needed - Ordered in Epic. Can be done at any [facility]. bks   H. pylori:    -     TSH:  Needed - Ordered in Epic. Can be done at any [facility]. bks   Nicotine Testing:    -     Other:  Needed - Lipids. Ordered in Epic. Can be done at any [facility]. bks      Consults/ Clearance: Medical Weight Management: Completed - 7/8/20 Referral from  "Magaly Villegas, APRN, CNP (works with Lorraine Johnson MD      PCP: Establish care with PCP:  Completed -     PCP letter of support:  Needed - Fax to [phone], attn: Dr Stein. wilbur      Smoking: Quit tobacco use (3 months smoke free)?:    -     Quit date:    -        Patient Education:  Information Session:  Completed - Viewed 7/27/20. wilbur   Given \"Making your decision\" handout?:  Given \"\"Making your decision\"\" handout? -     Given support group information?:  support group contact information provided -     Attended support group?:    -     Support plan in place?:  Completed -        Final Tasks:  Before surgery online class:  Needed -     Before surgery online class website link:  [email or web address]   After surgery online class:  Needed -     After surgery online class website link:  [email or web address]   Nurse visit for weigh-in and information:  Needed -     Pre-assessment clinic visit with anesthesia team for H&P:  Needed -     Final labs (Hgb, plt, T&S, UA):  Needed -        Notes: Please register for the Get Well Loop when you get an email invitation and a surgery date.     The Get Well Loop will give you information via email or text messages that can help you be more successful before and after surgery.  It can also help answer any questions you may have.   -           "

## 2020-09-09 ENCOUNTER — PATIENT OUTREACH (OUTPATIENT)
Dept: NURSING | Facility: CLINIC | Age: 35
End: 2020-09-09
Payer: COMMERCIAL

## 2020-09-09 ASSESSMENT — ACTIVITIES OF DAILY LIVING (ADL): DEPENDENT_IADLS:: INDEPENDENT

## 2020-09-09 NOTE — PROGRESS NOTES
Clinic Care Coordination Contact    Follow Up Progress Note      Assessment: Follow up call with Patient with history of   Patient Active Problem List   Diagnosis     CARDIOVASCULAR SCREENING; LDL GOAL LESS THAN 160     Family history of diabetes mellitus     History of depression     Hypertension goal BP (blood pressure) < 140/90     CTS (carpal tunnel syndrome)     Anxiety     BMI 40.0-44.9, adult (H)     Chronic bilateral thoracic back pain     Chronic midline low back pain without sciatica     Migraine with aura and without status migrainosus, not intractable     Low serum HDL     Counseling for parent-child problem     Dysthymic disorder     Iron deficiency anemia due to chronic blood loss     Menorrhagia with regular cycle     Moderate major depression (H)     Psychophysiological insomnia     Contraceptive management     IUD (intrauterine device) in place     Patient reports her incision has opened up a little and has a follow up with surgeon this week. Patient has a bandaid on thee area to prevent further opening. No s/s of infection reported. No bleeding or drainage reported.     Goals addressed this encounter:   Goals Addressed                 This Visit's Progress      Medical (pt-stated)   90%     Goal Statement: I will monitor for s/s of infection and manage my pain to avoid hospitalization  Date Goal set: 8-31-20  Barriers: possible pain medication allergie   Strengths: motivated to heal rapidly  Date to Achieve By: 10-30-20  Patient expressed understanding of goal: Patient verbalized understanding.   Action steps to achieve this goal:  1. I will take my medications as directed.  2. I will monitor incisions for s/s of infection and follow up if observed  3. I will follow up with my providers as directed.                   Outreach Frequency: monthly    Plan:   1) Patient will continue to follow treatment plan as directed and follow up with PCP with concerns ongoing.   2) Care Coordination to remain  available for future needs. Follow up planned for 2-3 weeks unless otherwise notified.     Monty Corcoran RN  Clinic Care Coordinator  [phone]

## 2020-09-10 ENCOUNTER — TELEPHONE (OUTPATIENT)
Dept: SURGERY | Facility: CLINIC | Age: 35
End: 2020-09-10

## 2020-09-10 NOTE — TELEPHONE ENCOUNTER
Patient called to schedule a pre-bariatric surgery psychological evaluation with Dr. Nuria Dsouza. Scheduled 11/10 at 8 a.m.     Discussed she will need 3 dietary visits and not the 6 that are listed on her task list.

## 2020-09-16 ENCOUNTER — MEDICAL CORRESPONDENCE (OUTPATIENT)
Dept: HEALTH INFORMATION MANAGEMENT | Facility: CLINIC | Age: 35
End: 2020-09-16

## 2020-09-16 NOTE — PATIENT INSTRUCTIONS
I would like you to eat approx 80 grams of protein daily (more is ok). You can try eating a mozzarella cheese stick, some nuts, Fairlife milk, tuna, etc. This might be difficult, but you can slowly work your way up to it. Protein powder or Premier Protein shakes are fine too.    Try doing strength training 2x per week whether this is HIIT or yoga or pilaties    Follow up in 6 weeks, or sooner if needed.     [facility]    If you have any questions regarding to your visit please contact your care team:     Team Pink:   Clinic Hours Telephone Number   Internal Medicine:  Dr. Lorraine Villegas, NP       7am-7pm  Monday - Thursday   7am-5pm  Fridays  [phone]  (Appointment scheduling available 24/7)    Questions about your visit?  Team Line  [phone]   Urgent Care - [place] and [facility] - 11am-9pm Monday-Friday Saturday-Sunday- 9am-5pm   [place] - 5pm-9pm Monday-Friday Saturday-Sunday- 9am-5pm  [phone] - [facility]  [phone] - [place]       What options do I have for visits at the clinic other than the traditional office visit?  To expand how we care for you, many of our providers are utilizing electronic visits (e-visits) and telephone visits, when medically appropriate, for interactions with their patients rather than a visit in the clinic.   We also offer nurse visits for many medical concerns. Just like any other service, we will bill your insurance company for this type of visit based on time spent on the phone with your provider. Not all insurance companies cover these visits. Please check with your medical insurance if this type of visit is covered. You will be responsible for any charges that are not paid by your insurance.      E-visits via [employer]:  generally incur a $35.00 fee.  Telephone visits:  Time spent on the phone: *charged based on time that is spent on the phone in increments of 10 minutes. Estimated cost:   5-10  mins $30.00   11-20 mins. $59.00   21-30 mins. $85.00   Use [employer] (secure email communication and access to your chart) to send your primary care provider a message or make an appointment. Ask someone on your Team how to sign up for [employer].    For a Price Quote for your services, please call our [employer] Price Line at [phone].    As always, Thank you for trusting us with your health care needs!    Discharged By:  SARAH DEAN     [facility]

## 2020-09-20 ENCOUNTER — MYC MEDICAL ADVICE (OUTPATIENT)
Dept: PODIATRY | Facility: CLINIC | Age: 35
End: 2020-09-20

## 2020-09-21 ENCOUNTER — VIRTUAL VISIT (OUTPATIENT)
Dept: SURGERY | Facility: CLINIC | Age: 35
End: 2020-09-21
Payer: COMMERCIAL

## 2020-09-21 ENCOUNTER — TELEPHONE (OUTPATIENT)
Dept: SURGERY | Facility: CLINIC | Age: 35
End: 2020-09-21

## 2020-09-21 DIAGNOSIS — E66.9 OBESITY: ICD-10-CM

## 2020-09-21 DIAGNOSIS — Z71.3 NUTRITIONAL COUNSELING: Primary | ICD-10-CM

## 2020-09-21 NOTE — LETTER
"9/21/2020       RE: Екатерина Ramon  [address]  [place] [address]     Dear Colleague,    Thank you for referring your patient, Екатерина Ramon, to the [facility] WEIGHT MANAGEMENT at [facility]. Please see a copy of my visit note below.    Bariatric Nutrition Consultation Note    Reason For Visit: Nutrition Reassessment    Екатерина Ramon is a 34 year old presenting today for return bariatric nutrition consult.   Pt is interested in laparoscopic sleeve gastrectomy with Dr. Stein expected surgery in Nov 2020.  Patient is accompanied by self.  This is pt's 3rd of 3 required nutrition visits prior to surgery.     Pt referred by Yvette Gage NP on July 30, 2020.  Patient with Co-morbidities of obesity including:  Type II DM no  Renal Failure no  Sleep apnea no  Hypertension yes   Dyslipidemia no  Joint pain no  Back pain no  GERD no     Support System Reviewed With Patient 7/29/2020   Who do you have in your support network that can be available to help you for the first 2 weeks after surgery? parents   Who can you count on for support throughout your weight loss surgery journey? family/friends       ANTHROPOMETRICS:  Estimated body mass index is 40.79 kg/m  as calculated from the following:    Height as of an earlier encounter on 7/30/20: 1.575 m (5' 2\").    Weight as of an earlier encounter on 7/30/20: 101.2 kg (223 lb). - Pt states her initial wt was a guess, and did not actually take her weight. Discussed with NP. Initial weight updated to reflect actual weight taken on 9/11/20. Goal weight for surgery also updated.     Current weight:   - F/u office visit after appendectomy, had weight taken in clinic: 232 lbs     Wt Readings from Last 10 Encounters:   07/30/20 101.2 kg (223 lb)   06/02/20 100.5 kg (221 lb 8 oz)   05/05/20 101.4 kg (223 lb 8 oz)   12/13/19 92.9 kg (204 lb 12 oz)   12/05/19 94.4 kg (208 lb 1.6 oz)   11/15/19 95.3 kg (210 lb)   06/24/19 90.2 kg " "(198 lb 12.8 oz)   06/05/19 87.5 kg (193 lb)   02/10/19 89.6 kg (197 lb 9.6 oz)   02/06/19 88 kg (194 lb)       Required weight loss goal pre-op: -10 lbs from initial consult weight (goal weight 222 lbs or less before surgery)       7/29/2020   I have tried the following methods to lose weight Watching portions or calories, Exercise, Atkins type diet (low carb/high protein), OTC Medications, Prescription Medications       Weight Loss Questions Reviewed With Patient 7/29/2020   How long have you been overweight? Since early childhood       SUPPLEMENT INFORMATION:  None    NUTRITION HISTORY:  - NKFA  - previous wt loss attempts - Ragiin lost 50 lbs, started to regain.   - Dining out: every other week     Pt had COVID19 in August, sxs included abd cramping, diarrhea and reduced appetite. Was not able to eat much for 10 days. Feeling better now. Before COVID, was focusong on eating breakfast more (protien shake).     Pt states her appetite has returned to normal. Is continuing to focus on structured meal pattern, lean protein and fruit/vegetables at meals, and reduced/limited carbohydrates.    Recent Diet Recall:  Breakfast: Protein shake (protein powder + 1% milk)  Lunch: smaller meal - string cheese, cottage cheese, HB egg; grapes and cheese  Dinner: meat (chicken/lean cuts of beef or 90% lean) + vegetables (tomato, and cucumbers, green beans)  Snacks: avoiding   Beverages: Water, Powerade - 60 oz/day, coffee in am   Dining Out:    Progress Towards Previous Goals:  Relating To Eating:  - Eat slowly (20-30 minutes per meal), chewing foods well (25 chews per bite/applesauce consistency) - Met, continues  - Eat 3 meals per day - Met, continues  - 9\" Plate method (1/2 plate non-starchy vegetables/fruit, 1/4 plate lean protein, 1/4 plate whole grain starch - no more than 1/2 cup carb/meal) -  Improving, continues   - You may use meal replacements if healthy meal is not planned/prepared (see recommendations below)  - Avoid " snacking - Improving, continues    Relating to beverages:  - Separate fluids from meals by 30 minutes before, during, and after eating - Improving, helps to not have drink at table.   - Avoid carbonated drinks  - Met, continues  - Drink 48-64 ounces of fluid per day - Met, continues    Relating to dietary supplements:  Start a multivitamin containing iron daily - Met, continues    Relating to activity:  Increase activity as able. - Increased walking. Looked at [employer] 21 day program.       Eating Habits 7/29/2020   Do you have any dietary restrictions? No   Do you currently binge eat (eat a large amount of food in a short time)? No   Are you an emotional eater? No   Do you get up to eat after falling asleep? No   What foods do you crave? chips, chocolate       ADDITIONAL INFORMATION:  Works at [employer]   Has a walking connie at work    Dining Out History Reviewed With Patient 7/29/2020   How often do you dine out? Around once a week.   Where do you dine out? (select all that apply) sit-down restaurants   What types of food do you order when you dine out? burgers, Mexican, Chinese/Greenlandic       Physical Activity Reviewed With Patient 7/29/2020   How often do you exercise? 3 to 4 times per week   What is the duration of your exercise (in minutes)? 30 Minutes   What types of exercise do you do? walking, other   What keeps you from being more active?  Shortness of breath, Too tired       MALNUTRITION  % Intake: No decreased intake noted  % Weight Loss: Weight loss does not meet criteria  Subcutaneous Fat Loss: None observed  Muscle Loss: None observed  Fluid Accumulation/Edema: None noted  Malnutrition Diagnosis: Patient does not meet two of the established criteria necessary for diagnosing malnutrition    NUTRITION DIAGNOSIS:  Obesity r/t long history of self-monitoring deficit and excessive energy intake aeb BMI >30 kg/m2. - continues    INTERVENTION:  Intervention Provided/Education Provided:  - Reviewed post-op diet  "guidelines, vitamins/minerals essential post-operatively, GI anatomy of bariatric surgeries, ways to help prepare for post-op diet guidelines pre-operatively, portion/calorie-control, mindful eating and sources of protein.  - Reviewed previous goals. Encouraged continued progress towards previous goals.   - Encouraged increased activity/exercise as able.  - Reviewed the Plate Method (healthy food choice and appropriate serving sizes) and meal replacements to help with pre-op weight loss.   - Provided pt with list of goals RD contact information.      Questions Reviewed With Patient 7/29/2020   How ready are you to make changes regarding your weight? Number 1 = Not ready at all to make changes up to 10 = very ready. 10   How confident are you that you can change? 1 = Not confident that you will be successful making changes up to 10 = very confident. 10       Patient Understanding: good  Expected Compliance: good    GOALS:  Relating To Eating:  - Eat slowly (20-30 minutes per meal), chewing foods well (25 chews per bite/applesauce consistency)  - Eat 3 meals per day  - 9\" Plate method (1/2 plate non-starchy vegetables/fruit, 1/4 plate lean protein, 1/4 plate whole grain starch - no more than 1/2 cup carb/meal)   - You may use meal replacements if healthy meal is not planned/prepared (see recommendations below)  - Avoid snacking    Relating to beverages:  - Separate fluids from meals by 30 minutes before, during, and after eating  - Avoid carbonated drinks  - Drink 48-64 ounces of fluid per day    Relating to dietary supplements:  Start a multivitamin containing iron daily    Relating to activity:  Increase activity as able.      Nutrition Handouts:  My Plate Planner_English - Pt Education  [email or web address]    Protein Sources for Weight Loss  [email or web address]     Mindful Eating  [email or web address]     Diet Guidelines after Weight Loss Surgery  [email or web address] "     Meal Replacements:  You may purchase meal replacement products online at our e-store. Visit e-store at [email or web address]   - The one week starter kits is a great way to sample a variety of products.  - For recipes and ideas on how to use products, visit - [employer]    *Protein Shake Criteria: no more than 210 Calories, at least 20 grams of protein, and less than 10 grams of sugar     Meal Replacement Shake Options:   [facility] smoothie (160 Calories, 20 g protein)   Premier Protein (160 Calories, 30 g protein)  Slim Fast Advanced Nutrition (180 Calories, 20 g protein)  Muscle Milk, lactose-free, 17 oz bottle (210 Calories, 30 g protein)  Integrated Supplements, no artificial sugars (110 Calories, 20 g protein)  Genepro, unflavored protein powder (60 Calories, 30 g protein)    Meal Replacement Bar Options:  [facility] Protein Shake (160 Calories, 15 g protein)  Quest Protein Bars (190 Calories, 20 g protein)  Built Bar (170 Calories, 15-20 g protein)  One Protein Bar (210 calories, 20 g protein)  [place] Signature Protein Bar ([employer]) (190 Calories, 21 g protein)  Pure Protein Bars (180 Calories, 21 g protein)    Frozen Meal Replacements  Healthy Choice  Lean Cuisine  Atkins Meals  Smart Ones    Again, thank you for allowing me to participate in the care of your patient.  Sincerely,    Aida Martin, LIDIA, LD

## 2020-09-21 NOTE — PROGRESS NOTES
"Екатерина Ramon is a 34 year old female who is being evaluated via a billable video visit.      The patient has been notified of following:     \"This video visit will be conducted via a call between you and your physician/provider. We have found that certain health care needs can be provided without the need for an in-person physical exam.  This service lets us provide the care you need with a video conversation.  If a prescription is necessary we can send it directly to your pharmacy.  If lab work is needed we can place an order for that and you can then stop by our lab to have the test done at a later time.    Video visits are billed at different rates depending on your insurance coverage.  Please reach out to your insurance provider with any questions.    If during the course of the call the physician/provider feels a video visit is not appropriate, you will not be charged for this service.\"    Patient has given verbal consent for Video visit? Yes  How would you like to obtain your AVS? MyChart    Will anyone else be joining your video visit? No        Video-Visit Details    Type of service:  Video Visit    Video Start Time: 1:04 PM  Video End Time: 1:19 PM    Time spent with patient: 15 minutes.    Originating Location (pt. Location): Home    Distant Location (provider location):  [facility]     Platform used for Video Visit: [employer]    Bariatric Nutrition Consultation Note    Reason For Visit: Nutrition Reassessment    Екатерина Ramon is a 34 year old presenting today for return bariatric nutrition consult.   Pt is interested in laparoscopic sleeve gastrectomy with Dr. Stein expected surgery in Nov 2020.  Patient is accompanied by self.  This is pt's 3rd of 3 required nutrition visits prior to surgery.     Pt referred by Yvette Gage NP on July 30, 2020.  Patient with Co-morbidities of obesity including:  Type II DM no  Renal Failure no  Sleep apnea no  Hypertension yes   Dyslipidemia " "no  Joint pain no  Back pain no  GERD no     Support System Reviewed With Patient 7/29/2020   Who do you have in your support network that can be available to help you for the first 2 weeks after surgery? parents   Who can you count on for support throughout your weight loss surgery journey? family/friends       ANTHROPOMETRICS:  Estimated body mass index is 40.79 kg/m  as calculated from the following:    Height as of an earlier encounter on 7/30/20: 1.575 m (5' 2\").    Weight as of an earlier encounter on 7/30/20: 101.2 kg (223 lb). - Pt states her initial wt was a guess, and did not actually take her weight. Discussed with NP. Initial weight updated to reflect actual weight taken on 9/11/20. Goal weight for surgery also updated.     Current weight:   - F/u office visit after appendectomy, had weight taken in clinic: 232 lbs     Wt Readings from Last 10 Encounters:   07/30/20 101.2 kg (223 lb)   06/02/20 100.5 kg (221 lb 8 oz)   05/05/20 101.4 kg (223 lb 8 oz)   12/13/19 92.9 kg (204 lb 12 oz)   12/05/19 94.4 kg (208 lb 1.6 oz)   11/15/19 95.3 kg (210 lb)   06/24/19 90.2 kg (198 lb 12.8 oz)   06/05/19 87.5 kg (193 lb)   02/10/19 89.6 kg (197 lb 9.6 oz)   02/06/19 88 kg (194 lb)       Required weight loss goal pre-op: -10 lbs from initial consult weight (goal weight 222 lbs or less before surgery)       7/29/2020   I have tried the following methods to lose weight Watching portions or calories, Exercise, Atkins type diet (low carb/high protein), OTC Medications, Prescription Medications       Weight Loss Questions Reviewed With Patient 7/29/2020   How long have you been overweight? Since early childhood       SUPPLEMENT INFORMATION:  None    NUTRITION HISTORY:  - NKFA  - previous wt loss attempts - Samuelza lost 50 lbs, started to regain.   - Dining out: every other week     Pt had COVID19 in August, sxs included abd cramping, diarrhea and reduced appetite. Was not able to eat much for 10 days. Feeling better now. " "Before COVID, was focusong on eating breakfast more (protien shake).     Pt states her appetite has returned to normal. Is continuing to focus on structured meal pattern, lean protein and fruit/vegetables at meals, and reduced/limited carbohydrates.    Recent Diet Recall:  Breakfast: Protein shake (protein powder + 1% milk)  Lunch: smaller meal - string cheese, cottage cheese, HB egg; grapes and cheese  Dinner: meat (chicken/lean cuts of beef or 90% lean) + vegetables (tomato, and cucumbers, green beans)  Snacks: avoiding   Beverages: Water, Powerade - 60 oz/day, coffee in am   Dining Out:    Progress Towards Previous Goals:  Relating To Eating:  - Eat slowly (20-30 minutes per meal), chewing foods well (25 chews per bite/applesauce consistency) - Met, continues  - Eat 3 meals per day - Met, continues  - 9\" Plate method (1/2 plate non-starchy vegetables/fruit, 1/4 plate lean protein, 1/4 plate whole grain starch - no more than 1/2 cup carb/meal) -  Improving, continues   - You may use meal replacements if healthy meal is not planned/prepared (see recommendations below)  - Avoid snacking - Improving, continues    Relating to beverages:  - Separate fluids from meals by 30 minutes before, during, and after eating - Improving, helps to not have drink at table.   - Avoid carbonated drinks  - Met, continues  - Drink 48-64 ounces of fluid per day - Met, continues    Relating to dietary supplements:  Start a multivitamin containing iron daily - Met, continues    Relating to activity:  Increase activity as able. - Increased walking. Looked at [employer] 21 day program.       Eating Habits 7/29/2020   Do you have any dietary restrictions? No   Do you currently binge eat (eat a large amount of food in a short time)? No   Are you an emotional eater? No   Do you get up to eat after falling asleep? No   What foods do you crave? chips, chocolate       ADDITIONAL INFORMATION:  Works at [employer]   Has a walking connie at " work    Dining Out History Reviewed With Patient 7/29/2020   How often do you dine out? Around once a week.   Where do you dine out? (select all that apply) sit-down restaurants   What types of food do you order when you dine out? burgers, Mexican, Chinese/brenton       Physical Activity Reviewed With Patient 7/29/2020   How often do you exercise? 3 to 4 times per week   What is the duration of your exercise (in minutes)? 30 Minutes   What types of exercise do you do? walking, other   What keeps you from being more active?  Shortness of breath, Too tired       MALNUTRITION  % Intake: No decreased intake noted  % Weight Loss: Weight loss does not meet criteria  Subcutaneous Fat Loss: None observed  Muscle Loss: None observed  Fluid Accumulation/Edema: None noted  Malnutrition Diagnosis: Patient does not meet two of the established criteria necessary for diagnosing malnutrition    NUTRITION DIAGNOSIS:  Obesity r/t long history of self-monitoring deficit and excessive energy intake aeb BMI >30 kg/m2. - continues    INTERVENTION:  Intervention Provided/Education Provided:  - Reviewed post-op diet guidelines, vitamins/minerals essential post-operatively, GI anatomy of bariatric surgeries, ways to help prepare for post-op diet guidelines pre-operatively, portion/calorie-control, mindful eating and sources of protein.  - Reviewed previous goals. Encouraged continued progress towards previous goals.   - Encouraged increased activity/exercise as able.  - Reviewed the Plate Method (healthy food choice and appropriate serving sizes) and meal replacements to help with pre-op weight loss.   - Provided pt with list of goals RD contact information.      Questions Reviewed With Patient 7/29/2020   How ready are you to make changes regarding your weight? Number 1 = Not ready at all to make changes up to 10 = very ready. 10   How confident are you that you can change? 1 = Not confident that you will be successful making changes up to 10  "= very confident. 10       Patient Understanding: good  Expected Compliance: good    GOALS:  Relating To Eating:  - Eat slowly (20-30 minutes per meal), chewing foods well (25 chews per bite/applesauce consistency)  - Eat 3 meals per day  - 9\" Plate method (1/2 plate non-starchy vegetables/fruit, 1/4 plate lean protein, 1/4 plate whole grain starch - no more than 1/2 cup carb/meal)   - You may use meal replacements if healthy meal is not planned/prepared (see recommendations below)  - Avoid snacking    Relating to beverages:  - Separate fluids from meals by 30 minutes before, during, and after eating  - Avoid carbonated drinks  - Drink 48-64 ounces of fluid per day    Relating to dietary supplements:  Start a multivitamin containing iron daily    Relating to activity:  Increase activity as able.      Nutrition Handouts:  My Plate Planner_English - Pt Education  [email or web address]    Protein Sources for Weight Loss  [email or web address]     Mindful Eating  [email or web address]     Diet Guidelines after Weight Loss Surgery  [email or web address]     Meal Replacements:  You may purchase meal replacement products online at our e-store. Visit e-store at [email or web address]   - The one week starter kits is a great way to sample a variety of products.  - For recipes and ideas on how to use products, visit - [employer]    *Protein Shake Criteria: no more than 210 Calories, at least 20 grams of protein, and less than 10 grams of sugar     Meal Replacement Shake Options:   [facility] smoothie (160 Calories, 20 g protein)   Premier Protein (160 Calories, 30 g protein)  Slim Fast Advanced Nutrition (180 Calories, 20 g protein)  Muscle Milk, lactose-free, 17 oz bottle (210 Calories, 30 g protein)  Integrated Supplements, no artificial sugars (110 Calories, 20 g protein)  Genepro, unflavored protein powder (60 Calories, 30 g protein)    Meal Replacement Bar " Options:  [facility] Protein Shake (160 Calories, 15 g protein)  Quest Protein Bars (190 Calories, 20 g protein)  Built Bar (170 Calories, 15-20 g protein)  One Protein Bar (210 calories, 20 g protein)  [place] Signature Protein Bar ([employer]) (190 Calories, 21 g protein)  Pure Protein Bars (180 Calories, 21 g protein)    Frozen Meal Replacements  Healthy Choice  Lean Cuisine  Atkins Meals  Smart Ones      Aida Martin RD, LD

## 2020-09-21 NOTE — LETTER
"9/21/2020       RE: Екатерина Ramon  [address]  [place] [address]     Dear Colleague,    Thank you for referring your patient, Екатерина Ramon, to the [facility]. Please see a copy of my visit note below.    Екатерина Ramon is a 34 year old female who is being evaluated via a billable video visit.      The patient has been notified of following:     \"This video visit will be conducted via a call between you and your physician/provider. We have found that certain health care needs can be provided without the need for an in-person physical exam.  This service lets us provide the care you need with a video conversation.  If a prescription is necessary we can send it directly to your pharmacy.  If lab work is needed we can place an order for that and you can then stop by our lab to have the test done at a later time.    Video visits are billed at different rates depending on your insurance coverage.  Please reach out to your insurance provider with any questions.    If during the course of the call the physician/provider feels a video visit is not appropriate, you will not be charged for this service.\"    Patient has given verbal consent for Video visit? Yes  How would you like to obtain your AVS? MyChart    Will anyone else be joining your video visit? No        Video-Visit Details    Type of service:  Video Visit    Video Start Time: 1:04 PM  Video End Time: 1:19 PM    Time spent with patient: 15 minutes.    Originating Location (pt. Location): Home    Distant Location (provider location):  [facility]     Platform used for Video Visit: [employer]    Bariatric Nutrition Consultation Note    Reason For Visit: Nutrition Reassessment    Екатерина Ramon is a 34 year old presenting today for return bariatric nutrition consult.   Pt is interested in laparoscopic sleeve gastrectomy with Dr. Stein expected surgery in Nov " "2020.  Patient is accompanied by self.  This is pt's 3rd of 3 required nutrition visits prior to surgery.     Pt referred by Yvette Gage NP on July 30, 2020.  Patient with Co-morbidities of obesity including:  Type II DM no  Renal Failure no  Sleep apnea no  Hypertension yes   Dyslipidemia no  Joint pain no  Back pain no  GERD no     Support System Reviewed With Patient 7/29/2020   Who do you have in your support network that can be available to help you for the first 2 weeks after surgery? parents   Who can you count on for support throughout your weight loss surgery journey? family/friends       ANTHROPOMETRICS:  Estimated body mass index is 40.79 kg/m  as calculated from the following:    Height as of an earlier encounter on 7/30/20: 1.575 m (5' 2\").    Weight as of an earlier encounter on 7/30/20: 101.2 kg (223 lb). - Pt states her initial wt was a guess, and did not actually take her weight. Discussed with NP. Initial weight updated to reflect actual weight taken on 9/11/20. Goal weight for surgery also updated.     Current weight:   - F/u office visit after appendectomy, had weight taken in clinic: 232 lbs     Wt Readings from Last 10 Encounters:   07/30/20 101.2 kg (223 lb)   06/02/20 100.5 kg (221 lb 8 oz)   05/05/20 101.4 kg (223 lb 8 oz)   12/13/19 92.9 kg (204 lb 12 oz)   12/05/19 94.4 kg (208 lb 1.6 oz)   11/15/19 95.3 kg (210 lb)   06/24/19 90.2 kg (198 lb 12.8 oz)   06/05/19 87.5 kg (193 lb)   02/10/19 89.6 kg (197 lb 9.6 oz)   02/06/19 88 kg (194 lb)       Required weight loss goal pre-op: -10 lbs from initial consult weight (goal weight 222 lbs or less before surgery)       7/29/2020   I have tried the following methods to lose weight Watching portions or calories, Exercise, Atkins type diet (low carb/high protein), OTC Medications, Prescription Medications       Weight Loss Questions Reviewed With Patient 7/29/2020   How long have you been overweight? Since early childhood       SUPPLEMENT " "INFORMATION:  None    NUTRITION HISTORY:  - NKFA  - previous wt loss attempts - Ragini lost 50 lbs, started to regain.   - Dining out: every other week     Pt had COVID19 in August, sxs included abd cramping, diarrhea and reduced appetite. Was not able to eat much for 10 days. Feeling better now. Before COVID, was focusong on eating breakfast more (protien shake).     Pt states her appetite has returned to normal. Is continuing to focus on structured meal pattern, lean protein and fruit/vegetables at meals, and reduced/limited carbohydrates.    Recent Diet Recall:  Breakfast: Protein shake (protein powder + 1% milk)  Lunch: smaller meal - string cheese, cottage cheese, HB egg; grapes and cheese  Dinner: meat (chicken/lean cuts of beef or 90% lean) + vegetables (tomato, and cucumbers, green beans)  Snacks: avoiding   Beverages: Water, Powerade - 60 oz/day, coffee in am   Dining Out:    Progress Towards Previous Goals:  Relating To Eating:  - Eat slowly (20-30 minutes per meal), chewing foods well (25 chews per bite/applesauce consistency) - Met, continues  - Eat 3 meals per day - Met, continues  - 9\" Plate method (1/2 plate non-starchy vegetables/fruit, 1/4 plate lean protein, 1/4 plate whole grain starch - no more than 1/2 cup carb/meal) -  Improving, continues   - You may use meal replacements if healthy meal is not planned/prepared (see recommendations below)  - Avoid snacking - Improving, continues    Relating to beverages:  - Separate fluids from meals by 30 minutes before, during, and after eating - Improving, helps to not have drink at table.   - Avoid carbonated drinks  - Met, continues  - Drink 48-64 ounces of fluid per day - Met, continues    Relating to dietary supplements:  Start a multivitamin containing iron daily - Met, continues    Relating to activity:  Increase activity as able. - Increased walking. Looked at [employer] 21 day program.       Eating Habits 7/29/2020   Do you have any dietary " restrictions? No   Do you currently binge eat (eat a large amount of food in a short time)? No   Are you an emotional eater? No   Do you get up to eat after falling asleep? No   What foods do you crave? chips, chocolate       ADDITIONAL INFORMATION:  Works at [employer]   Has a walking connie at work    Dining Out History Reviewed With Patient 7/29/2020   How often do you dine out? Around once a week.   Where do you dine out? (select all that apply) sit-down restaurants   What types of food do you order when you dine out? burgers, Mexican, Chinese/brenton       Physical Activity Reviewed With Patient 7/29/2020   How often do you exercise? 3 to 4 times per week   What is the duration of your exercise (in minutes)? 30 Minutes   What types of exercise do you do? walking, other   What keeps you from being more active?  Shortness of breath, Too tired       MALNUTRITION  % Intake: No decreased intake noted  % Weight Loss: Weight loss does not meet criteria  Subcutaneous Fat Loss: None observed  Muscle Loss: None observed  Fluid Accumulation/Edema: None noted  Malnutrition Diagnosis: Patient does not meet two of the established criteria necessary for diagnosing malnutrition    NUTRITION DIAGNOSIS:  Obesity r/t long history of self-monitoring deficit and excessive energy intake aeb BMI >30 kg/m2. - continues    INTERVENTION:  Intervention Provided/Education Provided:  - Reviewed post-op diet guidelines, vitamins/minerals essential post-operatively, GI anatomy of bariatric surgeries, ways to help prepare for post-op diet guidelines pre-operatively, portion/calorie-control, mindful eating and sources of protein.  - Reviewed previous goals. Encouraged continued progress towards previous goals.   - Encouraged increased activity/exercise as able.  - Reviewed the Plate Method (healthy food choice and appropriate serving sizes) and meal replacements to help with pre-op weight loss.   - Provided pt with list of goals RD contact information.   "    Questions Reviewed With Patient 7/29/2020   How ready are you to make changes regarding your weight? Number 1 = Not ready at all to make changes up to 10 = very ready. 10   How confident are you that you can change? 1 = Not confident that you will be successful making changes up to 10 = very confident. 10       Patient Understanding: good  Expected Compliance: good    GOALS:  Relating To Eating:  - Eat slowly (20-30 minutes per meal), chewing foods well (25 chews per bite/applesauce consistency)  - Eat 3 meals per day  - 9\" Plate method (1/2 plate non-starchy vegetables/fruit, 1/4 plate lean protein, 1/4 plate whole grain starch - no more than 1/2 cup carb/meal)   - You may use meal replacements if healthy meal is not planned/prepared (see recommendations below)  - Avoid snacking    Relating to beverages:  - Separate fluids from meals by 30 minutes before, during, and after eating  - Avoid carbonated drinks  - Drink 48-64 ounces of fluid per day    Relating to dietary supplements:  Start a multivitamin containing iron daily    Relating to activity:  Increase activity as able.      Nutrition Handouts:  My Plate Planner_English - Pt Education  [email or web address]    Protein Sources for Weight Loss  [email or web address]     Mindful Eating  [email or web address]     Diet Guidelines after Weight Loss Surgery  [email or web address]     Meal Replacements:  You may purchase meal replacement products online at our e-store. Visit e-store at [email or web address]   - The one week starter kits is a great way to sample a variety of products.  - For recipes and ideas on how to use products, visit - [employer]    *Protein Shake Criteria: no more than 210 Calories, at least 20 grams of protein, and less than 10 grams of sugar     Meal Replacement Shake Options:   [facility] smoothie (160 Calories, 20 g protein)   Premier Protein (160 Calories, 30 g protein)  Slim Fast " Advanced Nutrition (180 Calories, 20 g protein)  Muscle Milk, lactose-free, 17 oz bottle (210 Calories, 30 g protein)  Integrated Supplements, no artificial sugars (110 Calories, 20 g protein)  Genepro, unflavored protein powder (60 Calories, 30 g protein)    Meal Replacement Bar Options:  [facility] Protein Shake (160 Calories, 15 g protein)  Quest Protein Bars (190 Calories, 20 g protein)  Built Bar (170 Calories, 15-20 g protein)  One Protein Bar (210 calories, 20 g protein)  [place] Signature Protein Bar ([employer]) (190 Calories, 21 g protein)  Pure Protein Bars (180 Calories, 21 g protein)    Frozen Meal Replacements  Healthy Choice  Lean Cuisine  Atkins Meals  Smart Ones      Aida Martin RD, LD          Again, thank you for allowing me to participate in the care of your patient.      Sincerely,    Aida Martin RD

## 2020-09-21 NOTE — PROGRESS NOTES
Patient indicates, in an appointment with Aida Martin 9/21/2020, that she guessed her weight at initial consult. Suspect that she has since lost weight (significant dietary changes) per RD. Patient didn't have scale until recently. Oka to update tasklist with higher starting weight. ESTUARDO Willis CNP

## 2020-09-22 ENCOUNTER — CARE COORDINATION (OUTPATIENT)
Dept: SURGERY | Facility: CLINIC | Age: 35
End: 2020-09-22

## 2020-09-22 DIAGNOSIS — E66.01 MORBID OBESITY (H): Primary | ICD-10-CM

## 2020-09-22 NOTE — PROGRESS NOTES
Tasklist updated and sent to patient via [employer].     Bariatric Task List  Status:  Is patient a candidate for bariatric surgery?:  patient is a candidate for bariatric surgery -     Cleared to schedule surgeon consult?:  cleared to schedule surgeon consult - Call Kirby at [phone] to schedule visit with Dr Stein. bks   Status:  surgery evaluation in process -     Surgeon: Dr. Stein  -     Tentative surgery month/year: 12/21/20, 12/28/20 or January 2021 -  If tasks are done and at pre-surgery goal weight.      Insurance: Insurance:  [employer] -        Patient Info: Initial Weight:  232 -     Date of Initial Weight/Height:  7/30/2020 -     Goal Weight (lbs):  222 -     Required Weight Loss:  10 -     Surgery Type:  sleeve gastrectomy -        Dietician Visits: Structured weight loss required by insurance?:  structured weight loss required - Need 3 consecutive months of dietitian visits. bks   Dietician Visit 1:  Completed - 7/30/20 KB   Dietician Visit 2:  Completed - 8/25/20 with    Dietician Visit 3:  Completed -  9/21/20 KB   Dietician Visit 4:    -  Call [phone] to schedule visit to help reach pre-surgery goal weight.   Dietician Visit additional:  Needed - Monthly as needed to reach pre-surgery goal weight and for postop diet teaching. bks   Clearance from dietician to see surgeon?:    -     Dietician Notes:    -        Psychological Evaluation: Psych eval:  Needed - 11/10/20 Dr Dsouza appt. bks   Therapist letter of support:  Completed -  9/16/20 from Kat Kelley M.A. - bks      Lab Work: Complete Blood Count:  Completed - 8/29/20 in [facility]. bks   Comprehensive Metabolic Panel:  Completed - 8/29/20 in [facility]. bks   Vitamin D:  Needed - Ordered in Epic. Can be done at any [facility] lab. bks   Hgb A1c:  Needed - Ordered in Epic. Can be done at any [facility] lab. bks   PTH:  Needed - Ordered in Epic. Can be done at any [facility] lab. bks   H. pylori:    -     TSH:  Needed - Ordered in Epic. Can be done  "at any [facility] lab. bks   Other:  Needed - Lipids. Ordered in Epic. Can be done at any [facility] lab. bks      Consults/ Clearance: Medical Weight Management: Completed - 7/8/20 Referral from ESTUARDO Bruce CNP (works with Lorraine Johnson MD      PCP: Establish care with PCP:  Completed -     Follow up with PCP:    -     PCP letter of support:  Completed -  Referral from ESTUARDO Bruce CNP (works with Lorraine Johnson MD) 9/2/20 ESTUARDO Robledo CNP (PCP Ltr of support). bks      Patient Education:  Information Session:  Completed - Viewed 7/27/20. bks   Given \"Making your decision\" handout?:  Given \"\"Making your decision\"\" handout? -     Given support group information?:  support group contact information provided -     Attended support group?:    -     Support plan in place?:  Completed -     Research consents signed?:    -        Final Tasks:  Before surgery online class:  Needed -     Before surgery online class website link:  [email or web address]   After surgery online class:  Needed -     After surgery online class website link:  [email or web address]   Nurse visit for weigh-in and information:  Needed - ROCKY Cheng will call you to schedule this for a couple weeks before surgery date. s   Pre-assessment clinic visit with anesthesia team for H&P:  Needed - Call [phone] to schedule this for 3 weeks before your surgery date. bks   Final labs (Hgb, plt, T&S, UA):  Needed - To be done after the Pre-Assessment Clinic visit. bks      Notes: Please register for the Get Well Loop when you get an email invitation and a surgery date.     The Get Well Loop will give you information via email or text messages that can help you be more successful before and after surgery.  It can also help answer any questions you may have.   -           "

## 2020-09-23 ENCOUNTER — MYC MEDICAL ADVICE (OUTPATIENT)
Dept: FAMILY MEDICINE | Facility: CLINIC | Age: 35
End: 2020-09-23

## 2020-09-24 ENCOUNTER — TELEPHONE (OUTPATIENT)
Dept: SURGERY | Facility: CLINIC | Age: 35
End: 2020-09-24

## 2020-09-24 NOTE — TELEPHONE ENCOUNTER
Tentative surgery date for 1/4/2021 with Dr Stein.  She is in school and has clinicals until 12/17/2020 so is not able to do surgery prior to that time.

## 2020-09-25 ENCOUNTER — PATIENT OUTREACH (OUTPATIENT)
Dept: CARE COORDINATION | Facility: CLINIC | Age: 35
End: 2020-09-25

## 2020-09-25 ASSESSMENT — ACTIVITIES OF DAILY LIVING (ADL): DEPENDENT_IADLS:: INDEPENDENT

## 2020-09-25 NOTE — PROGRESS NOTES
Clinic Care Coordination Contact  [facility]/Voicemail    Referral Source: IP Handoff  Clinical Data: Care Coordinator Outreach  Outreach attempted x 1.  Left message on patient's voicemail with call back information and requested return call.  Plan: Care Coordinator sent care coordination introduction letter on 8-31 via [employer]. Care Coordinator will try to reach patient again in 10 business days.  Monty Corcoran RN  Primary Care Clinic RN Care Coordinator  [facility]   [phone]

## 2020-09-30 ENCOUNTER — MYC MEDICAL ADVICE (OUTPATIENT)
Dept: FAMILY MEDICINE | Facility: CLINIC | Age: 35
End: 2020-09-30

## 2020-09-30 NOTE — PROGRESS NOTES
"Екатерина Ramon is a 34 year old female who is being evaluated via a billable telephone visit.      The patient has been notified of following:     \"This telephone visit will be conducted via a call between you and your physician/provider. We have found that certain health care needs can be provided without the need for a physical exam.  This service lets us provide the care you need with a short phone conversation.  If a prescription is necessary we can send it directly to your pharmacy.  If lab work is needed we can place an order for that and you can then stop by our lab to have the test done at a later time.    Telephone visits are billed at different rates depending on your insurance coverage. During this emergency period, for some insurers they may be billed the same as an in-person visit.  Please reach out to your insurance provider with any questions.    If during the course of the call the physician/provider feels a telephone visit is not appropriate, you will not be charged for this service.\"    Patient has given verbal consent for Telephone visit?  Yes    What phone number would you like to be contacted at? [phone]    How would you like to obtain your AVS? Mail a copy    Subjective     Екатерина Ramon is a 34 year old female who presents via phone visit today for the following health issues:    HPI    Chief Complaint   Patient presents with     FMLA paperwork            Depression and Anxiety Follow-Up    How are you doing with your depression since your last visit? No change    How are you doing with your anxiety since your last visit?  No change    Are you having other symptoms that might be associated with depression or anxiety? No    Have you had a significant life event? OTHER: Illness, surgery     Do you have any concerns with your use of alcohol or other drugs? No    Social History     Tobacco Use     Smoking status: Former Smoker     Types: Cigarettes     Quit date: 5/1/2010     Years since " quitting: 10.4     Smokeless tobacco: Never Used   Substance Use Topics     Alcohol use: Yes     Comment: rare     Drug use: No     PHQ 1/2/2020 3/11/2020 6/26/2020   PHQ-9 Total Score 27 12 5   Q9: Thoughts of better off dead/self-harm past 2 weeks Nearly every day Not at all Not at all   F/U: Thoughts of suicide or self-harm Yes - -   F/U: Self harm-plan Yes - -   F/U: Self-harm action No - -   F/U: Safety concerns No - -     BERENICE-7 SCORE 11/14/2019 1/2/2020 3/11/2020   Total Score - - -   Total Score 18 (severe anxiety) 20 (severe anxiety) 21 (severe anxiety)   Total Score - 20 21     Last PHQ-9 6/26/2020   1.  Little interest or pleasure in doing things 0   2.  Feeling down, depressed, or hopeless 1   3.  Trouble falling or staying asleep, or sleeping too much 2   4.  Feeling tired or having little energy 1   5.  Poor appetite or overeating 0   6.  Feeling bad about yourself 1   7.  Trouble concentrating 0   8.  Moving slowly or restless 0   Q9: Thoughts of better off dead/self-harm past 2 weeks 0   PHQ-9 Total Score 5   Difficulty at work, home, or with people Somewhat difficult   In the past two weeks have you had thoughts of suicide or self harm? -   Do you have concerns about your personal safety or the safety of others? -   In the past 2 weeks have you thought about a plan or had intention to harm yourself? -   In the past 2 weeks have you acted on these thoughts in any way? -     BERENICE-7  3/11/2020   1. Feeling nervous, anxious, or on edge 3   2. Not being able to stop or control worrying 3   3. Worrying too much about different things 3   4. Trouble relaxing 3   5. Being so restless that it is hard to sit still 3   6. Becoming easily annoyed or irritable 3   7. Feeling afraid, as if something awful might happen 3   BERENICE-7 Total Score 21   If you checked any problems, how difficult have they made it for you to do your work, take care of things at home, or get along with other people? -       Suicide Assessment  Five-step Evaluation and Treatment (SAFE-T)    Patient reports several life stressors recently but feels her medications are overall effective for managing her anxiety and depression. Needs updated [facility] paperwork for intermittent leave and mental health therapy appointments.    Review of Systems   Constitutional, HEENT, cardiovascular, pulmonary, GI, , musculoskeletal, neuro, skin, endocrine and psych systems are negative, except as otherwise noted.       Objective          Vitals:  No vitals were obtained today due to virtual visit.    healthy, alert and no distress  PSYCH: Alert and oriented times 3; coherent speech, normal   rate and volume, able to articulate logical thoughts, able   to abstract reason, no tangential thoughts, no hallucinations   or delusions  Her affect is normal  RESP: No cough, no audible wheezing, able to talk in full sentences  Remainder of exam unable to be completed due to telephone visits    No results found for any visits on 10/02/20.        Assessment/Plan:    Assessment & Plan     Dysthymic disorder  Controlled, continue medications without change  - venlafaxine (EFFEXOR-ER) 225 MG 24 hr tablet; Take 1 tablet (225 mg) by mouth daily  - buPROPion (WELLBUTRIN XL) 150 MG 24 hr tablet; Take 1 tablet (150 mg) by mouth daily    Anxiety  Controlled    Psychophysiological insomnia    - traZODone (DESYREL) 50 MG tablet; Take 1-2 tablets ( mg) by mouth nightly as needed for sleep          See Patient Instructions    Return in about 2 months (around 12/2/2020) for Physical.    ESTUARDO Jain [facility]    Phone call duration:  8 minutes

## 2020-10-02 ENCOUNTER — VIRTUAL VISIT (OUTPATIENT)
Dept: FAMILY MEDICINE | Facility: CLINIC | Age: 35
End: 2020-10-02
Payer: COMMERCIAL

## 2020-10-02 DIAGNOSIS — F41.9 ANXIETY: ICD-10-CM

## 2020-10-02 DIAGNOSIS — F34.1 DYSTHYMIC DISORDER: Primary | ICD-10-CM

## 2020-10-02 DIAGNOSIS — F51.04 PSYCHOPHYSIOLOGICAL INSOMNIA: ICD-10-CM

## 2020-10-02 PROCEDURE — 99213 OFFICE O/P EST LOW 20 MIN: CPT | Mod: 95 | Performed by: NURSE PRACTITIONER

## 2020-10-02 RX ORDER — TRAZODONE HYDROCHLORIDE 50 MG/1
50-100 TABLET, FILM COATED ORAL
Qty: 60 TABLET | Refills: 5 | Status: SHIPPED | OUTPATIENT
Start: 2020-10-02 | End: 2021-11-04

## 2020-10-02 RX ORDER — VENLAFAXINE HYDROCHLORIDE 225 MG/1
225 TABLET, EXTENDED RELEASE ORAL DAILY
Qty: 90 TABLET | Refills: 1 | Status: ON HOLD | OUTPATIENT
Start: 2020-10-02 | End: 2021-01-05

## 2020-10-02 RX ORDER — BUPROPION HYDROCHLORIDE 150 MG/1
150 TABLET ORAL DAILY
Qty: 90 TABLET | Refills: 1 | Status: ON HOLD | OUTPATIENT
Start: 2020-10-02 | End: 2021-01-05

## 2020-10-02 NOTE — TELEPHONE ENCOUNTER
Called and spoke with Екатерина. She will  the completed FMLA letter at the [employer] .    FMLA form letter is down at [facility]  ready for . Rosemarie Mitchell,

## 2020-10-02 NOTE — LETTER
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  no.    Date(s) you treated the patient for condition: 10/02/20, 3/11/2020, 1/2/2020, 11/15/2019    Will the patient need to have treatment visits at least twice per year due to the                     condition? yes    Was medication, other than over-the-counter medication prescribed?  yes    Was the patient referred to other health care provider(s) for evaluation or treatment     (e.g., physical therapist)?  yes:  State the nature of such treatments and expected duration of treatment: Mental health therapist       2)  Is the medical condition pregnancy?  no.      3)  Is the employee unable to perform any of his/her job functions due to the     condition: no.      4)  Describe other relevant medical facts, if any, related to the condition which the employee seeks leave (such as medical facts may include symptoms, diagnosis, or any regimen of continuing treatment such as the use of specialized equipment):     (F34.1) Dysthymic disorder  (primary encounter diagnosis)    (F41.9) Anxiety    (F51.04) Psychophysiological insomnia      PART B: AMOUNT OF LEAVE NEEDED  5)  Will the employee be incapacitated for a single continuous period of time due to his/her medical condition, including any time  for treatment and recovery?  no.    6)  Will the employee need to attend follow-up treatment appointments or work part-time or on a reduced schedule because of the employee's medical condition?  yes:  Are the treatments or the reduced number of hours medically necessary?   Yes:      Estimate treatment schedule, if any, including the dates of any scheduled appointments and the time required for each appointment, including any recovery period:   Needs to miss work for up to 3 hours every 2 weeks for mental health therapy appointments.       Estimate the part-time or reduced work schedule the employee needs, if any:  3 hour(s) per day and 1 day(s) per week    7) Will the condition cause episodic flare ups periodically preventing the employee from performing his/her job functions? yes:  Is it medically necessary for the patient to be absent from work during the flare-ups?  yes:  Explain: Unable to work during flare-ups of anxiety    Based upon the patient's medical history and your knowledge of the medical condition, estimate the frequency of flare-ups and the duration of related incapacity that the patient may have over the next six months (e.g., 1 episode every 3 months lasting 1-2 days):    Frequency: 3 X a month, once daily  Duration: 1 day(s)       ADDITIONAL INFORMATION: IDENTIFY QUESTION NUMBER WITH YOUR ADDITIONAL ANSWER       ===========================================    IF EMPLOYEE'S FAMILY MEMBER IS THE PATIENT, PLEASE COMPLETE SECTION BELOW:  N/A      13) Does the patient/family member need the employee to provide basic medical or personal needs, or for safety or transportation?      14)  If no, would the employee's presence to provide psychological comfort be beneficial to the patient or assist in the patient's recovery?        ================================================================    TO BE COMPLETED BY THE HEALTH CARE PROVIDER:    Signature:  ESTUARDO Jain CNP  ________________________________________  Date:   10/2/2020

## 2020-10-13 ENCOUNTER — PATIENT OUTREACH (OUTPATIENT)
Dept: NURSING | Facility: CLINIC | Age: 35
End: 2020-10-13
Payer: COMMERCIAL

## 2020-10-13 ASSESSMENT — ACTIVITIES OF DAILY LIVING (ADL): DEPENDENT_IADLS:: INDEPENDENT

## 2020-10-13 NOTE — PROGRESS NOTES
Clinic Care Coordination Contact    Follow Up Progress Note      Assessment: follow up call with Patient with history of   Patient Active Problem List   Diagnosis     CARDIOVASCULAR SCREENING; LDL GOAL LESS THAN 160     Family history of diabetes mellitus     History of depression     Hypertension goal BP (blood pressure) < 140/90     CTS (carpal tunnel syndrome)     Anxiety     BMI 40.0-44.9, adult (H)     Chronic bilateral thoracic back pain     Chronic midline low back pain without sciatica     Migraine with aura and without status migrainosus, not intractable     Low serum HDL     Counseling for parent-child problem     Dysthymic disorder     Iron deficiency anemia due to chronic blood loss     Menorrhagia with regular cycle     Moderate major depression (H)     Psychophysiological insomnia     Contraceptive management     IUD (intrauterine device) in place     Patient reports she is doing well at home. No s/s of infection to report. Surgical sites are healing well if not healed. Patient has no concerns with medications at this time. Medication reconciliation status: Medications reviewed and reconciled.  Continue medications without change.      Goals addressed this encounter:   Goals Addressed                 This Visit's Progress      Medical (pt-stated)   90%     Goal Statement: I will monitor for s/s of infection and manage my pain to avoid hospitalization  Date Goal set: 8-31-20  Barriers: possible pain medication allergie   Strengths: motivated to heal rapidly  Date to Achieve By: 12-30-20  Patient expressed understanding of goal: Patient verbalized understanding.   Action steps to achieve this goal:  1. I will take my medications as directed.  2. I will monitor incisions for s/s of infection and follow up if observed  3. I will follow up with my providers as directed.                   Outreach Frequency: monthly    Plan:   1) Patient will continue to follow treatment plan as directed and follow up with  PCP with concerns ongoing.   2) Care Coordination to remain available for future needs. Follow up planned for next month unless otherwise notified.     Monty Corcoran RN  Clinic Care Coordinator  [phone]

## 2020-10-20 ENCOUNTER — TRANSFERRED RECORDS (OUTPATIENT)
Dept: HEALTH INFORMATION MANAGEMENT | Facility: CLINIC | Age: 35
End: 2020-10-20

## 2020-10-28 NOTE — TELEPHONE ENCOUNTER
Routing refill request to provider for review/approval because:  Labs not current:  A1C    Yaa RICHMONDN, RN

## 2020-11-04 NOTE — TELEPHONE ENCOUNTER
Called and spoke to patient. Verified that patient is still taking  Semaglutide and patient would like to get a refill.   Mary Hutchinson CMA

## 2020-11-06 ENCOUNTER — VIRTUAL VISIT (OUTPATIENT)
Dept: ENDOCRINOLOGY | Facility: CLINIC | Age: 35
End: 2020-11-06
Payer: COMMERCIAL

## 2020-11-06 VITALS — HEIGHT: 62 IN | WEIGHT: 230 LBS | BODY MASS INDEX: 42.33 KG/M2

## 2020-11-06 DIAGNOSIS — E66.01 MORBID OBESITY (H): Primary | ICD-10-CM

## 2020-11-06 PROCEDURE — 99212 OFFICE O/P EST SF 10 MIN: CPT | Mod: 95 | Performed by: SURGERY

## 2020-11-06 ASSESSMENT — MIFFLIN-ST. JEOR: SCORE: 1696.52

## 2020-11-06 ASSESSMENT — PAIN SCALES - GENERAL: PAINLEVEL: NO PAIN (0)

## 2020-11-06 NOTE — PROGRESS NOTES
"The patient has been notified of following:      \"This video visit will be conducted via a call between you and your physician/provider. We have found that certain health care needs can be provided without the need for an in-person physical exam.  This service lets us provide the care you need with a video conversation.  If a prescription is necessary we can send it directly to your pharmacy.  If lab work is needed we can place an order for that and you can then stop by our lab to have the test done at a later time.     Video visits are billed at different rates depending on your insurance coverage.  Please reach out to your insurance provider with any questions.     If during the course of the call the physician/provider feels a video visit is not appropriate, you will not be charged for this service.\"     Patient has given verbal consent for Video visit? Yes  How would you like to obtain your AVS? MyChart  If you are dropped from the video visit, the video invite should be resent to: Text to cell phone: [phone]  Will anyone else be joining your video visit? No    During this virtual visit the patient is located in [place], patient verifies this as the location during the entirety of this visit.      Video-Visit Details     Type of service:  Video Visit     Video Start Time: 823  Video End Time: 8:33 AM    Originating Location (pt. Location): Home     Distant Location (provider location):  [facility] [place]      Platform used for Video Visit: Brittney Stein MD      Dear Dr. Wolff, Anusha Benavides,      Referring provider:       7/29/2020   Who referred you? Magaly Gonzalez     I was asked to see the patient regarding obesity by the referring provider above.    I had the pleasure of meeting with your patient Екатерина Ramon in our weight loss surgery office.  This patient is a 35 year old female who has been undergoing our thorough preoperative screening process in " "anticipation of potential bariatric surgery.    At initial evaluation we recorded Екатерина Ramon's Height: 157.5 cm (5' 2\"), Initial Weight (lbs): 223 lbs and Initial BMI: 40.79.  The patient has been unsuccessful with other methods of permanent weight loss and suffers from multiple weight related medical conditions.  Due to lack of success in achieving weight loss through other methods, she is interested in undergoing bariatric surgery.    PREVIOUS WEIGHT LOSS ATTEMPTS:     7/29/2020   I have tried the following methods to lose weight Watching portions or calories, Exercise, Atkins type diet (low carb/high protein), OTC Medications, Prescription Medications       CO-MORBIDITIES OF OBESITY INCLUDE:     7/29/2020   I have the following health issues associated with obesity: High Blood Pressure       VITALS:  Ht 1.575 m (5' 2\")   Wt 104.3 kg (230 lb)   BMI 42.07 kg/m      PE:  GENERAL: Alert and oriented x3. NAD  HEENT exam: Sclerae not icteric. Hearing good. Head normocephalic and atraumatic.   CARDIOVASCULAR: No JVD  RESPIRATORY: Breathing unlabored  GASTROINTESTINAL: Obese  LOWER EXTREMITIES: no deformities  MUSCULOSKELETAL: Normal gait, Moves all 4 extremities equal and strong  NEUROLOGIC: no gross defect  SKIN: warm and dry to touch     In summary, she has undergone an appropriate medical evaluation, dietitian evaluation, as well as psychologic screening. The patient appears to be an appropriate candidate for bariatric surgery.    In the office today, I discussed the laparoscopic gastric sleeve surgery.  Risks, benefits and anticipated outcomes were outlined including the risk of death, staple line leak (1-2%), PE, DVT, ulcer, worsening GERD, N/V, stricture, hernia, wound infection, weight regain, and vitamin deficiencies. This patient has a good chance of sustaining permanent weight loss due to this procedure.  This should also allow improvement if not resolution of his/her weight related medical " conditions.    At present we are going to present your patient's file for prior authorization to insurance.  Pending prior authorization, I anticipate a surgical date in the near future.  We will keep you updated on any progress.  If you have questions regarding care please feel free to contact me.  Total time spent in the clinic was 10 minutes with greater than 50% in face-to-face consultation.    Will place PAC and Periop. María Tam to assist scheduling.    Sincerely,    Saud Stein MD    Please route or send letter to:  Primary Care Provider (PCP) and Referring Provider

## 2020-11-06 NOTE — LETTER
"11/6/2020       RE: Екатерина Ramon  [address]  [place] [address]     Dear Colleague,    Thank you for referring your patient, Екатерина Ramon, to the [facility] Center at [facility]. Please see a copy of my visit note below.    The patient has been notified of following:      \"This video visit will be conducted via a call between you and your physician/provider. We have found that certain health care needs can be provided without the need for an in-person physical exam.  This service lets us provide the care you need with a video conversation.  If a prescription is necessary we can send it directly to your pharmacy.  If lab work is needed we can place an order for that and you can then stop by our lab to have the test done at a later time.     Video visits are billed at different rates depending on your insurance coverage.  Please reach out to your insurance provider with any questions.     If during the course of the call the physician/provider feels a video visit is not appropriate, you will not be charged for this service.\"     Patient has given verbal consent for Video visit? Yes  How would you like to obtain your AVS? MyChart  If you are dropped from the video visit, the video invite should be resent to: Text to cell phone: [phone]  Will anyone else be joining your video visit? No    During this virtual visit the patient is located in [place], patient verifies this as the location during the entirety of this visit.      Video-Visit Details     Type of service:  Video Visit     Video Start Time: 823  Video End Time: 8:33 AM    Originating Location (pt. Location): Home     Distant Location (provider location):  [facility] WEIGHT MANAGEMENT [facility]      Platform used for Video Visit: Brittney Stein MD      Dear Anusha Rey,      Referring provider:       7/29/2020   Who referred you? " "Magaly Gonzalez     I was asked to see the patient regarding obesity by the referring provider above.    I had the pleasure of meeting with your patient Екатерина Ramon in our weight loss surgery office.  This patient is a 35 year old female who has been undergoing our thorough preoperative screening process in anticipation of potential bariatric surgery.    At initial evaluation we recorded Екатерина Ramon's Height: 157.5 cm (5' 2\"), Initial Weight (lbs): 223 lbs and Initial BMI: 40.79.  The patient has been unsuccessful with other methods of permanent weight loss and suffers from multiple weight related medical conditions.  Due to lack of success in achieving weight loss through other methods, she is interested in undergoing bariatric surgery.    PREVIOUS WEIGHT LOSS ATTEMPTS:     7/29/2020   I have tried the following methods to lose weight Watching portions or calories, Exercise, Atkins type diet (low carb/high protein), OTC Medications, Prescription Medications       CO-MORBIDITIES OF OBESITY INCLUDE:     7/29/2020   I have the following health issues associated with obesity: High Blood Pressure       VITALS:  Ht 1.575 m (5' 2\")   Wt 104.3 kg (230 lb)   BMI 42.07 kg/m      PE:  GENERAL: Alert and oriented x3. NAD  HEENT exam: Sclerae not icteric. Hearing good. Head normocephalic and atraumatic.   CARDIOVASCULAR: No JVD  RESPIRATORY: Breathing unlabored  GASTROINTESTINAL: Obese  LOWER EXTREMITIES: no deformities  MUSCULOSKELETAL: Normal gait, Moves all 4 extremities equal and strong  NEUROLOGIC: no gross defect  SKIN: warm and dry to touch     In summary, she has undergone an appropriate medical evaluation, dietitian evaluation, as well as psychologic screening. The patient appears to be an appropriate candidate for bariatric surgery.    In the office today, I discussed the laparoscopic gastric sleeve surgery.  Risks, benefits and anticipated outcomes were outlined including the risk of death, staple line " leak (1-2%), PE, DVT, ulcer, worsening GERD, N/V, stricture, hernia, wound infection, weight regain, and vitamin deficiencies. This patient has a good chance of sustaining permanent weight loss due to this procedure.  This should also allow improvement if not resolution of his/her weight related medical conditions.    At present we are going to present your patient's file for prior authorization to insurance.  Pending prior authorization, I anticipate a surgical date in the near future.  We will keep you updated on any progress.  If you have questions regarding care please feel free to contact me.  Total time spent in the clinic was 10 minutes with greater than 50% in face-to-face consultation.    Will place PAC and Periop. María Tam to assist scheduling.    Sincerely,    Saud Stein MD    Please route or send letter to:  Primary Care Provider (PCP) and Referring Provider

## 2020-11-06 NOTE — PROGRESS NOTES
"Екатерина Ramon is a 35 year old female who is being evaluated via a billable video visit.      The patient has been notified of following:     \"This video visit will be conducted via a call between you and your physician/provider. We have found that certain health care needs can be provided without the need for an in-person physical exam.  This service lets us provide the care you need with a video conversation.  If a prescription is necessary we can send it directly to your pharmacy.  If lab work is needed we can place an order for that and you can then stop by our lab to have the test done at a later time.    Video visits are billed at different rates depending on your insurance coverage.  Please reach out to your insurance provider with any questions.    If during the course of the call the physician/provider feels a video visit is not appropriate, you will not be charged for this service.\"    Patient has given verbal consent for Video visit? Yes  How would you like to obtain your AVS? MyChart  If you are dropped from the video visit, the video invite should be resent to: Text to cell phone: [phone]  Will anyone else be joining your video visit? No  {If patient encounters technical issues they should call [phone] :223026}  During this virtual visit the patient is located in [place], patient verifies this as the location during the entirety of this visit.     Video-Visit Details    Type of service:  Video Visit    Video Start Time: {video visit start/end time:152948}  Video End Time: {video visit start/end time:152948}    Originating Location (pt. Location): {video visit patient location:049823::\"Home\"}    Distant Location (provider location):  [facility]NAGEMENT [facility]INIC [place]     Platform used for Video Visit: {Virtual Visit Platforms:271584::\"[employer]\"}    {signature options:227522}      "

## 2020-11-06 NOTE — NURSING NOTE
"Chief Complaint   Patient presents with     RECHECK     pre surgery appt.       Vitals:    11/06/20 0806   Weight: 104.3 kg (230 lb)   Height: 1.575 m (5' 2\")       Body mass index is 42.07 kg/m .                            EDWAR FOX, EMT        "

## 2020-11-11 ENCOUNTER — MYC MEDICAL ADVICE (OUTPATIENT)
Dept: PODIATRY | Facility: CLINIC | Age: 35
End: 2020-11-11

## 2020-11-11 DIAGNOSIS — E66.01 OBESITY, CLASS III, BMI 40-49.9 (MORBID OBESITY) (H): ICD-10-CM

## 2020-11-11 DIAGNOSIS — E66.01 MORBID OBESITY (H): ICD-10-CM

## 2020-11-11 LAB
ALBUMIN SERPL-MCNC: 3.6 G/DL (ref 3.4–5)
ALP SERPL-CCNC: 65 U/L (ref 40–150)
ALT SERPL W P-5'-P-CCNC: 28 U/L (ref 0–50)
ANION GAP SERPL CALCULATED.3IONS-SCNC: 5 MMOL/L (ref 3–14)
AST SERPL W P-5'-P-CCNC: 22 U/L (ref 0–45)
BILIRUB SERPL-MCNC: 0.6 MG/DL (ref 0.2–1.3)
BUN SERPL-MCNC: 15 MG/DL (ref 7–30)
CALCIUM SERPL-MCNC: 8.4 MG/DL (ref 8.5–10.1)
CHLORIDE SERPL-SCNC: 107 MMOL/L (ref 94–109)
CHOLEST SERPL-MCNC: 172 MG/DL
CO2 SERPL-SCNC: 24 MMOL/L (ref 20–32)
CREAT SERPL-MCNC: 0.78 MG/DL (ref 0.52–1.04)
DEPRECATED CALCIDIOL+CALCIFEROL SERPL-MC: 24 UG/L (ref 20–75)
ERYTHROCYTE [DISTWIDTH] IN BLOOD BY AUTOMATED COUNT: 15.2 % (ref 10–15)
GFR SERPL CREATININE-BSD FRML MDRD: >90 ML/MIN/{1.73_M2}
GLUCOSE SERPL-MCNC: 103 MG/DL (ref 70–99)
HBA1C MFR BLD: 5.2 % (ref 0–5.6)
HCT VFR BLD AUTO: 41.2 % (ref 35–47)
HDLC SERPL-MCNC: 47 MG/DL
HGB BLD-MCNC: 13.4 G/DL (ref 11.7–15.7)
LDLC SERPL CALC-MCNC: 90 MG/DL
MCH RBC QN AUTO: 28.5 PG (ref 26.5–33)
MCHC RBC AUTO-ENTMCNC: 32.5 G/DL (ref 31.5–36.5)
MCV RBC AUTO: 88 FL (ref 78–100)
NONHDLC SERPL-MCNC: 125 MG/DL
PLATELET # BLD AUTO: 338 10E9/L (ref 150–450)
POTASSIUM SERPL-SCNC: 4 MMOL/L (ref 3.4–5.3)
PROT SERPL-MCNC: 7.6 G/DL (ref 6.8–8.8)
PTH-INTACT SERPL-MCNC: 110 PG/ML (ref 18–80)
RBC # BLD AUTO: 4.71 10E12/L (ref 3.8–5.2)
SODIUM SERPL-SCNC: 136 MMOL/L (ref 133–144)
TRIGL SERPL-MCNC: 175 MG/DL
TSH SERPL DL<=0.005 MIU/L-ACNC: 1.32 MU/L (ref 0.4–4)
WBC # BLD AUTO: 13.7 10E9/L (ref 4–11)

## 2020-11-11 PROCEDURE — 83970 ASSAY OF PARATHORMONE: CPT | Performed by: NURSE PRACTITIONER

## 2020-11-11 PROCEDURE — 85027 COMPLETE CBC AUTOMATED: CPT | Performed by: NURSE PRACTITIONER

## 2020-11-11 PROCEDURE — 80061 LIPID PANEL: CPT | Performed by: NURSE PRACTITIONER

## 2020-11-11 PROCEDURE — 83036 HEMOGLOBIN GLYCOSYLATED A1C: CPT | Performed by: NURSE PRACTITIONER

## 2020-11-11 PROCEDURE — 80053 COMPREHEN METABOLIC PANEL: CPT | Performed by: NURSE PRACTITIONER

## 2020-11-11 PROCEDURE — 82306 VITAMIN D 25 HYDROXY: CPT | Performed by: NURSE PRACTITIONER

## 2020-11-11 PROCEDURE — 84443 ASSAY THYROID STIM HORMONE: CPT | Performed by: NURSE PRACTITIONER

## 2020-11-12 DIAGNOSIS — E66.01 MORBID OBESITY (H): Primary | ICD-10-CM

## 2020-11-13 ENCOUNTER — CARE COORDINATION (OUTPATIENT)
Dept: ENDOCRINOLOGY | Facility: CLINIC | Age: 35
End: 2020-11-13

## 2020-11-13 PROBLEM — E66.01 MORBID OBESITY (H): Status: ACTIVE | Noted: 2020-11-13

## 2020-11-13 NOTE — PROGRESS NOTES
Tasklist updates.    Bariatric Task List  Status:  Is patient a candidate for bariatric surgery?:  patient is a candidate for bariatric surgery -     Cleared to schedule surgeon consult?:  cleared to schedule surgeon consult - Call Kirby at [phone] to schedule visit with Dr Stein. 11/6/2020 appt. bks   Status:  surgery evaluation in process -     Surgeon: Dr. Stein  -     Tentative surgery month/year: 1/4/2021 if tasks done and at goal weight -        Insurance: Insurance:  [facility] [employer] -        Patient Info: Initial Weight:  232 -     Date of Initial Weight/Height:  7/30/2020 -     Goal Weight (lbs):  222 -     Required Weight Loss:  10 -     Surgery Type:  sleeve gastrectomy -        Dietician Visits: Structured weight loss required by insurance?:  structured weight loss required - Need 3 consecutive months of dietitian visits. [facility]   Dietician Visit 1:  Completed - 7/30/20 KB   Dietician Visit 2:  Completed - 8/25/20 with KB   Dietician Visit 3:  Completed -  9/21/20 KB   Dietician Visit 4:    -     Dietician Visit 5:    -     Dietician Visit 6:    -     Dietician Visit additional:  Needed - Monthly as needed to reach pre-surgery goal weight and for postop diet teaching. 11/17 appt. Call [phone] to schedule. [facility]      Psychological Evaluation: Psych eval:  Needed - Check list of providers to schedule. [facility]   Therapist letter of support:  Completed -  9/16/20 from Kat Kelley M.A. - [facility]      Lab Work: Complete Blood Count:  Completed - [facility]/29/20 in [facility]. bks   Comprehensive Metabolic Panel:  Completed - 8/29/20 in [facility]. bks   Vitamin D:  Needed - Ordered in Epic. Can be done at any [facility] lab. bks; 11/11/20=24, recheck with PTH. bks   Hgb A1c:  Completed - Ordered in Epic. Can be done at any [facility] lab. bks   PTH:  Needed - Ordered in Epic. Can be done at any [facility] lab. bks; 11/11/20 =110, recheck with vitamin D. bks   H. pylori:    -     TSH:  Completed - Ordered in Epic. Can be done at any  "[facility] lab. bks   Nicotine Testing:    -     Other:  Completed - Lipids. Ordered in Epic. Can be done at any [facility] lab. bks      Consults/ Clearance: Medical Weight Management: Completed - 7/8/20 Referral from ESTUARDO Bruce CNP (works with Lorraine Johnson MD). Continue ongoing appts. bks      PCP: Establish care with PCP:  Completed -     PCP letter of support:  Completed -  Referral from ESTUARDO Bruce CNP (works with Lorraine Johnson MD) 9/2/20 ESTUARDO Robledo CNP (PCP Ltr of support). bks      Smoking:    Patient Education:  Information Session:  Completed - Viewed 7/27/20. bks   Given \"Making your decision\" handout?:  Given \"\"Making your decision\"\" handout? -     Given support group information?:  support group contact information provided -     Attended support group?:    -     Support plan in place?:  Completed -        Additional Surgery Requirements: Other Requirements:  Call Kirby at [phone] to check at the [facility] or call [phone] to schedule at another [facility] bks - Covid test 4 days before surgery.   Other Requirements:  Someone from the Call Center will call you. Or, you can call them at [phone]. bks - Postop appts at 1 week and 31+ days. bks      Final Tasks:  Before surgery online class:  Needed - View on Internet Explorer. bks   Before surgery online class website link:  [email or web address]   After surgery online class:  Needed - View on basestone Explorer. bks   After surgery online class website link:  [email or web address]   Nurse visit for weigh-in and information:  Needed - Skylar RN will call you to schedule this for a couple weeks before surgery date. bks   Pre-assessment clinic visit with anesthesia team for H&P:  Needed - Call [phone] to schedule this for 3 weeks before your surgery date. bks   Final labs (Hgb, plt, T&S, UA):  Needed - To be done after the Pre-Assessment " Clinic visit. bks      Notes: Please register for the Get Well Loop when you get an email invitation and a surgery date.     The Get Well Loop will give you information via email or text messages that can help you be more successful before and after surgery.  It can also help answer any questions you may have.   -

## 2020-11-16 NOTE — PROGRESS NOTES
"Екатерина Ramon is a 34 year old female who is being evaluated via a billable video visit.      The patient has been notified of following:     \"This video visit will be conducted via a call between you and your physician/provider. We have found that certain health care needs can be provided without the need for an in-person physical exam.  This service lets us provide the care you need with a video conversation.  If a prescription is necessary we can send it directly to your pharmacy.  If lab work is needed we can place an order for that and you can then stop by our lab to have the test done at a later time.    Video visits are billed at different rates depending on your insurance coverage.  Please reach out to your insurance provider with any questions.    If during the course of the call the physician/provider feels a video visit is not appropriate, you will not be charged for this service.\"    Patient has given verbal consent for Video visit? Yes  How would you like to obtain your AVS? MyChart    Will anyone else be joining your video visit? No        Video-Visit Details    Type of service:  Video Visit    Video Start Time: 8:59 AM   Video End Time: 9:15 AM  Time spent with patient: 16 minutes.    Originating Location (pt. Location): Home    Distant Location (provider location):  [facility]     Platform used for Video Visit: L[employer]    Bariatric Nutrition Consultation Note    Reason For Visit: Nutrition Reassessment    Екатерина Ramon is a 34 year old presenting today for return bariatric nutrition consult.   Pt is interested in laparoscopic sleeve gastrectomy with Dr. Stein expected surgery in Jan 2021.  Patient is accompanied by self.  This is pt's 4th of 3 required nutrition visits prior to surgery.     Pt referred by Yvette Gage NP on July 30, 2020.  Patient with Co-morbidities of obesity including:  Type II DM no  Renal Failure no  Sleep apnea no  Hypertension yes   Dyslipidemia " "no  Joint pain no  Back pain no  GERD no     Support System Reviewed With Patient 7/29/2020   Who do you have in your support network that can be available to help you for the first 2 weeks after surgery? parents   Who can you count on for support throughout your weight loss surgery journey? family/friends       ANTHROPOMETRICS:  Estimated body mass index is 40.79 kg/m  as calculated from the following:    Height as of an earlier encounter on 7/30/20: 1.575 m (5' 2\").    Weight as of an earlier encounter on 7/30/20: 101.2 kg (223 lb). - Pt states her initial wt was a guess, and did not actually take her weight. Discussed with NP. Initial weight updated to reflect actual weight taken on 9/11/20. Goal weight for surgery also updated.     Current weight (per pt report): 230 a few weeks ago (-2 lbs from initial)    Wt Readings from Last 10 Encounters:   11/06/20 104.3 kg (230 lb)   07/30/20 101.2 kg (223 lb)   06/02/20 100.5 kg (221 lb 8 oz)   05/05/20 101.4 kg (223 lb 8 oz)   12/13/19 92.9 kg (204 lb 12 oz)   12/05/19 94.4 kg (208 lb 1.6 oz)   11/15/19 95.3 kg (210 lb)   06/24/19 90.2 kg (198 lb 12.8 oz)   06/05/19 87.5 kg (193 lb)   02/10/19 89.6 kg (197 lb 9.6 oz)       Required weight loss goal pre-op: -10 lbs from initial consult weight (goal weight 222 lbs or less before surgery)       7/29/2020   I have tried the following methods to lose weight Watching portions or calories, Exercise, Atkins type diet (low carb/high protein), OTC Medications, Prescription Medications       Weight Loss Questions Reviewed With Patient 7/29/2020   How long have you been overweight? Since early childhood       SUPPLEMENT INFORMATION:  None    NUTRITION HISTORY:  - NKFA  - previous wt loss attempts - [place] lost 50 lbs, started to regain.   - Dining out: every other week     Pt had COVID19 in August, sxs included abd cramping, diarrhea and reduced appetite. Was not able to eat much for 10 days. Feeling better now. Before COVID, was " "focusong on eating breakfast more (protien shake).     Pt states her appetite has returned to normal. Is continuing to focus on structured meal pattern, lean protein and fruit/vegetables at meals, and reduced/limited carbohydrates.    Recent Diet Recall:  Breakfast: Protein shake (protein powder + 1% milk); oatmeal + milk  Lunch: skip  Dinner: salad (Macedonian and ranch, cheese, and sunflower seeds, hard boiled egg, occ crotons) with chicken    Snacks: avoiding   Beverages: Water (16 oz x 4-5/day), 16 oz/day coffee with half and half      Progress Towards Previous Goals:  Relating To Eating:  - Eat slowly (20-30 minutes per meal), chewing foods well (25 chews per bite/applesauce consistency) - Improving, getting easier   - Eat 3 meals per day - Not met  - 9\" Plate method (1/2 plate non-starchy vegetables/fruit, 1/4 plate lean protein, 1/4 plate whole grain starch - no more than 1/2 cup carb/meal) - Improving   - You may use meal replacements if healthy meal is not planned/prepared (see recommendations below)  - Avoid snacking - Met, continues     Relating to beverages:  - Separate fluids from meals by 30 minutes before, during, and after eating - Met, continues. \"Gotten really good at this.\"   - Avoid carbonated drinks - Met, continues   - Drink 48-64 ounces of fluid per day - Met, continnues    Relating to dietary supplements:  Start a multivitamin containing iron daily - Met, continues     Relating to activity:  Increase activity as able - Has a treadmill at home, taking walk over lunch for 30 mins.     Eating Habits 7/29/2020   Do you have any dietary restrictions? No   Do you currently binge eat (eat a large amount of food in a short time)? No   Are you an emotional eater? No   Do you get up to eat after falling asleep? No   What foods do you crave? chips, chocolate       ADDITIONAL INFORMATION:  Works at [employer]   Has a walking connie at work    Dining Out History Reviewed With Patient 7/29/2020   How often do you dine " "out? Around once a week.   Where do you dine out? (select all that apply) sit-down restaurants   What types of food do you order when you dine out? burgers, Mexican, Chinese/Belarusian       Physical Activity Reviewed With Patient 7/29/2020   How often do you exercise? 3 to 4 times per week   What is the duration of your exercise (in minutes)? 30 Minutes   What types of exercise do you do? walking, other   What keeps you from being more active?  Shortness of breath, Too tired         NUTRITION DIAGNOSIS:  Obesity r/t long history of self-monitoring deficit and excessive energy intake aeb BMI >30 kg/m2. - continues    INTERVENTION:  Intervention Provided/Education Provided:  - Reviewed post-op diet guidelines, vitamins/minerals essential post-operatively, GI anatomy of bariatric surgeries, ways to help prepare for post-op diet guidelines pre-operatively, portion/calorie-control, mindful eating and sources of protein.  - Reviewed previous goals. Encouraged continued progress towards previous goals.   - Encouraged increased activity/exercise as able.  - Reviewed the Plate Method (healthy food choice and appropriate serving sizes) and meal replacements to help with pre-op weight loss.   - Provided pt with list of goals RD contact information.      Questions Reviewed With Patient 7/29/2020   How ready are you to make changes regarding your weight? Number 1 = Not ready at all to make changes up to 10 = very ready. 10   How confident are you that you can change? 1 = Not confident that you will be successful making changes up to 10 = very confident. 10       Patient Understanding: good  Expected Compliance: good    GOALS:  Relating To Eating:  - Eat slowly (20-30 minutes per meal), chewing foods well (25 chews per bite/applesauce consistency)  - Eat 3 meals per day   - Replace 1-2 meals with protein shake  - 9\" Plate method (1/2 plate non-starchy vegetables/fruit, 1/4 plate lean protein, 1/4 plate whole grain starch - no more " than 1/2 cup carb/meal)   - You may use meal replacements if healthy meal is not planned/prepared (see recommendations below)  - Avoid snacking    Relating to beverages:  - Separate fluids from meals by 30 minutes before, during, and after eating  - Avoid carbonated drinks  - Drink 48-64 ounces of fluid per day. Track your fluid intake. PRactice taking 1 oz sips every 10-15 mins.     Relating to dietary supplements:  Continue multivitamin containing iron daily   Start vitamin D supplement    Relating to activity:  Continue exercise 30 mins per day.    Meal Replacements:  You may purchase meal replacement products online at our e-store. Visit e-store at [email or web address]   - The one week starter kits is a great way to sample a variety of products.  - For recipes and ideas on how to use products, visit - [employer]    *Protein Shake Criteria: no more than 210 Calories, at least 20 grams of protein, and less than 10 grams of sugar     Meal Replacement Shake Options:   [facility] smoothie (160 Calories, 20 g protein)   Premier Protein (160 Calories, 30 g protein)  Slim Fast Advanced Nutrition (180 Calories, 20 g protein)  Muscle Milk, lactose-free, 17 oz bottle (210 Calories, 30 g protein)  Integrated Supplements, no artificial sugars (110 Calories, 20 g protein)  Genepro, unflavored protein powder (60 Calories, 30 g protein)    Meal Replacement Bar Options:  [facility] Protein Shake (160 Calories, 15 g protein)  Quest Protein Bars (190 Calories, 20 g protein)  Built Bar (170 Calories, 15-20 g protein)  One Protein Bar (210 calories, 20 g protein)  [place] Signature Protein Bar ([employer]) (190 Calories, 21 g protein)  Pure Protein Bars (180 Calories, 21 g protein)    Frozen Meal Replacements  Healthy Choice  Lean Cuisine  Atkins Meals  Smart Ones      Aida Martin, LIDIA, LD

## 2020-11-17 ENCOUNTER — VIRTUAL VISIT (OUTPATIENT)
Dept: ENDOCRINOLOGY | Facility: CLINIC | Age: 35
End: 2020-11-17
Payer: COMMERCIAL

## 2020-11-17 DIAGNOSIS — E66.9 OBESITY: ICD-10-CM

## 2020-11-17 DIAGNOSIS — Z71.3 NUTRITIONAL COUNSELING: Primary | ICD-10-CM

## 2020-11-17 PROCEDURE — 97803 MED NUTRITION INDIV SUBSEQ: CPT | Mod: 95 | Performed by: DIETITIAN, REGISTERED

## 2020-11-17 NOTE — LETTER
"11/17/2020       RE: Екатерина Ramon  [address]  [place] [address]     Dear Colleague,    Thank you for referring your patient, Екатерина Ramon, to the [facility]NAG[facility] [place] [facility]. Please see a copy of my visit note below.    Екатерина Ramon is a 34 year old female who is being evaluated via a billable video visit.      The patient has been notified of following:     \"This video visit will be conducted via a call between you and your physician/provider. We have found that certain health care needs can be provided without the need for an in-person physical exam.  This service lets us provide the care you need with a video conversation.  If a prescription is necessary we can send it directly to your pharmacy.  If lab work is needed we can place an order for that and you can then stop by our lab to have the test done at a later time.    Video visits are billed at different rates depending on your insurance coverage.  Please reach out to your insurance provider with any questions.    If during the course of the call the physician/provider feels a video visit is not appropriate, you will not be charged for this service.\"    Patient has given verbal consent for Video visit? Yes  How would you like to obtain your AVS? MyChart    Will anyone else be joining your video visit? No        Video-Visit Details    Type of service:  Video Visit    Video Start Time: 8:59 AM   Video End Time: 9:15 AM  Time spent with patient: 16 minutes.    Originating Location (pt. Location): Home    Distant Location (provider location):  [facility] SURGICAL WEIGHT MANAGEMENT     Platform used for Video Visit: [employer]    Bariatric Nutrition Consultation Note    Reason For Visit: Nutrition Reassessment    Екатерина Ramon is a 34 year old presenting today for return bariatric nutrition consult.   Pt is interested in laparoscopic sleeve gastrectomy with Dr. Stein " "expected surgery in Jan 2021.  Patient is accompanied by self.  This is pt's 4th of 3 required nutrition visits prior to surgery.     Pt referred by Yvette Gage NP on July 30, 2020.  Patient with Co-morbidities of obesity including:  Type II DM no  Renal Failure no  Sleep apnea no  Hypertension yes   Dyslipidemia no  Joint pain no  Back pain no  GERD no     Support System Reviewed With Patient 7/29/2020   Who do you have in your support network that can be available to help you for the first 2 weeks after surgery? parents   Who can you count on for support throughout your weight loss surgery journey? family/friends       ANTHROPOMETRICS:  Estimated body mass index is 40.79 kg/m  as calculated from the following:    Height as of an earlier encounter on 7/30/20: 1.575 m (5' 2\").    Weight as of an earlier encounter on 7/30/20: 101.2 kg (223 lb). - Pt states her initial wt was a guess, and did not actually take her weight. Discussed with NP. Initial weight updated to reflect actual weight taken on 9/11/20. Goal weight for surgery also updated.     Current weight (per pt report): 230 a few weeks ago (-2 lbs from initial)    Wt Readings from Last 10 Encounters:   11/06/20 104.3 kg (230 lb)   07/30/20 101.2 kg (223 lb)   06/02/20 100.5 kg (221 lb 8 oz)   05/05/20 101.4 kg (223 lb 8 oz)   12/13/19 92.9 kg (204 lb 12 oz)   12/05/19 94.4 kg (208 lb 1.6 oz)   11/15/19 95.3 kg (210 lb)   06/24/19 90.2 kg (198 lb 12.8 oz)   06/05/19 87.5 kg (193 lb)   02/10/19 89.6 kg (197 lb 9.6 oz)       Required weight loss goal pre-op: -10 lbs from initial consult weight (goal weight 222 lbs or less before surgery)       7/29/2020   I have tried the following methods to lose weight Watching portions or calories, Exercise, Atkins type diet (low carb/high protein), OTC Medications, Prescription Medications       Weight Loss Questions Reviewed With Patient 7/29/2020   How long have you been overweight? Since early childhood       SUPPLEMENT " "INFORMATION:  None    NUTRITION HISTORY:  - NKFA  - previous wt loss attempts - Ragini lost 50 lbs, started to regain.   - Dining out: every other week     Pt had COVID19 in August, sxs included abd cramping, diarrhea and reduced appetite. Was not able to eat much for 10 days. Feeling better now. Before COVID, was focusong on eating breakfast more (protien shake).     Pt states her appetite has returned to normal. Is continuing to focus on structured meal pattern, lean protein and fruit/vegetables at meals, and reduced/limited carbohydrates.    Recent Diet Recall:  Breakfast: Protein shake (protein powder + 1% milk); oatmeal + milk  Lunch: skip  Dinner: salad (Divehi and ranch, cheese, and sunflower seeds, hard boiled egg, occ crotons) with chicken    Snacks: avoiding   Beverages: Water (16 oz x 4-5/day), 16 oz/day coffee with half and half      Progress Towards Previous Goals:  Relating To Eating:  - Eat slowly (20-30 minutes per meal), chewing foods well (25 chews per bite/applesauce consistency) - Improving, getting easier   - Eat 3 meals per day - Not met  - 9\" Plate method (1/2 plate non-starchy vegetables/fruit, 1/4 plate lean protein, 1/4 plate whole grain starch - no more than 1/2 cup carb/meal) - Improving   - You may use meal replacements if healthy meal is not planned/prepared (see recommendations below)  - Avoid snacking - Met, continues     Relating to beverages:  - Separate fluids from meals by 30 minutes before, during, and after eating - Met, continues. \"Gotten really good at this.\"   - Avoid carbonated drinks - Met, continues   - Drink 48-64 ounces of fluid per day - Met, continnues    Relating to dietary supplements:  Start a multivitamin containing iron daily - Met, continues     Relating to activity:  Increase activity as able - Has a treadmill at home, taking walk over lunch for 30 mins.     Eating Habits 7/29/2020   Do you have any dietary restrictions? No   Do you currently binge eat (eat a " large amount of food in a short time)? No   Are you an emotional eater? No   Do you get up to eat after falling asleep? No   What foods do you crave? chips, chocolate       ADDITIONAL INFORMATION:  Works at [employer]   Has a walking connie at work    Dining Out History Reviewed With Patient 7/29/2020   How often do you dine out? Around once a week.   Where do you dine out? (select all that apply) sit-down restaurants   What types of food do you order when you dine out? burgers, Mexican, Chinese/Sinhala       Physical Activity Reviewed With Patient 7/29/2020   How often do you exercise? 3 to 4 times per week   What is the duration of your exercise (in minutes)? 30 Minutes   What types of exercise do you do? walking, other   What keeps you from being more active?  Shortness of breath, Too tired         NUTRITION DIAGNOSIS:  Obesity r/t long history of self-monitoring deficit and excessive energy intake aeb BMI >30 kg/m2. - continues    INTERVENTION:  Intervention Provided/Education Provided:  - Reviewed post-op diet guidelines, vitamins/minerals essential post-operatively, GI anatomy of bariatric surgeries, ways to help prepare for post-op diet guidelines pre-operatively, portion/calorie-control, mindful eating and sources of protein.  - Reviewed previous goals. Encouraged continued progress towards previous goals.   - Encouraged increased activity/exercise as able.  - Reviewed the Plate Method (healthy food choice and appropriate serving sizes) and meal replacements to help with pre-op weight loss.   - Provided pt with list of goals RD contact information.      Questions Reviewed With Patient 7/29/2020   How ready are you to make changes regarding your weight? Number 1 = Not ready at all to make changes up to 10 = very ready. 10   How confident are you that you can change? 1 = Not confident that you will be successful making changes up to 10 = very confident. 10       Patient Understanding: good  Expected Compliance:  "good    GOALS:  Relating To Eating:  - Eat slowly (20-30 minutes per meal), chewing foods well (25 chews per bite/applesauce consistency)  - Eat 3 meals per day   - Replace 1-2 meals with protein shake  - 9\" Plate method (1/2 plate non-starchy vegetables/fruit, 1/4 plate lean protein, 1/4 plate whole grain starch - no more than 1/2 cup carb/meal)   - You may use meal replacements if healthy meal is not planned/prepared (see recommendations below)  - Avoid snacking    Relating to beverages:  - Separate fluids from meals by 30 minutes before, during, and after eating  - Avoid carbonated drinks  - Drink 48-64 ounces of fluid per day. Track your fluid intake. PRactice taking 1 oz sips every 10-15 mins.     Relating to dietary supplements:  Continue multivitamin containing iron daily   Start vitamin D supplement    Relating to activity:  Continue exercise 30 mins per day.    Meal Replacements:  You may purchase meal replacement products online at our e-store. Visit e-store at [email or web address]   - The one week starter kits is a great way to sample a variety of products.  - For recipes and ideas on how to use products, visit - KeyMe    *Protein Shake Criteria: no more than 210 Calories, at least 20 grams of protein, and less than 10 grams of sugar     Meal Replacement Shake Options:   [facility] smoothie (160 Calories, 20 g protein)   Premier Protein (160 Calories, 30 g protein)  Slim Fast Advanced Nutrition (180 Calories, 20 g protein)  Muscle Milk, lactose-free, 17 oz bottle (210 Calories, 30 g protein)  Integrated Supplements, no artificial sugars (110 Calories, 20 g protein)  Genepro, unflavored protein powder (60 Calories, 30 g protein)    Meal Replacement Bar Options:  [facility] Protein Shake (160 Calories, 15 g protein)  Quest Protein Bars (190 Calories, 20 g protein)  Built Bar (170 Calories, 15-20 g protein)  One Protein Bar (210 calories, 20 g protein)  Titus Signature Protein " Bar ([employer]) (190 Calories, 21 g protein)  Pure Protein Bars (180 Calories, 21 g protein)    Frozen Meal Replacements  Healthy Choice  Lean Cuisine  Atkins Meals  Smart Ones      Aida Martin RD, LD

## 2020-11-29 DIAGNOSIS — Z11.59 ENCOUNTER FOR SCREENING FOR OTHER VIRAL DISEASES: Primary | ICD-10-CM

## 2020-12-04 ENCOUNTER — CARE COORDINATION (OUTPATIENT)
Dept: ENDOCRINOLOGY | Facility: CLINIC | Age: 35
End: 2020-12-04

## 2020-12-04 NOTE — PROGRESS NOTES
Bariatric Task List  Status:  Is patient a candidate for bariatric surgery?:  patient is a candidate for bariatric surgery -     Cleared to schedule surgeon consult?:  cleared to schedule surgeon consult - Call Kirby at [phone] to schedule visit with Dr Stein. 11/6/2020 appt. bks   Status:  surgery evaluation in process -     Surgeon: Dr. Stein  -     Tentative surgery month/year: 1/4/2021 if tasks done and at goal weight -        Insurance: Insurance:  [facility] [employer] -     Cigna: PCP Recommendation and Medical Clearance:    -     HP Referral:    -     Other:    -        Patient Info: Initial Weight:  232 -     Date of Initial Weight/Height:  7/30/2020 -     Goal Weight (lbs):  222 -     Required Weight Loss:  10 -     Surgery Type:  sleeve gastrectomy -     Multidisciplinary Meeting:    -        Dietician Visits: Structured weight loss required by insurance?:  structured weight loss required - Need 3 consecutive months of dietitian visits. [facility]   Dietician Visit 1:  Completed - 7/30/20 KB   Dietician Visit 2:  Completed - 8/25/20 with KB   Dietician Visit 3:  Completed -  9/21/20 KB   Dietician Visit 4:    -     Dietician Visit 5:    -     Dietician Visit 6:    -     Dietician Visit additional:  Needed - Monthly as needed to reach pre-surgery goal weight and for postop diet teaching. Dec appt. Call [phone] to schedule. bks   Clearance from dietician to see surgeon?:    -     Dietician Notes:    -        Psychological Evaluation: Psych eval:  Needed - Check list of providers to schedule. [facility]   Therapist letter of support:  Completed -  9/16/20 from Kat Kelley MIanA. - [facility]   Psychiatrist letter of support:    -     Establish care with therapist:    -     Complete eating disorder evaluation:    -     Letter of clearance from therapist/eating disorder program:    -     Other:    -        Lab Work: Complete Blood Count:  Completed - 8/29/20 in Epic. [facility]   Comprehensive Metabolic Panel:  Completed - 8/29/20 in  "Epic. bks   Vitamin D:  Needed - Ordered in Epic. Can be done at any [facility] lab. bks; 11/11/20=24, recheck with PTH. bks. To be checked in 3 months per Yvette's note (after surgery). bks   Hgb A1c:  Completed - Ordered in Epic. Can be done at any [facility] lab. bks   PTH:  Needed - Ordered in Epic. Can be done at any [facility] lab. bks; 11/11/20 =110, recheck with vitamin D. bks;  To be checked in 3 months per Yvette's note (after surgery). bks   H. pylori:    -     TSH:  Completed - Ordered in Epic. Can be done at any [facility] lab. bks   Nicotine Testing:    -     Other:  Completed - Lipids. Ordered in Epic. Can be done at any [facility] lab. bks      Consults/ Clearance: Sleep Medicine:    -     Cardiac:    -     Pain:   -     Dental:    -     Endocrine:    -     Gastroenterology:    -     Vascular Medicine:    -     Hematology:    -     Medical Weight Management: Completed - 7/8/20 Referral from ESTUARDO Bruce, CNP (works with Lorraine Johnson MD). Continue ongoing appts. bks   Physical Therapy/Exercise:    -     Nephrology:    -     Neurology:    -     Pulmonology:    -     Rheumatology:    -     Other    -     Other    -     Other    -           Testing: UGI:    -     EGD:    -     Other:    -        PCP: Establish care with PCP:  Completed -     Follow up with PCP:    -     PCP letter of support:  Completed -  Referral from ESTUARDO Bruce, CNP (works with Lorraine Johnson MD) 9/2/20 ESTUARDO Robledo, CNP (PCP Ltr of support). bks      Smoking: Quit tobacco use (3 months smoke free)?:    -     Quit date:    -        Patient Education:  Information Session:  Completed - Viewed 7/27/20. bks   Given \"Making your decision\" handout?:  Given \"\"Making your decision\"\" handout? -     Given support group information?:  support group contact information provided -     Attended support group?:    -     Support plan in place?:  Completed -     Research consents signed?:    -        Additional " Surgery Requirements: Review Coag plan:    -     HgA1c <8:    -     Inpatient pain consult:    -     Final nicotine screen:    -     Dental work complete:    -     Birth control plan:    -     Other Requirements:  Call Kirby at [phone] to check at the [facility] or call [phone] to schedule at another [facility] bks - Covid test 4 days before surgery.   Other Requirements:  Someone from the Call Center will call you. Or, you can call them at [phone]. bks - Postop appts at 1 week and 31+ days. bks      Final Tasks:  Before surgery online class:  Needed - View on [employer]. bks   Before surgery online class website link:  [email or web address]   After surgery online class:  Needed - View on [employer]. bks   After surgery online class website link:  [email or web address]   Nurse visit for weigh-in and information:  Needed - Skylar RN will call you to schedule this for a couple weeks before surgery date. bks   Pre-assessment clinic visit with anesthesia team for H&P:  Needed - Call [phone] to schedule this for 3 weeks before your surgery date. bks   Final labs (Hgb, plt, T&S, UA):  Needed - To be done after the Pre-Assessment Clinic visit. bks      Notes: Please register for the Get Well Loop when you get an email invitation and a surgery date.     The Get Well Loop will give you information via email or text messages that can help you be more successful before and after surgery.  It can also help answer any questions you may have.   -

## 2020-12-07 ENCOUNTER — VIRTUAL VISIT (OUTPATIENT)
Dept: FAMILY MEDICINE | Facility: CLINIC | Age: 35
End: 2020-12-07
Payer: COMMERCIAL

## 2020-12-07 DIAGNOSIS — F41.9 ANXIETY: Primary | ICD-10-CM

## 2020-12-07 DIAGNOSIS — F34.1 DYSTHYMIC DISORDER: ICD-10-CM

## 2020-12-07 PROCEDURE — 99213 OFFICE O/P EST LOW 20 MIN: CPT | Mod: 95 | Performed by: NURSE PRACTITIONER

## 2020-12-07 RX ORDER — HYDROXYZINE HYDROCHLORIDE 25 MG/1
25 TABLET, FILM COATED ORAL EVERY 6 HOURS PRN
Qty: 30 TABLET | Refills: 1 | Status: SHIPPED | OUTPATIENT
Start: 2020-12-07 | End: 2021-11-04

## 2020-12-07 NOTE — TELEPHONE ENCOUNTER
FUTURE VISIT INFORMATION      SURGERY INFORMATION:    Date: 21    Location: [facility] OR    Surgeon:  Saud Setin MD    Anesthesia Type:  general    Procedure: GASTRECTOMY, SLEEVE, LAPAROSCOPIC HERNIORRHAPHY, HIATAL, LAPAROSCOPIC    Consult: virtual visit     RECORDS REQUESTED FROM:       Primary Care Provider: Anusha Wolff APRN [facility]    Pertinent Medical History: Hypertension    Most recent EKG+ Tracin/4/15

## 2020-12-07 NOTE — PROGRESS NOTES
"Екатерина Ramon is a 35 year old female who is being evaluated via a billable video visit.      The patient has been notified of following:     \"This video visit will be conducted via a call between you and your physician/provider. We have found that certain health care needs can be provided without the need for an in-person physical exam.  This service lets us provide the care you need with a video conversation.  If a prescription is necessary we can send it directly to your pharmacy.  If lab work is needed we can place an order for that and you can then stop by our lab to have the test done at a later time.    Video visits are billed at different rates depending on your insurance coverage.  Please reach out to your insurance provider with any questions.    If during the course of the call the physician/provider feels a video visit is not appropriate, you will not be charged for this service.\"    Patient has given verbal consent for Video visit? Yes  How would you like to obtain your AVS? MyChart  If you are dropped from the video visit, the video invite should be resent to: Text to cell phone: [phone]  Will anyone else be joining your video visit? No  Subjective     Екатерина Ramon is a 35 year old female who presents today via video visit for the following health issues:    HPI     Depression and Anxiety Follow-Up    How are you doing with your depression since your last visit? No change    How are you doing with your anxiety since your last visit?  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 She sees a therapist every 2 weeks. Exercises 4 days a week for about an hour.      Are you having other symptoms that might be associated with depression or anxiety? No    Have you had a significant life event? No     Do you have any concerns with your use of alcohol or other drugs? No    Social History     Tobacco Use     Smoking status: Former " Smoker     Types: Cigarettes     Quit date: 5/1/2010     Years since quitting: 10.6     Smokeless tobacco: Never Used   Substance Use Topics     Alcohol use: Yes     Comment: rare     Drug use: No     PHQ 3/11/2020 6/26/2020 12/3/2020   PHQ-9 Total Score 12 5 9   Q9: Thoughts of better off dead/self-harm past 2 weeks Not at all Not at all Not at all   F/U: Thoughts of suicide or self-harm - - -   F/U: Self harm-plan - - -   F/U: Self-harm action - - -   F/U: Safety concerns - - -     BERENICE-7 SCORE 1/2/2020 3/11/2020 12/3/2020   Total Score - - -   Total Score 20 (severe anxiety) 21 (severe anxiety) 17 (severe anxiety)   Total Score 20 21 17     Last PHQ-9 12/3/2020   1.  Little interest or pleasure in doing things 0   2.  Feeling down, depressed, or hopeless 1   3.  Trouble falling or staying asleep, or sleeping too much 3   4.  Feeling tired or having little energy 2   5.  Poor appetite or overeating 0   6.  Feeling bad about yourself 0   7.  Trouble concentrating 3   8.  Moving slowly or restless 0   Q9: Thoughts of better off dead/self-harm past 2 weeks 0   PHQ-9 Total Score 9   Difficulty at work, home, or with people -   In the past two weeks have you had thoughts of suicide or self harm? -   Do you have concerns about your personal safety or the safety of others? -   In the past 2 weeks have you thought about a plan or had intention to harm yourself? -   In the past 2 weeks have you acted on these thoughts in any way? -     BERENICE-7  12/3/2020   1. Feeling nervous, anxious, or on edge 3   2. Not being able to stop or control worrying 3   3. Worrying too much about different things 2   4. Trouble relaxing 3   5. Being so restless that it is hard to sit still 2   6. Becoming easily annoyed or irritable 3   7. Feeling afraid, as if something awful might happen 1   BERENICE-7 Total Score 17   If you checked any problems, how difficult have they made it for you to do your work, take care of things at home, or get along with  other people? -       Suicide Assessment Five-step Evaluation and Treatment (SAFE-T)      How many servings of fruits and vegetables do you eat daily?  2-3    On average, how many sweetened beverages do you drink each day (Examples: soda, juice, sweet tea, etc.  Do NOT count diet or artificially sweetened beverages)?   0    How many days per week do you exercise enough to make your heart beat faster? 4    How many minutes a day do you exercise enough to make your heart beat faster? 60 or more    How many days per week do you miss taking your medication? 0         Video Start Time: 2:10 PM    Review of Systems   Constitutional, HEENT, cardiovascular, pulmonary, gi and gu systems are negative, except as otherwise noted.      Objective         Vitals:  No vitals were obtained today due to virtual visit.    Physical Exam     GENERAL: Healthy, alert and no distress  EYES: Eyes grossly normal to inspection.  No discharge or erythema, or obvious scleral/conjunctival abnormalities.  RESP: No audible wheeze, cough, or visible cyanosis.  No visible retractions or increased work of breathing.    SKIN: Visible skin clear. No significant rash, abnormal pigmentation or lesions.  NEURO: Cranial nerves grossly intact.  Mentation and speech appropriate for age.  PSYCH: Mentation appears normal, affect normal/bright, judgement and insight intact, normal speech and appearance well-groomed.      No results found for this or any previous visit (from the past 24 hour(s)).        Assessment & Plan     Anxiety  Propanolol PRN has not been very helpful for her.  WiIl stop propanolol and trial hydroxyzine PRN.  - hydrOXYzine (ATARAX) 25 MG tablet; Take 1 tablet (25 mg) by mouth every 6 hours as needed for anxiety    Dysthymic disorder  Well controlled with medications without side effects.         Return in about 4 weeks (around 1/4/2021) for Physical as scheduled with PCP..    ESTUARDO [facility]  [facility]      Video-Visit Details    Type of service:  Video Visit    Video End Time:2:17 PM    Originating Location (pt. Location): Home    Distant Location (provider location):  [facility]     Platform used for Video Visit: Brittney

## 2020-12-08 ENCOUNTER — TELEPHONE (OUTPATIENT)
Dept: ENDOCRINOLOGY | Facility: CLINIC | Age: 35
End: 2020-12-08

## 2020-12-08 NOTE — TELEPHONE ENCOUNTER
Called patient to check when she finished up her visits with [facility] in [place] for the psychological evaluation. She states her final visit will be 12/17. Asked her to request the evaluation be faxed the same day she sees them as the insurance companies are taking approximately 15 days to prior authorization. Sleeve scheduled 1/4/21.

## 2020-12-13 ENCOUNTER — HEALTH MAINTENANCE LETTER (OUTPATIENT)
Age: 35
End: 2020-12-13

## 2020-12-21 ENCOUNTER — ANESTHESIA EVENT (OUTPATIENT)
Dept: SURGERY | Facility: CLINIC | Age: 35
End: 2020-12-21
Payer: COMMERCIAL

## 2020-12-21 ENCOUNTER — PRE VISIT (OUTPATIENT)
Dept: SURGERY | Facility: CLINIC | Age: 35
End: 2020-12-21

## 2020-12-21 ENCOUNTER — VIRTUAL VISIT (OUTPATIENT)
Dept: SURGERY | Facility: CLINIC | Age: 35
End: 2020-12-21
Payer: COMMERCIAL

## 2020-12-21 DIAGNOSIS — Z01.818 PREOP EXAMINATION: ICD-10-CM

## 2020-12-21 DIAGNOSIS — Z01.818 PREOP EXAMINATION: Primary | ICD-10-CM

## 2020-12-21 DIAGNOSIS — E66.01 MORBID OBESITY (H): ICD-10-CM

## 2020-12-21 LAB
ALBUMIN UR-MCNC: NEGATIVE MG/DL
APPEARANCE UR: CLEAR
BILIRUB UR QL STRIP: NEGATIVE
COLOR UR AUTO: YELLOW
GLUCOSE UR STRIP-MCNC: NEGATIVE MG/DL
HGB UR QL STRIP: NEGATIVE
KETONES UR STRIP-MCNC: NEGATIVE MG/DL
LEUKOCYTE ESTERASE UR QL STRIP: ABNORMAL
NITRATE UR QL: NEGATIVE
NON-SQ EPI CELLS #/AREA URNS LPF: ABNORMAL /LPF
PH UR STRIP: 8.5 PH (ref 5–7)
RBC #/AREA URNS AUTO: ABNORMAL /HPF
SOURCE: ABNORMAL
SP GR UR STRIP: 1.02 (ref 1–1.03)
UROBILINOGEN UR STRIP-ACNC: 0.2 EU/DL (ref 0.2–1)
WBC #/AREA URNS AUTO: ABNORMAL /HPF

## 2020-12-21 PROCEDURE — 81001 URINALYSIS AUTO W/SCOPE: CPT | Performed by: PHYSICIAN ASSISTANT

## 2020-12-21 PROCEDURE — 99202 OFFICE O/P NEW SF 15 MIN: CPT | Mod: 95 | Performed by: PHYSICIAN ASSISTANT

## 2020-12-21 ASSESSMENT — PAIN SCALES - GENERAL: PAINLEVEL: NO PAIN (0)

## 2020-12-21 ASSESSMENT — LIFESTYLE VARIABLES: TOBACCO_USE: 1

## 2020-12-21 NOTE — H&P
Pre-Operative H & P         Video-Visit Details    Type of service:  Video Visit    Patient verbally consented to video service today: YES      Video Start Time: 0943  Video End Time (time video stopped): 0953    Originating Location (pt. Location): Home    Distant Location (provider location): home    Mode of Communication:  Video Conference via SpeakUp      CC:  Preoperative exam to assess for increased cardiopulmonary risk while undergoing surgery and anesthesia.    Date of Encounter: 12/21/2020  Primary Care Physician:  Anusha Wolff  Associated diagnosis: obesity    HPI  Екатерина Ramon is a 35 year old female who presents for pre-operative H & P in preparation for gastric sleeve with Dr. Stein on 1/4/20 at [facility]. Patient is being evaluated for comorbid conditions of hypertension, anemia, migraines, anxiety        Ms. Ramon has a longstanding history of obesity. She has been working with the bariatric team in preparation for weight loss surgery. She has tried diets, exercise, OTC and rx medicaitons without long-term success. The above procedure is now planned.       History is obtained from the patient and chart review.      Past Medical History  Past Medical History:   Diagnosis Date     Family history of diabetes mellitus      Hypertension      Migraine with aura and without status migrainosus, not intractable 11/2/2016     PID (acute pelvic inflammatory disease) 5/2010       Past Surgical History  Past Surgical History:   Procedure Laterality Date     APPENDECTOMY       BUNIONECTOMY LAPIDUS WITH TARSAL METATARSAL (TMT) FUSION Right 08/14/2018    Procedure: BUNIONECTOMY LAPIDUS WITH TARSAL METATARSAL (TMT) FUSION;  Right first tarsometatarsal joint fusion, right Jackson bunionectomy;  Surgeon: Zack Healy DPM;  Location: [facility] OR      REVISE MEDIAN N/CARPAL TUNNEL SURG Left 06/05/2015    Primary, not revision     RELEASE CARPAL TUNNEL  Left 06/05/2015    Procedure: RELEASE CARPAL TUNNEL;  Surgeon: Juan Jose Joaquin MD;  Location: [facility] OR       Hx of Blood transfusions/reactions: denies     Hx of abnormal bleeding or anti-platelet use: ASA 81 mg    Menstrual history: No LMP recorded. (Menstrual status: IUD). IUD in place    Steroid use in the last year: denies    Personal or FH with difficulty with Anesthesia:  Patient denies any person history of issues with anesthesia.  She reports her son has a history of PONV    Prior to Admission Medications  Current Outpatient Medications   Medication Sig Dispense Refill     aspirin 81 MG EC tablet Take 81 mg by mouth daily       buPROPion (WELLBUTRIN XL) 150 MG 24 hr tablet Take 1 tablet (150 mg) by mouth daily (Patient taking differently: Take 150 mg by mouth every evening ) 90 tablet 1     calcium carbonate-vitamin D (OS-RIMMA) 500-400 MG-UNIT tablet Take 1 tablet by mouth daily       hydrOXYzine (ATARAX) 25 MG tablet Take 1 tablet (25 mg) by mouth every 6 hours as needed for anxiety 30 tablet 1     insulin pen needle (BD TALHA U/F) 32G X 4 MM miscellaneous Use once daily or as directed. 100 each 3     multivitamin, therapeutic (THERA-VIT) TABS tablet Take 1 tablet by mouth daily       naproxen (NAPROSYN) 500 MG tablet Take 1 tablet (500 mg) by mouth 2 times daily as needed for moderate pain (Patient not taking: Reported on 12/18/2020) 60 tablet 1     Semaglutide,0.25 or 0.5MG/DOS, 2 MG/1.5ML SOPN Inject 0.25 mg Subcutaneous every 7 days NEED APPOINTMENT FOR FURTHER REFILLS. (Patient taking differently: Inject 0.25 mg Subcutaneous every 7 days Fridays) 3 mL 0     SUMAtriptan (IMITREX) 25 MG tablet Take 1-2 tablets (25-50 mg) by mouth at onset of headache for migraine May repeat dose in 2 hours.  Do not exceed 200 mg in 24 hours (Patient not taking: Reported on 12/18/2020) 18 tablet 0     traZODone (DESYREL) 50 MG tablet Take 1-2 tablets ( mg) by mouth nightly as needed for sleep 60 tablet 5      venlafaxine (EFFEXOR-ER) 225 MG 24 hr tablet Take 1 tablet (225 mg) by mouth daily (Patient taking differently: Take 225 mg by mouth every evening ) 90 tablet 1     verapamil ER (VERELAN) 180 MG 24 hr capsule Take 1 capsule (180 mg) by mouth daily 90 capsule 3       Allergies  Allergies   Allergen Reactions     Oxycodone Itching     Lisinopril Cough     Zoloft      tired       Social History  Social History     Socioeconomic History     Marital status: Single     Spouse name: Not on file     Number of children: 1     Years of education: 12     Highest education level: Not on file   Occupational History     Occupation: retail     Employer: [employer]   Social Needs     Financial resource strain: Not on file     Food insecurity     Worry: Not on file     Inability: Not on file     Transportation needs     Medical: Not on file     Non-medical: Not on file   Tobacco Use     Smoking status: Former Smoker     Types: Cigarettes     Quit date: 5/1/2010     Years since quitting: 10.6     Smokeless tobacco: Never Used   Substance and Sexual Activity     Alcohol use: Yes     Comment: rare     Drug use: No     Sexual activity: Not Currently     Partners: Male     Birth control/protection: Implant   Lifestyle     Physical activity     Days per week: Not on file     Minutes per session: Not on file     Stress: Not on file   Relationships     Social connections     Talks on phone: Not on file     Gets together: Not on file     Attends Uatsdin service: Not on file     Active member of club or organization: Not on file     Attends meetings of clubs or organizations: Not on file     Relationship status: Not on file     Intimate partner violence     Fear of current or ex partner: Not on file     Emotionally abused: Not on file     Physically abused: Not on file     Forced sexual activity: Not on file   Other Topics Concern     Parent/sibling w/ CABG, MI or angioplasty before 65F 55M? No   Social History Narrative     Not on file        Family History  Family History   Problem Relation Age of Onset     Diabetes Mother      Hypertension Mother      Cancer Mother         skin     Diabetes Father      Anesthesia Reaction Son         PONV     Glaucoma No family hx of      Macular Degeneration No family hx of      Deep Vein Thrombosis (DVT) No family hx of        ROS/MED HX    ENT/Pulmonary:  - neg pulmonary ROS    (-) tobacco use   Neurologic:     (+)migraines,     Cardiovascular:     (+) hypertension----. : . . . :. . Previous cardiac testing date:results:date: results:ECG reviewed date:2015 results:NSR date: results:         (-) taking anticoagulants/antiplatelets   METS/Exercise Tolerance:  >4   Hematologic:  - neg hematologic  ROS      (-) History of Transfusion   Musculoskeletal:  - neg musculoskeletal ROS       GI/Hepatic:  - neg GI/hepatic ROS       Renal/Genitourinary:  - ROS Renal section negative       Endo:     (+) Obesity (BMI 40), .      Psychiatric:     (+) psychiatric history depression      Infectious Disease:  - neg infectious disease ROS       Malignancy:      - no malignancy   Other:           The complete review of systems is negative other than noted in the HPI or here.      0 lbs 0 oz  Data Unavailable   There is no height or weight on file to calculate BMI.       Physical Exam  Constitutional: Awake, alert, cooperative, no apparent distress, and appears stated age.  Respiratory: non labored breathing  Neuropsychiatric: Calm, cooperative. Normal affect.     Please refer to the physical examination documented by the anesthesiologist in the anesthesia record on the day of surgery    Labs: (personally reviewed)  Component      Latest Ref Rng & Units 11/11/2020   Sodium      133 - 144 mmol/L 136   Potassium      3.4 - 5.3 mmol/L 4.0   Chloride      94 - 109 mmol/L 107   Carbon Dioxide      20 - 32 mmol/L 24   Anion Gap      3 - 14 mmol/L 5   Glucose      70 - 99 mg/dL 103 (H)   Urea Nitrogen      7 - 30 mg/dL 15   Creatinine       0.52 - 1.04 mg/dL 0.78   GFR Estimate      >60 mL/min/1.73:m2 >90   GFR Estimate If Black      >60 mL/min/1.73:m2 >90   Calcium      8.5 - 10.1 mg/dL 8.4 (L)   Bilirubin Total      0.2 - 1.3 mg/dL 0.6   Albumin      3.4 - 5.0 g/dL 3.6   Protein Total      6.8 - 8.8 g/dL 7.6   Alkaline Phosphatase      40 - 150 U/L 65   ALT      0 - 50 U/L 28   AST      0 - 45 U/L 22   WBC      4.0 - 11.0 10e9/L 13.7 (H)   RBC Count      3.8 - 5.2 10e12/L 4.71   Hemoglobin      11.7 - 15.7 g/dL 13.4   Hematocrit      35.0 - 47.0 % 41.2   MCV      78 - 100 fl 88   MCH      26.5 - 33.0 pg 28.5   MCHC      31.5 - 36.5 g/dL 32.5   RDW      10.0 - 15.0 % 15.2 (H)   Platelet Count      150 - 450 10e9/L 338   Hemoglobin A1C      0 - 5.6 % 5.2     UA ordered and patient will complete at a [facility] closer to [facility]- I will watch for results later this week    EKG 2015    NSR      Outside records reviewed from: care everywhere    ASSESSMENT and PLAN  Екатерина Ramon is a 35 year old female scheduled for gastric sleeve on 1/4/21 by Dr. Amor in treatment of obesity.  PAC referral for risk assessment and optimization for anesthesia with comorbid conditions of hypertension, anemia, migraines, anxiety:    Pre-operative considerations:  1.  Cardiac:  Functional status- METS >4. Hypertension using verapamil. Denies cardiac symptoms. previous EKG as above.  intermediate risk surgery with 0.4% (RCRI #) risk of major adverse cardiac event.   2.  Pulm:  RADHA risk: intermediate. Distant tobacco use history. Denies pulmonary symptoms. COVID testing ordered per surgery team. She reports she will get the COVID vaccine tomorrow through work  3.  GI:  Risk of PONV score = 3.  If > 2, anti-emetic intervention recommended.  4.  Neuro: migraines using Imitrex as needed  5. Psych: depression and anxiety suing Wellbutrin, Effexor and hydroxyzine.   6. Endo: morbid obesity with the above procedure planned.     VTE risk: 0.5%    Patient is optimized and is acceptable  candidate for the proposed procedure.  No further diagnostic evaluation is needed.     **Physical exam and vital signs not completed today as this visit was scheduled as a virtual visit during Covid 19 pandemic. Physical exam should be completed the DOS in pre-op**      Sidra Eaton PA-C  Preoperative Assessment Center  [facility]  Phone: [phone]  Fax: [phone]

## 2020-12-21 NOTE — PATIENT INSTRUCTIONS
Preparing for Your Surgery      Name:  Екатерина Ramon   MRN:  [id number]   :  1985   Today's Date:  2020       Arriving for surgery:  Surgery date:  21  Arrival time:  5:30AM    Restrictions due to COVID 19:  No visitors are allowed at this time.    [employer] parking is available for anyone with mobility limitations or disabilities.  ([address]  24 hours/ 7 days a week; [facility]  7 am- 3:30 pm, Mon- Fri)    Please come to:       [facility], [address]  [address] [place]  [address]     -    Please proceed to the Surgery Lounge on the 3rd floor. [phone]?     - ?If you are in need of directions, wheelchair or escort please stop at the Information Desk in the lobby.  Inform the information person that you are here for surgery; a wheelchair and escort will be provided to the Surgery Lounge .?     What can I eat or drink?  -  You may eat and drink normally through 21.  Begin Clear Liquid diet 1/3/21 through midnight.   -  You may have sips of water until 3 hours before surgery. (Until 21, 4:30AM)    Examples of clear liquids:  Water  Clear broth  Juices (apple, white grape, white cranberry  and cider) without pulp  Noncarbonated, powder based beverages  (lemonade and Jeff-Aid)  Coffee or tea (without milk or cream)  Gatorade    -  No Alcohol for at least 24 hours before surgery     Which medicines can I take?    Hold Aspirin for 7 days before surgery.   Hold Multivitamins for 7 days before surgery.  Hold Supplements for 7 days before surgery.  Hold Sumatriptan(Imitrex) and Ibuprofen (Advil, Motrin) for 1 day before surgery--unless otherwise directed by surgeon.  Hold Naproxen (Aleve) for 4 days before surgery.    -  DO NOT take these medications the day of surgery:    Calcium    -  PLEASE TAKE these medications the day of surgery:    Verapamil    Hydroxyzine(Atarax) as needed    How do I prepare myself?  - Please take 2 showers before surgery  using Scrubcare or Hibiclens soap.    Use this soap only from the neck to your toes.     Leave the soap on your skin for one minute--then rinse thoroughly.      You may use your own shampoo and conditioner; no other hair products.   - Please remove all jewelry and body piercings.  - No lotions, deodorants or fragrance.  - No makeup or fingernail polish.   - Bring your ID and insurance card.    - All patients are required to have a Covid-19 test within 4 days of surgery/procedure.      -Patients will be contacted by the [facility] scheduling team within 1 week of surgery to make an appointment.      - Patients may call the Scheduling team at [phone] if they have not been scheduled within 4 days of  surgery.        Questions or Concerns:    - For any questions regarding the day of surgery or your hospital stay, please contact the Pre Admission Nursing Office at [phone].       - If you have health changes between today and your surgery please call your surgeon.       For questions after surgery please call your surgeons office.       Please get Urine Specimen at local [facility] prior to surgery.

## 2020-12-21 NOTE — PROGRESS NOTES
"Екатерина Ramon is a 34 year old female who is being evaluated via a billable video visit.      The patient has been notified of following:     \"This video visit will be conducted via a call between you and your physician/provider. We have found that certain health care needs can be provided without the need for an in-person physical exam.  This service lets us provide the care you need with a video conversation.  If a prescription is necessary we can send it directly to your pharmacy.  If lab work is needed we can place an order for that and you can then stop by our lab to have the test done at a later time.    Video visits are billed at different rates depending on your insurance coverage.  Please reach out to your insurance provider with any questions.    If during the course of the call the physician/provider feels a video visit is not appropriate, you will not be charged for this service.\"    Patient has given verbal consent for Video visit? Yes  How would you like to obtain your AVS? MyChart    Will anyone else be joining your video visit? No        Video-Visit Details    Type of service:  Video Visit    Video Start Time: 10:30 AM  Video End Time: 10:45 AM  Time spent with patient: 15 minutes.    Originating Location (pt. Location): Home    Distant Location (provider location):  [facility]     Platform used for Video Visit: EvoTronix    Bariatric Nutrition Consultation Note    Reason For Visit: Nutrition Reassessment    Екатерина Ramon is a 34 year old presenting today for return bariatric nutrition consult and nutrition education regarding clear and low-fat full liquid diet for post-bariatric surgery (scheduled 1/4/21).  Pt is interested in laparoscopic sleeve gastrectomy with Dr. Stein.  Patient is accompanied by self.  Patient has completed required nutrition visits prior to surgery.     Pt referred by Yvette Gage NP on July 30, 2020.  Patient with Co-morbidities of obesity " "including:  Type II DM no  Renal Failure no  Sleep apnea no  Hypertension yes   Dyslipidemia no  Joint pain no  Back pain no  GERD no     Support System Reviewed With Patient 7/29/2020   Who do you have in your support network that can be available to help you for the first 2 weeks after surgery? parents   Who can you count on for support throughout your weight loss surgery journey? family/friends       ANTHROPOMETRICS:  Estimated body mass index is 40.79 kg/m  as calculated from the following:    Height as of an earlier encounter on 7/30/20: 1.575 m (5' 2\").    Weight as of an earlier encounter on 7/30/20: 101.2 kg (223 lb). - Pt states her initial wt was a guess, and did not actually take her weight. Discussed with NP. Initial weight updated to 232 lbs to reflect actual weight taken on 9/11/20. Goal weight for surgery also updated.     Current weight:    Estimated body mass index is 42.07 kg/m  as calculated from the following:    Height as of 11/6/20: 1.575 m (5' 2\").    Weight as of 11/6/20: 104.3 kg (230 lb). (-2 lbs from initial) - pt states she has not checked her weight recently, plans to check today. States she feels like she has continued to lose weight since sticking to the Modified Liquid Diet.    Required weight loss goal pre-op: -10 lbs from initial consult weight (goal weight 222 lbs or less before surgery)       7/29/2020   I have tried the following methods to lose weight Watching portions or calories, Exercise, Atkins type diet (low carb/high protein), OTC Medications, Prescription Medications       Weight Loss Questions Reviewed With Patient 7/29/2020   How long have you been overweight? Since early childhood       SUPPLEMENT INFORMATION:  MVI tablet     NUTRITION HISTORY:  - NKFA  - previous wt loss attempts - Ragini lost 50 lbs, started to regain.   - Dining out: every other week     Pt had COVID19 in August, sxs included abd cramping, diarrhea and reduced appetite. Was not able to eat much for " "10 days. Feeling better now. Before COVID, was focusong on eating breakfast more (protien shake).   Pt states her appetite has returned to normal. Is continuing to focus on structured meal pattern, lean protein and fruit/vegetables at meals, and reduced/limited carbohydrates.    Today pt state she continues to follow the Modified Liquid Diet, replacing breakfast and lunch with a protein shake and dinner comprised of lean protein and non-starchy veggies. Avoiding snacks between and drinking only water throughout the day.     Recent Diet Recall:  Breakfast: Protein shake (protein powder + 1% milk); oatmeal + milk  Lunch: Protein shake  Dinner: 1 chicken breast/beef + veggies (asparagus, green beans, broccoli, carrots)   Snacks: none   Beverages: Water (16 oz x 4-5/day)     Progress Towards Previous Goals:  Relating To Eating:  - Eat slowly (20-30 minutes per meal), chewing foods well (25 chews per bite/applesauce consistency) -Met, continues   - Eat 3 meals per day - Met, continues   - Replace 1-2 meals with protein shake  - 9\" Plate method (1/2 plate non-starchy vegetables/fruit, 1/4 plate lean protein, 1/4 plate whole grain starch - no more than 1/2 cup carb/meal) - Met at dinner, using 3-5 oz of lean protein with non-starchy veggies. Reports using minimal olive oil to cook with.    - You may use meal replacements if healthy meal is not planned/prepared (see recommendations below)  - Avoid snacking - Met, continues    Relating to beverages:  - Separate fluids from meals by 30 minutes before, during, and after eating - Met, continues   - Avoid carbonated drinks Met, continues   - Drink 48-64 ounces of fluid per day. Track your fluid intake. Practice taking 1 oz sips every 10-15 mins. - Met, continues     Relating to dietary supplements:  Continue multivitamin containing iron daily - Met, continues  Start vitamin D supplement - Met, continues    Relating to activity:  Continue exercise 30 mins per day. - Met, " continues     Eating Habits 7/29/2020   Do you have any dietary restrictions? No   Do you currently binge eat (eat a large amount of food in a short time)? No   Are you an emotional eater? No   Do you get up to eat after falling asleep? No   What foods do you crave? chips, chocolate       ADDITIONAL INFORMATION:  Works at [employer]   Has a walking connie at work    Dining Out History Reviewed With Patient 7/29/2020   How often do you dine out? Around once a week.   Where do you dine out? (select all that apply) sit-down restaurants   What types of food do you order when you dine out? burgers, Mexican, Chinese/Setswana       Physical Activity Reviewed With Patient 7/29/2020   How often do you exercise? 3 to 4 times per week   What is the duration of your exercise (in minutes)? 30 Minutes   What types of exercise do you do? walking, other   What keeps you from being more active?  Shortness of breath, Too tired       NUTRITION DIAGNOSIS:  Obesity r/t long history of self-monitoring deficit and excessive energy intake aeb BMI >30 kg/m2. - improving/continues    INTERVENTION:  Intervention Provided/Education Provided/Reviewed previous goals and encouraged patient to continue goals prior to surgery. Pt to check weight today, and continue Mod Liq diet until surgery.     Provided instruction on bariatric clear and low-fat full liquid diets.  Provided the following handouts: Diet Guidelines for Bariatric Surgery, Sources of Protein, Keeping Track of Your Fluids, list of recommended vitamin/mineral supplementation after SG surgery and RD contact information.     Questions Reviewed With Patient 7/29/2020   How ready are you to make changes regarding your weight? Number 1 = Not ready at all to make changes up to 10 = very ready. 10   How confident are you that you can change? 1 = Not confident that you will be successful making changes up to 10 = very confident. 10       Patient Understanding: good  Expected Compliance:  good    GOALS:  Relating To Eating:  - Eat slowly (20-30 minutes per meal), chewing foods well (25 chews per bite/applesauce consistency)  - Eat 3 meals per day   - Replace 2 meals with protein shake   - Third meal is 3-4 oz of lean protein and non-starchy veggies  - Avoid snacking    Relating to beverages:  - Separate fluids from meals by 30 minutes before, during, and after eating  - Drink 48-64 ounces of fluid per day. Track your fluid intake. Practice taking 1 oz sips every 10-15 mins.     Relating to dietary supplements:  Continue multivitamin containing iron daily and vitamin D supplement until surgery.    Relating to activity:  Continue exercise 30 mins per day.    Goals after surgery:   1. Follow the bariatric post-op diet advancement schedule:  - Bariatric clear liquid diet the day before surgery through post-op day 6 (1/3 - 1/10).  - Bariatric low-fat full liquid diet on post-op days 7 through 13 (1/11 - 1/17).    2. Sip on 48-64 oz (or greater) fluids daily, recording intake to help stay on-track.  - Drink at least 2 oz of fluid every 30 min.    3. Stop multivitamin at surgery. We will discuss starting a chewable multivitamin at the one week post-op visit.     Chewable Multivitamin Options for After Surgery:  Bariatric Advantage Advanced Multi EA chewable ([email or web address]/item/[email or web address]-multi-ea)  - Discount code: UOFMINN (for 15% off order) and NEW10 (additional 10% off for new customers)   Celebrate Multi Complete 45 ([email or web address])  Flinstone's Complete, or generic (with 18 mg iron per chew)      High Protein Clear Liquid options:  BiPro  Premier Protein clear  [email or web address]  M Health Protein 15 Concentrate  Bone broth  Beneprotein protein powder mixed with 4-6 oz of fluid    Post-op Diet Handouts:  Diet Guidelines after Weight-loss Surgery  [email or web address]     Your Stage 1 Diet: Clear  Liquids  [email or web address]     Your Stage 2 Diet: Low-fat Full Liquids  [email or web address]     Your Stage 3 Diet: Pureed Foods  [email or web address]     Pureed Pleasures  [email or web address]    Your Stage 4 Diet: Soft Foods  [email or web address]    Your Stage 5 Diet: Regular Foods  [email or web address]    Supplements after Weight Loss Surgery  [email or web address]     Keeping Track of Fluids  [email or web address]      Aida Martin RD, LD

## 2020-12-21 NOTE — PROGRESS NOTES
"Екатерина Ramon is a 35 year old female who is being evaluated via a billable video visit.      The patient has been notified of following:     \"This video visit will be conducted via a call between you and your physician/provider. We have found that certain health care needs can be provided without the need for an in-person physical exam.  This service lets us provide the care you need with a video conversation.  If a prescription is necessary we can send it directly to your pharmacy.  If lab work is needed we can place an order for that and you can then stop by our lab to have the test done at a later time.    Video visits are billed at different rates depending on your insurance coverage.  Please reach out to your insurance provider with any questions.    If during the course of the call the physician/provider feels a video visit is not appropriate, you will not be charged for this service.\"    Patient has given verbal consent for Video visit? Yes  How would you like to obtain your AVS? MyChart    Will anyone else be joining your video visit? No      RUSS Ortiz LPN          "

## 2020-12-21 NOTE — ANESTHESIA PREPROCEDURE EVALUATION
"Anesthesia Pre-Procedure Evaluation    Patient: Екатерина Ramon   MRN:     [id number] Gender:   female   Age:    35 year old :      1985        Preoperative Diagnosis: Morbid obesity (H) [E66.01]   Procedure(s):  GASTRECTOMY, SLEEVE, LAPAROSCOPIC  HERNIORRHAPHY, HIATAL, LAPAROSCOPIC     LABS:  CBC:   Lab Results   Component Value Date    WBC 13.7 (H) 2020    WBC 11.5 (H) 11/15/2019    HGB 13.4 2020    HGB 12.6 2020    HCT 41.2 2020    HCT 37.4 11/15/2019     2020     11/15/2019     BMP:   Lab Results   Component Value Date     2020     2019    POTASSIUM 4.0 2020    POTASSIUM 3.8 2019    CHLORIDE 107 2020    CHLORIDE 105 2019    CO2 24 2020    CO2 27 2019    BUN 15 2020    BUN 13 2019    CR 0.78 2020    CR 0.86 2020     (H) 2020    GLC 76 2019     COAGS: No results found for: PTT, INR, FIBR  POC:   Lab Results   Component Value Date    HCG Negative 2013     OTHER:   Lab Results   Component Value Date    A1C 5.2 2020    RIMMA 8.4 (L) 2020    ALBUMIN 3.6 2020    PROTTOTAL 7.6 2020    ALT 28 2020    AST 22 2020    ALKPHOS 65 2020    BILITOTAL 0.6 2020    TSH 1.32 2020        Preop Vitals    BP Readings from Last 3 Encounters:   20 122/78   20 (!) 132/94   19 112/76    Pulse Readings from Last 3 Encounters:   20 96   20 86   19 64      Resp Readings from Last 3 Encounters:   20 16   20 18   18 16    SpO2 Readings from Last 3 Encounters:   20 100%   20 100%   19 98%      Temp Readings from Last 1 Encounters:   20 98.2  F (36.8  C) (Oral)    Ht Readings from Last 1 Encounters:   20 1.575 m (5' 2\")      Wt Readings from Last 1 Encounters:   20 104.3 kg (230 lb)    Estimated body mass index is 42.07 kg/m  as calculated from " "the following:    Height as of 11/6/20: 1.575 m (5' 2\").    Weight as of 11/6/20: 104.3 kg (230 lb).     LDA:  Peripheral IV 06/05/15 Left Hand (Active)   Number of days: 2026        Past Medical History:   Diagnosis Date     Family history of diabetes mellitus      Hypertension      Migraine with aura and without status migrainosus, not intractable 11/2/2016     PID (acute pelvic inflammatory disease) 5/2010      Past Surgical History:   Procedure Laterality Date     BUNIONECTOMY LAPIDUS WITH TARSAL METATARSAL (TMT) FUSION Right 8/14/2018    Procedure: BUNIONECTOMY LAPIDUS WITH TARSAL METATARSAL (TMT) FUSION;  Right first tarsometatarsal joint fusion, right Jackson bunionectomy;  Surgeon: Zack Healy DPM;  Location: [facility] OR     HC REVISE MEDIAN N/CARPAL TUNNEL SURG Left 6/5/15    Primary, not revision     RELEASE CARPAL TUNNEL Left 6/5/2015    Procedure: RELEASE CARPAL TUNNEL;  Surgeon: Juan Jose Joaquin MD;  Location: [facility] OR      Allergies   Allergen Reactions     Lisinopril Cough     Zoloft      tired        Anesthesia Evaluation     . Pt has had prior anesthetic. Type: General    No history of anesthetic complications          ROS/MED HX    ENT/Pulmonary:     (+)tobacco use (quit 2010), Past use , . .    Neurologic:     (+)migraines,     Cardiovascular:     (+) hypertension----. Taking blood thinners : . . . :. . Previous cardiac testing date:results:date: results:ECG reviewed date:2015 results:NSR date: results:          METS/Exercise Tolerance: Comment: Walking on treadmill >4 METS   Hematologic:  - neg hematologic  ROS      (-) History of Transfusion   Musculoskeletal:  - neg musculoskeletal ROS       GI/Hepatic:  - neg GI/hepatic ROS       Renal/Genitourinary:  - ROS Renal section negative       Endo:     (+) Obesity (BMI 40), .      Psychiatric:     (+) psychiatric history depression and anxiety      Infectious Disease:  - neg infectious disease ROS       Malignancy:      - no malignancy   Other:     "                     PHYSICAL EXAM:   Mental Status/Neuro: A/A/O   Airway: Facies: Feasible  Mallampati: II  Mouth/Opening: Full  TM distance: > 6 cm  Neck ROM: Full   Respiratory:   Resp. Rate: Normal     Resp. Effort: Normal      CV: Rhythm: Regular  Rate: Age appropriate   Comments:                      Assessment:   ASA SCORE: 3    H&P: History and physical reviewed and following examination; no interval change.   Smoking Status:  Non-Smoker/Unknown        Plan:   Anes. Type:  General   Pre-Medication: None   Induction:  IV (Standard)   Airway: ETT; Oral   Access/Monitoring: PIV   Maintenance: Balanced     Advanced Monitoring: BIS     Postop Plan:   Postop Pain: Opioids  Postop Sedation/Airway: Not planned  Disposition: Inpatient/Admit     PONV Management:   Adult Risk Factors: Female, Non-Smoker, Postop Opioids   Prevention: Ondansetron, Dexamethasone, Propofol     CONSENT: Via Surgical Consent   Plan and risks discussed with: Patient                   PAC Discussion and Assessment    ASA Classification: 3  Case is suitable for: [place]  Anesthetic techniques and relevant risks discussed: GA  Invasive monitoring and risk discussed:   Types:   Possibility and Risk of blood transfusion discussed:   NPO instructions given:   Additional anesthetic preparation and risks discussed:   Needs early admission to pre-op area:   Other:     PAC Resident/NP Anesthesia Assessment:  Екатерина Ramon is a 35 year old female scheduled for gastric sleeve on 1/4/21 by Dr. Amor in treatment of obesity.  PAC referral for risk assessment and optimization for anesthesia with comorbid conditions of hypertension, anemia, migraines, anxiety:    Pre-operative considerations:  1.  Cardiac:  Functional status- METS >4. Hypertension using verapamil. Denies cardiac symptoms. previous EKG as above.  intermediate risk surgery with 0.4% (RCRI #) risk of major adverse cardiac event.   2.  Pulm:  RADHA risk: intermediate. Distant tobacco use  history. Denies pulmonary symptoms. COVID testing ordered per surgery team. She reports she will get the COVID vaccine tomorrow through work  3.  GI:  Risk of PONV score = 3.  If > 2, anti-emetic intervention recommended.  4.  Neuro: migraines using Imitrex as needed  5. Psych: depression and anxiety suing Wellbutrin, Effexor and hydroxyzine.   6. Endo: morbid obesity with the above procedure planned.     VTE risk: 0.5%    Patient is optimized and is acceptable candidate for the proposed procedure.  No further diagnostic evaluation is needed.     **Physical exam and vital signs not completed today as this visit was scheduled as a virtual visit during Covid 19 pandemic. Physical exam should be completed the DOS in pre-op**    **For further details of assessment and testing, please see note completed on same date.      Sidra Eaton PA-C        Mid-Level Provider/Resident:   Date:   Time:     Attending Anesthesiologist Anesthesia Assessment:        Anesthesiologist:   Date:   Time:   Pass/Fail:   Disposition:     PAC Pharmacist Assessment:        Pharmacist:   Date:   Time:    Sidra Eaton PA-C

## 2020-12-22 ENCOUNTER — VIRTUAL VISIT (OUTPATIENT)
Dept: ENDOCRINOLOGY | Facility: CLINIC | Age: 35
End: 2020-12-22
Payer: COMMERCIAL

## 2020-12-22 DIAGNOSIS — E66.9 OBESITY: ICD-10-CM

## 2020-12-22 DIAGNOSIS — Z71.3 NUTRITIONAL COUNSELING: Primary | ICD-10-CM

## 2020-12-22 PROCEDURE — 97803 MED NUTRITION INDIV SUBSEQ: CPT | Mod: 95 | Performed by: DIETITIAN, REGISTERED

## 2020-12-22 NOTE — PATIENT INSTRUCTIONS
Nutrition Goals:  Goals before surgery:  Relating To Eating:  - Eat slowly (20-30 minutes per meal), chewing foods well (25 chews per bite/applesauce consistency)  - Eat 3 meals per day   - Replace 2 meals with protein shake   - Third meal is 3-4 oz of lean protein and non-starchy veggies  - Avoid snacking    Relating to beverages:  - Separate fluids from meals by 30 minutes before, during, and after eating  - Drink 48-64 ounces of fluid per day. Track your fluid intake. Practice taking 1 oz sips every 10-15 mins.     Relating to dietary supplements:  Continue multivitamin containing iron daily and vitamin D supplement until surgery.    Relating to activity:  Continue exercise 30 mins per day.    Goals after surgery:   1. Follow the bariatric post-op diet advancement schedule:  - Bariatric clear liquid diet the day before surgery through post-op day 6 (1/3 - 1/10).  - Bariatric low-fat full liquid diet on post-op days 7 through 13 (1/11 - 1/17).    2. Sip on 48-64 oz (or greater) fluids daily, recording intake to help stay on-track.  - Drink at least 2 oz of fluid every 30 min.    3. Stop multivitamin at surgery. We will discuss starting a chewable multivitamin at the one week post-op visit.     Chewable Multivitamin Options for After Surgery:  Bariatric Advantage Advanced Multi EA chewable (https://www.bariatricadVena [email or web address]/item/chewable-advanced-multi-ea)  - Discount code: UOFMINN (for 15% off order) and NEW10 (additional 10% off for new customers)   Clix Softwarerate Multi Complete 45 (https://[employer] [place].Immediately/products/fvisj-jigtbwue-[email or web address])  Flinstone's Complete, or generic (with 18 mg iron per chew)    High Protein Clear Liquid options:  BiPro  Premier Protein clear  Pwzaodr2L  M Health Protein 15 Concentrate  Bone broth  Beneprotein protein powder mixed with 4-6 oz of fluid    Post-op Diet Handouts:  Diet Guidelines after Weight-loss Surgery  [email or web address]     Your Stage 1  Diet: Clear Liquids  [email or web address]     Your Stage 2 Diet: Low-fat Full Liquids  [email or web address]     Your Stage 3 Diet: Pureed Foods  [email or web address]     Pureed Pleasures  [email or web address]    Your Stage 4 Diet: Soft Foods  [email or web address]    Your Stage 5 Diet: Regular Foods  [email or web address]    Supplements after Weight Loss Surgery  [email or web address]     Keeping Track of Fluids  [email or web address]

## 2020-12-22 NOTE — LETTER
"12/22/2020       RE: Екатерина Ramon  [address]  [place] [address]     Dear Colleague,    Thank you for referring your patient, Екатерина Ramon, to the [facility] [place] [facility]. Please see a copy of my visit note below.    Екатерина Ramon is a 34 year old female who is being evaluated via a billable video visit.      The patient has been notified of following:     \"This video visit will be conducted via a call between you and your physician/provider. We have found that certain health care needs can be provided without the need for an in-person physical exam.  This service lets us provide the care you need with a video conversation.  If a prescription is necessary we can send it directly to your pharmacy.  If lab work is needed we can place an order for that and you can then stop by our lab to have the test done at a later time.    Video visits are billed at different rates depending on your insurance coverage.  Please reach out to your insurance provider with any questions.    If during the course of the call the physician/provider feels a video visit is not appropriate, you will not be charged for this service.\"    Patient has given verbal consent for Video visit? Yes  How would you like to obtain your AVS? MyChart    Will anyone else be joining your video visit? No        Video-Visit Details    Type of service:  Video Visit    Video Start Time: 10:30 AM  Video End Time: 10:45 AM  Time spent with patient: 15 minutes.    Originating Location (pt. Location): Home    Distant Location (provider location):  [facility]     Platform used for Video Visit: [employer]    Bariatric Nutrition Consultation Note    Reason For Visit: Nutrition Reassessment    Екатерина Ramon is a 34 year old presenting today for return bariatric nutrition consult and nutrition education regarding clear and low-fat full liquid diet " "for post-bariatric surgery (scheduled 1/4/21).  Pt is interested in laparoscopic sleeve gastrectomy with Dr. Stein.  Patient is accompanied by self.  Patient has completed required nutrition visits prior to surgery.     Pt referred by Yvette Gage NP on July 30, 2020.  Patient with Co-morbidities of obesity including:  Type II DM no  Renal Failure no  Sleep apnea no  Hypertension yes   Dyslipidemia no  Joint pain no  Back pain no  GERD no     Support System Reviewed With Patient 7/29/2020   Who do you have in your support network that can be available to help you for the first 2 weeks after surgery? parents   Who can you count on for support throughout your weight loss surgery journey? family/friends       ANTHROPOMETRICS:  Estimated body mass index is 40.79 kg/m  as calculated from the following:    Height as of an earlier encounter on 7/30/20: 1.575 m (5' 2\").    Weight as of an earlier encounter on 7/30/20: 101.2 kg (223 lb). - Pt states her initial wt was a guess, and did not actually take her weight. Discussed with NP. Initial weight updated to 232 lbs to reflect actual weight taken on 9/11/20. Goal weight for surgery also updated.     Current weight:    Estimated body mass index is 42.07 kg/m  as calculated from the following:    Height as of 11/6/20: 1.575 m (5' 2\").    Weight as of 11/6/20: 104.3 kg (230 lb). (-2 lbs from initial) - pt states she has not checked her weight recently, plans to check today. States she feels like she has continued to lose weight since sticking to the Modified Liquid Diet.    Required weight loss goal pre-op: -10 lbs from initial consult weight (goal weight 222 lbs or less before surgery)       7/29/2020   I have tried the following methods to lose weight Watching portions or calories, Exercise, Atkins type diet (low carb/high protein), OTC Medications, Prescription Medications       Weight Loss Questions Reviewed With Patient 7/29/2020   How long have you been overweight? Since " "early childhood       SUPPLEMENT INFORMATION:  MVI tablet     NUTRITION HISTORY:  - NKFA  - previous wt loss attempts - Ragini lost 50 lbs, started to regain.   - Dining out: every other week     Pt had COVID19 in August, sxs included abd cramping, diarrhea and reduced appetite. Was not able to eat much for 10 days. Feeling better now. Before COVID, was focusong on eating breakfast more (protien shake).   Pt states her appetite has returned to normal. Is continuing to focus on structured meal pattern, lean protein and fruit/vegetables at meals, and reduced/limited carbohydrates.    Today pt state she continues to follow the Modified Liquid Diet, replacing breakfast and lunch with a protein shake and dinner comprised of lean protein and non-starchy veggies. Avoiding snacks between and drinking only water throughout the day.     Recent Diet Recall:  Breakfast: Protein shake (protein powder + 1% milk); oatmeal + milk  Lunch: Protein shake  Dinner: 1 chicken breast/beef + veggies (asparagus, green beans, broccoli, carrots)   Snacks: none   Beverages: Water (16 oz x 4-5/day)     Progress Towards Previous Goals:  Relating To Eating:  - Eat slowly (20-30 minutes per meal), chewing foods well (25 chews per bite/applesauce consistency) -Met, continues   - Eat 3 meals per day - Met, continues   - Replace 1-2 meals with protein shake  - 9\" Plate method (1/2 plate non-starchy vegetables/fruit, 1/4 plate lean protein, 1/4 plate whole grain starch - no more than 1/2 cup carb/meal) - Met at dinner, using 3-5 oz of lean protein with non-starchy veggies. Reports using minimal olive oil to cook with.    - You may use meal replacements if healthy meal is not planned/prepared (see recommendations below)  - Avoid snacking - Met, continues    Relating to beverages:  - Separate fluids from meals by 30 minutes before, during, and after eating - Met, continues   - Avoid carbonated drinks Met, continues   - Drink 48-64 ounces of fluid per " day. Track your fluid intake. Practice taking 1 oz sips every 10-15 mins. - Met, continues     Relating to dietary supplements:  Continue multivitamin containing iron daily - Met, continues  Start vitamin D supplement - Met, continues    Relating to activity:  Continue exercise 30 mins per day. - Met, continues     Eating Habits 7/29/2020   Do you have any dietary restrictions? No   Do you currently binge eat (eat a large amount of food in a short time)? No   Are you an emotional eater? No   Do you get up to eat after falling asleep? No   What foods do you crave? chips, chocolate       ADDITIONAL INFORMATION:  Works at [employer]   Has a walking connie at work    Dining Out History Reviewed With Patient 7/29/2020   How often do you dine out? Around once a week.   Where do you dine out? (select all that apply) sit-down restaurants   What types of food do you order when you dine out? burgers, Mexican, Chinese/Amharic       Physical Activity Reviewed With Patient 7/29/2020   How often do you exercise? 3 to 4 times per week   What is the duration of your exercise (in minutes)? 30 Minutes   What types of exercise do you do? walking, other   What keeps you from being more active?  Shortness of breath, Too tired       NUTRITION DIAGNOSIS:  Obesity r/t long history of self-monitoring deficit and excessive energy intake aeb BMI >30 kg/m2. - improving/continues    INTERVENTION:  Intervention Provided/Education Provided/Reviewed previous goals and encouraged patient to continue goals prior to surgery. Pt to check weight today, and continue Mod Liq diet until surgery.     Provided instruction on bariatric clear and low-fat full liquid diets.  Provided the following handouts: Diet Guidelines for Bariatric Surgery, Sources of Protein, Keeping Track of Your Fluids, list of recommended vitamin/mineral supplementation after SG surgery and RD contact information.     Questions Reviewed With Patient 7/29/2020   How ready are you to make changes  regarding your weight? Number 1 = Not ready at all to make changes up to 10 = very ready. 10   How confident are you that you can change? 1 = Not confident that you will be successful making changes up to 10 = very confident. 10       Patient Understanding: good  Expected Compliance: good    GOALS:  Relating To Eating:  - Eat slowly (20-30 minutes per meal), chewing foods well (25 chews per bite/applesauce consistency)  - Eat 3 meals per day   - Replace 2 meals with protein shake   - Third meal is 3-4 oz of lean protein and non-starchy veggies  - Avoid snacking    Relating to beverages:  - Separate fluids from meals by 30 minutes before, during, and after eating  - Drink 48-64 ounces of fluid per day. Track your fluid intake. Practice taking 1 oz sips every 10-15 mins.     Relating to dietary supplements:  Continue multivitamin containing iron daily and vitamin D supplement until surgery.    Relating to activity:  Continue exercise 30 mins per day.    Goals after surgery:   1. Follow the bariatric post-op diet advancement schedule:  - Bariatric clear liquid diet the day before surgery through post-op day 6 (1/3 - 1/10).  - Bariatric low-fat full liquid diet on post-op days 7 through 13 (1/11 - 1/17).    2. Sip on 48-64 oz (or greater) fluids daily, recording intake to help stay on-track.  - Drink at least 2 oz of fluid every 30 min.    3. Stop multivitamin at surgery. We will discuss starting a chewable multivitamin at the one week post-op visit.     Chewable Multivitamin Options for After Surgery:  Bariatric Advantage Advanced Multi EA chewable (https://www.bariatricadBig Super Searchage.com/item/chewable-advanced-multi-ea)  - Discount code: UOFMINN (for 15% off order) and NEW10 (additional 10% off for new customers)   Twist and Shout Multi Complete 45 ([email or web address]/products/[email or web address]-[email or web address])  Flinstone's Complete, or generic (with 18 mg iron per chew)      High Protein Clear Liquid  options:  BiPro  Premier Protein clear  Zyxgdoz4D  M Health Protein 15 Concentrate  Bone broth  Beneprotein protein powder mixed with 4-6 oz of fluid    Post-op Diet Handouts:  Diet Guidelines after Weight-loss Surgery  [email or web address]     Your Stage 1 Diet: Clear Liquids  [email or web address]     Your Stage 2 Diet: Low-fat Full Liquids  [email or web address]     Your Stage 3 Diet: Pureed Foods  [email or web address]     Pureed Pleasures  [email or web address]    Your Stage 4 Diet: Soft Foods  [email or web address]    Your Stage 5 Diet: Regular Foods  [email or web address]    Supplements after Weight Loss Surgery  [email or web address]     Keeping Track of Fluids  [email or web address]      Aida Martin RD, LD

## 2020-12-23 ENCOUNTER — TELEPHONE (OUTPATIENT)
Dept: ENDOCRINOLOGY | Facility: CLINIC | Age: 35
End: 2020-12-23

## 2020-12-23 NOTE — TELEPHONE ENCOUNTER
Patient called me back stating she had spoken to [facility] about getting the prior authorization in time for a 1/4 sleeve gastrectomy surgery with Dr. Stein. She spoke with a manager who assured her if it went through [employer] today, they would assure her that it would be prior authorized in time. I did get it sent through [employer] today as I had to fax it yesterday because [employer] would not load the clinicals.

## 2020-12-23 NOTE — TELEPHONE ENCOUNTER
"Patient faxed me the psychological evaluation 12/22/20  done by [facility] on 10/10/20. She has been scheduled for sleeve gastrectomy with Dr. Stein on 1/4/21. Submitted prior auth to [facility] [employer] 12/22. I left patient a message stating I would have to take her off 1/4 because [facility] would only have 5 days to prior authorize and they have been taking 15 working days to authorize. Patient returned my call as I was writing this note. She states she had faxed the evaluation last Thursday. I told her I would check into this to find out what happened. She was upset because she has taken time off from work and \"don't want to wait 6 months get get another date.\" I told her I would work on finding the next possible and once I received the authorization from [facility] I would contact her.    "

## 2020-12-28 ENCOUNTER — PREP FOR PROCEDURE (OUTPATIENT)
Dept: ENDOCRINOLOGY | Facility: CLINIC | Age: 35
End: 2020-12-28

## 2020-12-28 ENCOUNTER — CARE COORDINATION (OUTPATIENT)
Dept: ENDOCRINOLOGY | Facility: CLINIC | Age: 35
End: 2020-12-28

## 2020-12-28 DIAGNOSIS — E66.01 MORBID OBESITY (H): Primary | ICD-10-CM

## 2020-12-28 RX ORDER — CEFAZOLIN SODIUM 2 G/50ML
2 SOLUTION INTRAVENOUS
Status: CANCELLED | OUTPATIENT
Start: 2020-12-28

## 2020-12-28 RX ORDER — CEFAZOLIN SODIUM 1 G/50ML
1 INJECTION, SOLUTION INTRAVENOUS SEE ADMIN INSTRUCTIONS
Status: CANCELLED | OUTPATIENT
Start: 2020-12-28

## 2020-12-28 NOTE — PROGRESS NOTES
Tasklist updated and sent to patient via [employer].    Bariatric Task List  Status:  Is patient a candidate for bariatric surgery?:  patient is a candidate for bariatric surgery -     Cleared to schedule surgeon consult?:  cleared to schedule surgeon consult - Call Kirby at [phone] to schedule visit with Dr Stein. 11/6/2020 appt done bks   Status:  surgery evaluation in process -     Surgeon: Dr. Stein  -     Tentative surgery month/year: 1/4/2021 if tasks done and at goal weight -        Insurance: Insurance:  [facility] -        Patient Info: Initial Weight:  232 -     Date of Initial Weight/Height:  7/30/2020 -     Goal Weight (lbs):  222 -     Required Weight Loss:  10 -     Surgery Type:  sleeve gastrectomy -        Dietician Visits: Structured weight loss required by insurance?:  structured weight loss required - Need 3 consecutive months of dietitian visits. bks   Dietician Visit 1:  Completed - 7/30/20 KB   Dietician Visit 2:  Completed - 8/25/20 with    Dietician Visit 3:  Completed -  9/21/20 KB   Dietician Visit 4:  Completed - 11/17/20 in Epic. bks   Dietician Visit 5:  Completed - 12/22/20 in Epic. bks   Dietician Visit 6:    -     Dietician Visit additional:  Completed - post-op diet ed done 12/22 - KB   Clearance from dietician to see surgeon?:    -        Psychological Evaluation: Psych eval:  Completed - Check list of providers to schedule. [facility] Received from Douglas Rainey, PhD at [facility] on 12/22/20   Therapist letter of support:  Completed -  9/16/20 from Kat Kelley MIanA. - [facility]      Lab Work: Complete Blood Count:  Completed - 8/29/20 in [facility]. bks   Comprehensive Metabolic Panel:  Completed - 8/29/20 in Epic. bks   Vitamin D:  Needed - Ordered in Epic. Can be done at any [place] lab. bks; 11/11/20=24, recheck with PTH. bks. To be checked in 3 months per Yvette's note (after surgery). bks   Hgb A1c:  Completed - Ordered in Epic. Can be done at any [place] lab. bks  "  PTH:  Needed - Ordered in Epic. Can be done at any [place] lab. bks; 11/11/20 =110, recheck with vitamin D. bks;  To be checked in 3 months per Yvette's note (after surgery). bks   TSH:  Completed - Ordered in Epic. Can be done at any [place] lab. bks   Other:  Completed - Lipids. Ordered in Epic. Can be done at any [place] lab. bks      Consults/ Clearance: Medical Weight Management: Completed - 7/8/20 Referral from ESTUARDO Bruce CNP (works with Lorraine Johnson MD). Continue ongoing appts. bks      PCP: Establish care with PCP:  Completed -     Follow up with PCP:    -     PCP letter of support:  Completed -  Referral from ESTUARDO Bruce CNP (works with Lorraine Johnson MD) 9/2/20 ESTUARDO Robledo CNP (PCP Ltr of support). bks      Patient Education:  Information Session:  Completed - Viewed 7/27/20. bks   Given \"Making your decision\" handout?:  Given \"\"Making your decision\"\" handout? -     Given support group information?:  support group contact information provided -     Attended support group?:    -     Support plan in place?:  Completed -        Additional Surgery Requirements: Other Requirements:  Call Kirby at [phone] to [facility] or call [phone] to schedule at another [facility] bks - Covid test 4 days before surgery.   Other Requirements:  Someone from the Call Center will call you. Or, you can call them at [phone]. bks - Postop appts at 1 week and 31+ days. bks      Final Tasks:  Before surgery online class:  Needed - View on Internet Explorer. bks   Before surgery online class website link:  [email or web address]   After surgery online class:  Needed - View on Internet Explorer. bks   After surgery online class website link:  [email or web address]   Nurse visit for weigh-in and information:  Needed - ROCKY Cheng will call you to schedule this for a couple weeks before surgery date. bks "   Pre-assessment clinic visit with anesthesia team for H&P:  Needed - Call [phone] to schedule this for 3 weeks before your surgery date. bks   Final labs (Hgb, plt, T&S, UA):  Needed - To be done after the Pre-Assessment Clinic visit. bks      Notes: Please register for the Get Well Loop when you get an email invitation and a surgery date.     The Get Well Loop will give you information via email or text messages that can help you be more successful before and after surgery.  It can also help answer any questions you may have.    The online classes are not currently available. Please read the scripts for information on what to expect before and after surgery:    [email or web address]?[email or web address]    [email or web address]?[email or web address] -

## 2020-12-29 ENCOUNTER — ALLIED HEALTH/NURSE VISIT (OUTPATIENT)
Dept: SURGERY | Facility: CLINIC | Age: 35
End: 2020-12-29
Payer: COMMERCIAL

## 2020-12-29 VITALS — WEIGHT: 223 LBS | BODY MASS INDEX: 40.79 KG/M2

## 2020-12-29 NOTE — PROGRESS NOTES
This patient is having sleeve gastrectomy by Dr. Stein.    The following handouts were reviewed with the patient :  Before Your Surgery, Patient Checklist, Weight Loss Surgery Pre-operative Class, Preop Recommendations Quick Reference Guide, History and Physical, Medications to Avoid, Shower or Bathing Before Surgery, Bowel Preparation, Powerful Choices and [place] [facility] Directive.  Questions were addressed and understanding of content was verbalized.  Contact information was provided.    Patient goal weight: 222  Weight today: 223    Call [phone] Kirby León to confirm surgery date     Patient scheduled for COVID test  2/31/20.  Patient is having her second COVID vaccine on 1/12/21.      Patient currently taking opioid/narcotic pain medications? No. Instructed patient she will not be able to take her Naprosyn after surgery.

## 2020-12-29 NOTE — PATIENT INSTRUCTIONS
Below is a recap of our conversation regarding your upcoming Sleeve Gastrectomy on 1/4/21.    DAY BEFORE YOUR SURGERY     - Follow a clear liquid diet.       - Ensure you are well hydrated all day!     - Nothing to eat or drink after midnight.  You may have sips of water with medications up to 3 hours before your surgery if PAC has cleared you to do so.    SHOWER    - Follow instructions for pre-op shower using the required soap (chlorhexidine).      - You will take a shower the night before surgery and a shower the morning of surgery.  The soap can be very drying.  Do not put lotion on.    TRANSPORTATION & CARE    - You will need a  to take you to the hospital the day of surgery.    - You will need a  to pick you up from the hospital the day of discharge (usually the afternoon/evening the day after surgery).    - You will need someone to stay with you for the first 1-2 days after surgery, especially if you are taking prescription pain medicine.      AFTER SURGERY    MEDICATIONS     - Depending on the size and number of medications you take, you may need to space/change the time you take your medication so you do not overfill your stomach.   - Make sure you follow-up with your primary provider to make medication changes needed.   - If you have diabetes, follow-up with your provider that orders your diabetes medications and check your blood sugar regularly.      You will receive the following prescriptions at discharge (unless you are allergic or have other reasons not to take them):      Prilosec (omeprazole): This medication is for control of stomach acid.  Open the capsule and put in water, protein shake or other approve liquid.  Take as directed.  (Please disregard the prescription bottle instructions to not open the capsule).      Levsin (hyoscyamine): This medication is to help control spasms/cramping in the stomach and intestines.  Take as needed for cramping.    Zofran (ondansetron):  This  medication is for nausea.  The medication is to be placed under the tongue (sublingual) and taken as directed for nausea.    Senna: This medication is a stool softener:  Take as directed.  You may cut it in half to make it easier to swallow.  It is important to take this medication if you are taking a narcotic pain medicine as the pain medication can be constipating.  Senna can be purchased over-the-counter.      Narcotic pain medicine: Take as directed for pain.  If you are not having a lot of pain, you can take Tylenol or Extra Strength Tylenol per the bottle instructions.  The pain medicine can cause constipation.  Do not drive while taking narcotic pain medication.     PAIN CONTROL    - Take your pain medicine as needed, as directed.    - You can use Tylenol or Tylenol Extra Strength if you are not having a lot of pain.    - Use an ice pack on the incisions for the first 24 hours:  Use a barrier between the ice pack and the skin.  Do not leave the ice on longer than 20 minutes at each time.      - Change positions frequently.  Walking around once an hour will also help.    - You may also try a heating pad on your abdomen to help soothe cramping.  Use a barrier between the heating pad and your skin.  Do not leave on longer than 20 minutes at each time.    HYDRATION    - You will be provided with a small medicine cup after surgery.  It holds one ounce of fluid (30 mL).  You need to drink one of these (1 oz) every 10 to 15 .    - Your goal is 48 to 64 ounces each day.  This amount will help prevent dehydration.      - Keep a tally sheet next to you and treva each time you drink one ounce of fluid.  You should have at least 4-6 marks for each hour.    - Try keeping a water bottle by your bed.  Drink a little before going to sleep, if you wake in the middle of the night, and in the morning before getting out of bed.    - Keep an eye on your urine.  It is a good indicator of your hydration status.  Your urine should be  clear yellow or clear light yellow.      DIET  For Dr. Kaba and Dr. Stein Patients:     - You will be following the Stage 1 - Clear Liquid diet upon discharge.  Ensure you have a variety of proper clear liquids at home to support you in taking in the proper nutrition.     - Please refer to the Stage 1 - Cear Liquid diet handout provided by the Dietician.    BREATHING AND ACTIVITY    - Use your incentive spirometer each hour while awake.  Sitting up or standing, take 5 - 10 slow, deep breaths each time, focusing on expanding your lungs.  This should be done for the first couple of weeks after surgery.    - Get up and walk around each hour while you are awake - at least 10 minutes.  Walking will help with circulation, bowel regulation and will help you feel better.    -  Do not lift anything greater than 10 lb for the first 4 weeks.    WOUND  CARE  You may have surgical glue or steri-strips over your incision after surgery.    - Surgical glue: looks like a clear film over your incisions and will wear off a little at a time over the first 7-10 days after surgery.    - Steri-Strips: Adhesive strips of tape over your incisions.  They will fall off over the first 7-10 days after surgery.    Showering: you may shower the day you get home unless your surgeon tells you differently.    - Wash gently around incisions with warm soapy water, rinse well, and gently pat dry.    - No tub baths until all incisions are healed.      FOLLOW-UP CALL    - You will receive a post-op phone call two days after surgery to check in and see how you are doing (If your surgery is on a Friday, your phone call will be on the Monday following your surgery).  You can always send us a message via [employer] and the Get Well [employer] juanito.    GET WELL LOOP  [email or web address]    - Track your fluid intake!    - Communicate with nursing staff.    Please let us know if you have any additional questions.

## 2020-12-31 DIAGNOSIS — Z11.59 ENCOUNTER FOR SCREENING FOR OTHER VIRAL DISEASES: ICD-10-CM

## 2020-12-31 LAB
SARS-COV-2 RNA SPEC QL NAA+PROBE: NORMAL
SPECIMEN SOURCE: NORMAL

## 2020-12-31 PROCEDURE — U0003 INFECTIOUS AGENT DETECTION BY NUCLEIC ACID (DNA OR RNA); SEVERE ACUTE RESPIRATORY SYNDROME CORONAVIRUS 2 (SARS-COV-2) (CORONAVIRUS DISEASE [COVID-19]), AMPLIFIED PROBE TECHNIQUE, MAKING USE OF HIGH THROUGHPUT TECHNOLOGIES AS DESCRIBED BY CMS-2020-01-R: HCPCS | Performed by: SURGERY

## 2021-01-01 LAB
LABORATORY COMMENT REPORT: NORMAL
SARS-COV-2 RNA SPEC QL NAA+PROBE: NEGATIVE
SPECIMEN SOURCE: NORMAL

## 2021-01-04 ENCOUNTER — HOSPITAL ENCOUNTER (INPATIENT)
Facility: CLINIC | Age: 36
LOS: 1 days | Discharge: HOME | End: 2021-01-05
Attending: SURGERY | Admitting: SURGERY
Payer: COMMERCIAL

## 2021-01-04 ENCOUNTER — ANESTHESIA (OUTPATIENT)
Dept: SURGERY | Facility: CLINIC | Age: 36
End: 2021-01-04
Payer: COMMERCIAL

## 2021-01-04 DIAGNOSIS — E66.01 MORBID OBESITY (H): ICD-10-CM

## 2021-01-04 LAB
CREAT SERPL-MCNC: 0.64 MG/DL (ref 0.52–1.04)
GFR SERPL CREATININE-BSD FRML MDRD: >90 ML/MIN/{1.73_M2}
GLUCOSE BLDC GLUCOMTR-MCNC: 65 MG/DL (ref 70–99)
HCG UR QL: NEGATIVE
HGB BLD-MCNC: 12.5 G/DL (ref 11.7–15.7)
PLATELET # BLD AUTO: 280 10E9/L (ref 150–450)

## 2021-01-04 PROCEDURE — 258N000003 HC RX IP 258 OP 636: Performed by: STUDENT IN AN ORGANIZED HEALTH CARE EDUCATION/TRAINING PROGRAM

## 2021-01-04 PROCEDURE — 250N000011 HC RX IP 250 OP 636: Performed by: SURGERY

## 2021-01-04 PROCEDURE — 82565 ASSAY OF CREATININE: CPT | Performed by: SURGERY

## 2021-01-04 PROCEDURE — 258N000003 HC RX IP 258 OP 636: Performed by: ANESTHESIOLOGY

## 2021-01-04 PROCEDURE — 36415 COLL VENOUS BLD VENIPUNCTURE: CPT | Performed by: SURGERY

## 2021-01-04 PROCEDURE — 999N001017 HC STATISTIC GLUCOSE BY METER IP

## 2021-01-04 PROCEDURE — 120N000002 HC R&B MED SURG/OB UMMC

## 2021-01-04 PROCEDURE — 36415 COLL VENOUS BLD VENIPUNCTURE: CPT | Performed by: ANESTHESIOLOGY

## 2021-01-04 PROCEDURE — 250N000025 HC SEVOFLURANE, PER MIN: Performed by: SURGERY

## 2021-01-04 PROCEDURE — 0DB64Z3 EXCISION OF STOMACH, PERCUTANEOUS ENDOSCOPIC APPROACH, VERTICAL: ICD-10-PCS | Performed by: SURGERY

## 2021-01-04 PROCEDURE — 88305 TISSUE EXAM BY PATHOLOGIST: CPT | Mod: 26 | Performed by: PATHOLOGY

## 2021-01-04 PROCEDURE — 43775 LAP SLEEVE GASTRECTOMY: CPT | Mod: GC | Performed by: SURGERY

## 2021-01-04 PROCEDURE — 370N000017 HC ANESTHESIA TECHNICAL FEE, PER MIN: Performed by: SURGERY

## 2021-01-04 PROCEDURE — 250N000009 HC RX 250: Performed by: SURGERY

## 2021-01-04 PROCEDURE — 81025 URINE PREGNANCY TEST: CPT | Performed by: ANESTHESIOLOGY

## 2021-01-04 PROCEDURE — 258N000003 HC RX IP 258 OP 636: Performed by: SURGERY

## 2021-01-04 PROCEDURE — 258N000001 HC RX 258: Performed by: SURGERY

## 2021-01-04 PROCEDURE — 999N000141 HC STATISTIC PRE-PROCEDURE NURSING ASSESSMENT: Performed by: SURGERY

## 2021-01-04 PROCEDURE — 250N000011 HC RX IP 250 OP 636: Performed by: ANESTHESIOLOGY

## 2021-01-04 PROCEDURE — 250N000011 HC RX IP 250 OP 636: Performed by: STUDENT IN AN ORGANIZED HEALTH CARE EDUCATION/TRAINING PROGRAM

## 2021-01-04 PROCEDURE — 250N000009 HC RX 250: Performed by: STUDENT IN AN ORGANIZED HEALTH CARE EDUCATION/TRAINING PROGRAM

## 2021-01-04 PROCEDURE — 250N000013 HC RX MED GY IP 250 OP 250 PS 637: Performed by: SURGERY

## 2021-01-04 PROCEDURE — 85049 AUTOMATED PLATELET COUNT: CPT | Performed by: SURGERY

## 2021-01-04 PROCEDURE — 85018 HEMOGLOBIN: CPT | Performed by: ANESTHESIOLOGY

## 2021-01-04 PROCEDURE — 710N000010 HC RECOVERY PHASE 1, LEVEL 2, PER MIN: Performed by: SURGERY

## 2021-01-04 PROCEDURE — 272N000001 HC OR GENERAL SUPPLY STERILE: Performed by: SURGERY

## 2021-01-04 PROCEDURE — 88305 TISSUE EXAM BY PATHOLOGIST: CPT | Mod: TC | Performed by: SURGERY

## 2021-01-04 PROCEDURE — 250N000013 HC RX MED GY IP 250 OP 250 PS 637: Performed by: STUDENT IN AN ORGANIZED HEALTH CARE EDUCATION/TRAINING PROGRAM

## 2021-01-04 PROCEDURE — 360N000077 HC SURGERY LEVEL 4, PER MIN: Performed by: SURGERY

## 2021-01-04 RX ORDER — HYDROMORPHONE HYDROCHLORIDE 2 MG/1
2-4 TABLET ORAL
Status: DISCONTINUED | OUTPATIENT
Start: 2021-01-04 | End: 2021-01-05 | Stop reason: HOSPADM

## 2021-01-04 RX ORDER — DIPHENHYDRAMINE HCL 25 MG
25 CAPSULE ORAL EVERY 6 HOURS PRN
Status: DISCONTINUED | OUTPATIENT
Start: 2021-01-04 | End: 2021-01-05 | Stop reason: HOSPADM

## 2021-01-04 RX ORDER — FENTANYL CITRATE 50 UG/ML
INJECTION, SOLUTION INTRAMUSCULAR; INTRAVENOUS PRN
Status: DISCONTINUED | OUTPATIENT
Start: 2021-01-04 | End: 2021-01-04

## 2021-01-04 RX ORDER — NALOXONE HYDROCHLORIDE 0.4 MG/ML
0.4 INJECTION, SOLUTION INTRAMUSCULAR; INTRAVENOUS; SUBCUTANEOUS
Status: ACTIVE | OUTPATIENT
Start: 2021-01-04 | End: 2021-01-05

## 2021-01-04 RX ORDER — CEFAZOLIN SODIUM 1 G/3ML
1 INJECTION, POWDER, FOR SOLUTION INTRAMUSCULAR; INTRAVENOUS SEE ADMIN INSTRUCTIONS
Status: DISCONTINUED | OUTPATIENT
Start: 2021-01-04 | End: 2021-01-04

## 2021-01-04 RX ORDER — BUPROPION HCL 100 MG
50 TABLET ORAL 3 TIMES DAILY
Status: DISCONTINUED | OUTPATIENT
Start: 2021-01-04 | End: 2021-01-05 | Stop reason: HOSPADM

## 2021-01-04 RX ORDER — SCOLOPAMINE TRANSDERMAL SYSTEM 1 MG/1
1 PATCH, EXTENDED RELEASE TRANSDERMAL
Status: DISCONTINUED | OUTPATIENT
Start: 2021-01-04 | End: 2021-01-05 | Stop reason: HOSPADM

## 2021-01-04 RX ORDER — LIDOCAINE HYDROCHLORIDE 20 MG/ML
INJECTION, SOLUTION INFILTRATION; PERINEURAL PRN
Status: DISCONTINUED | OUTPATIENT
Start: 2021-01-04 | End: 2021-01-04

## 2021-01-04 RX ORDER — ONDANSETRON 2 MG/ML
INJECTION INTRAMUSCULAR; INTRAVENOUS PRN
Status: DISCONTINUED | OUTPATIENT
Start: 2021-01-04 | End: 2021-01-04

## 2021-01-04 RX ORDER — ONDANSETRON 2 MG/ML
4 INJECTION INTRAMUSCULAR; INTRAVENOUS EVERY 30 MIN PRN
Status: DISCONTINUED | OUTPATIENT
Start: 2021-01-04 | End: 2021-01-04 | Stop reason: HOSPADM

## 2021-01-04 RX ORDER — DIPHENHYDRAMINE HYDROCHLORIDE 50 MG/ML
25 INJECTION INTRAMUSCULAR; INTRAVENOUS EVERY 6 HOURS PRN
Status: DISCONTINUED | OUTPATIENT
Start: 2021-01-04 | End: 2021-01-05 | Stop reason: HOSPADM

## 2021-01-04 RX ORDER — CEFAZOLIN SODIUM 2 G/100ML
2 INJECTION, SOLUTION INTRAVENOUS
Status: COMPLETED | OUTPATIENT
Start: 2021-01-04 | End: 2021-01-04

## 2021-01-04 RX ORDER — LABETALOL HYDROCHLORIDE 5 MG/ML
10 INJECTION, SOLUTION INTRAVENOUS
Status: COMPLETED | OUTPATIENT
Start: 2021-01-04 | End: 2021-01-04

## 2021-01-04 RX ORDER — PROPOFOL 10 MG/ML
INJECTION, EMULSION INTRAVENOUS PRN
Status: DISCONTINUED | OUTPATIENT
Start: 2021-01-04 | End: 2021-01-04

## 2021-01-04 RX ORDER — ONDANSETRON 4 MG/1
4 TABLET, ORALLY DISINTEGRATING ORAL EVERY 30 MIN PRN
Status: DISCONTINUED | OUTPATIENT
Start: 2021-01-04 | End: 2021-01-04 | Stop reason: HOSPADM

## 2021-01-04 RX ORDER — HYDROXYZINE HYDROCHLORIDE 25 MG/1
25 TABLET, FILM COATED ORAL EVERY 6 HOURS PRN
Status: DISCONTINUED | OUTPATIENT
Start: 2021-01-04 | End: 2021-01-05 | Stop reason: HOSPADM

## 2021-01-04 RX ORDER — PROCHLORPERAZINE MALEATE 5 MG
10 TABLET ORAL EVERY 6 HOURS PRN
Status: DISCONTINUED | OUTPATIENT
Start: 2021-01-04 | End: 2021-01-05 | Stop reason: HOSPADM

## 2021-01-04 RX ORDER — ACETAMINOPHEN 325 MG/1
975 TABLET ORAL EVERY 8 HOURS
Status: DISCONTINUED | OUTPATIENT
Start: 2021-01-04 | End: 2021-01-05 | Stop reason: HOSPADM

## 2021-01-04 RX ORDER — SODIUM CHLORIDE, SODIUM LACTATE, POTASSIUM CHLORIDE, CALCIUM CHLORIDE 600; 310; 30; 20 MG/100ML; MG/100ML; MG/100ML; MG/100ML
INJECTION, SOLUTION INTRAVENOUS CONTINUOUS
Status: DISCONTINUED | OUTPATIENT
Start: 2021-01-04 | End: 2021-01-05 | Stop reason: HOSPADM

## 2021-01-04 RX ORDER — SUMATRIPTAN 25 MG/1
25-50 TABLET, FILM COATED ORAL
Status: DISCONTINUED | OUTPATIENT
Start: 2021-01-04 | End: 2021-01-05 | Stop reason: HOSPADM

## 2021-01-04 RX ORDER — HYDROMORPHONE HCL IN WATER/PF 6 MG/30 ML
0.2 PATIENT CONTROLLED ANALGESIA SYRINGE INTRAVENOUS
Status: DISCONTINUED | OUTPATIENT
Start: 2021-01-04 | End: 2021-01-05 | Stop reason: HOSPADM

## 2021-01-04 RX ORDER — NALOXONE HYDROCHLORIDE 0.4 MG/ML
0.2 INJECTION, SOLUTION INTRAMUSCULAR; INTRAVENOUS; SUBCUTANEOUS
Status: ACTIVE | OUTPATIENT
Start: 2021-01-04 | End: 2021-01-05

## 2021-01-04 RX ORDER — ONDANSETRON 2 MG/ML
4 INJECTION INTRAMUSCULAR; INTRAVENOUS EVERY 6 HOURS PRN
Status: DISCONTINUED | OUTPATIENT
Start: 2021-01-04 | End: 2021-01-05 | Stop reason: HOSPADM

## 2021-01-04 RX ORDER — SODIUM CHLORIDE, SODIUM LACTATE, POTASSIUM CHLORIDE, CALCIUM CHLORIDE 600; 310; 30; 20 MG/100ML; MG/100ML; MG/100ML; MG/100ML
INJECTION, SOLUTION INTRAVENOUS CONTINUOUS
Status: DISCONTINUED | OUTPATIENT
Start: 2021-01-04 | End: 2021-01-04 | Stop reason: HOSPADM

## 2021-01-04 RX ORDER — VENLAFAXINE 75 MG/1
75 TABLET ORAL 3 TIMES DAILY
Status: DISCONTINUED | OUTPATIENT
Start: 2021-01-04 | End: 2021-01-05 | Stop reason: HOSPADM

## 2021-01-04 RX ORDER — TRAZODONE HYDROCHLORIDE 50 MG/1
50-100 TABLET, FILM COATED ORAL
Status: DISCONTINUED | OUTPATIENT
Start: 2021-01-04 | End: 2021-01-05 | Stop reason: HOSPADM

## 2021-01-04 RX ORDER — ACETAMINOPHEN 325 MG/1
975 TABLET ORAL ONCE
Status: COMPLETED | OUTPATIENT
Start: 2021-01-04 | End: 2021-01-04

## 2021-01-04 RX ORDER — HYDRALAZINE HYDROCHLORIDE 20 MG/ML
10 INJECTION INTRAMUSCULAR; INTRAVENOUS
Status: COMPLETED | OUTPATIENT
Start: 2021-01-04 | End: 2021-01-04

## 2021-01-04 RX ORDER — ONDANSETRON 4 MG/1
4 TABLET, ORALLY DISINTEGRATING ORAL EVERY 6 HOURS PRN
Status: DISCONTINUED | OUTPATIENT
Start: 2021-01-04 | End: 2021-01-05 | Stop reason: HOSPADM

## 2021-01-04 RX ORDER — AMOXICILLIN 250 MG
2 CAPSULE ORAL 2 TIMES DAILY
Status: DISCONTINUED | OUTPATIENT
Start: 2021-01-04 | End: 2021-01-05 | Stop reason: HOSPADM

## 2021-01-04 RX ORDER — HYOSCYAMINE SULFATE 0.125 MG
125 TABLET ORAL EVERY 4 HOURS PRN
Status: DISCONTINUED | OUTPATIENT
Start: 2021-01-04 | End: 2021-01-05 | Stop reason: HOSPADM

## 2021-01-04 RX ORDER — BUPROPION HCL 100 MG
50 TABLET ORAL 3 TIMES DAILY
Status: DISCONTINUED | OUTPATIENT
Start: 2021-01-04 | End: 2021-01-04

## 2021-01-04 RX ORDER — LIDOCAINE 40 MG/G
CREAM TOPICAL
Status: DISCONTINUED | OUTPATIENT
Start: 2021-01-04 | End: 2021-01-05 | Stop reason: HOSPADM

## 2021-01-04 RX ORDER — DEXAMETHASONE SODIUM PHOSPHATE 4 MG/ML
INJECTION, SOLUTION INTRA-ARTICULAR; INTRALESIONAL; INTRAMUSCULAR; INTRAVENOUS; SOFT TISSUE PRN
Status: DISCONTINUED | OUTPATIENT
Start: 2021-01-04 | End: 2021-01-04

## 2021-01-04 RX ORDER — FENTANYL CITRATE 50 UG/ML
25-50 INJECTION, SOLUTION INTRAMUSCULAR; INTRAVENOUS
Status: DISCONTINUED | OUTPATIENT
Start: 2021-01-04 | End: 2021-01-04 | Stop reason: HOSPADM

## 2021-01-04 RX ORDER — LIDOCAINE 40 MG/G
CREAM TOPICAL
Status: DISCONTINUED | OUTPATIENT
Start: 2021-01-04 | End: 2021-01-04

## 2021-01-04 RX ADMIN — ENOXAPARIN SODIUM 40 MG: 40 INJECTION SUBCUTANEOUS at 07:14

## 2021-01-04 RX ADMIN — ROCURONIUM BROMIDE 100 MG: 10 INJECTION INTRAVENOUS at 07:50

## 2021-01-04 RX ADMIN — HYDRALAZINE HYDROCHLORIDE 10 MG: 20 INJECTION INTRAMUSCULAR; INTRAVENOUS at 10:12

## 2021-01-04 RX ADMIN — DEXAMETHASONE SODIUM PHOSPHATE 4 MG: 4 INJECTION, SOLUTION INTRA-ARTICULAR; INTRALESIONAL; INTRAMUSCULAR; INTRAVENOUS; SOFT TISSUE at 07:50

## 2021-01-04 RX ADMIN — Medication 50 MG: at 20:36

## 2021-01-04 RX ADMIN — FENTANYL CITRATE 100 MCG: 50 INJECTION, SOLUTION INTRAMUSCULAR; INTRAVENOUS at 07:48

## 2021-01-04 RX ADMIN — HYDRALAZINE HYDROCHLORIDE 10 MG: 20 INJECTION INTRAMUSCULAR; INTRAVENOUS at 10:27

## 2021-01-04 RX ADMIN — PHENYLEPHRINE HYDROCHLORIDE 100 MCG: 10 INJECTION INTRAVENOUS at 08:19

## 2021-01-04 RX ADMIN — ACETAMINOPHEN 975 MG: 325 TABLET, FILM COATED ORAL at 07:14

## 2021-01-04 RX ADMIN — FENTANYL CITRATE 100 MCG: 50 INJECTION, SOLUTION INTRAMUSCULAR; INTRAVENOUS at 07:42

## 2021-01-04 RX ADMIN — PROPOFOL 200 MG: 10 INJECTION, EMULSION INTRAVENOUS at 07:50

## 2021-01-04 RX ADMIN — Medication 2 G: at 08:02

## 2021-01-04 RX ADMIN — PHENYLEPHRINE HYDROCHLORIDE 200 MCG: 10 INJECTION INTRAVENOUS at 08:24

## 2021-01-04 RX ADMIN — ONDANSETRON 4 MG: 2 INJECTION INTRAMUSCULAR; INTRAVENOUS at 07:48

## 2021-01-04 RX ADMIN — SCOPALAMINE 1 PATCH: 1 PATCH, EXTENDED RELEASE TRANSDERMAL at 13:43

## 2021-01-04 RX ADMIN — LABETALOL HYDROCHLORIDE 10 MG: 5 INJECTION, SOLUTION INTRAVENOUS at 10:01

## 2021-01-04 RX ADMIN — HYDROMORPHONE HYDROCHLORIDE 2 MG: 2 TABLET ORAL at 16:04

## 2021-01-04 RX ADMIN — VENLAFAXINE 75 MG: 75 TABLET ORAL at 20:36

## 2021-01-04 RX ADMIN — FENTANYL CITRATE 50 MCG: 50 INJECTION, SOLUTION INTRAMUSCULAR; INTRAVENOUS at 10:39

## 2021-01-04 RX ADMIN — LIDOCAINE HYDROCHLORIDE 100 MG: 20 INJECTION, SOLUTION INFILTRATION; PERINEURAL at 07:48

## 2021-01-04 RX ADMIN — HYDROMORPHONE HYDROCHLORIDE 1 MG: 1 INJECTION, SOLUTION INTRAMUSCULAR; INTRAVENOUS; SUBCUTANEOUS at 08:18

## 2021-01-04 RX ADMIN — HYDROMORPHONE HYDROCHLORIDE 2 MG: 2 TABLET ORAL at 20:35

## 2021-01-04 RX ADMIN — SODIUM CHLORIDE, POTASSIUM CHLORIDE, SODIUM LACTATE AND CALCIUM CHLORIDE: 600; 310; 30; 20 INJECTION, SOLUTION INTRAVENOUS at 07:30

## 2021-01-04 RX ADMIN — SUGAMMADEX 200 MG: 100 INJECTION, SOLUTION INTRAVENOUS at 09:05

## 2021-01-04 RX ADMIN — DOCUSATE SODIUM AND SENNOSIDES 2 TABLET: 8.6; 5 TABLET, FILM COATED ORAL at 20:36

## 2021-01-04 RX ADMIN — ACETAMINOPHEN 975 MG: 325 TABLET, FILM COATED ORAL at 14:23

## 2021-01-04 RX ADMIN — MIDAZOLAM 2 MG: 1 INJECTION INTRAMUSCULAR; INTRAVENOUS at 07:45

## 2021-01-04 RX ADMIN — SODIUM CHLORIDE, POTASSIUM CHLORIDE, SODIUM LACTATE AND CALCIUM CHLORIDE: 600; 310; 30; 20 INJECTION, SOLUTION INTRAVENOUS at 13:44

## 2021-01-04 RX ADMIN — SODIUM CHLORIDE, POTASSIUM CHLORIDE, SODIUM LACTATE AND CALCIUM CHLORIDE: 600; 310; 30; 20 INJECTION, SOLUTION INTRAVENOUS at 20:29

## 2021-01-04 RX ADMIN — ACETAMINOPHEN 975 MG: 325 TABLET, FILM COATED ORAL at 23:08

## 2021-01-04 ASSESSMENT — ACTIVITIES OF DAILY LIVING (ADL)
ADLS_ACUITY_SCORE: 14

## 2021-01-04 ASSESSMENT — MIFFLIN-ST. JEOR: SCORE: 1651.16

## 2021-01-04 NOTE — PHARMACY-CONSULT NOTE
Bariatric Consult    Medications evaluated as requested per Bariatric Consult. Patient may swallow tablets/capsules ONLY if less than   inch.  Larger tablets/capsules should be broken, crushed or split.    The following changes have been made based on the Bariatric Medication Management Policy.    - buPROPion (WELLBUTRIN XL) 24 hr tablet 150 mg to bupropion IR 50mg TID   - venlafaxine (EFFEXOR-ER) 24 hr tablet 225 mg to venlafaxine IR 75mg TID     If patient's verapamil ER (VERELAN) 180 MG 24 hr capsule is ordered would recommend changing to IR formulation as well - verapamil IR 60mg TID.    The pharmacist will continue to follow as new medications are ordered.      Maria De Jesus Gallardo, PharmD  Pager: [phone]

## 2021-01-04 NOTE — PROGRESS NOTES
Saw and evaluated patient in person prior to lap sleeve gastrectomy, and answered all questions.    Risks reviewed up to, but not limited to: bleed, abscess/infection, staple line leak, MACE event/death, pain, hernia    Patient agrees to proceed.

## 2021-01-04 NOTE — OP NOTE
[facility]     Operative Note    Pre-operative diagnosis: Morbid obesity (H) [E66.01]   Post-operative diagnosis * No post-op diagnosis entered *   Procedure: Procedure(s):  GASTRECTOMY, SLEEVE, LAPAROSCOPIC   Surgeon: Surgeon(s) and Role:     * Saud Stein MD - Primary     * Jennifer Arnold MD - Resident - Assisting   Assistant Surgeon      Anesthesia: n/a      General    Estimated blood loss: Minimal   Drains: None   Specimens: ID Type Source Tests Collected by Time Destination   A : Partial Gastrectomy  Tissue Other SURGICAL PATHOLOGY EXAM Saud Stein MD 2021  8:52 AM       Findings: very large overlying liver.   Complications: None.   Implants: None.         BOUGIE SIZE: 40 FR  DISTANCE FROM PYLORUS: 10 CM  STAPLE LINE REINFORCEMENT: NO  STAPLE LINE OVERSEW: NO  COMORBIDITIES:   Past Medical History:   Diagnosis Date     Family history of diabetes mellitus      Hypertension      Migraine with aura and without status migrainosus, not intractable 2016     PID (acute pelvic inflammatory disease) 2010       INDICATIONS FOR PROCEDURE  Екатерина Ramon is a 35 year old female who is morbidly obese.  Numerous weight loss attempts without surgery have been without success.     After understanding the risks and benefits of proceeding with a laparoscopic vertical sleeve gastrectomy, she agreed to an operation as outlined by me.    I reviewed the risks of surgery with Екатерина Ramon.    These include, but are not limited to, death, myocardial infarction, pneumonia, urinary tract infection, deep venous thrombosis with or without pulmonary embolus, abdominal infection from bowel injury or abscess, bowel obstruction, wound infection, and bleeding.    More specific risks related to vertical sleeve gastrectomy were detailed at the bariatric informational seminar and include the followin.) leak at the vertical sleeve staple line, 2.) stricture in the  "sleeve, 3.) nausea, vomiting, and dehydration for several months, 4.) adhesions causing bowel obstruction, 5.) rapid weight loss causing a higher rate of gallstone formation during the first 6 months after surgery, 6.) decreased absorption of vitamins because of the reduced stomach size, 7.) weight regain if inappropriate food intake occurs.    The BMI that we are treating this patient for was measured at the initial consultation visit in our bariatric program and it was: 40.8 kg/m2 (as calculated just below).      The initial consult height, weight, and BMI are as follows:    Height: 5' 2\"   BARIATRIC WEIGHT TRACKING 11/6/2020   Initial BMI 40.79       Our weight loss surgery program requires weight loss prior to bariatric surgery and currently the height, weight, and BMI are as follows:    Height: 157.5 cm (5' 2\"), Weight: 99.8 kg (220 lb), and currently the Body mass index is 40.24 kg/m .    Due to the patient's comorbidity condition of HTN, in association with elevated body mass index, bariatric surgery has been recommended and is being performed today.    Moreover, as the surgeon performing this procedure, I certify that the following are true in regards to this patient at or prior to the day of surgery:    1. The patient's body mass index (BMI) is or has been greater than or equal to 35 kg/m2.  2. The patient has at least one co-morbidity related to obesity (as outlined above).  3. The patient has been previously unsuccessful with medical treatment for obesity.  Please note that some of this information has been documented as part of a comprehensive pre-bariatric surgery process in the outpatient clinic and is NOT immediately available in the inpatient encounter for this bariatric operation.      OPERATIVE PROCEDURE:     Екатерина Ramon was brought to the operating room and prepared in routine fashion. Under the benefits of general anesthesia, a left upper quadrant Veress needle was inserted and " pneumoperitoneum was established using carbon dioxide gas to a maximum pressure of 15 mmHg. A total of five ports were placed into the abdomen.     A liver retractor was placed through the rightmost port and this provided a view of the upper stomach. The operation was started by dividing the short gastric vessels off the greater curvature of the stomach. This dissection was carried up to the angle of His, and ligasure dissector was used for hemostasis.       A bougie (size noted above) was passed into the stomach and I used 5 loads of the Ethicon Shubert flex linear cutter stapler device to create a vertical sleeve gastrectomy with the bougie as a template. The bougie was removed.    A transabdominal properitoneal anesthetic block was performed using 30 ml 0.5% bupivicaine diluted with 90 ml saline (120 mL total); this was injected using 22gauge spinal needle into the properitoneal planes around the ports and laterally along the anterior axillary line under direct optical guidance. Some of the mixture was used to inject local anesthetic at the skin of each incision as well.    The sleeve gastrectomy specimen (partial gastrectomy) was now removed from the abdomen through the 15 mm port.    Hemostasis was secured, and the liver retractor and all ports were removed from the abdomen under direct visualization.     All needle and sponge counts were correct x2 at the end of the operation, and I was present for all critical components of the procedure.     Skin incisions were closed using skin staples, and sterile dressings were placed.       I was scrubbed for the whole case    Saud Stein MD

## 2021-01-04 NOTE — ANESTHESIA PROCEDURE NOTES
Airway   Date/Time: 1/4/2021 7:53 AM   Patient location during procedure: OR    Staff -   Anesthesiologist:  Regulo Rock MD  Resident/Fellow: Irwin Segal MD  Performed By: resident    Consent for Airway   Urgency: elective    Indications and Patient Condition  Indications for airway management: brittanie-procedural  Induction type:intravenousMask difficulty assessment: 1 - vent by mask    Final Airway Details  Final airway type: endotracheal airway  Successful airway:ETT - single  Endotracheal Airway Details   ETT size (mm): 7.0  Cuffed: yes  Successful intubation technique: direct laryngoscopy  Grade View of Cords: 1  Measured from: lips  Secured at (cm): 20  Secured with: pink tape  Bite block used: None    Post intubation assessment   Placement verified by: capnometry, equal breath sounds and chest rise   Number of attempts at approach: 1  Secured with:pink tape  Ease of procedure: easy  Dentition: Intact and Unchanged

## 2021-01-04 NOTE — CONSULTS
Inpatient Diabetes Note  This note is to resolve the consult request for Endocrine diabetes   Екатерина Ramon is a 35 year old female s/p laparoscopic gastric sleeve  No prior history of diabetes and A1c 5.2%    Prashant Loo CNP  Inpatient Diabetes Service  Pager [phone]  Job code pager [phone]

## 2021-01-04 NOTE — PLAN OF CARE
Vitals:    01/04/21 1235 01/04/21 1305 01/04/21 1410 01/04/21 1427   BP: 108/53 114/48 108/59 (!) 145/63   BP Location: Right arm Right arm Right arm Right arm   Pulse: 95 95 97 101   Resp: 15 17 22 20   Temp:       TempSrc:       SpO2: 93% 93% 94% 95%   Weight:       Height:         Pt postop sleeve gastrectomy, lap sites x 5, c/d/I, primapores. Minimal 'gas pain' only from free air, took tylenol, has scopolamine patch rt ear. Up indep to BR and voided. NPO x meds until 0600h. On room air. Continue with the POC.

## 2021-01-04 NOTE — ANESTHESIA PROCEDURE NOTES
Airway   Date/Time: 1/4/2021 8:01 AM   Patient location during procedure: OR    Staff -   Anesthesiologist:  Regulo Rock MD  Resident/Fellow: Irwin Segal MD  Performed By: resident    Consent for Airway   Urgency: elective    Indications and Patient Condition  Indications for airway management: brittanie-procedural  Induction type:intravenousMask difficulty assessment: 1 - vent by mask    Final Airway Details  Final airway type: endotracheal airway  Successful airway:ETT - single  Endotracheal Airway Details   ETT size (mm): 7.0  Cuffed: yes  Successful intubation technique: direct laryngoscopy  Grade View of Cords: 1

## 2021-01-04 NOTE — ANESTHESIA CARE TRANSFER NOTE
Patient: Екатерина Ramon    Procedure(s):  GASTRECTOMY, SLEEVE, LAPAROSCOPIC    Diagnosis: Morbid obesity (H) [E66.01]  Diagnosis Additional Information: No value filed.    Anesthesia Type:   General     Note:  Airway :ETT  Patient transferred to:PACU  Comments: Airway :Face Mask  Patient transferred to:PACU  Comments: Prior to extubation, patient was reversed with 200 mg of Sugammadex and shortly afterwards observed with spontaneous breathing with regular pattern and proper tidal volumes. Patient woke up, opened their eyes to verbal stimuli and followed basic commands. Patient was suctioned and extubated without complication.   Transported to PACU on 6L O2 via face mask.   VSS upon arrival to PACU.  Patient denies nausea or pain at this time.   Care transfer plan communicated, appropriate time for questions was given and patient care transferred to PACU RN       Irwin Segal MD    Handoff Report: Identifed the Patient, Identified the Reponsible Provider, Reviewed the pertinent medical history, Discussed the surgical course, Reviewed Intra-OP anesthesia mangement and issues during anesthesia, Set expectations for post-procedure period and Allowed opportunity for questions and acknowledgement of understanding      Vitals: (Last set prior to Anesthesia Care Transfer)    CRNA VITALS  1/4/2021 0846 - 1/4/2021 0925      1/4/2021             EKG:  NSR                Electronically Signed By: Irwin Segal MD  January 4, 2021  9:25 AM

## 2021-01-04 NOTE — ANESTHESIA POSTPROCEDURE EVALUATION
Anesthesia POST Procedure Evaluation    Patient: Екатерина Ramon   MRN:     [id number] Gender:   female   Age:    35 year old :      1985        Preoperative Diagnosis: Morbid obesity (H) [E66.01]   Procedure(s):  GASTRECTOMY, SLEEVE, LAPAROSCOPIC   Postop Comments: No value filed.     Anesthesia Type: General       Disposition: Admission   Postop Pain Control: Uneventful            Sign Out: Well controlled pain   PONV: No   Neuro/Psych: Uneventful            Sign Out: Acceptable/Baseline neuro status   Airway/Respiratory: Uneventful            Sign Out: Acceptable/Baseline resp. status   CV/Hemodynamics: Uneventful            Sign Out: Acceptable CV status   Other NRE: NONE   DID A NON-ROUTINE EVENT OCCUR? No    Event details/Postop Comments:  Discussed HTN w/ Pt and recommended f/u w/ PCP. She reports that she already has her annual checkup scheduled in the near future and will discuss it then. Will give hydralazine and labetalol to achieve SBP <160 and then okay for discharge from PACU.          Last Anesthesia Record Vitals:  CRNA VITALS  2021 0846 - 2021 0946      2021             EKG:  NSR          Last PACU Vitals:  Vitals Value Taken Time   /104 21 1000   Temp 36.5  C (97.7  F) 21 0945   Pulse 80 21 1008   Resp 14 21 1000   SpO2 96 % 21 1008   Temp src     NIBP 170/100 21 0923   Pulse     SpO2     Resp     Temp     Ht Rate 92 21 0923   Temp 2     Vitals shown include unvalidated device data.      Electronically Signed By: Regulo Go MD, 2021, 10:09 AM

## 2021-01-05 ENCOUNTER — PATIENT OUTREACH (OUTPATIENT)
Dept: CARE COORDINATION | Facility: CLINIC | Age: 36
End: 2021-01-05

## 2021-01-05 VITALS
SYSTOLIC BLOOD PRESSURE: 138 MMHG | TEMPERATURE: 98.8 F | RESPIRATION RATE: 16 BRPM | HEIGHT: 62 IN | OXYGEN SATURATION: 95 % | BODY MASS INDEX: 40.48 KG/M2 | WEIGHT: 220 LBS | HEART RATE: 74 BPM | DIASTOLIC BLOOD PRESSURE: 70 MMHG

## 2021-01-05 LAB — GLUCOSE BLDC GLUCOMTR-MCNC: 86 MG/DL (ref 70–99)

## 2021-01-05 PROCEDURE — 999N001017 HC STATISTIC GLUCOSE BY METER IP

## 2021-01-05 PROCEDURE — 250N000013 HC RX MED GY IP 250 OP 250 PS 637: Performed by: SURGERY

## 2021-01-05 PROCEDURE — 250N000011 HC RX IP 250 OP 636: Performed by: SURGERY

## 2021-01-05 RX ORDER — HYDROMORPHONE HYDROCHLORIDE 2 MG/1
2-4 TABLET ORAL
Qty: 15 TABLET | Refills: 0 | Status: SHIPPED | OUTPATIENT
Start: 2021-01-05 | End: 2021-01-05

## 2021-01-05 RX ORDER — SUMATRIPTAN 25 MG/1
25-50 TABLET, FILM COATED ORAL
Qty: 50 TABLET | Refills: 1 | Status: SHIPPED | OUTPATIENT
Start: 2021-01-05 | End: 2021-01-05

## 2021-01-05 RX ORDER — BUPROPION HYDROCHLORIDE 100 MG/1
50 TABLET ORAL 3 TIMES DAILY
Qty: 50 TABLET | Refills: 1 | Status: SHIPPED | OUTPATIENT
Start: 2021-01-05 | End: 2021-06-04 | Stop reason: ALTCHOICE

## 2021-01-05 RX ORDER — HYOSCYAMINE SULFATE 0.125 MG
125 TABLET ORAL EVERY 4 HOURS PRN
Qty: 30 TABLET | Refills: 0 | Status: SHIPPED | OUTPATIENT
Start: 2021-01-05 | End: 2021-06-04

## 2021-01-05 RX ORDER — ACETAMINOPHEN 325 MG/1
650 TABLET ORAL EVERY 6 HOURS PRN
Qty: 45 TABLET | Refills: 0 | Status: SHIPPED | OUTPATIENT
Start: 2021-01-05 | End: 2021-10-13

## 2021-01-05 RX ORDER — ONDANSETRON 4 MG/1
4 TABLET, ORALLY DISINTEGRATING ORAL EVERY 6 HOURS PRN
Qty: 30 TABLET | Refills: 1 | Status: SHIPPED | OUTPATIENT
Start: 2021-01-05 | End: 2021-02-05

## 2021-01-05 RX ORDER — AMOXICILLIN 250 MG
2 CAPSULE ORAL 2 TIMES DAILY
Qty: 50 TABLET | Refills: 1 | Status: SHIPPED | OUTPATIENT
Start: 2021-01-05 | End: 2021-02-05

## 2021-01-05 RX ORDER — SUMATRIPTAN 25 MG/1
25-50 TABLET, FILM COATED ORAL
Qty: 50 TABLET | Refills: 1 | Status: SHIPPED | OUTPATIENT
Start: 2021-01-05

## 2021-01-05 RX ORDER — DIPHENHYDRAMINE HCL 25 MG
25 CAPSULE ORAL EVERY 6 HOURS PRN
Qty: 25 CAPSULE | Refills: 1 | Status: SHIPPED | OUTPATIENT
Start: 2021-01-05 | End: 2021-02-05

## 2021-01-05 RX ORDER — VENLAFAXINE 75 MG/1
75 TABLET ORAL 3 TIMES DAILY
Qty: 45 TABLET | Refills: 1 | Status: SHIPPED | OUTPATIENT
Start: 2021-01-05 | End: 2021-06-04 | Stop reason: ALTCHOICE

## 2021-01-05 RX ORDER — HYDROMORPHONE HYDROCHLORIDE 2 MG/1
2-4 TABLET ORAL
Qty: 16 TABLET | Refills: 0 | Status: SHIPPED | OUTPATIENT
Start: 2021-01-05 | End: 2021-02-05

## 2021-01-05 RX ADMIN — ACETAMINOPHEN 975 MG: 325 TABLET, FILM COATED ORAL at 08:39

## 2021-01-05 RX ADMIN — DOCUSATE SODIUM AND SENNOSIDES 2 TABLET: 8.6; 5 TABLET, FILM COATED ORAL at 08:40

## 2021-01-05 RX ADMIN — OMEPRAZOLE 20 MG: 20 CAPSULE, DELAYED RELEASE ORAL at 08:39

## 2021-01-05 RX ADMIN — VENLAFAXINE 75 MG: 75 TABLET ORAL at 08:39

## 2021-01-05 RX ADMIN — Medication 50 MG: at 08:39

## 2021-01-05 RX ADMIN — ENOXAPARIN SODIUM 40 MG: 40 INJECTION SUBCUTANEOUS at 08:39

## 2021-01-05 ASSESSMENT — ACTIVITIES OF DAILY LIVING (ADL)
ADLS_ACUITY_SCORE: 14

## 2021-01-05 NOTE — PLAN OF CARE
"BP (!) 140/84 (BP Location: Right arm)   Pulse 97   Temp 98.9  F (37.2  C) (Oral)   Resp 20   Ht 1.575 m (5' 2\")   Wt 99.8 kg (220 lb)   SpO2 96%   BMI 40.24 kg/m      Neuro: A&O x4, calls appropriately.   Respiratory: Sat mid 90's on RA, denies SOB.   Cardiac: Tachycardic, asymptomatic. Denies cardiac chest pain.    GI/: Voiding w/out difficulty, unsaved x1. LBM yesterday.   Diet: NPO x meds & ice chips until 0600.  Pain/Nausea: PO dilaudid x2 and scheduled tylenol given for pain. Scopolamine patch behind R ear.   Incisions/Drains: lap sites x5, CDI primapores.   IV Access: L PIV infusing LR @ 75 mL/hr.    Activity: Independent w/in room. Ambulating to bathroom  Plan: Continue w/ POC.     "

## 2021-01-05 NOTE — PROGRESS NOTES
"S: No acute events. Staying Hydrated. HR<100    O:  /85 (BP Location: Right arm)   Pulse 91   Temp 98.5  F (36.9  C) (Oral)   Resp 16   Ht 1.575 m (5' 2\")   Wt 99.8 kg (220 lb)   SpO2 94%   BMI 40.24 kg/m    abd s/nt/nd  Inc c/d/i    A/P  CLD  Usual meds on discharge with the following changes: PO dilaudid instead of oxy, and will hold ozempic immediately ppostop  Dispo    Saud Stein MD    "

## 2021-01-05 NOTE — PLAN OF CARE
"/70 (BP Location: Right arm)   Pulse 74   Temp 98.8  F (37.1  C) (Oral)   Resp 16   Ht 1.575 m (5' 2\")   Wt 99.8 kg (220 lb)   SpO2 95%   BMI 40.24 kg/m      Reason for admission: POD#1- lap sleeve  Neuro: A&Ox4, calls appropriately  Cardiac: WDL, no chest pain  Respiratory: WDL, no SOB, RA  GI/: voiding adequately, passing gas, no BM this shift  Pain: denies, no n/v  Lines: removed IV  Drains: X  Incisions: 5 lap sites- removed staples, steri strips in place  Activity: up ad amadou  Diet: Byron clear liquids  Labs: X  Changes/Plan: reviewed after visit summary with patient, no further questions at this time for RN, continue POC    "

## 2021-01-05 NOTE — PROGRESS NOTES
Chart reviewed post discharge- has appointment scheduled within 48 hours. No call needed.    LUZ GONZALEZ, CMA

## 2021-01-05 NOTE — DISCHARGE SUMMARY
[facility] Discharge Summary    Екатерина Ramon MRN: [id number]   YOB: 1985 Age: 35 year old     Date of Admission:  1/4/2021  Date of Discharge::  1/5/2021  Admitting Physician:  Saud Stein MD  Discharge Physician:  Saud Stein MD  Primary Care Physician:         Anusha Wolff          Procedures:   01/04/2021: GASTRECTOMY, SLEEVE, LAPAROSCOPIC          Consultations:   PHARMACY BARIATRIC IP CONSULT  ENDOCRINE DIABETES ADULT IP CONSULT  RESPIRATORY CARE IP CONSULT          HPI (from H&P):   Екатерина Ramon is a 35 year old female who presents for a gastric sleeve with Dr. Stein on 1/4/20 at [facility]. Patient has a PMH s/f hypertension, anemia, migraines, anxiety        Ms. Ramon has a longstanding history of obesity. She has been working with the bariatric team in preparation for weight loss surgery. She has tried diets, exercise, OTC and rx medicaitons without long-term success. The above procedure is now planned.              Hospital Course:   The patient underwent listed procedure(s) above, which she tolerated without complications.  Overall unremarkable hospitalization.  At the time of discharge, she was tolerating PO intake, ambulating, voiding spontaneously without difficulty, and pain was controlled with oral pain medications. The patient was discharged home in stable and improved condition.         Medications Prior to Admission:     Medications Prior to Admission   Medication Sig Dispense Refill Last Dose     aspirin 81 MG EC tablet Take 81 mg by mouth daily   Past Month at Unknown time     hydrOXYzine (ATARAX) 25 MG tablet Take 1 tablet (25 mg) by mouth every 6 hours as needed for anxiety 30 tablet 1 Past Month at Unknown time     traZODone (DESYREL) 50 MG tablet Take 1-2 tablets ( mg) by mouth nightly as needed for sleep 60 tablet 5 1/3/2021 at 2000     verapamil ER (VERELAN) 180 MG 24 hr capsule Take  1 capsule (180 mg) by mouth daily 90 capsule 3 1/4/2021 at 0500     [DISCONTINUED] buPROPion (WELLBUTRIN XL) 150 MG 24 hr tablet Take 1 tablet (150 mg) by mouth daily (Patient taking differently: Take 150 mg by mouth every evening ) 90 tablet 1 1/3/2021 at 2000     [DISCONTINUED] calcium carbonate-vitamin D (OS-RIMMA) 500-400 MG-UNIT tablet Take 1 tablet by mouth daily   Past Month at Unknown time     [DISCONTINUED] multivitamin, therapeutic (THERA-VIT) TABS tablet Take 1 tablet by mouth daily   Past Month at Unknown time     [DISCONTINUED] naproxen (NAPROSYN) 500 MG tablet Take 1 tablet (500 mg) by mouth 2 times daily as needed for moderate pain 60 tablet 1 Past Month at Unknown time     [DISCONTINUED] Semaglutide,0.25 or 0.5MG/DOS, 2 MG/1.5ML SOPN Inject 0.25 mg Subcutaneous every 7 days NEED APPOINTMENT FOR FURTHER REFILLS. (Patient taking differently: Inject 0.25 mg Subcutaneous every 7 days Fridays) 3 mL 0 Past Month at Unknown time     [DISCONTINUED] SUMAtriptan (IMITREX) 25 MG tablet Take 1-2 tablets (25-50 mg) by mouth at onset of headache for migraine May repeat dose in 2 hours.  Do not exceed 200 mg in 24 hours 18 tablet 0 Past Month at Unknown time     [DISCONTINUED] venlafaxine (EFFEXOR-ER) 225 MG 24 hr tablet Take 1 tablet (225 mg) by mouth daily (Patient taking differently: Take 225 mg by mouth every evening ) 90 tablet 1 1/3/2021 at 2000            Discharge Medications:     Current Discharge Medication List      START taking these medications    Details   acetaminophen (TYLENOL) 325 MG tablet Take 2 tablets (650 mg) by mouth every 6 hours as needed for mild pain  Qty: 45 tablet, Refills: 0    Associated Diagnoses: Morbid obesity (H)      buPROPion (WELLBUTRIN) 100 MG tablet Take 0.5 tablets (50 mg) by mouth 3 times daily  Qty: 50 tablet, Refills: 1    Associated Diagnoses: Morbid obesity (H)      diphenhydrAMINE (BENADRYL) 25 MG capsule Take 1 capsule (25 mg) by mouth every 6 hours as needed for  itching  Qty: 25 capsule, Refills: 1    Associated Diagnoses: Morbid obesity (H)      HYDROmorphone (DILAUDID) 2 MG tablet Take 1-2 tablets (2-4 mg) by mouth every 3 hours as needed for breakthrough pain  Qty: 16 tablet, Refills: 0    Associated Diagnoses: Morbid obesity (H)      hyoscyamine (LEVSIN) 0.125 MG tablet Take 1 tablet (125 mcg) by mouth every 4 hours as needed for cramping (spasms)  Qty: 30 tablet, Refills: 0    Associated Diagnoses: Morbid obesity (H)      omeprazole (PRILOSEC) 20 MG DR capsule Take 1 capsule (20 mg) by mouth every morning (before breakfast)  Qty: 60 capsule, Refills: 4    Associated Diagnoses: Morbid obesity (H)      ondansetron (ZOFRAN-ODT) 4 MG ODT tab Take 1 tablet (4 mg) by mouth every 6 hours as needed for nausea or vomiting  Qty: 30 tablet, Refills: 1    Associated Diagnoses: Morbid obesity (H)      senna-docusate (SENOKOT-S/PERICOLACE) 8.6-50 MG tablet Take 2 tablets by mouth 2 times daily  Qty: 50 tablet, Refills: 1    Associated Diagnoses: Morbid obesity (H)      venlafaxine (EFFEXOR) 75 MG tablet Take 1 tablet (75 mg) by mouth 3 times daily  Qty: 45 tablet, Refills: 1    Associated Diagnoses: Morbid obesity (H)         CONTINUE these medications which have CHANGED    Details   SUMAtriptan (IMITREX) 25 MG tablet Take 1-2 tablets (25-50 mg) by mouth at onset of headache for migraine  Qty: 50 tablet, Refills: 1    Comments: May repeat dose in 2 hours. Do not exceed 200 mg in 24 hours  Associated Diagnoses: Morbid obesity (H)         CONTINUE these medications which have NOT CHANGED    Details   aspirin 81 MG EC tablet Take 81 mg by mouth daily      hydrOXYzine (ATARAX) 25 MG tablet Take 1 tablet (25 mg) by mouth every 6 hours as needed for anxiety  Qty: 30 tablet, Refills: 1    Associated Diagnoses: Anxiety      traZODone (DESYREL) 50 MG tablet Take 1-2 tablets ( mg) by mouth nightly as needed for sleep  Qty: 60 tablet, Refills: 5    Comments: Profile Rx: patient will  contact pharmacy when needed  Associated Diagnoses: Psychophysiological insomnia      verapamil ER (VERELAN) 180 MG 24 hr capsule Take 1 capsule (180 mg) by mouth daily  Qty: 90 capsule, Refills: 3    Associated Diagnoses: Hypertension goal BP (blood pressure) < 140/90; Migraine with aura and without status migrainosus, not intractable         STOP taking these medications       buPROPion (WELLBUTRIN XL) 150 MG 24 hr tablet Comments:   Reason for Stopping:         calcium carbonate-vitamin D (OS-RIMMA) 500-400 MG-UNIT tablet Comments:   Reason for Stopping:         multivitamin, therapeutic (THERA-VIT) TABS tablet Comments:   Reason for Stopping:         naproxen (NAPROSYN) 500 MG tablet Comments:   Reason for Stopping:         Semaglutide,0.25 or 0.5MG/DOS, 2 MG/1.5ML SOPN Comments:   Reason for Stopping:         venlafaxine (EFFEXOR-ER) 225 MG 24 hr tablet Comments:   Reason for Stopping:                    Day of Discharge Physical Exam:   Temp:  [98.5  F (36.9  C)-100.5  F (38.1  C)] 98.8  F (37.1  C)  Pulse:  [] 74  Resp:  [16-24] 16  BP: (101-145)/(48-85) 138/70  SpO2:  [93 %-96 %] 95 %  General: A&O, NAD, lying comfortably in bed  Chest: NLB on RA  Abd: Soft, ND, NT, incisions c/d/i  Extremities: WWP  Neuro: Moving all extremities spontaneously without apparent deficit         Discharge Instructions and Follow-Up:        Reason for your hospital stay    Laparoscopic Sleeve Gastrectomy     Adult [facility] Follow-up and recommended labs and tests    Follow up with Dr. Amor , at (location with clinic name or city) [facility], within 1 week  to evaluate after surgery. No follow up labs or test are needed.    Appointments on [place] and/or [facility] (with [facility] or [facility] provider or service). Call [phone] if you haven't heard regarding these appointments within 7 days of discharge.     Activity    Your activity upon discharge: no heavy lifting for 4 weeks     Discharge Instructions    After Bariatric  Surgery Discharge Instructions  [facility]     Note: Ask your nurse to order your medications from the pharmacy. Be sure you have your medications with you when you leave.  Diet on discharge.    If surgery was with Dr Amor: post-op diet is bariatric clear liquids and then advance to full liquid diet on post op day #1 if cleared by surgical team  How much fluid should I drink?  Strive for 48-64 ounces daily.  Carry a water bottle with you without a straw or sports top. Drink from it often.  Keep track of how much fluid you drink in a day.  Remember:  -Do not use straws, chew gum or suck on hard candies. They may cause painful gas.    -Sip, don't slurp when you drink.    -Practice small sips using a medicine cup for the first week postop.   -No ice or cold drinks. This could cause gas or spasms.   -No coffee, soda pop or drinks with caffeine. These may cause stomach pain.   -No alcohol. It is bad for your liver and will cause stomach pain.    How often should I do my deep breathing and coughing?  Use your incentive spirometer (small plastic breathing device) every hour while awake after you get home. Using the incentive spirometer helps you deep breath. Continuing to cough and deep breath will help prevent fevers and pneumonia.   If you do not have a fever after one week, you can stop using the incentive spirometer and discard it.     You can continue to take deep breaths without the incentive spirometer every one to two hours while awake for the month after surgery.    What kinds of activity can I do?  Get plenty of rest the first few days after surgery and try to balance rest and activity. You will need some time to recover - you may be more tired than you realize at first. You'll feel better and heal faster if you take good care of yourself.  For 4 weeks after surgery (Please review restrictions at your one week visit, they could change based on how well you are  doing):  -Don't lift more than 20 pounds.   -Take 4-5 short walks every day.  -Don't jog, run, or do belly exercises.  Don't swim, bathe or use a hot tub until your cuts are healed (scabs are gone).  You may shower  Don't plan to fly or take a road trip within the first 1 to 3 months after surgery.  You could get a blood clot in your legs. If you must travel, get up and move around every hour for at least 5 minutes before continuing on your journey.  Your care team can help you decide when it's safe for you to travel.     What can I do for pain control?  You had major belly surgery that involves all layers of your belly muscles. Pain is expected, even for some as far out as 6-8 weeks after surgery. Moving, sneezing, coughing, and breathing will cause discomfort because these activities use your belly muscles.   Please see your after visit summary medication review for what pain medication will be continued, discontinued and newly started for you.    You can take opioid pain medicine if prescribed and if needed. Try to wean off from it as soon as you feel comfortable.   Do not drive while you are taking opioid pain medicine. This is dangerous.  You can take acetaminophen (Tylenol) between your prescribed pain medicine on a scheduled basis OR take it scheduled every 6 hours (check with your care team for specifics).  -Acetaminophen formulation options:    -Liquid   -Caplet (Cut caplet in half before taking)   -Do not take more than 3000 mg Tylenol in a 24 hour period.  -You may also take Tylenol for pain in place of the opioid pain medicine (check with your care team for specifics).   You can apply ice or heat to the affected area(s). Just remember to wrap the ice in something and limit icing sessions to 20 minutes. Excessive icing can irritate the skin or cause skin damage.  You can apply heat with a hot, wet towel or heating pad. Just like cold therapy, limit heat application to 20 minutes. Never sleep with a  heating pad on. It could cause severe burns to your skin.  Wear your binder to support your belly muscles if you have one.  Take this off a little more each day and try to be off completely by 2 weeks after surgery. If you don't need your binder for comfort or support, you don't need to wear it.   You may not be able to sleep in a comfortable position for a few weeks after surgery. This is normal. You may be more comfortable sleeping in a recliner or propped up with 3 pillows for the first couple of weeks after surgery.  Do not take NSAID's (Non-Steroidal Anti-Inflammatory Drugs) (examples: ibuprofen, Motrin, Advil, Aleve, and Naproxen), aspirin, or use pain patches with NSAID's. They will increase your risk of bleeding or getting an ulcer.    Please call the clinic for any of the following pain concerns, we would like to talk to you:  -pain that does not improve with rest  -pain that gets worse and worse  -pain that is not controlled by your pain medicine  -a sudden severe increase in pain    What medications will I need to take after surgery?  You may be discharged with the following types of medications: omeprazole 20 mg once daily for heartburn symptom prevention, zofran as needed for nausea and a medication called hyoscyamine (levsin) as needed for cramping.Please see your after visit summary medication review for what will be continued, discontinued and newly started.  It is important to reduce the amount of acid in your new stomach for a couple of months after surgery while it is still healing. We will prescribe an acid reducer or antacid. Take it as directed. This will help prevent ulcers, heartburn and acid reflux.  If you took an acid reducer before you had surgery, your care team will let you know what acid reducer you will take after surgery.   It is okay to swallow any medications smaller than   inch in size.   -If it is larger than   inch, it may need to be cut, crushed, or in a liquid form. Check with  your care team about which way is most appropriate for you and your medications.    What should I know about my incisions (cuts)?  Your incisions are covered with white steri strips or butterfly tape and have band aids or gauze over the top. If you have gauze or band aids, they can come off in the hospital.  Leave your steri strips on until they fall off on their own. If the steri strips don't fall off after 1 to 2 weeks, you can take them off. If they fall off earlier, replace them with clean band aids trying to avoid touching the incision itself.   If you have gauze covered by a clear dressing, remove 2-3 days after surgery or as directed by your care team.  You may shower in the hospital after surgery and can get your incision coverings wet.  Do not submerge in water (e.g. No baths, swimming pools, hot tubs) until your care team tells you it is okay and your incisions are completely healed.    Call the clinic if you have any of these signs or symptoms of infection:  -Redness around the site.  -Drainage that smells bad.  -Drainage that is thick yellow or green.  -An increase in pain around the incision site  -An increase in swelling around the incision site  -Heat or warmth around incision site   -Fever of 101.5  F (38.3  C) or higher when taken under the tongue.    -Chills    Will my urine or bowel movements change?   Your first bowel movements (stools) will likely be liquid. You may also notice old blood or a darker color (black or maroon color) in your bowel movements.  This is not unusual and usually goes away after the first week, if not sooner. You may not have a bowel movement for a week.   If you have not had a bowel movement for at least three days after your surgery date and are passing gas, you can use over the counter stool softeners.  Please stop taking the stool softeners and laxatives if your stools are loose.  Increasing fluids and activity as well as getting off narcotics will help prevent  "constipation.    Call the clinic if:   -You have stomach pain.   -You continue to have constipation.  -You have excessive bloating after walking and passing gas.    How can I prevent dehydration if I feel nauseated (sick to my stomach) and vomit (throw up)?   Vomiting is not normal after surgery. If you continue to have nausea and vomiting, call the clinic.   Nausea can be a sign of dehydration. That is why it is very important to track your fluids.  Do not nap more than one hour during the day. Set a timer to wake yourself up, if needed. Too much sleep will keep you from drinking enough fluid during the day and lead to dehydration.  No outside activity in hot, humid weather until you can drink 48 to 64 ounces of fluid in 24 hours. If you sweat a lot, your body may lose too much water.  Try to take a 1 ounce sip of water (one medicine cup) every 15 minutes.  Set a timer to remind yourself.    Call the clinic if you have any of these signs or symptoms of dehydration:  -Dark colored urine  -Urinating (pass water) less than 2-3 times per day  -Lack of energy  -Nausea  -Dizziness  -Headache    Call the clinic ANY TIME at [phone] if:  -Your pain medicine is not working.  -You have a fever = 101.5 F.  -You have belly or left shoulder pain that gets worse and worse.  -You have a swollen leg with redness, warmth, or pain behind the knee or calf.  -You have chest pain   -You feel very short of breath.  -You have a sudden severe increase in heart rate.  -You have vomiting that gets worse and worse.  -You have constant nausea (feeling sick to your stomach) that does not go away with medication.  -You have trouble swallowing.  -You have an increasing feeling that \"something is not right\".  -You have hiccups that do not stop.  -You have any questions or concerns.    AFTER HOURS QUESTIONS OR CONCERNS: Call [phone] and ask to speak with surgery resident if you are having troubles in the evenings, at night, or on " weekends. Please call if you experience increasing abdominal pain, nausea, vomiting, increasing drainage from your wounds, chills, or fever >101.5    If you have to go to the Emergency Room, we prefer you go to the hospital that did your surgery. Please let them know that you had bariatric surgery and to notify your surgeon.    When should I go back to the clinic?  Follow up with your care team in 1-2 weeks.   If this appointment was not already made, please call: [phone]    Appointments located at [facility] clinic:  [facility]    [address]  [place] [address]     Diet    Follow this diet upon discharge: Bariatric Full Liquid Diet       Condition at discharge: Stable

## 2021-01-06 ENCOUNTER — TELEPHONE (OUTPATIENT)
Dept: ENDOCRINOLOGY | Facility: CLINIC | Age: 36
End: 2021-01-06

## 2021-01-06 NOTE — TELEPHONE ENCOUNTER
Name and date of birth verified:   Type of surgery: Sleeve Gastrectomy   Surgeon: Dr. Saud Stein   Date of surgery: 1/4/2021  Date of discharge: 1/5/2021  POD: 2       Speech clear and unlabored: yes    Last BM: pre-op  Last time voiding: every 2 hours   Description of incisions: clean, dry    Is the patient experiencing any of the following? :    -UTI symptoms (burning, frequent urination, hematuria, inability to urinate):  Patient denies.  or If Yes: (Recommend increase fluid intake, should be drinking at least 48-60 oz fluid)    -Constipation ( recommend stool softer, miralax, MOM):  Patient denies    -Pain is manageable    -Pain medication needed:  Pain Medication being used: tylenol.  Frequency: as needed    -Eating ( type of food, tolerating?): full liquids     -Nausea/ Vomiting: Patient denies     - Anxiety: Patient denies    -Discharge questions:  Patient has no questions at this time.    -Follow up visit date and time confirmed: 1/15/2021 with Dr. Stein     Patient advised if any concerns outside of clinic hours, to call hospital at [phone] and ask to speak to on-call bariatric resident. They should present to ED at [facility] to be assessed if urgency arises.     Risk for:  urinary tract infection, pneumonia, leg clots (deep vein thrombosis), lung clots (pulmonary emboli), injury to the bowels or other organs, bowel obstruction, hernia, and gastrointestinal bleeding.      Weight loss surgery side effects:  abdominal pain, cramping, bloating, difficulty swallowing, constipation, nausea, vomiting, diarrhea, frequent loose stools, dehydration, hair loss, excess skin, protein, iron and vitamin deficiencies, malnutrition, fatigue, and heartburn.    Bariatric surgery risks may include:  an internal leak at the staple line, narrowing of the esophagus, stomach or intestines (strictures), gallstones, bowel obstruction, inflammation of the esophagus or stomach, ulcers with or without bleeding, injuries to  other organs, hernias, and iron deficiency.    Laparoscopic adjustable gastric band:       -Laparoscopic adjustable gastric band side effects:  decreased weight loss.  -Risks include:  slipped band (gastric prolapsed) stretching of the esophagus, internal organ erosion, ulcers, tubing kinks, port flipping or internal leaking, band may need to be replaced or removed, nausea, vomiting, dehydration, heartburn, reflux, regurgitation, night time coughing, and gallstones related to weight loss.  Sleeve gastrectomy:  Sleeve gastrectomy side effects:  leaks are more common after this procedure.  Risks include:  internal leak at the vertical sleeve staple line; nausea, vomiting, and dehydration for several months; bowel obstruction; gallstones during the first 6 months related to rapid weight loss; decreased absorption of vitamins and protein because of the reduced stomach size; weight regain if you increase food intake; narrowing of stomach, esophagus or intestines (stricture); injury to other organs; hernia; and ulcers.     Yvette Gage CNP  [facility] [place]

## 2021-01-08 LAB — COPATH REPORT: NORMAL

## 2021-01-15 ENCOUNTER — VIRTUAL VISIT (OUTPATIENT)
Dept: ENDOCRINOLOGY | Facility: CLINIC | Age: 36
End: 2021-01-15
Payer: COMMERCIAL

## 2021-01-15 VITALS — HEIGHT: 62 IN | BODY MASS INDEX: 37.73 KG/M2 | WEIGHT: 205 LBS

## 2021-01-15 DIAGNOSIS — E66.9 OBESITY: ICD-10-CM

## 2021-01-15 DIAGNOSIS — Z98.84 S/P LAPAROSCOPIC SLEEVE GASTRECTOMY: ICD-10-CM

## 2021-01-15 DIAGNOSIS — Z71.3 NUTRITIONAL COUNSELING: Primary | ICD-10-CM

## 2021-01-15 DIAGNOSIS — E66.01 MORBID OBESITY (H): Primary | ICD-10-CM

## 2021-01-15 PROCEDURE — 99024 POSTOP FOLLOW-UP VISIT: CPT | Mod: 95 | Performed by: SURGERY

## 2021-01-15 PROCEDURE — 97803 MED NUTRITION INDIV SUBSEQ: CPT | Mod: 95 | Performed by: DIETITIAN, REGISTERED

## 2021-01-15 ASSESSMENT — MIFFLIN-ST. JEOR: SCORE: 1583.12

## 2021-01-15 ASSESSMENT — PAIN SCALES - GENERAL: PAINLEVEL: NO PAIN (0)

## 2021-01-15 NOTE — NURSING NOTE
"Chief Complaint   Patient presents with     Follow Up     1 week post op       Vitals:    01/15/21 0828   Weight: 93 kg (205 lb)   Height: 1.575 m (5' 2\")       Body mass index is 37.49 kg/m .                           "

## 2021-01-15 NOTE — PROGRESS NOTES
"Екатерина Ramon is a 35 year old female who is being evaluated via a billable video visit.      The patient has been notified of following:     \"This video visit will be conducted via a call between you and your physician/provider. We have found that certain health care needs can be provided without the need for an in-person physical exam.  This service lets us provide the care you need with a video conversation.  If a prescription is necessary we can send it directly to your pharmacy.  If lab work is needed we can place an order for that and you can then stop by our lab to have the test done at a later time.    Video visits are billed at different rates depending on your insurance coverage.  Please reach out to your insurance provider with any questions.    If during the course of the call the physician/provider feels a video visit is not appropriate, you will not be charged for this service.\"    Patient has given verbal consent for Video visit? Yes  How would you like to obtain your AVS? MyChart  Will anyone else be joining your video visit? No        Video-Visit Details    Type of service:  Video Visit    Video Start Time: 12:30 PM   Video End Time: 12:45 PM    Originating Location (pt. Location): Home    Distant Location (provider location):  [facility] [place]     Platform used for Video Visit: "[employer]"    During this virtual visit the patient is located in [place], patient verifies this as the location during the entirety of this visit.     Nutrition Assessment  Reason For Visit:  Екатерина Ramon is a 35 year old female presenting today for nutrition follow-up, 1 week s/p laparoscopic sleeve gastrectomy with Dr Stein on 1/4/21.  Patient referred by Dr. Stein on January 15, 2021.    Anthropometrics  Initial Consult Weight: 232 lbs  Day of Surgery Weight(1/4/21): 220 lbs  Current Weight: 205 lbs  Weight loss: 27 lbs from initial consult; 15 lbs from day of surgery    Current " Vitamins/Minerals: Dago's complete     Nutrition History  Pt reports consuming and tolerating bariatric clear and low-fat full liquid diets. Fluid intake appears adequate, consuming 60 oz/day of water and Powderade zero. Tried cream of wheat, yogurt and protein shakes so far on FLD. Meals are appropriate size, e.g. 2-3 oz yogurt, 1/2 shake (Premier Protein).    Diet Recall Yesterday:  B: 1/2 protein shake    L: cream of wheat  D: 1/2 shake     Nutrition Prescription:  Grams Protein: 60 (minimum)  Amount of Fluid: 48-64 oz    Nutrition Diagnosis  Food and nutrition-related knowledge deficit r/t lack of prior exposure to diet advancements beyond bariatric low-fat full liquid diet aeb pt unable to verbalize understanding of bariatric pureed and soft diets.    Intervention  Intervention At Appointment:  Materials/education provided on bariatric pureed and soft diets, protein intake, fluid intake, eating pace, portion control, avoiding excess sugar and fat, recommended vitamin/mineral supplements. Patient demonstrates understanding.     Expected Engagement: Good    Goals:  1) Follow bariatric low-fat full liquid diet through day 13 post-op, then to progress to pureed diet x 2 weeks (1/18 - 1/31).  If tolerating, may advance on day 29 post-op to bariatric soft diet (2/1 - 2/28).   2) Work towards 60 gm protein/day.  3) Consume 48-64 oz fluids daily- between meals.  4) Eat slowly (>20 min/meal), chewing well to smooth consistency once on the bariatric soft diet.  5) Limit portions to 1/2 cup/meal.  6) Start chewable/liquid multivitamin/minerals twice daily.    Post-op Diet Handouts:  Diet Guidelines after Weight-loss Surgery  [email or web address]     Your Stage 3 Diet: Pureed Foods  [email or web address]     Pureed Pleasures  [email or web address]    Your Stage 4 Diet: Soft Foods  [email or web address]    Your Stage 5 Diet: Regular Foods  [email or web address]    Supplements  after Weight Loss Surgery  [email or web address]     Keeping Track of Fluids  [email or web address]      Follow-Up: 3 weeks or prn      Aida Martin RD, LD

## 2021-01-15 NOTE — PATIENT INSTRUCTIONS
Nutrition Goals:  1) Follow bariatric low-fat full liquid diet through day 13 post-op, then to progress to pureed diet x 2 weeks (1/18 - 1/31).  If tolerating, may advance on day 29 post-op to bariatric soft diet (2/1 - 2/28).   2) Work towards 60 gm protein/day.  3) Consume 48-64 oz fluids daily- between meals.  4) Eat slowly (>20 min/meal), chewing well to smooth consistency once on the bariatric soft diet.  5) Limit portions to 1/2 cup/meal.  6) Start chewable/liquid multivitamin/minerals twice daily.    Post-op Diet Handouts:  Diet Guidelines after Weight-loss Surgery  [email or web address]     Your Stage 3 Diet: Pureed Foods  [email or web address]     Pureed Pleasures  [email or web address]    Your Stage 4 Diet: Soft Foods  [email or web address]    Your Stage 5 Diet: Regular Foods  [email or web address]    Supplements after Weight Loss Surgery  [email or web address]     Keeping Track of Fluids  [email or web address]

## 2021-01-15 NOTE — PROGRESS NOTES
Екатерина is a 35 year old who is being evaluated via a billable video visit.      How would you like to obtain your AVS? MyChart  If the video visit is dropped, the invitation should be resent by: Text to cell phone: [phone]  Will anyone else be joining your video visit? No        During this virtual visit the patient is located in [place], patient verifies this as the location during the entirety of this visit.     Video Start Time: 834  Video-Visit Details    Type of service:  Video Visit    Video End Time:8:37 AM    Originating Location (pt. Location): Home    Distant Location (provider location):  [facility] [place]     Platform used for Video Visit: [facility] Bariatric Surgery Note    RE: Екатерина Ramon  MR#: [id number]  : 1985  VISIT DATE: Saleem 15, 2021      Dear Anusha Wolff,    I had the pleasure of seeing your patient, Екатерина Ramon, in my post-bariatric surgery assessment clinic.    Assessment & Plan   Problem List Items Addressed This Visit     None             20 minutes spent on the date of the encounter doing chart review, history and exam, documentation and further activities as noted above  0956}  Continue normal followup schedule      CHIEF COMPLAINT: Post-bariatric surgery follow-up    HISTORY OF PRESENT ILLNESS:  >1 w s/p LSG    Toelrating liquids    Incisions healing    No postop oncerns    Weight History:  No flowsheet data found.  Initial Weight (lbs): 223 lbs  Weight: 93 kg (205 lb)(pt reported)  Last Visits Weight: 104.3 kg (230 lb)  Cumulative weight loss (lbs): 18  Weight Loss Percentage: 8.07%    No flowsheet data found.    Eating Habits 2020   How many meals do you eat per day? 3   Do you snack between meals? Sometimes       Exercise Questions Reviewed With Patient 2020   How often do you exercise? 3 to 4 times per week   What is the duration of your exercise (in minutes)? 30 Minutes   What types of exercise do  "you do? walking, other   What keeps you from being more active?  Shortness of breath, Too tired       Social History:  No flowsheet data found.    Medications:  Current Outpatient Medications   Medication     acetaminophen (TYLENOL) 325 MG tablet     aspirin 81 MG EC tablet     buPROPion (WELLBUTRIN) 100 MG tablet     diphenhydrAMINE (BENADRYL) 25 MG capsule     hydrOXYzine (ATARAX) 25 MG tablet     omeprazole (PRILOSEC) 20 MG DR capsule     senna-docusate (SENOKOT-S/PERICOLACE) 8.6-50 MG tablet     SUMAtriptan (IMITREX) 25 MG tablet     traZODone (DESYREL) 50 MG tablet     venlafaxine (EFFEXOR) 75 MG tablet     verapamil ER (VERELAN) 180 MG 24 hr capsule     HYDROmorphone (DILAUDID) 2 MG tablet     hyoscyamine (LEVSIN) 0.125 MG tablet     ondansetron (ZOFRAN-ODT) 4 MG ODT tab     No current facility-administered medications for this visit.      No flowsheet data found.    ROS:  GI: No flowsheet data found.  Skin: No flowsheet data found.  Psych: No flowsheet data found.  Female Only:   BAR RBS ROS - FEMALE ONLY 7/29/2020   Female only: Loss of menstrual cycles, Birth control         PHYSICAL EXAM:  Objective    Ht 1.575 m (5' 2\")   Wt 93 kg (205 lb)   BMI 37.49 kg/m    Vitals - Patient Reported  Pain Score: No Pain (0)  virtual      virtual      Sincerely,    Saud Stein MD      "

## 2021-01-15 NOTE — LETTER
"1/15/2021       RE: Екатерина Ramon  [address]  [place] [address]     Dear Colleague,    Thank you for referring your patient, Екатерина Ramon, to the [facility] [place] at [facility]. Please see a copy of my visit note below.    Екатерина Ramon is a 35 year old female who is being evaluated via a billable video visit.      The patient has been notified of following:     \"This video visit will be conducted via a call between you and your physician/provider. We have found that certain health care needs can be provided without the need for an in-person physical exam.  This service lets us provide the care you need with a video conversation.  If a prescription is necessary we can send it directly to your pharmacy.  If lab work is needed we can place an order for that and you can then stop by our lab to have the test done at a later time.    Video visits are billed at different rates depending on your insurance coverage.  Please reach out to your insurance provider with any questions.    If during the course of the call the physician/provider feels a video visit is not appropriate, you will not be charged for this service.\"    Patient has given verbal consent for Video visit? Yes  How would you like to obtain your AVS? MyChart  Will anyone else be joining your video visit? No        Video-Visit Details    Type of service:  Video Visit    Video Start Time: 12:30 PM   Video End Time: 12:45 PM    Originating Location (pt. Location): Home    Distant Location (provider location):  [facility]     Platform used for Video Visit: [employer]    During this virtual visit the patient is located in [place], patient verifies this as the location during the entirety of this visit.     Nutrition Assessment  Reason For Visit:  Екатерина Ramon is a 35 year old female presenting today for nutrition follow-up, 1 " week s/p laparoscopic sleeve gastrectomy with Dr Stein on 1/4/21.  Patient referred by Dr. Stein on January 15, 2021.    Anthropometrics  Initial Consult Weight: 232 lbs  Day of Surgery Weight(1/4/21): 220 lbs  Current Weight: 205 lbs  Weight loss: 27 lbs from initial consult; 15 lbs from day of surgery    Current Vitamins/Minerals: Dago's complete     Nutrition History  Pt reports consuming and tolerating bariatric clear and low-fat full liquid diets. Fluid intake appears adequate, consuming 60 oz/day of water and Powderade zero. Tried cream of wheat, yogurt and protein shakes so far on FLD. Meals are appropriate size, e.g. 2-3 oz yogurt, 1/2 shake (Premier Protein).    Diet Recall Yesterday:  B: 1/2 protein shake    L: cream of wheat  D: 1/2 shake     Nutrition Prescription:  Grams Protein: 60 (minimum)  Amount of Fluid: 48-64 oz    Nutrition Diagnosis  Food and nutrition-related knowledge deficit r/t lack of prior exposure to diet advancements beyond bariatric low-fat full liquid diet aeb pt unable to verbalize understanding of bariatric pureed and soft diets.    Intervention  Intervention At Appointment:  Materials/education provided on bariatric pureed and soft diets, protein intake, fluid intake, eating pace, portion control, avoiding excess sugar and fat, recommended vitamin/mineral supplements. Patient demonstrates understanding.     Expected Engagement: Good    Goals:  1) Follow bariatric low-fat full liquid diet through day 13 post-op, then to progress to pureed diet x 2 weeks (1/18 - 1/31).  If tolerating, may advance on day 29 post-op to bariatric soft diet (2/1 - 2/28).   2) Work towards 60 gm protein/day.  3) Consume 48-64 oz fluids daily- between meals.  4) Eat slowly (>20 min/meal), chewing well to smooth consistency once on the bariatric soft diet.  5) Limit portions to 1/2 cup/meal.  6) Start chewable/liquid multivitamin/minerals twice daily.    Post-op Diet Handouts:  Diet Guidelines after  Weight-loss Surgery  [email or web address]     Your Stage 3 Diet: Pureed Foods  [email or web address]     Pureed Pleasures  [email or web address]    Your Stage 4 Diet: Soft Foods  [email or web address]    Your Stage 5 Diet: Regular Foods  [email or web address]    Supplements after Weight Loss Surgery  [email or web address]     Keeping Track of Fluids  [email or web address]      Follow-Up: 3 weeks or prn      Aida Martin RD, LD

## 2021-01-15 NOTE — LETTER
1/15/2021       RE: Екатерина Ramon  [address]  [place] [address]     Dear Colleague,    Thank you for referring your patient, Екатерина Ramon, to the [facility] [place] at [facility]. Please see a copy of my visit note below.    Екатерина is a 35 year old who is being evaluated via a billable video visit.      How would you like to obtain your AVS? MyChart  If the video visit is dropped, the invitation should be resent by: Text to cell phone: [phone]  Will anyone else be joining your video visit? No        During this virtual visit the patient is located in [place], patient verifies this as the location during the entirety of this visit.     Video Start Time: 834  Video-Visit Details    Type of service:  Video Visit    Video End Time:8:37 AM    Originating Location (pt. Location): Home    Distant Location (provider location):  [facility]NA[facility]     Platform used for Video Visit: [facility] Bariatric Surgery Note    RE: Екатерина Ramon  MR#: [id number]  : 1985  VISIT DATE: Saleem 15, 2021      Dear Anusha Wolff,    I had the pleasure of seeing your patient, Екатерина Ramon, in my post-bariatric surgery assessment clinic.    Assessment & Plan   Problem List Items Addressed This Visit     None             20 minutes spent on the date of the encounter doing chart review, history and exam, documentation and further activities as noted above  0956}  Continue normal followup schedule      CHIEF COMPLAINT: Post-bariatric surgery follow-up    HISTORY OF PRESENT ILLNESS:  >1 w s/p LSG    Toelrating liquids    Incisions healing    No postop oncerns    Weight History:  No flowsheet data found.  Initial Weight (lbs): 223 lbs  Weight: 93 kg (205 lb)(pt reported)  Last Visits Weight: 104.3 kg (230 lb)  Cumulative weight loss (lbs): 18  Weight Loss Percentage: 8.07%    No flowsheet  "data found.    Eating Habits 7/29/2020   How many meals do you eat per day? 3   Do you snack between meals? Sometimes       Exercise Questions Reviewed With Patient 7/29/2020   How often do you exercise? 3 to 4 times per week   What is the duration of your exercise (in minutes)? 30 Minutes   What types of exercise do you do? walking, other   What keeps you from being more active?  Shortness of breath, Too tired       Social History:  No flowsheet data found.    Medications:  Current Outpatient Medications   Medication     acetaminophen (TYLENOL) 325 MG tablet     aspirin 81 MG EC tablet     buPROPion (WELLBUTRIN) 100 MG tablet     diphenhydrAMINE (BENADRYL) 25 MG capsule     hydrOXYzine (ATARAX) 25 MG tablet     omeprazole (PRILOSEC) 20 MG DR capsule     senna-docusate (SENOKOT-S/PERICOLACE) 8.6-50 MG tablet     SUMAtriptan (IMITREX) 25 MG tablet     traZODone (DESYREL) 50 MG tablet     venlafaxine (EFFEXOR) 75 MG tablet     verapamil ER (VERELAN) 180 MG 24 hr capsule     HYDROmorphone (DILAUDID) 2 MG tablet     hyoscyamine (LEVSIN) 0.125 MG tablet     ondansetron (ZOFRAN-ODT) 4 MG ODT tab     No current facility-administered medications for this visit.      No flowsheet data found.    ROS:  GI: No flowsheet data found.  Skin: No flowsheet data found.  Psych: No flowsheet data found.  Female Only:   BAR RBS ROS - FEMALE ONLY 7/29/2020   Female only: Loss of menstrual cycles, Birth control         PHYSICAL EXAM:  Objective    Ht 1.575 m (5' 2\")   Wt 93 kg (205 lb)   BMI 37.49 kg/m    Vitals - Patient Reported  Pain Score: No Pain (0)  virtual    Sincerely,    Saud Stein MD  "

## 2021-02-02 NOTE — PROGRESS NOTES
"   SUBJECTIVE:   CC: Екатерина Ramon is an 35 year old woman who presents for preventive health visit.     {Split Bill scripting  The purpose of this visit is to discuss your medical history and prevent health problems before you are sick. You may be responsible for a co-pay, coinsurance, or deductible if your visit today includes services such as checking on a sore throat, having an x-ray or lab test, or treating and evaluating a new or existing condition :848412}  Patient has been advised of split billing requirements and indicates understanding: {Yes and No:118399}  Healthy Habits:    Do you get at least three servings of calcium containing foods daily (dairy, green leafy vegetables, etc.)? { :951112::\"yes\"}    Amount of exercise or daily activities, outside of work: { :893735}    Problems taking medications regularly { :487140::\"No\"}    Medication side effects: { :833842::\"No\"}    Have you had an eye exam in the past two years? { :910934}    Do you see a dentist twice per year? { :522888}    Do you have sleep apnea, excessive snoring or daytime drowsiness?{ :571010}  {Outside tests to abstract? :589943}    {additional problems to add (Optional):434970}    Today's PHQ-2 Score:   PHQ-2 ( 1999 [employer]) 12/21/2020 6/5/2019   Q1: Little interest or pleasure in doing things 0 1   Q2: Feeling down, depressed or hopeless 0 1   PHQ-2 Score 0 2   Q1: Little interest or pleasure in doing things - Several days   Q2: Feeling down, depressed or hopeless - Several days   PHQ-2 Score - 2     {PHQ-2 LOOK IN ASSESSMENTS (Optional) :120680}  Abuse: Current or Past(Physical, Sexual or Emotional)- {YES/NO/NA:021043}  Do you feel safe in your environment? {YES/NO/NA:056209}        Social History     Tobacco Use     Smoking status: Former Smoker     Types: Cigarettes     Quit date: 5/1/2010     Years since quitting: 10.7     Smokeless tobacco: Never Used   Substance Use Topics     Alcohol use: Yes     Comment: rare     If you drink " "alcohol do you typically have >3 drinks per day or >7 drinks per week? {ETOH :009957}                     Reviewed orders with patient.  Reviewed health maintenance and updated orders accordingly - {Yes/No:297383::\"Yes\"}  {Chronicprobdata (Optional):267285}    Breast CA Risk Screening:  No flowsheet data found.  {Pull in link for FHS-7 answers if completed after opening note (Optional) :792212}  {If any of the questions to the BCRA (FHS-7) are answered yes, consider ordering referral for genetic counseling (Optional) :566635::\"click delete button to remove this line now\"}  {AMB Mammogram Decision Support :944271}  Pertinent mammograms are reviewed under the imaging tab.    Pertinent mammograms are reviewed under the imaging tab.  History of abnormal Pap smear: {PAP HX:199581}  PAP / HPV Latest Ref Rng & Units 9/11/2017 3/3/2014 2/22/2012   PAP - NIL NIL NIL   HPV 16 DNA NEG:Negative Negative - -   HPV 18 DNA NEG:Negative Negative - -   OTHER HR HPV NEG:Negative Negative - -     Reviewed and updated as needed this visit by clinical staff                 Reviewed and updated as needed this visit by Provider                {HISTORY OPTIONS (Optional):729528}    ROS:  { :310184}    OBJECTIVE:   There were no vitals taken for this visit.  EXAM:  {Exam Choices:239371}    {Diagnostic Test Results (Optional):328475::\"Diagnostic Test Results:\",\"Labs reviewed in Epic\"}    ASSESSMENT/PLAN:   {Diag Picklist:484524}    Patient has been advised of split billing requirements and indicates understanding: {YES / NO:774811::\"Yes\"}  COUNSELING:   {FEMALE COUNSELING MESSAGES:758172::\"Reviewed preventive health counseling, as reflected in patient instructions\"}    Estimated body mass index is 37.49 kg/m  as calculated from the following:    Height as of 1/15/21: 1.575 m (5' 2\").    Weight as of 1/15/21: 93 kg (205 lb).    {Weight Management Plan (ACO) Complete if BMI is abnormal-  Ages 18-64  BMI >24.9.  Age 65+ with BMI <23 or >30 " (Optional):720812}    She reports that she quit smoking about 10 years ago. Her smoking use included cigarettes. She has never used smokeless tobacco.      Counseling Resources:  ATP IV Guidelines  Pooled Cohorts Equation Calculator  Breast Cancer Risk Calculator  BRCA-Related Cancer Risk Assessment: FHS-7 Tool  FRAX Risk Assessment  ICSI Preventive Guidelines  Dietary Guidelines for Americans, 2010  [employer] MyPlate  ASA Prophylaxis  Lung CA Screening    ESTUARDO Jain CNP  [facility]

## 2021-02-05 ENCOUNTER — VIRTUAL VISIT (OUTPATIENT)
Dept: ENDOCRINOLOGY | Facility: CLINIC | Age: 36
End: 2021-02-05
Payer: COMMERCIAL

## 2021-02-05 ENCOUNTER — OFFICE VISIT (OUTPATIENT)
Dept: FAMILY MEDICINE | Facility: CLINIC | Age: 36
End: 2021-02-05
Payer: COMMERCIAL

## 2021-02-05 VITALS
HEART RATE: 91 BPM | DIASTOLIC BLOOD PRESSURE: 84 MMHG | HEIGHT: 62 IN | OXYGEN SATURATION: 98 % | BODY MASS INDEX: 38.64 KG/M2 | SYSTOLIC BLOOD PRESSURE: 128 MMHG | TEMPERATURE: 98.7 F | WEIGHT: 210 LBS

## 2021-02-05 VITALS — WEIGHT: 208 LBS | HEIGHT: 62 IN | BODY MASS INDEX: 38.28 KG/M2

## 2021-02-05 DIAGNOSIS — Z11.59 ENCOUNTER FOR HEPATITIS C SCREENING TEST FOR LOW RISK PATIENT: ICD-10-CM

## 2021-02-05 DIAGNOSIS — Z00.00 ROUTINE GENERAL MEDICAL EXAMINATION AT A HEALTH CARE FACILITY: Primary | ICD-10-CM

## 2021-02-05 DIAGNOSIS — I10 HYPERTENSION GOAL BP (BLOOD PRESSURE) < 140/90: ICD-10-CM

## 2021-02-05 DIAGNOSIS — F32.1 MODERATE MAJOR DEPRESSION (H): ICD-10-CM

## 2021-02-05 DIAGNOSIS — Z71.3 NUTRITIONAL COUNSELING: Primary | ICD-10-CM

## 2021-02-05 DIAGNOSIS — Z98.84 S/P LAPAROSCOPIC SLEEVE GASTRECTOMY: ICD-10-CM

## 2021-02-05 DIAGNOSIS — E66.9 OBESITY: ICD-10-CM

## 2021-02-05 DIAGNOSIS — L30.4 INTERTRIGO: ICD-10-CM

## 2021-02-05 DIAGNOSIS — G43.109 MIGRAINE WITH AURA AND WITHOUT STATUS MIGRAINOSUS, NOT INTRACTABLE: ICD-10-CM

## 2021-02-05 DIAGNOSIS — Z98.84 S/P LAPAROSCOPIC SLEEVE GASTRECTOMY: Primary | ICD-10-CM

## 2021-02-05 PROCEDURE — 99024 POSTOP FOLLOW-UP VISIT: CPT | Mod: 95 | Performed by: SURGERY

## 2021-02-05 PROCEDURE — 97803 MED NUTRITION INDIV SUBSEQ: CPT | Mod: 95 | Performed by: DIETITIAN, REGISTERED

## 2021-02-05 PROCEDURE — 99395 PREV VISIT EST AGE 18-39: CPT | Performed by: NURSE PRACTITIONER

## 2021-02-05 PROCEDURE — 86803 HEPATITIS C AB TEST: CPT | Performed by: NURSE PRACTITIONER

## 2021-02-05 PROCEDURE — 99213 OFFICE O/P EST LOW 20 MIN: CPT | Mod: 25 | Performed by: NURSE PRACTITIONER

## 2021-02-05 PROCEDURE — 36415 COLL VENOUS BLD VENIPUNCTURE: CPT | Performed by: NURSE PRACTITIONER

## 2021-02-05 RX ORDER — PEDI MULTIVIT NO.25/FOLIC ACID 300 MCG
1 TABLET,CHEWABLE ORAL DAILY
COMMUNITY
End: 2022-12-27

## 2021-02-05 RX ORDER — NYSTATIN 100000 [USP'U]/G
POWDER TOPICAL 2 TIMES DAILY
Qty: 45 G | Refills: 0 | Status: SHIPPED | OUTPATIENT
Start: 2021-02-05 | End: 2021-10-13

## 2021-02-05 RX ORDER — VERAPAMIL HYDROCHLORIDE 180 MG/1
180 CAPSULE, EXTENDED RELEASE ORAL DAILY
Qty: 90 CAPSULE | Refills: 3 | Status: SHIPPED | OUTPATIENT
Start: 2021-02-05 | End: 2022-06-23

## 2021-02-05 ASSESSMENT — ENCOUNTER SYMPTOMS
CONSTIPATION: 0
COUGH: 0
FREQUENCY: 0
EYE PAIN: 0
HEMATURIA: 0
NAUSEA: 0
HEADACHES: 1
DIARRHEA: 0
HEMATOCHEZIA: 0
CHILLS: 0
SHORTNESS OF BREATH: 0
SORE THROAT: 0
PALPITATIONS: 0
NERVOUS/ANXIOUS: 1
WEAKNESS: 0
DYSURIA: 0
JOINT SWELLING: 0
DIZZINESS: 0
BREAST MASS: 0
ARTHRALGIAS: 0
FEVER: 0
ABDOMINAL PAIN: 0
PARESTHESIAS: 0
MYALGIAS: 1
HEARTBURN: 0

## 2021-02-05 ASSESSMENT — PAIN SCALES - GENERAL: PAINLEVEL: NO PAIN (0)

## 2021-02-05 ASSESSMENT — MIFFLIN-ST. JEOR
SCORE: 1605.8
SCORE: 1596.73

## 2021-02-05 NOTE — PATIENT INSTRUCTIONS
Ricky Willams,    Follow-up with RD in 2 months.    Thank you,    Aida Martin, RD, LD  If you would like to schedule or reschedule an appointment with the RD, please call [phone]    Nutrition Goals  1) Follow soft diet for 4 weeks, then to progress to bariatric regular diet (3/1).   2) Consume 60 grams of protein/day.  3) Sip on 48-64 oz of fluids/day- between meals only.  4) Eat slowly (>20 min/meal), chewing foods well (to applesauce-like consistency).  5) Limit portions to 1/2 cup/meal.  6) Take the following after a Sleeve Gastrectomy:    Multivitamin/minerals: adult dose 2 times daily    Iron: 45-60 mg elemental (18-36 mg if low risk) - may partly or fully be covered in multivitamin     Calcium Citrate containing vitamin D: 500 mg 3 times daily or 600 mg 2 times daily    Vitamin B12: sublingual form of at least 500 mcg daily or injection of 1000 mcg monthly     B-50 Complex once daily     Post-op Diet Handouts:  Diet Guidelines after Weight-loss Surgery  [email or web address]     Your Stage 4 Diet: Soft Foods  [email or web address]    Your Stage 5 Diet: Regular Foods  [email or web address]    Supplements after Weight Loss Surgery  [email or web address]     Keeping Track of Fluids  [email or web address]    Interested in working with a health ?  Health coaches work with you to improve your overall health and wellbeing.  They look at the whole person, and may involve discussion of different areas of life, including, but not limited to the four pillars of health (sleep, exercise, nutrition, and stress management). Discuss with your care team if you would like to start working a health .    Health Coaching-3 Pack:    $99 for three health coaching visits    Visits may be done in person or via phone    Coaching is a partnership between the  and the client; Coaches do not prescribe or diagnose    Coaching helps inspire the client to reach his/her personal  goals      Virtual Support Groups are Available             Healthy Lifestyle Support Group      All are welcome!     Facilitator: Briseyda Gould, Certified Health     - Meets once a month on a Friday from 12:30pm to 1:30pm.  - Due to Covid-19 she is doing this Support Group virtually using Microsoft Teams.  - 60 minutes of small group guided discussion, support and resources.  - Please email Briseyda directly at [email or web address] Outlook to receive monthly invitations.  - If you opt to participate in individual health coaching sessions or for general questions, contact Briseyda via email.  - Briseyda will send out invites for each session, so the phone number and the conference ID may change for each session.     2020 Meetings      December 18 - Open Forum      2021 Meetings      January 29 - How to Stay Active and Healthy during the Winter Months   February 26  - Reading Food Labels: What do I Need to Know?, Guest Speaker: Aida Martin RD   March 19  - Finding Health, Happiness and Confidence at Every Size; Guest Speaker, Health Psychologist Fellow  April 30 -  Healthy Eating on a Budget, Guest Speaker: Bertha Marroquin RD   May 21 - Open Forum

## 2021-02-05 NOTE — PROGRESS NOTES
"Екатерина is a 35 year old who is being evaluated via a billable video visit.      How would you like to obtain your AVS? MyChart  If the video visit is dropped, the invitation should be resent by: Text to cell phone: [phone]  Will anyone else be joining your video visit? No      Video Start Time: 926  Video-Visit Details    Type of service:  Video Visit    Video End Time:9:31 AM    Originating Location (pt. Location): Home    Distant Location (provider location):  [facility] [place]     Platform used for Video Visit: [facility]     Postoperative bariatric surgery visit.    Patient underwent sleeve gastrectomy 4 weeks ago.      Tolerating liquids: y  Lightheadedness: n  Abdominal pain: n  Bowel movements: y  Fevers/shakes/chills: n    Ht 1.575 m (5' 2\")   Wt 94.3 kg (208 lb)   BMI 38.04 kg/m    NAD  Overall looks well  virtual    Plan:  1. RD visit today.  2. Start vitamin supplements per RD directions.  3. Advance diet per RD directions.  4. Continue current plan of care  5. Follow-up: 8 weeks.    Saud Stein MD                              "

## 2021-02-05 NOTE — NURSING NOTE
"Chief Complaint   Patient presents with     RECHECK     Follow up 21 day post op       Vitals:    02/05/21 0755   Weight: 94.3 kg (208 lb)   Height: 1.575 m (5' 2\")       Body mass index is 38.04 kg/m .                            EDWAR FOX, EMT    "

## 2021-02-05 NOTE — PROGRESS NOTES
"Екатерина Ramon is a 35 year old female who is being evaluated via a billable video visit.      The patient has been notified of following:     \"This video visit will be conducted via a call between you and your physician/provider. We have found that certain health care needs can be provided without the need for an in-person physical exam.  This service lets us provide the care you need with a video conversation.  If a prescription is necessary we can send it directly to your pharmacy.  If lab work is needed we can place an order for that and you can then stop by our lab to have the test done at a later time.    Video visits are billed at different rates depending on your insurance coverage.  Please reach out to your insurance provider with any questions.    If during the course of the call the physician/provider feels a video visit is not appropriate, you will not be charged for this service.\"    Patient has given verbal consent for Video visit? Yes  How would you like to obtain your AVS? MyChart  Will anyone else be joining your video visit? No        Video-Visit Details    Type of service:  Video Visit    Video Start Time: 1:04 PM  Video End Time: 1:15 PM  Time Spent with Patient: 11 mins    Originating Location (pt. Location): Home    Distant Location (provider location):  [facility]     Platform used for Video Visit: [employer]    During this virtual visit the patient is located in [place], patient verifies this as the location during the entirety of this visit.     Nutrition Assessment  Reason For Visit:  Екатерина Ramon is a 35 year old female presenting today for nutrition follow-up, 1 month s/p laparoscopic sleeve gastrectomy with Dr Stein on 1/4/21.  Patient referred by Dr. Stein on January 15, 2021.    Anthropometrics  Initial Consult Weight: 232 lbs  Day of Surgery Weight(1/4/21): 220 lbs  Current Weight: 206 lbs  Weight loss: 26 lbs from initial consult; 14 lbs from " day of surgery    Current Vitamins/Minerals: MVI/minerals (one Flinstones Complete with iron daily). Pt reports black loose stools when doing 2 MVI with iron/day, tolerating 1 per day fine. Pt reports this happened in the past when taking an iron supplement. Plans on purchasing another chewable MVI without iron to take with Flinstones.    Nutrition History:  Екатерина has started and tolerating soft diet. Consuming lean proteins (chicken, ham, beef roast) + baked veggies, fruits.  Hamburger did not sit well.   Meals TID, eating 1/2 cup per meal. One protein shake/day between meals. Consuming 64+ oz of water, SF sport drinks daily.     Progress with Previous Goals:  1) Follow bariatric low-fat full liquid diet through day 13 post-op, then to progress to pureed diet x 2 weeks.  If tolerating, may advance on day 29 post-op to bariatric soft diet. Met, continues   2) Work towards 60 gm protein/day. Met, continues   3) Consume 48-64 oz fluids daily- between meals. Met, continues   4) Eat slowly (>20 min/meal), chewing well to smooth consistency once on the bariatric soft diet. Met, continues    5) Limit portions to 1/2 cup/meal. Met, continues   6) Start chewable/liquid multivitamin/minerals twice daily. - Is only able to tolerate one MVI with iron daily, intends on purchasing another mvi w/o iron to take once daily with Flinstones Complete with iron.     Nutrition Prescription:  Grams Protein: 60 (minimum)   Amount of Fluid: 48-64 oz    Nutrition Diagnosis  Previous: Food and nutrition-related knowledge deficit r/t lack of prior exposure to diet advancements beyond bariatric low-fat full liquid diet aeb pt unable to verbalize understanding of bariatric pureed and soft diets.    Current: Food and nutrition-related knowledge deficit r/t lack of prior exposure to diet advancements beyond bariatric pureed diet aeb pt unable to verbalize full understanding of bariatric soft and regular consistency  diets.    Intervention  Materials/Education provided on bariatric soft and regular consistency diets, protein intake, fluid intake, eating pace, chewing foods well, portion control, sugar/fat intake, recommended vitamin/mineral supplements. Patient demonstrates understanding.     Expected Engagement: Good    Goals:  1) Follow soft diet for 4 weeks, then to progress to bariatric regular diet (3/1).   2) Consume 60 grams of protein/day.  3) Sip on 48-64 oz of fluids/day- between meals only.  4) Eat slowly (>20 min/meal), chewing foods well (to applesauce-like consistency).  5) Limit portions to 1/2 cup/meal.  6) Take the following after a Sleeve Gastrectomy:    Multivitamin/minerals: adult dose 2 times daily    Iron: 45-60 mg elemental (18-36 mg if low risk) - may partly or fully be covered in multivitamin     Calcium Citrate containing vitamin D: 500 mg 3 times daily or 600 mg 2 times daily    Vitamin B12: sublingual form of at least 500 mcg daily or injection of 1000 mcg monthly     B-50 Complex once daily     Post-op Diet Handouts:  Diet Guidelines after Weight-loss Surgery  [email or web address]     Your Stage 4 Diet: Soft Foods  [email or web address]    Your Stage 5 Diet: Regular Foods  [email or web address]    Supplements after Weight Loss Surgery  [email or web address]     Keeping Track of Fluids  [email or web address]         Follow-Up: 2 months, LIDIA Conteh

## 2021-02-05 NOTE — LETTER
"2/5/2021       RE: Екатерина Ramon  [address]  [place] [address]     Dear Colleague,    Thank you for referring your patient, Екатерина Ramon, to the [facility] [place] at [facility]. Please see a copy of my visit note below.    Екатерина is a 35 year old who is being evaluated via a billable video visit.      How would you like to obtain your AVS? MyChart  If the video visit is dropped, the invitation should be resent by: Text to cell phone: [phone]  Will anyone else be joining your video visit? No      Video Start Time: 926  Video-Visit Details    Type of service:  Video Visit    Video End Time:9:31 AM    Originating Location (pt. Location): Home    Distant Location (provider location):  [facility] [place]     Platform used for Video Visit: [facility]     Postoperative bariatric surgery visit.    Patient underwent sleeve gastrectomy 4 weeks ago.      Tolerating liquids: y  Lightheadedness: n  Abdominal pain: n  Bowel movements: y  Fevers/shakes/chills: n    Ht 1.575 m (5' 2\")   Wt 94.3 kg (208 lb)   BMI 38.04 kg/m    NAD  Overall looks well  virtual    Plan:  1. RD visit today.  2. Start vitamin supplements per RD directions.  3. Advance diet per RD directions.  4. Continue current plan of care  5. Follow-up: 8 weeks.    Saud Stein MD        "

## 2021-02-05 NOTE — PROGRESS NOTES
SUBJECTIVE:   CC: Екатерина Ramon is an 35 year old woman who presents for preventive health visit.       Patient has been advised of split billing requirements and indicates understanding: Yes  Healthy Habits:     Getting at least 3 servings of Calcium per day:  Yes    Bi-annual eye exam:  NO    Dental care twice a year:  Yes    Sleep apnea or symptoms of sleep apnea:  Daytime drowsiness    Diet:  Other    Frequency of exercise:  4-5 days/week    Duration of exercise:  45-60 minutes    Taking medications regularly:  Yes    Medication side effects:  None    PHQ-2 Total Score: 0    Additional concerns today:  No    Hypertension, migraines, and depression are all well controlled on current medications. Patient has gastric sleeve earlier this month and recovering well, following with weight management team.    Today's PHQ-2 Score:   PHQ-2 ( 1999 Pfizer) 2/5/2021   Q1: Little interest or pleasure in doing things 0   Q2: Feeling down, depressed or hopeless 0   PHQ-2 Score 0   Q1: Little interest or pleasure in doing things Not at all   Q2: Feeling down, depressed or hopeless Not at all   PHQ-2 Score 0       Abuse: Current or Past (Physical, Sexual or Emotional) - No  Do you feel safe in your environment? Yes        Social History     Tobacco Use     Smoking status: Former Smoker     Types: Cigarettes     Quit date: 5/1/2010     Years since quitting: 10.7     Smokeless tobacco: Never Used   Substance Use Topics     Alcohol use: Yes     Comment: rare         Alcohol Use 2/5/2021   Prescreen: >3 drinks/day or >7 drinks/week? No   Prescreen: >3 drinks/day or >7 drinks/week? -       Any new diagnosis of family breast, ovarian, or bowel cancer? No     Reviewed orders with patient.  Reviewed health maintenance and updated orders accordingly - Yes  Labs reviewed in [employer]    Breast CA Risk Screening:  No flowsheet data found.        Pertinent mammograms are reviewed under the imaging tab.    History of abnormal Pap smear: NO  "- age 30-65 PAP every 5 years with negative HPV co-testing recommended  PAP / HPV Latest Ref Rng & Units 9/11/2017 3/3/2014 2/22/2012   PAP - NIL NIL NIL   HPV 16 DNA NEG:Negative Negative - -   HPV 18 DNA NEG:Negative Negative - -   OTHER HR HPV NEG:Negative Negative - -     Reviewed and updated as needed this visit by clinical staff  Tobacco  Allergies  Meds              Reviewed and updated as needed this visit by Provider                    Review of Systems   Constitutional: Negative for chills and fever.   HENT: Negative for congestion, ear pain, hearing loss and sore throat.    Eyes: Negative for pain and visual disturbance.   Respiratory: Negative for cough and shortness of breath.    Cardiovascular: Negative for chest pain, palpitations and peripheral edema.   Gastrointestinal: Negative for abdominal pain, constipation, diarrhea, heartburn, hematochezia and nausea.   Breasts:  Negative for tenderness, breast mass and discharge.   Genitourinary: Negative for dysuria, frequency, genital sores, hematuria, pelvic pain, urgency, vaginal bleeding and vaginal discharge.   Musculoskeletal: Positive for myalgias. Negative for arthralgias and joint swelling.   Skin: Negative for rash.   Neurological: Positive for headaches. Negative for dizziness, weakness and paresthesias.   Psychiatric/Behavioral: Negative for mood changes. The patient is nervous/anxious.           OBJECTIVE:   /84 (BP Location: Right arm, Patient Position: Chair, Cuff Size: Adult Large)   Pulse 91   Temp 98.7  F (37.1  C) (Oral)   Ht 1.575 m (5' 2\")   Wt 95.3 kg (210 lb)   SpO2 98%   BMI 38.41 kg/m    Physical Exam  GENERAL: healthy, alert and no distress  EYES: Eyes grossly normal to inspection, PERRL and conjunctivae and sclerae normal  HENT: ear canals and TM's normal, nose and mouth without ulcers or lesions  NECK: no adenopathy, no asymmetry, masses, or scars and thyroid normal to palpation  RESP: lungs clear to auscultation - " no rales, rhonchi or wheezes  BREAST: normal without masses, tenderness or nipple discharge and no palpable axillary masses or adenopathy  CV: regular rate and rhythm, normal S1 S2, no S3 or S4, no murmur, click or rub, no peripheral edema and peripheral pulses strong  ABDOMEN: soft, nontender, no hepatosplenomegaly, no masses and bowel sounds normal  MS: no gross musculoskeletal defects noted, no edema  SKIN: 2x4 mm oval brown nevus left upper abdomen, right abdomen with purple-red papule which dimples. Umbilicus moist with milky discharge  NEURO: Normal strength and tone, mentation intact and speech normal  PSYCH: mentation appears normal, affect normal/bright    Diagnostic Test Results:  Labs reviewed in Epic  No results found for this or any previous visit (from the past 24 hour(s)).    ASSESSMENT/PLAN:   1. Routine general medical examination at a health care facility      2. Hypertension goal BP (blood pressure) < 140/90  Well controlled, continue medications without change  - verapamil ER (VERELAN) 180 MG 24 hr capsule; Take 1 capsule (180 mg) by mouth daily  Dispense: 90 capsule; Refill: 3    3. Migraine with aura and without status migrainosus, not intractable  Well controlled, continue current medications  - verapamil ER (VERELAN) 180 MG 24 hr capsule; Take 1 capsule (180 mg) by mouth daily  Dispense: 90 capsule; Refill: 3    4. Moderate major depression (H)  Well controlled, continue current medications    5. Encounter for hepatitis C screening test for low risk patient    - **Hepatitis C Screen Reflex to RNA FUTURE anytime    6. Intertrigo  Dry skin well  - nystatin (MYCOSTATIN) 951572 UNIT/GM external powder; Apply topically 2 times daily  Dispense: 45 g; Refill: 0    Patient has been advised of split billing requirements and indicates understanding: No  COUNSELING:  Reviewed preventive health counseling, as reflected in patient instructions       Regular exercise       Healthy diet/nutrition        "Osteoporosis prevention/bone health    Estimated body mass index is 38.41 kg/m  as calculated from the following:    Height as of this encounter: 1.575 m (5' 2\").    Weight as of this encounter: 95.3 kg (210 lb).    Weight management plan: Patient referred to endocrine and/or weight management specialty    She reports that she quit smoking about 10 years ago. Her smoking use included cigarettes. She has never used smokeless tobacco.      Counseling Resources:  ATP IV Guidelines  Pooled Cohorts Equation Calculator  Breast Cancer Risk Calculator  BRCA-Related Cancer Risk Assessment: FHS-7 Tool  FRAX Risk Assessment  ICSI Preventive Guidelines  Dietary Guidelines for Americans, 2010  USDA's MyPlate  ASA Prophylaxis  Lung CA Screening    Anusha Wolff, ESTUARDO CNP  [facility]  "

## 2021-02-05 NOTE — LETTER
"2/5/2021       RE: Екатерина Ramon  [address]  [place] [address]     Dear Colleague,    Thank you for referring your patient, Екатерина Ramon, to the [facility] [place] at [facility]. Please see a copy of my visit note below.    Екатерина Ramon is a 35 year old female who is being evaluated via a billable video visit.      The patient has been notified of following:     \"This video visit will be conducted via a call between you and your physician/provider. We have found that certain health care needs can be provided without the need for an in-person physical exam.  This service lets us provide the care you need with a video conversation.  If a prescription is necessary we can send it directly to your pharmacy.  If lab work is needed we can place an order for that and you can then stop by our lab to have the test done at a later time.    Video visits are billed at different rates depending on your insurance coverage.  Please reach out to your insurance provider with any questions.    If during the course of the call the physician/provider feels a video visit is not appropriate, you will not be charged for this service.\"    Patient has given verbal consent for Video visit? Yes  How would you like to obtain your AVS? MyChart  Will anyone else be joining your video visit? No        Video-Visit Details    Type of service:  Video Visit    Video Start Time: 1:04 PM  Video End Time: 1:15 PM  Time Spent with Patient: 11 mins    Originating Location (pt. Location): Home    Distant Location (provider location):  [facility]NA[facility]     Platform used for Video Visit: [employer]    During this virtual visit the patient is located in [place], patient verifies this as the location during the entirety of this visit.     Nutrition Assessment  Reason For Visit:  Екатерина Ramon is a 35 year old female " presenting today for nutrition follow-up, 1 month s/p laparoscopic sleeve gastrectomy with Dr Stein on 1/4/21.  Patient referred by Dr. Stein on January 15, 2021.    Anthropometrics  Initial Consult Weight: 232 lbs  Day of Surgery Weight(1/4/21): 220 lbs  Current Weight: 206 lbs  Weight loss: 26 lbs from initial consult; 14 lbs from day of surgery    Current Vitamins/Minerals: MVI/minerals (one Flinstones Complete with iron daily). Pt reports black loose stools when doing 2 MVI with iron/day, tolerating 1 per day fine. Pt reports this happened in the past when taking an iron supplement. Plans on purchasing another chewable MVI without iron to take with Flinstones.    Nutrition History:  Екатерина has started and tolerating soft diet. Consuming lean proteins (chicken, ham, beef roast) + baked veggies, fruits.  Hamburger did not sit well.   Meals TID, eating 1/2 cup per meal. One protein shake/day between meals. Consuming 64+ oz of water, SF sport drinks daily.     Progress with Previous Goals:  1) Follow bariatric low-fat full liquid diet through day 13 post-op, then to progress to pureed diet x 2 weeks.  If tolerating, may advance on day 29 post-op to bariatric soft diet. Met, continues   2) Work towards 60 gm protein/day. Met, continues   3) Consume 48-64 oz fluids daily- between meals. Met, continues   4) Eat slowly (>20 min/meal), chewing well to smooth consistency once on the bariatric soft diet. Met, continues    5) Limit portions to 1/2 cup/meal. Met, continues   6) Start chewable/liquid multivitamin/minerals twice daily. - Is only able to tolerate one MVI with iron daily, intends on purchasing another mvi w/o iron to take once daily with Flinstones Complete with iron.     Nutrition Prescription:  Grams Protein: 60 (minimum)   Amount of Fluid: 48-64 oz    Nutrition Diagnosis  Previous: Food and nutrition-related knowledge deficit r/t lack of prior exposure to diet advancements beyond bariatric low-fat full liquid  diet aeb pt unable to verbalize understanding of bariatric pureed and soft diets.    Current: Food and nutrition-related knowledge deficit r/t lack of prior exposure to diet advancements beyond bariatric pureed diet aeb pt unable to verbalize full understanding of bariatric soft and regular consistency diets.    Intervention  Materials/Education provided on bariatric soft and regular consistency diets, protein intake, fluid intake, eating pace, chewing foods well, portion control, sugar/fat intake, recommended vitamin/mineral supplements. Patient demonstrates understanding.     Expected Engagement: Good    Goals:  1) Follow soft diet for 4 weeks, then to progress to bariatric regular diet (3/1).   2) Consume 60 grams of protein/day.  3) Sip on 48-64 oz of fluids/day- between meals only.  4) Eat slowly (>20 min/meal), chewing foods well (to applesauce-like consistency).  5) Limit portions to 1/2 cup/meal.  6) Take the following after a Sleeve Gastrectomy:    Multivitamin/minerals: adult dose 2 times daily    Iron: 45-60 mg elemental (18-36 mg if low risk) - may partly or fully be covered in multivitamin     Calcium Citrate containing vitamin D: 500 mg 3 times daily or 600 mg 2 times daily    Vitamin B12: sublingual form of at least 500 mcg daily or injection of 1000 mcg monthly     B-50 Complex once daily     Post-op Diet Handouts:  Diet Guidelines after Weight-loss Surgery  [email or web address]     Your Stage 4 Diet: Soft Foods  [email or web address]    Your Stage 5 Diet: Regular Foods  [email or web address]    Supplements after Weight Loss Surgery  [email or web address]     Keeping Track of Fluids  [email or web address]         Follow-Up: 2 months, LIDIA Conteh

## 2021-02-08 LAB — HCV AB SERPL QL IA: NONREACTIVE

## 2021-02-10 ENCOUNTER — MYC MEDICAL ADVICE (OUTPATIENT)
Dept: FAMILY MEDICINE | Facility: CLINIC | Age: 36
End: 2021-02-10

## 2021-03-25 NOTE — PROGRESS NOTES
Assessment & Plan     Chronic midline low back pain with left-sided sciatica  Pain has improved since recent flare, but given short-term Tramadol for breakthrough if pain recurs. Cannot take NSAIDs s/p bariatric surgery.  Patient referred to pain management for injection and will send JOCY for MRI results.   ADDENDUM: MRI reviewed and minimal pathology- order changed to injection per pain management specialist  - tiZANidine (ZANAFLEX) 2 MG tablet; Take 1 tablet (2 mg) by mouth 3 times daily as needed for muscle spasms  - traMADol (ULTRAM) 50 MG tablet; Take 1 tablet (50 mg) by mouth every 6 hours as needed for severe pain  - PAIN MANAGEMENT REFERRAL; Future    Ordering of each unique test  Prescription drug management             No follow-ups on file.    ESTUARDO Jain CNP  [facility] SHANIQUA Willams is a 35 year old who presents for the following health issues     HPI     Back Pain    Onset/Duration: 4 years but getting worse  Description:   Location of pain: lower pain  Character of pain: stabbing, burning and cramping  Pain radiation: radiates into the left buttocks and radiates into the left leg  New numbness or weakness in legs, not attributed to pain: no   Intensity: moderate  Progression of Symptoms: worsening  History:   Specific cause: none  Pain interferes with job: no  History of back problems: Yes  Any previous MRI or X-rays: Yes  Sees a specialist for back pain: Yes  Alleviating factors:   Improved by: laying and heat    Precipitating factors:  Worsened by: Standing, Sitting and Walking  Therapies tried and outcome: Physical Therapy did not help    Accompanying Signs & Symptoms:  Risk of Fracture: None  Risk of Cauda Equina: None  Risk of Infection: None  Risk of Cancer: None  Risk of Ankylosing Spondylitis: Onset at age <35, male, AND morning back stiffness  no     Lumbar MRI done at [facility] 2-3 years ago. Recalls DDD? No report available at time of apt.  Has  "done 2 session of Physical Therapy.   No injections.  Prednisone helped, muscle relaxer didn't help.    Review of Systems   Constitutional, HEENT, cardiovascular, pulmonary, gi and gu systems are negative, except as otherwise noted.      Objective    /80 (BP Location: Left arm, Patient Position: Chair, Cuff Size: Adult Large)   Pulse 98   Temp 99.3  F (37.4  C) (Oral)   Ht 1.575 m (5' 2\")   Wt 89.4 kg (197 lb)   SpO2 96%   BMI 36.03 kg/m    Body mass index is 36.03 kg/m .  Physical Exam   GENERAL: healthy, alert and no distress  Comprehensive back pain exam:  Tenderness of Midline lumbar spine and paralumbar muscles bilaterally, Lower extremity sensation normal and equal on both sides and Straight leg raise negative bilaterally    No results found for this or any previous visit (from the past 24 hour(s)).            "

## 2021-04-01 ENCOUNTER — OFFICE VISIT (OUTPATIENT)
Dept: FAMILY MEDICINE | Facility: CLINIC | Age: 36
End: 2021-04-01
Payer: COMMERCIAL

## 2021-04-01 VITALS
OXYGEN SATURATION: 96 % | HEART RATE: 98 BPM | HEIGHT: 62 IN | DIASTOLIC BLOOD PRESSURE: 80 MMHG | BODY MASS INDEX: 36.25 KG/M2 | TEMPERATURE: 99.3 F | SYSTOLIC BLOOD PRESSURE: 116 MMHG | WEIGHT: 197 LBS

## 2021-04-01 DIAGNOSIS — G89.29 CHRONIC MIDLINE LOW BACK PAIN WITH LEFT-SIDED SCIATICA: Primary | ICD-10-CM

## 2021-04-01 DIAGNOSIS — M54.42 CHRONIC MIDLINE LOW BACK PAIN WITH LEFT-SIDED SCIATICA: Primary | ICD-10-CM

## 2021-04-01 PROCEDURE — 99213 OFFICE O/P EST LOW 20 MIN: CPT | Performed by: NURSE PRACTITIONER

## 2021-04-01 RX ORDER — TIZANIDINE 2 MG/1
2 TABLET ORAL 3 TIMES DAILY PRN
Qty: 30 TABLET | Refills: 2 | Status: SHIPPED | OUTPATIENT
Start: 2021-04-01 | End: 2021-10-13

## 2021-04-01 RX ORDER — TRAMADOL HYDROCHLORIDE 50 MG/1
50 TABLET ORAL EVERY 6 HOURS PRN
Qty: 10 TABLET | Refills: 0 | Status: SHIPPED | OUTPATIENT
Start: 2021-04-01 | End: 2021-04-04

## 2021-04-01 ASSESSMENT — MIFFLIN-ST. JEOR: SCORE: 1546.84

## 2021-04-05 ENCOUNTER — TELEPHONE (OUTPATIENT)
Dept: PALLIATIVE MEDICINE | Facility: CLINIC | Age: 36
End: 2021-04-05

## 2021-04-05 NOTE — TELEPHONE ENCOUNTER
Order received for Injection to be Determined by Pain Management Specialist.    Routing to review.    Mela ROSENBERG    [facility]

## 2021-04-05 NOTE — TELEPHONE ENCOUNTER
Based on review of notes and imaging it is unclear what injection would be recommended. Will route to nursing to get further details about pain.     Reyna Santos MD  [facility]

## 2021-04-06 NOTE — TELEPHONE ENCOUNTER
Called Екатерина.  Need to gather more information about her pain.  She states her pain is to the middle of the mid/lower back area.  Pain goes into to buttock on both sides but pain right now is to the left sciatic area and pain goes down the leg.  Pain goes down the thigh to the knee. Pain stays in the back of the leg.  No numbness, tingling or weakness to the leg. Pain is a throbbing pain that can come and go, but most of the time the pain has been constant.      Routing to provider to determine appropriate injection for patient    Марина Sandoval RN  Care Coordinator  [facility]

## 2021-04-06 NOTE — TELEPHONE ENCOUNTER
It is hard to determine if and what type of injection would be helpful without an exam in this case. Overall her imaging does not show any significant pathology. Recommend that the patient be seen in person for an interventional evaluation so that a physical exam can be done. She may potentially benefit from targeting the facet joints of SI joints but again cannot make this determination without a physical exam.     Reyna Santos MD  [facility]

## 2021-04-15 ENCOUNTER — OFFICE VISIT (OUTPATIENT)
Dept: PALLIATIVE MEDICINE | Facility: CLINIC | Age: 36
End: 2021-04-15
Payer: COMMERCIAL

## 2021-04-15 VITALS — HEART RATE: 97 BPM | DIASTOLIC BLOOD PRESSURE: 90 MMHG | SYSTOLIC BLOOD PRESSURE: 127 MMHG

## 2021-04-15 DIAGNOSIS — G89.29 CHRONIC MIDLINE LOW BACK PAIN WITH LEFT-SIDED SCIATICA: ICD-10-CM

## 2021-04-15 DIAGNOSIS — M54.42 CHRONIC MIDLINE LOW BACK PAIN WITH LEFT-SIDED SCIATICA: ICD-10-CM

## 2021-04-15 DIAGNOSIS — M54.16 LUMBAR RADICULOPATHY: Primary | ICD-10-CM

## 2021-04-15 PROCEDURE — 99204 OFFICE O/P NEW MOD 45 MIN: CPT | Performed by: PAIN MEDICINE

## 2021-04-15 ASSESSMENT — PAIN SCALES - GENERAL: PAINLEVEL: SEVERE PAIN (7)

## 2021-04-15 NOTE — PATIENT INSTRUCTIONS
- Further procedures recommended:    - ordered Left L5-S1 intralaminar epidural steroid injection   - Follow up: for procedure       ----------------------------------------------------------------  Clinic Number:  [phone]     Call with any questions about your care and for scheduling assistance.     Calls are returned Monday through Friday between 8 AM and 4:30 PM. We usually get back to you within 2 business days depending on the issue/request.    If we are prescribing your medications:    For opioid medication refills, call the clinic or send a [facility] message 7 days in advance.  Please include:    Name of requested medication    Name of the pharmacy.    For non-opioid medications, call your pharmacy directly to request a refill. Please allow 3-4 days to be processed.     Per [place] State Law:    All controlled substance prescriptions must be filled within 30 days of being written.      For those controlled substances allowing refills, pickup must occur within 30 days of last fill.      We believe regular attendance is key to your success in our program!      Any time you are unable to keep your appointment we ask that you call us at least 24 hours in advance to cancel.This will allow us to offer the appointment time to another patient.     Multiple missed appointments may lead to dismissal from the clinic.

## 2021-04-15 NOTE — PROGRESS NOTES
[facility] Consultation    Date of visit: 4/15/2021    Reason for consultation:    Primary Care Provider is Anusha Wolff.  Pain medications are being prescribed byn/a.    Please see the [facility] health questionnaire which the patient completed and reviewed with me in detail.    Chief Complaint:    Chief Complaint   Patient presents with     Pain     MME prescribed prior to seeing patient:  Current MME:    Pain history: Acute on chronic left-sided low back pain radiating to her lower extremity  Екатерина Ramon is a 35 year old female who first started having problems with pain greater than 2 years ago  She denies any specific inciting event  The pain has been getting progressively worse  Patient has tried PT multiple times with limited improvement  Currently the pain is bilateral left greater than right  The pain is essentially constant  Pain radiates to her left posterior thigh  Pain varies in nature   pain varies in severity  The pain can be sharp shooting  Pain can be a pulling type sensation in the back of her thigh  She denies any numbness tingling or burning  The pain is worse with prolonged walking  The pain is worse with bending  Pain is worse with lifting  Pain is slightly better with rest  Just started zanafex with benefit, takes intermittently   Denies any overt weakness  Denies recent falls  Denies incontinence  Pain significantly affects her quality of life      Of note her migraines are currently well controlled      Pain rating: intensity  Averages 6/10 on a 0-10 scale.      Current treatments include:    Effexor  Trazodone  Tizanidine  Imitrex  Atarax  Previous medication treatments included:  Tramadol, dil, oxy    Other treatments have included:  Екатерина Ramon has not been seen at a pain clinic in the past.    PT: y    Injections: No    Past Medical History:  Past Medical History:   Diagnosis Date     Family history of diabetes mellitus       Hypertension      Migraine with aura and without status migrainosus, not intractable 11/2/2016     PID (acute pelvic inflammatory disease) 5/2010     Past Surgical History:  Past Surgical History:   Procedure Laterality Date     APPENDECTOMY       BUNIONECTOMY LAPIDUS WITH TARSAL METATARSAL (TMT) FUSION Right 08/14/2018    Procedure: BUNIONECTOMY LAPIDUS WITH TARSAL METATARSAL (TMT) FUSION;  Right first tarsometatarsal joint fusion, right Jackson bunionectomy;  Surgeon: Zack Healy DPM;  Location: [facility] OR     HC REVISE MEDIAN N/CARPAL TUNNEL SURG Left 06/05/2015    Primary, not revision     LAPAROSCOPIC GASTRIC SLEEVE N/A 1/4/2021    Procedure: GASTRECTOMY, SLEEVE, LAPAROSCOPIC;  Surgeon: Saud Stein MD;  Location: [facility] OR     RELEASE CARPAL TUNNEL Left 06/05/2015    Procedure: RELEASE CARPAL TUNNEL;  Surgeon: Juan Jose Joaquin MD;  Location: [facility] OR     Medications:  Current Outpatient Medications   Medication Sig Dispense Refill     acetaminophen (TYLENOL) 325 MG tablet Take 2 tablets (650 mg) by mouth every 6 hours as needed for mild pain 45 tablet 0     buPROPion (WELLBUTRIN) 100 MG tablet Take 0.5 tablets (50 mg) by mouth 3 times daily 50 tablet 1     hydrOXYzine (ATARAX) 25 MG tablet Take 1 tablet (25 mg) by mouth every 6 hours as needed for anxiety 30 tablet 1     nystatin (MYCOSTATIN) 908052 UNIT/GM external powder Apply topically 2 times daily 45 g 0     omeprazole (PRILOSEC) 20 MG DR capsule Take 1 capsule (20 mg) by mouth every morning (before breakfast) 60 capsule 4     SUMAtriptan (IMITREX) 25 MG tablet Take 1-2 tablets (25-50 mg) by mouth at onset of headache for migraine 50 tablet 1     tiZANidine (ZANAFLEX) 2 MG tablet Take 1 tablet (2 mg) by mouth 3 times daily as needed for muscle spasms 30 tablet 2     traZODone (DESYREL) 50 MG tablet Take 1-2 tablets ( mg) by mouth nightly as needed for sleep 60 tablet 5     venlafaxine (EFFEXOR) 75 MG tablet Take 1 tablet (75 mg) by mouth 3  times daily 45 tablet 1     verapamil ER (VERELAN) 180 MG 24 hr capsule Take 1 capsule (180 mg) by mouth daily 90 capsule 3     aspirin 81 MG EC tablet Take 81 mg by mouth daily       childrens multivitamin w/iron (FLINTSTONES COMPLETE) 18 MG chewable tablet Take 1 chew tab by mouth daily       hyoscyamine (LEVSIN) 0.125 MG tablet Take 1 tablet (125 mcg) by mouth every 4 hours as needed for cramping (spasms) (Patient not taking: Reported on 1/15/2021) 30 tablet 0     Allergies:     Allergies   Allergen Reactions     Oxycodone Itching     Lisinopril Cough     Zoloft      tired     Social History:    History of chemical dependency treatment: n    Family history:  Family History   Problem Relation Age of Onset     Diabetes Mother      Hypertension Mother      Cancer Mother         skin     Diabetes Father      Anesthesia Reaction Son         PONV     Glaucoma No family hx of      Macular Degeneration No family hx of      Deep Vein Thrombosis (DVT) No family hx of      Family history of headaches: n    Review of Systems:  Skin: negative  Eyes: negative  Ears/Nose/Throat: negative  Respiratory: No shortness of breath, dyspnea on exertion, cough, or hemoptysis  Cardiovascular: negative  Gastrointestinal: negative  Genitourinary: negative  Musculoskeletal: negative  Neurologic: negative  Psychiatric: negative  Hematologic/Lymphatic/Immunologic: negative  Endocrine: negative    Physical Exam:  Vitals:    04/15/21 1458   BP: (!) 127/90   Pulse: 97     Exam:  Constitutional: healthy, alert and no distress  Head: normocephalic. Atraumatic.     Cardiovascular: Negative JVD  Respiratory: Speaking in full sentences no accessory muscles use   gastrointestinal: soft, non-tender, normoactive bowel sounds   Skin: no suspicious lesions or rashes  Psychiatric: mentation appears normal and affect normal/bright    Musculoskeletal exam:  Gait/Station/Posture: wnl  Cervical spine: ROM within normal limits       Thoracic spine:  Normal  "    Lumbar spine:     ROM: Within normal limits   Myofascial tenderness: Diffuse   Forde's: Positive bilaterally               Straight leg exam: Positive on the left   BUBBA/FADIR: Negative              SI: Negative   Greater trochanteric bursa: Negative  Neurologic exam:  CN:  Cranial nerves 2-12 are normal  Motor:  5/5 UE and LE strength  Reflexes:        Achilles:  2    Sensory:  (upper and lower extremities):   Light touch: normal   Allodynia: absent    Dysethesia: absent    Hyperalgesia: absent     Diagnostic tests:  MRI of  was completed on  showing:  *\"          Assessment/Plan:  Екатерина Ramon is a 35 year old female who presents with the complaints of acute on chronic left-sided low back pain radiating to her left lower extremity.   Екатерина was seen today for pain.    Diagnoses and all orders for this visit:    Lumbar radiculopathy  -     PAIN INJECTION EVAL/TREAT/FOLLOW UP    Chronic midline low back pain with left-sided sciatica  -     PAIN MANAGEMENT REFERRAL         - Further procedures recommended:    - ordered Left L5-S1 intralaminar epidural steroid injection   - Follow up: for procedure         The total TIME spent on this patient on the day of the appointment was 30 minutes.   Time spent preparing to see the patient (reviewing records and tests)  Time spend face to face with the patient  Time spent ordering tests, medications, procedures and referrals  Time spent Referring and communicating with other healthcare professionals  Documenting clinical information in Epic    Zia Alvarado MD  [facility]  This note was created with voice recognition software, and while reviewed for accuracy, typos may remain.     "

## 2021-04-15 NOTE — PROGRESS NOTES
"Екатерина Ramon is a 35 year old female who is being evaluated via a billable video visit.      The patient has been notified of following:     \"This video visit will be conducted via a call between you and your physician/provider. We have found that certain health care needs can be provided without the need for an in-person physical exam.  This service lets us provide the care you need with a video conversation.  If a prescription is necessary we can send it directly to your pharmacy.  If lab work is needed we can place an order for that and you can then stop by our lab to have the test done at a later time.    Video visits are billed at different rates depending on your insurance coverage.  Please reach out to your insurance provider with any questions.    If during the course of the call the physician/provider feels a video visit is not appropriate, you will not be charged for this service.\"    Patient has given verbal consent for Video visit? Yes  How would you like to obtain your AVS? MyChart  If you are dropped from the video visit, the video invite should be resent to: Text to cell phone: [phone]  Will anyone else be joining your video visit? No        Video-Visit Details    Type of service:  Video Visit    Video Start Time:  11:31 AM  Video End Time: 11:42 AM    Originating Location (pt. Location): Home    Distant Location (provider location):  [facility] [place]     Platform used for Video Visit: [employer]    During this virtual visit the patient is located in [place], patient verifies this as the location during the entirety of this visit.     Nutrition Reassessment  Reason For Visit:  Екатерина Ramon is a 35 year old female presenting today for nutrition follow-up, 3 month s/p laparoscopic sleeve gastrectomy with Dr Stein on 1/4/21.  Patient referred by Dr. Stein on January 15, 2021.     Anthropometrics  Initial Consult Weight: 232 lbs  Day of Surgery Weight(1/4/21): 220 " lbs  Current Weight: 191 lbs  Weight loss: 41 lbs from initial consult; 29 lbs from day of surgery    Current Vitamins/Minerals: MVI/minerals BID (1 flinstones complete with 9 mg iron and 1 bariatric soft chew MVI w/o iron),  calcium + vitamin D once (600 mg/800), B complex, B12.     Nutrition History:  Reports tolerating regular bariatric diet well. Fluid intake appears adequate, consuming 64+ oz/day.     Diet Recall:  B: 1 egg; protein shake   L: ~1 cup salad with ham/turkey    D: 1/4 chicken breast or 1/2 hamburger dariana and veggies (asparagus/broccoli)  Snacks: not normally. Occ cucumbers with cream cheese   Fluids: Water, Gatorade zero       Progress with Previous Goals:  1) Follow soft diet for 4 weeks, then to progress to bariatric regular diet. Met, continues   2) Consume 60 grams of protein/day. Met, continues    3) Sip on 48-64 oz of fluids/day- between meals only. Met, continues   4) Eat slowly (>20 min/meal), chewing foods well (to applesauce-like consistency). Met, continues   5) Limit portions to 1/2 cup/meal. Met, continues   6) Take the following after a Sleeve Gastrectomy: Met, continues     Multivitamin/minerals: adult dose 2 times daily    Iron: 45-60 mg elemental (18-36 mg if low risk) - may partly or fully be covered in multivitamin     Calcium Citrate containing vitamin D: 500 mg 3 times daily or 600 mg 2 times daily    Vitamin B12: sublingual form of at least 500 mcg daily or injection of 1000 mcg monthly     B-50 Complex once daily     Nutrition Prescription:  Grams Protein: 60 (minimum)   Amount of Fluid: 48-64 oz      Nutrition Diagnosis  Previous: Food and nutrition-related knowledge deficit r/t lack of prior exposure to diet advancements beyond bariatric pureed diet aeb pt unable to verbalize full understanding of bariatric soft and regular consistency diets.    Current: Food and nutrition-related knowledge deficit r/t lack of prior exposure to diet instruction beyond 3 months s/p SG as  evidenced by Pt seeking further guidance from RD on diet instruction beyond 3 months s/p SG.     Intervention  Materials/Education provided, reviewed bariatric regular consistency diet, protein intake, fluid intake, eating pace, chewing foods well, portion control, sugar/fat intake, recommended vitamin/mineral supplements. Ordered 3 month post-op labs per protocol. Patient demonstrates understanding.     Expected Engagement: Good    Goals:  1) Follow bariatric regular diet.   2) Consume 60 grams of protein/day.  3) Sip on 48-64 oz of fluids/day- between meals only.  4) Eat slowly (>20 min/meal), chewing foods well (to applesauce-like consistency).  5) Limit portions to 1/2 cup/meal.  6) Take the following after a Sleeve Gastrectomy:    Multivitamin/minerals: adult dose 2 times daily    Iron: 45-60 mg elemental (18-36 mg if low risk) - may partly or fully be covered in multivitamin     Calcium Citrate containing vitamin D: 500 mg 3 times daily or 600 mg 2 times daily    Vitamin B12: sublingual form of at least 500 mcg daily or injection of 1000 mcg monthly     B-50 Complex once daily   7) Increase exercise as able  8) Complete 3 month post-op labs. Call for a lab only appointment at a [facility] lab. To find a lab location near you, please call [phone].      Keeping Up Your Diet after Weight Loss Surgery  [email or web address]    Exercises after Weight Loss Surgery (strengthening)  [email or web address]         Follow-Up: 3 months, LIDIA Conteh

## 2021-04-16 ENCOUNTER — VIRTUAL VISIT (OUTPATIENT)
Dept: ENDOCRINOLOGY | Facility: CLINIC | Age: 36
End: 2021-04-16
Payer: COMMERCIAL

## 2021-04-16 VITALS — BODY MASS INDEX: 35.15 KG/M2 | WEIGHT: 191 LBS | HEIGHT: 62 IN

## 2021-04-16 DIAGNOSIS — E66.01 MORBID OBESITY (H): Primary | ICD-10-CM

## 2021-04-16 DIAGNOSIS — Z71.3 NUTRITIONAL COUNSELING: Primary | ICD-10-CM

## 2021-04-16 DIAGNOSIS — E66.9 OBESITY: ICD-10-CM

## 2021-04-16 DIAGNOSIS — Z98.84 S/P LAPAROSCOPIC SLEEVE GASTRECTOMY: ICD-10-CM

## 2021-04-16 PROCEDURE — 97803 MED NUTRITION INDIV SUBSEQ: CPT | Mod: 95 | Performed by: DIETITIAN, REGISTERED

## 2021-04-16 PROCEDURE — 99024 POSTOP FOLLOW-UP VISIT: CPT | Mod: 95 | Performed by: SURGERY

## 2021-04-16 ASSESSMENT — MIFFLIN-ST. JEOR: SCORE: 1519.62

## 2021-04-16 ASSESSMENT — PAIN SCALES - GENERAL: PAINLEVEL: NO PAIN (0)

## 2021-04-16 NOTE — LETTER
"4/16/2021       RE: Екатерина Ramon  [address]  [place] [address]     Dear Colleague,    Thank you for referring your patient, Екатерина Ramon, to the [facility] [place] at [facility]. Please see a copy of my visit note below.    Екатерина is a 35 year old who is being evaluated via a billable video visit.      How would you like to obtain your AVS? MyChart  If the video visit is dropped, the invitation should be resent by: Text to cell phone: [phone]  Will anyone else be joining your video visit? No      Video Start Time: 10:28 AM  Video-Visit Details    Type of service:  Video Visit    Video End Time:10:32 AM    Originating Location (pt. Location): Home    Distant Location (provider location):  [facility] [place]     Platform used for Video Visit: [employer]     Wt Readings from Last 10 Encounters:   04/16/21 86.6 kg (191 lb)   04/01/21 89.4 kg (197 lb)   02/05/21 95.3 kg (210 lb)   02/05/21 94.3 kg (208 lb)   01/15/21 93 kg (205 lb)   01/04/21 99.8 kg (220 lb)   12/29/20 101.2 kg (223 lb)   11/06/20 104.3 kg (230 lb)   07/30/20 101.2 kg (223 lb)   06/02/20 100.5 kg (221 lb 8 oz)   ]  Postoperative bariatric surgery visit.    Patient underwent sleeve gastrectomy 12 weeks ago.      Tolerating liquids: y  Lightheadedness: n  Abdominal pain: n  Bowel movements: n  Fevers/shakes/chills: n    Ht 1.575 m (5' 2\")   Wt 86.6 kg (191 lb)   BMI 34.93 kg/m    NAD  Overall looks well  virtual    Plan:  1. RD visit today.  2. Start vitamin supplements per RD directions.  3. Advance diet per RD directions.  4. Follow-up: 6 mo postop    Saud Stein MD      "

## 2021-04-16 NOTE — PROGRESS NOTES
"Екатерина is a 35 year old who is being evaluated via a billable video visit.      How would you like to obtain your AVS? MyChart  If the video visit is dropped, the invitation should be resent by: Text to cell phone: [phone]  Will anyone else be joining your video visit? No      Video Start Time: 10:28 AM  Video-Visit Details    Type of service:  Video Visit    Video End Time:10:32 AM    Originating Location (pt. Location): Home    Distant Location (provider location):  [facility]NAGEMENT [facility]     Platform used for Video Visit: [facility]     Wt Readings from Last 10 Encounters:   04/16/21 86.6 kg (191 lb)   04/01/21 89.4 kg (197 lb)   02/05/21 95.3 kg (210 lb)   02/05/21 94.3 kg (208 lb)   01/15/21 93 kg (205 lb)   01/04/21 99.8 kg (220 lb)   12/29/20 101.2 kg (223 lb)   11/06/20 104.3 kg (230 lb)   07/30/20 101.2 kg (223 lb)   06/02/20 100.5 kg (221 lb 8 oz)   ]  Postoperative bariatric surgery visit.    Patient underwent sleeve gastrectomy 12 weeks ago.      Tolerating liquids: y  Lightheadedness: n  Abdominal pain: n  Bowel movements: n  Fevers/shakes/chills: n    Ht 1.575 m (5' 2\")   Wt 86.6 kg (191 lb)   BMI 34.93 kg/m    NAD  Overall looks well  virtual    Plan:  1. RD visit today.  2. Start vitamin supplements per RD directions.  3. Advance diet per RD directions.  4. Follow-up: 6 mo postop    Saud Stein MD                  "

## 2021-04-16 NOTE — NURSING NOTE
"Chief Complaint   Patient presents with     Follow Up     3 Months Follow-up Bariatric Surgery Gastric Sleeve       Vitals:    04/16/21 1015   Weight: 86.6 kg (191 lb)   Height: 1.575 m (5' 2\")       Body mass index is 34.93 kg/m .                            "

## 2021-04-16 NOTE — PATIENT INSTRUCTIONS
Ricky Willams,    Follow-up with RD in 3 months.     Thank you,    Aida Martin, RD, LD  If you would like to schedule or reschedule an appointment with the RD, please call [phone]    Nutrition Goals  1) Follow bariatric regular diet.   2) Consume 60 grams of protein/day.  3) Sip on 48-64 oz of fluids/day- between meals only.  4) Eat slowly (>20 min/meal), chewing foods well (to applesauce-like consistency).  5) Limit portions to 1 cup/meal.  6) Take the following after a Sleeve Gastrectomy:    Multivitamin/minerals: adult dose 2 times daily    Iron: 45-60 mg elemental (18-36 mg if low risk) - may partly or fully be covered in multivitamin     Calcium Citrate containing vitamin D: 500 mg 3 times daily or 600 mg 2 times daily    Vitamin B12: sublingual form of at least 500 mcg daily or injection of 1000 mcg monthly     B-50 Complex once daily   7) Increase exercise as able  8) Complete 3 month post-op labs. Call for a lab only appointment at  [facility] lab. To find a lab location near you, please call [phone].      Keeping Up Your Diet after Weight Loss Surgery  [email or web address]    Exercises after Weight Loss Surgery (strengthening)  [email or web address]    Interested in working with a health ?  Health coaches work with you to improve your overall health and wellbeing.  They look at the whole person, and may involve discussion of different areas of life, including, but not limited to the four pillars of health (sleep, exercise, nutrition, and stress management). Discuss with your care team if you would like to start working a health .    Health Coaching-3 Pack:    $99 for three health coaching visits    Visits may be done in person or via phone    Coaching is a partnership between the  and the client; Coaches do not prescribe or diagnose    Coaching helps inspire the client to reach his/her personal goals      Virtual Support Groups are Available              Healthy Lifestyle Support Group      All are welcome!     Facilitator: Briseyda Gould, Certified Health     - Meets once a month on a Friday from 12:30pm to 1:30pm.  - Due to Covid-19 she is doing this Support Group virtually using Microsoft Teams.  - 60 minutes of small group guided discussion, support and resources.  - Please email Briseyda directly at [email or web address] to receive monthly invitations.  - If you opt to participate in individual health coaching sessions or for general questions, contact Briseyda via email.  - Briseyda will send out invites for each session, so the phone number and the conference ID may change for each session.     2020 Meetings      December 18 - Open Forum      2021 Meetings      January 29 - How to Stay Active and Healthy during the Winter Months   February 26  - Reading Food Labels: What do I Need to Know?, Guest Speaker: Aida Martin RD   March 19  - Finding Health, Happiness and Confidence at Every Size; Guest Speaker, Health Psychologist Fellow  April 30 -  Healthy Eating on a Budget, Guest Speaker: Bertha Marroquin RD   May 21 - Open Forum

## 2021-04-16 NOTE — LETTER
"4/16/2021       RE: Екатерина Ramon  [address]  [place] [address]     Dear Colleague,    Thank you for referring your patient, Екатерина Ramon, to the [facility] [facility]. Please see a copy of my visit note below.    Екатерина Ramon is a 35 year old female who is being evaluated via a billable video visit.      The patient has been notified of following:     \"This video visit will be conducted via a call between you and your physician/provider. We have found that certain health care needs can be provided without the need for an in-person physical exam.  This service lets us provide the care you need with a video conversation.  If a prescription is necessary we can send it directly to your pharmacy.  If lab work is needed we can place an order for that and you can then stop by our lab to have the test done at a later time.    Video visits are billed at different rates depending on your insurance coverage.  Please reach out to your insurance provider with any questions.    If during the course of the call the physician/provider feels a video visit is not appropriate, you will not be charged for this service.\"    Patient has given verbal consent for Video visit? Yes  How would you like to obtain your AVS? MyChart  If you are dropped from the video visit, the video invite should be resent to: Text to cell phone: [phone]  Will anyone else be joining your video visit? No        Video-Visit Details    Type of service:  Video Visit    Video Start Time:  11:31 AM  Video End Time: 11:42 AM    Originating Location (pt. Location): Home    Distant Location (provider location):  [facility]     Platform used for Video Visit: [employer]    During this virtual visit the patient is located in [place], patient verifies this as the location during the entirety of this visit. "     Nutrition Reassessment  Reason For Visit:  Екатерина Ramon is a 35 year old female presenting today for nutrition follow-up, 3 month s/p laparoscopic sleeve gastrectomy with Dr Stein on 1/4/21.  Patient referred by Dr. Stein on January 15, 2021.     Anthropometrics  Initial Consult Weight: 232 lbs  Day of Surgery Weight(1/4/21): 220 lbs  Current Weight: 191 lbs  Weight loss: 41 lbs from initial consult; 29 lbs from day of surgery    Current Vitamins/Minerals: MVI/minerals BID (1 flinstones complete with 9 mg iron and 1 bariatric soft chew MVI w/o iron),  calcium + vitamin D once (600 mg/800), B complex, B12.     Nutrition History:  Reports tolerating regular bariatric diet well. Fluid intake appears adequate, consuming 64+ oz/day.     Diet Recall:  B: 1 egg; protein shake   L: ~1 cup salad with ham/turkey    D: 1/4 chicken breast or 1/2 hamburger dariana and veggies (asparagus/broccoli)  Snacks: not normally. Occ cucumbers with cream cheese   Fluids: Water, Gatorade zero       Progress with Previous Goals:  1) Follow soft diet for 4 weeks, then to progress to bariatric regular diet. Met, continues   2) Consume 60 grams of protein/day. Met, continues    3) Sip on 48-64 oz of fluids/day- between meals only. Met, continues   4) Eat slowly (>20 min/meal), chewing foods well (to applesauce-like consistency). Met, continues   5) Limit portions to 1/2 cup/meal. Met, continues   6) Take the following after a Sleeve Gastrectomy: Met, continues     Multivitamin/minerals: adult dose 2 times daily    Iron: 45-60 mg elemental (18-36 mg if low risk) - may partly or fully be covered in multivitamin     Calcium Citrate containing vitamin D: 500 mg 3 times daily or 600 mg 2 times daily    Vitamin B12: sublingual form of at least 500 mcg daily or injection of 1000 mcg monthly     B-50 Complex once daily     Nutrition Prescription:  Grams Protein: 60 (minimum)   Amount of Fluid: 48-64 oz      Nutrition Diagnosis  Previous: Food and  nutrition-related knowledge deficit r/t lack of prior exposure to diet advancements beyond bariatric pureed diet aeb pt unable to verbalize full understanding of bariatric soft and regular consistency diets.    Current: Food and nutrition-related knowledge deficit r/t lack of prior exposure to diet instruction beyond 3 months s/p SG as evidenced by Pt seeking further guidance from RD on diet instruction beyond 3 months s/p SG.     Intervention  Materials/Education provided, reviewed bariatric regular consistency diet, protein intake, fluid intake, eating pace, chewing foods well, portion control, sugar/fat intake, recommended vitamin/mineral supplements. Ordered 3 month post-op labs per protocol. Patient demonstrates understanding.     Expected Engagement: Good    Goals:  1) Follow bariatric regular diet.   2) Consume 60 grams of protein/day.  3) Sip on 48-64 oz of fluids/day- between meals only.  4) Eat slowly (>20 min/meal), chewing foods well (to applesauce-like consistency).  5) Limit portions to 1/2 cup/meal.  6) Take the following after a Sleeve Gastrectomy:    Multivitamin/minerals: adult dose 2 times daily    Iron: 45-60 mg elemental (18-36 mg if low risk) - may partly or fully be covered in multivitamin     Calcium Citrate containing vitamin D: 500 mg 3 times daily or 600 mg 2 times daily    Vitamin B12: sublingual form of at least 500 mcg daily or injection of 1000 mcg monthly     B-50 Complex once daily   7) Increase exercise as able  8) Complete 3 month post-op labs. Call for a lab only appointment at [facility] lab. To find a lab location near you, please call [phone].      Keeping Up Your Diet after Weight Loss Surgery  [email or web address]    Exercises after Weight Loss Surgery (strengthening)  [email or web address]         Follow-Up: 3 months, LIDIA Conteh

## 2021-04-18 NOTE — PLAN OF CARE
PT: At this time no PT eval needs anticipated. Pt reports she is up ambulating IND without concerns. Paged team to see if pt has an precautions that would require teaching in PT but per chart review none anticipated. PT holding on eval at this time, likely to defer.    Problem: Altered Mood, Depressive Behavior:  Goal: Ability to disclose and discuss suicidal ideas will improve  Description: Ability to disclose and discuss suicidal ideas will improve  Outcome: Ongoing     Problem: Altered Mood, Depressive Behavior:  Goal: Absence of self-harm  Description: Absence of self-harm  Outcome: Ongoing      Pt denies suicidal ideations at this time. Pt agreed to seek staff at anytime she felt like any urges to harm self would arise. Safety checks maintained [id number]. Pt remains free from self harm this shift. Pt denies wanting to cause harm to self or others at this time. Pt encouraged to seek nursing staff at anytime if she felt at danger to themselves or others. Pt states understanding. Safety checks maintained [id number]    Pt denies SI/HI, pt states that she feels excited to be discharge to go home to be with her baby again.

## 2021-04-19 ENCOUNTER — TELEPHONE (OUTPATIENT)
Dept: PALLIATIVE MEDICINE | Facility: CLINIC | Age: 36
End: 2021-04-19

## 2021-04-19 NOTE — TELEPHONE ENCOUNTER
Screening Questions for Radiology Injections:    Injection to be done at which interventional clinic site? [facility] [place]    If [facility] location, tell patient that this procedure requires a COVID-19 lab test be done within 4 days of the procedure. Would you still like to move forward with scheduling the procedure?  Not Applicable   If YES, let patient know that someone will call them to schedule the COVID-19 test and that they will only receive a call back if the result is positive. Route to nursing to enter order.     Instruct patient to arrive as directed prior to the scheduled appointment time:    [place] minutes before      [place]: 30 minutes before; if IV needed 1 hour before     Procedure ordered by Jenny    Procedure ordered? Left L5-S1 ILESI      Transforaminal Cervical RAHEEM - no pain provider currently performing    As a reminder, receiving steroids can decrease your body's ability to fight infection.   Would you still like to move forward with scheduling the injection?  Yes    What insurance would patient like us to bill for this procedure? BCBS [employer]      Worker's comp or MVA (motor vehicle accident) -Any injection DO NOT SCHEDULE and route to Trinh Hilliard.      [employer] insurance - For SI joint injections, DO NOT SCHEDULE and route Trinh Hilliard.       ALL BCBS, [employer] and [employer]-Route to Trinh for review DO NOT SCHEDULE      IF SCHEDULING IN [place] AND NEEDS A PA, IT IS OKAY TO SCHEDULE. [place] HANDLES THEIR OWN PA'S AFTER THE PATIENT IS SCHEDULED. PLEASE SCHEDULE AT LEAST 1 WEEK OUT SO A PA CAN BE OBTAINED.    Any chance of pregnancy? NO   If YES, do NOT schedule and route to RN pool    Is an  needed? No     Patient has a drive home? (mandatory) YES: INFORMED    Is patient taking any blood thinners (i.e. plavix, coumadin, jantoven, warfarin, heparin, pradaxa or dabigatran, etc)? No   If hold needed, do NOT schedule, route to RN  pool     Is patient taking any aspirin products (includes Excedrin and Fiorinal)? No     If more than 325mg/day, OK to schedule; Instruct pt to decrease to less than 325 mg for 7 days AND route to RN pool    For CERVICAL procedures, hold all aspirin products for 6 days.     Tell pt that if aspirin product is not held for 6 days, the procedure WILL BE cancelled.      Does the patient have a bleeding or clotting disorder? No     If YES, okay to schedule AND route to RN nurse pool    For any patients with platelet count <100, must be forwarded to provider    Is patient diabetic?  No  If YES, instruct them to bring their glucometer.    Does patient have an active infection or treated for one within the past week? No     Is patient currently taking any antibiotics?  No     For patients on chronic, preventative, or prophylactic antibiotics, procedures may be scheduled.     For patients on antibiotics for active or recent infection:antibiotic course must have been completed for 4 days    Is patient currently taking any steroid medications? (i.e. Prednisone, Medrol)  No     For patients on steroid medications, course must have been completed for 4 days    Is patient actively being treated for cancer or immunocompromised? No  If YES, do NOT schedule and route to RN pool     Are you able to get on and off an exam table with minimal or no assistance? Yes  If NO, do NOT schedule and route to RN pool    Are you able to roll over and lay on your stomach with minimal or no assistance? Yes  If NO, do NOT schedule and route to RN pool     Any allergies to contrast dye, iodine, shellfish, or numbing and steroid medications? No  If YES, route to RN pool AND add allergy information to appointment notes    Allergies: Oxycodone, Lisinopril, and Zoloft      Has the patient had a flu shot or any other vaccinations within 7 days before or after the procedure.  No     Have you recently had a COVID vaccine or have plans to get it in the near  future? No    If yes, explain that for the vaccine to work best they need to:       wait 1 week before and 1 week after getting Vaccine #1    wait 1 week before and 2 weeks after getting Vaccine #2    If patient has concerns about the timing, send to RN pool     Does patient have an MRI/CT?  YES: 2019  Check Procedure Scheduling Grid to see if required.      Was the MRI done within the last 3 years?  Yes    If yes, where was the MRI done i.e.[facility], [facility], [facility], [facility] etc? [place]      If no, do not schedule and route to RN pool    If MRI was not done at [facility], [facility] or [facility] do NOT schedule and route to RN pool.      If pt has an imaging disc, the injection MAY be scheduled but pt has to bring disc to appt.     If they show up without the disc the injection cannot be done    Procedure Specific Instructions:      If celiac plexus block, informed patient NPO for 6 hours and that it is okay to take medications with sips of water, especially blood pressure medications  Not Applicable         If this is for a cervical procedure, informed patient that aspirin needs to be held for 6 days.   Not Applicable      If IV needed:    Do not schedule procedures requiring IV placement in the first appointment of the day or first appointment after lunch. Do NOT schedule at 0745, 0815 or 1245.     Instructed pt to arrive 30 minutes early for IV start if required. (Check Procedure Scheduling Grid)  Not Applicable    Reminders:      If you are started on any steroids or antibiotics between now and your appointment, you must contact us because the procedure may need to be cancelled.  Yes      For all procedures except radiofrequency ablations (RFAs) and spinal cord stimulator (SCS) trials, informed patient:    IV sedation is not provided for this procedure.  If you feel that an oral anti-anxiety medication is needed, you can discuss this further with your referring provider or primary care provider.   The Pain Clinic provider will discuss specifics of what the procedure includes at your appointment.  Most procedures last 10-20 minutes.  We use numbing medications to help with any discomfort during the procedure.  Not Applicable      For patients 85 or older we recommend having an adult stay w/ them for the remainder of the day.       Does the patient have any questions?  NO  Mela Cooper  [facility]

## 2021-04-23 ENCOUNTER — RADIOLOGY INJECTION OFFICE VISIT (OUTPATIENT)
Dept: PALLIATIVE MEDICINE | Facility: CLINIC | Age: 36
End: 2021-04-23
Payer: COMMERCIAL

## 2021-04-23 VITALS — HEART RATE: 87 BPM | DIASTOLIC BLOOD PRESSURE: 88 MMHG | SYSTOLIC BLOOD PRESSURE: 129 MMHG

## 2021-04-23 DIAGNOSIS — M54.16 LUMBAR RADICULOPATHY: ICD-10-CM

## 2021-04-23 PROCEDURE — 62323 NJX INTERLAMINAR LMBR/SAC: CPT | Performed by: PAIN MEDICINE

## 2021-04-23 ASSESSMENT — PAIN SCALES - GENERAL: PAINLEVEL: SEVERE PAIN (6)

## 2021-04-23 NOTE — PATIENT INSTRUCTIONS
[facility]   Procedure Discharge Instructions    Today you saw:     Dr. Zia Alvarado      You had an:  Epidural steroid injection       Medications used:  Lidocaine   Bupivacaine  Omnipaque Kenalog    Normal saline          If you have received sedation before, during, or after your procedure, for the next 24 hours you shall NOT:   -Drive  -Operate machinery  -Drink alcohol  -Sign any legal documents        If you were holding your blood thinning medication, please restart taking it: N/A    Be cautious when walking. Numbness and/or weakness in the lower extremities may occur for up to 6-8 hours after the procedure due to effect of the local anesthetic    Do not drive for 6 hours. The effect of the local anesthetic could slow your reflexes.     You may resume your regular activities after 24 hours    Avoid strenuous activity for the first 24 hours    You may shower, however avoid swimming, tub baths or hot tubs for 24 hours following your procedure    You may have a mild to moderate increase in pain for several days following the injection.    It may take up to 14 days for the steroid medication to start working although you may feel the effect as early as a few days after the procedure.       You may use ice packs for 10-15 minutes, 3 to 4 times a day at the injection site for comfort    Do not use heat to painful areas for 6 to 8 hours. This will give the local anesthetic time to wear off and prevent you from accidentally burning your skin.     Unless you have been directed to avoid the use of anti-inflammatory medications (NSAIDS), you may use medications such as ibuprofen, Aleve or Tylenol for pain control if needed.     If you were fasting, you may resume your normal diet and medications after the procedure    If you have diabetes, check your blood sugar more frequently than usual as your blood sugar may be higher than normal for 10-14 days following a steroid injection. Contact  your doctor who manages your diabetes if your blood sugar is higher than usual    Possible side effects of steroids that you may experience include flushing, elevated blood pressure, increased appetite, mild headaches and restlessness.  All of these symptoms will get better with time.    If you experience any of the following, call the Pain Clinic during work hours (Mon-Friday 8-4:30 pm) at [phone] or the Provider Line after hours at [phone]:  -Fever over 100 degree F  -Swelling, bleeding, redness, drainage, warmth at the injection site  -Progressive weakness or numbness in your legs or arms  -Loss of bowel or bladder function  -Unusual headache that is not relieved by Tylenol or other pain reliever  -Unusual new onset of pain that is not improving

## 2021-04-23 NOTE — PROGRESS NOTES
Pre procedure Diagnosis: lumbar radiculopathy    Post procedure Diagnosis: Same  Procedure performed: L5-S1 interlaminar epidural steroid injection   Anesthesia: none  Complications: none  Operators: Zia Alvarado MD     Indications:   Екатерина Ramon is a 35 year old female.  The patient has a history of bilateral low back pain radiating to the buttocks.  Examination shows negative slump.  she has tried conservative treatment including meds/PT.    MRI w  Options/alternatives, benefits and risks were discussed with the patient including but not limited to bleeding, infection, no pain relief, tissue trauma, exposure to radiation, reaction to medications, spinal cord injury, dural puncture, weakness, numbness and headache.  Questions were answered to her satisfaction and she wishes to proceed. Voluntary informed consent was obtained and signed.     Vitals were reviewed: Yes  Allergies were reviewed:  Yes   Medications were reviewed:  Yes   Pre-procedure pain score: 3/10    Procedure:  The patient's medical history, medications, and allergies were reviewed and reconciled.  After obtaining signed informed consent, the patient was brought into the procedure suite and was placed in a prone position on the procedure table.   A Pause for the Cause was performed.  Patient was prepped and draped in the usual sterile fashion.     The L 5-1 interspace was identified with AP fluoroscopy.  A total of 3ml of 1% lidocaine was used to anesthetize the skin and subcutaneous tissues for a  midline approach.    A 20gauge 4.5inch Touhy needle was advanced utilizing intermittent AP and Lateral fluoroscopy and air for loss of resistance.  The epidural space was encountered on the first pass without difficulties.  Aspiration for blood and CSF was negative.  Needle position was verified by injecting 1 ml of Omnipaque utilizing real-time fluoroscopy that showed good needle placement and epidural spread without signs of intravascular  or intrathecal uptake.  Omnipaque wasted:  9 ml.    Then, after repeated negative aspiration for blood or CSF, a combination of Kenalog 40 mg, Bupivicaine 0.25% 2 ml, diluted with 3 ml of normal saline to a total injectate volume of 6 ml was injected into the epidural space in a slow and incremental fashion and the needle was restyletted and withdrawn.  All injected medications were preservative free.    The injection site was cleaned and a sterile dressing was applied.    The patient tolerated the procedure well without complications and was taken to the recovery room for continued observation.    Images were saved to PACS.    Post-procedure pain score: 3/10  Follow-up includes:   -f/u phone call in one week  -f/u with referring provider     Zia Alvarado MD  [facility] [place]

## 2021-04-23 NOTE — NURSING NOTE
Discharge Information    IV Discontiued Time:  NA    Amount of Fluid Infused:  NA    Discharge Criteria = When patient returns to baseline or as per MD order    Consciousness:  Pt is fully awake    Circulation:  BP +/- 20% of pre-procedure level    Respiration:  Patient is able to breathe deeply    O2 Sat:  Patient is able to maintain O2 Sat >92% on room air    Activity:  Moves 4 extremities on command    Ambulation:  Patient is able to stand and walk or stand and pivot into wheelchair    Dressing:  Clean/dry or No Dressing    Notes:   Discharge instructions and AVS given to patient    Patient meets criteria for discharge?  YES    Admitted to PCU?  No    Responsible adult present to accompany patient home?  Yes    Signature/Title:    Erin Gonzalez RN  RN Care Coordinator  [facility]

## 2021-04-23 NOTE — NURSING NOTE
Pre-procedure Intake    Have you been fasting? NA    If yes, for how long? NA    Are you taking a prescribed blood thinner such as coumadin, Plavix, Xarelto?    No    If yes, when did you take your last dose? NA    Do you take aspirin?  No    If cervical procedure, have you held aspirin for 6 days?   Yes    Do you have any allergies to contrast dye, iodine, steroid and/or numbing medications?  NO    Are you currently taking antibiotics or have an active infection?  NO    Have you had a fever/elevated temperature within the past week? NO    Are you currently taking oral steroids? NO    Do you have a ? Yes       Are you pregnant or breastfeeding?  NO    Have you received the COVID-19 vaccine? NA       If yes, was it your 1st, 2nd or only dose needed? 2nd    Date of most recent vaccine: 01/11/21    Are the vital signs normal?  Yes      Jerilyn Sandoval CMA ([facility])

## 2021-05-24 ENCOUNTER — MYC MEDICAL ADVICE (OUTPATIENT)
Dept: FAMILY MEDICINE | Facility: CLINIC | Age: 36
End: 2021-05-24

## 2021-06-03 NOTE — PROGRESS NOTES
Екатерина is a 35 year old who is being evaluated via a billable video visit.      How would you like to obtain your AVS? MyChart  If the video visit is dropped, the invitation should be resent by: Send to e-mail at: [email or web address]  Will anyone else be joining your video visit? No      Video Start Time: 12:33 PM    Assessment & Plan     Moderate major depression (H)  Worsened. Remain of Effexor, will start Cymbalta instead. Continue mental health therapy. Follow-up in 6 weeks.  - REVIEW OF HEALTH MAINTENANCE PROTOCOL ORDERS  - buPROPion (WELLBUTRIN XL) 150 MG 24 hr tablet; Take 1 tablet (150 mg) by mouth every morning  - DULoxetine (CYMBALTA) 30 MG capsule; Take 1 capsule (30 mg) by mouth daily    Anxiety  As above  - REVIEW OF HEALTH MAINTENANCE PROTOCOL ORDERS  - buPROPion (WELLBUTRIN XL) 150 MG 24 hr tablet; Take 1 tablet (150 mg) by mouth every morning  - DULoxetine (CYMBALTA) 30 MG capsule; Take 1 capsule (30 mg) by mouth daily    Prescription drug management         See Patient Instructions    Return in about 6 weeks (around 2021) for Depression, Anxiety.    ESTUARDO Jain CNP  [facility] is a 35 year old who presents for the following health issues     HPI     Anxiety Follow-Up    How are you doing with your anxiety since your last visit? Worsened     Are you having other symptoms that might be associated with anxiety? Yes:  Stress    Have you had a significant life event? No     Are you feeling depressed? No    Do you have any concerns with your use of alcohol or other drugs? No    Social History     Tobacco Use     Smoking status: Former Smoker     Types: Cigarettes     Quit date: 2010     Years since quittin.1     Smokeless tobacco: Never Used   Substance Use Topics     Alcohol use: Yes     Comment: rare     Drug use: No     BERENICE-7 SCORE 2020 3/11/2020 12/3/2020   Total Score - - -   Total Score 20 (severe anxiety) 21 (severe  anxiety) 17 (severe anxiety)   Total Score 20 21 17     PHQ 3/11/2020 6/26/2020 12/3/2020   PHQ-9 Total Score 12 5 9   Q9: Thoughts of better off dead/self-harm past 2 weeks Not at all Not at all Not at all   F/U: Thoughts of suicide or self-harm - - -   F/U: Self harm-plan - - -   F/U: Self-harm action - - -   F/U: Safety concerns - - -     Patient notes that she was having side effects of feeling shaky and unwell on Effexor so stopped this several days ago.  Has had work stressors in the last month causing flare of anxiety.    Review of Systems   Constitutional, HEENT, cardiovascular, pulmonary, gi and gu systems are negative, except as otherwise noted.      Objective           Vitals:  No vitals were obtained today due to virtual visit.    Physical Exam   GENERAL: Healthy, alert and no distress  EYES: Eyes grossly normal to inspection.  No discharge or erythema, or obvious scleral/conjunctival abnormalities.  RESP: No audible wheeze, cough, or visible cyanosis.  No visible retractions or increased work of breathing.    SKIN: Visible skin clear. No significant rash, abnormal pigmentation or lesions.  NEURO: Cranial nerves grossly intact.  Mentation and speech appropriate for age.  PSYCH: Mentation appears normal, affect normal/bright, judgement and insight intact, normal speech and appearance well-groomed.    Results for orders placed or performed in visit on 06/04/21 (from the past 24 hour(s))   XR Foot Right G/E 3 Views    Narrative    FOOT RIGHT THREE OR MORE VIEWS   6/4/2021 10:20 AM     HISTORY:  Pain of right foot.    COMPARISON: 10/18/2018      Impression    IMPRESSION: First TMT arthrodesis with solid bony fusion. No fracture  identified.     LISET HUTSON MD               Video-Visit Details    Type of service:  Video Visit    Video End Time:12:42 PM    Originating Location (pt. Location): Home    Distant Location (provider location):  [facility]     Platform used for Video Visit:  AmWell

## 2021-06-04 ENCOUNTER — ANCILLARY PROCEDURE (OUTPATIENT)
Dept: GENERAL RADIOLOGY | Facility: CLINIC | Age: 36
End: 2021-06-04
Attending: PODIATRIST
Payer: COMMERCIAL

## 2021-06-04 ENCOUNTER — OFFICE VISIT (OUTPATIENT)
Dept: PODIATRY | Facility: CLINIC | Age: 36
End: 2021-06-04
Payer: COMMERCIAL

## 2021-06-04 ENCOUNTER — VIRTUAL VISIT (OUTPATIENT)
Dept: FAMILY MEDICINE | Facility: CLINIC | Age: 36
End: 2021-06-04
Payer: COMMERCIAL

## 2021-06-04 VITALS
SYSTOLIC BLOOD PRESSURE: 120 MMHG | BODY MASS INDEX: 34.75 KG/M2 | WEIGHT: 190 LBS | DIASTOLIC BLOOD PRESSURE: 80 MMHG | HEART RATE: 77 BPM

## 2021-06-04 DIAGNOSIS — M79.671 PAIN OF RIGHT FOOT: Primary | ICD-10-CM

## 2021-06-04 DIAGNOSIS — F41.9 ANXIETY: ICD-10-CM

## 2021-06-04 DIAGNOSIS — M79.671 PAIN OF RIGHT FOOT: ICD-10-CM

## 2021-06-04 DIAGNOSIS — F32.1 MODERATE MAJOR DEPRESSION (H): Primary | ICD-10-CM

## 2021-06-04 PROCEDURE — 73630 X-RAY EXAM OF FOOT: CPT | Mod: RT | Performed by: RADIOLOGY

## 2021-06-04 PROCEDURE — 99214 OFFICE O/P EST MOD 30 MIN: CPT | Mod: 95 | Performed by: NURSE PRACTITIONER

## 2021-06-04 PROCEDURE — 99213 OFFICE O/P EST LOW 20 MIN: CPT | Performed by: PODIATRIST

## 2021-06-04 RX ORDER — BUPROPION HYDROCHLORIDE 150 MG/1
150 TABLET ORAL EVERY MORNING
Qty: 90 TABLET | Refills: 0 | Status: SHIPPED | OUTPATIENT
Start: 2021-06-04 | End: 2021-10-13 | Stop reason: SINTOL

## 2021-06-04 RX ORDER — DULOXETIN HYDROCHLORIDE 30 MG/1
30 CAPSULE, DELAYED RELEASE ORAL DAILY
Qty: 90 CAPSULE | Refills: 0 | Status: SHIPPED | OUTPATIENT
Start: 2021-06-04 | End: 2021-10-13

## 2021-06-04 ASSESSMENT — ANXIETY QUESTIONNAIRES
2. NOT BEING ABLE TO STOP OR CONTROL WORRYING: NEARLY EVERY DAY
1. FEELING NERVOUS, ANXIOUS, OR ON EDGE: NEARLY EVERY DAY
3. WORRYING TOO MUCH ABOUT DIFFERENT THINGS: NEARLY EVERY DAY
6. BECOMING EASILY ANNOYED OR IRRITABLE: NEARLY EVERY DAY
5. BEING SO RESTLESS THAT IT IS HARD TO SIT STILL: MORE THAN HALF THE DAYS
IF YOU CHECKED OFF ANY PROBLEMS ON THIS QUESTIONNAIRE, HOW DIFFICULT HAVE THESE PROBLEMS MADE IT FOR YOU TO DO YOUR WORK, TAKE CARE OF THINGS AT HOME, OR GET ALONG WITH OTHER PEOPLE: EXTREMELY DIFFICULT

## 2021-06-04 ASSESSMENT — PATIENT HEALTH QUESTIONNAIRE - PHQ9: 5. POOR APPETITE OR OVEREATING: NEARLY EVERY DAY

## 2021-06-04 NOTE — PATIENT INSTRUCTIONS
We wish you continued good healing. If you have any questions or concerns, please do not hesitate to contact us at [phone]    [employer] (secure e-mail communication and access to your chart) to send a message or to make an appointment.    Please remember to call and schedule a follow up appointment if one was recommended at your earliest convenience.     +++OF MARCH 2020+++ LOCATION AND HOURS HAVE CHANGED    PLEASE CALL CLINICS TO VERIFY DAYS AND TIMES  PODIATRY CLINIC HOURS  TELEPHONE NUMBER    Dr. Zack IRENEPPEMA [facility]        Clinics:  [facility]   Tuesday 1PM-6PM  [place]  Wednesday 745AM-330PM  [place]  Thursday/Friday 745AM-230PM  [place] [facility] APPOINTMENTS  [phone]    [place] APPOINTMENTS  [phone]          If you need a medication refill, please contact us you may need lab work and/or a follow up visit prior to your refill (i.e. Antifungal medications).    If MRI needed please call Imaging at [phone] or [phone]    HOW DO I GET MY KNEE SCOOTER? Knee scooters can be picked up at ANY Medical Supply stores with your knee scooter Prescription.  OR    Bring your signed prescription to an [facility] Medical [facility] showroom.

## 2021-06-04 NOTE — LETTER
6/4/2021         RE: Екатерина Ramon  [address]  [place] [address]        Dear Colleague,    Thank you for referring your patient, Екатерина Ramon, to the [facility] [place]. Please see a copy of my visit note below.    S:  Patient complains of right foot pain.  Points to dorsum of 2/3/4 Mtpjs.   describes as a burning pain.  Pain intermittent.  Some days no pain at all.  Typically does not last all days.  She is status post right first tarsometatarsal joint fusion Jackson bunionectomy 2018.  She states this is going well.  She denies edema erythema ecchymosis numbness or increased deformity.    ROS: See above       Allergies   Allergen Reactions     Oxycodone Itching     Lisinopril Cough     Zoloft      tired       Current Outpatient Medications   Medication Sig Dispense Refill     acetaminophen (TYLENOL) 325 MG tablet Take 2 tablets (650 mg) by mouth every 6 hours as needed for mild pain 45 tablet 0     aspirin 81 MG EC tablet Take 81 mg by mouth daily       buPROPion (WELLBUTRIN XL) 150 MG 24 hr tablet Take 1 tablet (150 mg) by mouth every morning 90 tablet 0     childrens multivitamin w/iron (FLINTSTONES COMPLETE) 18 MG chewable tablet Take 1 chew tab by mouth daily       DULoxetine (CYMBALTA) 30 MG capsule Take 1 capsule (30 mg) by mouth daily 90 capsule 0     hydrOXYzine (ATARAX) 25 MG tablet Take 1 tablet (25 mg) by mouth every 6 hours as needed for anxiety 30 tablet 1     nystatin (MYCOSTATIN) 622618 UNIT/GM external powder Apply topically 2 times daily 45 g 0     omeprazole (PRILOSEC) 20 MG DR capsule Take 1 capsule (20 mg) by mouth every morning (before breakfast) 60 capsule 4     SUMAtriptan (IMITREX) 25 MG tablet Take 1-2 tablets (25-50 mg) by mouth at onset of headache for migraine 50 tablet 1     tiZANidine (ZANAFLEX) 2 MG tablet Take 1 tablet (2 mg) by mouth 3 times daily as needed for muscle spasms 30 tablet 2     traZODone (DESYREL) 50 MG tablet Take 1-2 tablets  ( mg) by mouth nightly as needed for sleep 60 tablet 5     verapamil ER (VERELAN) 180 MG 24 hr capsule Take 1 capsule (180 mg) by mouth daily 90 capsule 3       Patient Active Problem List   Diagnosis     CARDIOVASCULAR SCREENING; LDL GOAL LESS THAN 160     Family history of diabetes mellitus     History of depression     Hypertension goal BP (blood pressure) < 140/90     CTS (carpal tunnel syndrome)     Anxiety     BMI 40.0-44.9, adult (H)     Chronic bilateral thoracic back pain     Chronic midline low back pain without sciatica     Migraine with aura and without status migrainosus, not intractable     Low serum HDL     Counseling for parent-child problem     Dysthymic disorder     Iron deficiency anemia due to chronic blood loss     Menorrhagia with regular cycle     Moderate major depression (H)     Psychophysiological insomnia     Contraceptive management     IUD (intrauterine device) in place     Morbid obesity (H)       Past Medical History:   Diagnosis Date     Family history of diabetes mellitus      Hypertension      Migraine with aura and without status migrainosus, not intractable 11/2/2016     PID (acute pelvic inflammatory disease) 5/2010       Past Surgical History:   Procedure Laterality Date     APPENDECTOMY       BUNIONECTOMY LAPIDUS WITH TARSAL METATARSAL (TMT) FUSION Right 08/14/2018    Procedure: BUNIONECTOMY LAPIDUS WITH TARSAL METATARSAL (TMT) FUSION;  Right first tarsometatarsal joint fusion, right Jackson bunionectomy;  Surgeon: Zack Healy DPM;  Location: [facility] OR     HC REVISE MEDIAN N/CARPAL TUNNEL SURG Left 06/05/2015    Primary, not revision     LAPAROSCOPIC GASTRIC SLEEVE N/A 1/4/2021    Procedure: GASTRECTOMY, SLEEVE, LAPAROSCOPIC;  Surgeon: Saud Stein MD;  Location: [facility] OR     RELEASE CARPAL TUNNEL Left 06/05/2015    Procedure: RELEASE CARPAL TUNNEL;  Surgeon: Juan Jose Joaquin MD;  Location: [facility] OR       Family History   Problem Relation Age of Onset     Diabetes  Mother      Hypertension Mother      Cancer Mother         skin     Diabetes Father      Anesthesia Reaction Son         PONV     Glaucoma No family hx of      Macular Degeneration No family hx of      Deep Vein Thrombosis (DVT) No family hx of        Social History     Tobacco Use     Smoking status: Former Smoker     Types: Cigarettes     Quit date: 2010     Years since quittin.1     Smokeless tobacco: Never Used   Substance Use Topics     Alcohol use: Yes     Comment: rare         O:   /80   Pulse 77   Wt 86.2 kg (190 lb)   BMI 34.75 kg/m  .      Constitutional/ general:  Pt is in no apparent distress, appears well-nourished.  Cooperative with history and physical exam.     Psych:  The patient answered questions appropriately.  Normal affect.  Seems to have reasonable expectations, in terms of treatment.     Eyes:  Visual scanning/ tracking without deficit.     Ears:  Response to auditory stimuli is normal.  No hearing aid devices.  Auricles in proper alignment.     Lymphatic:  Popliteal lymph nodes not enlarged.     Lungs:  Non labored breathing, non labored speech. No cough.  No audible wheezing. Even, quiet breathing.       Vascular:  Pedal pulses are palpable bilaterally for both the DP and PT arteries.  CFT < 3 sec.  No edema.  Pedal hair growth noted.     Neuro:  Alert and oriented x 3. Coordinated gait.  Light touch sensation is intact to the L4, L5, S1 distributions. No obvious deficits.  No evidence of neurological-based weakness, spasticity, or contracture in the lower extremities.     Derm: Normal texture and turgor.  No erythema, ecchymosis, or cyanosis.  No open lesions.     Musculoskeletal:    Lower extremity muscle strength is normal.  Patient is ambulatory without an assistive device or brace.  Normal arch with weightbearing.  No forefoot or rear foot deformities noted.  MS 5/5 all compartments.  Normal ROM all fore foot and rearfoot joints.  No equinus.  Right first MTPJ has  excellent range of motion.  There is no pain with range of motion or palpation.  No pain on the fusion site.  No pain on the lesser MTPJ's right foot.  No pain with palpation or range of motion.  Negative Lachman's test.  Negative Adrian's click.  No signs of any masses.    Radiographic Exam:   First tarsometatarsal joint fusion noted.  First metatarsal excellent position.  Joint well aligned.  No signs of motion in the hardware.  Lesser MTPJ's unremarkable    A: Right foot pain    P:  X rays taken today of right foot.  We discussed her surgical site looks excellent.  Discussed there is no signs of arthritis in joints.  Discussed good support for feet at all times to help her joint function more efficiently.  I made suggestions.  Avoid activities that bother this and discussed will bother this.  Ice as needed.  May take anti-inflammatories.  RETURN TO CLINIC PRN.    Zack Healy DPM, FACFAS         Again, thank you for allowing me to participate in the care of your patient.        Sincerely,        Zack Healy DPM

## 2021-06-08 NOTE — PROGRESS NOTES
S:  Patient complains of right foot pain.  Points to dorsum of 2/3/4 Mtpjs.   describes as a burning pain.  Pain intermittent.  Some days no pain at all.  Typically does not last all days.  She is status post right first tarsometatarsal joint fusion Jackson bunionectomy 2018.  She states this is going well.  She denies edema erythema ecchymosis numbness or increased deformity.    ROS: See above       Allergies   Allergen Reactions     Oxycodone Itching     Lisinopril Cough     Zoloft      tired       Current Outpatient Medications   Medication Sig Dispense Refill     acetaminophen (TYLENOL) 325 MG tablet Take 2 tablets (650 mg) by mouth every 6 hours as needed for mild pain 45 tablet 0     aspirin 81 MG EC tablet Take 81 mg by mouth daily       buPROPion (WELLBUTRIN XL) 150 MG 24 hr tablet Take 1 tablet (150 mg) by mouth every morning 90 tablet 0     childrens multivitamin w/iron (FLINTSTONES COMPLETE) 18 MG chewable tablet Take 1 chew tab by mouth daily       DULoxetine (CYMBALTA) 30 MG capsule Take 1 capsule (30 mg) by mouth daily 90 capsule 0     hydrOXYzine (ATARAX) 25 MG tablet Take 1 tablet (25 mg) by mouth every 6 hours as needed for anxiety 30 tablet 1     nystatin (MYCOSTATIN) 734172 UNIT/GM external powder Apply topically 2 times daily 45 g 0     omeprazole (PRILOSEC) 20 MG DR capsule Take 1 capsule (20 mg) by mouth every morning (before breakfast) 60 capsule 4     SUMAtriptan (IMITREX) 25 MG tablet Take 1-2 tablets (25-50 mg) by mouth at onset of headache for migraine 50 tablet 1     tiZANidine (ZANAFLEX) 2 MG tablet Take 1 tablet (2 mg) by mouth 3 times daily as needed for muscle spasms 30 tablet 2     traZODone (DESYREL) 50 MG tablet Take 1-2 tablets ( mg) by mouth nightly as needed for sleep 60 tablet 5     verapamil ER (VERELAN) 180 MG 24 hr capsule Take 1 capsule (180 mg) by mouth daily 90 capsule 3       Patient Active Problem List   Diagnosis     CARDIOVASCULAR SCREENING; LDL GOAL LESS THAN  160     Family history of diabetes mellitus     History of depression     Hypertension goal BP (blood pressure) < 140/90     CTS (carpal tunnel syndrome)     Anxiety     BMI 40.0-44.9, adult (H)     Chronic bilateral thoracic back pain     Chronic midline low back pain without sciatica     Migraine with aura and without status migrainosus, not intractable     Low serum HDL     Counseling for parent-child problem     Dysthymic disorder     Iron deficiency anemia due to chronic blood loss     Menorrhagia with regular cycle     Moderate major depression (H)     Psychophysiological insomnia     Contraceptive management     IUD (intrauterine device) in place     Morbid obesity (H)       Past Medical History:   Diagnosis Date     Family history of diabetes mellitus      Hypertension      Migraine with aura and without status migrainosus, not intractable 11/2/2016     PID (acute pelvic inflammatory disease) 5/2010       Past Surgical History:   Procedure Laterality Date     APPENDECTOMY       BUNIONECTOMY LAPIDUS WITH TARSAL METATARSAL (TMT) FUSION Right 08/14/2018    Procedure: BUNIONECTOMY LAPIDUS WITH TARSAL METATARSAL (TMT) FUSION;  Right first tarsometatarsal joint fusion, right Jackson bunionectomy;  Surgeon: Zack Healy DPM;  Location: [facility]     HC REVISE MEDIAN N/CARPAL TUNNEL SURG Left 06/05/2015    Primary, not revision     LAPAROSCOPIC GASTRIC SLEEVE N/A 1/4/2021    Procedure: GASTRECTOMY, SLEEVE, LAPAROSCOPIC;  Surgeon: Saud Stein MD;  Location: [facility] OR     RELEASE CARPAL TUNNEL Left 06/05/2015    Procedure: RELEASE CARPAL TUNNEL;  Surgeon: Juan Jose Joaquin MD;  Location: [facility]       Family History   Problem Relation Age of Onset     Diabetes Mother      Hypertension Mother      Cancer Mother         skin     Diabetes Father      Anesthesia Reaction Son         PONV     Glaucoma No family hx of      Macular Degeneration No family hx of      Deep Vein Thrombosis (DVT) No family hx of         Social History     Tobacco Use     Smoking status: Former Smoker     Types: Cigarettes     Quit date: 2010     Years since quittin.1     Smokeless tobacco: Never Used   Substance Use Topics     Alcohol use: Yes     Comment: rare         O:   /80   Pulse 77   Wt 86.2 kg (190 lb)   BMI 34.75 kg/m  .      Constitutional/ general:  Pt is in no apparent distress, appears well-nourished.  Cooperative with history and physical exam.     Psych:  The patient answered questions appropriately.  Normal affect.  Seems to have reasonable expectations, in terms of treatment.     Eyes:  Visual scanning/ tracking without deficit.     Ears:  Response to auditory stimuli is normal.  No hearing aid devices.  Auricles in proper alignment.     Lymphatic:  Popliteal lymph nodes not enlarged.     Lungs:  Non labored breathing, non labored speech. No cough.  No audible wheezing. Even, quiet breathing.       Vascular:  Pedal pulses are palpable bilaterally for both the DP and PT arteries.  CFT < 3 sec.  No edema.  Pedal hair growth noted.     Neuro:  Alert and oriented x 3. Coordinated gait.  Light touch sensation is intact to the L4, L5, S1 distributions. No obvious deficits.  No evidence of neurological-based weakness, spasticity, or contracture in the lower extremities.     Derm: Normal texture and turgor.  No erythema, ecchymosis, or cyanosis.  No open lesions.     Musculoskeletal:    Lower extremity muscle strength is normal.  Patient is ambulatory without an assistive device or brace.  Normal arch with weightbearing.  No forefoot or rear foot deformities noted.  MS 5/5 all compartments.  Normal ROM all fore foot and rearfoot joints.  No equinus.  Right first MTPJ has excellent range of motion.  There is no pain with range of motion or palpation.  No pain on the fusion site.  No pain on the lesser MTPJ's right foot.  No pain with palpation or range of motion.  Negative Lachman's test.  Negative Adrian's click.   No signs of any masses.    Radiographic Exam:   First tarsometatarsal joint fusion noted.  First metatarsal excellent position.  Joint well aligned.  No signs of motion in the hardware.  Lesser MTPJ's unremarkable    A: Right foot pain    P:  X rays taken today of right foot.  We discussed her surgical site looks excellent.  Discussed there is no signs of arthritis in joints.  Discussed good support for feet at all times to help her joint function more efficiently.  I made suggestions.  Avoid activities that bother this and discussed will bother this.  Ice as needed.  May take anti-inflammatories.  RETURN TO CLINIC PRN.    Zack Healy, JOAN, FACFAS

## 2021-06-28 NOTE — PROGRESS NOTES
"    Assessment & Plan     Anal fissure  Recommend sitz baths 1-2 times daily, fiber supplement such as Metamucil. If not improving within 2 weeks, will start topical Nifedipine    Injury of toe, right, initial encounter  If fracture present, continue with connie taping for additional 2-3 weeks or until pain improving. If no fracture, likely contusion of toe and should heal with proper footwear and avoiding further trauma.  - XR Toe Right G/E 2 Views; Future    Ordering of each unique test      Return in about 2 weeks (around 7/15/2021) for if not improving.    ESTUARDO Jain CNP  [facility] SHANIQUA Willams is a 35 year old who presents for the following health issues     HPI     Chief Complaint   Patient presents with     Hemorrhoids     Toe Injury     Right 4th Toe pain x 1 month possible injury?       Hemorrhoids  Onset/Duration: 2 weeks  Description:   Rosie-anal lump: YES  Pain: YES- only when she has a BM  Itching: YES  Accompanying Signs & Symptoms:  Blood in stool: YES  Changes in stool pattern: YES  History:   Any previous GI studies done:none  Family History of colon cancer: no  Precipitating factors:   Having a BM  Alleviating factors:  None  Therapies tried and outcome: over the counter hemorrhoidal Cream which helps        Review of Systems   Constitutional, HEENT, cardiovascular, pulmonary, gi and gu systems are negative, except as otherwise noted.      Objective    /82 (BP Location: Left arm, Patient Position: Chair, Cuff Size: Adult Regular)   Pulse 90   Temp 98.4  F (36.9  C) (Oral)   Ht 1.575 m (5' 2\")   Wt 81.2 kg (179 lb)   SpO2 99%   BMI 32.74 kg/m    Body mass index is 32.74 kg/m .  Physical Exam   GENERAL: healthy, alert and no distress  RECTAL (female): anal fissure 6 o'clock with sentinel pile  MS: Right 4th toe with mild edema of DIP    No results found for this or any previous visit (from the past 24 hour(s)).            "

## 2021-07-01 ENCOUNTER — OFFICE VISIT (OUTPATIENT)
Dept: FAMILY MEDICINE | Facility: CLINIC | Age: 36
End: 2021-07-01
Payer: COMMERCIAL

## 2021-07-01 ENCOUNTER — ANCILLARY PROCEDURE (OUTPATIENT)
Dept: GENERAL RADIOLOGY | Facility: CLINIC | Age: 36
End: 2021-07-01
Attending: NURSE PRACTITIONER
Payer: COMMERCIAL

## 2021-07-01 VITALS
DIASTOLIC BLOOD PRESSURE: 82 MMHG | HEIGHT: 62 IN | TEMPERATURE: 98.4 F | WEIGHT: 179 LBS | OXYGEN SATURATION: 99 % | HEART RATE: 90 BPM | BODY MASS INDEX: 32.94 KG/M2 | SYSTOLIC BLOOD PRESSURE: 114 MMHG

## 2021-07-01 DIAGNOSIS — S99.921A INJURY OF TOE, RIGHT, INITIAL ENCOUNTER: ICD-10-CM

## 2021-07-01 DIAGNOSIS — K60.2 ANAL FISSURE: Primary | ICD-10-CM

## 2021-07-01 PROCEDURE — 73660 X-RAY EXAM OF TOE(S): CPT | Mod: RT | Performed by: RADIOLOGY

## 2021-07-01 PROCEDURE — 99214 OFFICE O/P EST MOD 30 MIN: CPT | Performed by: NURSE PRACTITIONER

## 2021-07-01 ASSESSMENT — PATIENT HEALTH QUESTIONNAIRE - PHQ9: SUM OF ALL RESPONSES TO PHQ QUESTIONS 1-9: 5

## 2021-07-01 ASSESSMENT — MIFFLIN-ST. JEOR: SCORE: 1465.19

## 2021-07-01 NOTE — PATIENT INSTRUCTIONS
Start using Metamucil or a fiber supplement daily, increasing fiber and water in diet as helpful also.  Take a sitz bath 1-2 times per day to soothe the bottom.  You can use Tucks or Hemorrhoid cream if desired.  If not improving in 2 weeks (no pain, no bleeding) , we can start a topical ointment. You can call me for that.     Patient Education     Understanding Anal Fissures  An anal fissure is a small rip or tear in the lining of the anus. The anus is the opening where stool leaves the body.  Anal fissures are common. They are sometimes confused with hemorrhoids, which can cause similar symptoms.   What causes an anal fissure?  Having constipation or straining during bowel movements is the most common cause of an anal fissure. You can also get one from:    Diarrhea    Childbirth    Anal sex  Less commonly, a fissure may be caused by:    Inflammatory bowel disease    An anal infection    An anal tumor  Symptoms of an anal fissure  The main symptom of an anal fissure is sharp pain during a bowel movement. This pain may last a few minutes to hours after. You may also have:    Minor bleeding during or after a bowel movement    Itching or irritation around the anus    A small lump near the anus  Treatment for an anal fissure  Most anal fissures will get better with no or minor treatment. Your healthcare provider may advise:    Changes in your diet. Eating more fiber-filled foods, such as fruits and vegetables, can help keep your bowel movements regular. It can also make your stool easier to pass. So, too, can drinking more water. Your healthcare provider may also tell you to take a fiber supplement if diet changes are not enough.    Stool softeners or laxatives. These medicines can help prevent constipation.    Sitz baths. Soaking in warm water for 10 to 20 minutes a day can help ease symptoms.    Medicines. Ointments, creams, and suppositories may help ease pain and aid with healing.    Botulinum toxin injections.  This treatment may be used if an anal fissure is not healing well. It can help relax the anal sphincter muscles. This may increase blood flow to the area and help healing.    Surgery. Your healthcare provider may advise surgery if the anal fissure isn t healing or keeps coming back. You ll likely have a lateral internal sphincterotomy. During this procedure, a cut is made in the anal sphincter to lower pressure inside the anus and increase blood flow. One possible complication of surgery is fecal incontinence.     When to call your healthcare provider  Call your healthcare provider right away if you have any of these:    Fever of 100.4 F (38 C) or higher, or as directed by your healthcare provider    Symptoms that don t get better, or get worse    New symptoms   Eliezer last reviewed this educational content on 1/1/2020 2000-2021 [employer]. All rights reserved. This information is not intended as a substitute for professional medical care. Always follow your healthcare professional's instructions.

## 2021-07-07 ENCOUNTER — VIRTUAL VISIT (OUTPATIENT)
Dept: ENDOCRINOLOGY | Facility: CLINIC | Age: 36
End: 2021-07-07
Payer: COMMERCIAL

## 2021-07-07 VITALS — HEIGHT: 62 IN | BODY MASS INDEX: 32.94 KG/M2 | WEIGHT: 179 LBS

## 2021-07-07 DIAGNOSIS — Z71.3 NUTRITIONAL COUNSELING: Primary | ICD-10-CM

## 2021-07-07 DIAGNOSIS — Z98.84 S/P LAPAROSCOPIC SLEEVE GASTRECTOMY: Primary | ICD-10-CM

## 2021-07-07 DIAGNOSIS — Z98.84 S/P LAPAROSCOPIC SLEEVE GASTRECTOMY: ICD-10-CM

## 2021-07-07 DIAGNOSIS — E66.811 OBESITY, CLASS I, BMI 30.0-34.9 (SEE ACTUAL BMI): ICD-10-CM

## 2021-07-07 PROCEDURE — 99213 OFFICE O/P EST LOW 20 MIN: CPT | Mod: 95 | Performed by: NURSE PRACTITIONER

## 2021-07-07 PROCEDURE — 97803 MED NUTRITION INDIV SUBSEQ: CPT | Mod: 95 | Performed by: DIETITIAN, REGISTERED

## 2021-07-07 ASSESSMENT — PAIN SCALES - GENERAL: PAINLEVEL: NO PAIN (0)

## 2021-07-07 ASSESSMENT — MIFFLIN-ST. JEOR: SCORE: 1465.19

## 2021-07-07 NOTE — LETTER
2021       RE: Екатерина Ramon  [address]  [place] [address]     Dear Colleague,    Thank you for referring your patient, Екатерина Ramon, to the [facility] [place] at [facility]. Please see a copy of my visit note below.    Return Bariatric Surgery Note    RE: Екатерина Ramon  MR#: [id number]  : 1985  VISIT DATE: 2021      Dear Anusha Wolff,    I had the pleasure of seeing your patient, Екатерина Ramon, in my post-bariatric surgery assessment clinic.    Assessment & Plan   Problem List Items Addressed This Visit        Digestive    BMI 40.0-44.9, adult (H)     6mo post sleeve. Down 19.7% total body weight since consult. Doing well. Hunger/ cravings well controlled.             Other    S/P laparoscopic sleeve gastrectomy - Primary     6 months post sleeve. Doing well.   No reflux, will taper off PPI. No constipation. Labs due from 3 month follow up visit, will get done now.   No abdominal pain, no dysphasia. No rashes.   Hunger and cravings well controlled. Continues to see weight loss- slow and steady- not seeing significant stalls  Mood good.                   Review of external notes as documented above     24 minutes spent on the date of the encounter doing chart review, history and exam, documentation and further activities per the note  [id number]}    MEDICATIONS:        - Continue other medications without change       - taper off of omeprazole as able- every other day for 2 weeks, every couple days for 2 weeks then take as needed after that.   FUTURE LABS:       - Schedule a non-fasting blood draw   FUTURE APPOINTMENTS:       - Follow-up visit in 6 months, sooner if needed         Екатерина Ramon is a 35 year old female who presents to clinic today for the following health issues       CHIEF COMPLAINT: Post-bariatric surgery follow-up    HISTORY OF PRESENT ILLNESS:  Questions  Regarding Prior Weight Loss Surgery Reviewed With Patient 7/7/2021   I had the following weight loss procedure: Sleeve Gastrectomy   What year was your surgery? 1/15/2001   How has your weight changed since your last visit? I have lost weight   Are you currently taking any weight loss medications? No   Do you currently have any of the following: None of the above   Have you been to the Emergency room since your last visit with us? No   Were you in the hospital since your last visit with us? No   Do you have any concerns today? none     Sleeve 1/2021 Dr. Sullivan - last seen 4/2021 Dr. Stein   -3 mo postop labs not done   -no ursodiol     Continues omeprazole daily. No breakthrough on missed doses.   No rashes  No abdominal pain  No dysphasia or food intolerances   No headaches     Goal was to feel healthy. Feeling better now. Feels more active and less out of breath.     Weight History:     7/7/2021   What is your highest lifetime weight? 232   What is your lowest weight since surgery? (In pounds) 177     Initial Weight (lbs): 223 lbs  Weight: 81.2 kg (179 lb)(pt reported)  Last Visits Weight: 86.6 kg (191 lb)  Cumulative weight loss (lbs): 44  Weight Loss Percentage: 19.73%   Wt Readings from Last 5 Encounters:   07/07/21 81.2 kg (179 lb)   07/01/21 81.2 kg (179 lb)   06/04/21 86.2 kg (190 lb)   04/16/21 86.6 kg (191 lb)   04/01/21 89.4 kg (197 lb)      Questions Regarding Co-Morbidities and Health Concerns Reviewed With Patient 7/7/2021   Pre-diabetes: Never   Diabetes II: Never   High Blood Pressure: Never   High cholesterol: Improved   Heartburn/Reflux: Never   Sleep apnea: Never   PCOS: Never   Back pain: Improved   Joint pain: Improved   Lower leg swelling: Never       Eating Habits 7/7/2021   How many meals do you eat per day? 3   Do you snack between meals? No   How much food are you eating at each meal? 1/2 cup to 1 cup   Are you able to separate your meals and liquids by at least 30 minutes? Yes   Are you able  "to avoid liquid calories? Yes     3 meals a day   Occasional protein shake in addition   No significant hunger/ cravings     Exercise Questions Reviewed With Patient 7/7/2021   How often do you exercise? 3 to 4 times per week   What is the duration of your exercise (in minutes)? 60+ Minutes   What types of exercise do you do? walking, gym membership   What keeps you from being more active?  I am as active as I can possbily be     School and work are getting in the way of being more active     Social History:      7/7/2021   Are you smoking? No   Are you drinking alcohol? No       Medications:  Current Outpatient Medications   Medication     acetaminophen (TYLENOL) 325 MG tablet     aspirin 81 MG EC tablet     buPROPion (WELLBUTRIN XL) 150 MG 24 hr tablet     childrens multivitamin w/iron (FLINTSTONES COMPLETE) 18 MG chewable tablet     DULoxetine (CYMBALTA) 30 MG capsule     hydrOXYzine (ATARAX) 25 MG tablet     nystatin (MYCOSTATIN) 236382 UNIT/GM external powder     omeprazole (PRILOSEC) 20 MG DR capsule     SUMAtriptan (IMITREX) 25 MG tablet     tiZANidine (ZANAFLEX) 2 MG tablet     traZODone (DESYREL) 50 MG tablet     verapamil ER (VERELAN) 180 MG 24 hr capsule     No current facility-administered medications for this visit.          7/7/2021   Do you avoid NSAIDs such as (Ibuprofen, Aleve, Naproxen, Advil)?   Yes       ROS:  GI:      7/7/2021   Vomiting: No   Diarrhea: No   Constipation: No   Swallowing trouble: No   Abdominal pain: No   Heartburn: No   Rash in skin folds: No   Depression: No   Stress urinary incontinence No     Skin:   BAR RBS ROS - SKIN 7/7/2021   Rash in skin folds: No     Psych:      7/7/2021   Depression: No   Anxiety: No     Female Only:   BAR RBS ROS - FEMALE ONLY 7/7/2021   Female only: Regular menstrual cycles       PHYSICAL EXAM:  Objective    Ht 1.575 m (5' 2\")   Wt 81.2 kg (179 lb)   BMI 32.74 kg/m    Vitals - Patient Reported  Pain Score: No Pain (0)        Physical Exam "   GENERAL: Healthy, alert and no distress  EYES: Eyes grossly normal to inspection.  No discharge or erythema, or obvious scleral/conjunctival abnormalities.  RESP: No audible wheeze, cough, or visible cyanosis.  No visible retractions or increased work of breathing.    SKIN: Visible skin clear. No significant rash, abnormal pigmentation or lesions.  NEURO: Cranial nerves grossly intact.  Mentation and speech appropriate for age.  PSYCH: Mentation appears normal, affect normal/bright, judgement and insight intact, normal speech and appearance well-groomed.        Sincerely,    Yvette Gage NP      Екатерина is a 35 year old who is being evaluated via a billable video visit.      How would you like to obtain your AVS? MyChart  If the video visit is dropped, the invitation should be resent by: Text to cell phone: [phone]  Will anyone else be joining your video visit? No      Video Start Time: 1103  Video-Visit Details    Type of service:  Video Visit    Video End Time:1114    Originating Location (pt. Location): Home    Distant Location (provider location):  [facility] WEIGHT MANAGEMENT CLINIC [place]     Platform used for Video Visit: Pyrolia

## 2021-07-07 NOTE — ASSESSMENT & PLAN NOTE
6mo post sleeve. Down 19.7% total body weight since consult. Doing well. Hunger/ cravings well controlled.

## 2021-07-07 NOTE — ASSESSMENT & PLAN NOTE
6 months post sleeve. Doing well.   No reflux, will taper off PPI. No constipation. Labs due from 3 month follow up visit, will get done now.   No abdominal pain, no dysphasia. No rashes.   Hunger and cravings well controlled. Continues to see weight loss- slow and steady- not seeing significant stalls  Mood good.

## 2021-07-07 NOTE — NURSING NOTE
"Chief Complaint   Patient presents with     Follow Up     6 Months Follow Laparscopic Gastric Sleeve Surgery       Vitals:    07/07/21 1028   Weight: 81.2 kg (179 lb)   Height: 1.575 m (5' 2\")       Body mass index is 32.74 kg/m .                            "

## 2021-07-07 NOTE — PATIENT INSTRUCTIONS
"It was a pleasure meeting with you today.    Thank you for allowing us the privilege of caring for you. We hope we provided you with the excellent service you deserve.   Please let us know if there is anything else we can do for you so that we can be sure you are leaving completely satisfied with your care experience.    To ensure the quality of our services you may be receiving a patient satisfaction survey from an independent patient satisfaction monitoring company.    The greatest compliment you can give is a \"Likely to Recommend\"      Your visit was with Yvette Gage NP today.     Instructions per today's visit:   MEDICATIONS:        - Continue other medications without change       - taper off of omeprazole as able- every other day for 2 weeks, every couple days for 2 weeks then take as needed after that.   FUTURE LABS:       - Schedule a non-fasting blood draw   FUTURE APPOINTMENTS:       - Follow-up visit in 6 months, sooner if needed         To schedule appointments with our team, please call [phone] option #1    Please call during clinic hours Monday through Friday 8:00a - 4:00p if you have questions or you can contact us via [employer] at anytime.      Nurses: [phone]  Fax: [phone]  Surgery Scheduler: [phone]    Please call the hospital at [phone] to speak with our on call MDs if you have urgent needs after hours, during weekends, or holidays.    **Please note if you need to go to the Emergency Room for any reason in the first 90 days after surgery it is important to go to the [facility] on the [place] on [facility] off of the [address] off of [address]    *For patients who are currently enrolled in our program and have not yet had weight loss surgery please note*:    You must be at your required goal weight at the time of your Anesthesia clinic visit as well as the day of surgery or your surgery will be canceled. You will not be able to obtain a new surgery date until " you have met your goal weight.    Lab results will be communicated through My Chart or letter (if My Chart not used). Please call the clinic if you have not received communication after 1 week or if you have any questions.?    Main follow-up parameters for all bariatric patients     Patients who have undergone Bariatric surgery:    1. Follow-up interval during the first year: ~1 week, 1 month, 3 month, 6 month,12 months.  Every 6 months until 5 years; and then annually thereafter.  The goal of follow-up sessions is to ensure that food intake behavior (choice of food and eating speed) and exercise/activity level are set appropriately.    2. Yearly lab tests ordered by our clinic.      3. The bariatric team should be aware and potentially evaluate all adverse gastrointestinal symptoms (dysphagia, abdominal pain, nausea, vomiting, diarrhea, heartburn, reflux, etc), which can be a sign of complication.  The bariatric surgeons perform general surgery procedures in addition to bariatric surgery (laparoscopic cholecystectomy, etc.)    4. Inability to tolerate textured food (chicken, steak, fish, eggs, beans) is NOT normal and may need to be evaluated by endoscopy performed by the bariatric surgeon.    _____________________________________________________________________________________________________________________________    Meal Replacement Products:    Here is the link to our new e-store where you can purchase our meal replacement products    [facility] E-Guidefitter.[employer]/store    The one week starter kit is a great way to sample a variety of products and see what works for you.    If you want more information about the product go to: [employer]    Free Shipping for orders over $75     Benefits of meal replacements products:    Portion and calorie control  Improved nutrition  Structured eating  Simplified food choices  Avoid contact with trigger  foods  ______________________________________________________________________________________________________________________________    Healthy Lifestyle Support Group   [facility] Weight Management Program  Virtual Support Group Now Available    60 minutes of small group guided discussion, support and resources    Led by Health , Briseyda Gould    For questions, and to receive the invite information, contact Briseyda at [email or web address]    All our welcome!    One Friday per month, 12:30pm to 1:30pm  SELF COMPASSION: July 31st  CREATING MY WELLNESS VISION: August 28th  MINDFULNESS PRACTICES FOR A CALM BODY & MIND: September 25th  MINDFUL EATING TOOLS: October 30th  HEALTHY& HAPPY HOLIDAYS: November 20th  OPEN FORUM: December 18th  _______________________________________________________________________________________________________________________________    Connections: Bariatric Care support group  Due to the Covid-19 restrictions, we will be doing our support group virtually using Microsoft Teams. You will need to get an invitation to the group from Kenny Camilo, Ph.D., LP at [email or web address] and to learn about using Microsoft Teams. The next meeting is on Tuesday, July 14 between 6:30 and 8 PM and there will be no formal speaker.    This group meets the second Tuesday of each month from 6:30 to 8 PM and will be held again at [facility] in the  [facility] [place] (A-B room) when the Covid-19 restrictions are lifted. The group is led by Kenny Camilo, Ph.D., Licensed Psychologist, [facility] Weight Management Program. There is no cost for group participation and is open to all [facility] (and those external to this program) pre- and post-operative bariatric surgery patients as well as their support system.    _________________________________________________________________________________________________________________________________      Interested in working with a health ?  Health coaches work with you to improve your overall health and wellbeing.  They look at the whole person, and may involve discussion of different areas of life, including, but not limited to the four pillars of health (sleep, exercise, nutrition, and stress management). Discuss with your care team if you would like to start working a health .     Health Coaching-3 Pack:      $99 for three health coaching visits    Visits may be done in person or via phone    Coaching is a partnership between the  and the client; Coaches do not prescribe or diagnose    Coaching helps inspire the client to reach his/her personal goals   __________________________________________________________________________________________________________________________________    [place] [facility] Get Moving Program    Our team of physical therapists is trained to help you understand and take control of your condition. They will perform a thorough evaluation to determine your ability for activity and develop a customized plan to fit your goals and physical ability.  Scheduling: Unsure if the Get Moving program is right for you? Discuss the program with your medical provider or diabetes educator. You can also call us at [phone] to ask questions or schedule an appointment.   AIDE Get Moving Program  ______________________________________________________________________________________________________________________  Bluetooth Scale:    We hope to provide you with high quality telephone and virtual healthcare visits while social distancing for COVID-19 is necessary, as well as in the future when virtual visits may be more convenient for you.     Our technology team made it possible for [employer] scales to send weight measurements to our  electronic medical record. This allows weights from you weighing at home to securely flow into the medical record, which will improve telephone and virtual visits.   Additionally, studies have shown that adults actually lose more weight when their weights are automatically sent to someone else, and also that this process is not stressful for those adults.    Below is a link for purchasing the scale, with a discount code for our patients. You may call your insurance company to see if they will reimburse you for the cost of the scale, as a piece of durable medical equipment. The scales only go up to a weight of 400 pounds. This is an issue and we are working with the developer on increasing this. We found no scales that go over 400lb that have blue-tooth for connecting to LiveWire Tax.    Scale to purchase: the Myze  Body  Scale: [email or web address]/[email or web address]/body/[email or web address]=aw.ds    Discount Code: We have a discount code for our patients to bring the cost down to $50, the code is: [place]_Scale_20%off    Steps to link the scale to LiveWire Tax via an Android Phone (you can always disconnect at any point in the future):  1. The order must be placed first before the patient can access Track My Health within LiveWire Tax.  2. Download Google Fit juanito from the Google Play Store   a. Log in or register using your Google account   3. Download the LiveWire Tax juanito from Google Play Store  a. Select add organization   b. Search for [employer]flo and select it   c. Log into LiveWire Tax  d. Select Track My Health   e. Select the green connect my account button   f. When prompted log into your Google account   g. Select okay to confirm the account   4. Download the Withings Health Mate juanito from Google Play Store  a. [place] for Myze   b. Go to profile   c. Tap google fit under the Apps section  d. Select the option to activate Google Fit integration   e. Select the same Google  account   f. Select okay to confirm the account  g.   Steps to link the scale to Profound via an iPhone (you can always disconnect at any point in the future):  **Note WearPoint is not available for download on an iPad**  1. The order must be placed first before the patient can access Track My Health within Profound.  2. Locate the Health ruma on your iPhone.  a. Set up your Apple Health account as prompted  b. The Sources page will show Apps that communicate with your Health ruma. Once all steps are completed, you should see [employer] and ClearPoint Metricst listed under the Apps section and your iPhone under the devices section.  i. Select Health Mate  1. Under 'ALLOW  [employer]  TO WRITE DATA ensure the toggle is on for Weight.  2. This will allow the scale to add your weight to the Apple Health  ii. Select MyChart  1. Under 'ALLOW  [employer]  TO READ DATA ensure the toggle is on for Weight.  2. This allows Profound to grab the weight from WearPoint so your provider can see your weights.  3. Download the Profound ruma from the Ruma Store   a. Select add organization                                                  b. Search for [employer] and select it  c. Log into Profound  d. Select Track My Health   e. Select the green connect my account button   f. Follow prompts to link your device to Profound.  4. Download the Withings health mate ruma in the Ruma Store   a. [place] for [employer]   b. Go to profile   c. Wire Health under the Apps section  d. If prompted to allow access with the Health Ruma, toggle weight on for read and write access.

## 2021-07-07 NOTE — LETTER
"7/7/2021       RE: Екатерина Ramon  [address]  [place] [address]     Dear Colleague,    Thank you for referring your patient, Екатерина Ramon, to the [facility] [place] at [facility]. Please see a copy of my visit note below.    Екатерина Ramon is a 35 year old female who is being evaluated via a billable video visit.      The patient has been notified of following:     \"This video visit will be conducted via a call between you and your physician/provider. We have found that certain health care needs can be provided without the need for an in-person physical exam.  This service lets us provide the care you need with a video conversation.  If a prescription is necessary we can send it directly to your pharmacy.  If lab work is needed we can place an order for that and you can then stop by our lab to have the test done at a later time.    Video visits are billed at different rates depending on your insurance coverage.  Please reach out to your insurance provider with any questions.    If during the course of the call the physician/provider feels a video visit is not appropriate, you will not be charged for this service.\"    Patient has given verbal consent for Video visit? Yes  How would you like to obtain your AVS? MyChart  If you are dropped from the video visit, the video invite should be resent to: Text to cell phone: [phone]  Will anyone else be joining your video visit? No      Video-Visit Details    Type of service:  Video Visit    Video Start Time:  11:33 AM  Video End Time: 11:48 AM    Originating Location (pt. Location): Home    Distant Location (provider location):  [facility]NAG[facility]     Platform used for Video Visit: Kitchenbug    During this virtual visit the patient is located in [place], patient verifies this as the location during the entirety of this visit.     Nutrition " "Reassessment  Reason For Visit:  Екатерина Ramon is a 35 year old female presenting today for nutrition follow-up, 6 month s/p laparoscopic sleeve gastrectomy with Dr Stein on 1/4/21.      Patient referred by Dr. Stein on January 15, 2021.    Questions Regarding Co-Morbidities and Health Concerns Reviewed With Patient 7/7/2021   Pre-diabetes: Never   Diabetes II: Never   High Blood Pressure: Never   High cholesterol: Improved   Heartburn/Reflux: Never   Sleep apnea: Never   PCOS: Never   Back pain: Improved   Joint pain: Improved   Lower leg swelling: Never        Anthropometrics  Initial Consult Weight: 232 lbs    Day of Surgery Weight(1/4/21): 220 lbs    Estimated body mass index is 32.74 kg/m  as calculated from the following:    Height as of an earlier encounter on 7/7/21: 1.575 m (5' 2\").    Weight as of an earlier encounter on 7/7/21: 81.2 kg (179 lb).      Current Vitamins/Minerals:   MVI/minerals BID (1 flinstones complete with 9 mg iron and 1 bariatric soft chew MVI w/o iron),  calcium + vitamin D once (600 mg/800), B complex, B12  mcg daily    Verified 7/7/21     Too much iron makes her stools black     Hx of iron def - 3 + years ago, changed BC and better now (mirena)     Nutrition History:  Reports tolerating regular bariatric diet well. Fluid intake appears adequate, consuming 64+ oz/day.   No concerns for RD today.    No reflux, no constipation, no abdominal pain.  Needs labs done - orders in  Hunger and cravings controlled.     Diet Recall from 3 months post op:   B: 1 egg; protein shake   L: ~1 cup salad with ham/turkey    D: 1/4 chicken breast or 1/2 hamburger dariana and veggies (asparagus/broccoli)  Snacks: not normally. Occ cucumbers with cream cheese   Fluids: Water, Gatorade zero       Progress with Previous Goals:  1) Follow bariatric regular diet.  - Met, tolerating okay per pt    2) Consume 60 grams of protein/day  - Met    3) Sip on 48-64 oz of fluids/day- between meals only. - Met, " reports no problem with this.  Water and Gatorade or powderade zero     4) Eat slowly (>20 min/meal), chewing foods well (to applesauce-like consistency).    5) Limit portions to 1/2 - Met, between 1/2 to 1 cup per pt    6) Take the following after a Sleeve Gastrectomy:    Multivitamin/minerals: adult dose 2 times daily    Iron: 45-60 mg elemental (18-36 mg if low risk) - may partly or fully be covered in multivitamin     Calcium Citrate containing vitamin D: 500 mg 3 times daily or 600 mg 2 times daily    Vitamin B12: sublingual form of at least 500 mcg daily or injection of 1000 mcg monthly     B-50 Complex once daily  - Met, reviewed. Plan to get labs done and will adjust then if needed    7) Increase exercise as able - Did not discuss today    8) Complete 3 month post-op labs. Call for a lab only appointment at a [facility] lab. To find a lab location near you, please call [phone]. - Not done yet    Nutrition Prescription:  Grams Protein: 60 (minimum)   Amount of Fluid: 48-64 oz      Nutrition Diagnosis  Previous: Food and nutrition-related knowledge deficit r/t lack of prior exposure to diet instruction beyond 3 months s/p SG as evidenced by Pt seeking further guidance from RD on diet instruction beyond 3 months s/p SG. - Improved, now at 6 months and pt is doing well. Reviewed diet recommendations for 6 months post op    Intervention  Reviewed and modified goals  Assessed hydration status and protein intake. Reviewed dietary guidelines 6+ months after surgery.   Coordination of care - labs ordered from 3 month visit. Reminded pt to get lab work done.  Answered pt questions  AVS via [employer]    Expected Engagement: Good    Goals:  1) Follow bariatric regular diet.     2) Consume 60 grams of protein/day.    3) Cont with 64+ oz of fluids/day- between meals only.    4) Eat slowly (>20 min/meal), chewing foods well (to applesauce-like consistency).    5) Limit portions to 3/4-1 cup/meal.    6) Have  labs done. See vitamin/mineral recommendations below (will adjust after labs are in)    -Take the following after a Sleeve Gastrectomy:    Multivitamin/minerals: adult dose 1-2 times daily  Ideally want one that provides:   5,000 - 10,000 international units vitamin A daily   800 mg oral folate daily  8 - 22 mg zinc and 1 - 2 mg copper daily      Iron: 45-60 mg elemental (18-36 mg if low risk) - may partly or fully be covered in multivitamin       Calcium Citrate containing vitamin D: 500 mg 3 times daily or 600 mg 2 times daily    --Separate the calcium from your multivitmain or iron by at least 2 hours.    - Must be a chewable calcium citrate until post-op 3 months    - Options for calcium citrate: Dylan calcium citrate chewable, bariatric advantage calcium citrate chewable, Celebrate vitamins calcium citrate chewable, Bariatric Fusion calcium citrate chewable      Vit D - at least 3,000 international units/day between all supplements      Vitamin B12: sublingual form of at least 500 mcg daily or injection of 1000 mcg monthly       B-50 Complex once daily (depending on MVI chosen)  Want 12 mg thiamin between B complex and MVI      Keeping Up Your Diet after Weight Loss Surgery  [email or web address]    Exercises after Weight Loss Surgery (strengthening)  [email or web address]         Follow-Up: 6 months, prn    Time spent with pt: 15 min  ELIANE Naylor, RD, LD

## 2021-07-07 NOTE — PROGRESS NOTES
Екатерина is a 35 year old who is being evaluated via a billable video visit.      How would you like to obtain your AVS? MyChart  If the video visit is dropped, the invitation should be resent by: Text to cell phone: [phone]  Will anyone else be joining your video visit? No      Video Start Time: 1103  Video-Visit Details    Type of service:  Video Visit    Video End Time:1114    Originating Location (pt. Location): Home    Distant Location (provider location):  [facility] WE[facility]NAGEMENT CLIN[facility] [place]     Platform used for Video Visit: MyWave

## 2021-07-07 NOTE — PROGRESS NOTES
"Екатерина Ramon is a 35 year old female who is being evaluated via a billable video visit.      The patient has been notified of following:     \"This video visit will be conducted via a call between you and your physician/provider. We have found that certain health care needs can be provided without the need for an in-person physical exam.  This service lets us provide the care you need with a video conversation.  If a prescription is necessary we can send it directly to your pharmacy.  If lab work is needed we can place an order for that and you can then stop by our lab to have the test done at a later time.    Video visits are billed at different rates depending on your insurance coverage.  Please reach out to your insurance provider with any questions.    If during the course of the call the physician/provider feels a video visit is not appropriate, you will not be charged for this service.\"    Patient has given verbal consent for Video visit? Yes  How would you like to obtain your AVS? MyChart  If you are dropped from the video visit, the video invite should be resent to: Text to cell phone: [phone]  Will anyone else be joining your video visit? No      Video-Visit Details    Type of service:  Video Visit    Video Start Time:  11:33 AM  Video End Time: 11:48 AM    Originating Location (pt. Location): Home    Distant Location (provider location):  [facility]NAG[facility] CLINIC [place]     Platform used for Video Visit: Veritext    During this virtual visit the patient is located in [place], patient verifies this as the location during the entirety of this visit.     Nutrition Reassessment  Reason For Visit:  Екатерина Ramon is a 35 year old female presenting today for nutrition follow-up, 6 month s/p laparoscopic sleeve gastrectomy with Dr Stein on 1/4/21.      Patient referred by Dr. Stein on January 15, 2021.    Questions Regarding Co-Morbidities and Health Concerns Reviewed With Patient " "7/7/2021   Pre-diabetes: Never   Diabetes II: Never   High Blood Pressure: Never   High cholesterol: Improved   Heartburn/Reflux: Never   Sleep apnea: Never   PCOS: Never   Back pain: Improved   Joint pain: Improved   Lower leg swelling: Never        Anthropometrics  Initial Consult Weight: 232 lbs    Day of Surgery Weight(1/4/21): 220 lbs    Estimated body mass index is 32.74 kg/m  as calculated from the following:    Height as of an earlier encounter on 7/7/21: 1.575 m (5' 2\").    Weight as of an earlier encounter on 7/7/21: 81.2 kg (179 lb).      Current Vitamins/Minerals:   MVI/minerals BID (1 flinstones complete with 9 mg iron and 1 bariatric soft chew MVI w/o iron),  calcium + vitamin D once (600 mg/800), B complex, B12  mcg daily    Verified 7/7/21     Too much iron makes her stools black     Hx of iron def - 3 + years ago, changed BC and better now (mirena)     Nutrition History:  Reports tolerating regular bariatric diet well. Fluid intake appears adequate, consuming 64+ oz/day.   No concerns for RD today.    No reflux, no constipation, no abdominal pain.  Needs labs done - orders in  Hunger and cravings controlled.     Diet Recall from 3 months post op:   B: 1 egg; protein shake   L: ~1 cup salad with ham/turkey    D: 1/4 chicken breast or 1/2 hamburger dariana and veggies (asparagus/broccoli)  Snacks: not normally. Occ cucumbers with cream cheese   Fluids: Water, Gatorade zero       Progress with Previous Goals:  1) Follow bariatric regular diet.  - Met, tolerating okay per pt    2) Consume 60 grams of protein/day  - Met    3) Sip on 48-64 oz of fluids/day- between meals only. - Met, reports no problem with this.  Water and Gatorade or powderade zero     4) Eat slowly (>20 min/meal), chewing foods well (to applesauce-like consistency).    5) Limit portions to 1/2 - Met, between 1/2 to 1 cup per pt    6) Take the following after a Sleeve Gastrectomy:    Multivitamin/minerals: adult dose 2 times daily   "  Iron: 45-60 mg elemental (18-36 mg if low risk) - may partly or fully be covered in multivitamin     Calcium Citrate containing vitamin D: 500 mg 3 times daily or 600 mg 2 times daily    Vitamin B12: sublingual form of at least 500 mcg daily or injection of 1000 mcg monthly     B-50 Complex once daily  - Met, reviewed. Plan to get labs done and will adjust then if needed    7) Increase exercise as able - Did not discuss today    8) Complete 3 month post-op labs. Call for a lab only appointment at a [facility] lab. To find a lab location near you, please call [phone]. - Not done yet    Nutrition Prescription:  Grams Protein: 60 (minimum)   Amount of Fluid: 48-64 oz      Nutrition Diagnosis  Previous: Food and nutrition-related knowledge deficit r/t lack of prior exposure to diet instruction beyond 3 months s/p SG as evidenced by Pt seeking further guidance from RD on diet instruction beyond 3 months s/p SG. - Improved, now at 6 months and pt is doing well. Reviewed diet recommendations for 6 months post op    Intervention  Reviewed and modified goals  Assessed hydration status and protein intake. Reviewed dietary guidelines 6+ months after surgery.   Coordination of care - labs ordered from 3 month visit. Reminded pt to get lab work done.  Answered pt questions  AVS via ChatStat    Expected Engagement: Good    Goals:  1) Follow bariatric regular diet.     2) Consume 60 grams of protein/day.    3) Cont with 64+ oz of fluids/day- between meals only.    4) Eat slowly (>20 min/meal), chewing foods well (to applesauce-like consistency).    5) Limit portions to 3/4-1 cup/meal.    6) Have labs done. See vitamin/mineral recommendations below (will adjust after labs are in)    -Take the following after a Sleeve Gastrectomy:    Multivitamin/minerals: adult dose 1-2 times daily  Ideally want one that provides:   5,000 - 10,000 international units vitamin A daily   800 mg oral folate daily  8 - 22 mg zinc and 1 - 2  mg copper daily      Iron: 45-60 mg elemental (18-36 mg if low risk) - may partly or fully be covered in multivitamin       Calcium Citrate containing vitamin D: 500 mg 3 times daily or 600 mg 2 times daily    --Separate the calcium from your multivitmain or iron by at least 2 hours.    - Must be a chewable calcium citrate until post-op 3 months    - Options for calcium citrate: Dylan calcium citrate chewable, bariatric advantage calcium citrate chewable, Celebrate vitamins calcium citrate chewable, Bariatric Fusion calcium citrate chewable      Vit D - at least 3,000 international units/day between all supplements      Vitamin B12: sublingual form of at least 500 mcg daily or injection of 1000 mcg monthly       B-50 Complex once daily (depending on MVI chosen)  Want 12 mg thiamin between B complex and MVI      Keeping Up Your Diet after Weight Loss Surgery  [email or web address]    Exercises after Weight Loss Surgery (strengthening)  [email or web address]         Follow-Up: 6 months, prn    Time spent with pt: 15 min  ELIANE Naylor, RD, LD

## 2021-07-07 NOTE — PATIENT INSTRUCTIONS
Ricky Lyon,    It was a pleasure to meet you today.    Follow-up with Registered Dietitian at 1 year post up (in Jan 2022).    Thank you,    ELIANE Naylor, RD, LD    To schedule your follow-up appointment please call [phone].     Nutrition Goals  1) Follow bariatric regular diet.     2) Consume 60 grams of protein/day.    3) Cont with 64+ oz of fluids/day- between meals only.    4) Eat slowly (>20 min/meal), chewing foods well (to applesauce-like consistency).    5) Limit portions to 3/4-1 cup/meal.    6) Have labs done. See vitamin/mineral recommendations below (will adjust after labs are in)    -Take the following after a Sleeve Gastrectomy:    Multivitamin/minerals: adult dose 1-2 times daily  Ideally want one that provides:   5,000 - 10,000 international units vitamin A daily   800 mg oral folate daily  8 - 22 mg zinc and 1 - 2 mg copper daily      Iron: 45-60 mg elemental (18-36 mg if low risk) - may partly or fully be covered in multivitamin       Calcium Citrate containing vitamin D: 500 mg 3 times daily or 600 mg 2 times daily    --Separate the calcium from your multivitmain or iron by at least 2 hours.    - Must be a chewable calcium citrate until post-op 3 months    - Options for calcium citrate: Dylan calcium citrate chewable, bariatric advantage calcium citrate chewable, Celebrate vitamins calcium citrate chewable, Bariatric Fusion calcium citrate chewable      Vit D - at least 3,000 international units/day between all supplements      Vitamin B12: sublingual form of at least 500 mcg daily or injection of 1000 mcg monthly       B-50 Complex once daily (depending on MVI chosen)  Want 12 mg thiamin between B complex and MVI      Keeping Up Your Diet after Weight Loss Surgery  [email or web address]    Exercises after Weight Loss Surgery (strengthening)  [email or web address]

## 2021-07-07 NOTE — PROGRESS NOTES
Return Bariatric Surgery Note    RE: Екатерина Ramon  MR#: [id number]  : 1985  VISIT DATE: 2021      Dear New, Anusha Benavides,    I had the pleasure of seeing your patient, Екатерина Ramon, in my post-bariatric surgery assessment clinic.    Assessment & Plan   Problem List Items Addressed This Visit        Digestive    BMI 40.0-44.9, adult (H)     6mo post sleeve. Down 19.7% total body weight since consult. Doing well. Hunger/ cravings well controlled.             Other    S/P laparoscopic sleeve gastrectomy - Primary     6 months post sleeve. Doing well.   No reflux, will taper off PPI. No constipation. Labs due from 3 month follow up visit, will get done now.   No abdominal pain, no dysphasia. No rashes.   Hunger and cravings well controlled. Continues to see weight loss- slow and steady- not seeing significant stalls  Mood good.                   Review of external notes as documented above     24 minutes spent on the date of the encounter doing chart review, history and exam, documentation and further activities per the note  [id number]}    MEDICATIONS:        - Continue other medications without change       - taper off of omeprazole as able- every other day for 2 weeks, every couple days for 2 weeks then take as needed after that.   FUTURE LABS:       - Schedule a non-fasting blood draw   FUTURE APPOINTMENTS:       - Follow-up visit in 6 months, sooner if needed         Екатерина Ramon is a 35 year old female who presents to clinic today for the following health issues       CHIEF COMPLAINT: Post-bariatric surgery follow-up    HISTORY OF PRESENT ILLNESS:  Questions Regarding Prior Weight Loss Surgery Reviewed With Patient 2021   I had the following weight loss procedure: Sleeve Gastrectomy   What year was your surgery? 1/15/2001   How has your weight changed since your last visit? I have lost weight   Are you currently taking any weight loss medications? No   Do you currently have any of  the following: None of the above   Have you been to the Emergency room since your last visit with us? No   Were you in the hospital since your last visit with us? No   Do you have any concerns today? none     Sleeve 1/2021 Dr. Sullivan - last seen 4/2021 Dr. Stein   -3 mo postop labs not done   -no ursodiol     Continues omeprazole daily. No breakthrough on missed doses.   No rashes  No abdominal pain  No dysphasia or food intolerances   No headaches     Goal was to feel healthy. Feeling better now. Feels more active and less out of breath.     Weight History:     7/7/2021   What is your highest lifetime weight? 232   What is your lowest weight since surgery? (In pounds) 177     Initial Weight (lbs): 223 lbs  Weight: 81.2 kg (179 lb)(pt reported)  Last Visits Weight: 86.6 kg (191 lb)  Cumulative weight loss (lbs): 44  Weight Loss Percentage: 19.73%   Wt Readings from Last 5 Encounters:   07/07/21 81.2 kg (179 lb)   07/01/21 81.2 kg (179 lb)   06/04/21 86.2 kg (190 lb)   04/16/21 86.6 kg (191 lb)   04/01/21 89.4 kg (197 lb)      Questions Regarding Co-Morbidities and Health Concerns Reviewed With Patient 7/7/2021   Pre-diabetes: Never   Diabetes II: Never   High Blood Pressure: Never   High cholesterol: Improved   Heartburn/Reflux: Never   Sleep apnea: Never   PCOS: Never   Back pain: Improved   Joint pain: Improved   Lower leg swelling: Never       Eating Habits 7/7/2021   How many meals do you eat per day? 3   Do you snack between meals? No   How much food are you eating at each meal? 1/2 cup to 1 cup   Are you able to separate your meals and liquids by at least 30 minutes? Yes   Are you able to avoid liquid calories? Yes     3 meals a day   Occasional protein shake in addition   No significant hunger/ cravings     Exercise Questions Reviewed With Patient 7/7/2021   How often do you exercise? 3 to 4 times per week   What is the duration of your exercise (in minutes)? 60+ Minutes   What types of exercise do you do?  "walking, gym membership   What keeps you from being more active?  I am as active as I can possbily be     School and work are getting in the way of being more active     Social History:      7/7/2021   Are you smoking? No   Are you drinking alcohol? No       Medications:  Current Outpatient Medications   Medication     acetaminophen (TYLENOL) 325 MG tablet     aspirin 81 MG EC tablet     buPROPion (WELLBUTRIN XL) 150 MG 24 hr tablet     childrens multivitamin w/iron (FLINTSTONES COMPLETE) 18 MG chewable tablet     DULoxetine (CYMBALTA) 30 MG capsule     hydrOXYzine (ATARAX) 25 MG tablet     nystatin (MYCOSTATIN) 963454 UNIT/GM external powder     omeprazole (PRILOSEC) 20 MG DR capsule     SUMAtriptan (IMITREX) 25 MG tablet     tiZANidine (ZANAFLEX) 2 MG tablet     traZODone (DESYREL) 50 MG tablet     verapamil ER (VERELAN) 180 MG 24 hr capsule     No current facility-administered medications for this visit.          7/7/2021   Do you avoid NSAIDs such as (Ibuprofen, Aleve, Naproxen, Advil)?   Yes       ROS:  GI:      7/7/2021   Vomiting: No   Diarrhea: No   Constipation: No   Swallowing trouble: No   Abdominal pain: No   Heartburn: No   Rash in skin folds: No   Depression: No   Stress urinary incontinence No     Skin:   BAR RBS ROS - SKIN 7/7/2021   Rash in skin folds: No     Psych:      7/7/2021   Depression: No   Anxiety: No     Female Only:   [facility] RBS ROS - FEMALE ONLY 7/7/2021   Female only: Regular menstrual cycles       PHYSICAL EXAM:  Objective    Ht 1.575 m (5' 2\")   Wt 81.2 kg (179 lb)   BMI 32.74 kg/m    Vitals - Patient Reported  Pain Score: No Pain (0)        Physical Exam   GENERAL: Healthy, alert and no distress  EYES: Eyes grossly normal to inspection.  No discharge or erythema, or obvious scleral/conjunctival abnormalities.  RESP: No audible wheeze, cough, or visible cyanosis.  No visible retractions or increased work of breathing.    SKIN: Visible skin clear. No significant rash, abnormal " pigmentation or lesions.  NEURO: Cranial nerves grossly intact.  Mentation and speech appropriate for age.  PSYCH: Mentation appears normal, affect normal/bright, judgement and insight intact, normal speech and appearance well-groomed.        Sincerely,    Yvette Gage NP

## 2021-07-08 ENCOUNTER — TELEPHONE (OUTPATIENT)
Dept: ENDOCRINOLOGY | Facility: CLINIC | Age: 36
End: 2021-07-08

## 2021-07-08 NOTE — TELEPHONE ENCOUNTER
Spoke with pt about scheduling 6 month follow up with Gina Treadwell. Pt requested that she call back at a later date. Confirmed with pt that she had correct all number.

## 2021-07-23 ENCOUNTER — MYC MEDICAL ADVICE (OUTPATIENT)
Dept: ENDOCRINOLOGY | Facility: CLINIC | Age: 36
End: 2021-07-23

## 2021-07-23 DIAGNOSIS — Z98.84 STATUS POST LAPAROSCOPIC SLEEVE GASTRECTOMY: Primary | ICD-10-CM

## 2021-07-23 DIAGNOSIS — E66.01 MORBID OBESITY (H): ICD-10-CM

## 2021-07-26 NOTE — TELEPHONE ENCOUNTER
Patient requesting refill of Omeprazole.  Continues to have intermittent reflux after sleeve gastrectomy.  Patient states she does not take it every day.

## 2021-07-27 ENCOUNTER — LAB (OUTPATIENT)
Dept: LAB | Facility: CLINIC | Age: 36
End: 2021-07-27
Payer: COMMERCIAL

## 2021-07-27 DIAGNOSIS — Z98.84 S/P LAPAROSCOPIC SLEEVE GASTRECTOMY: ICD-10-CM

## 2021-07-27 LAB
ALBUMIN SERPL-MCNC: 3.7 G/DL (ref 3.4–5)
ALP SERPL-CCNC: 74 U/L (ref 40–150)
ALT SERPL W P-5'-P-CCNC: 22 U/L (ref 0–50)
ANION GAP SERPL CALCULATED.3IONS-SCNC: 7 MMOL/L (ref 3–14)
AST SERPL W P-5'-P-CCNC: 17 U/L (ref 0–45)
BILIRUB SERPL-MCNC: 0.6 MG/DL (ref 0.2–1.3)
BUN SERPL-MCNC: 9 MG/DL (ref 7–30)
CALCIUM SERPL-MCNC: 8.8 MG/DL (ref 8.5–10.1)
CHLORIDE BLD-SCNC: 110 MMOL/L (ref 94–109)
CO2 SERPL-SCNC: 25 MMOL/L (ref 20–32)
CREAT SERPL-MCNC: 0.75 MG/DL (ref 0.52–1.04)
ERYTHROCYTE [DISTWIDTH] IN BLOOD BY AUTOMATED COUNT: 14.5 % (ref 10–15)
FERRITIN SERPL-MCNC: 33 NG/ML (ref 12–150)
GFR SERPL CREATININE-BSD FRML MDRD: >90 ML/MIN/1.73M2
GLUCOSE BLD-MCNC: 81 MG/DL (ref 70–99)
HCT VFR BLD AUTO: 40.2 % (ref 35–47)
HGB BLD-MCNC: 13.3 G/DL (ref 11.7–15.7)
MCH RBC QN AUTO: 30.3 PG (ref 26.5–33)
MCHC RBC AUTO-ENTMCNC: 33.1 G/DL (ref 31.5–36.5)
MCV RBC AUTO: 92 FL (ref 78–100)
PLATELET # BLD AUTO: 319 10E3/UL (ref 150–450)
POTASSIUM BLD-SCNC: 4.5 MMOL/L (ref 3.4–5.3)
PROT SERPL-MCNC: 6.9 G/DL (ref 6.8–8.8)
PTH-INTACT SERPL-MCNC: 55 PG/ML (ref 18–80)
RBC # BLD AUTO: 4.39 10E6/UL (ref 3.8–5.2)
SODIUM SERPL-SCNC: 142 MMOL/L (ref 133–144)
VIT B12 SERPL-MCNC: 313 PG/ML (ref 193–986)
WBC # BLD AUTO: 8 10E3/UL (ref 4–11)

## 2021-07-27 PROCEDURE — 83970 ASSAY OF PARATHORMONE: CPT

## 2021-07-27 PROCEDURE — 82728 ASSAY OF FERRITIN: CPT

## 2021-07-27 PROCEDURE — 36415 COLL VENOUS BLD VENIPUNCTURE: CPT

## 2021-07-27 PROCEDURE — 99000 SPECIMEN HANDLING OFFICE-LAB: CPT

## 2021-07-27 PROCEDURE — 82607 VITAMIN B-12: CPT

## 2021-07-27 PROCEDURE — 85027 COMPLETE CBC AUTOMATED: CPT

## 2021-07-27 PROCEDURE — 80053 COMPREHEN METABOLIC PANEL: CPT

## 2021-07-27 PROCEDURE — 82306 VITAMIN D 25 HYDROXY: CPT

## 2021-07-27 PROCEDURE — 84590 ASSAY OF VITAMIN A: CPT | Mod: 90

## 2021-07-28 LAB — DEPRECATED CALCIDIOL+CALCIFEROL SERPL-MC: 37 UG/L (ref 20–75)

## 2021-07-29 LAB
ANNOTATION COMMENT IMP: NORMAL
RETINYL PALMITATE SERPL-MCNC: 0.02 MG/L
VIT A SERPL-MCNC: 0.44 MG/L

## 2021-08-29 ENCOUNTER — E-VISIT (OUTPATIENT)
Dept: URGENT CARE | Facility: CLINIC | Age: 36
End: 2021-08-29
Payer: COMMERCIAL

## 2021-08-29 DIAGNOSIS — Z20.822 SUSPECTED COVID-19 VIRUS INFECTION: Primary | ICD-10-CM

## 2021-08-29 PROCEDURE — 99421 OL DIG E/M SVC 5-10 MIN: CPT | Performed by: NURSE PRACTITIONER

## 2021-08-30 NOTE — PATIENT INSTRUCTIONS
Dear Екатерина Ramon,    Your symptoms show that you may have coronavirus (COVID-19). This illness can cause fever, cough and trouble breathing. Many people get a mild case and get better on their own. Some people can get very sick.    Will I be tested for COVID-19?  We would like to test you for Covid-19 virus. I have placed orders for this test.     To schedule: go to your [employer] home page and scroll down to the section that says  You have an appointment that needs to be scheduled  and click the large green button that says  Schedule Now  and follow the steps to find the next available openings.    If you are unable to complete these [employer] scheduling steps, please call [phone] to schedule your testing.     Return to work/school/ guidance:  Please let your workplace manager and staffing office know when your quarantine ends     We can t give you an exact date as it depends on the above. You can calculate this on your own or work with your manager/staffing office to calculate this. (For example if you were exposed on 10/4, you would have to quarantine for 14 full days. That would be through 10/18. You could return on 10/19.)      If you receive a positive COVID-19 test result, follow the guidance of the those who are giving you the results. Usually the return to work is 10 (or in some cases 20 days from symptom onset.) If you work at [facility], you must also be cleared by Employee Occupational Health and Safety to return to work.        If you receive a negative COVID-19 test result and did not have a high risk exposure to someone with a known positive COVID-19 test, you can return to work once you're free of fever for 24 hours without fever-reducing medication and your symptoms are improving or resolved.      If you receive a negative COVID-19 test and If you had a high risk exposure to someone who has tested positive for COVID-19 then you can return to work 14 days after your last contact  with the positive individual    Note: If you have ongoing exposure to the covid positive person, this quarantine period may be more than 14 days. (For example, if you are continued to be exposed to your child who tested positive and cannot isolate from them, then the quarantine of 7-14 days can't start until your child is no longer contagious. This is typically 10 days from onset of the child's symptoms. So the total duration may be 17-24 days in this case.)    Sign up for [employer].   We know it's scary to hear that you might have COVID-19. We want to track your symptoms to make sure you're okay over the next 2 weeks. Please look for an email from [employer]--this is a free, online program that we'll use to keep in touch. To sign up, follow the link in the email you will receive. Learn more at [email or web address]    How can I take care of myself?    Get lots of rest. Drink extra fluids (unless a doctor has told you not to)    Take Tylenol (acetaminophen) or ibuprofen for fever or pain. If you have liver or kidney problems, ask your family doctor if it's okay to take Tylenol o ibuprofen    If you have other health problems (like cancer, heart failure, an organ transplant or severe kidney disease): Call your specialty clinic if you don't feel better in the next 2 days.    Know when to call 911. Emergency warning signs include:  o Trouble breathing or shortness of breath  o Pain or pressure in the chest that doesn't go away  o Feeling confused like you haven't felt before, or not being able to wake up  o Bluish-colored lips or face    Where can I get more information?  [facility] - About COVID-19:   [email or web address]    CDC - What to Do If You're Sick:   www.cdc.gov/coronavirus/2019-ncov/about/steps-when-sick.html    August 29, 2021  RE:  Екатерина Ramon                                                                                                                  [address]  [place] [address]      To whom it may concern:    I evaluated Екатерина Ramon on August 29, 2021. Екатерина Ramon should be excused from work/school.     They should let their workplace manager and staffing office know when their quarantine ends.    We can not give an exact date as it depends on the information below. They can calculate this on their own or work with their manager/staffing office to calculate this. (For example if they were exposed on 10/04, they would have to quarantine for 14 full days. That would be through 10/18. They could return on 10/19.)    Quarantine Guidelines:      If patient receives a positive COVID-19 test result, they should follow the guidance of those who are giving the results. Usually the return to work is 10 (or in some cases 20 days from symptom onset.) If they work at [employer], they must be cleared by Employee Occupational Health and Safety to return to work.        If patient receives a negative COVID-19 test result and did not have a high risk exposure to someone with a known positive COVID-19 test, they can return to work once they're free of fever for 24 hours without fever-reducing medication and their symptoms are improving or resolved.      If patient receives a negative COVID-19 test and if they had a high risk exposure to someone who has tested positive for COVID-19 then they can return to work 14 days after their last contact with the positive individual    Note: If there is ongoing exposure to the covid positive person, this quarantine period may be longer than 14 days. (For example, if they are continually exposed to their child, who tested positive and cannot isolate from them, then the quarantine of 7-14 days can't start until their child is no longer contagious. This is typically 10 days from onset to the child's symptoms. So the total duration may be 17-24 days in this case.)     Sincerely,  Mariama Ward, CNP

## 2021-09-07 ENCOUNTER — MYC MEDICAL ADVICE (OUTPATIENT)
Dept: FAMILY MEDICINE | Facility: CLINIC | Age: 36
End: 2021-09-07

## 2021-10-13 ENCOUNTER — VIRTUAL VISIT (OUTPATIENT)
Dept: FAMILY MEDICINE | Facility: CLINIC | Age: 36
End: 2021-10-13
Payer: COMMERCIAL

## 2021-10-13 DIAGNOSIS — F32.1 MODERATE MAJOR DEPRESSION (H): ICD-10-CM

## 2021-10-13 DIAGNOSIS — F41.9 ANXIETY: Primary | ICD-10-CM

## 2021-10-13 PROCEDURE — 99214 OFFICE O/P EST MOD 30 MIN: CPT | Mod: 95 | Performed by: NURSE PRACTITIONER

## 2021-10-13 RX ORDER — DULOXETIN HYDROCHLORIDE 60 MG/1
60 CAPSULE, DELAYED RELEASE ORAL DAILY
Qty: 90 CAPSULE | Refills: 0 | Status: SHIPPED | OUTPATIENT
Start: 2021-10-13 | End: 2022-03-31

## 2021-10-13 ASSESSMENT — PATIENT HEALTH QUESTIONNAIRE - PHQ9
SUM OF ALL RESPONSES TO PHQ QUESTIONS 1-9: 8
5. POOR APPETITE OR OVEREATING: NEARLY EVERY DAY

## 2021-10-13 ASSESSMENT — ANXIETY QUESTIONNAIRES
IF YOU CHECKED OFF ANY PROBLEMS ON THIS QUESTIONNAIRE, HOW DIFFICULT HAVE THESE PROBLEMS MADE IT FOR YOU TO DO YOUR WORK, TAKE CARE OF THINGS AT HOME, OR GET ALONG WITH OTHER PEOPLE: VERY DIFFICULT
2. NOT BEING ABLE TO STOP OR CONTROL WORRYING: NEARLY EVERY DAY
3. WORRYING TOO MUCH ABOUT DIFFERENT THINGS: NEARLY EVERY DAY
5. BEING SO RESTLESS THAT IT IS HARD TO SIT STILL: NOT AT ALL
GAD7 TOTAL SCORE: 18
6. BECOMING EASILY ANNOYED OR IRRITABLE: NEARLY EVERY DAY
1. FEELING NERVOUS, ANXIOUS, OR ON EDGE: NEARLY EVERY DAY
7. FEELING AFRAID AS IF SOMETHING AWFUL MIGHT HAPPEN: NEARLY EVERY DAY

## 2021-10-13 NOTE — PROGRESS NOTES
"Екатерина is a 35 year old who is being evaluated via a billable telephone visit.      What phone number would you like to be contacted at? [phone]  How would you like to obtain your AVS?     Assessment & Plan     Anxiety  Worsened due to recent work stressors and losing mental health therapist. Patient referral for therapy and Psychiatry consult. Will increase Cymbalta in the meantime. Written to be off work x1 week and Accomodation forms filled out to work from home 1 day/week.  - MENTAL HEALTH REFERRAL  - Adult; Psychiatry, Outpatient Treatment; Individual/Couples/Family/Group Therapy/Health Psychology; [facility] [phone]; We will contact you to schedule the appointment or please call with any questio...; Future  - DULoxetine (CYMBALTA) 60 MG capsule; Take 1 capsule (60 mg) by mouth daily    Moderate major depression (H)  As above  - MENTAL HEALTH REFERRAL  - Adult; Psychiatry, Outpatient Treatment; Individual/Couples/Family/Group Therapy/Health Psychology; [facility] [phone]; We will contact you to schedule the appointment or please call with any questio...; Future  - DULoxetine (CYMBALTA) 60 MG capsule; Take 1 capsule (60 mg) by mouth daily    Ordering of each unique test  Prescription drug management         BMI:   Estimated body mass index is 32.74 kg/m  as calculated from the following:    Height as of 7/7/21: 1.575 m (5' 2\").    Weight as of 7/7/21: 81.2 kg (179 lb).       See Patient Instructions    Return in about 6 weeks (around 11/24/2021) for Anxiety, Depression. If unable to see Psychiatry in this timeframe.    Anusha Wolff, ESTUARDO CNP  [facility] [place] Willams is a 35 year old who presents for the following health issues     HPI     Anxiety Follow-Up      How are you doing with your anxiety since your last visit? Worsened     Are you having other symptoms that might be associated with anxiety? No    Have you had a " significant life event? No     Are you feeling depressed? Yes:  see PHQ-9    Do you have any concerns with your use of alcohol or other drugs? No    Social History     Tobacco Use     Smoking status: Former Smoker     Types: Cigarettes     Quit date: 2010     Years since quittin.4     Smokeless tobacco: Never Used   Substance Use Topics     Alcohol use: Yes     Comment: rare     Drug use: No     BERENICE-7 SCORE 3/11/2020 12/3/2020 10/13/2021   Total Score - - -   Total Score 21 (severe anxiety) 17 (severe anxiety) -   Total Score 21 17 18     PHQ 12/3/2020 2021 10/13/2021   PHQ-9 Total Score 9 5 8   Q9: Thoughts of better off dead/self-harm past 2 weeks Not at all Not at all Not at all   F/U: Thoughts of suicide or self-harm - - -   F/U: Self harm-plan - - -   F/U: Self-harm action - - -   F/U: Safety concerns - - -       Started a new job in August and hates it.  Was seeing a therapist, but she left a few months ago. Hard to open up to someone new.  Unsure of change to Cymbalta made a difference in mood. Stopped Wellbutrin due to side effects.    Review of Systems   Constitutional, HEENT, cardiovascular, pulmonary, gi and gu systems are negative, except as otherwise noted.      Objective           Vitals:  No vitals were obtained today due to virtual visit.    Physical Exam   healthy, alert and no distress  PSYCH: Alert and oriented times 3; coherent speech, normal   rate and volume, able to articulate logical thoughts, able   to abstract reason, no tangential thoughts, no hallucinations   or delusions  Her affect is flat and sad  RESP: No cough, no audible wheezing, able to talk in full sentences  Remainder of exam unable to be completed due to telephone visits    No results found for any visits on 10/13/21.            Phone call duration: 12 minutes

## 2021-10-13 NOTE — LETTER
October 13, 2021      Екатерина Ramon  [address]  [place] [address]        To Whom It May Concern:    Екатерина Ramon  was seen on 10/13/21.  Please excuse her 10/12/21 until 10/15/21 due to exacerbation of Anxiety and Depression.        Sincerely,        ESTUARDO Jain CNP

## 2021-10-14 ASSESSMENT — ANXIETY QUESTIONNAIRES: GAD7 TOTAL SCORE: 18

## 2021-10-18 ENCOUNTER — MYC MEDICAL ADVICE (OUTPATIENT)
Dept: PALLIATIVE MEDICINE | Facility: CLINIC | Age: 36
End: 2021-10-18

## 2021-10-18 DIAGNOSIS — M54.16 LUMBAR RADICULOPATHY: Primary | ICD-10-CM

## 2021-10-18 NOTE — TELEPHONE ENCOUNTER
Per patient myChart message:  From: Екатерина Redmondsondra      Created: 10/18/2021 8:00 AM      Morning,  I feel the last injections are starting to wear off. Starting to get lower pain in the back again. During the last few months I noticed pain the the thoracic region. Not sure if because it was fixing the lower region it pushed some pain up higher or something else. Do I need to do anything special to schedule another appointment for injections? Are we able to to thoracic section also? Other thoughts?  Thanks,  Екатерина        4/15/21: eval for procedure only with Dr. Alvarado   4/23/21: L5-S1 interlaminar epidural steroid injection   _________________________________    M[employer] sent to pt:  From: Cheryl Page RN      Created: 10/18/2021 4:12 PM      Inocencio Higuera to hear that you had relief for a long time.  During that time what % of relief did you have? Insurance often required this information if it is going to be repeated.     I will have Dr. Alvarado review your questions about your  Mid-back pain.  You may have to talk w/ your primary care provider about this as it is new and we have not seen you for that.     ROCKY Luna-BSN  [facility]

## 2021-10-19 PROBLEM — F32.9 MAJOR DEPRESSION: Status: ACTIVE | Noted: 2019-11-15

## 2021-10-29 ENCOUNTER — RADIOLOGY INJECTION OFFICE VISIT (OUTPATIENT)
Dept: PALLIATIVE MEDICINE | Facility: CLINIC | Age: 36
End: 2021-10-29
Payer: COMMERCIAL

## 2021-10-29 VITALS — DIASTOLIC BLOOD PRESSURE: 60 MMHG | OXYGEN SATURATION: 97 % | SYSTOLIC BLOOD PRESSURE: 116 MMHG | HEART RATE: 82 BPM

## 2021-10-29 DIAGNOSIS — M54.16 LUMBAR RADICULOPATHY: ICD-10-CM

## 2021-10-29 PROCEDURE — 62323 NJX INTERLAMINAR LMBR/SAC: CPT | Performed by: PAIN MEDICINE

## 2021-10-29 ASSESSMENT — PAIN SCALES - GENERAL: PAINLEVEL: SEVERE PAIN (7)

## 2021-10-29 NOTE — NURSING NOTE
Pre-procedure Intake  If YES to any questions or NO to having a   Please complete laminated checklist and leave on the computer keyboard for Provider, verbally inform provider if able.    For SCS Trial, RFA's or any sedation procedure:  Have you been fasting? NA    If yes, for how long?     Are you taking any any blood thinners such as Coumadin, Warfarin, Jantoven, Pradaxa Xarelto, Eliquis, Edoxaban, Enoxaparin, Lovenox, Heparin, Arixtra, Fondaparinux, or Fragmin? OR Antiplatelet medication such as Plavix, Brilinta, or Effient?   No     If yes, when did you take your last dose?     Do you take aspirin?  No    If cervical procedure, have you held aspirin for 6 days?   NA    Do you have any allergies to contrast dye, iodine, steroid and/or numbing medications?  NO    Are you currently taking antibiotics or have an active infection?  NO    Have you had a fever/elevated temperature within the past week? NO    Are you currently taking oral steroids? NO    Do you have a ? Yes    Are you pregnant or breastfeeding?  NO    Have you received the COVID-19 vaccine? Yes    If yes, was it your 1st, 2nd or only dose needed? 2nd     Date of most recent vaccine: 01/12/2021    Had the booster early this month 10/07/2021    Notify provider and RNs if systolic BP >170, diastolic BP >100, P >100 or O2 sats < 90%    Latoya Davison MA  [facility] [place]

## 2021-10-29 NOTE — NURSING NOTE
Discharge Information    IV Discontiued Time:  NA    Amount of Fluid Infused:  NA    Discharge Criteria = When patient returns to baseline or as per MD order    Consciousness:  Pt is fully awake    Circulation:  BP +/- 20% of pre-procedure level    Respiration:  Patient is able to breathe deeply    O2 Sat:  Patient is able to maintain O2 Sat >92% on room air    Activity:  Moves 4 extremities on command    Ambulation:  Patient is able to stand and walk or stand and pivot into wheelchair    Dressing:  Clean/dry or No Dressing    Notes:   Discharge instructions and AVS given to patient    Patient meets criteria for discharge?  YES    Admitted to PCU?  No    Responsible adult present to accompany patient home?  Yes    Signature/Title:    Morelia Hester RN  RN Care Coordinator  [facility] [place]

## 2021-10-29 NOTE — PATIENT INSTRUCTIONS
[facility]   Procedure Discharge Instructions    Today you saw:   Dr. Zia Alvarado      You had an:  Repeat L5-S1 Epidural steroid injection      Medications used:  Lidocaine   Bupivacaine   Dexamethasone Omnipaque Normal saline      Be cautious when walking. Numbness and/or weakness in the lower extremities may occur for up to 6-8 hours after the procedure due to effect of the local anesthetic    Do not drive for 6 hours. The effect of the local anesthetic could slow your reflexes.     You may resume your regular activities after 24 hours    Avoid strenuous activity for the first 24 hours    You may shower, however avoid swimming, tub baths or hot tubs for 24 hours following your procedure    You may have a mild to moderate increase in pain for several days following the injection.    It may take up to 14 days for the steroid medication to start working although you may feel the effect as early as a few days after the procedure.       You may use ice packs for 10-15 minutes, 3 to 4 times a day at the injection site for comfort    Do not use heat to painful areas for 6 to 8 hours. This will give the local anesthetic time to wear off and prevent you from accidentally burning your skin.     Unless you have been directed to avoid the use of anti-inflammatory medications (NSAIDS), you may use medications such as ibuprofen, Aleve or Tylenol for pain control if needed.     If you were fasting, you may resume your normal diet and medications after the procedure    If you have diabetes, check your blood sugar more frequently than usual as your blood sugar may be higher than normal for 10-14 days following a steroid injection. Contact your doctor who manages your diabetes if your blood sugar is higher than usual    Possible side effects of steroids that you may experience include flushing, elevated blood pressure, increased appetite, mild headaches and restlessness.  All of these symptoms  will get better with time.    If you experience any of the following, call the Pain Clinic during work hours (Mon-Friday 8-4:30 pm) at [phone] or the Provider Line after hours at [phone]:  -Fever over 100 degree F  -Swelling, bleeding, redness, drainage, warmth at the injection site  -Progressive weakness or numbness in your legs  -Loss of bowel or bladder function  -Unusual headache that is not relieved by Tylenol or other pain reliever  -Unusual new onset of pain that is not improving

## 2021-11-02 RX ORDER — DEXAMETHASONE SODIUM PHOSPHATE 10 MG/ML
10 INJECTION INTRAMUSCULAR; INTRAVENOUS ONCE
Status: DISCONTINUED | OUTPATIENT
Start: 2021-11-02 | End: 2021-11-02 | Stop reason: CLARIF

## 2021-11-02 RX ORDER — TRIAMCINOLONE ACETONIDE 40 MG/ML
40 INJECTION, SUSPENSION INTRA-ARTICULAR; INTRAMUSCULAR ONCE
Status: COMPLETED | OUTPATIENT
Start: 2021-11-02 | End: 2021-11-02

## 2021-11-02 RX ADMIN — TRIAMCINOLONE ACETONIDE 40 MG: 40 INJECTION, SUSPENSION INTRA-ARTICULAR; INTRAMUSCULAR at 15:36

## 2021-11-02 NOTE — PROGRESS NOTES
Pre procedure Diagnosis: lumbar radiculopathy    Post procedure Diagnosis: Same  Procedure performed: L5-S1 interlaminar epidural steroid injection   Anesthesia: none  Complications: none  Operators: Zia Alvarado MD     Indications:   Екатерина Ramon is a 35 year old female.  The patient has a history of bilateral low back pain radiating to the buttocks.  Examination shows negative slump.  she has tried conservative treatment including meds/PT/injections.    MRI w  Options/alternatives, benefits and risks were discussed with the patient including but not limited to bleeding, infection, no pain relief, tissue trauma, exposure to radiation, reaction to medications, spinal cord injury, dural puncture, weakness, numbness and headache.  Questions were answered to her satisfaction and she wishes to proceed. Voluntary informed consent was obtained and signed.     Vitals were reviewed: Yes  Allergies were reviewed:  Yes   Medications were reviewed:  Yes   Pre-procedure pain score: 6/10    Procedure:  The patient's medical history, medications, and allergies were reviewed and reconciled.  After obtaining signed informed consent, the patient was brought into the procedure suite and was placed in a prone position on the procedure table.   A Pause for the Cause was performed.  Patient was prepped and draped in the usual sterile fashion.     The L 5-1 interspace was identified with AP fluoroscopy.  A total of 3ml of 1% lidocaine was used to anesthetize the skin and subcutaneous tissues for a  midline approach.    A 20gauge 4.5inch Touhy needle was advanced utilizing intermittent AP and Lateral fluoroscopy and air for loss of resistance.  The epidural space was encountered on the first pass without difficulties.  Aspiration for blood and CSF was negative.  Needle position was verified by injecting 1 ml of Omnipaque utilizing real-time fluoroscopy that showed good needle placement and epidural spread without signs of  intravascular or intrathecal uptake.  Omnipaque wasted:  9 ml.    Then, after repeated negative aspiration for blood or CSF, a combination of Kenalog 40 mg, Bupivicaine 0.25% 2 ml, diluted with 3 ml of normal saline to a total injectate volume of 6 ml was injected into the epidural space in a slow and incremental fashion and the needle was restyletted and withdrawn.  All injected medications were preservative free.    The injection site was cleaned and a sterile dressing was applied.    The patient tolerated the procedure well without complications and was taken to the recovery room for continued observation.    Images were saved to PACS.    Post-procedure pain score: 3/10  Follow-up includes:   -f/u phone call in one week  -f/u with referring provider     Zia Alvarado MD  [facility] [place]

## 2021-11-04 ENCOUNTER — VIRTUAL VISIT (OUTPATIENT)
Dept: PSYCHOLOGY | Facility: CLINIC | Age: 36
End: 2021-11-04
Payer: COMMERCIAL

## 2021-11-04 ENCOUNTER — VIRTUAL VISIT (OUTPATIENT)
Dept: PSYCHIATRY | Facility: CLINIC | Age: 36
End: 2021-11-04
Attending: NURSE PRACTITIONER
Payer: COMMERCIAL

## 2021-11-04 DIAGNOSIS — F39 MOOD DISORDER (H): ICD-10-CM

## 2021-11-04 DIAGNOSIS — F41.1 GAD (GENERALIZED ANXIETY DISORDER): ICD-10-CM

## 2021-11-04 DIAGNOSIS — F41.9 ANXIETY: Primary | ICD-10-CM

## 2021-11-04 DIAGNOSIS — G47.00 INSOMNIA, UNSPECIFIED TYPE: ICD-10-CM

## 2021-11-04 DIAGNOSIS — F51.04 PSYCHOPHYSIOLOGICAL INSOMNIA: ICD-10-CM

## 2021-11-04 DIAGNOSIS — F39 MOOD DISORDER (H): Primary | ICD-10-CM

## 2021-11-04 PROCEDURE — 90791 PSYCH DIAGNOSTIC EVALUATION: CPT | Mod: 95 | Performed by: PSYCHOLOGIST

## 2021-11-04 PROCEDURE — 99204 OFFICE O/P NEW MOD 45 MIN: CPT | Mod: 95 | Performed by: PSYCHIATRY & NEUROLOGY

## 2021-11-04 RX ORDER — HYDROXYZINE HYDROCHLORIDE 25 MG/1
25-50 TABLET, FILM COATED ORAL 3 TIMES DAILY PRN
Qty: 120 TABLET | Refills: 1 | Status: SHIPPED | OUTPATIENT
Start: 2021-11-04 | End: 2021-11-09

## 2021-11-04 RX ORDER — TRAZODONE HYDROCHLORIDE 50 MG/1
50-100 TABLET, FILM COATED ORAL
Qty: 180 TABLET | Refills: 0 | Status: SHIPPED | OUTPATIENT
Start: 2021-11-04 | End: 2022-04-28

## 2021-11-04 RX ORDER — BUSPIRONE HYDROCHLORIDE 10 MG/1
10 TABLET ORAL 2 TIMES DAILY
Qty: 60 TABLET | Refills: 1 | Status: SHIPPED | OUTPATIENT
Start: 2021-11-04 | End: 2021-12-31

## 2021-11-04 ASSESSMENT — ANXIETY QUESTIONNAIRES
GAD7 TOTAL SCORE: 21
1. FEELING NERVOUS, ANXIOUS, OR ON EDGE: NEARLY EVERY DAY
7. FEELING AFRAID AS IF SOMETHING AWFUL MIGHT HAPPEN: NEARLY EVERY DAY
8. IF YOU CHECKED OFF ANY PROBLEMS, HOW DIFFICULT HAVE THESE MADE IT FOR YOU TO DO YOUR WORK, TAKE CARE OF THINGS AT HOME, OR GET ALONG WITH OTHER PEOPLE?: EXTREMELY DIFFICULT
GAD7 TOTAL SCORE: 21
6. BECOMING EASILY ANNOYED OR IRRITABLE: NEARLY EVERY DAY
2. NOT BEING ABLE TO STOP OR CONTROL WORRYING: NEARLY EVERY DAY
5. BEING SO RESTLESS THAT IT IS HARD TO SIT STILL: NEARLY EVERY DAY
4. TROUBLE RELAXING: NEARLY EVERY DAY
GAD7 TOTAL SCORE: 21
7. FEELING AFRAID AS IF SOMETHING AWFUL MIGHT HAPPEN: NEARLY EVERY DAY
3. WORRYING TOO MUCH ABOUT DIFFERENT THINGS: NEARLY EVERY DAY

## 2021-11-04 ASSESSMENT — PATIENT HEALTH QUESTIONNAIRE - PHQ9
SUM OF ALL RESPONSES TO PHQ QUESTIONS 1-9: 16
10. IF YOU CHECKED OFF ANY PROBLEMS, HOW DIFFICULT HAVE THESE PROBLEMS MADE IT FOR YOU TO DO YOUR WORK, TAKE CARE OF THINGS AT HOME, OR GET ALONG WITH OTHER PEOPLE: EXTREMELY DIFFICULT
SUM OF ALL RESPONSES TO PHQ QUESTIONS 1-9: 16

## 2021-11-04 ASSESSMENT — COLUMBIA-SUICIDE SEVERITY RATING SCALE - C-SSRS
1. IN THE PAST MONTH, HAVE YOU WISHED YOU WERE DEAD OR WISHED YOU COULD GO TO SLEEP AND NOT WAKE UP?: NO
4. HAVE YOU HAD THESE THOUGHTS AND HAD SOME INTENTION OF ACTING ON THEM?: NO
5. HAVE YOU STARTED TO WORK OUT OR WORKED OUT THE DETAILS OF HOW TO KILL YOURSELF? DO YOU INTEND TO CARRY OUT THIS PLAN?: NO
2. HAVE YOU ACTUALLY HAD ANY THOUGHTS OF KILLING YOURSELF LIFETIME?: NO
1. IN THE PAST MONTH, HAVE YOU WISHED YOU WERE DEAD OR WISHED YOU COULD GO TO SLEEP AND NOT WAKE UP?: NO
3. HAVE YOU BEEN THINKING ABOUT HOW YOU MIGHT KILL YOURSELF?: NO
2. HAVE YOU ACTUALLY HAD ANY THOUGHTS OF KILLING YOURSELF?: NO
4. HAVE YOU HAD THESE THOUGHTS AND HAD SOME INTENTION OF ACTING ON THEM?: NO
5. HAVE YOU STARTED TO WORK OUT OR WORKED OUT THE DETAILS OF HOW TO KILL YOURSELF? DO YOU INTEND TO CARRY OUT THIS PLAN?: NO

## 2021-11-04 NOTE — Clinical Note
Just FYI. No action needed.     Guerline Wagner, DO  [facility] Psychiatry  [facility]
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
## 2021-11-04 NOTE — PROGRESS NOTES
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 assessment of risk factors related to suicide and self-harm have been reviewed and are noted above. The patient convincingly denies acute suicidality on several occasions. Local community safety resources reviewed and printed for patient to use if needed. There was no deceit detected, and the patient presented in a manner that was believable.     DSM5  Diagnosis:  Mood Disorder, Unspecified (MDD vs Adjustment Disorder vs dysthymia)  300.02 (F41.1) Generalized Anxiety Disorder  Insomnia, unspecified    Medical Comorbidities Include:   Patient Active Problem List    Diagnosis Date Noted     S/P laparoscopic sleeve gastrectomy 07/07/2021     Priority: Medium     1/2021 Dr. Stein  premckinley 232lb (BMI 42)        Morbid obesity (H) 11/13/2020     Priority: Medium     Added automatically from request for surgery [id number]       IUD (intrauterine device) in place 12/13/2019     Priority: Medium     Mirena placed 12/13/2019         Contraceptive management 12/06/2019     Priority: Medium     Added automatically from request for surgery [id number]       Iron deficiency anemia due to chronic blood loss 11/15/2019     Priority: Medium     Menorrhagia with regular cycle 11/15/2019     Priority: Medium     Moderate major depression (H) 11/15/2019     Priority: Medium     Psychophysiological  insomnia 11/15/2019     Priority: Medium     Migraine with aura and without status migrainosus, not intractable 11/02/2016     Priority: Medium     Low serum HDL 11/02/2016     Priority: Medium     Chronic bilateral thoracic back pain 08/10/2016     Priority: Medium     Chronic midline low back pain without sciatica 08/10/2016     Priority: Medium     BMI 40.0-44.9, adult (H) 07/20/2016     Priority: Medium     High weight 232lb        Anxiety 02/25/2016     Priority: Medium     CTS (carpal tunnel syndrome) 04/27/2015     Priority: Medium     Hypertension goal BP (blood pressure) < 140/90 02/04/2015     Priority: Medium     History of depression 02/20/2014     Priority: Medium     Around age 26, has been in remission since then       Family history of diabetes mellitus 02/22/2012     Priority: Medium     Counseling for parent-child problem 08/08/2011     Priority: Medium     Dysthymic disorder 08/08/2011     Priority: Medium     CARDIOVASCULAR SCREENING; LDL GOAL LESS THAN 160 10/31/2010     Priority: Medium       Impression:  Екатерина Ramon is a 36-year-old female with past psychiatric history including depression and anxiety who presents today for psychiatric evaluation.  Patient has had some increased psychosocial stressors recently and feels like she has not been functioning optimally.  She does report that a lot of her current anxiety and mood changes are related to her work situation.  She is looking for a new job.  Hopes to go back to her previous work location.  Has had a few past psychiatric medication trials.  Nothing really stood out to her as being helpful.  She thinks duloxetine has been somewhat helpful.  Dose was just increased and might need some more time.  We will start buspirone as an augmentation to her duloxetine and see if it might further help with anxiety.  Since much of her current worsening mental health is related to her work situation, I do think it will be quite a bit better when she  is out of her current environment.  She was also encouraged to engage in individual psychotherapy for additional support and ongoing development of nonpharmacologic coping skills and strategies.  She has an appointment in January to reestablish with a therapist.  No acute safety concerns or SI today.  No problematic drug or alcohol use.    Medication side effects and alternatives reviewed. Health promotion activities recommended and reviewed today. All questions addressed. Education and counseling completed regarding risks and benefits of medications and psychotherapy options. Recommend therapy for additional support.     Treatment Plan:    Continue duloxetine/Cymbalta 60 mg daily for depression, anxiety.     Continue trazodone  mg at bedtime prn for sleep.      Continue hydroxyzine 25-50 mg up to three times a day as needed for anxiety/sleep. Can break a tablet in half and take just 12.5 mg if 25 mg too sedating.     Start buspirone/BusPar for anxiety and to augment duloxetine. Start with 5 mg twice daily for about a week and increase to 10 mg twice daily as tolerated.     Continue therapy as planned (to start in January).    Continue all other cares per primary care provider.     Continue all other medications as reviewed per electronic medical record today.     Safety plan reviewed. To the Emergency Department as needed or call after hours crisis line at [phone] or [phone]. [place] Crisis Text Line: Text [place] to 986673  or  Suicide LifeLine Chat: suicidepreventionlifeline.org/chat    Schedule an appointment with me in 4-6 weeks or sooner as needed.  Call [facility] at [phone] to schedule.    Follow up with primary care provider as planned or sooner if needed for acute medical concerns.    Call the psychiatric nurse line with medication questions or concerns at [phone].    MyChart may be used to communicate with your provider, but this is not intended to be used for  "emergencies.    Patient Education:  Serotonin syndrome (SS) has occurred with serotonergic antidepressants (eg, SSRIs, SNRIs), particularly when used in combination with other serotonergic agents (eg, triptans, TCAs, fentanyl, lithium, tramadol, buspirone, Keila's wort, tryptophan) or agents that impair metabolism of serotonin (eg, MAO inhibitors intended to treat psychiatric disorders, other MAO inhibitors [ie, linezolid and intravenous methylene blue]). Antipsychotic Agents may also enhance the serotonergic effect of Serotonin Modulators.Signs and symptoms include: agitation or restlessness, confusion, rapid heart rate and high blood pressure, dilated pupils, loss of muscle coordination or twitching muscles, heavy sweating, diarrhea, headaches, shivering and/or goose bumps.  Patient was educated on signs and symptoms of serotonin syndrome.  Symptoms typically occur within several hours of taking a new drug or increasing the dose.  Patient was notified to report symptoms immediately.    Get Out of Your Mind & Into Your Life   By Carlos Fletcher    The Happiness Trap: How to Stop Struggling and Start Living  By Speedy Zacarias    The Reality Slap: Finding Peace & Fulfillment When Life Hurts  By Speedy Zacarais    Things Might Go Terribly, Horribly Wrong: A Guide to Life Liberated from Anxiety  By Lyn Baptiste, PhD    Stress Less, Live More: How Acceptance and Commitment Therapy Can Help You Live a Busy Yet Balanced Life  By Jose Cuba    Finding Life Beyond Trauma: Using Acceptance and Commitment Therapy to Heal From Post-Traumatic Stress and Trauma-Related Problems  By Josette Martin and Shaina Carter    Other ACT Skills References     Speedy Zacarias on YouTube - Demonstrates several different skills to deal with difficult thoughts and feelings     Book:  \"A Liberated Mind: How to Pivot Toward What Matters\" by Carlos Fletcher     Psychological flexibility: How love turns pain into purpose  Tedx Talk by  " Chance discussing his struggle with anxiety and panic disorder which motivated him to develop ACT therapy (Acceptance and Commitment Therapy)     You Are Not Your Thoughts    Community Resources:    National Suicide Prevention Lifeline: 411.373.5587 (TTY: 684.866.8237). Call anytime for help.  (www.suicidepreventionlifeline.org)  National [place] on Mental Illness (www.ezio.org): 319.149.6851 or 637-614-8879.   Mental Health Association (www.mentalhealth.org): 184.789.3986 or 654-748-9183.  [place] Crisis Text Line: Text [place] to 682291  Suicide LifeLine Chat: [employer].org/chat    Administrative Billing:   Phone Call/Video Duration: 21 Minutes  Start: 9:25a  Stop: 9:46    Complexity of Care: Patient with multiple psychiatric diagnoses and multiple medication changes adding to complexity of care.    Patient Status:  Patient will continue to be seen for ongoing consultation and stabilization.    Signed:   Guerline Wagner DO  [facility]    Disclaimer: This note consists of symbols derived from keyboarding, dictation and/or voice recognition software. As a result, there may be errors in the script that have gone undetected. Please consider this when interpreting information found in this chart.

## 2021-11-04 NOTE — PATIENT INSTRUCTIONS
Treatment Plan:    Continue duloxetine/Cymbalta 60 mg daily for depression, anxiety.     Continue trazodone  mg at bedtime prn for sleep.      Continue hydroxyzine 25-50 mg up to three times a day as needed for anxiety/sleep. Can break a tablet in half and take just 12.5 mg if 25 mg too sedating.     Start buspirone/BusPar for anxiety and to augment duloxetine. Start with 5 mg twice daily for about a week and increase to 10 mg twice daily as tolerated.     Continue therapy as planned (to start in January).    Continue all other cares per primary care provider.     Continue all other medications as reviewed per electronic medical record today.     Safety plan reviewed. To the Emergency Department as needed or call after hours crisis line at [phone] or [phone]. [place] Crisis Text Line: Text [place] to 332189  or  Suicide LifeLine Chat: suicidepreventionDisabledParkline.org/chat    Schedule an appointment with me in 4-6 weeks or sooner as needed.  Call [facility] at [phone] to schedule.    Follow up with primary care provider as planned or sooner if needed for acute medical concerns.    Call the psychiatric nurse line with medication questions or concerns at [phone].    [employer]um may be used to communicate with your provider, but this is not intended to be used for emergencies.    Patient Education:  Serotonin syndrome (SS) has occurred with serotonergic antidepressants (eg, SSRIs, SNRIs), particularly when used in combination with other serotonergic agents (eg, triptans, TCAs, fentanyl, lithium, tramadol, buspirone, Keila's wort, tryptophan) or agents that impair metabolism of serotonin (eg, MAO inhibitors intended to treat psychiatric disorders, other MAO inhibitors [ie, linezolid and intravenous methylene blue]). Antipsychotic Agents may also enhance the serotonergic effect of Serotonin Modulators.Signs and symptoms include: agitation or restlessness, confusion, rapid heart rate and  "high blood pressure, dilated pupils, loss of muscle coordination or twitching muscles, heavy sweating, diarrhea, headaches, shivering and/or goose bumps.  Patient was educated on signs and symptoms of serotonin syndrome.  Symptoms typically occur within several hours of taking a new drug or increasing the dose.  Patient was notified to report symptoms immediately.    Get Out of Your Mind & Into Your Life   By Carlos Fletcher    The Happiness Trap: How to Stop Struggling and Start Living  By Speedy Zacarias    The Reality Slap: Finding Peace & Fulfillment When Life Hurts  By Speedy Zacarias    Things Might Go Terribly, Horribly Wrong: A Guide to Life Liberated from Anxiety  By Lyn Baptiste, PhD    Stress Less, Live More: How Acceptance and Commitment Therapy Can Help You Live a Busy Yet Balanced Life  By Jose Cuba    Finding Life Beyond Trauma: Using Acceptance and Commitment Therapy to Heal From Post-Traumatic Stress and Trauma-Related Problems  By Josette Martin and Shaina Carter    Other ACT Skills References     Speedy Zacarias on YouTube - Demonstrates several different skills to deal with difficult thoughts and feelings     Book:  \"A Liberated Mind: How to Pivot Toward What Matters\" by Carlos Fletcher     Psychological flexibility: How love turns pain into purpose  Tedx Talk by Dr. Fletcher discussing his struggle with anxiety and panic disorder which motivated him to develop ACT therapy (Acceptance and Commitment Therapy)     You Are Not Your Thoughts    Community Resources:    National Suicide Prevention Lifeline: 785.923.7000 (TTY: 163.563.3553). Call anytime for help.  (www.suicidepreventionlifeline.org)  National [place] on Mental Illness (www.ezio.org): 732.254.6442 or 462-023-2007.   Mental Health Association (www.mentalhealth.org): 829.150.4052 or 280-317-0888.  [place] Crisis Text Line: Text [place] to 607593  Suicide LifeLine Chat: suicidepreventionGeosignline.org/chat  "

## 2021-11-04 NOTE — Clinical Note
Please call this patient to get them scheduled for a follow-up visit in 4-6 weeks. Please schedule with me and the [facility], Dr. Crawford if possible. Thanks!

## 2021-11-05 ASSESSMENT — ANXIETY QUESTIONNAIRES: GAD7 TOTAL SCORE: 21

## 2021-12-14 ENCOUNTER — MYC MEDICAL ADVICE (OUTPATIENT)
Dept: FAMILY MEDICINE | Facility: CLINIC | Age: 36
End: 2021-12-14
Payer: COMMERCIAL

## 2021-12-14 DIAGNOSIS — M54.42 CHRONIC MIDLINE LOW BACK PAIN WITH LEFT-SIDED SCIATICA: ICD-10-CM

## 2021-12-14 DIAGNOSIS — G89.29 CHRONIC MIDLINE LOW BACK PAIN WITH LEFT-SIDED SCIATICA: ICD-10-CM

## 2021-12-15 RX ORDER — TRAMADOL HYDROCHLORIDE 50 MG/1
50 TABLET ORAL EVERY 6 HOURS PRN
Qty: 10 TABLET | Refills: 0 | OUTPATIENT
Start: 2021-12-15

## 2021-12-30 ENCOUNTER — VIRTUAL VISIT (OUTPATIENT)
Dept: FAMILY MEDICINE | Facility: CLINIC | Age: 36
End: 2021-12-30
Payer: COMMERCIAL

## 2021-12-30 ENCOUNTER — TELEPHONE (OUTPATIENT)
Dept: FAMILY MEDICINE | Facility: CLINIC | Age: 36
End: 2021-12-30

## 2021-12-30 DIAGNOSIS — G89.29 CHRONIC MIDLINE LOW BACK PAIN WITHOUT SCIATICA: ICD-10-CM

## 2021-12-30 DIAGNOSIS — M54.6 CHRONIC BILATERAL THORACIC BACK PAIN: Primary | ICD-10-CM

## 2021-12-30 DIAGNOSIS — G89.29 CHRONIC BILATERAL THORACIC BACK PAIN: Primary | ICD-10-CM

## 2021-12-30 DIAGNOSIS — M54.50 CHRONIC MIDLINE LOW BACK PAIN WITHOUT SCIATICA: ICD-10-CM

## 2021-12-30 DIAGNOSIS — U07.1 INFECTION DUE TO 2019 NOVEL CORONAVIRUS: ICD-10-CM

## 2021-12-30 PROCEDURE — 99213 OFFICE O/P EST LOW 20 MIN: CPT | Mod: 95 | Performed by: NURSE PRACTITIONER

## 2021-12-30 RX ORDER — METHOCARBAMOL 500 MG/1
500-1000 TABLET, FILM COATED ORAL 4 TIMES DAILY PRN
Qty: 20 TABLET | Refills: 3 | Status: SHIPPED | OUTPATIENT
Start: 2021-12-30 | End: 2022-12-27

## 2021-12-30 ASSESSMENT — ANXIETY QUESTIONNAIRES
GAD7 TOTAL SCORE: 12
7. FEELING AFRAID AS IF SOMETHING AWFUL MIGHT HAPPEN: MORE THAN HALF THE DAYS
4. TROUBLE RELAXING: MORE THAN HALF THE DAYS
6. BECOMING EASILY ANNOYED OR IRRITABLE: MORE THAN HALF THE DAYS
1. FEELING NERVOUS, ANXIOUS, OR ON EDGE: MORE THAN HALF THE DAYS
2. NOT BEING ABLE TO STOP OR CONTROL WORRYING: SEVERAL DAYS
7. FEELING AFRAID AS IF SOMETHING AWFUL MIGHT HAPPEN: MORE THAN HALF THE DAYS
5. BEING SO RESTLESS THAT IT IS HARD TO SIT STILL: SEVERAL DAYS
GAD7 TOTAL SCORE: 12
3. WORRYING TOO MUCH ABOUT DIFFERENT THINGS: MORE THAN HALF THE DAYS
GAD7 TOTAL SCORE: 12

## 2021-12-30 ASSESSMENT — PATIENT HEALTH QUESTIONNAIRE - PHQ9
10. IF YOU CHECKED OFF ANY PROBLEMS, HOW DIFFICULT HAVE THESE PROBLEMS MADE IT FOR YOU TO DO YOUR WORK, TAKE CARE OF THINGS AT HOME, OR GET ALONG WITH OTHER PEOPLE: SOMEWHAT DIFFICULT
SUM OF ALL RESPONSES TO PHQ QUESTIONS 1-9: 9
SUM OF ALL RESPONSES TO PHQ QUESTIONS 1-9: 9

## 2021-12-30 NOTE — PATIENT INSTRUCTIONS
"  Instructions for Patients  After Your COVID-19 (Coronavirus) Test  You have been tested for COVID-19 (coronavirus).   If you'll have surgery in the next few days, we'll let you know ahead of time if you have the virus. Please call your surgeon's office with any questions.  For all other patients: Results are usually available in StepOut within 2 to 3 days.   If you do not have a StepOut account, you'll get a letter in the mail in about 7 to 10 days.   [employer] is often the fastest way to get test results. Please sign up if you do not already have a StepOut account. See the handout Getting COVID-19 Test Results in StepOut for help.  What if my test result is positive?  If your test is positive and you have not viewed your result in [employer], you'll get a phone call with your result. (A positive test means that you have the virus.)     Follow the tips under \"How do I self-isolate?\" below for 10 days (20 days if you have a weak immune system).    You don't need to be retested for COVID-19 before going back to school or work. As long as you're fever-free and feeling better, you can go back to school, work and other activities after waiting the 10 or 20 days.  What if I have questions after I get my results?  If you have questions about your results, please visit our testing website at [email or web address].   After 7 to 10 days, if you have not gotten your results:     Call [phone] ([phone]) and ask to speak with our COVID-19 results team.    If you're being treated at an infusion center: Call your infusion center directly.  What are the symptoms of COVID-19?  Cough, fever and trouble breathing are the most common signs of COVID-19.  Other symptoms can include new headaches, new muscle or body aches, new and unexplained fatigue (feeling very tired), chills, sore throat, congestion (stuffy or runny nose), diarrhea (loose poop), loss of taste or smell, belly pain, and nausea or " "vomiting (feeling sick to your stomach or throwing up).  You may already have symptoms of COVID-19, or they may show up later.  What should I do if I have symptoms?  If you're having surgery: Call your surgeon's office.  For all other patients: Stay home and away from others (self-isolate) until ...    You've had no fever--and no medicine that reduces fever--for 1 full day (24 hours), AND    Other symptoms have gotten better. For example, your cough or breathing has improved, AND    At least 10 days have passed since your symptoms first started.  How do I self-isolate?    Stay in your own room, even for meals. Use your own bathroom if you can.    Stay away from others in your home. No hugging, kissing or shaking hands. No visitors.    Don't go to work, school or anywhere else.    Clean \"high touch\" surfaces often (doorknobs, counters, handles). Use household cleaning spray or wipes. You'll find a full list of  on the EPA website: www.epa.gov/pesticide-registration/list-n-disinfectants-use-against-sars-cov-2.    Cover your mouth and nose with a mask or other face covering to avoid spreading germs.    Wash your hands and face often. Use soap and water.    Caregivers in these groups are at risk for severe illness due to COVID-19:  ? People 65 years and older  ? People who live in a nursing home or long-term care facility  ? People with chronic disease (lung, heart, cancer, diabetes, kidney, liver, immunologic)  ? People who have a weakened immune system, including those who:    Are in cancer treatment    Take medicine that weakens the immune system, such as corticosteroids    Had a bone marrow or organ transplant    Have an immune deficiency    Have poorly controlled HIV or AIDS    Are obese (body mass index of 40 or higher)    Smoke regularly    Caregivers should wear gloves while washing dishes, handling laundry and cleaning bedrooms and bathrooms.    Use caution when washing and drying laundry: Don't shake " dirty laundry and use the warmest water setting that you can.    For more tips on managing your health at home, go to www.cdc.gov/coronavirus/2019-ncov/downloads/10Things.pdf.  How can I take care of myself at home?  1. Get lots of rest. Drink extra fluids (unless a doctor has told you not to).  2. Take Tylenol (acetaminophen) for fever or pain. If you have liver or kidney problems, ask your family doctor if it's OK to take Tylenol.   Adults can take either:  ? 650 mg (two 325 mg pills) every 4 to 6 hours, or   ? 1,000 mg (two 500 mg pills) every 8 hours as needed.  ? Note: Don't take more than 3,000 mg in one day. Acetaminophen is found in many medicines (both prescribed and over-the-counter medicines). Read all labels to be sure you don't take too much.   For children, check the Tylenol bottle for the right dose. The dose is based on the child's age or weight.  3. If you have other health problems (like cancer, heart failure, an organ transplant or severe kidney disease): Call your specialty clinic if you don't feel better in the next 2 days.  4. Know when to call 911. Emergency warning signs include:  ? Trouble breathing or shortness of breath  ? Chest pain or pressure that doesn't go away  ? Feeling confused like you haven't felt before, or not being able to wake up  ? Bluish-colored lips or face  5. If your doctor prescribed a blood thinner medicine: Follow their instructions.  Where can I get more information?    [facility] - About COVID-19:   [email or web address]    CDC - If You're Sick: cdc.gov/coronavirus/2019-ncov/about/steps-when-sick.html    CDC - Ending Home Isolation: www.cdc.gov/coronavirus/2019-ncov/hcp/disposition-in-home-patients.html    CDC - Caring for Someone: www.cdc.gov/coronavirus/2019-ncov/if-you-are-sick/care-for-someone.html    [facility] - Interim Guidance for Hospital Discharge to Home: www.[facility].[place]./diseases/coronavirus/hcp/hospdischarge.pdf    [facility]  [place] clinical trials (COVID-19 research studies): clinicalaffairs.[facility]-clinical-trials    Below are the COVID-19 hotlines at the [facility] of Health ([facility]). Interpreters are available.  ? For health questions: Call 503-815-2740 or 1-957.648.2776 (7 a.m. to 7 p.m.)  ? For questions about schools and childcare: Call 142-750-0871 or 1-416.529.8372 (7 a.m. to 7 p.m.)    For informational purposes only. Not to replace the advice of your health care provider. Clinically reviewed by Infection Prevention and the [facility] COVID-19 Clinical Team. Copyright   2020 [facility]. All rights reserved. [employer] 488123 - Rev 11/11/20.      Thank you for limiting contact with others, wearing a simple mask to cover your cough, practice good hand hygiene habits and accessing our virtual services where possible to limit the spread of this virus.    For more information about COVID19 and options for caring for yourself at home, please visit the CDC website at https://www.cdc.gov/coronavirus/2019-ncov/about/steps-when-sick.html  For more options for care at [facility], please visit our website at [email or web address]

## 2021-12-30 NOTE — TELEPHONE ENCOUNTER
Anusha Wolff, APRN CNP  P Fz Rn Triage Pool  Can you route to abstracting pool?   Positive saliva COVID-19 test 12/28/21

## 2021-12-30 NOTE — PROGRESS NOTES
"Екатерина is a 36 year old who is being evaluated via a billable telephone visit.      What phone number would you like to be contacted at? [phone]  How would you like to obtain your AVS? MyChart    Assessment & Plan     Chronic bilateral thoracic back pain  Side effects on Tizanidine- patient agreeable to trial of Robaxin instead. Return for x-ray after COVID-19 isolation period has ended.  - XR Thoracic Spine 3 Views; Future  - methocarbamol (ROBAXIN) 500 MG tablet; Take 1-2 tablets (500-1,000 mg) by mouth 4 times daily as needed for muscle spasms    Chronic midline low back pain without sciatica  Doing well- encouraged patient to restart her home Physical Therapy exercises. Offered referral back to Physical Therapy.    Infection due to 2019 novel coronavirus  Discussed home isolation.      Ordering of each unique test  Prescription drug management         BMI:   Estimated body mass index is 32.74 kg/m  as calculated from the following:    Height as of 7/7/21: 1.575 m (5' 2\").    Weight as of 7/7/21: 81.2 kg (179 lb).       See Patient Instructions    Return in about 1 month (around 1/30/2022) for Physical.    Anusha Wolff, ESTUARDO CNP  [facility]    Ny Willams is a 36 year old who presents for the following health issues     HPI     Chief Complaint   Patient presents with     Back Pain     Fup     Patient presents for thoracic back pain ongoing since April and worsened since October. Lumbar injections helped low back, but now thoracic area more painful. Has had pain here before but wasn't as bad as lumbar area. Naproxen helps a little. Muscle relaxers caused her to feel restless. Prior thoracic x-ray reviewed.    Positive COVID 2 days ago after exposure from son- stuffy nose and little tickle in the throat, otherwise feels well.    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    Review of Systems   Constitutional, HEENT, cardiovascular, " pulmonary, gi and gu systems are negative, except as otherwise noted.      Objective           Vitals:  No vitals were obtained today due to virtual visit.    Physical Exam   healthy, alert and no distress  PSYCH: Alert and oriented times 3; coherent speech, normal   rate and volume, able to articulate logical thoughts, able   to abstract reason, no tangential thoughts, no hallucinations   or delusions  Her affect is normal  RESP: No cough, no audible wheezing, able to talk in full sentences  Remainder of exam unable to be completed due to telephone visits    No results found for any visits on 12/30/21.            Phone call duration: 12 minutes

## 2021-12-31 ENCOUNTER — VIRTUAL VISIT (OUTPATIENT)
Dept: PSYCHIATRY | Facility: CLINIC | Age: 36
End: 2021-12-31
Payer: COMMERCIAL

## 2021-12-31 ENCOUNTER — VIRTUAL VISIT (OUTPATIENT)
Dept: PSYCHOLOGY | Facility: CLINIC | Age: 36
End: 2021-12-31
Payer: COMMERCIAL

## 2021-12-31 DIAGNOSIS — F41.1 GAD (GENERALIZED ANXIETY DISORDER): Primary | ICD-10-CM

## 2021-12-31 DIAGNOSIS — F41.9 ANXIETY: Primary | ICD-10-CM

## 2021-12-31 DIAGNOSIS — F51.04 PSYCHOPHYSIOLOGICAL INSOMNIA: ICD-10-CM

## 2021-12-31 DIAGNOSIS — F39 MOOD DISORDER (H): ICD-10-CM

## 2021-12-31 DIAGNOSIS — G47.00 INSOMNIA, UNSPECIFIED TYPE: ICD-10-CM

## 2021-12-31 PROCEDURE — 99214 OFFICE O/P EST MOD 30 MIN: CPT | Mod: 95 | Performed by: PSYCHIATRY & NEUROLOGY

## 2021-12-31 PROCEDURE — 90832 PSYTX W PT 30 MINUTES: CPT | Mod: 95 | Performed by: PSYCHOLOGIST

## 2021-12-31 RX ORDER — BUSPIRONE HYDROCHLORIDE 10 MG/1
10 TABLET ORAL 2 TIMES DAILY
Qty: 180 TABLET | Refills: 0 | Status: SHIPPED | OUTPATIENT
Start: 2021-12-31 | End: 2022-03-31

## 2021-12-31 ASSESSMENT — PATIENT HEALTH QUESTIONNAIRE - PHQ9: SUM OF ALL RESPONSES TO PHQ QUESTIONS 1-9: 9

## 2021-12-31 ASSESSMENT — ANXIETY QUESTIONNAIRES: GAD7 TOTAL SCORE: 12

## 2021-12-31 NOTE — Clinical Note
Psychiatry Update/Consult. I am returning Екатерина ELIZABETH Redmondalissons's psychiatric care back to you for ongoing management and medication prescribing.  Patient has graduated from [facility] due to ongoing stability and readiness to return to primary care provider. Future medication refills will come from you (I ensure patient had 2-3 months today). I recommended that the patient follow up with you in about 2-3 months for a mental health visit. More details about treatment plan are in my note. If you have any questions or concerns, please don't hesitate to reach out. The patient can be re-referred back to this service for further consultation in the future if needed but a new referral will need to be placed.      Thanks for the referral! It was a pleasure working with Екатерина JOHNSON Nylasondra.     Guerline Wagner, DO  [facility]
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
## 2021-12-31 NOTE — PROGRESS NOTES
"Екатерина Ramon is a 36 year old year old who is being evaluated via a billable video visit.      How would you like to obtain your AVS? MyChart  If you are dropped from the video visit, the video invite should be resent to: Text to cell phone: see Epic  Will anyone else be joining your video visit? No       Telemedicine Visit: The patient's condition can be safely assessed and treated via synchronous audio and visual telemedicine encounter.      Reason for Telemedicine Visit: Covid-19 Pandemic    Originating Site (Patient Location): Patient's home     Distant Site (Provider Location): Provider Remote Setting    Mode of Communication:  Video Conference via Novomer    As the provider I attest to compliance with applicable laws and regulations related to telemedicine.        Outpatient Psychiatric Progress Note    Name: Екатерина Ramon   : 1985                    Primary Care Provider: ESTUARDO Jain CNP   Therapist: None, Appt in  to establish with SELENA Sol     PHQ-9 scores:  PHQ-9 SCORE 10/13/2021 2021 2021   PHQ-9 Total Score - - -   PHQ-9 Total Score MyChart - 16 (Moderately severe depression) 9 (Mild depression)   PHQ-9 Total Score 8 16 9       BERENICE-7 scores:  BERENICE-7 SCORE 10/13/2021 2021 2021   Total Score - - -   Total Score - 21 (severe anxiety) 12 (moderate anxiety)   Total Score 18 21 12       Patient Identification:  Patient is a 36 year old, partnered / significant other  White Not  or  female  who presents for return visit with me.  Patient is currently employed full time. Patient attended the phone/video session alone. Patient prefers to be called: \"Екатерина\".    Interim History:  I last saw Екатерина Ramon for outpatient psychiatry Consultation on 2021. During that appointment, we:      Continue duloxetine/Cymbalta 60 mg daily for depression, anxiety.     Continue trazodone  mg at bedtime prn for sleep.  "     Continue hydroxyzine 25-50 mg up to three times a day as needed for anxiety/sleep. Can break a tablet in half and take just 12.5 mg if 25 mg too sedating.     Start buspirone/BusPar for anxiety and to augment duloxetine. Start with 5 mg twice daily for about a week and increase to 10 mg twice daily as tolerated.     Continue therapy as planned (to start in January).    12/31: Patient overall doing quite well.  Significant improvement of her anxiety since last visit.  She changed jobs which has been a major benefit.  She does feel BuSpar likely helpful but hard to tell considering how improved her symptoms probably are due to job change.  Sleeping better as well.  Unfortunately tested positive for COVID a few days ago.  Just mild upper respiratory symptoms at this time.  Feels like she is faring quite well.  No acute safety concerns.  No problematic drug or alcohol use.  Will be starting individual psychotherapy in January.    Per [facility], Dr. 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 She is not working directly with patients which she appreciates. She thinks Buspar has been helpful because she feels calmer.     Past Psychiatric Med Trials:  Wellbutrin XL - started making her feel weird and shaky  Duloxetine - just increased the duloxetine, had been feeling more anxious,   Hydroxyzine  prozac - not effective  celexa - still felt pretty anxious, not effective after awhile  zoloft - started in 2012, doesn't remember much about why or what  effexor-xr    Psychiatric ROS:  Екатерина Ramon reports mood has been: Improved  Anxiety has been: Improved  Sleep has been: Pretty good  Yi sxs: None  Psychosis sxs: None  ADHD/ADD sxs: N/A  PTSD sxs: Stable  PHQ9 and GAD7 scores were reviewed today if completed.   Medication side effects: Denies  Current stressors include: Symptoms  Coping mechanisms and supports include: Family,  Hobbies and Friends    Current medications include:   Current Outpatient Medications   Medication Sig     busPIRone (BUSPAR) 10 MG tablet Take 1 tablet (10 mg) by mouth 2 times daily     childrens multivitamin w/iron (FLINTSTONES COMPLETE) 18 MG chewable tablet Take 1 chew tab by mouth daily     DULoxetine (CYMBALTA) 60 MG capsule Take 1 capsule (60 mg) by mouth daily     hydrOXYzine (ATARAX) 10 MG tablet Take 1-3 tablets (10-30 mg) by mouth 3 times daily as needed (anxiety, sleep)     methocarbamol (ROBAXIN) 500 MG tablet Take 1-2 tablets (500-1,000 mg) by mouth 4 times daily as needed for muscle spasms     omeprazole (PRILOSEC) 20 MG DR capsule Take 1 capsule (20 mg) by mouth every morning (before breakfast)     SUMAtriptan (IMITREX) 25 MG tablet Take 1-2 tablets (25-50 mg) by mouth at onset of headache for migraine     traZODone (DESYREL) 50 MG tablet Take 1-2 tablets ( mg) by mouth nightly as needed for sleep     verapamil ER (VERELAN) 180 MG 24 hr capsule Take 1 capsule (180 mg) by mouth daily     No current facility-administered medications for this visit.       Past Medical/Surgical History:  Past Medical History:   Diagnosis Date     Family history of diabetes mellitus      Hypertension      Migraine with aura and without status migrainosus, not intractable 11/2/2016     PID (acute pelvic inflammatory disease) 5/2010      has a past medical history of Family history of diabetes mellitus, Hypertension, Migraine with aura and without status migrainosus, not intractable (11/2/2016), and PID (acute pelvic inflammatory disease) (5/2010).She has no past medical history of Cancer (H), Cerebral infarction (H), Congestive heart failure (H), Congestive heart failure, unspecified, COPD (chronic obstructive pulmonary disease) (H), Depressive disorder, History of blood transfusion, Thyroid disease, or Uncomplicated asthma.    Social History:  Reviewed. No changes to social history except as noted above in  HPI.    Vital Signs:   None. This is phone/video visit.     Labs:  Most recent laboratory results reviewed and no new labs.     Review of Systems:  10 systems (general, cardiovascular, respiratory, eyes, ENT, endocrine, GI, , M/S, neurological) were reviewed. Most pertinent finding(s) is/are: denies fever, cough, headaches, shortness of breath, chest pain, N/V, constipation/diarrhea, trouble urinating, aches and pains. The remaining systems are all unremarkable.    Mental Status Examination (limited as this is by phone/video):  Appearance: Awake, alert, appears stated age, no acute distress, well-groomed   Attitude:  cooperative, pleasant   Motor: No gross abnormalities observed via video, not formally tested   Oriented to:  person, place, time, and situation  Attention Span and Concentration:  normal  Speech:  clear, coherent, regular rate, rhythm, and volume  Language: intact  Mood:  better  Affect:  appropriate and in normal range and mood congruent  Associations:  no loose associations  Thought Process:  logical, linear and goal oriented  Thought Content:  no evidence of suicidal ideation or homicidal ideation, no evidence of psychotic thought, no auditory hallucinations present and no visual hallucinations present  Recent and Remote Memory:  Intact to interview. Not formally assessed. No amnesia.  Fund of Knowledge: appropriate  Insight:  good  Judgment:  intact, adequate for safety  Impulse Control:  intact    Suicide Risk Assessment:  Today Екатерина Ramon reports no suicidal ideation. Based on all available evidence including the factors cited above, Екатерина Ramon does not appear to be at imminent risk for self-harm, does not meet criteria for a 72-hr hold, and therefore remains appropriate for ongoing outpatient level of care.  A thorough assessment of risk factors related to suicide and self-harm have been reviewed and are noted above. The patient convincingly denies suicidality on several  occasions. Local community safety resources printed and reviewed for patient to use if needed. There was no deceit detected, and the patient presented in a manner that was believable.     DSM5 Diagnosis:  Mood Disorder, Unspecified (MDD vs Adjustment Disorder vs dysthymia)  300.02 (F41.1) Generalized Anxiety Disorder  Insomnia, unspecified    Medical comorbidities include:   Patient Active Problem List    Diagnosis Date Noted     S/P laparoscopic sleeve gastrectomy 07/07/2021     Priority: Medium     1/2021 Dr. Stein  preop 232lb (BMI 42)        Morbid obesity (H) 11/13/2020     Priority: Medium     Added automatically from request for surgery [id number]       IUD (intrauterine device) in place 12/13/2019     Priority: Medium     Mirena placed 12/13/2019         Contraceptive management 12/06/2019     Priority: Medium     Added automatically from request for surgery [id number]       Iron deficiency anemia due to chronic blood loss 11/15/2019     Priority: Medium     Menorrhagia with regular cycle 11/15/2019     Priority: Medium     Moderate major depression (H) 11/15/2019     Priority: Medium     Psychophysiological insomnia 11/15/2019     Priority: Medium     Migraine with aura and without status migrainosus, not intractable 11/02/2016     Priority: Medium     Low serum HDL 11/02/2016     Priority: Medium     Chronic bilateral thoracic back pain 08/10/2016     Priority: Medium     Chronic midline low back pain without sciatica 08/10/2016     Priority: Medium     BMI 40.0-44.9, adult (H) 07/20/2016     Priority: Medium     High weight 232lb        Anxiety 02/25/2016     Priority: Medium     CTS (carpal tunnel syndrome) 04/27/2015     Priority: Medium     Hypertension goal BP (blood pressure) < 140/90 02/04/2015     Priority: Medium     History of depression 02/20/2014     Priority: Medium     Around age 26, has been in remission since then       Family history of diabetes mellitus 02/22/2012     Priority: Medium      Counseling for parent-child problem 08/08/2011     Priority: Medium     Dysthymic disorder 08/08/2011     Priority: Medium     CARDIOVASCULAR SCREENING; LDL GOAL LESS THAN 160 10/31/2010     Priority: Medium       Psychosocial & Contextual Factors: see HPI above    Assessment:  From Intake, 11/04/2021:  Екатерина Ramon is a 36-year-old female with past psychiatric history including depression and anxiety who presents today for psychiatric evaluation.  Patient has had some increased psychosocial stressors recently and feels like she has not been functioning optimally.  She does report that a lot of her current anxiety and mood changes are related to her work situation.  She is looking for a new job.  Hopes to go back to her previous work location.  Has had a few past psychiatric medication trials.  Nothing really stood out to her as being helpful.  She thinks duloxetine has been somewhat helpful.  Dose was just increased and might need some more time.  We will start buspirone as an augmentation to her duloxetine and see if it might further help with anxiety.  Since much of her current worsening mental health is related to her work situation, I do think it will be quite a bit better when she is out of her current environment.  She was also encouraged to engage in individual psychotherapy for additional support and ongoing development of nonpharmacologic coping skills and strategies.  She has an appointment in January to reestablish with a therapist.  No acute safety concerns or SI today.  No problematic drug or alcohol use.    12/31/2021:  Patient overall has been doing quite well.  Changing jobs has significantly improved her mental health.  Mood more stable.  Anxiety much improved.  Tolerating buspirone well.  Sleeping better.  Discussed staying on buspirone for at least another 6 months, and especially through the winter, before attempting a taper off the medication.  I think it could be reasonable to try  tapering off if patient continues to do very well and have reduced psychosocial stressors as she does now.  Could also consider increasing the dose if anxiety or to start worsening again.  No medication changes today.  Symptoms likely to further improve once she starts individual psychotherapy this January.  Due to to improvement of patient symptoms and stability with no medication changes, patient's care will be returned back to primary care provider for ongoing management.  No acute safety concerns.  No problematic drug or alcohol use.    Medication side effects and alternatives were reviewed. Health promotion activities recommended and reviewed today. All questions addressed. Education and counseling completed regarding risks and benefits of medications and psychotherapy options. Recommend therapy for additional support.     Treatment Plan:    Continue duloxetine/Cymbalta 60 mg daily for mood, anxiety.    Continue BuSpar/buspirone 10 mg twice daily for anxiety and to augment duloxetine.    Continue hydroxyzine 10-30 mg up to three times a day as needed for anxiety/sleep.     Continue trazodone  mg at bedtime as needed for sleep.    Continue all other cares per primary care provider.     Continue all other medications as reviewed per electronic medical record today.     Safety plan reviewed. To the Emergency Department as needed or call after hours crisis line at [phone] or [phone]. [place] Crisis Text Line. Text [place] to 643394 or Suicide LifeLine Chat: suicidepreventionlifeline.org/chat    Continue therapy as planned, starting in January.    Follow up with primary care provider in 2 months for mental health visit, as planned, or for acute medical concerns.    MyChart may be used to communicate with your provider, but this is not intended to be used for emergencies.    Thank you for our work together in the Psychiatry Collaborative Care Model at [facility]. This is our last visit and I  am returning your care back to your Primary Care Provider ESTUARDO Jain CNP . If you are not doing well, please contact your Primary Care Provider office.     Administrative Billing:   Phone Call/Video Duration: 8 Minutes  Start: 8:30a  Stop: 8:42a    Time spent with patient was 8 minutes and greater than 50% of time or 5 minutes was spent in counseling and coordination of care regarding above diagnoses and treatment plan.    Patient Status:  The patient is being returned to the referring provider for ongoing care and medication prescribing.  The patient can be referred back to this service for further consultation as needed.    Signed:   Guerline Wagner DO  [facility]    Disclaimer: This note consists of symbols derived from keyboarding, dictation and/or voice recognition software. As a result, there may be errors in the script that have gone undetected. Please consider this when interpreting information found in this chart.

## 2022-01-01 NOTE — PATIENT INSTRUCTIONS
Treatment Plan:    Continue duloxetine/Cymbalta 60 mg daily for mood, anxiety.    Continue BuSpar/buspirone 10 mg twice daily for anxiety and to augment duloxetine.    Continue hydroxyzine 10-30 mg up to three times a day as needed for anxiety/sleep.     Continue trazodone  mg at bedtime as needed for sleep.    Continue all other cares per primary care provider.     Continue all other medications as reviewed per electronic medical record today.     Safety plan reviewed. To the Emergency Department as needed or call after hours crisis line at [phone] or [phone]. [place] Crisis Text Line. Text [place] to 855661 or Suicide LifeLine Chat: suicidepreventionImplicit Monitoring Solutionsline.org/chat    Continue therapy as planned, starting in January.    Follow up with primary care provider in 2-3 months for mental health visit, as planned, or for acute medical concerns.    [employer] may be used to communicate with your provider, but this is not intended to be used for emergencies.    Thank you for our work together in the Psychiatry Collaborative Care Model at [facility]. This is our last visit and I am returning your care back to your Primary Care Provider ESTUARDO Jain CNP . If you are not doing well, please contact your Primary Care Provider office.

## 2022-01-06 ENCOUNTER — ANCILLARY PROCEDURE (OUTPATIENT)
Dept: GENERAL RADIOLOGY | Facility: CLINIC | Age: 37
End: 2022-01-06
Attending: NURSE PRACTITIONER
Payer: COMMERCIAL

## 2022-01-06 ENCOUNTER — MYC MEDICAL ADVICE (OUTPATIENT)
Dept: FAMILY MEDICINE | Facility: CLINIC | Age: 37
End: 2022-01-06

## 2022-01-06 DIAGNOSIS — G89.29 CHRONIC BILATERAL THORACIC BACK PAIN: ICD-10-CM

## 2022-01-06 DIAGNOSIS — M54.6 CHRONIC BILATERAL THORACIC BACK PAIN: ICD-10-CM

## 2022-01-06 PROCEDURE — 72072 X-RAY EXAM THORAC SPINE 3VWS: CPT | Performed by: RADIOLOGY

## 2022-01-20 ENCOUNTER — TELEPHONE (OUTPATIENT)
Dept: BEHAVIORAL HEALTH | Facility: CLINIC | Age: 37
End: 2022-01-20
Payer: COMMERCIAL

## 2022-01-21 ENCOUNTER — MYC MEDICAL ADVICE (OUTPATIENT)
Dept: FAMILY MEDICINE | Facility: CLINIC | Age: 37
End: 2022-01-21
Payer: COMMERCIAL

## 2022-01-21 ENCOUNTER — TELEPHONE (OUTPATIENT)
Dept: SURGERY | Facility: CLINIC | Age: 37
End: 2022-01-21

## 2022-01-21 ENCOUNTER — OFFICE VISIT (OUTPATIENT)
Dept: URGENT CARE | Facility: URGENT CARE | Age: 37
End: 2022-01-21
Payer: COMMERCIAL

## 2022-01-21 VITALS
HEART RATE: 81 BPM | TEMPERATURE: 97.4 F | RESPIRATION RATE: 16 BRPM | BODY MASS INDEX: 31.09 KG/M2 | SYSTOLIC BLOOD PRESSURE: 132 MMHG | OXYGEN SATURATION: 98 % | WEIGHT: 170 LBS | DIASTOLIC BLOOD PRESSURE: 85 MMHG

## 2022-01-21 DIAGNOSIS — K62.5 RECTAL BLEEDING: Primary | ICD-10-CM

## 2022-01-21 DIAGNOSIS — K64.4 EXTERNAL HEMORRHOIDS: ICD-10-CM

## 2022-01-21 LAB
ERYTHROCYTE [DISTWIDTH] IN BLOOD BY AUTOMATED COUNT: 12.8 % (ref 10–15)
HCT VFR BLD AUTO: 37.7 % (ref 35–47)
HGB BLD-MCNC: 12.5 G/DL (ref 11.7–15.7)
MCH RBC QN AUTO: 30.9 PG (ref 26.5–33)
MCHC RBC AUTO-ENTMCNC: 33.2 G/DL (ref 31.5–36.5)
MCV RBC AUTO: 93 FL (ref 78–100)
PLATELET # BLD AUTO: 253 10E3/UL (ref 150–450)
RBC # BLD AUTO: 4.04 10E6/UL (ref 3.8–5.2)
WBC # BLD AUTO: 8.6 10E3/UL (ref 4–11)

## 2022-01-21 PROCEDURE — 36415 COLL VENOUS BLD VENIPUNCTURE: CPT | Performed by: NURSE PRACTITIONER

## 2022-01-21 PROCEDURE — 99214 OFFICE O/P EST MOD 30 MIN: CPT | Performed by: NURSE PRACTITIONER

## 2022-01-21 PROCEDURE — 85027 COMPLETE CBC AUTOMATED: CPT | Performed by: NURSE PRACTITIONER

## 2022-01-21 NOTE — TELEPHONE ENCOUNTER
Patient went to urgent care last Friday for rectal bleeding for the past 3-4 weeks. She had bright red blood come out of her rectum without stool. Denies dizziness, weakness, black or tarry stools, anal itching, shortness of breath, constipation, diarrhea, rectal pain, abdominal pain, emesis. Per provider in Urgent care who reviewed CBC during visit showing normal platelets and hgb, no sign of anemia. small hemorrhoid not thrombosed. No anal fissure or perirectal abscess currently. Provider referred her for a colonoscopy which is still needing to be scheduled. Denies pain.     Will see Dr. Murray on 2/25    Sandhya Palacio, RN BSN  [facility] Colon Rectal Surgery  [phone]

## 2022-01-21 NOTE — TELEPHONE ENCOUNTER
RN received call from Patient.    Patient stated she had made an appointment with Dr. Wolff but was not able to get into see her until 2/18.    Patient was hopig to be able to see PCP as she would feel mor comfortable speaking with her regarding this.    RN unable to fine available appointment in clinic today.    RN advised patient she should go to urgent care to be evaluated today.    Patient verbalized understanding and will go to urgent care    Diego Veronica RN, BSN, PHN  [facility]

## 2022-01-21 NOTE — TELEPHONE ENCOUNTER
[facility]    Phone Message    May a detailed message be left on voicemail: yes     Reason for Call: Appointment Intake    Referring Provider Name: Bryanna White NP   Diagnosis and/or Symptoms: Rectal bleeding & External hemorrhoids - Referral is marked as Priority 1-2 weeks.    First available with Arianne is April.  Please reach out to pt to get this scheduled.         Action Taken: Message routed to:  [facility]S    Travel Screening: Not Applicable

## 2022-01-21 NOTE — PATIENT INSTRUCTIONS
Patient Education     Understanding Rectal Bleeding  Rectal bleeding is when blood passes through your rectum and anus. It can happen with or without a bowel movement. Rectal bleeding may be a sign of a serious problem in your rectum, colon, or upper GI tract. Call your healthcare provider right away if you have any rectal bleeding.     The GI tract  The gastrointestinal (GI) tract includes the:     Mouth    Esophagus    Stomach    Small intestine    Large intestine (colon)    Rectum    Anus  The food you eat is digested as it passes through the GI tract. Solid waste leaves the body through the rectum.   Rectal bleeding and GI problems  The cause of rectal bleeding may be found in any part of the GI tract. The colon or rectum may be the site of your bleeding problem. Or bleeding may be due to problems farther up the GI tract, such as in the small intestine, duodenum, or stomach.   Causes of rectal bleeding  These are some possible causes:    Hemorrhoids (swollen veins in the rectum and anus)    Fissures (tears in or near the anus)    Diverticulosis (inflamed pockets in the colon wall)    Infection    Ischemia (low blood flow)    Radiation damage    Inflammatory bowel disease (Crohn's disease or ulcerative colitis)    Ulcers in the upper GI tract and inflammation of the large intestine    Abnormal tissue growths (tumors or polyps) in the GI tract    A bulging rectum (also called a rectal prolapse)    Abnormal blood vessels in the small intestine or in the colon  Common symptoms  Common symptoms are:    Rectal pain, itching, or soreness    Belly pain, including upper belly pain near the stomach (epigastric pain)    Small drops of blood that sometimes appear on the stool or toilet paper    Stool that looks black or tarry   Rectal bleeding can also happen without pain.  [employer] last reviewed this educational content on 6/1/2019 2000-2021 [employer]. All rights reserved. This information is not  intended as a substitute for professional medical care. Always follow your healthcare professional's instructions.           Patient Education     Hemorrhoids    Hemorrhoids are swollen and inflamed veins inside the rectum and near the anus. The rectum is the last several inches of the colon. The anus is the passage between the rectum and the outside of the body.   Causes  The veins can become swollen due to increased pressure in them. This is most often caused by:     Chronic constipation or diarrhea    Straining when having a bowel movement    Sitting too long on the toilet    A low-fiber diet    Pregnancy  Symptoms    Bleeding from the rectum. You may notice this after bowel movements.    Lump near the anus    Itching around the anus    Pain around the anus    Mucus leaks from the anus  There are different types of hemorrhoids. Depending on the type you have and the severity, you may be able to treat yourself at home. In some cases, a procedure may be the best treatment option. Your healthcare provider can tell you more about this, if needed.   Home care  General care    To get relief from pain or itching, try:  ? Medicines. Your healthcare provider may recommend stool softeners, suppositories, or laxatives to help manage constipation. Use these exactly as directed.  ? Sitz baths. A sitz bath involves sitting in a few inches of warm bath water. Be careful not to make the water so hot that you burn yourself--test it before sitting in it. Soak for about 10 to 15 minutes a few times a day. This may help relieve pain.  ? Topical products. Your healthcare provider may prescribe or recommend creams, ointments, or pads that can be applied to the hemorrhoid. Use these exactly as directed.  Tips to help prevent hemorrhoids     Eat more fiber. Fiber adds bulk to stool and absorbs water as it moves through your colon. This makes stool softer and easier to pass.  ? Increase the fiber in your diet with more fiber-rich foods.  These include fresh fruit, vegetables, and whole grains.  ? Take a fiber supplement or bulking agent, if advised by your healthcare provider. These include products such as psyllium or methylcellulose.    Drink more water. Your healthcare provider may direct you to drink plenty of water. This can help keep stool soft.    Be more active. Frequent exercise aids digestion and helps prevent constipation. It may also help make bowel movements more regular.    Don t strain during bowel movements. This can make hemorrhoids more likely. Also, don t sit on the toilet for long periods of time.    Follow-up care  Follow up with your healthcare provider as advised. If a culture or imaging tests were done, someone will let you know the results when they are ready. This may take a few days or longer. If your healthcare provider recommends a procedure for your hemorrhoids, these options can be discussed. Options may include surgery and outpatient office treatments.   When to seek medical advice  Call your healthcare provider right away if any of these occur:     Increased bleeding from the rectum    Increased pain around the rectum or anus    Weakness or dizziness  Call 911  Call 911 if any of these occur:     Trouble breathing or swallowing    Fainting or loss of consciousness    Unusually fast heart rate    Vomiting blood    Large amounts of blood in stool or black, tarry stools  [employer] last reviewed this educational content on 8/1/2019 2000-2021 [employer]. All rights reserved. This information is not intended as a substitute for professional medical care. Always follow your healthcare professional's instructions.

## 2022-01-21 NOTE — PROGRESS NOTES
Assessment & Plan     Rectal bleeding    - CBC with platelets  - Adult Gastro Ref - Consult Only    External hemorrhoids    - Adult Gastro Ref - Consult Only     Reviewed CBC during visit showing normal platelets and hgb, no sign of anemia. Small hemorrhoid not thrombosed. No anal fissure or perirectal abscess currently. VSS. Priority consult entered for further evaluation in GI for rectal bleeding. Continue stool softeners. No red flag signs currently.     Follow-up with PCP if symptoms persist for 7 days, and sooner if symptoms worsen or new symptoms develop.     Discussed red flag symptoms which warrant immediate visit in emergency room    All questions were answered and patient verbalized understanding. AVS reviewed with patient.     30 minutes spent during visit, chart review, and charting on date of encounter.     Bryanna White, DNP, APRN, CNP 1/21/2022 3:28 PM  [facility] [place]          Ny Willams is a 36 year old female who presents to clinic today for the following health issues:  Chief Complaint   Patient presents with     Rectal Problem     blood in stool for the past few weeks. has tried stool softeners and has hx of hemorrhoids      Patient presents for evaluation of blood in stool for the past 3-4 weeks. Today she had plain bright red blood come out of her rectum without stool. Denies dizziness, weakness, black or tarry stools, anal itching, shortness of breath, constipation, diarrhea, rectal pain, abdominal pain, emesis. She can feel her hemorrhoid which is small. No history of colonoscopy. She has a history of hemorrhoid. She has been taking senna daily. She was evaluated by PCP 7/1/21 for hemorrhoid and was diagnosed with anal fissure and was advised to do sitz baths and fiber.     Problem list, Medication list, Allergies, and Medical history reviewed in EPIC.    ROS:  Review of systems negative except for noted above          Objective    /85   Pulse 81    Temp 97.4  F (36.3  C) (Tympanic)   Resp 16   Wt 77.1 kg (170 lb)   SpO2 98%   BMI 31.09 kg/m    Physical Exam  Constitutional:       General: She is not in acute distress.     Appearance: She is not toxic-appearing or diaphoretic.   Cardiovascular:      Rate and Rhythm: Normal rate and regular rhythm.      Heart sounds: Normal heart sounds.   Pulmonary:      Effort: Pulmonary effort is normal. No respiratory distress.      Breath sounds: Normal breath sounds. No wheezing, rhonchi or rales.   Abdominal:      General: Bowel sounds are normal. There is no distension.      Palpations: Abdomen is soft.      Tenderness: There is no abdominal tenderness. There is no guarding or rebound.   Genitourinary:     Rectum: External hemorrhoid present. No mass, tenderness or anal fissure.   Neurological:      Mental Status: She is alert.          Labs:  Results for orders placed or performed in visit on 01/21/22   CBC with platelets     Status: Normal   Result Value Ref Range    WBC Count 8.6 4.0 - 11.0 10e3/uL    RBC Count 4.04 3.80 - 5.20 10e6/uL    Hemoglobin 12.5 11.7 - 15.7 g/dL    Hematocrit 37.7 35.0 - 47.0 %    MCV 93 78 - 100 fL    MCH 30.9 26.5 - 33.0 pg    MCHC 33.2 31.5 - 36.5 g/dL    RDW 12.8 10.0 - 15.0 %    Platelet Count 253 150 - 450 10e3/uL

## 2022-01-24 ENCOUNTER — TELEPHONE (OUTPATIENT)
Dept: GASTROENTEROLOGY | Facility: CLINIC | Age: 37
End: 2022-01-24
Payer: COMMERCIAL

## 2022-01-24 ENCOUNTER — VIRTUAL VISIT (OUTPATIENT)
Dept: PSYCHOLOGY | Facility: CLINIC | Age: 37
End: 2022-01-24
Payer: COMMERCIAL

## 2022-01-24 DIAGNOSIS — F34.1 DYSTHYMIC DISORDER: Primary | ICD-10-CM

## 2022-01-24 DIAGNOSIS — F41.9 ANXIETY: ICD-10-CM

## 2022-01-24 DIAGNOSIS — K62.5 RECTAL BLEEDING: Primary | ICD-10-CM

## 2022-01-24 PROCEDURE — 90791 PSYCH DIAGNOSTIC EVALUATION: CPT | Mod: 95

## 2022-01-24 ASSESSMENT — COLUMBIA-SUICIDE SEVERITY RATING SCALE - C-SSRS
4. HAVE YOU HAD THESE THOUGHTS AND HAD SOME INTENTION OF ACTING ON THEM?: NO
3. HAVE YOU BEEN THINKING ABOUT HOW YOU MIGHT KILL YOURSELF?: NO
4. HAVE YOU HAD THESE THOUGHTS AND HAD SOME INTENTION OF ACTING ON THEM?: NO
1. IN THE PAST MONTH, HAVE YOU WISHED YOU WERE DEAD OR WISHED YOU COULD GO TO SLEEP AND NOT WAKE UP?: YES
1. IN THE PAST MONTH, HAVE YOU WISHED YOU WERE DEAD OR WISHED YOU COULD GO TO SLEEP AND NOT WAKE UP?: NO
2. HAVE YOU ACTUALLY HAD ANY THOUGHTS OF KILLING YOURSELF LIFETIME?: YES
5. HAVE YOU STARTED TO WORK OUT OR WORKED OUT THE DETAILS OF HOW TO KILL YOURSELF? DO YOU INTEND TO CARRY OUT THIS PLAN?: NO
2. HAVE YOU ACTUALLY HAD ANY THOUGHTS OF KILLING YOURSELF?: NO
5. HAVE YOU STARTED TO WORK OUT OR WORKED OUT THE DETAILS OF HOW TO KILL YOURSELF? DO YOU INTEND TO CARRY OUT THIS PLAN?: NO

## 2022-01-24 ASSESSMENT — PATIENT HEALTH QUESTIONNAIRE - PHQ9
SUM OF ALL RESPONSES TO PHQ QUESTIONS 1-9: 7
10. IF YOU CHECKED OFF ANY PROBLEMS, HOW DIFFICULT HAVE THESE PROBLEMS MADE IT FOR YOU TO DO YOUR WORK, TAKE CARE OF THINGS AT HOME, OR GET ALONG WITH OTHER PEOPLE: SOMEWHAT DIFFICULT
SUM OF ALL RESPONSES TO PHQ QUESTIONS 1-9: 7
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
## 2022-01-24 NOTE — TELEPHONE ENCOUNTER
Screening Questions  Blue=prep questions Red=location Green=sedation   1. Are you active on mychart? Y    2. What insurance is in the chart? BCBS     3.  Ordering/Referring Provider: Sb Murray MD    4. BMI 31.1, If greater than 40 review exclusion criteria also will need EXTENDED PREP    5.  Respiratory Screening (If yes to any of the following HOSPITAL setting only):     Do you use daily home oxygen? N  Do you have mod to severe Obstructive Sleep Apnea? N (can be seen at [facility] or hospital setting)    Do you have Pulmonary Hypertension? N   Do you have UNCONTROLLED asthma? N    6. Have you had a heart or lung transplant? N  (If yes, please review exclusion criteria)    7. Are you currently on dialysis?N  (If yes, schedule in HOSPITAL setting only)(If yes, please send Golytely prep)    8. Do you have chronic kidney disease? N (If yes, please send Golytely prep)    9. Have you had a stroke or Transient ischemic attack (TIA) within 6 months? N (If yes, do not schedule at [facility])    10. In the past 6 months, have you had any heart related issues including cardiomyopathy or heart attack? N (If yes, please review exclusion criteria)           If yes, did it require cardiac stenting or other implantable device?  (If yes, please review exclusion criteria)      11. Do you have any implantable devices in your body (pacemaker, defib, LVAD)? N (If yes, schedule at [facility])    12. Do you take nitroglycerin? If yes, how often? N (if yes, schedule at HOSPITAL setting)    13. Are you currently taking any blood thinners?N (If yes- inform patient to follow up with PCP or provider for follow up instructions)     14. Are you a diabetic? N (If yes, please send Golytely prep)    15. (Females) Are you currently pregnant? N  If yes, how many weeks?      16. Are you taking any prescription pain medications on a routine schedule? N If yes, MAC sedation and patient will need EXTENDED PREP.    17. Do you have any chemical dependencies  such as alcohol, street drugs, or methadone? N If yes, MAC sedation     18. Do you have any history of post-traumatic stress syndrome, severe anxiety or history of psychosis? Y ANXIETY AND PTSD  If yes, MAC sedation.     19. Do you transfer independently? Y    20.  Do you have any issues with constipation? N   If yes, pt will need EXTENDED PREP     21. Preferred Pharmacy for Pre Prescription [facility]    Scheduling Details    Which Colonoscopy Prep was Sent?: MPREP  Type of Procedure Scheduled: COLON  Surgeon: SAUNDRA  Date of Procedure:2/9  Location: [facility]  Caller (Please ask for phone number if not scheduled by patient):       Sedation Type: MAC  Conscious Sedation- Needs  for 6 hours after the procedure  MAC/General-Needs  for 24 hours after procedure    Pre-op Required at [facility], [place], [place] and OR for MAC sedation: N  (if yes advise patient they will need a pre-op prior to procedure)      Informed patient they will need an adult  Y  Cannot take any type of public or medical transportation alone    Pre-Procedure Covid test to be completed at [facility] or Externally: covid+ 12/28    Confirmed Nurse will call to complete assessment Y    Additional comments:  ([facility] PATIENTS NEED EXTENDED PREP)

## 2022-01-25 ASSESSMENT — PATIENT HEALTH QUESTIONNAIRE - PHQ9: SUM OF ALL RESPONSES TO PHQ QUESTIONS 1-9: 7

## 2022-01-26 DIAGNOSIS — Z11.59 ENCOUNTER FOR SCREENING FOR OTHER VIRAL DISEASES: Primary | ICD-10-CM

## 2022-01-31 ENCOUNTER — VIRTUAL VISIT (OUTPATIENT)
Dept: PSYCHOLOGY | Facility: CLINIC | Age: 37
End: 2022-01-31
Payer: COMMERCIAL

## 2022-01-31 DIAGNOSIS — F34.1 DYSTHYMIC DISORDER: Primary | ICD-10-CM

## 2022-01-31 PROCEDURE — 90834 PSYTX W PT 45 MINUTES: CPT | Mod: 95

## 2022-01-31 ASSESSMENT — PATIENT HEALTH QUESTIONNAIRE - PHQ9
SUM OF ALL RESPONSES TO PHQ QUESTIONS 1-9: 6
10. IF YOU CHECKED OFF ANY PROBLEMS, HOW DIFFICULT HAVE THESE PROBLEMS MADE IT FOR YOU TO DO YOUR WORK, TAKE CARE OF THINGS AT HOME, OR GET ALONG WITH OTHER PEOPLE: SOMEWHAT DIFFICULT
SUM OF ALL RESPONSES TO PHQ QUESTIONS 1-9: 6

## 2022-01-31 NOTE — PROGRESS NOTES
Progress Note    Patient Name: кЕатерина Ramon  Date: 1/31/22         Service Type: Individual      Session Start Time: 4:35 pm  Session End Time: 5:15 pm     Session Length: 40 mins    Session #: 2    Attendees: Client attended alone    Service Modality:  Video Visit:      Provider verified identity through the following two step process.  Patient provided:  Patient is known previously to provider    Telemedicine Visit: The patient's condition can be safely assessed and treated via synchronous audio and visual telemedicine encounter.      Reason for Telemedicine Visit: Patient convenience (e.g. access to timely appointments / distance to available provider)    Originating Site (Patient Location): Patient's home    Distant Site (Provider Location): Provider Remote Setting- Home Office    Consent:  The patient/guardian has verbally consented to: the potential risks and benefits of telemedicine (video visit) versus in person care; bill my insurance or make self-payment for services provided; and responsibility for payment of non-covered services.     Patient would like the video invitation sent by:  My Chart    Mode of Communication:  Video Conference via [facility]    As the provider I attest to compliance with applicable laws and regulations related to telemedicine.     Treatment Plan Last Reviewed:   PHQ-9 / BERENICE-7 : Answers for HPI/ROS submitted by the patient on 1/31/2022  If you checked off any problems, how difficult have these problems made it for you to do your work, take care of things at home, or get along with other people?: Somewhat difficult  PHQ9 TOTAL SCORE: 6    DATA  Interactive Complexity: No  Crisis: No       Progress Since Last Session (Related to Symptoms / Goals / Homework):   Symptoms: No change low energy, mood, trouble with sleep    Homework: Did not complete      Episode of Care Goals: No improvement - CONTEMPLATION (Considering change and yet  undecided); Intervened by assessing the negative and positive thinking (ambivalence) about behavior change     Current / Ongoing Stressors and Concerns:   Anticipating upcoming colonoscopy. 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     Treatment Objective(s) Addressed in This Session:   identify 2 strategies for improving mood     Intervention:   Solution Focused: : explored enjoyable activities at home. Surfaced ambivalence around taking classes that don't feel useful for the future, recognized  Opportunities for creating structure and boundaries around studying and downtime.         ASSESSMENT: Current Emotional / Mental Status (status of significant symptoms):   Risk status (Self / Other harm or suicidal ideation)   Patient denies current fears or concerns for personal safety.   Patient denies current or recent suicidal ideation or behaviors.   Patient denies current or recent homicidal ideation or behaviors.   Patient denies current or recent self injurious behavior or ideation.   Patient denies other safety concerns.   Patient reports there has been no change in risk factors since their last session.     Patient reports there has been no change in protective factors since their last session.     Recommended that patient call 911 or go to the local ED should there be a change in any of these risk factors.     Appearance:   Appropriate    Eye Contact:   Good    Psychomotor Behavior: Normal    Attitude:   Cooperative  Pleasant   Orientation:   All   Speech    Rate / Production: Normal     Volume:  Normal    Mood:    Anxious  Depressed    Affect:    Blunted    Thought Content:  Clear    Thought Form:  Coherent  Logical    Insight:    Fair      Medication Review:   No changes to current psychiatric medication(s)     Medication Compliance:   Yes     Changes in Health Issues:   None reported     Chemical Use Review:   Substance Use: Chemical use reviewed, no active concerns identified      Tobacco Use: No  current tobacco use.      Diagnosis:  1. Dysthymic disorder        Collateral Reports Completed:   Not Applicable    PLAN: (Patient Tasks / Therapist Tasks / Other)  Plan enjoyable activities. Challenge anxious thoughts.    SUMI Sol  1/31/22  This note has been reviewed and I agree with the plan of care. This note is co-signed by MIRLANDE Brice, LICSW, Supervisor, on 1/31/2022

## 2022-02-01 ASSESSMENT — PATIENT HEALTH QUESTIONNAIRE - PHQ9: SUM OF ALL RESPONSES TO PHQ QUESTIONS 1-9: 6

## 2022-02-02 ENCOUNTER — TELEPHONE (OUTPATIENT)
Dept: GASTROENTEROLOGY | Facility: CLINIC | Age: 37
End: 2022-02-02
Payer: COMMERCIAL

## 2022-02-02 NOTE — TELEPHONE ENCOUNTER
Pre assessment questions completed for upcoming colonoscopy procedure scheduled on 2.9.2022    COVID test:  Pt states that she was COVID positive at the end of December/beginning of January.  RN asked pt if this was a PCR test however pt is unsure.  Pt stated she will upload the results.  Pt is aware if this was not a PCR test she will need to retest prior to procedure.     Reviewed procedural arrival time 0730 and facility location [facility].    Designated  policy reviewed. Instructed to have someone stay 24 hours post procedure.     Anticoagulation/blood thinners? no    Electronic implanted devices? no    Constipation/straining with bowel movements? No     Reviewed Miralax/Magnesium citrate/Dulcolax prep instructions with patient. No fiber/iron supplements or foods that contain nuts/seeds prior to procedure.     Patient verbalized understanding and had no questions or concerns at this time.    Elizabeth Ignacio RN

## 2022-02-03 NOTE — TELEPHONE ENCOUNTER
Pt uploaded positive COVID test results from 12.28.2022 in 2.2.2022 mychart encounter.    Elizabeth Ignacio RN

## 2022-02-09 ENCOUNTER — DOCUMENTATION ONLY (OUTPATIENT)
Dept: GASTROENTEROLOGY | Facility: OUTPATIENT CENTER | Age: 37
End: 2022-02-09
Payer: COMMERCIAL

## 2022-02-09 ENCOUNTER — TRANSFERRED RECORDS (OUTPATIENT)
Dept: HEALTH INFORMATION MANAGEMENT | Facility: CLINIC | Age: 37
End: 2022-02-09
Payer: COMMERCIAL

## 2022-02-09 DIAGNOSIS — K62.5 RECTAL BLEEDING: ICD-10-CM

## 2022-02-23 ENCOUNTER — PRE VISIT (OUTPATIENT)
Dept: SURGERY | Facility: CLINIC | Age: 37
End: 2022-02-23
Payer: COMMERCIAL

## 2022-02-23 NOTE — TELEPHONE ENCOUNTER
Diagnosis, Referred by & from: Dx: Rectal Bleeding- Referred from  Bryanna White NP @ [facility]   Appt date: 02/25/2022   NOTES STATUS DETAILS   OFFICE NOTE from referring provider Internal  01/21/2022 OV at  with Bryanna Bustillo NP    OFFICE NOTE from other specialist N/A      DISCHARGE SUMMARY from hospital N/A    DISCHARGE REPORT from the ER N/A    OPERATIVE REPORT N/A    MEDICATION LIST Internal    LABS     ANAL PAP N/A    BIOPSIES/PATHOLOGY RELATED TO DIAGNOSIS Internal 01/04/2021 Surgical Path  08/29/2020 Path Tissue Exam   DIAGNOSTIC PROCEDURES     PFC TESTING (from the Pelvic floor center includes Manometry, PDNL, EMG, etc.) N/A    COLONOSCOPY Received  02/09/2022 at [facility]     UPPER ENDOSCOPY (EGD) N/A      FLEX SIGMOIDOSCOPY N/A    ERCP N/A    IMAGING (DISC & REPORT)      CT N/A    MRI Internal 09/03/2019 Lumbar Spine      XRAY N/A    ULTRASOUND  (ENDOANAL/ENDORECTAL) Internal 05/04/2018 Pelvic

## 2022-03-09 ENCOUNTER — MYC MEDICAL ADVICE (OUTPATIENT)
Dept: ENDOCRINOLOGY | Facility: CLINIC | Age: 37
End: 2022-03-09
Payer: COMMERCIAL

## 2022-03-09 DIAGNOSIS — Z98.84 STATUS POST LAPAROSCOPIC SLEEVE GASTRECTOMY: Primary | ICD-10-CM

## 2022-03-09 DIAGNOSIS — E65 PANNUS, ABDOMINAL: ICD-10-CM

## 2022-03-10 DIAGNOSIS — L98.7 EXCESS SKIN: ICD-10-CM

## 2022-03-10 DIAGNOSIS — Z98.84 S/P LAPAROSCOPIC SLEEVE GASTRECTOMY: Primary | ICD-10-CM

## 2022-03-13 ENCOUNTER — HEALTH MAINTENANCE LETTER (OUTPATIENT)
Age: 37
End: 2022-03-13

## 2022-03-31 ENCOUNTER — OFFICE VISIT (OUTPATIENT)
Dept: FAMILY MEDICINE | Facility: CLINIC | Age: 37
End: 2022-03-31
Payer: COMMERCIAL

## 2022-03-31 VITALS
WEIGHT: 170 LBS | DIASTOLIC BLOOD PRESSURE: 86 MMHG | OXYGEN SATURATION: 100 % | HEIGHT: 62 IN | BODY MASS INDEX: 31.28 KG/M2 | TEMPERATURE: 98.5 F | HEART RATE: 95 BPM | SYSTOLIC BLOOD PRESSURE: 130 MMHG

## 2022-03-31 DIAGNOSIS — M54.50 CHRONIC MIDLINE LOW BACK PAIN WITHOUT SCIATICA: Primary | ICD-10-CM

## 2022-03-31 DIAGNOSIS — F32.1 MODERATE MAJOR DEPRESSION (H): ICD-10-CM

## 2022-03-31 DIAGNOSIS — G89.29 CHRONIC MIDLINE LOW BACK PAIN WITHOUT SCIATICA: Primary | ICD-10-CM

## 2022-03-31 DIAGNOSIS — F41.1 GAD (GENERALIZED ANXIETY DISORDER): ICD-10-CM

## 2022-03-31 PROBLEM — E66.01 MORBID OBESITY (H): Status: RESOLVED | Noted: 2020-11-13 | Resolved: 2022-03-31

## 2022-03-31 PROCEDURE — 99214 OFFICE O/P EST MOD 30 MIN: CPT | Performed by: NURSE PRACTITIONER

## 2022-03-31 RX ORDER — DULOXETIN HYDROCHLORIDE 60 MG/1
60 CAPSULE, DELAYED RELEASE ORAL DAILY
Qty: 90 CAPSULE | Refills: 1 | Status: SHIPPED | OUTPATIENT
Start: 2022-03-31 | End: 2022-06-23

## 2022-03-31 RX ORDER — METHYLPREDNISOLONE 4 MG
TABLET, DOSE PACK ORAL
Qty: 21 TABLET | Refills: 0 | Status: SHIPPED | OUTPATIENT
Start: 2022-03-31 | End: 2022-04-28

## 2022-03-31 RX ORDER — BUSPIRONE HYDROCHLORIDE 10 MG/1
10 TABLET ORAL 2 TIMES DAILY
Qty: 180 TABLET | Refills: 1 | Status: SHIPPED | OUTPATIENT
Start: 2022-03-31 | End: 2022-06-23

## 2022-03-31 NOTE — LETTER
March 31, 2022      Екатерина Ramon  [address]  [place] [address]        To Whom It May Concern:    Екатерина Ramon  was seen on 03/31/22.  Please allow her to work from home until 4/7/22 due to injury.        Sincerely,        ESTUARDO Jain CNP

## 2022-03-31 NOTE — PROGRESS NOTES
"  Assessment & Plan     (M54.50,  G89.29) Chronic midline low back pain without sciatica  (primary encounter diagnosis)  Comment: worsened, appears to be more muscular in nature. Educated on what symptoms to watch for and when to go to ER. Low suspicion for Cauda Equina.   Restart home Physical Therapy exercises.  Plan: methylPREDNISolone (MEDROL DOSEPAK) 4 MG tablet        therapy pack    (F32.1) Moderate major depression (H)  Comment: stable  Plan: DULoxetine (CYMBALTA) 60 MG capsule    (F41.1) BERENICE (generalized anxiety disorder)  Comment: stable  Plan: busPIRone (BUSPAR) 10 MG tablet      Prescription drug management         BMI:   Estimated body mass index is 31.09 kg/m  as calculated from the following:    Height as of this encounter: 1.575 m (5' 2\").    Weight as of this encounter: 77.1 kg (170 lb).       Return in about 1 month (around 4/30/2022) for Physical.    WALT Gutierrez, RN, FNP-student    ESTUARDO Jain [facility] SHANIQUA Willams is a 36 year old who presents for the following health issues  accompanied by her self.    HPI     Chief Complaint   Patient presents with     Back Pain     Fup, has worsened in the past 3 days     Pt reports severe lower back pain that started monday morning after waking up.She has a history of back pain and has tried muscle relaxers and physcial therapy without improvement. The pain is constent 9/10 and she reports last had a steroid injection in October. She describes the pain originating in her lower back and then going down into her legs bilaterally. The only thing that has help is laying on heating pad. She reports no bowel or bladder changes or issues and no saddle anesthesia     Depression and anxiety are well controlled on current medications. Psychiatry signed off on management.    Review of Systems   CONSTITUTIONAL: NEGATIVE for fever, chills, change in weight  RESP: NEGATIVE for significant cough or SOB  CV: " "NEGATIVE for chest pain, palpitations or peripheral edema  : NEGATIVE for frequency, dysuria, or hematuria  MUSCULOSKELETAL:POSITIVE  for back pain lower and radicular pain buttocks and down the posterior aspect of her thighs   NEURO: NEGATIVE for weakness, or dizziness   PSYCHIATRIC: NEGATIVE for changes in mood or affect      Objective    /86 (BP Location: Right arm, Patient Position: Chair, Cuff Size: Adult Regular)   Pulse 95   Temp 98.5  F (36.9  C) (Oral)   Ht 1.575 m (5' 2\")   Wt 77.1 kg (170 lb)   SpO2 100%   BMI 31.09 kg/m    Body mass index is 31.09 kg/m .  Physical Exam   GENERAL: healthy, alert and no distress  RESP: lungs clear to auscultation - no rales, rhonchi or wheezes  CV: regular rate and rhythm, normal S1 S2, no S3 or S4, no murmur, click or rub, no peripheral edema and peripheral pulses strong  MS: Tenderness diffusely across lumbar and paralumbar region. Mildly limited lumbar rotation and extension. Negative for straight leg test bilaterally  NEURO: Normal strength and tone, mentation intact and speech normal  PSYCH: mentation appears normal, affect normal/bright    No results found for any visits on 03/31/22.    Resident/Fellow Attestation   I, Anusha Wolff, was present with the medical/STEVEN student who participated in the service and in the documentation of the note.  I have verified the history and personally performed the physical exam and medical decision making.  I agree with the assessment and plan of care as documented in the note.      Anusha Wolff, APRN CNP          "

## 2022-04-26 ENCOUNTER — MYC MEDICAL ADVICE (OUTPATIENT)
Dept: FAMILY MEDICINE | Facility: CLINIC | Age: 37
End: 2022-04-26
Payer: COMMERCIAL

## 2022-04-26 DIAGNOSIS — M54.50 CHRONIC MIDLINE LOW BACK PAIN WITHOUT SCIATICA: Primary | ICD-10-CM

## 2022-04-26 DIAGNOSIS — G89.29 CHRONIC MIDLINE LOW BACK PAIN WITHOUT SCIATICA: Primary | ICD-10-CM

## 2022-04-27 ENCOUNTER — TELEPHONE (OUTPATIENT)
Dept: PALLIATIVE MEDICINE | Facility: CLINIC | Age: 37
End: 2022-04-27
Payer: COMMERCIAL

## 2022-04-27 ASSESSMENT — ENCOUNTER SYMPTOMS
HEMATURIA: 0
PARESTHESIAS: 1
FREQUENCY: 0
CHILLS: 0
HEADACHES: 0
DIZZINESS: 0
NAUSEA: 0
PALPITATIONS: 0
CONSTIPATION: 0
COUGH: 0
SHORTNESS OF BREATH: 0
SORE THROAT: 0
ABDOMINAL PAIN: 0
HEMATOCHEZIA: 0
HEARTBURN: 0
BREAST MASS: 0
DYSURIA: 0
NERVOUS/ANXIOUS: 1
DIARRHEA: 0
EYE PAIN: 0
FEVER: 0
ARTHRALGIAS: 0
JOINT SWELLING: 0
MYALGIAS: 1
WEAKNESS: 0

## 2022-04-27 NOTE — TELEPHONE ENCOUNTER
Screening Questions for Radiology Injections:    Injection to be done at which interventional clinic site? [facility]    Remind [place] Pt's that Covid test is no longer required except for sedation procedure Pt's.    Instruct patient to arrive as directed prior to the scheduled appointment time:    [facility]: 30 minutes before      [place]: 30 minutes before; if IV needed 1 hour before     Procedure ordered by     Procedure ordered? LESI      Transforaminal Cervical RAHEEM -  [facility] does NOT have providers that do these- [facility] and [facility] do have providers that do    As a reminder, receiving steroids can decrease your body's ability to fight infection.   Would you still like to move forward with scheduling the injection?  Yes    What insurance would patient like us to bill for this procedure? BC [employer]      Worker's comp or MVA (motor vehicle accident) -Any injection DO NOT SCHEDULE and route to Ceasar Stallings.      [employer] insurance - For SI joint injections, DO NOT SCHEDULE and route Trinh Hilliard.       ALL BCBS, [employer] and [employer]-Route to Trinh for review DO NOT SCHEDULE      IF SCHEDULING IN [place] AND NEEDS A PA, IT IS OKAY TO SCHEDULE. [place] HANDLES THEIR OWN PA'S AFTER THE PATIENT IS SCHEDULED. PLEASE SCHEDULE AT LEAST 1 WEEK OUT SO A PA CAN BE OBTAINED.    Any chance of pregnancy? NO   If YES, do NOT schedule and route to RN pool    Is an  needed? No     Patient has a drive home? (mandatory) YES: ok    Is patient taking any blood thinners (That is: Coumadin, Warfarin, Jantoven, Pradaxa Xarelto, Eliquis, Edoxaban, Enoxaparin, Lovenox, Heparin, Arixtra, Fondaparinux, or Fragmin? OR Antiplatelet medication such as Plavix, Brilinta, or Effient? )? No   If hold needed, do NOT schedule, route to RN pool     Is patient taking any aspirin products (includes Excedrin and Fiorinal)? No     If more than 325mg/day, OK to schedule; Instruct pt to decrease to less  than 325 mg for 7 days AND route to RN pool    For CERVICAL procedures, hold all aspirin products for 6 days.     Tell pt that if aspirin product is not held for 6 days, the procedure WILL BE cancelled.      Does the patient have a bleeding or clotting disorder? No     If YES, okay to schedule AND route to RN nurse pool    For any patients with platelet count <100, must be forwarded to provider    Any allergies to contrast dye, iodine, shellfish, or numbing and steroid medications? No    If YES, add allergy information to appointment notes AND route to the RN pool     If RAHEEM and Contrast Dye / Iodine Allergy? DO NOT SCHEDULE, route to RN pool    Allergies: Oxycodone, Lisinopril, and Zoloft     Is patient diabetic?  No  If YES, instruct them to bring their glucometer.    Does patient have an active infection or treated for one within the past week? No     Is patient currently taking any antibiotics?  No     For patients on chronic, preventative, or prophylactic antibiotics, procedures may be scheduled.     For patients on antibiotics for active or recent infection:antibiotic course must have been completed for 4 days    Is patient currently taking any steroid medications? (i.e. Prednisone, Medrol)  No     For patients on steroid medications, course must have been completed for 4 days    Is patient actively being treated for cancer or immunocompromised? No  If YES, do NOT schedule and route to RN pool     Are you able to get on and off an exam table with minimal or no assistance? Yes  If NO, do NOT schedule and route to RN pool    Are you able to roll over and lay on your stomach with minimal or no assistance? Yes  If NO, do NOT schedule and route to RN pool     Has the patient had a flu shot or any other vaccinations within 7 days before or after the procedure.  No     Have you recently had a COVID vaccine or have plans to get it in the near future? No    If yes, explain that for the vaccine to work best they need  to:       wait 1 week before and 1 week after getting Vaccine #1    wait 1 week before and 2 weeks after getting Vaccine #2    wait 1 week before and 2 weeks after getting Vaccine BOOSTER    If patient has concerns about the timing, send to RN pool     Does patient have an MRI/CT?  YES: MRI  Check Procedure Scheduling Grid to see if required.      Was the MRI done within the last 3 years?  Yes    If yes, where was the MRI done i.e.[facility], [facility], [facility], [facility] etc? [facility]      If no, do not schedule and route to RN pool    If MRI was not done at [place], [facility] or [facility] do NOT schedule and route to RN pool.      If pt has an imaging disc, the injection MAY be scheduled but pt has to bring disc to appt.     If they show up without the disc the injection cannot be done    Procedure Specific Instructions:      If celiac plexus block, informed patient NPO for 6 hours and that it is okay to take medications with sips of water, especially blood pressure medications  Not Applicable         If this is for a cervical procedure, informed patient that aspirin needs to be held for 6 days.   Not Applicable      If IV needed:    Do not schedule procedures requiring IV placement in the first appointment of the day or first appointment after lunch. Do NOT schedule at 0745, 0815 or 1245. ok    Instructed pt to arrive 30 minutes early for IV start if required. (Check Procedure Scheduling Grid)  Not Applicable    Reminders:      If you are started on any steroids or antibiotics between now and your appointment, you must contact us because the procedure may need to be cancelled.  Yes      For all procedures except radiofrequency ablations (RFAs) and spinal cord stimulator (SCS) trials, informed patient:    IV sedation is not provided for this procedure.  If you feel that an oral anti-anxiety medication is needed, you can discuss this further with your referring provider or primary care provider.   The Pain Clinic provider will discuss specifics of what the procedure includes at your appointment.  Most procedures last 10-20 minutes.  We use numbing medications to help with any discomfort during the procedure.  Not Applicable      For patients 85 or older we recommend having an adult stay w/ them for the remainder of the day.   ok    Does the patient have any questions?  NO  Aicha Stallings  [facility]

## 2022-04-28 ENCOUNTER — OFFICE VISIT (OUTPATIENT)
Dept: FAMILY MEDICINE | Facility: CLINIC | Age: 37
End: 2022-04-28
Payer: COMMERCIAL

## 2022-04-28 VITALS
BODY MASS INDEX: 31.28 KG/M2 | HEART RATE: 84 BPM | TEMPERATURE: 99.1 F | DIASTOLIC BLOOD PRESSURE: 86 MMHG | WEIGHT: 170 LBS | SYSTOLIC BLOOD PRESSURE: 122 MMHG | OXYGEN SATURATION: 99 % | HEIGHT: 62 IN

## 2022-04-28 DIAGNOSIS — F41.1 GAD (GENERALIZED ANXIETY DISORDER): ICD-10-CM

## 2022-04-28 DIAGNOSIS — F32.1 MODERATE MAJOR DEPRESSION (H): ICD-10-CM

## 2022-04-28 DIAGNOSIS — Z79.899 ENCOUNTER FOR LONG-TERM (CURRENT) USE OF MEDICATIONS: ICD-10-CM

## 2022-04-28 DIAGNOSIS — Z98.84 S/P BARIATRIC SURGERY: ICD-10-CM

## 2022-04-28 DIAGNOSIS — F51.04 PSYCHOPHYSIOLOGICAL INSOMNIA: ICD-10-CM

## 2022-04-28 DIAGNOSIS — Z00.00 ROUTINE GENERAL MEDICAL EXAMINATION AT A HEALTH CARE FACILITY: Primary | ICD-10-CM

## 2022-04-28 DIAGNOSIS — Z12.4 CERVICAL CANCER SCREENING: ICD-10-CM

## 2022-04-28 PROBLEM — D50.0 IRON DEFICIENCY ANEMIA DUE TO CHRONIC BLOOD LOSS: Status: RESOLVED | Noted: 2019-11-15 | Resolved: 2022-04-28

## 2022-04-28 LAB
CHOLEST SERPL-MCNC: 171 MG/DL
FASTING STATUS PATIENT QL REPORTED: NORMAL
FERRITIN SERPL-MCNC: 43 NG/ML (ref 12–150)
HDLC SERPL-MCNC: 62 MG/DL
LDLC SERPL CALC-MCNC: 99 MG/DL
NONHDLC SERPL-MCNC: 109 MG/DL
TRIGL SERPL-MCNC: 52 MG/DL

## 2022-04-28 PROCEDURE — G0145 SCR C/V CYTO,THINLAYER,RESCR: HCPCS | Performed by: NURSE PRACTITIONER

## 2022-04-28 PROCEDURE — 99395 PREV VISIT EST AGE 18-39: CPT | Performed by: NURSE PRACTITIONER

## 2022-04-28 PROCEDURE — 80061 LIPID PANEL: CPT | Performed by: NURSE PRACTITIONER

## 2022-04-28 PROCEDURE — 82306 VITAMIN D 25 HYDROXY: CPT | Performed by: NURSE PRACTITIONER

## 2022-04-28 PROCEDURE — 36415 COLL VENOUS BLD VENIPUNCTURE: CPT | Performed by: NURSE PRACTITIONER

## 2022-04-28 PROCEDURE — 82607 VITAMIN B-12: CPT | Performed by: NURSE PRACTITIONER

## 2022-04-28 PROCEDURE — 82728 ASSAY OF FERRITIN: CPT | Performed by: NURSE PRACTITIONER

## 2022-04-28 PROCEDURE — 87624 HPV HI-RISK TYP POOLED RSLT: CPT | Performed by: NURSE PRACTITIONER

## 2022-04-28 RX ORDER — HYDROXYZINE HYDROCHLORIDE 10 MG/1
10-30 TABLET, FILM COATED ORAL 3 TIMES DAILY PRN
Qty: 90 TABLET | Refills: 1 | Status: CANCELLED | OUTPATIENT
Start: 2022-04-28

## 2022-04-28 RX ORDER — TRAZODONE HYDROCHLORIDE 50 MG/1
50-100 TABLET, FILM COATED ORAL
Qty: 180 TABLET | Refills: 0 | Status: SHIPPED | OUTPATIENT
Start: 2022-04-28

## 2022-04-28 ASSESSMENT — ENCOUNTER SYMPTOMS
HEMATURIA: 0
PALPITATIONS: 0
JOINT SWELLING: 0
CONSTIPATION: 0
DIARRHEA: 0
FREQUENCY: 0
CHILLS: 0
FEVER: 0
SHORTNESS OF BREATH: 0
HEMATOCHEZIA: 0
DYSURIA: 0
ABDOMINAL PAIN: 0
EYE PAIN: 0
SORE THROAT: 0
MYALGIAS: 1
WEAKNESS: 0
NERVOUS/ANXIOUS: 1
DIZZINESS: 0
HEARTBURN: 0
HEADACHES: 0
PARESTHESIAS: 1
COUGH: 0
ARTHRALGIAS: 0
NAUSEA: 0
BREAST MASS: 0

## 2022-04-28 NOTE — PROGRESS NOTES
SUBJECTIVE:   CC: Екатерина Ramon is an 36 year old woman who presents for preventive health visit.     Patient has been advised of split billing requirements and indicates understanding: Yes     Healthy Habits:     Getting at least 3 servings of Calcium per day:  NO    Bi-annual eye exam:  NO    Dental care twice a year:  Yes    Sleep apnea or symptoms of sleep apnea:  Daytime drowsiness    Diet:  Other    Frequency of exercise:  2-3 days/week    Duration of exercise:  30-45 minutes    Taking medications regularly:  Yes    Medication side effects:  Not applicable    PHQ-2 Total Score: 0    Additional concerns today:  No        Today's PHQ-2 Score:   PHQ-2 (  [employer]) 2022   Q1: Little interest or pleasure in doing things 0   Q2: Feeling down, depressed or hopeless 0   PHQ-2 Score 0   PHQ-2 Total Score (12-17 Years)- Positive if 3 or more points; Administer PHQ-A if positive -   Q1: Little interest or pleasure in doing things Not at all   Q2: Feeling down, depressed or hopeless Not at all   PHQ-2 Score 0       Abuse: Current or Past (Physical, Sexual or Emotional) - No  Do you feel safe in your environment? Yes    Have you ever done Advance Care Planning? (For example, a Health Directive, POLST, or a discussion with a medical provider or your loved ones about your wishes): Yes, patient states has an Advance Care Planning document and will bring a copy to the clinic.    Social History     Tobacco Use     Smoking status: Former Smoker     Types: Cigarettes     Quit date: 2010     Years since quittin.0     Smokeless tobacco: Never Used   Substance Use Topics     Alcohol use: Yes     Comment: rare       Alcohol Use 2022   Prescreen: >3 drinks/day or >7 drinks/week? No   Prescreen: >3 drinks/day or >7 drinks/week? -       Reviewed orders with patient.  Reviewed health maintenance and updated orders accordingly - Yes    Breast Cancer Screening:    FHS-7:   Breast CA Risk Assessment (FHS-7)  4/27/2022   Did any of your first-degree relatives have breast or ovarian cancer? No   Did any of your relatives have bilateral breast cancer? Unknown   Did any man in your family have breast cancer? No   Did any woman in your family have breast and ovarian cancer? Yes   Did any woman in your family have breast cancer before age 50 y? Yes   Do you have 2 or more relatives with breast and/or ovarian cancer? Yes   Do you have 2 or more relatives with breast and/or bowel cancer? Yes       Patient under 40 years of age: Routine Mammogram Screening not recommended.   Pertinent mammograms are reviewed under the imaging tab.    History of abnormal Pap smear: NO - age 30-65 PAP every 5 years with negative HPV co-testing recommended  PAP / HPV Latest Ref Rng & Units 9/11/2017 3/3/2014 2/22/2012   PAP (Historical) - NIL NIL NIL   HPV16 NEG:Negative Negative - -   HPV18 NEG:Negative Negative - -   HRHPV NEG:Negative Negative - -     Reviewed and updated as needed this visit by clinical staff   Tobacco  Allergies  Meds                Reviewed and updated as needed this visit by Provider                     Patient notes that her mood has been good lately. She feels that it is well managed on current medications.  She has been following a healthy diet and exercising regularly. She stopped taking the vitamins from bariatric team and just takes a multivitamin daily.   Notes her migraines have been well controlled, and she hasn't had one in a while. She has Imitrex which works well for her if she experiences one.   Had vision exam recently and has some floaters, otherwise vision exam was normal.    Review of Systems   Constitutional: Negative for chills and fever.   HENT: Negative for congestion, ear pain, hearing loss and sore throat.    Eyes: Positive for visual disturbance. Negative for pain.   Respiratory: Negative for cough and shortness of breath.    Cardiovascular: Negative for chest pain, palpitations and peripheral  "edema.   Gastrointestinal: Negative for abdominal pain, constipation, diarrhea, heartburn, hematochezia and nausea.   Breasts:  Negative for tenderness, breast mass and discharge.   Genitourinary: Negative for dysuria, frequency, genital sores, hematuria, pelvic pain, urgency, vaginal bleeding and vaginal discharge.   Musculoskeletal: Positive for myalgias. Negative for arthralgias and joint swelling.   Skin: Negative for rash.   Neurological: Positive for paresthesias. Negative for dizziness, weakness and headaches.   Psychiatric/Behavioral: Negative for mood changes. The patient is nervous/anxious.           OBJECTIVE:   /86 (BP Location: Left arm, Patient Position: Sitting, Cuff Size: Adult Regular)   Pulse 84   Temp 99.1  F (37.3  C) (Oral)   Ht 1.575 m (5' 2\")   Wt 77.1 kg (170 lb)   SpO2 99%   BMI 31.09 kg/m    Physical Exam  GENERAL: healthy, alert and no distress  EYES: Eyes grossly normal to inspection, PERRL and conjunctivae and sclerae normal  HENT: ear canals and TM's normal, nose and mouth without ulcers or lesions  NECK: no adenopathy, no asymmetry, masses, or scars and thyroid normal to palpation  RESP: lungs clear to auscultation - no rales, rhonchi or wheezes  BREAST: normal without masses, tenderness or nipple discharge and no palpable axillary masses or adenopathy  CV: regular rate and rhythm, normal S1 S2, no S3 or S4, no murmur, click or rub, no peripheral edema and peripheral pulses strong  ABDOMEN: soft, nontender, no hepatosplenomegaly, no masses and bowel sounds normal   (female): normal female external genitalia, normal urethral meatus, vaginal mucosa pink, moist, well rugated, and normal cervix/adnexa/uterus without masses or discharge  MS: no gross musculoskeletal defects noted, no edema  SKIN: no suspicious lesions or rashes  NEURO: Normal strength and tone, mentation intact and speech normal  PSYCH: mentation appears normal, affect normal/bright        ASSESSMENT/PLAN: " "  Екатерина was seen today for physical.    Diagnoses and all orders for this visit:    Routine general medical examination at a health care facility  -     Lipid panel reflex to direct LDL Fasting; Future    Cervical cancer screening  -     Pap Screen with HPV - recommended age 30 - 65 years    S/P bariatric surgery  Will check labs today, had some done at [facility] recently. Discussed potential of restarting vitamins ordered by bariatric team after bariatric surgery.   -     Ferritin; Future  -     Vitamin B12; Future  -     Vitamin D Deficiency; Future    Psychophysiological insomnia  Stable; continue with current treatment plan and medications  -     traZODone (DESYREL) 50 MG tablet; Take 1-2 tablets ( mg) by mouth nightly as needed for sleep    BERENICE (generalized anxiety disorder)  Stable; continue with current treatment plan and medications    Moderate major depression (H)  Stable; continue with current treatment plan and medications.      Patient has been advised of split billing requirements and indicates understanding: Yes    COUNSELING:  Reviewed preventive health counseling, as reflected in patient instructions       Regular exercise       Healthy diet/nutrition       Vision screening    Estimated body mass index is 31.09 kg/m  as calculated from the following:    Height as of this encounter: 1.575 m (5' 2\").    Weight as of this encounter: 77.1 kg (170 lb).    Weight management plan: Discussed healthy diet and exercise guidelines    She reports that she quit smoking about 12 years ago. Her smoking use included cigarettes. She has never used smokeless tobacco.      Counseling Resources:  ATP IV Guidelines  Pooled Cohorts Equation Calculator  Breast Cancer Risk Calculator  BRCA-Related Cancer Risk Assessment: FHS-7 Tool  FRAX Risk Assessment  ICSI Preventive Guidelines  Dietary Guidelines for Americans, 2010  USDA's MyPlate  ASA Prophylaxis  Lung CA Screening    Eleonora Steel RN, APRN " student    Resident/Fellow Attestation   I, ESTUARDO Jain CNP, was present with the medical/STEVEN student who participated in the service and in the documentation of the note.  I have verified the history and personally performed the physical exam and medical decision making.  I agree with the assessment and plan of care as documented in the note.      ESTUARDO Jain CNP, APRN CNP  [facility]

## 2022-04-29 LAB
DEPRECATED CALCIDIOL+CALCIFEROL SERPL-MC: 23 UG/L (ref 20–75)
VIT B12 SERPL-MCNC: 227 PG/ML (ref 193–986)

## 2022-05-02 LAB
BKR LAB AP GYN ADEQUACY: NORMAL
BKR LAB AP GYN INTERPRETATION: NORMAL
BKR LAB AP HPV REFLEX: NORMAL
BKR LAB AP PREVIOUS ABNORMAL: NORMAL
PATH REPORT.COMMENTS IMP SPEC: NORMAL
PATH REPORT.COMMENTS IMP SPEC: NORMAL
PATH REPORT.RELEVANT HX SPEC: NORMAL

## 2022-05-03 LAB
HUMAN PAPILLOMA VIRUS 16 DNA: NEGATIVE
HUMAN PAPILLOMA VIRUS 18 DNA: NEGATIVE
HUMAN PAPILLOMA VIRUS FINAL DIAGNOSIS: NORMAL
HUMAN PAPILLOMA VIRUS OTHER HR: NEGATIVE

## 2022-05-13 ENCOUNTER — RADIOLOGY INJECTION OFFICE VISIT (OUTPATIENT)
Dept: PALLIATIVE MEDICINE | Facility: CLINIC | Age: 37
End: 2022-05-13
Attending: NURSE PRACTITIONER
Payer: COMMERCIAL

## 2022-05-13 VITALS — HEART RATE: 88 BPM | DIASTOLIC BLOOD PRESSURE: 87 MMHG | SYSTOLIC BLOOD PRESSURE: 139 MMHG

## 2022-05-13 DIAGNOSIS — G89.29 CHRONIC MIDLINE LOW BACK PAIN WITHOUT SCIATICA: ICD-10-CM

## 2022-05-13 DIAGNOSIS — M54.16 LUMBAR RADICULOPATHY: ICD-10-CM

## 2022-05-13 DIAGNOSIS — M54.50 CHRONIC MIDLINE LOW BACK PAIN WITHOUT SCIATICA: ICD-10-CM

## 2022-05-13 PROCEDURE — 62323 NJX INTERLAMINAR LMBR/SAC: CPT | Performed by: PAIN MEDICINE

## 2022-05-13 RX ADMIN — TRIAMCINOLONE ACETONIDE 40 MG: 40 INJECTION, SUSPENSION INTRA-ARTICULAR; INTRAMUSCULAR at 11:24

## 2022-05-13 ASSESSMENT — PAIN SCALES - GENERAL
PAINLEVEL: SEVERE PAIN (7)
PAINLEVEL: MILD PAIN (2)

## 2022-05-13 NOTE — NURSING NOTE
Discharge Information    IV Discontiued Time:  NA    Amount of Fluid Infused:  NA    Discharge Criteria = When patient returns to baseline or as per MD order    Consciousness:  Pt is fully awake    Circulation:  BP +/- 20% of pre-procedure level    Respiration:  Patient is able to breathe deeply    O2 Sat:  Patient is able to maintain O2 Sat >92% on room air    Activity:  Moves 4 extremities on command    Ambulation:  Patient is able to stand and walk or stand and pivot into wheelchair    Dressing:  Clean/dry or No Dressing    Notes:   Discharge instructions and AVS given to patient    Patient meets criteria for discharge?  YES    Admitted to PCU?  No    Responsible adult present to accompany patient home?  Yes    Signature/Title:    Morelia Hester RN  RN Care Coordinator  [facility]

## 2022-05-13 NOTE — NURSING NOTE
Pre-procedure Intake  If YES to any questions or NO to having a   Please complete laminated checklist and leave on the computer keyboard for Provider, verbally inform provider if able.    For SCS Trial, RFA's or any sedation procedure:  Have you been fasting? NA    If yes, for how long?     Are you taking any any blood thinners such as Coumadin, Warfarin, Jantoven, Pradaxa Xarelto, Eliquis, Edoxaban, Enoxaparin, Lovenox, Heparin, Arixtra, Fondaparinux, or Fragmin? OR Antiplatelet medication such as Plavix, Brilinta, or Effient?   No     If yes, when did you take your last dose?     Do you take aspirin?  No    If cervical procedure, have you held aspirin for 6 days?       Do you have any allergies to contrast dye, iodine, steroid and/or numbing medications?  NO    Are you currently taking antibiotics or have an active infection?  NO    Have you had a fever/elevated temperature within the past week? NO    Are you currently taking oral steroids? NO    Do you have a ? Yes    Are you pregnant or breastfeeding?  NO    Have you received the COVID-19 vaccine? Yes    If yes, was it your 1st, 2nd or only dose needed?     Date of most recent vaccine: 1/12/21    Notify provider and RNs if systolic BP >170, diastolic BP >100, P >100 or O2 sats < 90%

## 2022-05-13 NOTE — PATIENT INSTRUCTIONS
[facility]   Procedure Discharge Instructions    Today you saw: Dr. Zia Alvarado     You had an: Lumbar Epidural steroid injection       Medications used:  Lidocaine   Bupivacaine  Omnipaque    Kenalog   Normal saline    Be cautious when walking. Numbness and/or weakness in the lower extremities may occur for up to 6-8 hours after the procedure due to effect of the local anesthetic  Do not drive for 6 hours. The effect of the local anesthetic could slow your reflexes.   You may resume your regular activities after 24 hours  Avoid strenuous activity for the first 24 hours  You may shower, however avoid swimming, tub baths or hot tubs for 24 hours following your procedure  You may have a mild to moderate increase in pain for several days following the injection.  It may take up to 14 days for the steroid medication to start working although you may feel the effect as early as a few days after the procedure.     You may use ice packs for 10-15 minutes, 3 to 4 times a day at the injection site for comfort  Do not use heat to painful areas for 6 to 8 hours. This will give the local anesthetic time to wear off and prevent you from accidentally burning your skin.   Unless you have been directed to avoid the use of anti-inflammatory medications (NSAIDS), you may use medications such as ibuprofen, Aleve or Tylenol for pain control if needed.   If you were fasting, you may resume your normal diet and medications after the procedure  If you have diabetes, check your blood sugar more frequently than usual as your blood sugar may be higher than normal for 10-14 days following a steroid injection. Contact your doctor who manages your diabetes if your blood sugar is higher than usual  Possible side effects of steroids that you may experience include flushing, elevated blood pressure, increased appetite, mild headaches and restlessness.  All of these symptoms will get better with time.  If you  experience any of the following, call the Pain Clinic during work hours (Mon-Friday 8-4:30 pm) at [phone] or the Provider Line after hours at [phone]:  -Fever over 100 degree F  -Swelling, bleeding, redness, drainage, warmth at the injection site  -Progressive weakness or numbness in your legs   -Loss of bowel or bladder function  -Unusual headache that is not relieved by Tylenol or other pain reliever  -Unusual new onset of pain that is not improving

## 2022-05-15 RX ORDER — TRIAMCINOLONE ACETONIDE 40 MG/ML
40 INJECTION, SUSPENSION INTRA-ARTICULAR; INTRAMUSCULAR ONCE
Status: COMPLETED | OUTPATIENT
Start: 2022-05-15 | End: 2022-05-15

## 2022-05-15 RX ADMIN — TRIAMCINOLONE ACETONIDE 40 MG: 40 INJECTION, SUSPENSION INTRA-ARTICULAR; INTRAMUSCULAR at 21:24

## 2022-05-16 NOTE — PROGRESS NOTES
Pre procedure Diagnosis: lumbar radiculopathy    Post procedure Diagnosis: Same  Procedure performed: L5-S1 interlaminar epidural steroid injection   Anesthesia: none  Complications: none  Operators: Zia Alvarado MD     Indications:   Екатерина Ramon is a 35 year old female.  The patient has a history of bilateral low back pain radiating to the buttocks.  Examination shows negative slump.  she has tried conservative treatment including meds/PT.    MRI w  Options/alternatives, benefits and risks were discussed with the patient including but not limited to bleeding, infection, no pain relief, tissue trauma, exposure to radiation, reaction to medications, spinal cord injury, dural puncture, weakness, numbness and headache.  Questions were answered to her satisfaction and she wishes to proceed. Voluntary informed consent was obtained and signed.     Vitals were reviewed: Yes  Allergies were reviewed:  Yes   Medications were reviewed:  Yes   Pre-procedure pain score: 3/10    Procedure:  The patient's medical history, medications, and allergies were reviewed and reconciled.  After obtaining signed informed consent, the patient was brought into the procedure suite and was placed in a prone position on the procedure table.   A Pause for the Cause was performed.  Patient was prepped and draped in the usual sterile fashion.     The L 5-1 interspace was identified with AP fluoroscopy.  A total of 3ml of 1% lidocaine was used to anesthetize the skin and subcutaneous tissues for a  midline approach.    A 20gauge 4.5inch Touhy needle was advanced utilizing intermittent AP and Lateral fluoroscopy and air for loss of resistance.  The epidural space was encountered on the first pass without difficulties.  Aspiration for blood and CSF was negative.  Needle position was verified by injecting 1 ml of Omnipaque utilizing real-time fluoroscopy that showed good needle placement and epidural spread without signs of intravascular  or intrathecal uptake.  Omnipaque wasted:  9 ml.    Then, after repeated negative aspiration for blood or CSF, a combination of Kenalog 40 mg, Bupivicaine 0.25% 2 ml, diluted with 3 ml of normal saline to a total injectate volume of 6 ml was injected into the epidural space in a slow and incremental fashion and the needle was restyletted and withdrawn.  All injected medications were preservative free.    The injection site was cleaned and a sterile dressing was applied.    The patient tolerated the procedure well without complications and was taken to the recovery room for continued observation.    Images were saved to PACS.    Post-procedure pain score: 4/10  Follow-up includes:   -f/u phone call in one week  -f/u with referring provider     Zia Alvarado MD  [facility]

## 2022-05-24 ENCOUNTER — MYC MEDICAL ADVICE (OUTPATIENT)
Dept: FAMILY MEDICINE | Facility: CLINIC | Age: 37
End: 2022-05-24
Payer: COMMERCIAL

## 2022-06-15 ENCOUNTER — MYC MEDICAL ADVICE (OUTPATIENT)
Dept: FAMILY MEDICINE | Facility: CLINIC | Age: 37
End: 2022-06-15
Payer: COMMERCIAL

## 2022-06-21 ASSESSMENT — ANXIETY QUESTIONNAIRES
3. WORRYING TOO MUCH ABOUT DIFFERENT THINGS: NEARLY EVERY DAY
2. NOT BEING ABLE TO STOP OR CONTROL WORRYING: NEARLY EVERY DAY
4. TROUBLE RELAXING: NEARLY EVERY DAY
GAD7 TOTAL SCORE: 21
5. BEING SO RESTLESS THAT IT IS HARD TO SIT STILL: NEARLY EVERY DAY
8. IF YOU CHECKED OFF ANY PROBLEMS, HOW DIFFICULT HAVE THESE MADE IT FOR YOU TO DO YOUR WORK, TAKE CARE OF THINGS AT HOME, OR GET ALONG WITH OTHER PEOPLE?: EXTREMELY DIFFICULT
7. FEELING AFRAID AS IF SOMETHING AWFUL MIGHT HAPPEN: NEARLY EVERY DAY
7. FEELING AFRAID AS IF SOMETHING AWFUL MIGHT HAPPEN: NEARLY EVERY DAY
GAD7 TOTAL SCORE: 21
GAD7 TOTAL SCORE: 21
6. BECOMING EASILY ANNOYED OR IRRITABLE: NEARLY EVERY DAY
1. FEELING NERVOUS, ANXIOUS, OR ON EDGE: NEARLY EVERY DAY

## 2022-06-21 ASSESSMENT — PATIENT HEALTH QUESTIONNAIRE - PHQ9
10. IF YOU CHECKED OFF ANY PROBLEMS, HOW DIFFICULT HAVE THESE PROBLEMS MADE IT FOR YOU TO DO YOUR WORK, TAKE CARE OF THINGS AT HOME, OR GET ALONG WITH OTHER PEOPLE: EXTREMELY DIFFICULT
SUM OF ALL RESPONSES TO PHQ QUESTIONS 1-9: 13
SUM OF ALL RESPONSES TO PHQ QUESTIONS 1-9: 13

## 2022-06-23 ENCOUNTER — VIRTUAL VISIT (OUTPATIENT)
Dept: FAMILY MEDICINE | Facility: CLINIC | Age: 37
End: 2022-06-23
Payer: COMMERCIAL

## 2022-06-23 DIAGNOSIS — F41.1 GAD (GENERALIZED ANXIETY DISORDER): ICD-10-CM

## 2022-06-23 DIAGNOSIS — I10 HYPERTENSION GOAL BP (BLOOD PRESSURE) < 140/90: ICD-10-CM

## 2022-06-23 DIAGNOSIS — F32.1 MODERATE MAJOR DEPRESSION (H): Primary | ICD-10-CM

## 2022-06-23 DIAGNOSIS — G43.109 MIGRAINE WITH AURA AND WITHOUT STATUS MIGRAINOSUS, NOT INTRACTABLE: ICD-10-CM

## 2022-06-23 PROCEDURE — 99214 OFFICE O/P EST MOD 30 MIN: CPT | Mod: 95 | Performed by: NURSE PRACTITIONER

## 2022-06-23 RX ORDER — DULOXETIN HYDROCHLORIDE 60 MG/1
60 CAPSULE, DELAYED RELEASE ORAL DAILY
Qty: 90 CAPSULE | Refills: 1 | Status: SHIPPED | OUTPATIENT
Start: 2022-06-23 | End: 2022-12-27

## 2022-06-23 RX ORDER — HYDROXYZINE HYDROCHLORIDE 10 MG/1
10-30 TABLET, FILM COATED ORAL 3 TIMES DAILY PRN
Qty: 90 TABLET | Refills: 1 | Status: SHIPPED | OUTPATIENT
Start: 2022-06-23 | End: 2022-12-27

## 2022-06-23 RX ORDER — BUSPIRONE HYDROCHLORIDE 10 MG/1
10 TABLET ORAL 2 TIMES DAILY
Qty: 180 TABLET | Refills: 1 | Status: SHIPPED | OUTPATIENT
Start: 2022-06-23 | End: 2022-12-27

## 2022-06-23 ASSESSMENT — PATIENT HEALTH QUESTIONNAIRE - PHQ9
SUM OF ALL RESPONSES TO PHQ QUESTIONS 1-9: 13
10. IF YOU CHECKED OFF ANY PROBLEMS, HOW DIFFICULT HAVE THESE PROBLEMS MADE IT FOR YOU TO DO YOUR WORK, TAKE CARE OF THINGS AT HOME, OR GET ALONG WITH OTHER PEOPLE: EXTREMELY DIFFICULT

## 2022-06-23 ASSESSMENT — ANXIETY QUESTIONNAIRES: GAD7 TOTAL SCORE: 21

## 2022-06-23 NOTE — LETTER
June 23, 2022      Екатерина Ramon  [address]  [place] [address]        To Whom It May Concern:    Екатерина Ramon  was seen on 06/23/22.  Please excuse her 6/24/22 due to exacerbation of Mental illness.        Sincerely,        ESTUARDO Jain CNP

## 2022-06-23 NOTE — LETTER
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  no.    Date(s) you treated the patient for condition: 06/23/22, 4/28/22, 3/31/22, 2/5/21, 10/02/20, 3/11/2020, 1/2/2020, 11/15/2019    Will the patient need to have treatment visits at least twice per year due to the                     condition? yes    Was medication, other than over-the-counter medication prescribed?  yes    Was the patient referred to other health care provider(s) for evaluation or treatment     (e.g., physical therapist)?  yes:  State the nature of such treatments and expected duration of treatment: Mental health therapist       2)  Is the medical condition pregnancy?  no.      3)  Is the employee unable to perform any of his/her job functions due to the     condition: no.      4)  Describe other relevant medical facts, if any, related to the condition which the employee seeks leave (such as medical facts may include symptoms, diagnosis, or any regimen of continuing treatment such as the use of specialized equipment):     (F34.1) Dysthymic disorder  (primary encounter diagnosis)    (F41.9) Anxiety    (F51.04) Psychophysiological insomnia      PART B: AMOUNT OF LEAVE NEEDED  5)  Will the employee be incapacitated for a single continuous period of time due to his/her  medical condition, including any time for treatment and recovery?  no.    6)  Will the employee need to attend follow-up treatment appointments or work part-time or on a reduced schedule because of the employee's medical condition?  yes:  Are the treatments or the reduced number of hours medically necessary?   Yes:      Estimate treatment schedule, if any, including the dates of any scheduled appointments and the time required for each appointment, including any recovery period:   Needs to miss work for up to 3 hours every 2 weeks for mental health therapy appointments.       Estimate the part-time or reduced work schedule the employee needs, if any:  3 hour(s) per day and 1 day(s) per week    7) Will the condition cause episodic flare ups periodically preventing the employee from performing his/her job functions? yes:  Is it medically necessary for the patient to be absent from work during the flare-ups?  yes:  Explain: Unable to work during flare-ups of anxiety    Based upon the patient's medical history and your knowledge of the medical condition, estimate the frequency of flare-ups and the duration of related incapacity that the patient may have over the next six months (e.g., 1 episode every 3 months lasting 1-2 days):    Frequency: 3 X a month, once daily  Duration: 1 day(s)       ADDITIONAL INFORMATION: IDENTIFY QUESTION NUMBER WITH YOUR ADDITIONAL ANSWER       ===========================================    IF EMPLOYEE'S FAMILY MEMBER IS THE PATIENT, PLEASE COMPLETE SECTION BELOW:  N/A      13) Does the patient/family member need the employee to provide basic medical or personal needs, or for safety or transportation?      14)  If no, would the employee's presence to provide psychological comfort be beneficial to the patient or assist in the patient's recovery?        ================================================================    TO BE COMPLETED BY THE HEALTH CARE PROVIDER:    Signature:  Anusha Benavides  ESTUARDO Wolff CNP ________________________________________  Date:   06/23/22

## 2022-06-23 NOTE — PROGRESS NOTES
Екатерина is a 36 year old who is being evaluated via a billable video visit.      How would you like to obtain your AVS? MyChart  If the video visit is dropped, the invitation should be resent by: Text to cell phone: [phone]  Will anyone else be joining your video visit? No          Assessment & Plan     Moderate major depression (H)  Worsened due to work stress. Encouraged patient to restart mental health therapy for stress management. Continue current medications.  FMLA renewed.  - DULoxetine (CYMBALTA) 60 MG capsule; Take 1 capsule (60 mg) by mouth daily    BERENICE (generalized anxiety disorder)  As above  - busPIRone (BUSPAR) 10 MG tablet; Take 1 tablet (10 mg) by mouth 2 times daily  - hydrOXYzine (ATARAX) 10 MG tablet; Take 1-3 tablets (10-30 mg) by mouth 3 times daily as needed (anxiety, sleep)    Hypertension goal BP (blood pressure) < 140/90  Controlled s/p bariatric surgery off medications    Migraine with aura and without status migrainosus, not intractable  Well controlled, ok to stay off Verapamil.      Ordering of each unique test  Prescription drug management         See Patient Instructions    Return in about 6 months (around 12/23/2022) for Depression, Anxiety.    ESTUARDO Jain [facility] [place] Willams is a 36 year old, presenting for the following health issues:  No chief complaint on file.      History of Present Illness       Mental Health Follow-up:  Patient presents to follow-up on Depression & Anxiety.Patient's depression since last visit has been:  Medium  The patient is not having other symptoms associated with depression.  Patient's anxiety since last visit has been:  Worse  The patient is not having other symptoms associated with anxiety.  Any significant life events: job concerns  Patient is feeling anxious or having panic attacks.  Patient has no concerns about alcohol or drug use.She consumes 0 sweetened beverage(s) daily.She  exercises with enough effort to increase her heart rate 30 to 60 minutes per day.  She exercises with enough effort to increase her heart rate 3 or less days per week.   She is taking medications regularly.    Today's PHQ-9         PHQ-9 Total Score: 13    PHQ-9 Q9 Thoughts of better off dead/self-harm past 2 weeks :   Not at all    How difficult have these problems made it for you to do your work, take care of things at home, or get along with other people: Extremely difficult  Today's BERENICE-7 Score: 21     Work stressful- covering for others and not supposed to be  Taking 3 courses    Migraines occurring 2-4 times per month. Imitrex is helpful. Has not taken Verapamil for quite some time. BP has been normal.    Was having cravings to drinks, did a few times and felt unwell which discouraged her from doing so. Cravings better now.    Review of Systems   Constitutional, HEENT, cardiovascular, pulmonary, gi and gu systems are negative, except as otherwise noted.      Objective           Vitals:  No vitals were obtained today due to virtual visit.    Physical Exam   GENERAL: Healthy, alert and no distress  EYES: Eyes grossly normal to inspection.  No discharge or erythema, or obvious scleral/conjunctival abnormalities.  RESP: No audible wheeze, cough, or visible cyanosis.  No visible retractions or increased work of breathing.    SKIN: Visible skin clear. No significant rash, abnormal pigmentation or lesions.  NEURO: Cranial nerves grossly intact.  Mentation and speech appropriate for age.  PSYCH: Mentation appears normal, affect normal/bright, judgement and insight intact, normal speech and appearance well-groomed.    No results found for any visits on 06/23/22.            Video-Visit Details    Video Start Time: 1:34 PM    Type of service:  Video Visit    Video End Time:1:50 PM    Originating Location (pt. Location): Home    Distant Location (provider location):  [facility]     Platform used for  Video Visit: Brittney    .  Ian.

## 2022-07-05 RX ORDER — TRIAMCINOLONE ACETONIDE 40 MG/ML
40 INJECTION, SUSPENSION INTRA-ARTICULAR; INTRAMUSCULAR ONCE
Status: COMPLETED | OUTPATIENT
Start: 2022-05-13 | End: 2022-05-13

## 2022-08-31 ENCOUNTER — MYC MEDICAL ADVICE (OUTPATIENT)
Dept: FAMILY MEDICINE | Facility: CLINIC | Age: 37
End: 2022-08-31

## 2022-09-22 ENCOUNTER — HOSPITAL ENCOUNTER (OUTPATIENT)
Facility: AMBULATORY SURGERY CENTER | Age: 37
End: 2022-09-22
Attending: PODIATRIST | Admitting: PODIATRIST
Payer: COMMERCIAL

## 2022-09-22 ENCOUNTER — ANCILLARY PROCEDURE (OUTPATIENT)
Dept: GENERAL RADIOLOGY | Facility: CLINIC | Age: 37
End: 2022-09-22
Attending: PODIATRIST
Payer: COMMERCIAL

## 2022-09-22 ENCOUNTER — OFFICE VISIT (OUTPATIENT)
Dept: PODIATRY | Facility: CLINIC | Age: 37
End: 2022-09-22

## 2022-09-22 ENCOUNTER — MYC MEDICAL ADVICE (OUTPATIENT)
Dept: PODIATRY | Facility: CLINIC | Age: 37
End: 2022-09-22

## 2022-09-22 ENCOUNTER — TELEPHONE (OUTPATIENT)
Dept: PODIATRY | Facility: CLINIC | Age: 37
End: 2022-09-22

## 2022-09-22 VITALS — DIASTOLIC BLOOD PRESSURE: 90 MMHG | HEART RATE: 86 BPM | SYSTOLIC BLOOD PRESSURE: 138 MMHG

## 2022-09-22 DIAGNOSIS — M21.612 BUNION, LEFT: ICD-10-CM

## 2022-09-22 DIAGNOSIS — M21.612 BUNION, LEFT: Primary | ICD-10-CM

## 2022-09-22 PROCEDURE — 99214 OFFICE O/P EST MOD 30 MIN: CPT | Performed by: PODIATRIST

## 2022-09-22 PROCEDURE — 73630 X-RAY EXAM OF FOOT: CPT | Mod: TC | Performed by: RADIOLOGY

## 2022-09-22 NOTE — PATIENT INSTRUCTIONS
We wish you continued good healing. If you have any questions or concerns, please do not hesitate to contact us at  [phone]    [email or web address] (secure e-mail communication and access to your chart) to send a message or to make an appointment.    Please remember to call and schedule a follow up appointment if one was recommended at your earliest convenience.     PODIATRY CLINIC HOURS  TELEPHONE NUMBER    Dr. Zack IRENEPPEMA [facility]        Clinics:  Jai Handy CMA   Tuesday 1PM-6PM  [place]  Wednesday 745AM-330PM  [place]  Thursday/Friday 745AM-230PM  [place] [facility] APPOINTMENTS  [phone]    [place] APPOINTMENTS  [phone]        If you need a medication refill, please contact us you may need lab work and/or a follow up visit prior to your refill (i.e. Antifungal medications).  If MRI needed please call Imaging at [phone] or [phone]  HOW DO I GET MY KNEE SCOOTER? Knee scooters can be picked up at ANY Medical Supply stores with your knee scooter Prescription.  OR  Bring your signed prescription to an [facility] showroom.

## 2022-09-22 NOTE — LETTER
9/22/2022         RE: Екатерина Ramon  [address]  [place] [address]        Dear Colleague,    Thank you for referring your patient, Екатерина Ramon, to the [facility] [place]. Please see a copy of my visit note below.    Subjective:    Pt is seen today with the chief complaint of left foot pain.  Points to medial bump of bunion and states that has been bothersome for greater that 1 year(s).  Has tried shoegear changes with no improvement.  Bothersome both in joint and along medial aspect of 1st MPJ right foot.  Describes as burning pain.  Pain with ambulation and shoewear and difficulty working secondary to pain.  In 2018 patient had right first tarsometatarsal joint fusion and Jackson bunionectomy that worked well.  She would like to have this done on her left foot soon.  Denies erythema ecchymosis numbness    ROS: See above       Allergies   Allergen Reactions     Oxycodone Itching     Lisinopril Cough     Zoloft      tired       Current Outpatient Medications   Medication Sig Dispense Refill     busPIRone (BUSPAR) 10 MG tablet Take 1 tablet (10 mg) by mouth 2 times daily 180 tablet 1     childrens multivitamin w/iron (FLINTSTONES COMPLETE) 18 MG chewable tablet Take 1 chew tab by mouth daily       DULoxetine (CYMBALTA) 60 MG capsule Take 1 capsule (60 mg) by mouth daily 90 capsule 1     hydrOXYzine (ATARAX) 10 MG tablet Take 1-3 tablets (10-30 mg) by mouth 3 times daily as needed (anxiety, sleep) 90 tablet 1     methocarbamol (ROBAXIN) 500 MG tablet Take 1-2 tablets (500-1,000 mg) by mouth 4 times daily as needed for muscle spasms 20 tablet 3     omeprazole (PRILOSEC) 20 MG DR capsule Take 1 capsule (20 mg) by mouth every morning (before breakfast) 60 capsule 4     SUMAtriptan (IMITREX) 25 MG tablet Take 1-2 tablets (25-50 mg) by mouth at onset of headache for migraine 50 tablet 1     traZODone (DESYREL) 50 MG tablet Take 1-2 tablets ( mg) by mouth nightly as needed for  sleep 180 tablet 0       Patient Active Problem List   Diagnosis     CARDIOVASCULAR SCREENING; LDL GOAL LESS THAN 160     Family history of diabetes mellitus     History of depression     Hypertension goal BP (blood pressure) < 140/90     CTS (carpal tunnel syndrome)     Anxiety     Chronic bilateral thoracic back pain     Chronic midline low back pain without sciatica     Migraine with aura and without status migrainosus, not intractable     Low serum HDL     Counseling for parent-child problem     Dysthymic disorder     Menorrhagia with regular cycle     Moderate major depression (H)     Psychophysiological insomnia     Contraceptive management     IUD (intrauterine device) in place     S/P laparoscopic sleeve gastrectomy       Past Medical History:   Diagnosis Date     Family history of diabetes mellitus      Hypertension      Migraine with aura and without status migrainosus, not intractable 11/2/2016     PID (acute pelvic inflammatory disease) 5/2010       Past Surgical History:   Procedure Laterality Date     APPENDECTOMY       BUNIONECTOMY LAPIDUS WITH TARSAL METATARSAL (TMT) FUSION Right 08/14/2018    Procedure: BUNIONECTOMY LAPIDUS WITH TARSAL METATARSAL (TMT) FUSION;  Right first tarsometatarsal joint fusion, right Jackson bunionectomy;  Surgeon: Zack Healy DPM;  Location: [facility] OR     HC REVISE MEDIAN N/CARPAL TUNNEL SURG Left 06/05/2015    Primary, not revision     LAPAROSCOPIC GASTRIC SLEEVE N/A 1/4/2021    Procedure: GASTRECTOMY, SLEEVE, LAPAROSCOPIC;  Surgeon: Saud Stein MD;  Location: [facility] OR     RELEASE CARPAL TUNNEL Left 06/05/2015    Procedure: RELEASE CARPAL TUNNEL;  Surgeon: Juan Jose Joaquin MD;  Location: [facility] OR       Family History   Problem Relation Age of Onset     Diabetes Mother      Hypertension Mother      Cancer Mother         skin     Diabetes Father      Breast Cancer Paternal Grandmother      Anesthesia Reaction Son         PONV     Glaucoma No family hx of       Macular Degeneration No family hx of      Deep Vein Thrombosis (DVT) No family hx of        Social History     Tobacco Use     Smoking status: Former Smoker     Types: Cigarettes     Quit date: 2010     Years since quittin.4     Smokeless tobacco: Never Used   Substance Use Topics     Alcohol use: Yes     Comment: rare         Objective:    Vitals: BP (!) 138/90   Pulse 86   BMI: There is no height or weight on file to calculate BMI.  Height: Data Unavailable    Constitutional/ general:  Pt is in no apparent distress, appears well-nourished.  Cooperative with history and physical exam.     Psych:  The patient answered questions appropriately.  Normal affect.  Seems to have reasonable expectations, in terms of treatment.    Lungs:  Non labored breathing, non labored speech. No cough.  No audible wheezing. Even, quiet breathing.      Vascular:  Pedal pulses are palpable bilaterally for both the DP and PT arteries.  CFT < 3 sec.  negative edema.  negative varicosities.  positive pedal hair growth noted.     Neuro:  Alert and oriented x 3. Coordinated gait.  Light touch sensation is intact    Derm: Normal texture and turgor.  No erythema, ecchymosis, or cyanosis.  No open lesions.     Musculoskeletal:    Lower extremity muscle strength is normal.  Patient is ambulatory without an assistive device or brace .   Mild reactive hyperemia over 1st MPJ of the left foot.  No underlying bursa noted.  Pronated arch with weightbearing.  Hypermobility noted at TMTJ.  Valgus rotation of hallux at MPJ. negative crepitus with range of motion of the first MPJ left foot.          Radiographic Exam:  Xrays, three views left foot weight bearing obtained today.  This demonstrated an intermetatarsal angle of 15 degrees.  Sesamoid position appears to be a 7.  First MPJ joint space deviated.  positive metatarsus adductus.      ASSESSMENT:    Painful Hallux Valgus deformity left foot.  Midfoot instability left foot with  pronation    Plan:  X-rays taken today.  Discussed with patient a bunion is caused by a muscle imbalance. The big toe is pulled toward the smaller toes. The lump is created by a bone pushing outward.  Also explained that hypermobility and midfoot instability can contribute to this and other problems such as pronation.     Bunion pain is usually a combination of shoes rubbing on the skin, nerve irritation, compression between the toes, joint misalignment, arthritis, and altered gait.   Most bunion pain can be improved by wearing compatible shoes. People with bunions cannot choose footwear just because they like the style. Your bunion should determine what shoe should be worn. Wide shoes with non-irritating seams, soft leather, and a square toe box are most compatible. Shoes should fit well out of the box but may need to be professionally stretched and modified to accommodate the bump. Heels, dress shoes, and pointed toes will not provide comfort.   Shoe inserts or orthotics will often times help with the bunion pain, however, inserts make a shoe fit more challenging. Pads placed around the bunion may help.   Bunion surgery involves cutting and repositioning the bones surrounding the bunion. Pins and screws are used to hold the bones in place during the healing process. The goal of bunion surgery is to reduce the size of the bunion bump. Realignment of the toe and joint is attempted. Most first toes can not be forced back into a normal alignment even with surgery.   Healing after surgery requires about 6 weeks of protection. This allows the bone to heal. Maximum recovery takes about one year. The scar tissue and joint structures require this amount of time to finish the healing process. Expect stiffness, swelling, and numbness during that time frame.   Bunion surgery does involve side effects. Some side effects are predictable and others are less common but do occur. A scar will be visible and may be irritated by  shoes. The shoe may rub on the screw or internal pin requiring surgical removal of the fixation devices. The screw and pin would like be in place for one year. The first toe may loose motion after surgery. The amount of stiffness is variable. Some people never regain motion of their big toe. This is due to scar tissue that develops with any surgery. The first toe may drift towards or away from the second toe. Spreading of the first and second toe is a rare occurrence after bunion surgery. This can be bothersome and may require surgical repair. Toe drift toward the second toe may result in a recurrent bunion and revision surgery. Joint fusion is one option to correct and unstable, drifting toe. This procedure straightens the toe, however, no motion remains. Fusion may be necessary to correct complications of bunion surgery or as the original procedure in severe cases.   All surgical procedures involve the risk of infection, numbness, pain, delayed bone healing, dislocation, blood clots, and continued foot pain. Bunion surgery is complex and should not be taken lightly.  Also discussed improving midfoot instability would involve fusing midfoot joint.     Any skin incision can cause infection. Deep infection might involve the bone. If this occurs, repeat surgery and antibiotics through an IV for 6 weeks may be needed. Scar tissue from dissection and retraction can cause nerve pain or numbness. This is generally temporary but may be permanent. We do not have treatments that cure nerve problems. Pain could develop in the second toe due to a change in pressure. In addition, the cut in the bone may displace and require additional surgery.   Delayed healing would lengthen the healing time. Some bones occasionally do not heal. If this occurs, repeat surgery, extended protection, or electronic bone stimulation may be needed. Smokers have a 20 % of the bone not healing; this is 2 times the risk of people who do not smoke.    Foot pain is complex. Most feet hurt for more than one reason. Fixing the bunion would not necessarily create a pain free foot. Appropriate shoes, healthy body weight, avoidance of bare foot walking, and moderation of activity will always be necessary to enjoy foot comfort. Your bunion may involve arthritis, or loss of the cartilage in the joint. This is incurable even with surgery. Also, long standing bunions often involve chronic irritation to the surrounding nerves. Nerve pain may not resolve even with reducing the bunion bump since permanent nerve damage may be present.   Bunion surgery is actually quite successful. Most surgical patients are pleased with their foot following bunion surgery. Many of the issues described above can be controlled by taking proper care of your foot during the healing process.   Discussed surgical as well as conservative options for her current foot pathology at length.  Patient has opted for surgical correction.  A left modified Jackson bunionectomy as well as a midfoot fusion will be done on an outpatient basis under MAC sedation.  Benefits and risks again discussed at length. These include, but are not limited to overcorrection, undercorrection, infection, numbness, scar formation, decreased ROM or painful ROM, loss of limb, chronic pain,need for further procedures.   NWB for 6 weeks, cam walker for three weeks, and then will start walking in a shoe.  Rx for knee walker.  Patient still has crutches and a cam walker from last surgery..  No guarantees of outcome were given. Patient gave verbal understanding. Will need pre-op history and physical within 7 days of the planned procedure. After care instructions, and the procedure were discussed at length.  We will set this up today to have Tuesday, November 1, 2022.    Zack Healy DPM, FACFAS              Again, thank you for allowing me to participate in the care of your patient.        Sincerely,        Zack Healy DPM

## 2022-09-22 NOTE — TELEPHONE ENCOUNTER
Type of surgery: Left first tarsometatarsal joint fusion Left Jackson bunionectomy (Left)    CPT 73882, 14390   Bunion, left M21.612     Location of surgery: [facility]  Date and time of surgery: 11/15  Surgeon: Dr. Healy   Pre-Op Appt Date: 11/1  Post-Op Appt Date: 12/1   Packet sent out: Yes  Pre-cert/Authorization completed: Unable to determine if pa is needed by website or phone. Submitted via availity. PENDING Cert/Ref# [id number]  Per Betsy Ho, no prior auth required. Call ref# [id number]    Date: 9-23-22    Carisa Corona  Financial Securing/Prior Auth Dept  [phone]

## 2022-09-22 NOTE — PROGRESS NOTES
Subjective:    Pt is seen today with the chief complaint of left foot pain.  Points to medial bump of bunion and states that has been bothersome for greater that 1 year(s).  Has tried shoegear changes with no improvement.  Bothersome both in joint and along medial aspect of 1st MPJ right foot.  Describes as burning pain.  Pain with ambulation and shoewear and difficulty working secondary to pain.  In 2018 patient had right first tarsometatarsal joint fusion and Jackson bunionectomy that worked well.  She would like to have this done on her left foot soon.  Denies erythema ecchymosis numbness    ROS: See above       Allergies   Allergen Reactions     Oxycodone Itching     Lisinopril Cough     Zoloft      tired       Current Outpatient Medications   Medication Sig Dispense Refill     busPIRone (BUSPAR) 10 MG tablet Take 1 tablet (10 mg) by mouth 2 times daily 180 tablet 1     childrens multivitamin w/iron (FLINTSTONES COMPLETE) 18 MG chewable tablet Take 1 chew tab by mouth daily       DULoxetine (CYMBALTA) 60 MG capsule Take 1 capsule (60 mg) by mouth daily 90 capsule 1     hydrOXYzine (ATARAX) 10 MG tablet Take 1-3 tablets (10-30 mg) by mouth 3 times daily as needed (anxiety, sleep) 90 tablet 1     methocarbamol (ROBAXIN) 500 MG tablet Take 1-2 tablets (500-1,000 mg) by mouth 4 times daily as needed for muscle spasms 20 tablet 3     omeprazole (PRILOSEC) 20 MG DR capsule Take 1 capsule (20 mg) by mouth every morning (before breakfast) 60 capsule 4     SUMAtriptan (IMITREX) 25 MG tablet Take 1-2 tablets (25-50 mg) by mouth at onset of headache for migraine 50 tablet 1     traZODone (DESYREL) 50 MG tablet Take 1-2 tablets ( mg) by mouth nightly as needed for sleep 180 tablet 0       Patient Active Problem List   Diagnosis     CARDIOVASCULAR SCREENING; LDL GOAL LESS THAN 160     Family history of diabetes mellitus     History of depression     Hypertension goal BP (blood pressure) < 140/90     CTS (carpal tunnel  syndrome)     Anxiety     Chronic bilateral thoracic back pain     Chronic midline low back pain without sciatica     Migraine with aura and without status migrainosus, not intractable     Low serum HDL     Counseling for parent-child problem     Dysthymic disorder     Menorrhagia with regular cycle     Moderate major depression (H)     Psychophysiological insomnia     Contraceptive management     IUD (intrauterine device) in place     S/P laparoscopic sleeve gastrectomy       Past Medical History:   Diagnosis Date     Family history of diabetes mellitus      Hypertension      Migraine with aura and without status migrainosus, not intractable 2016     PID (acute pelvic inflammatory disease) 2010       Past Surgical History:   Procedure Laterality Date     APPENDECTOMY       BUNIONECTOMY LAPIDUS WITH TARSAL METATARSAL (TMT) FUSION Right 2018    Procedure: BUNIONECTOMY LAPIDUS WITH TARSAL METATARSAL (TMT) FUSION;  Right first tarsometatarsal joint fusion, right Jackson bunionectomy;  Surgeon: Zack Healy DPM;  Location: [facility] OR     HC REVISE MEDIAN N/CARPAL TUNNEL SURG Left 2015    Primary, not revision     LAPAROSCOPIC GASTRIC SLEEVE N/A 2021    Procedure: GASTRECTOMY, SLEEVE, LAPAROSCOPIC;  Surgeon: Saud Stein MD;  Location: [facility] OR     RELEASE CARPAL TUNNEL Left 2015    Procedure: RELEASE CARPAL TUNNEL;  Surgeon: Juan Jose Joaquin MD;  Location: [facility] OR       Family History   Problem Relation Age of Onset     Diabetes Mother      Hypertension Mother      Cancer Mother         skin     Diabetes Father      Breast Cancer Paternal Grandmother      Anesthesia Reaction Son         PONV     Glaucoma No family hx of      Macular Degeneration No family hx of      Deep Vein Thrombosis (DVT) No family hx of        Social History     Tobacco Use     Smoking status: Former Smoker     Types: Cigarettes     Quit date: 2010     Years since quittin.4     Smokeless tobacco: Never  Used   Substance Use Topics     Alcohol use: Yes     Comment: rare         Objective:    Vitals: BP (!) 138/90   Pulse 86   BMI: There is no height or weight on file to calculate BMI.  Height: Data Unavailable    Constitutional/ general:  Pt is in no apparent distress, appears well-nourished.  Cooperative with history and physical exam.     Psych:  The patient answered questions appropriately.  Normal affect.  Seems to have reasonable expectations, in terms of treatment.    Lungs:  Non labored breathing, non labored speech. No cough.  No audible wheezing. Even, quiet breathing.      Vascular:  Pedal pulses are palpable bilaterally for both the DP and PT arteries.  CFT < 3 sec.  negative edema.  negative varicosities.  positive pedal hair growth noted.     Neuro:  Alert and oriented x 3. Coordinated gait.  Light touch sensation is intact    Derm: Normal texture and turgor.  No erythema, ecchymosis, or cyanosis.  No open lesions.     Musculoskeletal:    Lower extremity muscle strength is normal.  Patient is ambulatory without an assistive device or brace .   Mild reactive hyperemia over 1st MPJ of the left foot.  No underlying bursa noted.  Pronated arch with weightbearing.  Hypermobility noted at TMTJ.  Valgus rotation of hallux at MPJ. negative crepitus with range of motion of the first MPJ left foot.          Radiographic Exam:  Xrays, three views left foot weight bearing obtained today.  This demonstrated an intermetatarsal angle of 15 degrees.  Sesamoid position appears to be a 7.  First MPJ joint space deviated.  positive metatarsus adductus.      ASSESSMENT:    Painful Hallux Valgus deformity left foot.  Midfoot instability left foot with pronation    Plan:  X-rays taken today.  Discussed with patient a bunion is caused by a muscle imbalance. The big toe is pulled toward the smaller toes. The lump is created by a bone pushing outward.  Also explained that hypermobility and midfoot instability can contribute to  this and other problems such as pronation.     Bunion pain is usually a combination of shoes rubbing on the skin, nerve irritation, compression between the toes, joint misalignment, arthritis, and altered gait.   Most bunion pain can be improved by wearing compatible shoes. People with bunions cannot choose footwear just because they like the style. Your bunion should determine what shoe should be worn. Wide shoes with non-irritating seams, soft leather, and a square toe box are most compatible. Shoes should fit well out of the box but may need to be professionally stretched and modified to accommodate the bump. Heels, dress shoes, and pointed toes will not provide comfort.   Shoe inserts or orthotics will often times help with the bunion pain, however, inserts make a shoe fit more challenging. Pads placed around the bunion may help.   Bunion surgery involves cutting and repositioning the bones surrounding the bunion. Pins and screws are used to hold the bones in place during the healing process. The goal of bunion surgery is to reduce the size of the bunion bump. Realignment of the toe and joint is attempted. Most first toes can not be forced back into a normal alignment even with surgery.   Healing after surgery requires about 6 weeks of protection. This allows the bone to heal. Maximum recovery takes about one year. The scar tissue and joint structures require this amount of time to finish the healing process. Expect stiffness, swelling, and numbness during that time frame.   Bunion surgery does involve side effects. Some side effects are predictable and others are less common but do occur. A scar will be visible and may be irritated by shoes. The shoe may rub on the screw or internal pin requiring surgical removal of the fixation devices. The screw and pin would like be in place for one year. The first toe may loose motion after surgery. The amount of stiffness is variable. Some people never regain motion of  their big toe. This is due to scar tissue that develops with any surgery. The first toe may drift towards or away from the second toe. Spreading of the first and second toe is a rare occurrence after bunion surgery. This can be bothersome and may require surgical repair. Toe drift toward the second toe may result in a recurrent bunion and revision surgery. Joint fusion is one option to correct and unstable, drifting toe. This procedure straightens the toe, however, no motion remains. Fusion may be necessary to correct complications of bunion surgery or as the original procedure in severe cases.   All surgical procedures involve the risk of infection, numbness, pain, delayed bone healing, dislocation, blood clots, and continued foot pain. Bunion surgery is complex and should not be taken lightly.  Also discussed improving midfoot instability would involve fusing midfoot joint.     Any skin incision can cause infection. Deep infection might involve the bone. If this occurs, repeat surgery and antibiotics through an IV for 6 weeks may be needed. Scar tissue from dissection and retraction can cause nerve pain or numbness. This is generally temporary but may be permanent. We do not have treatments that cure nerve problems. Pain could develop in the second toe due to a change in pressure. In addition, the cut in the bone may displace and require additional surgery.   Delayed healing would lengthen the healing time. Some bones occasionally do not heal. If this occurs, repeat surgery, extended protection, or electronic bone stimulation may be needed. Smokers have a 20 % of the bone not healing; this is 2 times the risk of people who do not smoke.   Foot pain is complex. Most feet hurt for more than one reason. Fixing the bunion would not necessarily create a pain free foot. Appropriate shoes, healthy body weight, avoidance of bare foot walking, and moderation of activity will always be necessary to enjoy foot comfort. Your  bunion may involve arthritis, or loss of the cartilage in the joint. This is incurable even with surgery. Also, long standing bunions often involve chronic irritation to the surrounding nerves. Nerve pain may not resolve even with reducing the bunion bump since permanent nerve damage may be present.   Bunion surgery is actually quite successful. Most surgical patients are pleased with their foot following bunion surgery. Many of the issues described above can be controlled by taking proper care of your foot during the healing process.   Discussed surgical as well as conservative options for her current foot pathology at length.  Patient has opted for surgical correction.  A left modified Jackson bunionectomy as well as a midfoot fusion will be done on an outpatient basis under MAC sedation.  Benefits and risks again discussed at length. These include, but are not limited to overcorrection, undercorrection, infection, numbness, scar formation, decreased ROM or painful ROM, loss of limb, chronic pain,need for further procedures.   NWB for 6 weeks, cam walker for three weeks, and then will start walking in a shoe.  Rx for knee walker.  Patient still has crutches and a cam walker from last surgery..  No guarantees of outcome were given. Patient gave verbal understanding. Will need pre-op history and physical within 7 days of the planned procedure. After care instructions, and the procedure were discussed at length.  We will set this up today to have Tuesday, November 1, 2022.    Zack Healy DPM, XAVIERFAS

## 2022-09-23 NOTE — TELEPHONE ENCOUNTER
Patient's form printed and will walk forms over to [address] [place] [address] to get signature from provider once filled out.      dArian Power RN on 9/23/2022 at 10:35 AM

## 2022-09-29 NOTE — TELEPHONE ENCOUNTER
Form completed for: Екатерина Ramon  What was done with form: Patient will  form at    Message sent to the pt thru Marycruz Handy CMA

## 2022-09-29 NOTE — TELEPHONE ENCOUNTER
Forms completed and given to Dr. Healy's MA. Please contact patient when forms ready to .     Adrian Power RN on 9/29/2022 at 1:03 PM

## 2022-11-03 NOTE — TELEPHONE ENCOUNTER
Patient left a voice message to cancel surgery with Dr. Healy on 11/15/2022 . Left a voice message for patient to call back to reschedule at [phone]

## 2022-11-07 ENCOUNTER — MYC MEDICAL ADVICE (OUTPATIENT)
Dept: FAMILY MEDICINE | Facility: CLINIC | Age: 37
End: 2022-11-07

## 2022-11-18 ENCOUNTER — MYC MEDICAL ADVICE (OUTPATIENT)
Dept: PODIATRY | Facility: CLINIC | Age: 37
End: 2022-11-18

## 2022-11-18 ENCOUNTER — TELEPHONE (OUTPATIENT)
Dept: FAMILY MEDICINE | Facility: CLINIC | Age: 37
End: 2022-11-18

## 2022-11-18 ENCOUNTER — TELEPHONE (OUTPATIENT)
Dept: PALLIATIVE MEDICINE | Facility: CLINIC | Age: 37
End: 2022-11-18

## 2022-11-18 DIAGNOSIS — M54.50 CHRONIC MIDLINE LOW BACK PAIN WITHOUT SCIATICA: Primary | ICD-10-CM

## 2022-11-18 DIAGNOSIS — G89.29 CHRONIC MIDLINE LOW BACK PAIN WITHOUT SCIATICA: Primary | ICD-10-CM

## 2022-11-18 NOTE — TELEPHONE ENCOUNTER
Left message on answering machine for patient to call back to the nurse at [phone].    Regarding referral placed to pain clinic for steroid injection to back.     Reta Koenig RN  [facility]

## 2022-11-18 NOTE — TELEPHONE ENCOUNTER
My clinical question is:         Reason for Referral:   Procedure Order            Procedure:   Injection to be Determined by Pain Management Specialist            Cat Garcia    [facility]

## 2022-11-18 NOTE — TELEPHONE ENCOUNTER
Pt notified referral has been placed. Pt will call to schedule.    Erin Bass RN  [facility]

## 2022-11-18 NOTE — TELEPHONE ENCOUNTER
Екатерина Ramon would like to request a referral.  Reason: Back injection  Requested provider: Michael Alvarado  Comment:  I need a new referral for a back injection. I need someone to do this. My PC left and I cant get in until 12/27 for a new appointment. I am miserable and in pain and I can't wait for that. I haave seen Dr. Alvarado for this already before. Its not my fault she left and I cant get in until then. Someone figure it out because it is painful and affecting sciatica.          Referral placed.   Skylar Childress PA-C

## 2022-11-21 NOTE — TELEPHONE ENCOUNTER
Screening Questions for Radiology Injections:    Injection to be done at which interventional clinic site? [facility]    Procedure ordered by [place]    Procedure ordered? Repeat L5-S1 ILESI     Transforaminal Cervical RAHEEM - Send to [facility] - No  Community Site providers perform this procedure    What insurance would patient like us to bill for this procedure? [employer]    Worker's comp or MVA (motor vehicle accident) -Any injection DO NOT SCHEDULE and route to Ceasar Stallings.      [employer] insurance - For SI joint injections, DO NOT SCHEDULE and route to Trinh Hilliard.       ALL BCBS, [employer] and [employer] - DO NOT SCHEDULE and route to Trinh Hilliard    MEDICA- facet joint injections, route to Trinh Babak    Is an  needed? No     Patient has a  home? (Review Grid) YES: ok    Any chance of pregnancy? NO   If YES, do NOT schedule and route to RN pool  - Dr. Valdez route to Latoya Hernández and PM&R Nurse  [[id number]]      Is patient actively being treated for cancer or immunocompromised? No  If YES, do NOT schedule and route to RN pool/ Dr. Valdez's Team    Does the patient have a bleeding or clotting disorder? No     If YES, okay to schedule AND route to RN nurse jeny/ Dr. Valdez's Team     (For any patients with platelet count <100, RN must forward to provider)    Is patient taking any Blood Thinners OR Antiplatelet medication?  No   If hold needed, do NOT schedule, route to RN pool/ Dr. Valdez's Team    Examples:   o Blood Thinners: (Coumadin, Warfarin, Jantoven, Pradaxa, Xarelto, Eliquis, Edoxaban, Enoxaparin, Lovenox, Heparin, Arixtra, Fondaparinux or Fragmin)  o Antiplatelet Medications: (Plavix, Brilinta or Effient)     Is patient taking any aspirin products (includes Excedrin and Fiorinal)? No     If more than 325mg/day, OK to schedule; Instruct Pt to decrease to less than 325 mg for 7 days AND route to RN pool/ Dr. Valdez's Team     For CERVICAL  procedures, hold all aspirin products for 6 days.     Tell Pt that if aspirin product is not held for 6 days, the procedure WILL BE cancelled.     Any allergies to contrast dye, iodine, shellfish, or numbing and steroid medications? No    If YES, schedule and add allergy information to appointment notes AND route to the RN pool/ Dr. Valdez's Team    If RAHEEM and Contrast Dye / Iodine Allergy? DO NOT SCHEDULE, route to RN pool/ Dr. Valdez's Team    Allergies: Oxycodone, Lisinopril, and Zoloft     Does patient have an active infection or treated for one within the past week? No    Is patient currently taking any antibiotics or steroid medications?  No     For patients on chronic, preventative, or prophylactic antibiotics, procedures may be scheduled.     For patients on antibiotics for active or recent infection, schedule 4 days after completed.    For patients on steroid medications, schedule 4 days after completed.     Has the patient had a flu shot or any other vaccinations within the past 7 days? No  If yes, explain that for the vaccine to work best they need to:       wait 1 week before and 1 week after getting any Vaccine    wait 1 week before and 2 weeks after getting Covid Vaccine #2 or BOOSTER    If patient has concerns about the timing, send to RN pool/ Dr. Valdez's Team    Does patient have an MRI/CT?  YES: MRI Include Date and Check Procedure Scheduling Grid to see if required.    Was the MRI/CT done within the last 3 years?  Yes     If no route to RN Pool/ Dr. Bulls Team    If yes, where was the MRI/CT done? [facility]     Refer to PACS Transmissions list for approved external locations and route to RN Pool High Priority/ Dr. Bulls Team    If MRI was not done at approved external location do NOT schedule and route to RN pool/ Dr. Valdez's Team      If patient has an imaging disc, the injection MAY be scheduled but patient must bring disc to appt or appt will be cancelled.    Is patient able to  transfer to a procedure table with minimal or no assistance? Yes     If no, do NOT schedule and route to RN Pool/ Dr. Valdez's Team    Procedure Specific Instructions:    If celiac plexus block, informed patient NPO for 6 hours and that it is okay to take medications with sips of water, especially blood pressure medications Not Applicable         If this is for a cervical procedure, informed patient that aspirin needs to be held for 6 days.   Not Applicable      Sedation, If Sedation is ordered for any procedure, patient must be NPO for 6 hours prior to procedure Not Applicable      If IV needed:    Do not schedule procedures requiring IV placement in the first appointment of the day or first appointment after lunch. Do NOT schedule at 0745, 0815 or 1245. OK    Instructed patient to arrive 30 minutes early for IV start if required. (Check Procedure Scheduling Grid)  Not Applicable    Reminders:    If you are started on any steroids or antibiotics between now and your appointment, you must contact us because the procedure may need to be cancelled.  Yes      As a reminder, receiving steroids can decrease your body's ability to fight infection.   Would you still like to move forward with scheduling the injection?  Yes      IV Sedation is not provided for procedures. If oral anti-anxiety medication is needed, the patient should request this from their referring provider.      Instruct patient to arrive as directed prior to the scheduled appointment time:  If IV needed 30 minutes before appointment time       For patients 85 or older we recommend having an adult stay w/ them for the remainder of the day.       If the patient is Diabetic, remind them to bring their glucometer.      Does the patient have any questions?  NO  Aicha Stallings  [facility]

## 2022-12-02 ENCOUNTER — TELEPHONE (OUTPATIENT)
Dept: FAMILY MEDICINE | Facility: CLINIC | Age: 37
End: 2022-12-02

## 2022-12-02 ENCOUNTER — MYC MEDICAL ADVICE (OUTPATIENT)
Dept: FAMILY MEDICINE | Facility: CLINIC | Age: 37
End: 2022-12-02

## 2022-12-02 ENCOUNTER — E-VISIT (OUTPATIENT)
Dept: PSYCHIATRY | Facility: CLINIC | Age: 37
End: 2022-12-02
Payer: COMMERCIAL

## 2022-12-02 DIAGNOSIS — M54.50 NON-SPECIFIC LOW BACK PAIN: Primary | ICD-10-CM

## 2022-12-02 DIAGNOSIS — Z53.9 ERRONEOUS ENCOUNTER--DISREGARD: Primary | ICD-10-CM

## 2022-12-02 RX ORDER — METHOCARBAMOL 500 MG/1
500 TABLET, FILM COATED ORAL 4 TIMES DAILY PRN
Qty: 60 TABLET | Refills: 1 | Status: SHIPPED | OUTPATIENT
Start: 2022-12-02

## 2022-12-02 NOTE — TELEPHONE ENCOUNTER
Екатерина Ramon would like to request a referral.  Reason: Medication  Requested provider: Dr. Cohen  Comment:  I have an upcoming appointment in December as a new provider since Dr. Wolff left I have I can t get my back injection until January and hoping to make that due to training work schedule. My sciatic is on fire and radiation down my butt and both legs for last couple days Dr. Wolff has prescribed a relaxer for that wondering If she can do the same. I have done heat and ice and nothing is helping. Of I have to squeeze in a phone or video appointment I can.      Was sent as a mychart referral request. I would recommend patient complete an E visit with a provider.   Skylar Childress PA-C

## 2022-12-02 NOTE — TELEPHONE ENCOUNTER
Called patient. Left voice message to return call at [phone]. MyChart message sent to patient.     Elizabeth Byrd RN   [facility]

## 2022-12-02 NOTE — PATIENT INSTRUCTIONS
Thank you for choosing us for your care. Based on your symptoms and length of illness, I do not think that you need a prescription at this time.  Please follow the care advise I've provided and use the over the counter medications to help relieve your symptoms.  View your full visit summary for details by clicking on the link below.     If you're not feeling better within 2-3 weeks, please respond to this message and we can consider if a prescription is needed.  You can schedule an appointment right here in [facility], or call [phone]  If the visit is for the same symptoms as your eVisit, we'll refund the cost of your eVisit if seen within seven days.      Ricky Willams,     I believe you started this e-visit with the wrong provider. I have closed this one as no charge and you can restart a new one with correct provider as necessary. Call your clinic with any questions or concerns you may have about getting in touch with the correct provider.     Take care.     Guerline Wagner, DO  [facility]  [facility]

## 2022-12-07 NOTE — TELEPHONE ENCOUNTER
See [employer] encounter from 12/2/22. Pt has read [employer] message.    Erin Bass RN  [facility]

## 2022-12-27 ENCOUNTER — OFFICE VISIT (OUTPATIENT)
Dept: FAMILY MEDICINE | Facility: CLINIC | Age: 37
End: 2022-12-27
Payer: COMMERCIAL

## 2022-12-27 VITALS
OXYGEN SATURATION: 100 % | SYSTOLIC BLOOD PRESSURE: 134 MMHG | BODY MASS INDEX: 31.01 KG/M2 | DIASTOLIC BLOOD PRESSURE: 72 MMHG | HEIGHT: 63 IN | TEMPERATURE: 98.5 F | HEART RATE: 94 BPM | WEIGHT: 175 LBS

## 2022-12-27 DIAGNOSIS — G89.29 CHRONIC MIDLINE LOW BACK PAIN WITHOUT SCIATICA: ICD-10-CM

## 2022-12-27 DIAGNOSIS — Z98.84 S/P LAPAROSCOPIC SLEEVE GASTRECTOMY: ICD-10-CM

## 2022-12-27 DIAGNOSIS — I10 HYPERTENSION GOAL BP (BLOOD PRESSURE) < 140/90: ICD-10-CM

## 2022-12-27 DIAGNOSIS — F41.1 GAD (GENERALIZED ANXIETY DISORDER): Primary | ICD-10-CM

## 2022-12-27 DIAGNOSIS — M54.50 CHRONIC MIDLINE LOW BACK PAIN WITHOUT SCIATICA: ICD-10-CM

## 2022-12-27 PROBLEM — Z30.9 CONTRACEPTIVE MANAGEMENT: Status: RESOLVED | Noted: 2019-12-06 | Resolved: 2022-12-27

## 2022-12-27 LAB
ANION GAP SERPL CALCULATED.3IONS-SCNC: 5 MMOL/L (ref 3–14)
BUN SERPL-MCNC: 13 MG/DL (ref 7–30)
CALCIUM SERPL-MCNC: 8.5 MG/DL (ref 8.5–10.1)
CHLORIDE BLD-SCNC: 106 MMOL/L (ref 94–109)
CO2 SERPL-SCNC: 27 MMOL/L (ref 20–32)
CREAT SERPL-MCNC: 0.6 MG/DL (ref 0.52–1.04)
DEPRECATED CALCIDIOL+CALCIFEROL SERPL-MC: 19 UG/L (ref 20–75)
ERYTHROCYTE [DISTWIDTH] IN BLOOD BY AUTOMATED COUNT: 12.9 % (ref 10–15)
FERRITIN SERPL-MCNC: 35 NG/ML (ref 12–150)
GFR SERPL CREATININE-BSD FRML MDRD: >90 ML/MIN/1.73M2
GLUCOSE BLD-MCNC: 109 MG/DL (ref 70–99)
HCT VFR BLD AUTO: 39.9 % (ref 35–47)
HGB BLD-MCNC: 13.1 G/DL (ref 11.7–15.7)
MCH RBC QN AUTO: 31.6 PG (ref 26.5–33)
MCHC RBC AUTO-ENTMCNC: 32.8 G/DL (ref 31.5–36.5)
MCV RBC AUTO: 96 FL (ref 78–100)
PLATELET # BLD AUTO: 259 10E3/UL (ref 150–450)
POTASSIUM BLD-SCNC: 3.5 MMOL/L (ref 3.4–5.3)
RBC # BLD AUTO: 4.15 10E6/UL (ref 3.8–5.2)
SODIUM SERPL-SCNC: 138 MMOL/L (ref 133–144)
VIT B12 SERPL-MCNC: 288 PG/ML (ref 232–1245)
WBC # BLD AUTO: 6.8 10E3/UL (ref 4–11)

## 2022-12-27 PROCEDURE — 82306 VITAMIN D 25 HYDROXY: CPT | Performed by: PHYSICIAN ASSISTANT

## 2022-12-27 PROCEDURE — 82728 ASSAY OF FERRITIN: CPT | Performed by: PHYSICIAN ASSISTANT

## 2022-12-27 PROCEDURE — 80048 BASIC METABOLIC PNL TOTAL CA: CPT | Performed by: PHYSICIAN ASSISTANT

## 2022-12-27 PROCEDURE — 36415 COLL VENOUS BLD VENIPUNCTURE: CPT | Performed by: PHYSICIAN ASSISTANT

## 2022-12-27 PROCEDURE — 99214 OFFICE O/P EST MOD 30 MIN: CPT | Performed by: PHYSICIAN ASSISTANT

## 2022-12-27 PROCEDURE — 85027 COMPLETE CBC AUTOMATED: CPT | Performed by: PHYSICIAN ASSISTANT

## 2022-12-27 PROCEDURE — 96127 BRIEF EMOTIONAL/BEHAV ASSMT: CPT | Performed by: PHYSICIAN ASSISTANT

## 2022-12-27 PROCEDURE — 82607 VITAMIN B-12: CPT | Performed by: PHYSICIAN ASSISTANT

## 2022-12-27 RX ORDER — HYDROXYZINE HYDROCHLORIDE 10 MG/1
10-30 TABLET, FILM COATED ORAL 3 TIMES DAILY PRN
Qty: 90 TABLET | Refills: 1 | Status: SHIPPED | OUTPATIENT
Start: 2022-12-27

## 2022-12-27 RX ORDER — BUSPIRONE HYDROCHLORIDE 10 MG/1
10 TABLET ORAL 2 TIMES DAILY
Qty: 180 TABLET | Refills: 1 | Status: SHIPPED | OUTPATIENT
Start: 2022-12-27 | End: 2023-01-27

## 2022-12-27 RX ORDER — DULOXETINE 40 MG/1
80 CAPSULE, DELAYED RELEASE ORAL DAILY
Qty: 180 CAPSULE | Refills: 0 | Status: SHIPPED | OUTPATIENT
Start: 2022-12-27 | End: 2023-01-27

## 2022-12-27 ASSESSMENT — ANXIETY QUESTIONNAIRES
5. BEING SO RESTLESS THAT IT IS HARD TO SIT STILL: NOT AT ALL
5. BEING SO RESTLESS THAT IT IS HARD TO SIT STILL: NOT AT ALL
1. FEELING NERVOUS, ANXIOUS, OR ON EDGE: NEARLY EVERY DAY
7. FEELING AFRAID AS IF SOMETHING AWFUL MIGHT HAPPEN: NOT AT ALL
6. BECOMING EASILY ANNOYED OR IRRITABLE: NEARLY EVERY DAY
GAD7 TOTAL SCORE: 12
4. TROUBLE RELAXING: SEVERAL DAYS
3. WORRYING TOO MUCH ABOUT DIFFERENT THINGS: SEVERAL DAYS
GAD7 TOTAL SCORE: 12
2. NOT BEING ABLE TO STOP OR CONTROL WORRYING: MORE THAN HALF THE DAYS
GAD7 TOTAL SCORE: 12
2. NOT BEING ABLE TO STOP OR CONTROL WORRYING: NEARLY EVERY DAY
8. IF YOU CHECKED OFF ANY PROBLEMS, HOW DIFFICULT HAVE THESE MADE IT FOR YOU TO DO YOUR WORK, TAKE CARE OF THINGS AT HOME, OR GET ALONG WITH OTHER PEOPLE?: VERY DIFFICULT
3. WORRYING TOO MUCH ABOUT DIFFERENT THINGS: MORE THAN HALF THE DAYS
7. FEELING AFRAID AS IF SOMETHING AWFUL MIGHT HAPPEN: MORE THAN HALF THE DAYS
6. BECOMING EASILY ANNOYED OR IRRITABLE: MORE THAN HALF THE DAYS
IF YOU CHECKED OFF ANY PROBLEMS ON THIS QUESTIONNAIRE, HOW DIFFICULT HAVE THESE PROBLEMS MADE IT FOR YOU TO DO YOUR WORK, TAKE CARE OF THINGS AT HOME, OR GET ALONG WITH OTHER PEOPLE: VERY DIFFICULT
1. FEELING NERVOUS, ANXIOUS, OR ON EDGE: NEARLY EVERY DAY
GAD7 TOTAL SCORE: 11
IF YOU CHECKED OFF ANY PROBLEMS ON THIS QUESTIONNAIRE, HOW DIFFICULT HAVE THESE PROBLEMS MADE IT FOR YOU TO DO YOUR WORK, TAKE CARE OF THINGS AT HOME, OR GET ALONG WITH OTHER PEOPLE: VERY DIFFICULT
7. FEELING AFRAID AS IF SOMETHING AWFUL MIGHT HAPPEN: MORE THAN HALF THE DAYS

## 2022-12-27 ASSESSMENT — PATIENT HEALTH QUESTIONNAIRE - PHQ9
5. POOR APPETITE OR OVEREATING: SEVERAL DAYS
10. IF YOU CHECKED OFF ANY PROBLEMS, HOW DIFFICULT HAVE THESE PROBLEMS MADE IT FOR YOU TO DO YOUR WORK, TAKE CARE OF THINGS AT HOME, OR GET ALONG WITH OTHER PEOPLE: VERY DIFFICULT
SUM OF ALL RESPONSES TO PHQ QUESTIONS 1-9: 8
SUM OF ALL RESPONSES TO PHQ QUESTIONS 1-9: 8

## 2022-12-27 ASSESSMENT — PAIN SCALES - GENERAL: PAINLEVEL: EXTREME PAIN (8)

## 2022-12-27 ASSESSMENT — ENCOUNTER SYMPTOMS: NERVOUS/ANXIOUS: 1

## 2022-12-27 NOTE — PROGRESS NOTES
"  Assessment & Plan     BERENICE (generalized anxiety disorder)  Increased anxiety. Suggest increasing dose of duloxetine - will increase to 80 mg (from 60 mg). She is agreeable. Suggest she follow up with psychiatry (last visit was Jan 2022).   - DULoxetine 40 MG CPEP; Take 80 mg by mouth daily  - busPIRone (BUSPAR) 10 MG tablet; Take 1 tablet (10 mg) by mouth 2 times daily  - hydrOXYzine (ATARAX) 10 MG tablet; Take 1-3 tablets (10-30 mg) by mouth 3 times daily as needed (anxiety, sleep)    S/P laparoscopic sleeve gastrectomy  Due for labs.   - CBC with platelets; Future  - Ferritin; Future  - Vitamin D Deficiency; Future  - Vitamin B12; Future    Hypertension goal BP (blood pressure) < 140/90  Blood pressure has been stable. Likely since weight loss from bariatric surgery. Will continue to monitor.   - BASIC METABOLIC PANEL; Future  - BASIC METABOLIC PANEL    Chronic midline low back pain without sciatica  Has follow up appt with pain management for epidural in Jan 2023. PDMP checked - no opioid prescriptions seen.               BMI:   Estimated body mass index is 31.5 kg/m  as calculated from the following:    Height as of this encounter: 1.588 m (5' 2.5\").    Weight as of this encounter: 79.4 kg (175 lb).   Weight management plan: Discussed healthy diet and exercise guidelines        Return in about 4 months (around 4/27/2023) for Routine preventive.    Yvette Day PA-C  [facility] SHANIQUA Willams is a 37 year old, presenting for the following health issues:  Pain, Anxiety, and Health Maintenance           Anxiety Follow-Up    How are you doing with your anxiety since your last visit? Worsened     Are you having other symptoms that might be associated with anxiety? Yes:  work    Have you had a significant life event? OTHER: work     Are you feeling depressed? No    Do you have any concerns with your use of alcohol or other drugs? No    Started a new job in Oct. Stressful. Has had " crying panic attacks.   Using hydroxyzine more - daily almost. Previous to job change it was every few weeks.     Seeing pain for repeat epidural in mid January    Social History     Tobacco Use     Smoking status: Former     Types: Cigarettes     Quit date: 2010     Years since quittin.6     Smokeless tobacco: Never   Vaping Use     Vaping Use: Never used   Substance Use Topics     Alcohol use: Yes     Comment: rare     Drug use: No     BERENICE-7 SCORE 2022   Total Score - - -   Total Score 21 (severe anxiety) - 12 (moderate anxiety)   Total Score 21 12 11     PHQ 2022   PHQ-9 Total Score 6 13 8   Q9: Thoughts of better off dead/self-harm past 2 weeks Not at all Not at all Not at all   F/U: Thoughts of suicide or self-harm - - -   F/U: Self harm-plan - - -   F/U: Self-harm action - - -   F/U: Safety concerns - - -         Pain History:  When did you first notice your pain? - Chronic Pain   Have you seen this provider for your pain in the past?   Yes   Where in your body do you have pain? Low back  Are you seeing anyone else for your pain? Yes - injections    PHQ-9 SCORE 2022   PHQ-9 Total Score - - -   PHQ-9 Total Score MyChart 6 (Mild depression) 13 (Moderate depression) 8 (Mild depression)   PHQ-9 Total Score 6 13 8       BERENICE-7 SCORE 2022   Total Score - - -   Total Score 21 (severe anxiety) - 12 (moderate anxiety)   Total Score 21 12 11           PEG Score 2022   PEG Total Score 5.67       PDMP Review       Value Time User    State PDMP site checked  Yes 2022  1:18 PM Yvette Day PA-C        Last CSA Agreement:   CSA -- Patient Level:    CSA: None found at the patient level.       Last UDS:             Review of Systems   Psychiatric/Behavioral: The patient is nervous/anxious.       Constitutional, HEENT, cardiovascular, pulmonary, gi and gu systems are negative, except as otherwise  "noted.      Objective    /72 (BP Location: Right arm, Patient Position: Chair, Cuff Size: Adult Regular)   Pulse 94   Temp 98.5  F (36.9  C) (Oral)   Ht 1.588 m (5' 2.5\")   Wt 79.4 kg (175 lb)   LMP  (LMP Unknown)   SpO2 100%   Breastfeeding No   BMI 31.50 kg/m    Body mass index is 31.5 kg/m .  Physical Exam   GENERAL: healthy, alert and no distress  RESP: lungs clear to auscultation - no rales, rhonchi or wheezes  CV: regular rate and rhythm, normal S1 S2, no S3 or S4, no murmur, click or rub, no peripheral edema and peripheral pulses strong  ABDOMEN: soft, nontender, no hepatosplenomegaly, no masses and bowel sounds normal  MS: no gross musculoskeletal defects noted, no edema                    "

## 2023-01-10 ENCOUNTER — ANCILLARY ORDERS (OUTPATIENT)
Dept: PALLIATIVE MEDICINE | Facility: CLINIC | Age: 38
End: 2023-01-10

## 2023-01-10 DIAGNOSIS — M54.16 LUMBAR RADICULOPATHY: ICD-10-CM

## 2023-01-17 ENCOUNTER — MYC MEDICAL ADVICE (OUTPATIENT)
Dept: FAMILY MEDICINE | Facility: CLINIC | Age: 38
End: 2023-01-17

## 2023-01-17 ENCOUNTER — RADIOLOGY INJECTION OFFICE VISIT (OUTPATIENT)
Dept: PALLIATIVE MEDICINE | Facility: CLINIC | Age: 38
End: 2023-01-17
Attending: PHYSICIAN ASSISTANT
Payer: COMMERCIAL

## 2023-01-17 VITALS
RESPIRATION RATE: 16 BRPM | OXYGEN SATURATION: 97 % | DIASTOLIC BLOOD PRESSURE: 105 MMHG | HEART RATE: 98 BPM | SYSTOLIC BLOOD PRESSURE: 133 MMHG

## 2023-01-17 DIAGNOSIS — M54.50 CHRONIC MIDLINE LOW BACK PAIN WITHOUT SCIATICA: ICD-10-CM

## 2023-01-17 DIAGNOSIS — M54.16 LUMBAR RADICULOPATHY: ICD-10-CM

## 2023-01-17 DIAGNOSIS — G89.29 CHRONIC MIDLINE LOW BACK PAIN WITHOUT SCIATICA: ICD-10-CM

## 2023-01-17 PROCEDURE — 62323 NJX INTERLAMINAR LMBR/SAC: CPT | Performed by: PAIN MEDICINE

## 2023-01-17 RX ORDER — TRIAMCINOLONE ACETONIDE 40 MG/ML
40 INJECTION, SUSPENSION INTRA-ARTICULAR; INTRAMUSCULAR ONCE
Status: COMPLETED | OUTPATIENT
Start: 2023-01-17 | End: 2023-01-17

## 2023-01-17 RX ADMIN — TRIAMCINOLONE ACETONIDE 40 MG: 40 INJECTION, SUSPENSION INTRA-ARTICULAR; INTRAMUSCULAR at 15:46

## 2023-01-17 ASSESSMENT — PAIN SCALES - GENERAL
PAINLEVEL: SEVERE PAIN (7)
PAINLEVEL: MODERATE PAIN (5)

## 2023-01-17 NOTE — NURSING NOTE
Discharge Information    IV Discontiued Time:  NA    Amount of Fluid Infused:  NA    Discharge Criteria = When patient returns to baseline or as per MD order    Consciousness:  Pt is fully awake    Circulation:  BP +/- 20% of pre-procedure level    Respiration:  Patient is able to breathe deeply    O2 Sat:  Patient is able to maintain O2 Sat >92% on room air    Activity:  Moves 4 extremities on command    Ambulation:  Patient is able to stand and walk or stand and pivot into wheelchair    Dressing:  Clean/dry or No Dressing    Notes:   Discharge instructions and AVS given to patient    Patient meets criteria for discharge?  YES    Admitted to PCU?  No    Responsible adult present to accompany patient home?  Yes    Signature/Title:    Azael Stephens RN  RN Care Coordinator  [facility] [place]

## 2023-01-17 NOTE — PROGRESS NOTES
Pre procedure Diagnosis: lumbar radiculopathy    Post procedure Diagnosis: Same  Procedure performed: L5-S1 interlaminar epidural steroid injection   Anesthesia: none  Complications: none  Operators: Zia Alvarado MD     Indications:   Екатерина Ramon is a 37 year old female.  The patient has a history of bilateral low back pain radiating to the buttocks.  Examination shows negative slump.  she has tried conservative treatment including meds/PT.    MRI w  Options/alternatives, benefits and risks were discussed with the patient including but not limited to bleeding, infection, no pain relief, tissue trauma, exposure to radiation, reaction to medications, spinal cord injury, dural puncture, weakness, numbness and headache.  Questions were answered to her satisfaction and she wishes to proceed. Voluntary informed consent was obtained and signed.     Vitals were reviewed: Yes  Allergies were reviewed:  Yes   Medications were reviewed:  Yes   Pre-procedure pain score: 7/10    Procedure:  The patient's medical history, medications, and allergies were reviewed and reconciled.  After obtaining signed informed consent, the patient was brought into the procedure suite and was placed in a prone position on the procedure table.   A Pause for the Cause was performed.  Patient was prepped and draped in the usual sterile fashion.     The L 5-1 interspace was identified with AP fluoroscopy.  A total of 3ml of 1% lidocaine was used to anesthetize the skin and subcutaneous tissues for a  midline approach.    A 20gauge 4.5inch Touhy needle was advanced utilizing intermittent AP and Lateral fluoroscopy and air for loss of resistance.  The epidural space was encountered on the first pass without difficulties.  Aspiration for blood and CSF was negative.  Needle position was verified by injecting 1 ml of Omnipaque utilizing real-time fluoroscopy that showed good needle placement and epidural spread without signs of intravascular  or intrathecal uptake.  Omnipaque wasted:  9 ml.    Then, after repeated negative aspiration for blood or CSF, a combination of Kenalog 40 mg, Bupivicaine 0.25% 2 ml, diluted with 3 ml of normal saline to a total injectate volume of 6 ml was injected into the epidural space in a slow and incremental fashion and the needle was restyletted and withdrawn.  All injected medications were preservative free.    The injection site was cleaned and a sterile dressing was applied.    The patient tolerated the procedure well without complications and was taken to the recovery room for continued observation.    Images were saved to PACS.    Post-procedure pain score: 5/10  Follow-up includes:   -f/u phone call in one week  -f/u with referring provider     Zia Alvarado MD  [facility] [place]

## 2023-01-17 NOTE — PATIENT INSTRUCTIONS
[facility]   Procedure Discharge Instructions    Today you saw: Dr. Zia Alvarado          You had an:  Lumbar Epidural steroid injection               If you were holding your blood thinning medication, please restart taking it: N/A  Be cautious when walking. Numbness and/or weakness in the lower extremities may occur for up to 6-8 hours after the procedure due to effect of the local anesthetic  Do not drive for 6 hours. The effect of the local anesthetic could slow your reflexes.   You may resume your regular activities after 24 hours  Avoid strenuous activity for the first 24 hours  You may shower, however avoid swimming, tub baths or hot tubs for 24 hours following your procedure  You may have a mild to moderate increase in pain for several days following the injection.  It may take up to 14 days for the steroid medication to start working although you may feel the effect as early as a few days after the procedure.     You may use ice packs for 10-15 minutes, 3 to 4 times a day at the injection site for comfort  Do not use heat to painful areas for 6 to 8 hours. This will give the local anesthetic time to wear off and prevent you from accidentally burning your skin.   Unless you have been directed to avoid the use of anti-inflammatory medications (NSAIDS), you may use medications such as ibuprofen, Aleve or Tylenol for pain control if needed.   Possible side effects of steroids that you may experience include flushing, elevated blood pressure, increased appetite, mild headaches and restlessness.  All of these symptoms will get better with time.  If you experience any of the following, call the Pain Clinic during work hours (Mon-Friday 8-4:30 pm) at [phone] or the Provider Line after hours at [phone]:  -Fever over 100 degree F  -Swelling, bleeding, redness, drainage, warmth at the injection site  -Progressive weakness or numbness in your legs   -Loss of bowel or bladder  function  -Unusual new onset of pain that is not improving

## 2023-01-17 NOTE — NURSING NOTE
Pre-procedure Intake  If YES to any questions or NO to having a   Please complete laminated checklist and leave on the computer keyboard for Provider, verbally inform provider if able.    For SCS Trial, RFA's or any sedation procedure:  Have you been fasting? NA    If yes, for how long?     Are you taking any any blood thinners such as Coumadin, Warfarin, Jantoven, Pradaxa Xarelto, Eliquis, Edoxaban, Enoxaparin, Lovenox, Heparin, Arixtra, Fondaparinux, or Fragmin? OR Antiplatelet medication such as Plavix, Brilinta, or Effient?   No     If yes, when did you take your last dose?     Do you take aspirin?  Yes     If cervical procedure, have you held aspirin for 6 days?   NA    Do you have any allergies to contrast dye, iodine, steroid and/or numbing medications?  NO    Are you currently taking antibiotics or have an active infection?  NO    Have you had a fever/elevated temperature within the past week? NO    Are you currently taking oral steroids? NO    Do you have a ? Yes    Are you pregnant or breastfeeding?  NO    Have you received the COVID-19 vaccine? Yes    If yes, was it your 1st, 2nd or only dose needed?     Date of most recent vaccine: 9/9/22    Notify provider and RNs if systolic BP >170, diastolic BP >100, P >100 or O2 sats < 90%

## 2023-01-27 ENCOUNTER — MYC MEDICAL ADVICE (OUTPATIENT)
Dept: FAMILY MEDICINE | Facility: CLINIC | Age: 38
End: 2023-01-27
Payer: COMMERCIAL

## 2023-01-27 ENCOUNTER — VIRTUAL VISIT (OUTPATIENT)
Dept: PSYCHIATRY | Facility: CLINIC | Age: 38
End: 2023-01-27
Payer: COMMERCIAL

## 2023-01-27 ENCOUNTER — VIRTUAL VISIT (OUTPATIENT)
Dept: BEHAVIORAL HEALTH | Facility: CLINIC | Age: 38
End: 2023-01-27
Payer: COMMERCIAL

## 2023-01-27 DIAGNOSIS — F41.1 GAD (GENERALIZED ANXIETY DISORDER): ICD-10-CM

## 2023-01-27 DIAGNOSIS — G47.00 INSOMNIA, UNSPECIFIED TYPE: ICD-10-CM

## 2023-01-27 DIAGNOSIS — F41.0 PANIC ATTACK: ICD-10-CM

## 2023-01-27 DIAGNOSIS — F39 MOOD DISORDER (H): Primary | ICD-10-CM

## 2023-01-27 DIAGNOSIS — F41.1 GAD (GENERALIZED ANXIETY DISORDER): Primary | ICD-10-CM

## 2023-01-27 DIAGNOSIS — F51.04 PSYCHOPHYSIOLOGICAL INSOMNIA: ICD-10-CM

## 2023-01-27 PROCEDURE — 90791 PSYCH DIAGNOSTIC EVALUATION: CPT | Mod: 95 | Performed by: PSYCHOLOGIST

## 2023-01-27 PROCEDURE — 99214 OFFICE O/P EST MOD 30 MIN: CPT | Mod: 95 | Performed by: PSYCHIATRY & NEUROLOGY

## 2023-01-27 RX ORDER — ALPRAZOLAM 0.25 MG
.25-.5 TABLET ORAL 2 TIMES DAILY PRN
Qty: 20 TABLET | Refills: 0 | Status: SHIPPED | OUTPATIENT
Start: 2023-01-27

## 2023-01-27 RX ORDER — DULOXETIN HYDROCHLORIDE 20 MG/1
CAPSULE, DELAYED RELEASE ORAL
Qty: 90 CAPSULE | Refills: 0 | Status: SHIPPED | OUTPATIENT
Start: 2023-01-27

## 2023-01-27 RX ORDER — DULOXETINE 40 MG/1
40 CAPSULE, DELAYED RELEASE ORAL SEE ADMIN INSTRUCTIONS
COMMUNITY
Start: 2023-01-27

## 2023-01-27 RX ORDER — BUSPIRONE HYDROCHLORIDE 10 MG/1
20 TABLET ORAL 2 TIMES DAILY
Qty: 120 TABLET | Refills: 1 | Status: SHIPPED | OUTPATIENT
Start: 2023-01-27

## 2023-01-27 RX ORDER — PROPRANOLOL HYDROCHLORIDE 20 MG/1
20-40 TABLET ORAL 2 TIMES DAILY PRN
Qty: 120 TABLET | Refills: 1 | Status: SHIPPED | OUTPATIENT
Start: 2023-01-27 | End: 2023-04-21

## 2023-01-27 ASSESSMENT — ANXIETY QUESTIONNAIRES
GAD7 TOTAL SCORE: 18
2. NOT BEING ABLE TO STOP OR CONTROL WORRYING: NEARLY EVERY DAY
GAD7 TOTAL SCORE: 18
5. BEING SO RESTLESS THAT IT IS HARD TO SIT STILL: MORE THAN HALF THE DAYS
GAD7 TOTAL SCORE: 18
1. FEELING NERVOUS, ANXIOUS, OR ON EDGE: NEARLY EVERY DAY
7. FEELING AFRAID AS IF SOMETHING AWFUL MIGHT HAPPEN: NEARLY EVERY DAY
8. IF YOU CHECKED OFF ANY PROBLEMS, HOW DIFFICULT HAVE THESE MADE IT FOR YOU TO DO YOUR WORK, TAKE CARE OF THINGS AT HOME, OR GET ALONG WITH OTHER PEOPLE?: VERY DIFFICULT
3. WORRYING TOO MUCH ABOUT DIFFERENT THINGS: MORE THAN HALF THE DAYS
7. FEELING AFRAID AS IF SOMETHING AWFUL MIGHT HAPPEN: NEARLY EVERY DAY
6. BECOMING EASILY ANNOYED OR IRRITABLE: NEARLY EVERY DAY
IF YOU CHECKED OFF ANY PROBLEMS ON THIS QUESTIONNAIRE, HOW DIFFICULT HAVE THESE PROBLEMS MADE IT FOR YOU TO DO YOUR WORK, TAKE CARE OF THINGS AT HOME, OR GET ALONG WITH OTHER PEOPLE: VERY DIFFICULT
4. TROUBLE RELAXING: MORE THAN HALF THE DAYS

## 2023-01-27 ASSESSMENT — PATIENT HEALTH QUESTIONNAIRE - PHQ9
SUM OF ALL RESPONSES TO PHQ QUESTIONS 1-9: 12
10. IF YOU CHECKED OFF ANY PROBLEMS, HOW DIFFICULT HAVE THESE PROBLEMS MADE IT FOR YOU TO DO YOUR WORK, TAKE CARE OF THINGS AT HOME, OR GET ALONG WITH OTHER PEOPLE: VERY DIFFICULT
SUM OF ALL RESPONSES TO PHQ QUESTIONS 1-9: 12

## 2023-01-27 NOTE — Clinical Note
Hi there! Saw Екатерина to establish care with [facility]. I have declined to write for accommodations (work from home) at this time for this patient. You can read about my reasoning in my note if you are interested in additional information. I will be seeing her back in 3-4 weeks. Thanks!

## 2023-01-27 NOTE — PROGRESS NOTES
"Telemedicine Visit: The patient's condition can be safely assessed and treated via synchronous audio and visual telemedicine encounter.      Reason for Telemedicine Visit: Patient has requested telehealth visit    Originating Site (Patient Location): Patient's home    Distant Location (provider location):  Off-site    Consent:  The patient/guardian has verbally consented to: the potential risks and benefits of telemedicine (video visit) versus in person care; bill my insurance or make self-payment for services provided; and responsibility for payment of non-covered services.     Mode of Communication:  Video Conference via [employer]    As the provider I attest to compliance with applicable laws and regulations related to telemedicine.        Outpatient Psychiatric Progress Note    Name: Екатерина Ramon   : 1985                    Primary Care Provider: ESTUARDO Jain CNP   Therapist: None, will look to re-establish with SELENA Sol     PHQ-9 scores:  PHQ-9 SCORE 2022   PHQ-9 Total Score - - -   PHQ-9 Total Score MyChart 13 (Moderate depression) 8 (Mild depression) 12 (Moderate depression)   PHQ-9 Total Score 13 8 12       BERENICE-7 scores:  BERENICE-7 SCORE 2022   Total Score - - -   Total Score - 12 (moderate anxiety) 18 (severe anxiety)   Total Score 12 11 18       Patient Identification:  Patient is a 37 year old, partnered / significant other  White Not  or  female  who presents for return visit with me.  Patient is currently employed full time. Patient attended the phone/video session alone. Patient prefers to be called: \"Екатерина\".    Interim History:  I last saw Екатерина Ramon for outpatient psychiatry return visit on 2021. During that appointment, we:      Continue duloxetine/Cymbalta 60 mg daily for mood, anxiety.    Continue BuSpar/buspirone 10 mg twice daily for anxiety and to augment " duloxetine.    Continue hydroxyzine 10-30 mg up to three times a day as needed for anxiety/sleep.     Continue trazodone  mg at bedtime as needed for sleep.    Continue all other cares per primary care provider.     Since last visit in 12/2021 - duloxetine now at 80 mg daily, buspirone still at 10 mg twice daily and also on hydroxyzine and trazodone prn     1/27: Patient returns to [facility] psychiatric services after last being seen in December 2021.  Duloxetine is now at 80 mg daily, buspirone at 10 mg twice daily, and taking hydroxyzine and trazodone as needed.  Patient overall doing quite well outside of work.  Unfortunately, patient currently struggling with job.  Patient reports struggling with her job due to not enjoying the work and disorganization with the training.  Patient reports having to go into the office for training but states it can all be done at home.  She reports having panic attacks near daily when she goes into the office for training.  She reports actively looking for a new job for these reasons.  She is not having panic attacks at home or in other areas outside of work.  Duloxetine also recently increased to 80 mg daily.  Patient's blood pressure has been running high.  She has not been in psychotherapy.  She would be willing to reengage in therapy.  She has asked for accommodations to be able to work and do her training from home.    Per [facility], Dr. Vito Crawford, during today's team-based visit:  The reason for seeking services at this time is: Last seen 12/31/2021 and PCP requested to be reseen by [facility]. Things were stable and going well for her. She changed jobs because there were more advancement opportunities but after changing in October 2022 doing 's dependency claims. However, she is not liking it and not finding it gratifying. She is actively looking for something new. She has been having more panic attacks (shaky, feeling hot, hard time breathing). She is having them  more often when she is in the office feeling like she is not following the training as well as everyone else. She said her PCP increased her duloxetine and wanted her to meet with us again to review her medications. The problem(s) began getting worse in approximately October 2022.      Patient has attempted to resolve these concerns in the past through medication.    Past Psychiatric Med Trials:  Wellbutrin XL - started making her feel weird and shaky  Duloxetine -current  Hydroxyzine  prozac - not effective  celexa - still felt pretty anxious, not effective after awhile  zoloft - started in 2012, doesn't remember much about why or what  effexor-xr    Psychiatric ROS:  Екатеирна Ramon reports mood has been: Relatively stable  Anxiety has been: Reports relatively stable outside of work, see HPI above  Sleep has been: Can continue trazodone  Yi sxs: None  Psychosis sxs: None  ADHD/ADD sxs: N/A  PTSD sxs: Stable  PHQ9 and GAD7 scores were reviewed today if completed.   Medication side effects: Denies  Current stressors include: Symptoms  Coping mechanisms and supports include: Family, Hobbies and Friends    Current medications include:   Current Outpatient Medications   Medication Sig     busPIRone (BUSPAR) 10 MG tablet Take 1 tablet (10 mg) by mouth 2 times daily     DULoxetine 40 MG CPEP Take 80 mg by mouth daily     hydrOXYzine (ATARAX) 10 MG tablet Take 1-3 tablets (10-30 mg) by mouth 3 times daily as needed (anxiety, sleep)     methocarbamol (ROBAXIN) 500 MG tablet Take 1 tablet (500 mg) by mouth 4 times daily as needed for muscle spasms     omeprazole (PRILOSEC) 20 MG DR capsule Take 1 capsule (20 mg) by mouth every morning (before breakfast)     SUMAtriptan (IMITREX) 25 MG tablet Take 1-2 tablets (25-50 mg) by mouth at onset of headache for migraine     traZODone (DESYREL) 50 MG tablet Take 1-2 tablets ( mg) by mouth nightly as needed for sleep     No current facility-administered medications for this  visit.       Past Medical/Surgical History:  Past Medical History:   Diagnosis Date     Family history of diabetes mellitus      Hypertension      Migraine with aura and without status migrainosus, not intractable 11/2/2016     PID (acute pelvic inflammatory disease) 5/2010      has a past medical history of Family history of diabetes mellitus, Hypertension, Migraine with aura and without status migrainosus, not intractable (11/2/2016), and PID (acute pelvic inflammatory disease) (5/2010).    She has no past medical history of Cancer (H), Cerebral infarction (H), Congestive heart failure (H), Congestive heart failure, unspecified, COPD (chronic obstructive pulmonary disease) (H), Depressive disorder, History of blood transfusion, Thyroid disease, or Uncomplicated asthma.    Social History:  Reviewed. No changes to social history except as noted above in HPI.    Vital Signs:   None. This is phone/video visit.     Labs:  Most recent laboratory results reviewed and no new labs.     Review of Systems:  10 systems (general, cardiovascular, respiratory, eyes, ENT, endocrine, GI, , M/S, neurological) were reviewed. Most pertinent finding(s) is/are: denies fever, cough, headaches, shortness of breath, chest pain, N/V, constipation/diarrhea, trouble urinating, aches and pains. The remaining systems are all unremarkable.    Mental Status Examination (limited as this is by phone/video):  Appearance: Awake, alert, appears stated age, no acute distress, well-groomed   Attitude:  cooperative, pleasant   Motor: No gross abnormalities observed via video, not formally tested   Oriented to:  person, place, time, and situation  Attention Span and Concentration:  normal  Speech:  clear, coherent, regular rate, rhythm, and volume  Language: intact  Mood: Good outside of work  Affect:  appropriate and in normal range and mood congruent  Associations:  no loose associations  Thought Process:  logical, linear and goal oriented  Thought  Content:  no evidence of suicidal ideation or homicidal ideation, no evidence of psychotic thought, no auditory hallucinations present and no visual hallucinations present  Recent and Remote Memory:  Intact to interview. Not formally assessed. No amnesia.  Fund of Knowledge: appropriate  Insight:  good  Judgment:  intact, adequate for safety  Impulse Control:  intact    Suicide Risk Assessment:  Today Екатерина Ramon reports no suicidal ideation. Based on all available evidence including the factors cited above, Екатерина Ramon does not appear to be at imminent risk for self-harm, does not meet criteria for a 72-hr hold, and therefore remains appropriate for ongoing outpatient level of care.  A thorough assessment of risk factors related to suicide and self-harm have been reviewed and are noted above. The patient convincingly denies suicidality on several occasions. Local community safety resources reviewed for patient to use if needed. There was no deceit detected, and the patient presented in a manner that was believable.     DSM5 Diagnosis:  Mood Disorder, Unspecified (MDD vs Adjustment Disorder vs dysthymia)  300.02 (F41.1) Generalized Anxiety Disorder  Insomnia, unspecified    Medical comorbidities include:   Patient Active Problem List    Diagnosis Date Noted     BERENICE (generalized anxiety disorder) 12/27/2022     Priority: Medium     Bunion, left 09/22/2022     Priority: Medium     Added automatically from request for surgery [id number]       S/P laparoscopic sleeve gastrectomy 07/07/2021     Priority: Medium     1/2021 Dr. Emil alves 232lb (BMI 42)        IUD (intrauterine device) in place 12/13/2019     Priority: Medium     Mirena placed 12/13/2019         Menorrhagia with regular cycle 11/15/2019     Priority: Medium     Moderate major depression (H) 11/15/2019     Priority: Medium     Psychophysiological insomnia 11/15/2019     Priority: Medium     Migraine with aura and without status migrainosus, not  intractable 11/02/2016     Priority: Medium     Chronic bilateral thoracic back pain 08/10/2016     Priority: Medium     Chronic midline low back pain without sciatica 08/10/2016     Priority: Medium     CTS (carpal tunnel syndrome) 04/27/2015     Priority: Medium     Hypertension goal BP (blood pressure) < 140/90 02/04/2015     Priority: Medium     Family history of diabetes mellitus 02/22/2012     Priority: Medium     Counseling for parent-child problem 08/08/2011     Priority: Medium     Dysthymic disorder 08/08/2011     Priority: Medium     CARDIOVASCULAR SCREENING; LDL GOAL LESS THAN 160 10/31/2010     Priority: Medium       Psychosocial & Contextual Factors: see HPI above    Assessment:  From Intake, 11/04/2021:  Екатерина Ramon is a 36-year-old female with past psychiatric history including depression and anxiety who presents today for psychiatric evaluation.  Patient has had some increased psychosocial stressors recently and feels like she has not been functioning optimally.  She does report that a lot of her current anxiety and mood changes are related to her work situation.  She is looking for a new job.  Hopes to go back to her previous work location.  Has had a few past psychiatric medication trials.  Nothing really stood out to her as being helpful.  She thinks duloxetine has been somewhat helpful.  Dose was just increased and might need some more time.  We will start buspirone as an augmentation to her duloxetine and see if it might further help with anxiety.  Since much of her current worsening mental health is related to her work situation, I do think it will be quite a bit better when she is out of her current environment.  She was also encouraged to engage in individual psychotherapy for additional support and ongoing development of nonpharmacologic coping skills and strategies.  She has an appointment in January to reestablish with a therapist.  No acute safety concerns or SI today.  No  problematic drug or alcohol use.    12/31/2021:  Patient overall has been doing quite well.  Changing jobs has significantly improved her mental health.  Mood more stable.  Anxiety much improved.  Tolerating buspirone well.  Sleeping better.  Discussed staying on buspirone for at least another 6 months, and especially through the winter, before attempting a taper off the medication.  I think it could be reasonable to try tapering off if patient continues to do very well and have reduced psychosocial stressors as she does now.  Could also consider increasing the dose if anxiety or to start worsening again.  No medication changes today.  Symptoms likely to further improve once she starts individual psychotherapy this January.  Due to to improvement of patient symptoms and stability with no medication changes, patient's care will be returned back to primary care provider for ongoing management.  No acute safety concerns.  No problematic drug or alcohol use.    1/27/2023:  Patient overall reports doing well apart from work.  Patient is currently back in a work situation that is causing a lot of distress.  Completion of accommodation forms deferred at this time since we do not know patient well (have only met with patient 3 times now, with last visit being 13 months ago).  Patient given additional tools to manage her acute anxiety while at work.  Patient agreeable to a trial with low-dose alprazolam to see if that is helpful for her symptoms.  We also started trial with propranolol to take as needed for physical anxiety symptoms as well to decrease feeling shaky, racing heart, possibly some of the sweating she encounters.  I also wonder if her increased dose of duloxetine could be worsening some of her symptoms.  Duloxetine side effects can include increased anxiety, feeling on edge, excessive sweating, etc.  Patient's blood pressure is also been higher lately.  Patient's high blood pressure could also be worsening  anxiety and higher stress situations.  We will decrease duloxetine back down to 60 mg daily.  Instead, we will increase buspirone to 20 mg twice daily for anxiety as well.  Patient will have multiple tools to help manage her anxiety.  Patient was also strongly encouraged to get back into psychotherapy to further manage her symptoms.  Since things are otherwise going so well for the patient, I encouraged her to continue to present to work in the office and utilize these new tools.  By getting back into therapy, I am hopeful she will develop additional coping skills and strategies to manage the stress and discomfort she is feeling while at work.  Work situations have seemed to historically cause quite a bit of distress for the patient.  I do think this could potentially provide for additional growth opportunities and further increase distress tolerance.  No acute safety concerns.  No SI.  No problematic drug or alcohol use.    Medication side effects and alternatives were reviewed. Health promotion activities recommended and reviewed today. All questions addressed. Education and counseling completed regarding risks and benefits of medications and psychotherapy options. Recommend therapy for additional support.     Treatment Plan:    Decrease duloxetine to 60 mg daily for mood, anxiety (40 + 20). Continue to monitor blood pressure.     Increase BusPar/buspirone to 20 mg twice daily for anxiety and to augment duloxetine.     Start alprazolam/Xanax 0.25-0.5 mg twice daily as needed for severe anxiety/panic symptoms.     Start propranolol 20-40 mg twice daily as needed for anxiety.     Continue trazodone  mg at bedtime as needed for sleep.     Continue hydroxyzine 10-30 mg three times daily as needed for anxiety, sleep.     We will continue to assess which medications are the most effective and best tolerated to be able to discontinue others that are less effective.      Continue all other cares per primary care  provider.     Continue all other medications as reviewed per electronic medical record today.     Safety plan reviewed. To the Emergency Department as needed or call after hours crisis line at [phone] or [phone]. [place] Crisis Text Line. Text [place] to 270955 or Suicide LifeLine Chat: suicidepreventionlifeline.org/chat    Strongly recommend re-engaging in psychotherapy for additional support and ongoing development of nonpharmacologic coping skills and strategies.     Schedule an appointment with me in 3-4 weeks or sooner as needed. Call [facility] at [phone] to schedule.    Follow up with primary care provider as planned or for acute medical concerns.    Call the psychiatric nurse line with medication questions or concerns at [phone].    TSBhart may be used to communicate with your provider, but this is not intended to be used for emergencies.    Risks of benzodiazepine (Ativan, Xanax, Klonopin, Valium, etc) use including, but not limited to, sedation, tolerance, risk for addiction/dependence. Do not drink alcohol while taking benzodiazepines due to risk of trouble breathing and potential death. Do not drive or operate heavy machinery until it is known how the drug affects you. Discuss with physician or pharmacist before ever taking a benzodiazepine with a narcotic/opioid pain medication.     Administrative Billing:   Phone Call/Video Duration: 20 Minutes  Start: 3:04p  Stop: 3:24p    Patient Status:  Patient will continue to be seen for ongoing consultation and stabilization.    Signed:   Guerline Wagner DO  [facility]    Disclaimer: This note consists of symbols derived from keyboarding, dictation and/or voice recognition software. As a result, there may be errors in the script that have gone undetected. Please consider this when interpreting information found in this chart.
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
## 2023-01-27 NOTE — Clinical Note
Please call this patient to get them scheduled for a follow-up visit in 3-4 weeks. Please schedule with me and the [facility] [place]. Thanks!

## 2023-01-29 NOTE — PATIENT INSTRUCTIONS
Treatment Plan:  Decrease duloxetine to 60 mg daily for mood, anxiety (40 + 20). Continue to monitor blood pressure.   Increase BusPar/buspirone to 20 mg twice daily for anxiety and to augment duloxetine.   Start alprazolam/Xanax 0.25-0.5 mg twice daily as needed for severe anxiety/panic symptoms.   Start propranolol 20-40 mg twice daily as needed for anxiety.   Continue trazodone  mg at bedtime as needed for sleep.   Continue hydroxyzine 10-30 mg three times daily as needed for anxiety, sleep.   We will continue to assess which medications are the most effective and best tolerated to be able to discontinue others that are less effective.    Continue all other cares per primary care provider.   Continue all other medications as reviewed per electronic medical record today.   Safety plan reviewed. To the Emergency Department as needed or call after hours crisis line at [phone] or [phone]. [place] Crisis Text Line. Text [place] to 473931 or Suicide LifeLine Chat: suicidepreventionlifeline.org/chat  Strongly recommend re-engaging in psychotherapy for additional support and ongoing development of nonpharmacologic coping skills and strategies.   Schedule an appointment with me in 3-4 weeks or sooner as needed. Call [facility] at [phone] to schedule.  Follow up with primary care provider as planned or for acute medical concerns.  Call the psychiatric nurse line with medication questions or concerns at [phone].  MyChart may be used to communicate with your provider, but this is not intended to be used for emergencies.    Risks of benzodiazepine (Ativan, Xanax, Klonopin, Valium, etc) use including, but not limited to, sedation, tolerance, risk for addiction/dependence. Do not drink alcohol while taking benzodiazepines due to risk of trouble breathing and potential death. Do not drive or operate heavy machinery until it is known how the drug affects you. Discuss with physician or  pharmacist before ever taking a benzodiazepine with a narcotic/opioid pain medication.

## 2023-01-30 ENCOUNTER — TELEPHONE (OUTPATIENT)
Dept: FAMILY MEDICINE | Facility: CLINIC | Age: 38
End: 2023-01-30
Payer: COMMERCIAL

## 2023-01-30 NOTE — TELEPHONE ENCOUNTER
Reason for Call:  Form, our goal is to have forms completed with 72 hours, however, some forms may require a visit or additional information.    Type of letter, form or note:  medical documentation     Who is the form from?: Patient    Where did the form come from: form was faxed in    What clinic location was the form placed at?: [facility]    Where the form was placed: Given to physician    What number is listed as a contact on the form?:            Call taken on 1/30/2023 at 1:07 PM by Alice Boss

## 2023-01-31 ENCOUNTER — TELEPHONE (OUTPATIENT)
Dept: FAMILY MEDICINE | Facility: CLINIC | Age: 38
End: 2023-01-31

## 2023-01-31 NOTE — LETTER
January 31, 2023    To  Екатерина Ramon  [address]  [place] [address]    Your team at [facility] cares about your health. We have reviewed your chart and based on our findings; we are making the following recommendations to better manage your health.     You are in particular need of attention regarding the following:     HYPERTENSION FOLLOW UP: Office Visit    If you have already completed these items, please contact the clinic via phone or   MyChart so your care team can review and update your records. Thank you for   choosing [facility] for your healthcare needs. For any questions,   concerns, or to schedule an appointment please contact our clinic.    Healthy Regards,      Your [facility] Care Team

## 2023-01-31 NOTE — LETTER
March 8, 2023    To  Екатерина Ramon  [address]  [place] [address]    Your team at [facility] cares about your health. We have reviewed your chart and based on our findings; we are making the following recommendations to better manage your health.     You are in particular need of attention regarding the following:     Schedule an office visit with your PRIMARY CARE PHYSICIAN for mental health follow-up.  HYPERTENSION FOLLOW UP: Office Visit    If you have already completed these items, please contact the clinic via phone or   [employer] so your care team can review and update your records. Thank you for   choosing [facility] for your healthcare needs. For any questions,   concerns, or to schedule an appointment please contact our clinic.    Healthy Regards,      Your [facility] Care Team

## 2023-01-31 NOTE — TELEPHONE ENCOUNTER
Called and notified patient that this was done she wanted it sent to her e-mail. [email or web address].    Notified patient if she doesn't receive this by tomorrow to call back and we can get a copy and get it to the first floor  for her.

## 2023-01-31 NOTE — TELEPHONE ENCOUNTER
Patient Quality Outreach    Patient is due for the following:   Hypertension -  Hypertension follow-up visit  Depression  -  Depression follow-up visit  Chronic Opioid Use -  Urine Drug Screen    Next Steps:   Schedule a office visit for hypertension    Type of outreach:    Sent [employer] message.      Questions for provider review:    None     Alicia Knox, CMA

## 2023-02-10 ENCOUNTER — VIRTUAL VISIT (OUTPATIENT)
Dept: PSYCHOLOGY | Facility: CLINIC | Age: 38
End: 2023-02-10
Attending: PSYCHIATRY & NEUROLOGY
Payer: COMMERCIAL

## 2023-02-10 DIAGNOSIS — F41.1 GENERALIZED ANXIETY DISORDER WITH PANIC ATTACKS: Primary | ICD-10-CM

## 2023-02-10 DIAGNOSIS — F41.0 GENERALIZED ANXIETY DISORDER WITH PANIC ATTACKS: Primary | ICD-10-CM

## 2023-02-10 PROCEDURE — 90791 PSYCH DIAGNOSTIC EVALUATION: CPT | Mod: VID

## 2023-02-10 ASSESSMENT — ANXIETY QUESTIONNAIRES
7. FEELING AFRAID AS IF SOMETHING AWFUL MIGHT HAPPEN: MORE THAN HALF THE DAYS
GAD7 TOTAL SCORE: 19
2. NOT BEING ABLE TO STOP OR CONTROL WORRYING: NEARLY EVERY DAY
GAD7 TOTAL SCORE: 19
4. TROUBLE RELAXING: NEARLY EVERY DAY
5. BEING SO RESTLESS THAT IT IS HARD TO SIT STILL: MORE THAN HALF THE DAYS
IF YOU CHECKED OFF ANY PROBLEMS ON THIS QUESTIONNAIRE, HOW DIFFICULT HAVE THESE PROBLEMS MADE IT FOR YOU TO DO YOUR WORK, TAKE CARE OF THINGS AT HOME, OR GET ALONG WITH OTHER PEOPLE: EXTREMELY DIFFICULT
7. FEELING AFRAID AS IF SOMETHING AWFUL MIGHT HAPPEN: MORE THAN HALF THE DAYS
3. WORRYING TOO MUCH ABOUT DIFFERENT THINGS: NEARLY EVERY DAY
1. FEELING NERVOUS, ANXIOUS, OR ON EDGE: NEARLY EVERY DAY
8. IF YOU CHECKED OFF ANY PROBLEMS, HOW DIFFICULT HAVE THESE MADE IT FOR YOU TO DO YOUR WORK, TAKE CARE OF THINGS AT HOME, OR GET ALONG WITH OTHER PEOPLE?: EXTREMELY DIFFICULT
GAD7 TOTAL SCORE: 19
6. BECOMING EASILY ANNOYED OR IRRITABLE: NEARLY EVERY DAY

## 2023-02-10 ASSESSMENT — COLUMBIA-SUICIDE SEVERITY RATING SCALE - C-SSRS
1. IN THE PAST MONTH, HAVE YOU WISHED YOU WERE DEAD OR WISHED YOU COULD GO TO SLEEP AND NOT WAKE UP?: NO
LETHALITY/MEDICAL DAMAGE CODE MOST LETHAL ACTUAL ATTEMPT: MODERATELY SEVERE PHYSICAL DAMAGE, MEDICAL HOSPITALIZATION AND LIKELY INTENSIVE CARE REQUIRED
TOTAL  NUMBER OF ACTUAL ATTEMPTS LIFETIME: 1
REASONS FOR IDEATION LIFETIME: MOSTLY TO END OR STOP THE PAIN (YOU COULDN'T GO ON LIVING WITH THE PAIN OR HOW YOU WERE FEELING)
ATTEMPT PAST THREE MONTHS: NO
LETHALITY/MEDICAL DAMAGE CODE MOST LETHAL POTENTIAL ATTEMPT: BEHAVIOR LIKELY TO RESULT IN INJURY BUT NOT LIKELY TO CAUSE DEATH
LETHALITY/MEDICAL DAMAGE CODE FIRST POTENTIAL ATTEMPT: BEHAVIOR LIKELY TO RESULT IN INJURY BUT NOT LIKELY TO CAUSE DEATH
ATTEMPT LIFETIME: YES
1. HAVE YOU WISHED YOU WERE DEAD OR WISHED YOU COULD GO TO SLEEP AND NOT WAKE UP?: YES
6. HAVE YOU EVER DONE ANYTHING, STARTED TO DO ANYTHING, OR PREPARED TO DO ANYTHING TO END YOUR LIFE?: NO
TOTAL  NUMBER OF INTERRUPTED ATTEMPTS LIFETIME: NO
2. HAVE YOU ACTUALLY HAD ANY THOUGHTS OF KILLING YOURSELF?: NO
TOTAL  NUMBER OF ABORTED OR SELF INTERRUPTED ATTEMPTS LIFETIME: NO
LETHALITY/MEDICAL DAMAGE CODE MOST RECENT POTENTIAL ATTEMPT: BEHAVIOR LIKELY TO RESULT IN INJURY BUT NOT LIKELY TO CAUSE DEATH
REASONS FOR IDEATION PAST MONTH: DOES NOT APPLY

## 2023-02-10 ASSESSMENT — PATIENT HEALTH QUESTIONNAIRE - PHQ9
SUM OF ALL RESPONSES TO PHQ QUESTIONS 1-9: 10
SUM OF ALL RESPONSES TO PHQ QUESTIONS 1-9: 10
10. IF YOU CHECKED OFF ANY PROBLEMS, HOW DIFFICULT HAVE THESE PROBLEMS MADE IT FOR YOU TO DO YOUR WORK, TAKE CARE OF THINGS AT HOME, OR GET ALONG WITH OTHER PEOPLE: VERY DIFFICULT
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
## 2023-02-20 ENCOUNTER — VIRTUAL VISIT (OUTPATIENT)
Dept: PSYCHOLOGY | Facility: CLINIC | Age: 38
End: 2023-02-20
Payer: COMMERCIAL

## 2023-02-20 DIAGNOSIS — F41.0 GENERALIZED ANXIETY DISORDER WITH PANIC ATTACKS: Primary | ICD-10-CM

## 2023-02-20 DIAGNOSIS — Z91.199 NO-SHOW FOR APPOINTMENT: ICD-10-CM

## 2023-02-20 DIAGNOSIS — F41.1 GENERALIZED ANXIETY DISORDER WITH PANIC ATTACKS: Primary | ICD-10-CM

## 2023-02-20 NOTE — PROGRESS NOTES
This patient was a no show for this scheduled appointment. Writer invited Pt over Amwell via preferred method and called pt. Pt did not answer phone and did not log onto the visit. Pt has another appointment scheduled for 2/27/23. Pt is encouraged to follow up with provider via telephone or MyChart if she would like to reschedule this appointment for later in the week pending provider availability.    Nancy Gifford, Psychotherapist Trainee, [facility]  2/20/23

## 2023-02-26 ASSESSMENT — ANXIETY QUESTIONNAIRES
GAD7 TOTAL SCORE: 13
7. FEELING AFRAID AS IF SOMETHING AWFUL MIGHT HAPPEN: MORE THAN HALF THE DAYS
IF YOU CHECKED OFF ANY PROBLEMS ON THIS QUESTIONNAIRE, HOW DIFFICULT HAVE THESE PROBLEMS MADE IT FOR YOU TO DO YOUR WORK, TAKE CARE OF THINGS AT HOME, OR GET ALONG WITH OTHER PEOPLE: EXTREMELY DIFFICULT
2. NOT BEING ABLE TO STOP OR CONTROL WORRYING: MORE THAN HALF THE DAYS
GAD7 TOTAL SCORE: 13
5. BEING SO RESTLESS THAT IT IS HARD TO SIT STILL: NOT AT ALL
7. FEELING AFRAID AS IF SOMETHING AWFUL MIGHT HAPPEN: MORE THAN HALF THE DAYS
4. TROUBLE RELAXING: MORE THAN HALF THE DAYS
GAD7 TOTAL SCORE: 13
8. IF YOU CHECKED OFF ANY PROBLEMS, HOW DIFFICULT HAVE THESE MADE IT FOR YOU TO DO YOUR WORK, TAKE CARE OF THINGS AT HOME, OR GET ALONG WITH OTHER PEOPLE?: EXTREMELY DIFFICULT
1. FEELING NERVOUS, ANXIOUS, OR ON EDGE: NEARLY EVERY DAY
6. BECOMING EASILY ANNOYED OR IRRITABLE: MORE THAN HALF THE DAYS
3. WORRYING TOO MUCH ABOUT DIFFERENT THINGS: MORE THAN HALF THE DAYS

## 2023-02-27 ENCOUNTER — MYC MEDICAL ADVICE (OUTPATIENT)
Dept: PSYCHIATRY | Facility: CLINIC | Age: 38
End: 2023-02-27
Payer: COMMERCIAL

## 2023-02-27 ENCOUNTER — VIRTUAL VISIT (OUTPATIENT)
Dept: PSYCHOLOGY | Facility: CLINIC | Age: 38
End: 2023-02-27
Payer: COMMERCIAL

## 2023-02-27 ENCOUNTER — MYC MEDICAL ADVICE (OUTPATIENT)
Dept: FAMILY MEDICINE | Facility: CLINIC | Age: 38
End: 2023-02-27
Payer: COMMERCIAL

## 2023-02-27 DIAGNOSIS — F41.1 GENERALIZED ANXIETY DISORDER WITH PANIC ATTACKS: Primary | ICD-10-CM

## 2023-02-27 DIAGNOSIS — F41.1 GAD (GENERALIZED ANXIETY DISORDER): ICD-10-CM

## 2023-02-27 DIAGNOSIS — F41.0 GENERALIZED ANXIETY DISORDER WITH PANIC ATTACKS: Primary | ICD-10-CM

## 2023-02-27 DIAGNOSIS — F51.04 PSYCHOPHYSIOLOGICAL INSOMNIA: ICD-10-CM

## 2023-02-27 DIAGNOSIS — F39 MOOD DISORDER (H): Primary | ICD-10-CM

## 2023-02-27 PROCEDURE — 90834 PSYTX W PT 45 MINUTES: CPT | Mod: VID

## 2023-02-27 NOTE — PROGRESS NOTES
"[facility]                                     Progress Note    Patient Name: Екатерина Ramon  Date: 2/27/23         Service Type: Individual      Session Start Time: 3:30pm  Session End Time: 4:15pm     Session Length: 45 minutes    Session #: 2    Attendees: Client attended alone    Service Modality:  Video Visit:      Provider verified identity through the following two step process.  Patient provided:  Patient is known previously to provider    Telemedicine Visit: The patient's condition can be safely assessed and treated via synchronous audio and visual telemedicine encounter.      Reason for Telemedicine Visit: Patient has requested telehealth visit and Patient convenience (e.g. access to timely appointments / distance to available provider)    Originating Site (Patient Location): Patient's home    Distant Site (Provider Location): Provider Remote Setting- Home Office    Consent:  The patient/guardian has verbally consented to: the potential risks and benefits of telemedicine (video visit) versus in person care; bill my insurance or make self-payment for services provided; and responsibility for payment of non-covered services.     Patient would like the video invitation sent by:  My Chart    Mode of Communication:  Video Conference via [facility]    Distant Location (Provider):  Off-site    As the provider I attest to compliance with applicable laws and regulations related to telemedicine.    DATA  Interactive Complexity: No  Crisis: No        Progress Since Last Session (Related to Symptoms / Goals / Homework):   Symptoms: No change in anxiety symptoms related to the panic she feels at work    Homework: Achieved / completed to satisfaction      Episode of Care Goals: created tx plan during session     Current / Ongoing Stressors and Concerns:   Pt presented to the appointment stating she was \"okay\" and \"am getting over a cold\". Pt felt well enough to complete session. Pt reports ongoing " anxiety related to working in-person at her job. Pt reports panic attacks got worse over last week due to finishing training at her job and still not being sure if her work is good. Pt reports she would like to build skills to help manage her anxiety at work and before hand. Provider and pt reviewed distress tolerance skills of TIPPs and 5-4-3-2-1 grounding method. Pt was given resources to practice these at home. During session pt asked if provider could write her a medical note for her work place stating she needed to work from home due to ongoing anxiety. Provider told pt she could not write that note as it is out of her scope of practice. Provider consulted with supervisor to double check and provider was reassured that was correct - provider shared that information with pt. Pt reports the skills were helpful and she is going to try them over the next week. Pt was encouraged to reach out to medical provider and psychiatrist to see if they would be able to write the note or see her for further medical evaluation.      Treatment Objective(s) Addressed in This Session:   identify panic thoughts and symptoms when working; identify triggers that lead to panic; discussion around grounding and distress tolerance skills       Intervention:   CBT: identify thinking patterns that lead to anxiety  DBT: develop new distress tolerance skill such as 5-4-3-2-1 and TIPPs skills    Assessments completed prior to visit:  The following assessments were completed by patient for this visit:  PROMIS 10-Global Health (all questions and answers displayed):   PROMIS 10 12/30/2021 1/21/2022 1/31/2022 1/27/2023 2/10/2023   In general, would you say your health is: Good Good Good Good Good   In general, would you say your quality of life is: Good Fair Good Good Good   In general, how would you rate your physical health? Fair Fair Good Good Good   In general, how would you rate your mental health, including your mood and your ability to  think? Fair Poor Fair Poor Poor   In general, how would you rate your satisfaction with your social activities and relationships? Good Good Good Good Good   In general, please rate how well you carry out your usual social activities and roles Fair Fair Good Fair Poor   To what extent are you able to carry out your everyday physical activities such as walking, climbing stairs, carrying groceries, or moving a chair? Completely Mostly Completely Completely Completely   How often have you been bothered by emotional problems such as feeling anxious, depressed or irritable? Often Often Sometimes Always Always   How would you rate your fatigue on average? Severe Very severe Severe Very severe Severe   How would you rate your pain on average?   0 = No Pain  to  10 = Worst Imaginable Pain 8 8 8 3 6   In general, would you say your health is: 3 3 3 3 3   In general, would you say your quality of life is: 3 2 3 3 3   In general, how would you rate your physical health? 2 2 3 3 3   In general, how would you rate your mental health, including your mood and your ability to think? 2 1 2 1 1   In general, how would you rate your satisfaction with your social activities and relationships? 3 3 3 3 3   In general, please rate how well you carry out your usual social activities and roles. (This includes activities at home, at work and in your community, and responsibilities as a parent, child, spouse, employee, friend, etc.) 2 2 3 2 1   To what extent are you able to carry out your everyday physical activities such as walking, climbing stairs, carrying groceries, or moving a chair? 5 4 5 5 5   In the past 7 days, how often have you been bothered by emotional problems such as feeling anxious, depressed, or irritable? 4 4 3 5 5   In the past 7 days, how would you rate your fatigue on average? 4 5 4 5 4   In the past 7 days, how would you rate your pain on average, where 0 means no pain, and 10 means worst imaginable pain? 8 8 8 3 6    Global Mental Health Score 10 8 11 8 8   Global Physical Health Score 11 9 12 13 13   PROMIS TOTAL - SUBSCORES 21 17 23 21 21   Some recent data might be hidden      Answers for HPI/ROS submitted by the patient on 2/26/2023  BERENICE 7 TOTAL SCORE: 13     ASSESSMENT: Current Emotional / Mental Status (status of significant symptoms):   Risk status (Self / Other harm or suicidal ideation)   Patient denies current fears or concerns for personal safety.   Patient denies current or recent suicidal ideation or behaviors.   Patient denies current or recent homicidal ideation or behaviors.   Patient denies current or recent self injurious behavior or ideation.   Patient denies other safety concerns.   Patient reports there has been no change in risk factors since their last session.     Patient reports there has been no change in protective factors since their last session.     Recommended that patient call 911 or go to the local ED should there be a change in any of these risk factors.     Appearance:   Appropriate    Eye Contact:   Good    Psychomotor Behavior: Normal    Attitude:   Cooperative    Orientation:   All   Speech    Rate / Production: Normal     Volume:  Normal    Mood:    Normal   Affect:    Appropriate    Thought Content:  Clear    Thought Form:  Coherent  Logical    Insight:    Fair      Medication Review:   No changes to current psychiatric medication(s)     Medication Compliance:   Yes     Changes in Health Issues:   None reported     Chemical Use Review:   Substance Use: Chemical use reviewed, no active concerns identified      Tobacco Use: No current tobacco use.      Diagnosis:  Generalized Anxiety Disorder with Panic Attacks    Collateral Reports Completed:   Not Applicable    PLAN: (Patient Tasks / Therapist Tasks / Other)  1. Practice TIPP skills during the week when feeling overwhelmed with work  2. Practice the 5-4-3-2-1 grounding method at work when feeling overwhelmed - pt is encouraged to  something that tastes sour to help grounding when feeling anxious at work  3. Pt is to continue journaling thoughts and feelings that lead to the panic attacks as well as situations outside of work that lead to ongoing and higher anxiety.      Aida Gifford, Psychotherapist Trainee, [facility]     2/28/23    This note has been reviewed and I agree with the plan of care. This note is co-signed by Barbara Hill MA, [facility] Supervisor, on: 2/28/2023                                                         ______________________________________________________________________    Individual Treatment Plan    Patient's Name: Екатерина Ramon  YOB: 1985    Date of Creation: 2/27/23  Date Treatment Plan Last Reviewed/Revised: 2/27/23    DSM5 Diagnoses: 300.02 (F41.1) Generalized Anxiety Disorder with panic attacks  Psychosocial / Contextual Factors: ongoing concerns with job and ability to perform needed tasks  PROMIS (reviewed every 90 days): 21    Referral / Collaboration:  Referral to another professional/service is not indicated at this time..    Anticipated number of session for this episode of care: 9-12 sessions  Anticipation frequency of session: Weekly  Anticipated Duration of each session: 38-52 minutes  Treatment plan will be reviewed in 90 days or when goals have been changed.       MeasurableTreatment Goal(s) related to diagnosis / functional impairment(s)  Goal 1: Patient will reduce anxiety symptoms by 50%    I will know I've met my goal when I am able to function well, less panic attacks, and no freaking out.      Objective #A  Patient will use at least 3 coping skills for anxiety management in the next 6 weeks.  Status: New - Date: 2/27/23     Intervention(s)  Therapist will teach distress tolerance and crisis intervention skills. Therapist will practice and role play with pt during session.    Objective #B Pt will recognize thoughts that lead to higher anxiety and implement thinking pattern  changes as necessary to reduce anxious thinking.  Status: New - Date: 2/27/23     Intervention(s)  Therapist will assign homework of thinking logs and thinking pattern recognition exercises and discuss the findings with pt during session to prompt insight into maladaptive thinking patterns..    Objective #C Learn and implement distress tolerance skills  Patient will use cognitive strategies identified in therapy to challenge anxious thoughts.  Status: New - Date: 2/27/23     Intervention(s)  Therapist will teach pt how to identify anxious thoughts and phrases to challenge the cognitions..      Patient has reviewed and agreed to the above plan.      Aida Gifford, Psychotherapist Trainee, [facility]    February 27, 2023

## 2023-02-28 NOTE — TELEPHONE ENCOUNTER
Received a Via message from patient requesting a note to miss work. RN acknowledged receipt of the message and that we will wait for providers response. Routing to provider for consideration.     Last visit: 1/27/2023  Next visit: None scheduled. Will request patient make an appointment via Via.     Last visit note from 1/27/2023:    1/27: Patient returns to [facility] psychiatric services after last being seen in December 2021.  Duloxetine is now at 80 mg daily, buspirone at 10 mg twice daily, and taking hydroxyzine and trazodone as needed.  Patient overall doing quite well outside of work.  Unfortunately, patient currently struggling with job.  Patient reports struggling with her job due to not enjoying the work and disorganization with the training.  Patient reports having to go into the office for training but states it can all be done at home.  She reports having panic attacks near daily when she goes into the office for training.  She reports actively looking for a new job for these reasons.  She is not having panic attacks at home or in other areas outside of work.  Duloxetine also recently increased to 80 mg daily.  Patient's blood pressure has been running high.  She has not been in psychotherapy.  She would be willing to reengage in therapy.  She has asked for accommodations to be able to work and do her training from home.     Per [facility], Dr. Vito Crawford, during today's team-based visit:  The reason for seeking services at this time is: Last seen 12/31/2021 and PCP requested to be reseen by [facility]. Things were stable and going well for her. She changed jobs because there were more advancement opportunities but after changing in October 2022 doing 's dependency claims. However, she is not liking it and not finding it gratifying. She is actively looking for something new. She has been having more panic attacks (shaky, feeling hot, hard time breathing). She is having them more often when she  is in the office feeling like she is not following the training as well as everyone else. She said her PCP increased her duloxetine and wanted her to meet with us again to review her medications. The problem(s) began getting worse in approximately October 2022.     Treatment Plan:    Decrease duloxetine to 60 mg daily for mood, anxiety (40 + 20). Continue to monitor blood pressure.     Increase BusPar/buspirone to 20 mg twice daily for anxiety and to augment duloxetine.     Start alprazolam/Xanax 0.25-0.5 mg twice daily as needed for severe anxiety/panic symptoms.     Start propranolol 20-40 mg twice daily as needed for anxiety.     Continue trazodone  mg at bedtime as needed for sleep.     Continue hydroxyzine 10-30 mg three times daily as needed for anxiety, sleep.     We will continue to assess which medications are the most effective and best tolerated to be able to discontinue others that are less effective.      Continue all other cares per primary care provider.     Continue all other medications as reviewed per electronic medical record today.     Safety plan reviewed. To the Emergency Department as needed or call after hours crisis line at [phone] or [phone]. [place] Crisis Text Line. Text [place] to 602415 or Suicide LifeLine Chat: suicidepreventionlifeline.org/chat    Strongly recommend re-engaging in psychotherapy for additional support and ongoing development of nonpharmacologic coping skills and strategies.     Schedule an appointment with me in 3-4 weeks or sooner as needed.

## 2023-03-08 NOTE — TELEPHONE ENCOUNTER
Patient Quality Outreach    Patient is due for the following:   Hypertension -  Hypertension follow-up visit  Depression  -  Depression follow-up visit  Chronic Opioid Use -  Urine Drug Screen    Next Steps:   Schedule a office visit for pain, hypertension, and depression    Type of outreach:    Sent letter.    Next Steps:  Reach out within 90 days via [employer].    Max number of attempts reached: Yes. Will try again in 90 days if patient still on fail list.    Questions for provider review:    None     Alicia Knox, CMA

## 2023-03-10 ENCOUNTER — VIRTUAL VISIT (OUTPATIENT)
Dept: PSYCHOLOGY | Facility: CLINIC | Age: 38
End: 2023-03-10
Payer: COMMERCIAL

## 2023-03-10 DIAGNOSIS — F41.1 GENERALIZED ANXIETY DISORDER WITH PANIC ATTACKS: Primary | ICD-10-CM

## 2023-03-10 DIAGNOSIS — F41.0 GENERALIZED ANXIETY DISORDER WITH PANIC ATTACKS: Primary | ICD-10-CM

## 2023-03-10 PROCEDURE — 90834 PSYTX W PT 45 MINUTES: CPT | Mod: VID

## 2023-03-10 NOTE — PROGRESS NOTES
[facility]                                     Progress Note    Patient Name: Екатерина Ramon  Date: 3/10/23         Service Type: Individual      Session Start Time: 3:30pm  Session End Time: 4:08pm     Session Length: 38 minutes    Session #: 3    Attendees: Client attended alone    Service Modality:  Video Visit:      Provider verified identity through the following two step process.  Patient provided:  Patient is known previously to provider    Telemedicine Visit: The patient's condition can be safely assessed and treated via synchronous audio and visual telemedicine encounter.      Reason for Telemedicine Visit: Patient has requested telehealth visit and Patient convenience (e.g. access to timely appointments / distance to available provider)    Originating Site (Patient Location): Patient's home    Distant Site (Provider Location): Provider Remote Setting- Home Office    Consent:  The patient/guardian has verbally consented to: the potential risks and benefits of telemedicine (video visit) versus in person care; bill my insurance or make self-payment for services provided; and responsibility for payment of non-covered services.     Patient would like the video invitation sent by:  My Chart    Mode of Communication:  Video Conference via Amwell    Distant Location (Provider):  Off-site    As the provider I attest to compliance with applicable laws and regulations related to telemedicine.    DATA  Interactive Complexity: No  Crisis: No        Progress Since Last Session (Related to Symptoms / Goals / Homework):   Symptoms: Improving anxiety symptoms due to social support network and reaching out for support when needing it    Homework: Achieved / completed to satisfaction      Episode of Care Goals: Satisfactory progress - ACTION (Actively working towards change); Intervened by reinforcing change plan / affirming steps taken     Current / Ongoing Stressors and Concerns:   Pt reported she  "is doing well and has been \"doing better since last session\". Pt reported minor conflict with boyfriend over the job she has now and reports it was resolved through effective communication. Pt reports she has been reaching out to her support system more often when she is feeling anxious and that has decreased the longevity of her anxious feelings. Pt reports she had a good meeting with her supervisor at work yesterday and he was supportive to her needs. Pt reports having a meeting with her long-term psychiatrist on 3/17/23 and will update writer about how things go with that. Provider introduced mindfulness to pt and pt reports she would like to use it before her work day begins. Pt denies any SI, SIB, or HI since last session. See below for tasks until next session.     Treatment Objective(s) Addressed in This Session:   use at least 3 coping skills for anxiety management in the next 1 weeks       Intervention:   CBT: identify thinking patterns that lead to anxiety  DBT: introduced and practiced mindfulness    Assessments completed prior to visit:  The following assessments were completed by patient for this visit:  PROMIS 10-Global Health (all questions and answers displayed):   PROMIS 10 12/30/2021 1/21/2022 1/31/2022 1/27/2023 2/10/2023   In general, would you say your health is: Good Good Good Good Good   In general, would you say your quality of life is: Good Fair Good Good Good   In general, how would you rate your physical health? Fair Fair Good Good Good   In general, how would you rate your mental health, including your mood and your ability to think? Fair Poor Fair Poor Poor   In general, how would you rate your satisfaction with your social activities and relationships? Good Good Good Good Good   In general, please rate how well you carry out your usual social activities and roles Fair Fair Good Fair Poor   To what extent are you able to carry out your everyday physical activities such as walking, " climbing stairs, carrying groceries, or moving a chair? Completely Mostly Completely Completely Completely   How often have you been bothered by emotional problems such as feeling anxious, depressed or irritable? Often Often Sometimes Always Always   How would you rate your fatigue on average? Severe Very severe Severe Very severe Severe   How would you rate your pain on average?   0 = No Pain  to  10 = Worst Imaginable Pain 8 8 8 3 6   In general, would you say your health is: 3 3 3 3 3   In general, would you say your quality of life is: 3 2 3 3 3   In general, how would you rate your physical health? 2 2 3 3 3   In general, how would you rate your mental health, including your mood and your ability to think? 2 1 2 1 1   In general, how would you rate your satisfaction with your social activities and relationships? 3 3 3 3 3   In general, please rate how well you carry out your usual social activities and roles. (This includes activities at home, at work and in your community, and responsibilities as a parent, child, spouse, employee, friend, etc.) 2 2 3 2 1   To what extent are you able to carry out your everyday physical activities such as walking, climbing stairs, carrying groceries, or moving a chair? 5 4 5 5 5   In the past 7 days, how often have you been bothered by emotional problems such as feeling anxious, depressed, or irritable? 4 4 3 5 5   In the past 7 days, how would you rate your fatigue on average? 4 5 4 5 4   In the past 7 days, how would you rate your pain on average, where 0 means no pain, and 10 means worst imaginable pain? 8 8 8 3 6   Global Mental Health Score 10 8 11 8 8   Global Physical Health Score 11 9 12 13 13   PROMIS TOTAL - SUBSCORES 21 17 23 21 21   Some recent data might be hidden      Answers for HPI/ROS submitted by the patient on 2/26/2023  BERENICE 7 TOTAL SCORE: 13     ASSESSMENT: Current Emotional / Mental Status (status of significant symptoms):   Risk status (Self / Other harm  or suicidal ideation)   Patient denies current fears or concerns for personal safety.   Patient denies current or recent suicidal ideation or behaviors.   Patient denies current or recent homicidal ideation or behaviors.   Patient denies current or recent self injurious behavior or ideation.   Patient denies other safety concerns.   Patient reports there has been no change in risk factors since their last session.     Patient reports there has been no change in protective factors since their last session.     Recommended that patient call 911 or go to the local ED should there be a change in any of these risk factors.     Appearance:   Appropriate    Eye Contact:   Good    Psychomotor Behavior: Normal    Attitude:   Cooperative    Orientation:   All   Speech    Rate / Production: Normal     Volume:  Normal    Mood:    Normal   Affect:    Appropriate    Thought Content:  Clear    Thought Form:  Coherent  Logical    Insight:    Fair      Medication Review:   No changes to current psychiatric medication(s)     Medication Compliance:   Yes     Changes in Health Issues:   None reported     Chemical Use Review:   Substance Use: Chemical use reviewed, no active concerns identified      Tobacco Use: No current tobacco use.      Diagnosis:  Generalized Anxiety Disorder with Panic Attacks    Collateral Reports Completed:   Not Applicable    PLAN: (Patient Tasks / Therapist Tasks / Other)  1. Pt is to practice one minute of mindfulness two times per day until next session and journal results.  2. Pt is to continue journaling thoughts and feelings that lead to the panic attacks as well as situations outside of work that lead to ongoing and higher anxiety.      Aida Gifford, Psychotherapist Trainee, [facility]     3/10/23    This note has been reviewed and I agree with the plan of care. This note is co-signed by Barbara Hill MA, [facility] Supervisor, on: 3/13/2023                                                          ______________________________________________________________________    Individual Treatment Plan    Patient's Name: Екатерина Ramon  YOB: 1985    Date of Creation: 2/27/23  Date Treatment Plan Last Reviewed/Revised: 3/10/23    DSM5 Diagnoses: 300.02 (F41.1) Generalized Anxiety Disorder with panic attacks  Psychosocial / Contextual Factors: ongoing concerns with job and ability to perform needed tasks  PROMIS (reviewed every 90 days): 21    Referral / Collaboration:  Referral to another professional/service is not indicated at this time..    Anticipated number of session for this episode of care: 9-12 sessions  Anticipation frequency of session: Weekly  Anticipated Duration of each session: 38-52 minutes  Treatment plan will be reviewed in 90 days or when goals have been changed.       MeasurableTreatment Goal(s) related to diagnosis / functional impairment(s)  Goal 1: Patient will reduce anxiety symptoms by 50%    I will know I've met my goal when I am able to function well, less panic attacks, and no freaking out.      Objective #A  Patient will use at least 3 coping skills for anxiety management in the next 6 weeks.  Status: Continued - Date(s):3/10/23     Intervention(s)  Therapist will teach distress tolerance and crisis intervention skills. Therapist will practice and role play with pt during session.    Objective #B Pt will recognize thoughts that lead to higher anxiety and implement thinking pattern changes as necessary to reduce anxious thinking.   Status: Continued - Date(s):3/10/23     Intervention(s)  Therapist will assign homework of thinking logs and thinking pattern recognition exercises and discuss the findings with pt during session to prompt insight into maladaptive thinking patterns..    Objective #C Learn and implement distress tolerance skills  Patient will use cognitive strategies identified in therapy to challenge anxious thoughts.   Status: Continued - Date(s):3/10/23      Intervention(s)  Therapist will teach pt how to identify anxious thoughts and phrases to challenge the cognitions..      Patient has reviewed and agreed to the above plan.      Aida Gifford, Psychotherapist Trainee, [facility]

## 2023-03-13 ENCOUNTER — MYC MEDICAL ADVICE (OUTPATIENT)
Dept: FAMILY MEDICINE | Facility: CLINIC | Age: 38
End: 2023-03-13

## 2023-03-13 ENCOUNTER — VIRTUAL VISIT (OUTPATIENT)
Dept: FAMILY MEDICINE | Facility: CLINIC | Age: 38
End: 2023-03-13
Payer: COMMERCIAL

## 2023-03-13 ENCOUNTER — MEDICAL CORRESPONDENCE (OUTPATIENT)
Dept: HEALTH INFORMATION MANAGEMENT | Facility: CLINIC | Age: 38
End: 2023-03-13

## 2023-03-13 DIAGNOSIS — F41.1 GAD (GENERALIZED ANXIETY DISORDER): ICD-10-CM

## 2023-03-13 DIAGNOSIS — E66.01 MORBID OBESITY (H): ICD-10-CM

## 2023-03-13 DIAGNOSIS — I10 HYPERTENSION GOAL BP (BLOOD PRESSURE) < 140/90: ICD-10-CM

## 2023-03-13 DIAGNOSIS — F41.0 PANIC ATTACK: Primary | ICD-10-CM

## 2023-03-13 DIAGNOSIS — F33.41 RECURRENT MAJOR DEPRESSIVE DISORDER, IN PARTIAL REMISSION (H): ICD-10-CM

## 2023-03-13 PROCEDURE — 99214 OFFICE O/P EST MOD 30 MIN: CPT | Mod: VID

## 2023-03-13 ASSESSMENT — ANXIETY QUESTIONNAIRES
GAD7 TOTAL SCORE: 16
4. TROUBLE RELAXING: SEVERAL DAYS
6. BECOMING EASILY ANNOYED OR IRRITABLE: NEARLY EVERY DAY
GAD7 TOTAL SCORE: 16
5. BEING SO RESTLESS THAT IT IS HARD TO SIT STILL: NOT AT ALL
8. IF YOU CHECKED OFF ANY PROBLEMS, HOW DIFFICULT HAVE THESE MADE IT FOR YOU TO DO YOUR WORK, TAKE CARE OF THINGS AT HOME, OR GET ALONG WITH OTHER PEOPLE?: EXTREMELY DIFFICULT
2. NOT BEING ABLE TO STOP OR CONTROL WORRYING: NEARLY EVERY DAY
IF YOU CHECKED OFF ANY PROBLEMS ON THIS QUESTIONNAIRE, HOW DIFFICULT HAVE THESE PROBLEMS MADE IT FOR YOU TO DO YOUR WORK, TAKE CARE OF THINGS AT HOME, OR GET ALONG WITH OTHER PEOPLE: EXTREMELY DIFFICULT
1. FEELING NERVOUS, ANXIOUS, OR ON EDGE: NEARLY EVERY DAY
3. WORRYING TOO MUCH ABOUT DIFFERENT THINGS: NEARLY EVERY DAY
GAD7 TOTAL SCORE: 16
7. FEELING AFRAID AS IF SOMETHING AWFUL MIGHT HAPPEN: NEARLY EVERY DAY
7. FEELING AFRAID AS IF SOMETHING AWFUL MIGHT HAPPEN: NEARLY EVERY DAY

## 2023-03-13 ASSESSMENT — ENCOUNTER SYMPTOMS: NERVOUS/ANXIOUS: 1

## 2023-03-13 NOTE — PATIENT INSTRUCTIONS
- send my forms for work  - schedule in-person appointment BP/annual exam  - change how you take propranolol, take 20 mg twice daily scheduled

## 2023-03-14 NOTE — TELEPHONE ENCOUNTER
Forms received from Request for Medical Documentation/ Accomodation form for Ghanshyam Dixon.  Forms placed in provider 'sign me' folder.  Please send form back to patient after completion.    FIDE Daily  [facility]

## 2023-03-15 ENCOUNTER — VIRTUAL VISIT (OUTPATIENT)
Dept: PSYCHOLOGY | Facility: CLINIC | Age: 38
End: 2023-03-15
Payer: COMMERCIAL

## 2023-03-15 DIAGNOSIS — F41.0 GENERALIZED ANXIETY DISORDER WITH PANIC ATTACKS: Primary | ICD-10-CM

## 2023-03-15 DIAGNOSIS — F41.1 GENERALIZED ANXIETY DISORDER WITH PANIC ATTACKS: Primary | ICD-10-CM

## 2023-03-15 PROCEDURE — 99207 PR NO BILLABLE SERVICE THIS VISIT: CPT | Mod: VID

## 2023-03-15 ASSESSMENT — PATIENT HEALTH QUESTIONNAIRE - PHQ9
SUM OF ALL RESPONSES TO PHQ QUESTIONS 1-9: 9
SUM OF ALL RESPONSES TO PHQ QUESTIONS 1-9: 9
10. IF YOU CHECKED OFF ANY PROBLEMS, HOW DIFFICULT HAVE THESE PROBLEMS MADE IT FOR YOU TO DO YOUR WORK, TAKE CARE OF THINGS AT HOME, OR GET ALONG WITH OTHER PEOPLE: VERY DIFFICULT

## 2023-03-15 NOTE — PROGRESS NOTES
[facility]                                     Progress Note    Patient Name: Екатерина Ramon  Date: 3/15/23         Service Type: Individual      Session Start Time: 3:30pm  Session End Time: 3:35pm     Session Length: 5 minutes    Session #: 4    Attendees: Client attended alone    Service Modality:  Video Visit:      Provider verified identity through the following two step process.  Patient provided:  Patient is known previously to provider    Telemedicine Visit: The patient's condition can be safely assessed and treated via synchronous audio and visual telemedicine encounter.      Reason for Telemedicine Visit: Patient has requested telehealth visit and Patient convenience (e.g. access to timely appointments / distance to available provider)    Originating Site (Patient Location): Patient's home    Distant Site (Provider Location): Provider Remote Setting- Home Office    Consent:  The patient/guardian has verbally consented to: the potential risks and benefits of telemedicine (video visit) versus in person care; bill my insurance or make self-payment for services provided; and responsibility for payment of non-covered services.     Patient would like the video invitation sent by:  My Chart    Mode of Communication:  Video Conference via Amwell    Distant Location (Provider):  Off-site    As the provider I attest to compliance with applicable laws and regulations related to telemedicine.    DATA  Interactive Complexity: No  Crisis: No        Progress Since Last Session (Related to Symptoms / Goals / Homework):   Symptoms: No change in current mental health symptoms    Homework: Achieved / completed to satisfaction      Episode of Care Goals: Satisfactory progress - ACTION (Actively working towards change); Intervened by reinforcing change plan / affirming steps taken     Current / Ongoing Stressors and Concerns:   Pt came to session sick and did a short check-in with provider. Pt reports  no changes in mental health symptoms from last session. Pt denies any SI, SIB, or HI. Pt reports she was able to connect with a new  Health provider who was able to fill out the form she needed to work from home and reports it has been a huge relief finally getting that completed. Pt reports she has not been able to do much otherwise due to being sick. Provider ended session after quick check in due to pt needing to rest and take care of her physical health.     Treatment Objective(s) Addressed in This Session:   use at least 3 coping skills for anxiety management in the next 1 weeks       Intervention:   CBT: identify thinking patterns that lead to anxiety  DBT: introduced and practiced mindfulness    Assessments completed prior to visit:  The following assessments were completed by patient for this visit:  PROMIS 10-Global Health (all questions and answers displayed):   PROMIS 10 12/30/2021 1/21/2022 1/31/2022 1/27/2023 2/10/2023   In general, would you say your health is: Good Good Good Good Good   In general, would you say your quality of life is: Good Fair Good Good Good   In general, how would you rate your physical health? Fair Fair Good Good Good   In general, how would you rate your mental health, including your mood and your ability to think? Fair Poor Fair Poor Poor   In general, how would you rate your satisfaction with your social activities and relationships? Good Good Good Good Good   In general, please rate how well you carry out your usual social activities and roles Fair Fair Good Fair Poor   To what extent are you able to carry out your everyday physical activities such as walking, climbing stairs, carrying groceries, or moving a chair? Completely Mostly Completely Completely Completely   How often have you been bothered by emotional problems such as feeling anxious, depressed or irritable? Often Often Sometimes Always Always   How would you rate your fatigue on average? Severe Very severe Severe  Very severe Severe   How would you rate your pain on average?   0 = No Pain  to  10 = Worst Imaginable Pain 8 8 8 3 6   In general, would you say your health is: 3 3 3 3 3   In general, would you say your quality of life is: 3 2 3 3 3   In general, how would you rate your physical health? 2 2 3 3 3   In general, how would you rate your mental health, including your mood and your ability to think? 2 1 2 1 1   In general, how would you rate your satisfaction with your social activities and relationships? 3 3 3 3 3   In general, please rate how well you carry out your usual social activities and roles. (This includes activities at home, at work and in your community, and responsibilities as a parent, child, spouse, employee, friend, etc.) 2 2 3 2 1   To what extent are you able to carry out your everyday physical activities such as walking, climbing stairs, carrying groceries, or moving a chair? 5 4 5 5 5   In the past 7 days, how often have you been bothered by emotional problems such as feeling anxious, depressed, or irritable? 4 4 3 5 5   In the past 7 days, how would you rate your fatigue on average? 4 5 4 5 4   In the past 7 days, how would you rate your pain on average, where 0 means no pain, and 10 means worst imaginable pain? 8 8 8 3 6   Global Mental Health Score 10 8 11 8 8   Global Physical Health Score 11 9 12 13 13   PROMIS TOTAL - SUBSCORES 21 17 23 21 21   Some recent data might be hidden      Answers for HPI/ROS submitted by the patient on 2/26/2023  BERENICE 7 TOTAL SCORE: 13     ASSESSMENT: Current Emotional / Mental Status (status of significant symptoms):   Risk status (Self / Other harm or suicidal ideation)   Patient denies current fears or concerns for personal safety.   Patient denies current or recent suicidal ideation or behaviors.   Patient denies current or recent homicidal ideation or behaviors.   Patient denies current or recent self injurious behavior or ideation.   Patient denies other safety  concerns.   Patient reports there has been no change in risk factors since their last session.     Patient reports there has been no change in protective factors since their last session.     Recommended that patient call 911 or go to the local ED should there be a change in any of these risk factors.     Appearance:   Appropriate    Eye Contact:   Good    Psychomotor Behavior: Normal    Attitude:   Cooperative    Orientation:   All   Speech    Rate / Production: Normal     Volume:  Normal    Mood:    Normal   Affect:    Appropriate    Thought Content:  Clear    Thought Form:  Coherent  Logical    Insight:    Fair      Medication Review:   No changes to current psychiatric medication(s)     Medication Compliance:   Yes     Changes in Health Issues:   None reported     Chemical Use Review:   Substance Use: Chemical use reviewed, no active concerns identified      Tobacco Use: No current tobacco use.      Diagnosis:  Generalized Anxiety Disorder with Panic Attacks    Collateral Reports Completed:   Not Applicable    PLAN: (Patient Tasks / Therapist Tasks / Other)  1. Pt is to practice one minute of mindfulness two times per day until next session and journal results.  2. Pt is to continue journaling thoughts and feelings that lead to the panic attacks as well as situations outside of work that lead to ongoing and higher anxiety.      Aida Gifford, Psychotherapist Trainee, [facility]     3/15/23    This note has been reviewed and I agree with the plan of care. This note is co-signed by Barbara Hill MA, [facility] Supervisor, on: 3/16/2023                                                         ______________________________________________________________________    Individual Treatment Plan    Patient's Name: Екатерина Ramon  YOB: 1985    Date of Creation: 2/27/23  Date Treatment Plan Last Reviewed/Revised: 3/10/23    DSM5 Diagnoses: 300.02 (F41.1) Generalized Anxiety Disorder with panic  attacks  Psychosocial / Contextual Factors: ongoing concerns with job and ability to perform needed tasks  PROMIS (reviewed every 90 days): 21    Referral / Collaboration:  Referral to another professional/service is not indicated at this time..    Anticipated number of session for this episode of care: 9-12 sessions  Anticipation frequency of session: Weekly  Anticipated Duration of each session: 38-52 minutes  Treatment plan will be reviewed in 90 days or when goals have been changed.       MeasurableTreatment Goal(s) related to diagnosis / functional impairment(s)  Goal 1: Patient will reduce anxiety symptoms by 50%    I will know I've met my goal when I am able to function well, less panic attacks, and no freaking out.      Objective #A  Patient will use at least 3 coping skills for anxiety management in the next 6 weeks.  Status: Continued - Date(s):3/10/23     Intervention(s)  Therapist will teach distress tolerance and crisis intervention skills. Therapist will practice and role play with pt during session.    Objective #B Pt will recognize thoughts that lead to higher anxiety and implement thinking pattern changes as necessary to reduce anxious thinking.   Status: Continued - Date(s):3/10/23     Intervention(s)  Therapist will assign homework of thinking logs and thinking pattern recognition exercises and discuss the findings with pt during session to prompt insight into maladaptive thinking patterns..    Objective #C Learn and implement distress tolerance skills  Patient will use cognitive strategies identified in therapy to challenge anxious thoughts.   Status: Continued - Date(s):3/10/23     Intervention(s)  Therapist will teach pt how to identify anxious thoughts and phrases to challenge the cognitions..      Patient has reviewed and agreed to the above plan.      Aida Gifford, Psychotherapist Trainee, [facility]

## 2023-03-17 NOTE — TELEPHONE ENCOUNTER
Emailed paper work as requested from pt.    Placed in stat scan in gold pod      Yaneth Herbert    [facility]

## 2023-03-22 ENCOUNTER — VIRTUAL VISIT (OUTPATIENT)
Dept: PSYCHOLOGY | Facility: CLINIC | Age: 38
End: 2023-03-22
Payer: COMMERCIAL

## 2023-03-22 DIAGNOSIS — F41.1 GENERALIZED ANXIETY DISORDER WITH PANIC ATTACKS: Primary | ICD-10-CM

## 2023-03-22 DIAGNOSIS — F41.0 GENERALIZED ANXIETY DISORDER WITH PANIC ATTACKS: Primary | ICD-10-CM

## 2023-03-22 PROCEDURE — 90832 PSYTX W PT 30 MINUTES: CPT | Mod: VID

## 2023-03-22 NOTE — PROGRESS NOTES
[facility]                                     Progress Note    Patient Name: Екатерина Ramon  Date: 3/22/23         Service Type: Individual      Session Start Time: 3:30pm  Session End Time: 3:52pm     Session Length: 22 minutes    Session #: 5    Attendees: Client attended alone    Service Modality:  Video Visit:      Provider verified identity through the following two step process.  Patient provided:  Patient is known previously to provider    Telemedicine Visit: The patient's condition can be safely assessed and treated via synchronous audio and visual telemedicine encounter.      Reason for Telemedicine Visit: Patient has requested telehealth visit and Patient convenience (e.g. access to timely appointments / distance to available provider)    Originating Site (Patient Location): Patient's home    Distant Site (Provider Location): Provider Remote Setting- Home Office    Consent:  The patient/guardian has verbally consented to: the potential risks and benefits of telemedicine (video visit) versus in person care; bill my insurance or make self-payment for services provided; and responsibility for payment of non-covered services.     Patient would like the video invitation sent by:  My Chart    Mode of Communication:  Video Conference via [facility]    Distant Location (Provider):  Off-site    As the provider I attest to compliance with applicable laws and regulations related to telemedicine.    DATA  Interactive Complexity: No  Crisis: No        Progress Since Last Session (Related to Symptoms / Goals / Homework):   Symptoms: Improving symptoms related to anxiety    Homework: Achieved / completed to satisfaction      Episode of Care Goals: Satisfactory progress - MAINTENANCE (Working to maintain change, with risk of relapse); Intervened by continuing to positively reinforce healthy behavior choice      Current / Ongoing Stressors and Concerns:   Pt reports things have been going well. Pt was  able to get the paper work needed to work from home. Pt reports anxiety has significantly decreased since being able to work from home and has continued her morning meditation routine to help manage anxiety throughout the day. Pt appears to have met majority of goals set forth in the current treatment plan. Pt would like to continue meeting once every three weeks to check-in and see how things are going before terminating services. Pt also reports some major life transitions coming up in the next few months and would like to have a space to process if needed. Pt denies any safety concerns since last session and will continue the plan outlined below.     Treatment Objective(s) Addressed in This Session:   use at least 3 coping skills for anxiety management in the next 2 weeks       Intervention:   CBT: identify thinking patterns that lead to anxiety  DBT: introduced and practiced mindfulness    Assessments completed prior to visit:  The following assessments were completed by patient for this visit:  PROMIS 10-Global Health (all questions and answers displayed):   PROMIS 10 12/30/2021 1/21/2022 1/31/2022 1/27/2023 2/10/2023   In general, would you say your health is: Good Good Good Good Good   In general, would you say your quality of life is: Good Fair Good Good Good   In general, how would you rate your physical health? Fair Fair Good Good Good   In general, how would you rate your mental health, including your mood and your ability to think? Fair Poor Fair Poor Poor   In general, how would you rate your satisfaction with your social activities and relationships? Good Good Good Good Good   In general, please rate how well you carry out your usual social activities and roles Fair Fair Good Fair Poor   To what extent are you able to carry out your everyday physical activities such as walking, climbing stairs, carrying groceries, or moving a chair? Completely Mostly Completely Completely Completely   How often have  you been bothered by emotional problems such as feeling anxious, depressed or irritable? Often Often Sometimes Always Always   How would you rate your fatigue on average? Severe Very severe Severe Very severe Severe   How would you rate your pain on average?   0 = No Pain  to  10 = Worst Imaginable Pain 8 8 8 3 6   In general, would you say your health is: 3 3 3 3 3   In general, would you say your quality of life is: 3 2 3 3 3   In general, how would you rate your physical health? 2 2 3 3 3   In general, how would you rate your mental health, including your mood and your ability to think? 2 1 2 1 1   In general, how would you rate your satisfaction with your social activities and relationships? 3 3 3 3 3   In general, please rate how well you carry out your usual social activities and roles. (This includes activities at home, at work and in your community, and responsibilities as a parent, child, spouse, employee, friend, etc.) 2 2 3 2 1   To what extent are you able to carry out your everyday physical activities such as walking, climbing stairs, carrying groceries, or moving a chair? 5 4 5 5 5   In the past 7 days, how often have you been bothered by emotional problems such as feeling anxious, depressed, or irritable? 4 4 3 5 5   In the past 7 days, how would you rate your fatigue on average? 4 5 4 5 4   In the past 7 days, how would you rate your pain on average, where 0 means no pain, and 10 means worst imaginable pain? 8 8 8 3 6   Global Mental Health Score 10 8 11 8 8   Global Physical Health Score 11 9 12 13 13   PROMIS TOTAL - SUBSCORES 21 17 23 21 21   Some recent data might be hidden      Answers for HPI/ROS submitted by the patient on 2/26/2023  BERENICE 7 TOTAL SCORE: 13     ASSESSMENT: Current Emotional / Mental Status (status of significant symptoms):   Risk status (Self / Other harm or suicidal ideation)   Patient denies current fears or concerns for personal safety.   Patient denies current or recent  suicidal ideation or behaviors.   Patient denies current or recent homicidal ideation or behaviors.   Patient denies current or recent self injurious behavior or ideation.   Patient denies other safety concerns.   Patient reports there has been no change in risk factors since their last session.     Patient reports there has been no change in protective factors since their last session.     Recommended that patient call 911 or go to the local ED should there be a change in any of these risk factors.     Appearance:   Appropriate    Eye Contact:   Good    Psychomotor Behavior: Normal    Attitude:   Cooperative    Orientation:   All   Speech    Rate / Production: Normal     Volume:  Normal    Mood:    Normal   Affect:    Appropriate    Thought Content:  Clear    Thought Form:  Coherent  Logical    Insight:    Fair      Medication Review:   No changes to current psychiatric medication(s)     Medication Compliance:   Yes     Changes in Health Issues:   None reported     Chemical Use Review:   Substance Use: Chemical use reviewed, no active concerns identified      Tobacco Use: No current tobacco use.      Diagnosis:  Generalized Anxiety Disorder with Panic Attacks    Collateral Reports Completed:   Not Applicable    PLAN: (Patient Tasks / Therapist Tasks / Other)  1. Pt is to practice one minute of mindfulness two times per day until next session and journal results.  2. Pt is to continue using coping skills to manage anxiety from work, panic attacks as well as situations outside of work that lead to ongoing and higher anxiety.      Aida Gifford, Psychotherapist Trainee, [facility]     3/22/23    This note has been reviewed and I agree with the plan of care. This note is co-signed by Barbara Hill MA, [facility] Supervisor, on: 3/23/2023                                                         ______________________________________________________________________    Individual Treatment Plan    Patient's Name: Екатерина JOHNSON  Tristen  YOB: 1985    Date of Creation: 2/27/23  Date Treatment Plan Last Reviewed/Revised: 3/10/23    DSM5 Diagnoses: 300.02 (F41.1) Generalized Anxiety Disorder with panic attacks  Psychosocial / Contextual Factors: ongoing concerns with job and ability to perform needed tasks  PROMIS (reviewed every 90 days): 21    Referral / Collaboration:  Referral to another professional/service is not indicated at this time..    Anticipated number of session for this episode of care: 9-12 sessions  Anticipation frequency of session: Weekly  Anticipated Duration of each session: 38-52 minutes  Treatment plan will be reviewed in 90 days or when goals have been changed.       MeasurableTreatment Goal(s) related to diagnosis / functional impairment(s)  Goal 1: Patient will reduce anxiety symptoms by 50%    I will know I've met my goal when I am able to function well, less panic attacks, and no freaking out.      Objective #A  Patient will use at least 3 coping skills for anxiety management in the next 6 weeks.  Status: Continued - Date(s):3/10/23     Intervention(s)  Therapist will teach distress tolerance and crisis intervention skills. Therapist will practice and role play with pt during session.    Objective #B Pt will recognize thoughts that lead to higher anxiety and implement thinking pattern changes as necessary to reduce anxious thinking.   Status: Continued - Date(s):3/10/23     Intervention(s)  Therapist will assign homework of thinking logs and thinking pattern recognition exercises and discuss the findings with pt during session to prompt insight into maladaptive thinking patterns..    Objective #C Learn and implement distress tolerance skills  Patient will use cognitive strategies identified in therapy to challenge anxious thoughts.   Status: Continued - Date(s):3/10/23     Intervention(s)  Therapist will teach pt how to identify anxious thoughts and phrases to challenge the cognitions..      Patient  has reviewed and agreed to the above plan.      Aida Gifford, Psychotherapist Trainee, [facility]

## 2023-04-04 ENCOUNTER — MYC MEDICAL ADVICE (OUTPATIENT)
Dept: PODIATRY | Facility: CLINIC | Age: 38
End: 2023-04-04
Payer: COMMERCIAL

## 2023-04-04 NOTE — CONFIDENTIAL NOTE
interested in getting Left foot Bunion Surgery.  Last Xray was in Sept 2022  Does she need to see you again?      Carlita Handy, CMA

## 2023-04-19 ASSESSMENT — ENCOUNTER SYMPTOMS
HEMATOCHEZIA: 0
HEMATURIA: 0
SHORTNESS OF BREATH: 0
SORE THROAT: 0
JOINT SWELLING: 0
FREQUENCY: 0
CHILLS: 0
PARESTHESIAS: 0
EYE PAIN: 0
HEARTBURN: 0
WEAKNESS: 0
PALPITATIONS: 0
FEVER: 0
CONSTIPATION: 0
HEADACHES: 1
NERVOUS/ANXIOUS: 1
BREAST MASS: 0
NAUSEA: 0
MYALGIAS: 0
COUGH: 0
DYSURIA: 0
DIZZINESS: 0
ARTHRALGIAS: 0
DIARRHEA: 0
ABDOMINAL PAIN: 0

## 2023-04-19 ASSESSMENT — PATIENT HEALTH QUESTIONNAIRE - PHQ9
10. IF YOU CHECKED OFF ANY PROBLEMS, HOW DIFFICULT HAVE THESE PROBLEMS MADE IT FOR YOU TO DO YOUR WORK, TAKE CARE OF THINGS AT HOME, OR GET ALONG WITH OTHER PEOPLE: VERY DIFFICULT
SUM OF ALL RESPONSES TO PHQ QUESTIONS 1-9: 12
SUM OF ALL RESPONSES TO PHQ QUESTIONS 1-9: 12

## 2023-04-21 ENCOUNTER — PATIENT OUTREACH (OUTPATIENT)
Dept: CARE COORDINATION | Facility: CLINIC | Age: 38
End: 2023-04-21

## 2023-04-21 ENCOUNTER — OFFICE VISIT (OUTPATIENT)
Dept: FAMILY MEDICINE | Facility: CLINIC | Age: 38
End: 2023-04-21
Payer: COMMERCIAL

## 2023-04-21 VITALS
HEART RATE: 82 BPM | DIASTOLIC BLOOD PRESSURE: 80 MMHG | TEMPERATURE: 97.8 F | OXYGEN SATURATION: 100 % | SYSTOLIC BLOOD PRESSURE: 120 MMHG

## 2023-04-21 DIAGNOSIS — Z13.220 SCREENING FOR LIPID DISORDERS: ICD-10-CM

## 2023-04-21 DIAGNOSIS — Z00.00 ROUTINE GENERAL MEDICAL EXAMINATION AT A HEALTH CARE FACILITY: Primary | ICD-10-CM

## 2023-04-21 DIAGNOSIS — Z13.29 SCREENING FOR THYROID DISORDER: ICD-10-CM

## 2023-04-21 DIAGNOSIS — F33.41 RECURRENT MAJOR DEPRESSIVE DISORDER, IN PARTIAL REMISSION (H): ICD-10-CM

## 2023-04-21 DIAGNOSIS — E66.01 MORBID OBESITY (H): ICD-10-CM

## 2023-04-21 DIAGNOSIS — F41.1 GAD (GENERALIZED ANXIETY DISORDER): ICD-10-CM

## 2023-04-21 DIAGNOSIS — Z98.84 STATUS POST LAPAROSCOPIC SLEEVE GASTRECTOMY: ICD-10-CM

## 2023-04-21 LAB
ALBUMIN SERPL BCG-MCNC: 4.7 G/DL (ref 3.5–5.2)
ALP SERPL-CCNC: 56 U/L (ref 35–104)
ALT SERPL W P-5'-P-CCNC: 24 U/L (ref 10–35)
ANION GAP SERPL CALCULATED.3IONS-SCNC: 12 MMOL/L (ref 7–15)
AST SERPL W P-5'-P-CCNC: 34 U/L (ref 10–35)
BASOPHILS # BLD AUTO: 0.1 10E3/UL (ref 0–0.2)
BASOPHILS NFR BLD AUTO: 1 %
BILIRUB SERPL-MCNC: 1 MG/DL
BUN SERPL-MCNC: 11.1 MG/DL (ref 6–20)
CALCIUM SERPL-MCNC: 9.5 MG/DL (ref 8.6–10)
CHLORIDE SERPL-SCNC: 102 MMOL/L (ref 98–107)
CHOLEST SERPL-MCNC: 209 MG/DL
CREAT SERPL-MCNC: 0.74 MG/DL (ref 0.51–0.95)
CREAT UR-MCNC: 139 MG/DL
DEPRECATED HCO3 PLAS-SCNC: 24 MMOL/L (ref 22–29)
EOSINOPHIL # BLD AUTO: 0.1 10E3/UL (ref 0–0.7)
EOSINOPHIL NFR BLD AUTO: 1 %
ERYTHROCYTE [DISTWIDTH] IN BLOOD BY AUTOMATED COUNT: 12.6 % (ref 10–15)
FERRITIN SERPL-MCNC: 74 NG/ML (ref 6–175)
GFR SERPL CREATININE-BSD FRML MDRD: >90 ML/MIN/1.73M2
GLUCOSE SERPL-MCNC: 77 MG/DL (ref 70–99)
HCT VFR BLD AUTO: 40.6 % (ref 35–47)
HDLC SERPL-MCNC: 80 MG/DL
HGB BLD-MCNC: 13.4 G/DL (ref 11.7–15.7)
LDLC SERPL CALC-MCNC: 116 MG/DL
LYMPHOCYTES # BLD AUTO: 2.1 10E3/UL (ref 0.8–5.3)
LYMPHOCYTES NFR BLD AUTO: 22 %
MAGNESIUM SERPL-MCNC: 2.1 MG/DL (ref 1.7–2.3)
MCH RBC QN AUTO: 31.9 PG (ref 26.5–33)
MCHC RBC AUTO-ENTMCNC: 33 G/DL (ref 31.5–36.5)
MCV RBC AUTO: 97 FL (ref 78–100)
MONOCYTES # BLD AUTO: 0.8 10E3/UL (ref 0–1.3)
MONOCYTES NFR BLD AUTO: 9 %
NEUTROPHILS # BLD AUTO: 6.6 10E3/UL (ref 1.6–8.3)
NEUTROPHILS NFR BLD AUTO: 68 %
NONHDLC SERPL-MCNC: 129 MG/DL
PLATELET # BLD AUTO: 310 10E3/UL (ref 150–450)
POTASSIUM SERPL-SCNC: 4 MMOL/L (ref 3.4–5.3)
PROT SERPL-MCNC: 7.8 G/DL (ref 6.4–8.3)
RBC # BLD AUTO: 4.2 10E6/UL (ref 3.8–5.2)
SODIUM SERPL-SCNC: 138 MMOL/L (ref 136–145)
TRIGL SERPL-MCNC: 67 MG/DL
TSH SERPL DL<=0.005 MIU/L-ACNC: 1.59 UIU/ML (ref 0.3–4.2)
VIT B12 SERPL-MCNC: 301 PG/ML (ref 232–1245)
WBC # BLD AUTO: 9.8 10E3/UL (ref 4–11)

## 2023-04-21 PROCEDURE — 99213 OFFICE O/P EST LOW 20 MIN: CPT | Mod: 25

## 2023-04-21 PROCEDURE — 82607 VITAMIN B-12: CPT

## 2023-04-21 PROCEDURE — 99395 PREV VISIT EST AGE 18-39: CPT

## 2023-04-21 PROCEDURE — 80061 LIPID PANEL: CPT

## 2023-04-21 PROCEDURE — 36415 COLL VENOUS BLD VENIPUNCTURE: CPT

## 2023-04-21 PROCEDURE — 99000 SPECIMEN HANDLING OFFICE-LAB: CPT

## 2023-04-21 PROCEDURE — 80050 GENERAL HEALTH PANEL: CPT

## 2023-04-21 PROCEDURE — 82728 ASSAY OF FERRITIN: CPT

## 2023-04-21 PROCEDURE — 83735 ASSAY OF MAGNESIUM: CPT

## 2023-04-21 PROCEDURE — 82525 ASSAY OF COPPER: CPT | Mod: 90

## 2023-04-21 PROCEDURE — 2894A URINE DRUG CONFIRMATION PANEL: CPT

## 2023-04-21 PROCEDURE — 84207 ASSAY OF VITAMIN B-6: CPT | Mod: 90

## 2023-04-21 PROCEDURE — 82306 VITAMIN D 25 HYDROXY: CPT

## 2023-04-21 PROCEDURE — 84590 ASSAY OF VITAMIN A: CPT | Mod: 90

## 2023-04-21 PROCEDURE — 84630 ASSAY OF ZINC: CPT | Mod: 90

## 2023-04-21 RX ORDER — PROPRANOLOL HYDROCHLORIDE 20 MG/1
20 TABLET ORAL 3 TIMES DAILY
Qty: 120 TABLET | Refills: 1 | Status: SHIPPED | OUTPATIENT
Start: 2023-04-21 | End: 2024-07-19 | Stop reason: ALTCHOICE

## 2023-04-21 ASSESSMENT — ENCOUNTER SYMPTOMS
ARTHRALGIAS: 0
PARESTHESIAS: 0
HEADACHES: 1
FREQUENCY: 0
CHILLS: 0
SHORTNESS OF BREATH: 0
NAUSEA: 0
PALPITATIONS: 0
MYALGIAS: 0
WEAKNESS: 0
BREAST MASS: 0
NERVOUS/ANXIOUS: 1
FEVER: 0
COUGH: 0
DIARRHEA: 0
SORE THROAT: 0
DYSURIA: 0
CONSTIPATION: 0
HEARTBURN: 0
HEMATURIA: 0
ABDOMINAL PAIN: 0
DIZZINESS: 0
JOINT SWELLING: 0
EYE PAIN: 0
HEMATOCHEZIA: 0

## 2023-04-21 NOTE — PATIENT INSTRUCTIONS
Increase propranolol to 20 mg three times daily, if you are tolerating fine, next refill I can send for a 60 mg once daily formulation.    Labs you should have checked every year after GI surgery -     Blood counts (CBC)  Blood glucose   Kidney function  Ferritin (iron)  Vitamin B12 and B6  Vitamin D  Vitamin A  Thiamine  Copper  Zinc  Magnesium    Start vitamin D supplement   Next PAP 2027        Preventive Health Recommendations  Female Ages 26 - 39  Yearly exam:   See your health care provider every year in order to  Review health changes.   Discuss preventive care.    Review your medicines if you your doctor has prescribed any.    Until age 30: Get a Pap test every three years (more often if you have had an abnormal result).    After age 30: Talk to your doctor about whether you should have a Pap test every 3 years or have a Pap test with HPV screening every 5 years.   You do not need a Pap test if your uterus was removed (hysterectomy) and you have not had cancer.  You should be tested each year for STDs (sexually transmitted diseases), if you're at risk.   Talk to your provider about how often to have your cholesterol checked.  If you are at risk for diabetes, you should have a diabetes test (fasting glucose).  Shots: Get a flu shot each year. Get a tetanus shot every 10 years.   Nutrition:   Eat at least 5 servings of fruits and vegetables each day.  Eat whole-grain bread, whole-wheat pasta and brown rice instead of white grains and rice.  Get adequate Calcium and Vitamin D.     Lifestyle  Exercise at least 150 minutes a week (30 minutes a day, 5 days of the week). This will help you control your weight and prevent disease.  Limit alcohol to one drink per day.  No smoking.   Wear sunscreen to prevent skin cancer.  See your dentist every six months for an exam and cleaning.

## 2023-04-21 NOTE — PROGRESS NOTES
SUBJECTIVE:   CC: Екатерина is an 37 year old who presents for preventive health visit.       4/21/2023     8:38 AM   Additional Questions   Roomed by Jayleen Cosme MA   Accompanied by N/A         4/21/2023     8:38 AM   Patient Reported Additional Medications   Patient reports taking the following new medications None     Patient has been advised of split billing requirements and indicates understanding: Yes  Healthy Habits:     Getting at least 3 servings of Calcium per day:  Yes    Bi-annual eye exam:  NO    Dental care twice a year:  Yes    Sleep apnea or symptoms of sleep apnea:  Daytime drowsiness    Diet:  Other    Frequency of exercise:  2-3 days/week    Duration of exercise:  30-45 minutes    Taking medications regularly:  Yes    Barriers to taking medications:  None    PHQ-2 Total Score: 3    Additional concerns today:  No    .      MENTAL HEALTH  Since last visit having good and bad days.  working from home, mood more stable. Less frequent panic attacks.  Work productivityimproved since being at home, management has agreed and is fully supportive of remote work. Will need med rec for work from home redone in June. Able to complete full work function remotely.     Increased propranolol to scheduled from prn to help anxiety, headaches, BP.  Since increase  NO dizziness, SOB, lightheadedness.  No change in sleep/fatigue.   No noticeable change in libido.     Mom hx of skin cancer, pt had atypical mole removed, benign results.   No family history of colon cancer.     Hx of bariatric surgery and vitamin D deficiency, currently taking any supplements and has not had lab follow-up in a while.     Has Mirena IUD, got in 2019.  Worked really well for the first few years but irregular spotting lately.     BP Readings from Last 6 Encounters:   04/21/23 120/80   01/17/23 (!) 133/105   12/27/22 134/72   09/22/22 (!) 138/90   05/13/22 139/87   04/28/22 122/86       Depression and Anxiety Follow-Up    How are you  doing with your depression since your last visit? Depends on day    How are you doing with your anxiety since your last visit?  Depends on day    Are you having other symptoms that might be associated with depression or anxiety? No    Have you had a significant life event? No     Do you have any concerns with your use of alcohol or other drugs? No    Going to therapy through [facility].     Social History     Tobacco Use     Smoking status: Former     Types: Cigarettes     Quit date: 2010     Years since quittin.9     Smokeless tobacco: Never   Vaping Use     Vaping status: Never Used   Substance Use Topics     Alcohol use: Yes     Comment: Rare     Drug use: Never         3/13/2023    10:09 AM 3/15/2023     9:45 AM 2023    12:24 PM   PHQ   PHQ-9 Total Score 12 9 12   Q9: Thoughts of better off dead/self-harm past 2 weeks Not at all Not at all Not at all         2/10/2023     7:34 AM 2023     3:36 PM 3/13/2023    10:11 AM   BERENICE-7 SCORE   Total Score 19 (severe anxiety) 13 (moderate anxiety) 16 (severe anxiety)   Total Score 19 13 16         2023    12:24 PM   Last PHQ-9   1.  Little interest or pleasure in doing things 1   2.  Feeling down, depressed, or hopeless 2   3.  Trouble falling or staying asleep, or sleeping too much 3   4.  Feeling tired or having little energy 3   5.  Poor appetite or overeating 0   6.  Feeling bad about yourself 3   7.  Trouble concentrating 0   8.  Moving slowly or restless 0   Q9: Thoughts of better off dead/self-harm past 2 weeks 0   PHQ-9 Total Score 12         3/13/2023    10:11 AM   BERENICE-7    1. Feeling nervous, anxious, or on edge 3   2. Not being able to stop or control worrying 3   3. Worrying too much about different things 3   4. Trouble relaxing 1   5. Being so restless that it is hard to sit still 0   6. Becoming easily annoyed or irritable 3   7. Feeling afraid, as if something awful might happen 3   BERENICE-7 Total Score 16   If you checked any  problems, how difficult have they made it for you to do your work, take care of things at home, or get along with other people? Extremely difficult       Suicide Assessment Five-step Evaluation and Treatment (SAFE-T)      Today's PHQ-2 Score:       2023    12:24 PM   PHQ-2 (  [employer])   Q1: Little interest or pleasure in doing things 1   Q2: Feeling down, depressed or hopeless 2   PHQ-2 Score 3   Q1: Little interest or pleasure in doing things Several days   Q2: Feeling down, depressed or hopeless More than half the days   PHQ-2 Score 3           Social History     Tobacco Use     Smoking status: Former     Types: Cigarettes     Quit date: 2010     Years since quittin.9     Smokeless tobacco: Never   Vaping Use     Vaping status: Never Used   Substance Use Topics     Alcohol use: Yes     Comment: Rare             2023    12:24 PM   Alcohol Use   Prescreen: >3 drinks/day or >7 drinks/week? No     Reviewed orders with patient.  Reviewed health maintenance and updated orders accordingly - Yes  Lab work is in process  Labs reviewed in EPIC  BP Readings from Last 3 Encounters:   23 120/80   23 (!) 133/105   22 134/72    Wt Readings from Last 3 Encounters:   22 79.4 kg (175 lb)   22 77.1 kg (170 lb)   22 77.1 kg (170 lb)                  Patient Active Problem List   Diagnosis     CARDIOVASCULAR SCREENING; LDL GOAL LESS THAN 160     Family history of diabetes mellitus     Hypertension goal BP (blood pressure) < 140/90     CTS (carpal tunnel syndrome)     Chronic bilateral thoracic back pain     Chronic midline low back pain without sciatica     Migraine with aura and without status migrainosus, not intractable     Dysthymic disorder     Menorrhagia with regular cycle     Moderate major depression (H)     Psychophysiological insomnia     IUD (intrauterine device) in place     S/P laparoscopic sleeve gastrectomy     Bunion, left     BERENICE (generalized anxiety disorder)      Past Surgical History:   Procedure Laterality Date     APPENDECTOMY       BUNIONECTOMY LAPIDUS WITH TARSAL METATARSAL (TMT) FUSION Right 2018    Procedure: BUNIONECTOMY LAPIDUS WITH TARSAL METATARSAL (TMT) FUSION;  Right first tarsometatarsal joint fusion, right Jackson bunionectomy;  Surgeon: Zack Healy DPM;  Location: [facility] OR     HC REVISE MEDIAN N/CARPAL TUNNEL SURG Left 2015    Primary, not revision     LAPAROSCOPIC GASTRIC SLEEVE N/A 2021    Procedure: GASTRECTOMY, SLEEVE, LAPAROSCOPIC;  Surgeon: Saud Stein MD;  Location: [facility] OR     RELEASE CARPAL TUNNEL Left 2015    Procedure: RELEASE CARPAL TUNNEL;  Surgeon: Juan Jose Joaquin MD;  Location: [facility] OR       Social History     Tobacco Use     Smoking status: Former     Types: Cigarettes     Quit date: 2010     Years since quittin.9     Smokeless tobacco: Never   Vaping Use     Vaping status: Never Used   Substance Use Topics     Alcohol use: Yes     Comment: Rare     Family History   Problem Relation Age of Onset     Diabetes Mother      Hypertension Mother      Cancer Mother         skin     Other Cancer Mother      Diabetes Father      Breast Cancer Paternal Grandmother      Anesthesia Reaction Son         PONV     Breast Cancer Cousin      Glaucoma No family hx of      Macular Degeneration No family hx of      Deep Vein Thrombosis (DVT) No family hx of          Current Outpatient Medications   Medication Sig Dispense Refill     ALPRAZolam (XANAX) 0.25 MG tablet Take 1-2 tablets (0.25-0.5 mg) by mouth 2 times daily as needed for anxiety 20 tabs to last 30 days 20 tablet 0     busPIRone (BUSPAR) 10 MG tablet Take 2 tablets (20 mg) by mouth 2 times daily 120 tablet 1     DULoxetine (CYMBALTA) 20 MG capsule Take one cap by mouth daily WITH 40 mg cap for total daily dose 60 mg. 90 capsule 0     DULoxetine HCl 40 MG CPEP Take 40 mg by mouth See Admin Instructions Take one cap by mouth WITH 20 mg cap for total daily  dose 60 mg.       hydrOXYzine (ATARAX) 10 MG tablet Take 1-3 tablets (10-30 mg) by mouth 3 times daily as needed (anxiety, sleep) 90 tablet 1     methocarbamol (ROBAXIN) 500 MG tablet Take 1 tablet (500 mg) by mouth 4 times daily as needed for muscle spasms 60 tablet 1     omeprazole (PRILOSEC) 20 MG DR capsule Take 1 capsule (20 mg) by mouth every morning (before breakfast) 60 capsule 4     propranolol (INDERAL) 20 MG tablet Take 1 tablet (20 mg) by mouth 3 times daily 120 tablet 1     SUMAtriptan (IMITREX) 25 MG tablet Take 1-2 tablets (25-50 mg) by mouth at onset of headache for migraine 50 tablet 1     traZODone (DESYREL) 50 MG tablet Take 1-2 tablets ( mg) by mouth nightly as needed for sleep 180 tablet 0     Allergies   Allergen Reactions     Oxycodone Itching     Lisinopril Cough     Zoloft      tired     Recent Labs   Lab Test 12/27/22  1102 04/28/22  1618 07/27/21  1340 01/04/21  1215 11/11/20  0758 06/02/20  1522 06/05/19  1926 09/11/17  1507 06/19/17  1524 12/09/16  1115 05/06/16  1415   A1C  --   --   --   --  5.2  --   --   --   --   --  5.4   LDL  --  99  --   --  90  --  113*  --   --   --   --    HDL  --  62  --   --  47*  --  56  --   --   --   --    TRIG  --  52  --   --  175*  --  176*  --   --   --   --    ALT  --   --  22  --  28  --   --   --   --   --   --    CR 0.60  --  0.75 0.64 0.78   < > 0.87   < > 0.70   < > 0.69   GFRESTIMATED >90  --  >90 >90 >90   < > 88   < > >90   < > >90  Non  GFR Calc     GFRESTBLACK  --   --   --  >90 >90   < > >90   < > >90   < > >90  African American GFR Calc     POTASSIUM 3.5  --  4.5  --  4.0  --  3.8   < >  --    < > 4.0   TSH  --   --   --   --  1.32  --   --   --  0.55  --   --     < > = values in this interval not displayed.        Breast Cancer Screening:    FHS-7:       4/27/2022     8:32 AM 4/19/2023    12:25 PM   Breast CA Risk Assessment (FHS-7)   Did any of your first-degree relatives have breast or ovarian cancer? No Unknown    Did any of your relatives have bilateral breast cancer? Unknown Unknown   Did any man in your family have breast cancer? No No   Did any woman in your family have breast and ovarian cancer? Yes Yes   Did any woman in your family have breast cancer before age 50 y? Yes Yes   Do you have 2 or more relatives with breast and/or ovarian cancer? Yes Unknown   Do you have 2 or more relatives with breast and/or bowel cancer? Yes Unknown       Patient under 40 years of age: Routine Mammogram Screening not recommended.   Pertinent mammograms are reviewed under the imaging tab.    History of abnormal Pap smear: NO - age 30-65 PAP every 5 years with negative HPV co-testing recommended      Latest Ref Rng & Units 4/28/2022     3:53 PM 9/11/2017     2:20 PM 9/11/2017     2:15 PM   PAP / HPV   PAP  Negative for Intraepithelial Lesion or Malignancy (NILM)       PAP (Historical)   NIL      HPV 16 DNA Negative Negative    Negative     HPV 18 DNA Negative Negative    Negative     Other HR HPV Negative Negative    Negative       Reviewed and updated as needed this visit by clinical staff   Tobacco  Allergies  Meds   Med Hx  Surg Hx  Fam Hx  Soc Hx        Reviewed and updated as needed this visit by Provider                 Past Medical History:   Diagnosis Date     Depressive disorder      Family history of diabetes mellitus      Hypertension      Migraine with aura and without status migrainosus, not intractable 11/02/2016     PID (acute pelvic inflammatory disease) 05/2010      Past Surgical History:   Procedure Laterality Date     APPENDECTOMY       BUNIONECTOMY LAPIDUS WITH TARSAL METATARSAL (TMT) FUSION Right 08/14/2018    Procedure: BUNIONECTOMY LAPIDUS WITH TARSAL METATARSAL (TMT) FUSION;  Right first tarsometatarsal joint fusion, right Jackson bunionectomy;  Surgeon: Zack Healy DPM;  Location: [facility]     HC REVISE MEDIAN N/CARPAL TUNNEL SURG Left 06/05/2015    Primary, not revision     LAPAROSCOPIC GASTRIC SLEEVE  N/A 2021    Procedure: GASTRECTOMY, SLEEVE, LAPAROSCOPIC;  Surgeon: Saud Stein MD;  Location: [facility]     RELEASE CARPAL TUNNEL Left 2015    Procedure: RELEASE CARPAL TUNNEL;  Surgeon: Juan Jose Joaquin MD;  Location: [facility]     OB History    Para Term  AB Living   2 1 1 0 1 1   SAB IAB Ectopic Multiple Live Births   0 1 0 0 1      # Outcome Date GA Lbr Barber/2nd Weight Sex Delivery Anes PTL Lv   2 IAB            1 Term         ANUJA       Review of Systems   Constitutional: Negative for chills and fever.   HENT: Negative for congestion, ear pain, hearing loss and sore throat.    Eyes: Negative for pain and visual disturbance.   Respiratory: Negative for cough and shortness of breath.    Cardiovascular: Negative for chest pain, palpitations and peripheral edema.   Gastrointestinal: Negative for abdominal pain, constipation, diarrhea, heartburn, hematochezia and nausea.   Breasts:  Negative for tenderness, breast mass and discharge.   Genitourinary: Negative for dysuria, frequency, genital sores, hematuria, pelvic pain, urgency, vaginal bleeding and vaginal discharge.   Musculoskeletal: Negative for arthralgias, joint swelling and myalgias.   Skin: Negative for rash.   Neurological: Positive for headaches. Negative for dizziness, weakness and paresthesias.   Psychiatric/Behavioral: Negative for mood changes. The patient is nervous/anxious.      Headaches are rare.     OBJECTIVE:   /80 (BP Location: Right arm, Patient Position: Sitting, Cuff Size: Adult Regular)   Pulse 82   Temp 97.8  F (36.6  C) (Tympanic)   LMP  (LMP Unknown)   SpO2 100%   Physical Exam  GENERAL: healthy, alert and no distress  EYES: Eyes grossly normal to inspection, PERRL and conjunctivae and sclerae normal  HENT: ear canals and TM's normal, nose and mouth without ulcers or lesions  NECK: no adenopathy, no asymmetry, masses, or scars and thyroid normal to palpation  RESP: lungs clear to auscultation - no  rales, rhonchi or wheezes  CV: regular rate and rhythm, normal S1 S2, no S3 or S4, no murmur, click or rub, no peripheral edema and peripheral pulses strong  ABDOMEN: soft, nontender, no hepatosplenomegaly, no masses and bowel sounds normal  MS: no gross musculoskeletal defects noted, no edema  SKIN: no suspicious lesions or rashes  PSYCH: mentation appears normal, affect normal/bright    Diagnostic Test Results:  Labs reviewed in Epic  Results for orders placed or performed in visit on 04/21/23 (from the past 24 hour(s))   [id number] - Urine Drug Confirmation Panel (Comprehensive)    Narrative    The following orders were created for panel order [id number] - Urine Drug Confirmation Panel (Comprehensive).  Procedure                               Abnormality         Status                     ---------                               -----------         ------                     Urine Drug Confirmation ...[[id number]]                      In process                 Urine Creatinine for Blue...[[id number]]                      In process                   Please view results for these tests on the individual orders.   CBC with platelets and differential    Narrative    The following orders were created for panel order CBC with platelets and differential.  Procedure                               Abnormality         Status                     ---------                               -----------         ------                     CBC with platelets and d...[[id number]]                      Final result                 Please view results for these tests on the individual orders.   CBC with platelets and differential   Result Value Ref Range    WBC Count 9.8 4.0 - 11.0 10e3/uL    RBC Count 4.20 3.80 - 5.20 10e6/uL    Hemoglobin 13.4 11.7 - 15.7 g/dL    Hematocrit 40.6 35.0 - 47.0 %    MCV 97 78 - 100 fL    MCH 31.9 26.5 - 33.0 pg    MCHC 33.0 31.5 - 36.5 g/dL    RDW 12.6 10.0 - 15.0 %    Platelet Count 310 150 - 450 10e3/uL    %  Neutrophils 68 %    % Lymphocytes 22 %    % Monocytes 9 %    % Eosinophils 1 %    % Basophils 1 %    Absolute Neutrophils 6.6 1.6 - 8.3 10e3/uL    Absolute Lymphocytes 2.1 0.8 - 5.3 10e3/uL    Absolute Monocytes 0.8 0.0 - 1.3 10e3/uL    Absolute Eosinophils 0.1 0.0 - 0.7 10e3/uL    Absolute Basophils 0.1 0.0 - 0.2 10e3/uL       ASSESSMENT/PLAN:   Екатерина was seen today for physical.    Diagnoses and all orders for this visit:    Routine general medical examination at a health care facility  Getting routine drug panel since patient is in clinic today, I do not prescribe any controlled substance or but she does have a ongoing prescription for Xanax.  -     [id number] - Urine Drug Confirmation Panel (Comprehensive)    Recurrent major depressive disorder, in partial remission (H)  BERENICE (generalized anxiety disorder)  Chronic, depression stable, anxiety is improved since she is working from home.  When patient needs work paperwork filled out in June I will reassign for 1 year.  Reasonable considering her employer is agreeable and as noted patient's improved productivity patient is also able to complete all of her job functions remotely.  -     propranolol (INDERAL) 20 MG tablet; Take 1 tablet (20 mg) by mouth 3 times daily    Morbid obesity (H)  Status post laparoscopic sleeve gastrectomy  Historical, requires annual deficiency screening.   -     omeprazole (PRILOSEC) 20 MG DR capsule; Take 1 capsule (20 mg) by mouth every morning (before breakfast  -     Comprehensive metabolic panel (BMP + Alb, Alk Phos, ALT, AST, Total. Bili, TP)  -     CBC with platelets and differential  -     Ferritin  -     Vitamin B12  -     Vitamin B6  -     Vitamin D Deficiency  -     Vitamin A  -     Zinc  -     Copper level  -     Magnesium    Screening for thyroid disorder  -     TSH with free T4 reflex    Screening for lipid disorders  Patient with past elevated triglycerides and LDL, advise checking annually for screening.  Risk factors for  hyperlipidemia inclued multiple medications as well.  -     Lipid Profile (Chol, Trig, HDL, LDL calc)        Patient has been advised of split billing requirements and indicates understanding: Yes      COUNSELING:  Reviewed preventive health counseling, as reflected in patient instructions        She reports that she quit smoking about 12 years ago. Her smoking use included cigarettes. She has never used smokeless tobacco.      [facility]  Answers for HPI/ROS submitted by the patient on 4/19/2023  If you checked off any problems, how difficult have these problems made it for you to do your work, take care of things at home, or get along with other people?: Very difficult  PHQ9 TOTAL SCORE: 12

## 2023-04-22 LAB — DEPRECATED CALCIDIOL+CALCIFEROL SERPL-MC: 27 UG/L (ref 20–75)

## 2023-04-24 LAB
COPPER SERPL-MCNC: 88.4 UG/DL
ZINC SERPL-MCNC: 81.7 UG/DL

## 2023-04-25 LAB
ANNOTATION COMMENT IMP: NORMAL
PYRIDOXAL PHOS SERPL-SCNC: 27.8 NMOL/L
RETINYL PALMITATE SERPL-MCNC: 0.02 MG/L
VIT A SERPL-MCNC: 0.64 MG/L

## 2023-06-01 ENCOUNTER — VIRTUAL VISIT (OUTPATIENT)
Dept: FAMILY MEDICINE | Facility: CLINIC | Age: 38
End: 2023-06-01
Payer: COMMERCIAL

## 2023-06-01 DIAGNOSIS — F41.0 PANIC ATTACKS: ICD-10-CM

## 2023-06-01 DIAGNOSIS — F33.41 RECURRENT MAJOR DEPRESSIVE DISORDER, IN PARTIAL REMISSION (H): ICD-10-CM

## 2023-06-01 DIAGNOSIS — F41.9 ANXIETY: ICD-10-CM

## 2023-06-01 DIAGNOSIS — F34.1 DYSTHYMIC DISORDER: Primary | ICD-10-CM

## 2023-06-01 DIAGNOSIS — I10 HYPERTENSION GOAL BP (BLOOD PRESSURE) < 140/90: ICD-10-CM

## 2023-06-01 PROBLEM — F33.9 MAJOR DEPRESSION, RECURRENT (H): Status: ACTIVE | Noted: 2023-06-01

## 2023-06-01 PROCEDURE — 99215 OFFICE O/P EST HI 40 MIN: CPT | Mod: 95

## 2023-06-01 PROCEDURE — 96127 BRIEF EMOTIONAL/BEHAV ASSMT: CPT

## 2023-06-01 ASSESSMENT — PATIENT HEALTH QUESTIONNAIRE - PHQ9
SUM OF ALL RESPONSES TO PHQ QUESTIONS 1-9: 11
10. IF YOU CHECKED OFF ANY PROBLEMS, HOW DIFFICULT HAVE THESE PROBLEMS MADE IT FOR YOU TO DO YOUR WORK, TAKE CARE OF THINGS AT HOME, OR GET ALONG WITH OTHER PEOPLE: VERY DIFFICULT
SUM OF ALL RESPONSES TO PHQ QUESTIONS 1-9: 11

## 2023-06-01 ASSESSMENT — ANXIETY QUESTIONNAIRES
5. BEING SO RESTLESS THAT IT IS HARD TO SIT STILL: NOT AT ALL
IF YOU CHECKED OFF ANY PROBLEMS ON THIS QUESTIONNAIRE, HOW DIFFICULT HAVE THESE PROBLEMS MADE IT FOR YOU TO DO YOUR WORK, TAKE CARE OF THINGS AT HOME, OR GET ALONG WITH OTHER PEOPLE: VERY DIFFICULT
4. TROUBLE RELAXING: NEARLY EVERY DAY
3. WORRYING TOO MUCH ABOUT DIFFERENT THINGS: NEARLY EVERY DAY
GAD7 TOTAL SCORE: 16
2. NOT BEING ABLE TO STOP OR CONTROL WORRYING: NEARLY EVERY DAY
1. FEELING NERVOUS, ANXIOUS, OR ON EDGE: NEARLY EVERY DAY
6. BECOMING EASILY ANNOYED OR IRRITABLE: SEVERAL DAYS
GAD7 TOTAL SCORE: 16
GAD7 TOTAL SCORE: 16
7. FEELING AFRAID AS IF SOMETHING AWFUL MIGHT HAPPEN: NEARLY EVERY DAY
7. FEELING AFRAID AS IF SOMETHING AWFUL MIGHT HAPPEN: NEARLY EVERY DAY
8. IF YOU CHECKED OFF ANY PROBLEMS, HOW DIFFICULT HAVE THESE MADE IT FOR YOU TO DO YOUR WORK, TAKE CARE OF THINGS AT HOME, OR GET ALONG WITH OTHER PEOPLE?: VERY DIFFICULT

## 2023-06-01 NOTE — PATIENT INSTRUCTIONS
Ricky Willams,     Here is a summary of our visit today -     Completed FMLA paperwork/packet and [employer] Request for Medical Documentation forms. I will email to you at [email or web address]     Schedule a psychiatrist (or psych PA, NP) for med review and recommendations on any changes.    Schedule an in person appointment with me in next 1-3 months so we can review medication list and get an EKG.    Thank you!    Ghanshyam

## 2023-06-01 NOTE — PROGRESS NOTES
Екатерина is a 37 year old who is being evaluated via a billable video visit.      How would you like to obtain your AVS? MyChart  If the video visit is dropped, the invitation should be resent by: Other e-mail: "[email or web address] or call is preferred to [phone]  Will anyone else be joining your video visit? No        Assessment & Plan     Екатерина is a very pleasant 37 y/old female here to discuss her chronic depression/anxiety and related panic attacks and hypertension.     We reviewed 2 forms needed for work accomodation, these will be emailed to her at [employer] this afternoon and sent to chart for scanning.    No med changes today but advised EKG and med review appointment due to multiple medications.     Will consider MTM referral. Has open psychiatry referral.       Dysthymic disorder  Recurrent major depressive disorder, in partial remission (H)  Chronic, on multiple medications and working with mental health counselor.     Anxiety  Chronic. On daily and prn medications.     Panic attacks  Chronic/severe with history of hypertensive crisis. Improving. Daily medications and work accommodations to prevent. Prn xanax.     Hypertension goal BP (blood pressure) < 140/90  Chronic, significantly improved since on schedule propranolol. Hx of hypertensive crisis with panic attacks.       Ordering of each unique test  Prescription drug management         See Patient Instructions      40 minutes spent on the date of the encounter doing chart review of prior [place] paperwork, history, and documentation and form completion as noted above        ESTUARDO [facility]    Ny Willams is a 37 year old, presenting for the following health issues:  Forms        6/1/2023     3:41 PM   Additional Questions   Roomed by Wandy ROLLINS MA     Forms 6/1/2023   Any forms needing to be completed Yes, please check your sign me box     History of Present Illness       Mental Health  Follow-up:  Patient presents to follow-up on Depression & Anxiety.Patient's depression since last visit has been:  Medium  The patient is not having other symptoms associated with depression.  Patient's anxiety since last visit has been:  Worse  The patient is not having other symptoms associated with anxiety.  Any significant life events: job concerns  Patient is feeling anxious or having panic attacks.  Patient has no concerns about alcohol or drug use.    She eats 2-3 servings of fruits and vegetables daily.She consumes 0 sweetened beverage(s) daily.She exercises with enough effort to increase her heart rate 30 to 60 minutes per day.  She exercises with enough effort to increase her heart rate 4 days per week.   She is taking medications regularly.    Today's PHQ-9         PHQ-9 Total Score: 11    PHQ-9 Q9 Thoughts of better off dead/self-harm past 2 weeks :   Not at all    How difficult have these problems made it for you to do your work, take care of things at home, or get along with other people: Very difficult  Today's BERENICE-7 Score: 16       Patient can complete all of her essential job functions from home. Her work has been accommodating and supportive.     Diagnosis related to work accomodations  HTN - significant hx of hypertensive crisis due to uncontrolled panic attacks.   Panic attacks - triggers include loud noises, repetitive visual distractions, bright lights, sensory overload. Symptoms include feelings of doom, hypertension, headaches, difficulty breathing, chest pain.   Anxiety -   Dysthymic Disorder -   Depression -   Unclear on exact diagnosis has not had full psychological testing. On multiple medications and currently feeling stable.               Review of Systems   Constitutional, HEENT, cardiovascular, pulmonary, gi and gu systems are negative, except as otherwise noted.      Objective           Vitals:  No vitals were obtained today due to virtual visit.    Physical Exam   GENERAL: Healthy,  alert and no distress  EYES: Eyes grossly normal to inspection.  No discharge or erythema, or obvious scleral/conjunctival abnormalities.  RESP: No audible wheeze, cough, or visible cyanosis.  No visible retractions or increased work of breathing.    SKIN: Visible skin clear. No significant rash, abnormal pigmentation or lesions.  NEURO: Cranial nerves grossly intact.  Mentation and speech appropriate for age.  PSYCH: Mentation appears normal, affect normal/bright, judgement and insight intact, normal speech and appearance well-groomed.    No results found for this or any previous visit (from the past 24 hour(s)).            Video-Visit Details    Type of service:  Video Visit     Originating Location (pt. Location): Home  Distant Location (provider location):  On-site  Platform used for Video Visit: [employer]

## 2023-07-14 ENCOUNTER — OFFICE VISIT (OUTPATIENT)
Dept: URGENT CARE | Facility: URGENT CARE | Age: 38
End: 2023-07-14
Payer: COMMERCIAL

## 2023-07-14 VITALS
DIASTOLIC BLOOD PRESSURE: 94 MMHG | RESPIRATION RATE: 16 BRPM | HEART RATE: 87 BPM | OXYGEN SATURATION: 99 % | BODY MASS INDEX: 31.93 KG/M2 | SYSTOLIC BLOOD PRESSURE: 143 MMHG | TEMPERATURE: 97.6 F | WEIGHT: 177.4 LBS

## 2023-07-14 DIAGNOSIS — R19.5 LOOSE STOOLS: Primary | ICD-10-CM

## 2023-07-14 PROCEDURE — 99212 OFFICE O/P EST SF 10 MIN: CPT | Performed by: PHYSICIAN ASSISTANT

## 2023-07-14 NOTE — PROGRESS NOTES
Assessment & Plan     1. Loose stools  Resolved    Monitor symptoms and follow up as needed.               [facility] [place]    Ny Willams is a 37 year old female who presents to clinic today for the following health issues:  Chief Complaint   Patient presents with     Diarrhea     Bad stomach ache and diarrhea yesterday morning  Has had rectal bleeding since      HPI    Here for abdominal pain yesterday then diarrhea x2. Not watery. Some blood on tissue on yesterday. Light amount and also light this am. No pain around the rectum. No history of recent constipation. No fever. Some fatigue more than usual. Never had this before except with constipation. History of banded hemorrhoids last year.           Review of Systems        Objective    BP (!) 143/94   Pulse 87   Temp 97.6  F (36.4  C) (Tympanic)   Resp 16   Wt 80.5 kg (177 lb 6.4 oz)   LMP  (LMP Unknown)   SpO2 99%   BMI 31.93 kg/m    Physical Exam  Constitutional:       General: She is not in acute distress.     Appearance: Normal appearance.   HENT:      Head: Normocephalic.   Abdominal:      General: Abdomen is flat.   Neurological:      Mental Status: She is alert.   Psychiatric:         Mood and Affect: Mood normal.         Behavior: Behavior normal.

## 2023-07-15 ENCOUNTER — DOCUMENTATION ONLY (OUTPATIENT)
Dept: PSYCHOLOGY | Facility: CLINIC | Age: 38
End: 2023-07-15
Payer: COMMERCIAL

## 2023-07-15 DIAGNOSIS — F41.0 GENERALIZED ANXIETY DISORDER WITH PANIC ATTACKS: Primary | ICD-10-CM

## 2023-07-15 DIAGNOSIS — F41.1 GENERALIZED ANXIETY DISORDER WITH PANIC ATTACKS: Primary | ICD-10-CM

## 2023-07-15 NOTE — PROGRESS NOTES
Discharge Summary  Multiple Sessions    Client Name: Екатерина Ramon MRN#: [id number] YOB: 1985      Intake / Discharge Date:   Intake: 2/10/23   Discharge: 7/15/23        DSM5 Diagnoses: (Sustained by DSM5 Criteria Listed Above)  Diagnoses: 300.02 (F41.1) Generalized Anxiety Disorder  Psychosocial & Contextual Factors: stress with new job, stress within important relationships  Tmazrz10: 21          Presenting Concern:  Pt began seeking therapy services due to worsening anxiety at work as she got a new job. Pt reported panic attack symptoms as well as other BERENICE symptoms (see DA for more information). Pt was interested in skills building and gaining a stronger sense of control over her anxiety.       Reason for Discharge:  Client is satisfied with progress      Disposition at Time of Last Encounter:   Comments:   Pt appeared to be calm and confident during last encounter. Pt reported lower anxiety symptoms and was content with the progress made.      Risk Management:   Client denies a history of suicidal ideation, suicide attempts, self-injurious behavior, homicidal ideation, homicidal behavior and and other safety concerns  Recommended that patient call 911 or go to the local ED should there be a change in any of these risk factors.      Referred To:  Pt was not referred to any other services at the last appointment due to satisfaction with progress. Pt is recommended to resume therapy if her anxiety starts to worsen again or if panic attack symptoms resume.        Aida Gifford, Psychotherapist Trainee, [facility]  7/15/2023

## 2023-08-23 ENCOUNTER — OFFICE VISIT (OUTPATIENT)
Dept: FAMILY MEDICINE | Facility: CLINIC | Age: 38
End: 2023-08-23
Payer: COMMERCIAL

## 2023-08-23 ENCOUNTER — MYC MEDICAL ADVICE (OUTPATIENT)
Dept: FAMILY MEDICINE | Facility: CLINIC | Age: 38
End: 2023-08-23

## 2023-08-23 ENCOUNTER — TELEPHONE (OUTPATIENT)
Dept: PODIATRY | Facility: CLINIC | Age: 38
End: 2023-08-23

## 2023-08-23 VITALS
SYSTOLIC BLOOD PRESSURE: 137 MMHG | WEIGHT: 183.4 LBS | TEMPERATURE: 98.1 F | OXYGEN SATURATION: 99 % | BODY MASS INDEX: 32.5 KG/M2 | RESPIRATION RATE: 16 BRPM | DIASTOLIC BLOOD PRESSURE: 85 MMHG | HEIGHT: 63 IN | HEART RATE: 88 BPM

## 2023-08-23 DIAGNOSIS — M54.41 CHRONIC BILATERAL LOW BACK PAIN WITH BILATERAL SCIATICA: ICD-10-CM

## 2023-08-23 DIAGNOSIS — Z79.899 ENCOUNTER FOR MEDICATION MANAGEMENT: Primary | ICD-10-CM

## 2023-08-23 DIAGNOSIS — M54.42 CHRONIC BILATERAL LOW BACK PAIN WITH BILATERAL SCIATICA: ICD-10-CM

## 2023-08-23 DIAGNOSIS — G89.29 CHRONIC BILATERAL LOW BACK PAIN WITH BILATERAL SCIATICA: ICD-10-CM

## 2023-08-23 PROBLEM — M21.612 BUNION, LEFT: Status: ACTIVE | Noted: 2022-09-22

## 2023-08-23 PROCEDURE — 99213 OFFICE O/P EST LOW 20 MIN: CPT | Mod: 25

## 2023-08-23 PROCEDURE — 93000 ELECTROCARDIOGRAM COMPLETE: CPT

## 2023-08-23 ASSESSMENT — PATIENT HEALTH QUESTIONNAIRE - PHQ9
SUM OF ALL RESPONSES TO PHQ QUESTIONS 1-9: 13
10. IF YOU CHECKED OFF ANY PROBLEMS, HOW DIFFICULT HAVE THESE PROBLEMS MADE IT FOR YOU TO DO YOUR WORK, TAKE CARE OF THINGS AT HOME, OR GET ALONG WITH OTHER PEOPLE: EXTREMELY DIFFICULT
SUM OF ALL RESPONSES TO PHQ QUESTIONS 1-9: 13

## 2023-08-23 ASSESSMENT — PAIN SCALES - GENERAL: PAINLEVEL: EXTREME PAIN (8)

## 2023-08-23 NOTE — PROGRESS NOTES
"  Assessment & Plan     Encounter for medication management  On multiple QT prolonging medications.   - EKG 12-lead complete w/read - Clinics    Chronic bilateral low back pain with bilateral sciatica  Chronic, referred to pain specialist to pursue injection.   - Pain Management  Referral               BMI:   Estimated body mass index is 32.91 kg/m  as calculated from the following:    Height as of this encounter: 1.59 m (5' 2.6\").    Weight as of this encounter: 83.2 kg (183 lb 6.4 oz).       As needed/if worsening.     ESTUARDO Crowder CNP  [facility] NEW LDASIYA Willams is a 37 year old, presenting for the following health issues:  Back Pain      History of Present Illness       Back Pain:  She presents for follow up of back pain. Patient's back pain is a chronic problem.  Location of back pain:  Right lower back, left lower back, right middle of back, left middle of back, right buttock, left buttock, right hip and left hip  Description of back pain: gnawing, sharp and stabbing  Back pain spreads: right buttocks, left buttocks, right thigh and left thigh    Since patient first noticed back pain, pain is: gradually worsening  Does back pain interfere with her job:  Yes       She eats 2-3 servings of fruits and vegetables daily.She consumes 0 sweetened beverage(s) daily.She exercises with enough effort to increase her heart rate 30 to 60 minutes per day.  She exercises with enough effort to increase her heart rate 3 or less days per week.   She is taking medications regularly.     Injections with Dr. Alvarado, last done in January. Resolves pain for about 5 months, then starts to wear off.           Review of Systems   Constitutional, HEENT, cardiovascular, pulmonary, gi and gu systems are negative, except as otherwise noted.      Objective    /85   Pulse 88   Temp 98.1  F (36.7  C) (Oral)   Resp 16   Ht 1.59 m (5' 2.6\")   Wt 83.2 kg (183 lb 6.4 oz)   SpO2 99%  "  BMI 32.91 kg/m    Body mass index is 32.91 kg/m .  Physical Exam   GENERAL: healthy, alert and no distress  NECK: no adenopathy, no asymmetry, masses, or scars and thyroid normal to palpation  RESP: lungs clear to auscultation - no rales, rhonchi or wheezes  CV: regular rate and rhythm, normal S1 S2, no S3 or S4, no murmur, click or rub, no peripheral edema and peripheral pulses strong  ABDOMEN: soft, nontender, no hepatosplenomegaly, no masses and bowel sounds normal  MS: no gross musculoskeletal defects noted, no edema    No results found for this or any previous visit (from the past 24 hour(s)).

## 2023-08-23 NOTE — LETTER
August 29, 2023      Екатерина Ramon  [address]  [place] [address]        To Whom It May Concern:    Екатерина Ramon was seen at our clinic on 8/23/23.  Please excuse her on these days: 8/14, 8/18, and 8/21 due to exacerbation of chronic medical condition for which she currently has intermittent PATRICIO forms. Additional documentation and updated  forms will be completed and available next week.        Sincerely,        ESTUARDO Crowder CNP

## 2023-08-29 NOTE — TELEPHONE ENCOUNTER
Sent temp work note via [employer], will updated intermittent PATRICIO forms next week when provider back in clinic.

## 2023-09-12 ENCOUNTER — VIRTUAL VISIT (OUTPATIENT)
Dept: PSYCHOLOGY | Facility: CLINIC | Age: 38
End: 2023-09-12
Payer: COMMERCIAL

## 2023-09-12 DIAGNOSIS — F41.0 GENERALIZED ANXIETY DISORDER WITH PANIC ATTACKS: Primary | ICD-10-CM

## 2023-09-12 DIAGNOSIS — F32.1 MAJOR DEPRESSIVE DISORDER, SINGLE EPISODE, MODERATE (H): ICD-10-CM

## 2023-09-12 DIAGNOSIS — F41.1 GENERALIZED ANXIETY DISORDER WITH PANIC ATTACKS: Primary | ICD-10-CM

## 2023-09-12 PROCEDURE — 90834 PSYTX W PT 45 MINUTES: CPT | Mod: VID

## 2023-09-12 ASSESSMENT — ANXIETY QUESTIONNAIRES
6. BECOMING EASILY ANNOYED OR IRRITABLE: NEARLY EVERY DAY
2. NOT BEING ABLE TO STOP OR CONTROL WORRYING: NEARLY EVERY DAY
GAD7 TOTAL SCORE: 19
5. BEING SO RESTLESS THAT IT IS HARD TO SIT STILL: MORE THAN HALF THE DAYS
IF YOU CHECKED OFF ANY PROBLEMS ON THIS QUESTIONNAIRE, HOW DIFFICULT HAVE THESE PROBLEMS MADE IT FOR YOU TO DO YOUR WORK, TAKE CARE OF THINGS AT HOME, OR GET ALONG WITH OTHER PEOPLE: EXTREMELY DIFFICULT
4. TROUBLE RELAXING: MORE THAN HALF THE DAYS
GAD7 TOTAL SCORE: 19
7. FEELING AFRAID AS IF SOMETHING AWFUL MIGHT HAPPEN: NEARLY EVERY DAY
3. WORRYING TOO MUCH ABOUT DIFFERENT THINGS: NEARLY EVERY DAY
1. FEELING NERVOUS, ANXIOUS, OR ON EDGE: NEARLY EVERY DAY

## 2023-09-12 ASSESSMENT — PATIENT HEALTH QUESTIONNAIRE - PHQ9
10. IF YOU CHECKED OFF ANY PROBLEMS, HOW DIFFICULT HAVE THESE PROBLEMS MADE IT FOR YOU TO DO YOUR WORK, TAKE CARE OF THINGS AT HOME, OR GET ALONG WITH OTHER PEOPLE: EXTREMELY DIFFICULT
SUM OF ALL RESPONSES TO PHQ QUESTIONS 1-9: 15
SUM OF ALL RESPONSES TO PHQ QUESTIONS 1-9: 15

## 2023-09-12 NOTE — PROGRESS NOTES
[facility]                                     Progress Note    Patient Name: Екатерина Ramon  Date: 9/12/23         Service Type: Individual      Session Start Time: 3:30pm  Session End Time: 4:20pm     Session Length: 50 minutes    Session #: 1    Attendees: Client attended alone    Service Modality:  Video Visit:      Provider verified identity through the following two step process.  Patient provided:  Patient is known previously to provider    Telemedicine Visit: The patient's condition can be safely assessed and treated via synchronous audio and visual telemedicine encounter.      Reason for Telemedicine Visit: Patient has requested telehealth visit and Patient convenience (e.g. access to timely appointments / distance to available provider)    Originating Site (Patient Location): Patient's home    Distant Site (Provider Location): Provider Remote Setting- Home Office    Consent:  The patient/guardian has verbally consented to: the potential risks and benefits of telemedicine (video visit) versus in person care; bill my insurance or make self-payment for services provided; and responsibility for payment of non-covered services.     Patient would like the video invitation sent by:  My Chart    Mode of Communication:  Video Conference via Amwell    Distant Location (Provider):  Off-site    As the provider I attest to compliance with applicable laws and regulations related to telemedicine.    DATA  Interactive Complexity: No  Crisis: No        Progress Since Last Session (Related to Symptoms / Goals / Homework):   Symptoms: Worsening anxiety symptoms since last appointment with provider on March 22, 2023    Homework:  No homework assigned from last appointment as that was final session with provider. Pt started a new round of therapy with provider this session      Episode of Care Goals: Satisfactory progress - PREPARATION (Decided to change - considering how); Intervened by negotiating a  "change plan and determining options / strategies for behavior change, identifying triggers, exploring social supports, and working towards setting a date to begin behavior change     Current / Ongoing Stressors and Concerns:   Pt reached out to provider to resume services due to increased depression and anxiety symptoms. Pt reports work is the main source of stress and has been having a hard time coping with her mental health. Pt reports there is instability at her job because she is struggling with the tasks and \"was put on a performance plan\". Pt and provider processed emotions, and a new treatment plan was created. Pt reports ruminations about future, low mood, high irritability, high levels of exhaustion, and ongoing sleeping issues which are impacting her ability to work and important relationships in her life. Pt was engaged throughout session and denies any SI, SIB, or HI since last appointment. See below for updated treatment plan and plan before next appointment.         Treatment Objective(s) Addressed in This Session:   use at least 4 coping skills for anxiety management in the next 2 weeks  Decrease frequency and intensity of feeling down, depressed, hopeless  Improve quantity and quality of night time sleep / decrease daytime naps  Feel less tired and more energy during the day   Improve concentration, focus, and mindfulness in daily activities        Intervention:   Emotion Focused Therapy: identifying and processing emotions in session, emotion education and discussion  Solution Focused: finding ways to manage anxiety day-to-day using DBT mindfulness, writing things down before bed, and working on creating self care habits    Assessments completed prior to visit:  The following assessments were completed by patient for this visit:  PROMIS 10-Global Health (all questions and answers displayed):       12/30/2021     8:36 AM 1/21/2022     8:40 AM 1/31/2022     4:30 PM 1/27/2023    12:07 PM 2/10/2023     " 7:44 AM 9/12/2023     7:21 AM   PROMIS 10   In general, would you say your health is: Good Good Good Good Good Fair   In general, would you say your quality of life is: Good Fair Good Good Good Good   In general, how would you rate your physical health? Fair Fair Good Good Good Fair   In general, how would you rate your mental health, including your mood and your ability to think? Fair Poor Fair Poor Poor Poor   In general, how would you rate your satisfaction with your social activities and relationships? Good Good Good Good Good Good   In general, please rate how well you carry out your usual social activities and roles Fair Fair Good Fair Poor Good   To what extent are you able to carry out your everyday physical activities such as walking, climbing stairs, carrying groceries, or moving a chair? Completely Mostly Completely Completely Completely Completely   In the past 7 days, how often have you been bothered by emotional problems such as feeling anxious, depressed, or irritable? Often Often Sometimes Always Always Always   In the past 7 days, how would you rate your fatigue on average? Severe Very severe Severe Very severe Severe Severe   In the past 7 days, how would you rate your pain on average, where 0 means no pain, and 10 means worst imaginable pain? 8 8 8 3 6 8   In general, would you say your health is: 3 3 3 3 3 2   In general, would you say your quality of life is: 3 2 3 3 3 3   In general, how would you rate your physical health? 2 2 3 3 3 2   In general, how would you rate your mental health, including your mood and your ability to think? 2 1 2 1 1 1   In general, how would you rate your satisfaction with your social activities and relationships? 3 3 3 3 3 3   In general, please rate how well you carry out your usual social activities and roles. (This includes activities at home, at work and in your community, and responsibilities as a parent, child, spouse, employee, friend, etc.) 2 2 3 2 1 3   To  what extent are you able to carry out your everyday physical activities such as walking, climbing stairs, carrying groceries, or moving a chair? 5 4 5 5 5 5   In the past 7 days, how often have you been bothered by emotional problems such as feeling anxious, depressed, or irritable? 4 4 3 5 5 5   In the past 7 days, how would you rate your fatigue on average? 4 5 4 5 4 4   In the past 7 days, how would you rate your pain on average, where 0 means no pain, and 10 means worst imaginable pain? 8 8 8 3 6 8   Global Mental Health Score 10 8 11 8 8 8   Global Physical Health Score 11 9 12 13 13 11   PROMIS TOTAL - SUBSCORES 21 17 23 21 21 19      Answers for HPI/ROS submitted by the patient on 2/26/2023  BERENICE 7 TOTAL SCORE: 13     ASSESSMENT: Current Emotional / Mental Status (status of significant symptoms):   Risk status (Self / Other harm or suicidal ideation)   Patient denies current fears or concerns for personal safety.   Patient denies current or recent suicidal ideation or behaviors.   Patient denies current or recent homicidal ideation or behaviors.   Patient denies current or recent self injurious behavior or ideation.   Patient denies other safety concerns.   Patient reports there has been no change in risk factors since their last session.     Patient reports there has been no change in protective factors since their last session.     Recommended that patient call 911 or go to the local ED should there be a change in any of these risk factors.     Appearance:   Appropriate    Eye Contact:   Good    Psychomotor Behavior: Normal    Attitude:   Cooperative    Orientation:   All   Speech    Rate / Production: Normal     Volume:  Normal    Mood:    Anxious  Depressed    Affect:    Appropriate  Worrisome    Thought Content:  Clear    Thought Form:  Coherent  Logical    Insight:    Fair      Medication Review:   No changes to current psychiatric medication(s)     Medication Compliance:   Yes     Changes in Health  "Issues:   None reported     Chemical Use Review:   Substance Use: Chemical use reviewed, no active concerns identified      Tobacco Use: No current tobacco use.      Diagnosis:  Generalized Anxiety Disorder with Panic Attacks  Major depressive disorder, single episode, moderate    Collateral Reports Completed:   Not Applicable    PLAN: (Patient Tasks / Therapist Tasks / Other)  Pt is to download the calm juanito and listen to at least two anxiety meditations and come to next session ready to discuss.   Pt is to write everything spiraling in her head at night to help get to sleep easier.  Pt is to use coping skills discussed and reviewed in session when feeling like she is \"spiraling\" into deeper anxiety.      Aida Gifford, Psychotherapist Trainee, [facility]     9/12/23      This note has been reviewed and I agree with the plan of care. This note is co-signed by Barbara Hill MA, [facility] Supervisor, on: 9/18/2023                                                            ______________________________________________________________________    Individual Treatment Plan    Patient's Name: Екатерина Ramon  YOB: 1985    Date of Creation: 9/12/23  Date Treatment Plan Last Reviewed/Revised: 9/12/23    DSM5 Diagnoses: 300.02 (F41.1) Generalized Anxiety Disorder with panic attacks, Major depressive disorder, single episode, moderate  Psychosocial / Contextual Factors: ongoing concerns with job and ability to perform needed tasks  PROMIS (reviewed every 90 days): 19    Referral / Collaboration:  Referral to another professional/service is not indicated at this time..    Anticipated number of session for this episode of care: 6-9 sessions  Anticipation frequency of session: Every other week  Anticipated Duration of each session: 38-52 minutes  Treatment plan will be reviewed in 90 days or when goals have been changed.       MeasurableTreatment Goal(s) related to diagnosis / functional impairment(s)  Goal 1: Pt " "will reduce anxiety symptoms by 75 % as according to BERENICE-7 scores.    I will know I've met my goal when I feel that I am able to be aware of my anxiety and control it easier.\"     Objective #A  Patient will use at least 5 coping skills for anxiety management in the next 12 weeks.  Status: New - Date: 9/12/23    Intervention(s)  Therapist will teach  distress tolerance and crisis intervention skills. Therapist will practice and role play with pt during session .    Objective #B Pt will learn and implement on a consistent basis relaxation skills to help manage physical symptoms of anxiety   Status: New - Date: 9/12/23    Intervention(s)  Therapist will assign homework of thinking logs and thinking pattern recognition exercises and discuss the findings with pt during session to prompt insight into maladaptive thinking patterns. .    Objective #C   Patient will use cognitive strategies identified in therapy to challenge anxious thoughts.   Status: New - Date: 9/12/23    Intervention(s)  Therapist will teach pt how to identify anxious thoughts and phrases to challenge the cognitions. .    Goal 2: Pt will reduce depressive symptoms by 50% according to PHQ-9 scores.  \"I know when I have met my goal when I am feeling better and out of this funk\".      Objective #A  Pt will recognize depressive thinking patterns and identify thoughts leading to depressive thinking.  Status: New - Date: 9/12/23    Intervention(s)  Pt will learn about maladaptive thinking patterns in session and be assigned homework of recognizing those thinking patterns, emotions, and follow up thoughts/behaviors outside of therapy. Pt will being to next sessions to discuss.    Objective #B  Pt will implement a consistent night time routine and work on getting enough rest at night, as well as limit the amount of naps she is taking throughout the day.  Status: New - Date: 9/12/23    Intervention(s)  Pt will learn about sleeping routines and create one for " herself outside of session. Pt will be asked to implement it on a regular basis and come to next appointments to discuss it.    Objective #C  Pt will engage in activities she has enjoyed doing in the past while focusing on the positive things of the activity.  Status: New - Date: 9/12/23    Intervention(s)  Pt will learn about emotion regulation skills related to focusing on positive events/outcomes to challenge maladaptive patterns in her brain. Pt is to engage in those activities in a safe way outside of session, make a list of positive things about the activity or the outcome of the activity, and bring to next sessions to discuss.      Patient has reviewed and agreed to the above plan.      Aida Gifford, Psychotherapist Trainee, [facility]

## 2023-09-13 ENCOUNTER — MYC MEDICAL ADVICE (OUTPATIENT)
Dept: FAMILY MEDICINE | Facility: CLINIC | Age: 38
End: 2023-09-13
Payer: COMMERCIAL

## 2023-09-13 ENCOUNTER — ANCILLARY ORDERS (OUTPATIENT)
Dept: PALLIATIVE MEDICINE | Facility: CLINIC | Age: 38
End: 2023-09-13

## 2023-09-13 DIAGNOSIS — M54.16 LUMBAR RADICULOPATHY: Primary | ICD-10-CM

## 2023-09-18 NOTE — TELEPHONE ENCOUNTER
Provider wanted TC to reach out to patient in regards to FMLA forms, TC called left a message to have patient call me back.        Yaneth Herbert    [facility]

## 2023-09-19 NOTE — TELEPHONE ENCOUNTER
Patient called back wants the provider to fill out forms restricting her to do limited [employer] claims pt states she's required to do 10 claims a day and thaT she is barely completing 3 a day. States that the steps to follow for instruction is very confusing to her and causing her to be very stressed. Patient does not want to lower/reduced hrs.    TC huddled with provider for filled out to the best for the patient '    Emailed to [email or web address] as requested by the patient      Yaneth Herbert    [facility]

## 2023-09-21 ENCOUNTER — RADIOLOGY INJECTION OFFICE VISIT (OUTPATIENT)
Dept: PALLIATIVE MEDICINE | Facility: CLINIC | Age: 38
End: 2023-09-21
Payer: COMMERCIAL

## 2023-09-21 VITALS — OXYGEN SATURATION: 99 % | HEART RATE: 84 BPM | SYSTOLIC BLOOD PRESSURE: 136 MMHG | DIASTOLIC BLOOD PRESSURE: 85 MMHG

## 2023-09-21 DIAGNOSIS — M54.41 CHRONIC BILATERAL LOW BACK PAIN WITH BILATERAL SCIATICA: ICD-10-CM

## 2023-09-21 DIAGNOSIS — G89.29 CHRONIC BILATERAL LOW BACK PAIN WITH BILATERAL SCIATICA: ICD-10-CM

## 2023-09-21 DIAGNOSIS — M54.16 LUMBAR RADICULOPATHY: ICD-10-CM

## 2023-09-21 DIAGNOSIS — M54.42 CHRONIC BILATERAL LOW BACK PAIN WITH BILATERAL SCIATICA: ICD-10-CM

## 2023-09-21 PROCEDURE — 62323 NJX INTERLAMINAR LMBR/SAC: CPT | Performed by: PAIN MEDICINE

## 2023-09-21 RX ORDER — TRIAMCINOLONE ACETONIDE 40 MG/ML
40 INJECTION, SUSPENSION INTRA-ARTICULAR; INTRAMUSCULAR ONCE
Status: COMPLETED | OUTPATIENT
Start: 2023-09-21 | End: 2023-09-21

## 2023-09-21 RX ADMIN — TRIAMCINOLONE ACETONIDE 40 MG: 40 INJECTION, SUSPENSION INTRA-ARTICULAR; INTRAMUSCULAR at 15:14

## 2023-09-21 ASSESSMENT — PAIN SCALES - GENERAL
PAINLEVEL: SEVERE PAIN (7)
PAINLEVEL: NO PAIN (0)

## 2023-09-21 NOTE — NURSING NOTE
Discharge Information    IV Discontiued Time:  NA    Amount of Fluid Infused:  NA    Discharge Criteria = When patient returns to baseline or as per MD order    Consciousness:  Pt is fully awake    Circulation:  BP +/- 20% of pre-procedure level    Respiration:  Patient is able to breathe deeply    O2 Sat:  Patient is able to maintain O2 Sat >92% on room air    Activity:  Moves 4 extremities on command    Ambulation:  Patient is able to stand and walk or stand and pivot into wheelchair    Dressing:  Clean/dry or No Dressing    Notes:   Discharge instructions and AVS given to patient    Patient meets criteria for discharge?  YES    Admitted to PCU?  No    Responsible adult present to accompany patient home?  Yes    Signature/Title:    Mela Rush RN  RN Care Coordinator  [facility]

## 2023-09-21 NOTE — PROGRESS NOTES
Pre procedure Diagnosis: lumbar radiculopathy    Post procedure Diagnosis: Same  Procedure performed: L5-S1 interlaminar epidural steroid injection   Anesthesia: none  Complications: none  Operators: Zia Alvarado MD     Indications:   Екатерина Ramon is a 37 year old female.  The patient has a history of bilateral low back pain radiating to the buttocks.  Examination shows negative slump.  she has tried conservative treatment including meds/PHYSICAL THERAPY /injections with benefit     MRI w  Options/alternatives, benefits and risks were discussed with the patient including but not limited to bleeding, infection, no pain relief, tissue trauma, exposure to radiation, reaction to medications, spinal cord injury, dural puncture, weakness, numbness and headache.  Questions were answered to her satisfaction and she wishes to proceed. Voluntary informed consent was obtained and signed.     Vitals were reviewed: Yes  Allergies were reviewed:  Yes   Medications were reviewed:  Yes   Pre-procedure pain score: 7/10    Procedure:  The patient's medical history, medications, and allergies were reviewed and reconciled.  After obtaining signed informed consent, the patient was brought into the procedure suite and was placed in a prone position on the procedure table.   A Pause for the Cause was performed.  Patient was prepped and draped in the usual sterile fashion.     The L 5-1 interspace was identified with AP fluoroscopy.  A total of 3ml of 1% lidocaine was used to anesthetize the skin and subcutaneous tissues for a  midline approach.    A 20gauge 4.5inch Touhy needle was advanced utilizing intermittent AP and Lateral fluoroscopy and air for loss of resistance.  The epidural space was encountered on the first pass without difficulties.  Aspiration for blood and CSF was negative.  Needle position was verified by injecting 1 ml of Omnipaque utilizing real-time fluoroscopy that showed good needle placement and epidural  spread without signs of intravascular or intrathecal uptake.  Omnipaque wasted:  9 ml.    Then, after repeated negative aspiration for blood or CSF, a combination of Kenalog 40 mg, Bupivicaine 0.25% 2 ml, diluted with 3 ml of normal saline to a total injectate volume of 6 ml was injected into the epidural space in a slow and incremental fashion and the needle was restyletted and withdrawn.  All injected medications were preservative free.    The injection site was cleaned and a sterile dressing was applied.    The patient tolerated the procedure well without complications and was taken to the recovery room for continued observation.    Images were saved to PACS.    Post-procedure pain score: 0/10  Follow-up includes:   -f/u with referring provider     Zia Alvarado MD  [facility]

## 2023-09-21 NOTE — NURSING NOTE
Pre-procedure Intake  If YES to any questions or NO to having a   Please complete laminated checklist and leave on the computer keyboard for Provider, verbally inform provider if able.    For SCS Trial, RFA's or any sedation procedure:  Have you been fasting? NA  If yes, for how long?     Are you taking any any blood thinners such as Coumadin, Warfarin, Jantoven, Pradaxa Xarelto, Eliquis, Edoxaban, Enoxaparin, Lovenox, Heparin, Arixtra, Fondaparinux, or Fragmin? OR Antiplatelet medication such as Plavix, Brilinta, or Effient?   No   If yes, when did you take your last dose?     Do you take aspirin?  No  If cervical procedure, have you held aspirin for 6 days?       Do you have any allergies to contrast dye, iodine, steroid and/or numbing medications?  NO    Are you currently taking antibiotics or have an active infection?  NO    Have you had a fever/elevated temperature within the past week? NO    Are you currently taking oral steroids? NO    Do you have a ? Yes    Are you pregnant or breastfeeding?  NO    Have you received the COVID-19 vaccine? Yes  If yes, was it your 1st, 2nd or only dose needed?  Date of most recent vaccine: 9/9/22    Notify provider and RNs if systolic BP >170, diastolic BP >100, P >100 or O2 sats < 90%

## 2023-09-21 NOTE — PATIENT INSTRUCTIONS
[facility]   Procedure Discharge Instructions    Today you saw: Dr. Zia Alvarado    You had an:  Epidural steroid injection        Be cautious when walking. Numbness and/or weakness in the lower extremities may occur for up to 6-8 hours after the procedure due to effect of the local anesthetic  Do not drive for 6 hours. The effect of the local anesthetic could slow your reflexes.   You may resume your regular activities after 24 hours  Avoid strenuous activity for the first 24 hours  You may shower, however avoid swimming, tub baths or hot tubs for 24 hours following your procedure  You may have a mild to moderate increase in pain for several days following the injection.  It may take up to 14 days for the steroid medication to start working although you may feel the effect as early as a few days after the procedure.     You may use ice packs for 10-15 minutes, 3 to 4 times a day at the injection site for comfort  Do not use heat to painful areas for 6 to 8 hours. This will give the local anesthetic time to wear off and prevent you from accidentally burning your skin.   Unless you have been directed to avoid the use of anti-inflammatory medications (NSAIDS), you may use medications such as ibuprofen, Aleve or Tylenol for pain control if needed.   Possible side effects of steroids that you may experience include flushing, elevated blood pressure, increased appetite, mild headaches and restlessness.  All of these symptoms will get better with time.  If you experience any of the following, call the Pain Clinic during work hours (Mon-Friday 8-4:30 pm) at [phone] or the Provider Line after hours at [phone]:  -Fever over 100 degree F  -Swelling, bleeding, redness, drainage, warmth at the injection site  -Progressive weakness or numbness in your legs  -Loss of bowel or bladder function  -Unusual new onset of pain that is not improving

## 2023-10-20 ENCOUNTER — VIRTUAL VISIT (OUTPATIENT)
Dept: PSYCHOLOGY | Facility: CLINIC | Age: 38
End: 2023-10-20
Payer: COMMERCIAL

## 2023-10-20 DIAGNOSIS — F32.1 MAJOR DEPRESSIVE DISORDER, SINGLE EPISODE, MODERATE (H): ICD-10-CM

## 2023-10-20 DIAGNOSIS — F41.1 GENERALIZED ANXIETY DISORDER WITH PANIC ATTACKS: Primary | ICD-10-CM

## 2023-10-20 DIAGNOSIS — F41.0 GENERALIZED ANXIETY DISORDER WITH PANIC ATTACKS: Primary | ICD-10-CM

## 2023-10-20 PROCEDURE — 90834 PSYTX W PT 45 MINUTES: CPT | Mod: VID

## 2023-10-20 ASSESSMENT — ANXIETY QUESTIONNAIRES
4. TROUBLE RELAXING: MORE THAN HALF THE DAYS
IF YOU CHECKED OFF ANY PROBLEMS ON THIS QUESTIONNAIRE, HOW DIFFICULT HAVE THESE PROBLEMS MADE IT FOR YOU TO DO YOUR WORK, TAKE CARE OF THINGS AT HOME, OR GET ALONG WITH OTHER PEOPLE: VERY DIFFICULT
1. FEELING NERVOUS, ANXIOUS, OR ON EDGE: NEARLY EVERY DAY
6. BECOMING EASILY ANNOYED OR IRRITABLE: SEVERAL DAYS
5. BEING SO RESTLESS THAT IT IS HARD TO SIT STILL: NOT AT ALL
3. WORRYING TOO MUCH ABOUT DIFFERENT THINGS: NEARLY EVERY DAY
2. NOT BEING ABLE TO STOP OR CONTROL WORRYING: NEARLY EVERY DAY
GAD7 TOTAL SCORE: 15
7. FEELING AFRAID AS IF SOMETHING AWFUL MIGHT HAPPEN: NEARLY EVERY DAY
GAD7 TOTAL SCORE: 15

## 2023-10-20 ASSESSMENT — PATIENT HEALTH QUESTIONNAIRE - PHQ9
10. IF YOU CHECKED OFF ANY PROBLEMS, HOW DIFFICULT HAVE THESE PROBLEMS MADE IT FOR YOU TO DO YOUR WORK, TAKE CARE OF THINGS AT HOME, OR GET ALONG WITH OTHER PEOPLE: VERY DIFFICULT
SUM OF ALL RESPONSES TO PHQ QUESTIONS 1-9: 11
SUM OF ALL RESPONSES TO PHQ QUESTIONS 1-9: 11

## 2023-10-20 NOTE — PROGRESS NOTES
[facility]                                     Progress Note    Patient Name: Екатерина Ramon  Date: 10/20/23         Service Type: Individual      Session Start Time: 8:00am  Session End Time: 8:38am     Session Length: 38 minutes    Session #: 2    Attendees: Client attended alone    Service Modality:  Video Visit:      Provider verified identity through the following two step process.  Patient provided:  Patient is known previously to provider    Telemedicine Visit: The patient's condition can be safely assessed and treated via synchronous audio and visual telemedicine encounter.      Reason for Telemedicine Visit: Patient has requested telehealth visit and Patient convenience (e.g. access to timely appointments / distance to available provider)    Originating Site (Patient Location): Patient's home    Distant Site (Provider Location): Provider Remote Setting- Home Office    Consent:  The patient/guardian has verbally consented to: the potential risks and benefits of telemedicine (video visit) versus in person care; bill my insurance or make self-payment for services provided; and responsibility for payment of non-covered services.     Patient would like the video invitation sent by:  My Chart    Mode of Communication:  Video Conference via [employer]well    Distant Location (Provider):  Off-site    As the provider I attest to compliance with applicable laws and regulations related to telemedicine.    DATA  Interactive Complexity: No  Crisis: No        Progress Since Last Session (Related to Symptoms / Goals / Homework):   Symptoms: No change in anxiety symptoms since last appointment    Homework: Partially completed      Episode of Care Goals: Satisfactory progress - ACTION (Actively working towards change); Intervened by reinforcing change plan / affirming steps taken     Current / Ongoing Stressors and Concerns:   Pt reports main stressor has been work due to the stress and performance plan she  "was put on. Pt reports mood since last session has been \"anxious\". Pt and provider discussed her work situation, stress response she has been experiencing, the values in past jobs that she has enjoyed vs the values she sees in her current job, and coping skills to help manage anxiety while she continue to be in her current position. Pt appears to have gained insight into her desire for change in her job and the things she would like to see when she makes that change. Pt appears to be working toward allowing herself room to \"take time for [her]\" and challenging anxious thoughts. Pt denies any SI, SIB, or HI since last session. See below for plan of care until next appointment.     Treatment Objective(s) Addressed in This Session:   use cognitive strategies identified in therapy to challenge anxious thoughts  Improve quantity and quality of night time sleep / decrease daytime naps  Identify negative self-talk and behaviors: challenge core beliefs, myths, and actions  Improve concentration, focus, and mindfulness in daily activities        Intervention:   CBT: thought challenging, identification of maladaptive automatic thoughts  Solution Focused: specific actions to take to manage stress at work, behavioral activation to manage short-term anxiety/stressors    Assessments completed prior to visit:  The following assessments were completed by patient for this visit:  PROMIS 10-Global Health (all questions and answers displayed):       12/30/2021     8:36 AM 1/21/2022     8:40 AM 1/31/2022     4:30 PM 1/27/2023    12:07 PM 2/10/2023     7:44 AM 9/12/2023     7:21 AM   PROMIS 10   In general, would you say your health is: Good Good Good Good Good Fair   In general, would you say your quality of life is: Good Fair Good Good Good Good   In general, how would you rate your physical health? Fair Fair Good Good Good Fair   In general, how would you rate your mental health, including your mood and your ability to think? Fair " Poor Fair Poor Poor Poor   In general, how would you rate your satisfaction with your social activities and relationships? Good Good Good Good Good Good   In general, please rate how well you carry out your usual social activities and roles Fair Fair Good Fair Poor Good   To what extent are you able to carry out your everyday physical activities such as walking, climbing stairs, carrying groceries, or moving a chair? Completely Mostly Completely Completely Completely Completely   In the past 7 days, how often have you been bothered by emotional problems such as feeling anxious, depressed, or irritable? Often Often Sometimes Always Always Always   In the past 7 days, how would you rate your fatigue on average? Severe Very severe Severe Very severe Severe Severe   In the past 7 days, how would you rate your pain on average, where 0 means no pain, and 10 means worst imaginable pain? 8 8 8 3 6 8   In general, would you say your health is: 3 3 3 3 3 2   In general, would you say your quality of life is: 3 2 3 3 3 3   In general, how would you rate your physical health? 2 2 3 3 3 2   In general, how would you rate your mental health, including your mood and your ability to think? 2 1 2 1 1 1   In general, how would you rate your satisfaction with your social activities and relationships? 3 3 3 3 3 3   In general, please rate how well you carry out your usual social activities and roles. (This includes activities at home, at work and in your community, and responsibilities as a parent, child, spouse, employee, friend, etc.) 2 2 3 2 1 3   To what extent are you able to carry out your everyday physical activities such as walking, climbing stairs, carrying groceries, or moving a chair? 5 4 5 5 5 5   In the past 7 days, how often have you been bothered by emotional problems such as feeling anxious, depressed, or irritable? 4 4 3 5 5 5   In the past 7 days, how would you rate your fatigue on average? 4 5 4 5 4 4   In the past 7  days, how would you rate your pain on average, where 0 means no pain, and 10 means worst imaginable pain? 8 8 8 3 6 8   Global Mental Health Score 10 8 11 8 8 8   Global Physical Health Score 11 9 12 13 13 11   PROMIS TOTAL - SUBSCORES 21 17 23 21 21 19         ASSESSMENT: Current Emotional / Mental Status (status of significant symptoms):   Risk status (Self / Other harm or suicidal ideation)   Patient denies current fears or concerns for personal safety.   Patient denies current or recent suicidal ideation or behaviors.   Patient denies current or recent homicidal ideation or behaviors.   Patient denies current or recent self injurious behavior or ideation.   Patient denies other safety concerns.   Patient reports there has been no change in risk factors since their last session.     Patient reports there has been no change in protective factors since their last session.     Recommended that patient call 911 or go to the local ED should there be a change in any of these risk factors.     Appearance:   Appropriate    Eye Contact:   Good    Psychomotor Behavior: Normal    Attitude:   Cooperative    Orientation:   All   Speech    Rate / Production: Normal     Volume:  Normal    Mood:    Anxious    Affect:    Appropriate    Thought Content:  Clear    Thought Form:  Coherent  Logical    Insight:    Fair      Medication Review:   No changes to current psychiatric medication(s)     Medication Compliance:   Yes     Changes in Health Issues:   None reported     Chemical Use Review:   Substance Use: Chemical use reviewed, no active concerns identified      Tobacco Use: No current tobacco use.      Diagnosis:  Generalized Anxiety Disorder with Panic Attacks  Major depressive disorder, single episode, moderate    Collateral Reports Completed:   Not Applicable    PLAN: (Patient Tasks / Therapist Tasks / Other)  Pt is to use thought challenging to challenge maladaptive automatic thoughts of self and the world around her.   Pt  "is to practice being present while on her trip and celebrating her birthday.   Pt is to continue to search for a job that will bring a sense of purpose/satisfaction into her life.       Aida Gifford, Psychotherapist Trainee, [facility]     10/20/23    This note has been reviewed and I agree with the plan of care. This note is co-signed by Barbara Hill MA, [facility] Supervisor, on: 10/23/2023                                                           ______________________________________________________________________    Individual Treatment Plan    Patient's Name: Екатерина Ramon  YOB: 1985    Date of Creation: 9/12/23  Date Treatment Plan Last Reviewed/Revised: 9/12/23    DSM5 Diagnoses: 300.02 (F41.1) Generalized Anxiety Disorder with panic attacks, Major depressive disorder, single episode, moderate  Psychosocial / Contextual Factors: ongoing concerns with job and ability to perform needed tasks  PROMIS (reviewed every 90 days): 19    Referral / Collaboration:  Referral to another professional/service is not indicated at this time..    Anticipated number of session for this episode of care: 6-9 sessions  Anticipation frequency of session: Every other week  Anticipated Duration of each session: 38-52 minutes  Treatment plan will be reviewed in 90 days or when goals have been changed.       MeasurableTreatment Goal(s) related to diagnosis / functional impairment(s)  Goal 1: Pt will reduce anxiety symptoms by 75 % as according to BERENICE-7 scores.    I will know I've met my goal when I feel that I am able to be aware of my anxiety and control it easier.\"     Objective #A  Patient will use at least 5 coping skills for anxiety management in the next 12 weeks.  Status: New - Date: 9/12/23    Intervention(s)  Therapist will teach  distress tolerance and crisis intervention skills. Therapist will practice and role play with pt during session .    Objective #B Pt will learn and implement on a consistent basis " "relaxation skills to help manage physical symptoms of anxiety   Status: New - Date: 9/12/23    Intervention(s)  Therapist will assign homework of thinking logs and thinking pattern recognition exercises and discuss the findings with pt during session to prompt insight into maladaptive thinking patterns. .    Objective #C   Patient will use cognitive strategies identified in therapy to challenge anxious thoughts.   Status: New - Date: 9/12/23    Intervention(s)  Therapist will teach pt how to identify anxious thoughts and phrases to challenge the cognitions. .    Goal 2: Pt will reduce depressive symptoms by 50% according to PHQ-9 scores.  \"I know when I have met my goal when I am feeling better and out of this funk\".      Objective #A  Pt will recognize depressive thinking patterns and identify thoughts leading to depressive thinking.  Status: New - Date: 9/12/23    Intervention(s)  Pt will learn about maladaptive thinking patterns in session and be assigned homework of recognizing those thinking patterns, emotions, and follow up thoughts/behaviors outside of therapy. Pt will being to next sessions to discuss.    Objective #B  Pt will implement a consistent night time routine and work on getting enough rest at night, as well as limit the amount of naps she is taking throughout the day.  Status: New - Date: 9/12/23    Intervention(s)  Pt will learn about sleeping routines and create one for herself outside of session. Pt will be asked to implement it on a regular basis and come to next appointments to discuss it.    Objective #C  Pt will engage in activities she has enjoyed doing in the past while focusing on the positive things of the activity.  Status: New - Date: 9/12/23    Intervention(s)  Pt will learn about emotion regulation skills related to focusing on positive events/outcomes to challenge maladaptive patterns in her brain. Pt is to engage in those activities in a safe way outside of session, make a list of " positive things about the activity or the outcome of the activity, and bring to next sessions to discuss.      Patient has reviewed and agreed to the above plan.      Aida Gifford, Psychotherapist Trainee, [facility]

## 2023-11-21 ENCOUNTER — MYC MEDICAL ADVICE (OUTPATIENT)
Dept: PODIATRY | Facility: CLINIC | Age: 38
End: 2023-11-21
Payer: COMMERCIAL

## 2023-11-22 NOTE — CONFIDENTIAL NOTE
Parking pass completed and left at the desk at the [facility] for   patient informed  Carlita Handy CMA

## 2023-11-27 ENCOUNTER — OFFICE VISIT (OUTPATIENT)
Dept: FAMILY MEDICINE | Facility: CLINIC | Age: 38
End: 2023-11-27
Payer: COMMERCIAL

## 2023-11-27 VITALS
TEMPERATURE: 98.2 F | HEIGHT: 62 IN | DIASTOLIC BLOOD PRESSURE: 86 MMHG | BODY MASS INDEX: 34.96 KG/M2 | OXYGEN SATURATION: 97 % | RESPIRATION RATE: 16 BRPM | HEART RATE: 98 BPM | SYSTOLIC BLOOD PRESSURE: 142 MMHG | WEIGHT: 190 LBS

## 2023-11-27 DIAGNOSIS — R03.0 ELEVATED BP WITHOUT DIAGNOSIS OF HYPERTENSION: ICD-10-CM

## 2023-11-27 DIAGNOSIS — F33.41 RECURRENT MAJOR DEPRESSIVE DISORDER, IN PARTIAL REMISSION (H): ICD-10-CM

## 2023-11-27 DIAGNOSIS — Z01.818 PREOP GENERAL PHYSICAL EXAM: Primary | ICD-10-CM

## 2023-11-27 DIAGNOSIS — F41.1 GAD (GENERALIZED ANXIETY DISORDER): ICD-10-CM

## 2023-11-27 DIAGNOSIS — M21.612 BUNION, LEFT: ICD-10-CM

## 2023-11-27 LAB
HCG UR QL: NEGATIVE
HGB BLD-MCNC: 13.8 G/DL (ref 11.7–15.7)

## 2023-11-27 PROCEDURE — 85018 HEMOGLOBIN: CPT | Performed by: PHYSICIAN ASSISTANT

## 2023-11-27 PROCEDURE — 99214 OFFICE O/P EST MOD 30 MIN: CPT | Performed by: PHYSICIAN ASSISTANT

## 2023-11-27 PROCEDURE — 36415 COLL VENOUS BLD VENIPUNCTURE: CPT | Performed by: PHYSICIAN ASSISTANT

## 2023-11-27 PROCEDURE — 81025 URINE PREGNANCY TEST: CPT | Performed by: PHYSICIAN ASSISTANT

## 2023-11-27 ASSESSMENT — PATIENT HEALTH QUESTIONNAIRE - PHQ9
10. IF YOU CHECKED OFF ANY PROBLEMS, HOW DIFFICULT HAVE THESE PROBLEMS MADE IT FOR YOU TO DO YOUR WORK, TAKE CARE OF THINGS AT HOME, OR GET ALONG WITH OTHER PEOPLE: SOMEWHAT DIFFICULT
SUM OF ALL RESPONSES TO PHQ QUESTIONS 1-9: 6
SUM OF ALL RESPONSES TO PHQ QUESTIONS 1-9: 6

## 2023-11-27 ASSESSMENT — PAIN SCALES - GENERAL: PAINLEVEL: NO PAIN (0)

## 2023-11-27 NOTE — PROGRESS NOTES
[facility]  [address] [facility] [address]  Phone: [phone]  Primary Provider: Ghanshyam Dixon  Pre-op Performing Provider: KEHR, KRISTEN M      PREOPERATIVE EVALUATION:  Today's date: 11/27/2023    Екатерина is a 38 year old, presenting for the following:  Pre-Op Exam        11/27/2023     1:08 PM   Additional Questions   Roomed by Latoya   Accompanied by Self       Surgical Information:  Surgery/Procedure: Left first tarsometatarsal joint fusion.  Left Jackson bunionectomy   Surgery Location: [facility] [place]  Surgeon: Zack Healy DPM  Surgery Date: 12/5/23  Time of Surgery: TBD  Where patient plans to recover: At home with family  Fax number for surgical facility: Note does not need to be faxed, will be available electronically in Epic.    Assessment & Plan     The proposed surgical procedure is considered LOW risk.    Preop general physical exam  Bunion, left  - HCG Qual, Urine ([id number]); Future  - Hemoglobin; Future  - HCG Qual, Urine ([id number])  - Hemoglobin    BERENICE (generalized anxiety disorder)  stable    Recurrent major depressive disorder, in partial remission (H24)  stable    Elevated BP without diagnosis of hypertension  Improvement on recheck. Mild elevation.   She will continue to monitor at home, plan recheck with her primary provider after surgery.             - No identified additional risk factors other than previously addressed    Antiplatelet or Anticoagulation Medication Instructions:   - Patient is on no antiplatelet or anticoagulation medications.    Additional Medication Instructions:  Patient is to take all scheduled medications on the day of surgery    RECOMMENDATION:  APPROVAL GIVEN to proceed with proposed procedure, without further diagnostic evaluation.            Subjective       HPI related to upcoming procedure: Екатерина has a bunion on the left side causing pain. She will be having  surgery.         11/25/2023     1:46 PM    Preop Questions   1. Have you ever had a heart attack or stroke? No   2. Have you ever had surgery on your heart or blood vessels, such as a stent placement, a coronary artery bypass, or surgery on an artery in your head, neck, heart, or legs? No   3. Do you have chest pain with activity? No   4. Do you have a history of  heart failure? No   5. Do you currently have a cold, bronchitis or symptoms of other infection? No   6. Do you have a cough, shortness of breath, or wheezing? No   7. Do you or anyone in your family have previous history of blood clots? No   8. Do you or does anyone in your family have a serious bleeding problem such as prolonged bleeding following surgeries or cuts? No   9. Have you ever had problems with anemia or been told to take iron pills? No   10. Have you had any abnormal blood loss such as black, tarry or bloody stools, or abnormal vaginal bleeding? No   11. Have you ever had a blood transfusion? No   12. Are you willing to have a blood transfusion if it is medically needed before, during, or after your surgery? Yes   13. Have you or any of your relatives ever had problems with anesthesia? No   14. Do you have sleep apnea, excessive snoring or daytime drowsiness? No   15. Do you have any artifical heart valves or other implanted medical devices like a pacemaker, defibrillator, or continuous glucose monitor? No   16. Do you have artificial joints? No   17. Are you allergic to latex? No   18. Is there any chance that you may be pregnant? No       Health Care Directive:  Patient does not have a Health Care Directive or Living Will: Patient states has Advance Directive and will bring in a copy to clinic.    Preoperative Review of :   reviewed - controlled substances reflected in medication list.      Status of Chronic Conditions:  DEPRESSION / ANXIETY- Patient has a long history of Depression and anxiety of moderate severity requiring medication for control with recent symptoms being  stable..Current symptoms of depression include none.     Review of Systems  CONSTITUTIONAL: NEGATIVE for fever, chills, change in weight  INTEGUMENTARY/SKIN: NEGATIVE for worrisome rashes, moles or lesions  EYES: NEGATIVE for vision changes or irritation  ENT/MOUTH: NEGATIVE for ear, mouth and throat problems  RESP: NEGATIVE for significant cough or SOB  CV: NEGATIVE for chest pain, palpitations or peripheral edema  GI: NEGATIVE for nausea, abdominal pain, heartburn, or change in bowel habits  : NEGATIVE for frequency, dysuria, or hematuria  MUSCULOSKELETAL: NEGATIVE for significant arthralgias or myalgia  NEURO: NEGATIVE for weakness, dizziness or paresthesias  ENDOCRINE: NEGATIVE for temperature intolerance, skin/hair changes  HEME: NEGATIVE for bleeding problems  PSYCHIATRIC: NEGATIVE for changes in mood or affect    Patient Active Problem List    Diagnosis Date Noted    Major depression, recurrent (H24) 06/01/2023     Priority: Medium    BERENICE (generalized anxiety disorder) 12/27/2022     Priority: Medium    Bunion, left 09/22/2022     Priority: Medium     Added automatically from request for surgery [id number]      S/P laparoscopic sleeve gastrectomy 07/07/2021     Priority: Medium     1/2021 Dr. Stein  preop 232lb (BMI 42)       IUD (intrauterine device) in place 12/13/2019     Priority: Medium     Mirena placed 12/13/2019        Menorrhagia with regular cycle 11/15/2019     Priority: Medium    Moderate major depression (H) 11/15/2019     Priority: Medium    Psychophysiological insomnia 11/15/2019     Priority: Medium    Migraine with aura and without status migrainosus, not intractable 11/02/2016     Priority: Medium    Chronic bilateral thoracic back pain 08/10/2016     Priority: Medium    Chronic midline low back pain without sciatica 08/10/2016     Priority: Medium    CTS (carpal tunnel syndrome) 04/27/2015     Priority: Medium    Hypertension goal BP (blood pressure) < 140/90 02/04/2015     Priority: Medium     Family history of diabetes mellitus 02/22/2012     Priority: Medium    Dysthymic disorder 08/08/2011     Priority: Medium    CARDIOVASCULAR SCREENING; LDL GOAL LESS THAN 160 10/31/2010     Priority: Medium      Past Medical History:   Diagnosis Date    Depressive disorder     Family history of diabetes mellitus     Hypertension     Migraine with aura and without status migrainosus, not intractable 11/02/2016    PID (acute pelvic inflammatory disease) 05/2010     Past Surgical History:   Procedure Laterality Date    APPENDECTOMY      BUNIONECTOMY LAPIDUS WITH TARSAL METATARSAL (TMT) FUSION Right 08/14/2018    Procedure: BUNIONECTOMY LAPIDUS WITH TARSAL METATARSAL (TMT) FUSION;  Right first tarsometatarsal joint fusion, right aJckson bunionectomy;  Surgeon: Zack Healy DPM;  Location: [facility] OR    HC REVISE MEDIAN N/CARPAL TUNNEL SURG Left 06/05/2015    Primary, not revision    LAPAROSCOPIC GASTRIC SLEEVE N/A 1/4/2021    Procedure: GASTRECTOMY, SLEEVE, LAPAROSCOPIC;  Surgeon: Saud Stein MD;  Location: [facility] OR    RELEASE CARPAL TUNNEL Left 06/05/2015    Procedure: RELEASE CARPAL TUNNEL;  Surgeon: Juan Jose Joaquin MD;  Location: [facility] OR     Current Outpatient Medications   Medication Sig Dispense Refill    ALPRAZolam (XANAX) 0.25 MG tablet Take 1-2 tablets (0.25-0.5 mg) by mouth 2 times daily as needed for anxiety 20 tabs to last 30 days 20 tablet 0    busPIRone (BUSPAR) 10 MG tablet Take 2 tablets (20 mg) by mouth 2 times daily 120 tablet 1    DULoxetine (CYMBALTA) 20 MG capsule Take one cap by mouth daily WITH 40 mg cap for total daily dose 60 mg. 90 capsule 0    DULoxetine HCl 40 MG CPEP Take 40 mg by mouth See Admin Instructions Take one cap by mouth WITH 20 mg cap for total daily dose 60 mg.      hydrOXYzine (ATARAX) 10 MG tablet Take 1-3 tablets (10-30 mg) by mouth 3 times daily as needed (anxiety, sleep) 90 tablet 1    methocarbamol (ROBAXIN) 500 MG tablet Take 1 tablet (500 mg) by mouth 4 times  "daily as needed for muscle spasms 60 tablet 1    omeprazole (PRILOSEC) 20 MG DR capsule Take 1 capsule (20 mg) by mouth every morning (before breakfast) 60 capsule 4    propranolol (INDERAL) 20 MG tablet Take 1 tablet (20 mg) by mouth 3 times daily 120 tablet 1    SUMAtriptan (IMITREX) 25 MG tablet Take 1-2 tablets (25-50 mg) by mouth at onset of headache for migraine 50 tablet 1    traZODone (DESYREL) 50 MG tablet Take 1-2 tablets ( mg) by mouth nightly as needed for sleep 180 tablet 0       Allergies   Allergen Reactions    Oxycodone Itching    Lisinopril Cough    Zoloft      tired        Social History     Tobacco Use    Smoking status: Former     Types: Cigarettes     Quit date: 2010     Years since quittin.5    Smokeless tobacco: Never   Substance Use Topics    Alcohol use: Yes     Comment: Rare     Family History   Problem Relation Age of Onset    Diabetes Mother     Hypertension Mother     Cancer Mother         skin    Other Cancer Mother     Diabetes Father     Breast Cancer Paternal Grandmother     Anesthesia Reaction Son         PONV    Breast Cancer Cousin     Glaucoma No family hx of     Macular Degeneration No family hx of     Deep Vein Thrombosis (DVT) No family hx of      History   Drug Use Unknown         Objective     BP (!) 142/86   Pulse 98   Temp 98.2  F (36.8  C) (Tympanic)   Resp 16   Ht 1.575 m (5' 2\")   Wt 86.2 kg (190 lb)   LMP  (LMP Unknown)   SpO2 97%   BMI 34.75 kg/m      Physical Exam    GENERAL APPEARANCE: healthy, alert and no distress     EYES: EOMI, PERRL     HENT: ear canals and TM's normal and nose and mouth without ulcers or lesions     NECK: no adenopathy, no asymmetry, masses, or scars and thyroid normal to palpation     RESP: lungs clear to auscultation - no rales, rhonchi or wheezes     CV: regular rates and rhythm, normal S1 S2, no S3 or S4 and no murmur, click or rub     ABDOMEN:  soft, nontender, no HSM or masses and bowel sounds normal     MS: " extremities normal- no gross deformities noted, no evidence of inflammation in joints, FROM in all extremities.     SKIN: no suspicious lesions or rashes     NEURO: Normal strength and tone, sensory exam grossly normal, mentation intact and speech normal     PSYCH: mentation appears normal. and affect normal/bright     LYMPHATICS: No cervical adenopathy    Recent Labs   Lab Test 04/21/23  0958 12/27/22  1102   HGB 13.4 13.1    259    138   POTASSIUM 4.0 3.5   CR 0.74 0.60        Diagnostics:  Recent Results (from the past 24 hour(s))   HCG Qual, Urine ([id number])    Collection Time: 11/27/23  1:44 PM   Result Value Ref Range    hCG Urine Qualitative Negative Negative   Hemoglobin    Collection Time: 11/27/23  1:44 PM   Result Value Ref Range    Hemoglobin 13.8 11.7 - 15.7 g/dL      No EKG required, no history of coronary heart disease, significant arrhythmia, peripheral arterial disease or other structural heart disease.    Revised Cardiac Risk Index (RCRI):  The patient has the following serious cardiovascular risks for perioperative complications:   - No serious cardiac risks = 0 points     RCRI Interpretation: 0 points: Class I (very low risk - 0.4% complication rate)         Signed Electronically by: Kristen M. Kehr, PA-C  Copy of this evaluation report is provided to requesting physician.

## 2023-11-27 NOTE — PATIENT INSTRUCTIONS
Preparing for Your Surgery  Getting started  A nurse will call you to review your health history and instructions. They will give you an arrival time based on your scheduled surgery time. Please be ready to share:  Your doctor's clinic name and phone number  Your medical, surgical, and anesthesia history  A list of allergies and sensitivities  A list of medicines, including herbal treatments and over-the-counter drugs  Whether the patient has a legal guardian (ask how to send us the papers in advance)  Please tell us if you're pregnant--or if there's any chance you might be pregnant. Some surgeries may injure a fetus (unborn baby), so they require a pregnancy test. Surgeries that are safe for a fetus don't always need a test, and you can choose whether to have one.   If you have a child who's having surgery, please ask for a copy of Preparing for Your Child's Surgery.    Preparing for surgery  Within 10 to 30 days of surgery: Have a pre-op exam (sometimes called an H&P, or History and Physical). This can be done at a clinic or pre-operative center.  If you're having a , you may not need this exam. Talk to your care team.  At your pre-op exam, talk to your care team about all medicines you take. If you need to stop any medicines before surgery, ask when to start taking them again.  We do this for your safety. Many medicines can make you bleed too much during surgery. Some change how well surgery (anesthesia) drugs work.  Call your insurance company to let them know you're having surgery. (If you don't have insurance, call [phone].)  Call your clinic if there's any change in your health. This includes signs of a cold or flu (sore throat, runny nose, cough, rash, fever). It also includes a scrape or scratch near the surgery site.  If you have questions on the day of surgery, call your hospital or surgery center.  Eating and drinking guidelines  For your safety: Unless your surgeon tells you otherwise,  follow the guidelines below.  Eat and drink as usual until 8 hours before you arrive for surgery. After that, no food or milk.  Drink clear liquids until 2 hours before you arrive. These are liquids you can see through, like water, Gatorade, and Propel Water. They also include plain black coffee and tea (no cream or milk), candy, and breath mints. You can spit out gum when you arrive.  If you drink alcohol: Stop drinking it the night before surgery.  If your care team tells you to take medicine on the morning of surgery, it's okay to take it with a sip of water.  Preventing infection  Shower or bathe the night before and morning of your surgery. Follow the instructions your clinic gave you. (If no instructions, use regular soap.)  Don't shave or clip hair near your surgery site. We'll remove the hair if needed.  Don't smoke or vape the morning of surgery. You may chew nicotine gum up to 2 hours before surgery. A nicotine patch is okay.  Note: Some surgeries require you to completely quit smoking and nicotine. Check with your surgeon.  Your care team will make every effort to keep you safe from infection. We will:  Clean our hands often with soap and water (or an alcohol-based hand rub).  Clean the skin at your surgery site with a special soap that kills germs.  Give you a special gown to keep you warm. (Cold raises the risk of infection.)  Wear special hair covers, masks, gowns and gloves during surgery.  Give antibiotic medicine, if prescribed. Not all surgeries need antibiotics.  What to bring on the day of surgery  Photo ID and insurance card  Copy of your health care directive, if you have one  Glasses and hearing aids (bring cases)  You can't wear contacts during surgery  Inhaler and eye drops, if you use them (tell us about these when you arrive)  CPAP machine or breathing device, if you use them  A few personal items, if spending the night  If you have . . .  A pacemaker, ICD (cardiac defibrillator) or other  implant: Bring the ID card.  An implanted stimulator: Bring the remote control.  A legal guardian: Bring a copy of the certified (court-stamped) guardianship papers.  Please remove any jewelry, including body piercings. Leave jewelry and other valuables at home.  If you're going home the day of surgery  You must have a responsible adult drive you home. They should stay with you overnight as well.  If you don't have someone to stay with you, and you aren't safe to go home alone, we may keep you overnight. Insurance often won't pay for this.  After surgery  If it's hard to control your pain or you need more pain medicine, please call your surgeon's office.  Questions?   If you have any questions for your care team, list them here: _________________________________________________________________________________________________________________________________________________________________________ ____________________________________ ____________________________________ ____________________________________  For informational purposes only. Not to replace the advice of your health care provider. Copyright   2003, 2019 [facility]. All rights reserved. Clinically reviewed by Saige Messina MD. [employer] 301491 - REV 12/22.    How to Take Your Medication Before Surgery  - Take all of your medications before surgery as usual

## 2023-11-27 NOTE — TELEPHONE ENCOUNTER
APPLICATION FOR DISABILITY PARKING CERTIFICATE Form     Екатерина is wanting her son to pick this up.    Chicho Guzmán,

## 2023-11-27 NOTE — H&P (VIEW-ONLY)
[facility]  [address] [facility] [address]  Phone: [phone]  Primary Provider: Ghanshyam Dixon  Pre-op Performing Provider: KEHR, KRISTEN M      PREOPERATIVE EVALUATION:  Today's date: 11/27/2023    Екатерина is a 38 year old, presenting for the following:  Pre-Op Exam        11/27/2023     1:08 PM   Additional Questions   Roomed by Latoya   Accompanied by Self       Surgical Information:  Surgery/Procedure: Left first tarsometatarsal joint fusion.  Left Jackson bunionectomy   Surgery Location: [facility] [place]  Surgeon: Zack Healy DPM  Surgery Date: 12/5/23  Time of Surgery: TBD  Where patient plans to recover: At home with family  Fax number for surgical facility: Note does not need to be faxed, will be available electronically in Epic.    Assessment & Plan     The proposed surgical procedure is considered LOW risk.    Preop general physical exam  Bunion, left  - HCG Qual, Urine ([id number]); Future  - Hemoglobin; Future  - HCG Qual, Urine ([id number])  - Hemoglobin    BERENICE (generalized anxiety disorder)  stable    Recurrent major depressive disorder, in partial remission (H24)  stable    Elevated BP without diagnosis of hypertension  Improvement on recheck. Mild elevation.   She will continue to monitor at home, plan recheck with her primary provider after surgery.             - No identified additional risk factors other than previously addressed    Antiplatelet or Anticoagulation Medication Instructions:   - Patient is on no antiplatelet or anticoagulation medications.    Additional Medication Instructions:  Patient is to take all scheduled medications on the day of surgery    RECOMMENDATION:  APPROVAL GIVEN to proceed with proposed procedure, without further diagnostic evaluation.            Subjective       HPI related to upcoming procedure: Екатерина has a bunion on the left side causing pain. She will be having  surgery.         11/25/2023     1:46 PM    Preop Questions   1. Have you ever had a heart attack or stroke? No   2. Have you ever had surgery on your heart or blood vessels, such as a stent placement, a coronary artery bypass, or surgery on an artery in your head, neck, heart, or legs? No   3. Do you have chest pain with activity? No   4. Do you have a history of  heart failure? No   5. Do you currently have a cold, bronchitis or symptoms of other infection? No   6. Do you have a cough, shortness of breath, or wheezing? No   7. Do you or anyone in your family have previous history of blood clots? No   8. Do you or does anyone in your family have a serious bleeding problem such as prolonged bleeding following surgeries or cuts? No   9. Have you ever had problems with anemia or been told to take iron pills? No   10. Have you had any abnormal blood loss such as black, tarry or bloody stools, or abnormal vaginal bleeding? No   11. Have you ever had a blood transfusion? No   12. Are you willing to have a blood transfusion if it is medically needed before, during, or after your surgery? Yes   13. Have you or any of your relatives ever had problems with anesthesia? No   14. Do you have sleep apnea, excessive snoring or daytime drowsiness? No   15. Do you have any artifical heart valves or other implanted medical devices like a pacemaker, defibrillator, or continuous glucose monitor? No   16. Do you have artificial joints? No   17. Are you allergic to latex? No   18. Is there any chance that you may be pregnant? No       Health Care Directive:  Patient does not have a Health Care Directive or Living Will: Patient states has Advance Directive and will bring in a copy to clinic.    Preoperative Review of :   reviewed - controlled substances reflected in medication list.      Status of Chronic Conditions:  DEPRESSION / ANXIETY- Patient has a long history of Depression and anxiety of moderate severity requiring medication for control with recent symptoms being  stable..Current symptoms of depression include none.     Review of Systems  CONSTITUTIONAL: NEGATIVE for fever, chills, change in weight  INTEGUMENTARY/SKIN: NEGATIVE for worrisome rashes, moles or lesions  EYES: NEGATIVE for vision changes or irritation  ENT/MOUTH: NEGATIVE for ear, mouth and throat problems  RESP: NEGATIVE for significant cough or SOB  CV: NEGATIVE for chest pain, palpitations or peripheral edema  GI: NEGATIVE for nausea, abdominal pain, heartburn, or change in bowel habits  : NEGATIVE for frequency, dysuria, or hematuria  MUSCULOSKELETAL: NEGATIVE for significant arthralgias or myalgia  NEURO: NEGATIVE for weakness, dizziness or paresthesias  ENDOCRINE: NEGATIVE for temperature intolerance, skin/hair changes  HEME: NEGATIVE for bleeding problems  PSYCHIATRIC: NEGATIVE for changes in mood or affect    Patient Active Problem List    Diagnosis Date Noted    Major depression, recurrent (H24) 06/01/2023     Priority: Medium    BERENICE (generalized anxiety disorder) 12/27/2022     Priority: Medium    Bunion, left 09/22/2022     Priority: Medium     Added automatically from request for surgery [id number]      S/P laparoscopic sleeve gastrectomy 07/07/2021     Priority: Medium     1/2021 Dr. Stein  preop 232lb (BMI 42)       IUD (intrauterine device) in place 12/13/2019     Priority: Medium     Mirena placed 12/13/2019        Menorrhagia with regular cycle 11/15/2019     Priority: Medium    Moderate major depression (H) 11/15/2019     Priority: Medium    Psychophysiological insomnia 11/15/2019     Priority: Medium    Migraine with aura and without status migrainosus, not intractable 11/02/2016     Priority: Medium    Chronic bilateral thoracic back pain 08/10/2016     Priority: Medium    Chronic midline low back pain without sciatica 08/10/2016     Priority: Medium    CTS (carpal tunnel syndrome) 04/27/2015     Priority: Medium    Hypertension goal BP (blood pressure) < 140/90 02/04/2015     Priority: Medium     Family history of diabetes mellitus 02/22/2012     Priority: Medium    Dysthymic disorder 08/08/2011     Priority: Medium    CARDIOVASCULAR SCREENING; LDL GOAL LESS THAN 160 10/31/2010     Priority: Medium      Past Medical History:   Diagnosis Date    Depressive disorder     Family history of diabetes mellitus     Hypertension     Migraine with aura and without status migrainosus, not intractable 11/02/2016    PID (acute pelvic inflammatory disease) 05/2010     Past Surgical History:   Procedure Laterality Date    APPENDECTOMY      BUNIONECTOMY LAPIDUS WITH TARSAL METATARSAL (TMT) FUSION Right 08/14/2018    Procedure: BUNIONECTOMY LAPIDUS WITH TARSAL METATARSAL (TMT) FUSION;  Right first tarsometatarsal joint fusion, right Jackson bunionectomy;  Surgeon: Zack Healy DPM;  Location: [facility] OR    HC REVISE MEDIAN N/CARPAL TUNNEL SURG Left 06/05/2015    Primary, not revision    LAPAROSCOPIC GASTRIC SLEEVE N/A 1/4/2021    Procedure: GASTRECTOMY, SLEEVE, LAPAROSCOPIC;  Surgeon: Saud Stein MD;  Location: [facility] OR    RELEASE CARPAL TUNNEL Left 06/05/2015    Procedure: RELEASE CARPAL TUNNEL;  Surgeon: Juan Jose Joaquin MD;  Location: [facility] OR     Current Outpatient Medications   Medication Sig Dispense Refill    ALPRAZolam (XANAX) 0.25 MG tablet Take 1-2 tablets (0.25-0.5 mg) by mouth 2 times daily as needed for anxiety 20 tabs to last 30 days 20 tablet 0    busPIRone (BUSPAR) 10 MG tablet Take 2 tablets (20 mg) by mouth 2 times daily 120 tablet 1    DULoxetine (CYMBALTA) 20 MG capsule Take one cap by mouth daily WITH 40 mg cap for total daily dose 60 mg. 90 capsule 0    DULoxetine HCl 40 MG CPEP Take 40 mg by mouth See Admin Instructions Take one cap by mouth WITH 20 mg cap for total daily dose 60 mg.      hydrOXYzine (ATARAX) 10 MG tablet Take 1-3 tablets (10-30 mg) by mouth 3 times daily as needed (anxiety, sleep) 90 tablet 1    methocarbamol (ROBAXIN) 500 MG tablet Take 1 tablet (500 mg) by mouth 4 times  "daily as needed for muscle spasms 60 tablet 1    omeprazole (PRILOSEC) 20 MG DR capsule Take 1 capsule (20 mg) by mouth every morning (before breakfast) 60 capsule 4    propranolol (INDERAL) 20 MG tablet Take 1 tablet (20 mg) by mouth 3 times daily 120 tablet 1    SUMAtriptan (IMITREX) 25 MG tablet Take 1-2 tablets (25-50 mg) by mouth at onset of headache for migraine 50 tablet 1    traZODone (DESYREL) 50 MG tablet Take 1-2 tablets ( mg) by mouth nightly as needed for sleep 180 tablet 0       Allergies   Allergen Reactions    Oxycodone Itching    Lisinopril Cough    Zoloft      tired        Social History     Tobacco Use    Smoking status: Former     Types: Cigarettes     Quit date: 2010     Years since quittin.5    Smokeless tobacco: Never   Substance Use Topics    Alcohol use: Yes     Comment: Rare     Family History   Problem Relation Age of Onset    Diabetes Mother     Hypertension Mother     Cancer Mother         skin    Other Cancer Mother     Diabetes Father     Breast Cancer Paternal Grandmother     Anesthesia Reaction Son         PONV    Breast Cancer Cousin     Glaucoma No family hx of     Macular Degeneration No family hx of     Deep Vein Thrombosis (DVT) No family hx of      History   Drug Use Unknown         Objective     BP (!) 142/86   Pulse 98   Temp 98.2  F (36.8  C) (Tympanic)   Resp 16   Ht 1.575 m (5' 2\")   Wt 86.2 kg (190 lb)   LMP  (LMP Unknown)   SpO2 97%   BMI 34.75 kg/m      Physical Exam    GENERAL APPEARANCE: healthy, alert and no distress     EYES: EOMI, PERRL     HENT: ear canals and TM's normal and nose and mouth without ulcers or lesions     NECK: no adenopathy, no asymmetry, masses, or scars and thyroid normal to palpation     RESP: lungs clear to auscultation - no rales, rhonchi or wheezes     CV: regular rates and rhythm, normal S1 S2, no S3 or S4 and no murmur, click or rub     ABDOMEN:  soft, nontender, no HSM or masses and bowel sounds normal     MS: " extremities normal- no gross deformities noted, no evidence of inflammation in joints, FROM in all extremities.     SKIN: no suspicious lesions or rashes     NEURO: Normal strength and tone, sensory exam grossly normal, mentation intact and speech normal     PSYCH: mentation appears normal. and affect normal/bright     LYMPHATICS: No cervical adenopathy    Recent Labs   Lab Test 04/21/23  0958 12/27/22  1102   HGB 13.4 13.1    259    138   POTASSIUM 4.0 3.5   CR 0.74 0.60        Diagnostics:  Recent Results (from the past 24 hour(s))   HCG Qual, Urine ([id number])    Collection Time: 11/27/23  1:44 PM   Result Value Ref Range    hCG Urine Qualitative Negative Negative   Hemoglobin    Collection Time: 11/27/23  1:44 PM   Result Value Ref Range    Hemoglobin 13.8 11.7 - 15.7 g/dL      No EKG required, no history of coronary heart disease, significant arrhythmia, peripheral arterial disease or other structural heart disease.    Revised Cardiac Risk Index (RCRI):  The patient has the following serious cardiovascular risks for perioperative complications:   - No serious cardiac risks = 0 points     RCRI Interpretation: 0 points: Class I (very low risk - 0.4% complication rate)         Signed Electronically by: Kristen M. Kehr, PA-C  Copy of this evaluation report is provided to requesting physician.

## 2023-12-04 ENCOUNTER — ANCILLARY ORDERS (OUTPATIENT)
Dept: PODIATRY | Facility: CLINIC | Age: 38
End: 2023-12-04

## 2023-12-04 ENCOUNTER — ANESTHESIA EVENT (OUTPATIENT)
Dept: SURGERY | Facility: AMBULATORY SURGERY CENTER | Age: 38
End: 2023-12-04
Payer: COMMERCIAL

## 2023-12-04 DIAGNOSIS — M79.675 PAIN OF TOE OF LEFT FOOT: Primary | ICD-10-CM

## 2023-12-05 ENCOUNTER — ANCILLARY PROCEDURE (OUTPATIENT)
Dept: GENERAL RADIOLOGY | Facility: CLINIC | Age: 38
End: 2023-12-05
Attending: PODIATRIST
Payer: COMMERCIAL

## 2023-12-05 ENCOUNTER — ANESTHESIA (OUTPATIENT)
Dept: SURGERY | Facility: AMBULATORY SURGERY CENTER | Age: 38
End: 2023-12-05
Payer: COMMERCIAL

## 2023-12-05 ENCOUNTER — HOSPITAL ENCOUNTER (OUTPATIENT)
Facility: AMBULATORY SURGERY CENTER | Age: 38
Discharge: HOME | End: 2023-12-05
Attending: PODIATRIST | Admitting: PODIATRIST
Payer: COMMERCIAL

## 2023-12-05 VITALS
TEMPERATURE: 97.3 F | OXYGEN SATURATION: 95 % | BODY MASS INDEX: 34.75 KG/M2 | WEIGHT: 190 LBS | SYSTOLIC BLOOD PRESSURE: 116 MMHG | DIASTOLIC BLOOD PRESSURE: 73 MMHG | HEART RATE: 87 BPM | RESPIRATION RATE: 18 BRPM

## 2023-12-05 DIAGNOSIS — M21.612 BUNION, LEFT: Primary | ICD-10-CM

## 2023-12-05 DIAGNOSIS — M79.675 PAIN OF TOE OF LEFT FOOT: ICD-10-CM

## 2023-12-05 PROCEDURE — 28292 COR HLX VLGS RSC PRX PHLX BS: CPT | Mod: TA

## 2023-12-05 PROCEDURE — G8907 PT DOC NO EVENTS ON DISCHARG: HCPCS

## 2023-12-05 PROCEDURE — 28750 FUSION OF BIG TOE JOINT: CPT | Mod: 51 | Performed by: PODIATRIST

## 2023-12-05 PROCEDURE — G8916 PT W IV AB GIVEN ON TIME: HCPCS

## 2023-12-05 PROCEDURE — 28292 COR HLX VLGS RSC PRX PHLX BS: CPT | Mod: LT | Performed by: PODIATRIST

## 2023-12-05 PROCEDURE — 28740 FUSION OF FOOT BONES: CPT | Mod: TA

## 2023-12-05 DEVICE — IMP SCR SYN CORTEX 3.5X50MM SELF TAP SS 204.850: Type: IMPLANTABLE DEVICE | Site: FOOT | Status: FUNCTIONAL

## 2023-12-05 RX ORDER — ONDANSETRON 2 MG/ML
4 INJECTION INTRAMUSCULAR; INTRAVENOUS EVERY 30 MIN PRN
Status: DISCONTINUED | OUTPATIENT
Start: 2023-12-05 | End: 2023-12-06 | Stop reason: HOSPADM

## 2023-12-05 RX ORDER — ONDANSETRON 4 MG/1
4 TABLET, ORALLY DISINTEGRATING ORAL EVERY 30 MIN PRN
Status: DISCONTINUED | OUTPATIENT
Start: 2023-12-05 | End: 2023-12-06 | Stop reason: HOSPADM

## 2023-12-05 RX ORDER — PROPOFOL 10 MG/ML
INJECTION, EMULSION INTRAVENOUS PRN
Status: DISCONTINUED | OUTPATIENT
Start: 2023-12-05 | End: 2023-12-05

## 2023-12-05 RX ORDER — FENTANYL CITRATE 50 UG/ML
INJECTION, SOLUTION INTRAMUSCULAR; INTRAVENOUS PRN
Status: DISCONTINUED | OUTPATIENT
Start: 2023-12-05 | End: 2023-12-05

## 2023-12-05 RX ORDER — BUPIVACAINE HYDROCHLORIDE 5 MG/ML
INJECTION, SOLUTION PERINEURAL PRN
Status: DISCONTINUED | OUTPATIENT
Start: 2023-12-05 | End: 2023-12-05 | Stop reason: HOSPADM

## 2023-12-05 RX ORDER — SODIUM CHLORIDE, SODIUM LACTATE, POTASSIUM CHLORIDE, CALCIUM CHLORIDE 600; 310; 30; 20 MG/100ML; MG/100ML; MG/100ML; MG/100ML
INJECTION, SOLUTION INTRAVENOUS CONTINUOUS
Status: DISCONTINUED | OUTPATIENT
Start: 2023-12-05 | End: 2023-12-06 | Stop reason: HOSPADM

## 2023-12-05 RX ORDER — LIDOCAINE 40 MG/G
CREAM TOPICAL
Status: DISCONTINUED | OUTPATIENT
Start: 2023-12-05 | End: 2023-12-06 | Stop reason: HOSPADM

## 2023-12-05 RX ORDER — LIDOCAINE HYDROCHLORIDE 20 MG/ML
INJECTION, SOLUTION INFILTRATION; PERINEURAL PRN
Status: DISCONTINUED | OUTPATIENT
Start: 2023-12-05 | End: 2023-12-05

## 2023-12-05 RX ORDER — CEFAZOLIN SODIUM 2 G/50ML
2 SOLUTION INTRAVENOUS
Status: COMPLETED | OUTPATIENT
Start: 2023-12-05 | End: 2023-12-05

## 2023-12-05 RX ORDER — CEFAZOLIN SODIUM 2 G/50ML
2 SOLUTION INTRAVENOUS SEE ADMIN INSTRUCTIONS
Status: DISCONTINUED | OUTPATIENT
Start: 2023-12-05 | End: 2023-12-06 | Stop reason: HOSPADM

## 2023-12-05 RX ORDER — FENTANYL CITRATE 50 UG/ML
25 INJECTION, SOLUTION INTRAMUSCULAR; INTRAVENOUS EVERY 5 MIN PRN
Status: DISCONTINUED | OUTPATIENT
Start: 2023-12-05 | End: 2023-12-06 | Stop reason: HOSPADM

## 2023-12-05 RX ORDER — HYDROCODONE BITARTRATE AND ACETAMINOPHEN 5; 325 MG/1; MG/1
1-2 TABLET ORAL EVERY 4 HOURS PRN
Qty: 25 TABLET | Refills: 0 | Status: SHIPPED | OUTPATIENT
Start: 2023-12-05

## 2023-12-05 RX ORDER — PROPOFOL 10 MG/ML
INJECTION, EMULSION INTRAVENOUS CONTINUOUS PRN
Status: DISCONTINUED | OUTPATIENT
Start: 2023-12-05 | End: 2023-12-05

## 2023-12-05 RX ORDER — ONDANSETRON 2 MG/ML
INJECTION INTRAMUSCULAR; INTRAVENOUS PRN
Status: DISCONTINUED | OUTPATIENT
Start: 2023-12-05 | End: 2023-12-05

## 2023-12-05 RX ORDER — ACETAMINOPHEN 325 MG/1
975 TABLET ORAL ONCE
Status: COMPLETED | OUTPATIENT
Start: 2023-12-05 | End: 2023-12-05

## 2023-12-05 RX ORDER — FENTANYL CITRATE 50 UG/ML
50 INJECTION, SOLUTION INTRAMUSCULAR; INTRAVENOUS EVERY 5 MIN PRN
Status: DISCONTINUED | OUTPATIENT
Start: 2023-12-05 | End: 2023-12-06 | Stop reason: HOSPADM

## 2023-12-05 RX ADMIN — FENTANYL CITRATE 25 MCG: 50 INJECTION, SOLUTION INTRAMUSCULAR; INTRAVENOUS at 08:29

## 2023-12-05 RX ADMIN — PROPOFOL 70 MG: 10 INJECTION, EMULSION INTRAVENOUS at 07:08

## 2023-12-05 RX ADMIN — LIDOCAINE HYDROCHLORIDE 60 MG: 20 INJECTION, SOLUTION INFILTRATION; PERINEURAL at 07:08

## 2023-12-05 RX ADMIN — ONDANSETRON 4 MG: 2 INJECTION INTRAMUSCULAR; INTRAVENOUS at 07:16

## 2023-12-05 RX ADMIN — FENTANYL CITRATE 50 MCG: 50 INJECTION, SOLUTION INTRAMUSCULAR; INTRAVENOUS at 07:08

## 2023-12-05 RX ADMIN — FENTANYL CITRATE 25 MCG: 50 INJECTION, SOLUTION INTRAMUSCULAR; INTRAVENOUS at 09:03

## 2023-12-05 RX ADMIN — ACETAMINOPHEN 975 MG: 325 TABLET ORAL at 06:35

## 2023-12-05 RX ADMIN — FENTANYL CITRATE 50 MCG: 50 INJECTION, SOLUTION INTRAMUSCULAR; INTRAVENOUS at 07:23

## 2023-12-05 RX ADMIN — CEFAZOLIN SODIUM 2 G: 2 SOLUTION INTRAVENOUS at 07:02

## 2023-12-05 RX ADMIN — PROPOFOL 150 MCG/KG/MIN: 10 INJECTION, EMULSION INTRAVENOUS at 07:08

## 2023-12-05 RX ADMIN — SODIUM CHLORIDE, SODIUM LACTATE, POTASSIUM CHLORIDE, CALCIUM CHLORIDE: 600; 310; 30; 20 INJECTION, SOLUTION INTRAVENOUS at 06:35

## 2023-12-05 RX ADMIN — FENTANYL CITRATE 25 MCG: 50 INJECTION, SOLUTION INTRAMUSCULAR; INTRAVENOUS at 07:49

## 2023-12-05 RX ADMIN — FENTANYL CITRATE 25 MCG: 50 INJECTION, SOLUTION INTRAMUSCULAR; INTRAVENOUS at 09:14

## 2023-12-05 RX ADMIN — PROPOFOL 40 MG: 10 INJECTION, EMULSION INTRAVENOUS at 07:21

## 2023-12-05 ASSESSMENT — LIFESTYLE VARIABLES: TOBACCO_USE: 1

## 2023-12-05 NOTE — DISCHARGE INSTRUCTIONS
Today you received 975 mg of Tylenol/acetaminophen at 6:35 AM. You may repeat this dose after 12:35 PM. Do not take more than 4,000 mg of Tylenol/acetaminophen in a 24 hour period.

## 2023-12-05 NOTE — ANESTHESIA POSTPROCEDURE EVALUATION
Instructions After Anesthesia  Dr. Mauricio Velazco  [phone]      Activity  For the next 24 hours: Do not stay alone, drive a car, use electrical/power tools or appliances, drink alcohol, or sign any legal papers.  For pediatric patients: Please do not leave your child alone, let your child ride a bike, skateboard, do gymnastics, etc.  You may start normal activities as tolerated (unless instructed otherwise.)    Bathing  You may shower.    Diet  Start with a light meal.  If you are not nauseated you may resume your regular diet.    Call if you have:  Fever over 101 degrees.  Nausea or vomiting greater than 12 hours.  Or if you have any questions.   Patient: Екатерина Ramon    Procedure: Procedure(s):  Left first tarsometatarsal joint fusion.  Left Jackson bunionectomy       Anesthesia Type:  No value filed.    Note:  Disposition: Outpatient   Postop Pain Control: Uneventful            Sign Out: Well controlled pain   PONV: No   Neuro/Psych: Uneventful            Sign Out: Acceptable/Baseline neuro status   Airway/Respiratory: Uneventful            Sign Out: Acceptable/Baseline resp. status   CV/Hemodynamics: Uneventful            Sign Out: Acceptable CV status; No obvious hypovolemia; No obvious fluid overload   Other NRE: NONE   DID A NON-ROUTINE EVENT OCCUR? No       Last vitals:  Vitals Value Taken Time   /73 12/05/23 0952   Temp 97.3  F (36.3  C) 12/05/23 0952   Pulse     Resp 18 12/05/23 0952   SpO2 95 % 12/05/23 0920       Electronically Signed By: Juan Jose Shen MD  December 5, 2023  1:59 PM

## 2023-12-05 NOTE — INTERVAL H&P NOTE
"I have reviewed the surgical (or preoperative) H&P that is linked to this encounter, and examined the patient. There are no significant changes    Clinical Conditions Present on Arrival:  Clinically Significant Risk Factors Present on Admission                  # Obesity: Estimated body mass index is 34.75 kg/m  as calculated from the following:    Height as of 11/27/23: 1.575 m (5' 2\").    Weight as of this encounter: 86.2 kg (190 lb).       "

## 2023-12-05 NOTE — OR NURSING
UPT declined. Pt stated she completed a urine pregnancy test at her pre-op and does not have a chance for pregnancy since then.    Reviewed pre-op note from 11/27/23- UPT negative\    MDA informed.

## 2023-12-05 NOTE — ANESTHESIA CARE TRANSFER NOTE
Patient: Екатерина Ramon    Procedure: Procedure(s):  Left first tarsometatarsal joint fusion.  Left Jackson bunionectomy       Diagnosis: Bunion, left [M21.612]  Diagnosis Additional Information: No value filed.    Anesthesia Type:   No value filed.     Note:    Oropharynx: oropharynx clear of all foreign objects  Level of Consciousness: awake  Oxygen Supplementation: room air    Independent Airway: airway patency satisfactory and stable    Vital Signs Stable: post-procedure vital signs reviewed and stable  Report to RN Given: handoff report given  Patient transferred to: Phase II  Comments: Anesthesia Care Transfer Note    Patient: Екатерина Ramon    Transferred to: Phase II    Patient vital signs: stable    Airway: none    Monitors on, breathing spontaneously, report to RN    Letha Webb CRNA  12/5/2023 9:23 AM         Handoff Report: Identifed the Patient, Identified the Reponsible Provider, Reviewed the pertinent medical history, Discussed the surgical course, Reviewed Intra-OP anesthesia mangement and issues during anesthesia, Set expectations for post-procedure period and Allowed opportunity for questions and acknowledgement of understanding  Vitals:  Vitals Value Taken Time   BP     Temp     Pulse     Resp     SpO2         Electronically Signed By: ESTUARDO Arvizu CRNA  December 5, 2023  9:23 AM

## 2023-12-05 NOTE — BRIEF OP NOTE
[facility] Brief Operative Note    Pre-operative diagnosis: Left first TMTJ joint instability  Left bunion   Post-operative diagnosis * No post-op diagnosis entered *  same   Procedure: Procedure(s):  Left first tarsometatarsal joint fusion.  Left Jackson bunionectomy   Surgeon(s): Surgeon(s) and Role:     * Zack Healy DPM - Primary   Estimated blood loss: 1 mL    Specimens: * No specimens in log *   Findings: Bunion, joint instability

## 2023-12-05 NOTE — ANESTHESIA PREPROCEDURE EVALUATION
Anesthesia Pre-Procedure Evaluation    Patient: Екатерина Ramon   MRN: [id number] : 1985        Procedure : Procedure(s):  Left first tarsometatarsal joint fusion.  Left Jackson bunionectomy          Past Medical History:   Diagnosis Date     Depressive disorder      Family history of diabetes mellitus      Hypertension      Migraine with aura and without status migrainosus, not intractable 2016     PID (acute pelvic inflammatory disease) 2010      Past Surgical History:   Procedure Laterality Date     APPENDECTOMY       BUNIONECTOMY LAPIDUS WITH TARSAL METATARSAL (TMT) FUSION Right 2018    Procedure: BUNIONECTOMY LAPIDUS WITH TARSAL METATARSAL (TMT) FUSION;  Right first tarsometatarsal joint fusion, right Jackson bunionectomy;  Surgeon: Zack Healy DPM;  Location: [facility] OR     HC REVISE MEDIAN N/CARPAL TUNNEL SURG Left 2015    Primary, not revision     LAPAROSCOPIC GASTRIC SLEEVE N/A 2021    Procedure: GASTRECTOMY, SLEEVE, LAPAROSCOPIC;  Surgeon: Saud Stein MD;  Location: [facility] OR     RELEASE CARPAL TUNNEL Left 2015    Procedure: RELEASE CARPAL TUNNEL;  Surgeon: Juan Jose Joaquin MD;  Location: [facility] OR      Allergies   Allergen Reactions     Oxycodone Itching     Lisinopril Cough     Zoloft      tired      Social History     Tobacco Use     Smoking status: Former     Types: Cigarettes     Quit date: 2010     Years since quittin.6     Smokeless tobacco: Never   Substance Use Topics     Alcohol use: Yes     Comment: Rare      Wt Readings from Last 1 Encounters:   23 86.2 kg (190 lb)        Anesthesia Evaluation   Pt has had prior anesthetic. Type: General.    No history of anesthetic complications       ROS/MED HX  ENT/Pulmonary:     (+)                tobacco use, Past use,                      Neurologic:     (+)      migraines,                          Cardiovascular:     (+)  hypertension- -   -  - -                                     "  METS/Exercise Tolerance:     Hematologic:  - neg hematologic  ROS     Musculoskeletal:  - neg musculoskeletal ROS     GI/Hepatic:  - neg GI/hepatic ROS     Renal/Genitourinary:  - neg Renal ROS     Endo:  - neg endo ROS     Psychiatric/Substance Use:     (+) psychiatric history 1 and depression       Infectious Disease:  - neg infectious disease ROS     Malignancy:  - neg malignancy ROS     Other:  - neg other ROS        Physical Exam    Airway  airway exam normal           Respiratory Devices and Support         Dental       (+) Completely normal teeth      Cardiovascular   cardiovascular exam normal          Pulmonary   pulmonary exam normal            OUTSIDE LABS:  CBC:   Lab Results   Component Value Date    WBC 9.8 04/21/2023    WBC 6.8 12/27/2022    HGB 13.8 11/27/2023    HGB 13.4 04/21/2023    HCT 40.6 04/21/2023    HCT 39.9 12/27/2022     04/21/2023     12/27/2022     BMP:   Lab Results   Component Value Date     04/21/2023     12/27/2022    POTASSIUM 4.0 04/21/2023    POTASSIUM 3.5 12/27/2022    CHLORIDE 102 04/21/2023    CHLORIDE 106 12/27/2022    CO2 24 04/21/2023    CO2 27 12/27/2022    BUN 11.1 04/21/2023    BUN 13 12/27/2022    CR 0.74 04/21/2023    CR 0.60 12/27/2022    GLC 77 04/21/2023     (H) 12/27/2022     COAGS: No results found for: \"PTT\", \"INR\", \"FIBR\"  POC:   Lab Results   Component Value Date    BGM 86 01/05/2021    HCG Negative 11/27/2023     HEPATIC:   Lab Results   Component Value Date    ALBUMIN 4.7 04/21/2023    PROTTOTAL 7.8 04/21/2023    ALT 24 04/21/2023    AST 34 04/21/2023    ALKPHOS 56 04/21/2023    BILITOTAL 1.0 04/21/2023     OTHER:   Lab Results   Component Value Date    A1C 5.2 11/11/2020    RIMMA 9.5 04/21/2023    MAG 2.1 04/21/2023    TSH 1.59 04/21/2023       Anesthesia Plan    ASA Status:  2    NPO Status:  NPO Appropriate    Anesthesia Type: MAC.     - Reason for MAC: chronic cardiopulmonary disease   Induction: Intravenous, Propofol. " "  Maintenance: TIVA.        Consents    Anesthesia Plan(s) and associated risks, benefits, and realistic alternatives discussed. Questions answered and patient/representative(s) expressed understanding.     - Discussed:     - Discussed with:  Patient      - Extended Intubation/Ventilatory Support Discussed: No.      - Patient is DNR/DNI Status: No     Use of blood products discussed: No .     Postoperative Care    Pain management: IV analgesics, Oral pain medications (Peripheral nerve block by surgeon).   PONV prophylaxis: Ondansetron (or other 5HT-3), Background Propofol Infusion     Comments:             Juan Jose Shen MD    I have reviewed the pertinent notes and labs in the chart from the past 30 days and (re)examined the patient.  Any updates or changes from those notes are reflected in this note.              # Obesity: Estimated body mass index is 34.75 kg/m  as calculated from the following:    Height as of 11/27/23: 1.575 m (5' 2\").    Weight as of this encounter: 86.2 kg (190 lb).      "

## 2023-12-06 ENCOUNTER — MYC MEDICAL ADVICE (OUTPATIENT)
Dept: PODIATRY | Facility: CLINIC | Age: 38
End: 2023-12-06
Payer: COMMERCIAL

## 2023-12-06 DIAGNOSIS — M79.675 PAIN OF TOE OF LEFT FOOT: Primary | ICD-10-CM

## 2023-12-06 RX ORDER — HYDROXYZINE HYDROCHLORIDE 25 MG/1
25-50 TABLET, FILM COATED ORAL EVERY 4 HOURS PRN
Qty: 25 TABLET | Refills: 0 | Status: SHIPPED | OUTPATIENT
Start: 2023-12-06

## 2023-12-06 NOTE — TELEPHONE ENCOUNTER
Zack Healy, DPM  You1 minute ago (1:14 PM)     Wrote prescription for hydroxyzine and sent to [facility] pharmacy.  Try to decrease intake of narcotics which will also help

## 2023-12-06 NOTE — OP NOTE
Surgeon:  Dr. Zack Healy    Date of Surgery: 12/6/23    Preopp diagnosis:   Left first tarsometatarsal joint instability  Left bunion    Postopp diagnosis: Same    Procedure:   Left first tarsometatarsal joint fusion   left Jackson bunionectomy    Anesthesia: MAC     hemostasis: Pneumatic ankle tourniquet at 250 mmHg    Indications: Patient has painful left foot.  Had similar procedure on contralateral foot approximately 5 years ago and worked well.  She would like these procedures done today.  She understands possible complications include infection numbness stiff joints continued pain and nonunion.    Patient was brought to the operating table in a supine position.  Patient was given sedation by anesthesia and medial portion of left foot was blocked utilizing 0.5% Marcaine plain.  The foot was then prepped and draped in the normal sterile manner and a pneumatic ankle tourniquet was placed around the ankle with copious amounts of Webril padding.  The foot was then elevated for complete exsanguination the tourniquet was inflated and the foot was brought on the operating table where the following procedure was performed.    At this time an incision was made just lateral to the extensor halluces longus tendon on the dorsum of the left first tarsometatarsal joint..  This was brought down to the deep fascia utilizing blunt and sharp dissection techniques.  All neurovascular structures were encountered with the Bovied, tied, or retracted to the side.  At this time the periosteum was incised the entire length of the incision reflected off medially and dorsally.  We then used a Arthrex distractor and all soft tissue removed off joint.  Joint resected with a saw in corrected position.  We placed the 2 bones together and good reduction of deformity was noted in all 3 planes.  We did subchondral drilling with a 2 oh drill bit and also roughed up surface with a osteotome.  We made sure no loose debris or soft tissue was in  the fusion site.  We then fixated this with a 3.5 millimeter screw from dorsal distal to plantar proximal utilizing standard AO fixation techniques.  The screws holding quite well and the fusion site was tight in good alignment.  Excellent position of fusion site was confirmed with FluoroScan.      We then evaluated the bunion.  This was still present we elected to do a Jackson bunionectomy.  At this time an incision was made just medial to the extensor halluces longus tendon.  This was brought down to the deep fascia utilizing blunt and sharp dissection techniques.  All neurovascular structures were encountered with the Bovied, tied, or retracted to the side.  At this time the periosteum was incised the entire length of the incision reflected off medially and dorsally.  A first interspace release was done at this time by her satisfaction.  We made sure to leave the lateral collateral ligament and intact and elected not to release the sesamoids.  We remove the medial bump with a sagittal saw.  All sharp bony prominences were burred smooth.  We loaded the foot.  Good reduction of deformity was noted.  This was confirmed with the FluoroScan.    The wounds were flushed.  Standard layered closure was done at this time.  We dressed this with Adaptic 4 x 4's Vidal Kerlix and Coban.  Tourniquet was deflated all digits were noted to show proper levels of perfusion.  Complications were none.  Estimated blood loss was 1 cc.  Patient tolerated procedure and anesthesia well.  Was then wheeled from the operating to the cover room vital signs stable and intact sterile dressing.              Zack Healy DPM DPM, FACFAS     General

## 2023-12-06 NOTE — OP NOTE
Surgeon:  Dr. Zack Healy    Date of Surgery: 12/5/23    Preopp diagnosis:   Left midfoot instability  Left bunion    Postopp diagnosis: Same    Procedure:   Left first tarsometatarsal joint fusion  Left Jackson bunionectomy    Anesthesia: MAC    Specimens: none    Findings: none    EBL: 1cc    Zack Healy, JOAN DPM, FACFAS

## 2023-12-06 NOTE — CONFIDENTIAL NOTE
Zack Healy, DPM  You1 minute ago (1:14 PM)      Wrote prescription for hydroxyzine and sent to [facility] pharmacy.  Try to decrease intake of narcotics which will also help    Per MD   Message sent to the pt    Carlita Handy CMA

## 2023-12-07 RX ORDER — HYDROCODONE BITARTRATE AND ACETAMINOPHEN 5; 325 MG/1; MG/1
1 TABLET ORAL EVERY 4 HOURS PRN
Qty: 10 TABLET | Refills: 0 | Status: SHIPPED | OUTPATIENT
Start: 2023-12-07 | End: 2023-12-10

## 2023-12-07 NOTE — CONFIDENTIAL NOTE
Zack Healy, DPM        Prescription for Norco written.  In a day or 2 patient should start taking Tylenol during the day and only use narcotics at night.  Patient will need to come to [facility] to  a piece of paper to get narcotics sent to [facility] pharmacy       Hard copy left at [facility] . patient informed thru the daniela Handy CMA

## 2023-12-12 ENCOUNTER — OFFICE VISIT (OUTPATIENT)
Dept: PODIATRY | Facility: CLINIC | Age: 38
End: 2023-12-12
Payer: COMMERCIAL

## 2023-12-12 VITALS — DIASTOLIC BLOOD PRESSURE: 80 MMHG | TEMPERATURE: 98.5 F | SYSTOLIC BLOOD PRESSURE: 144 MMHG | HEART RATE: 100 BPM

## 2023-12-12 DIAGNOSIS — M21.612 BUNION, LEFT: Primary | ICD-10-CM

## 2023-12-12 PROCEDURE — 99024 POSTOP FOLLOW-UP VISIT: CPT | Performed by: PODIATRIST

## 2023-12-12 NOTE — LETTER
12/12/2023         RE: Екатерина Ramon  [address] [address] [place] [address]        Dear Colleague,    Thank you for referring your patient, Екатерина Ramon, to the [facility]. Please see a copy of my visit note below.    Subjective:    Patient is 7 days postopp left first tarsometatarsal joint fusion and left Jackson bunionectomy bunionectomy procedure done on 12/5/23.  Patient is doing well, admits compliance, denies fevers, chills, nausea or vomiting.  Pain slowly getting better.  She has been nonweightbearing.  States she is feeling good now and she has no other new complaints.    Objective:    Dressings clean, dry and intact.  Incisions are dry, well coapted with no dehiscence or drainage.  Pulses are palpable, sensation to light touch is intact.  Normal postopp edema and ecchymosis.  No erythema on the opperative site.    Hallux in good alignment.  Dorsiflexion is almost normal.  Plantarflexion still somewhat limited.    Assessment: Postopp day 7 left first ray surgery    Plan:   New dressing placed on foot.  Continue ACE bandage and elevation.  Start gentle range of motion of first MTPJ.  Discussed her dorsiflexion is excellent and she will work on gently plantar flexing and showed her how to do this.  Any increase in erythema, edema, and pain patient to call me immediately.   Continue nonweightbearing.  She has a cam walker from past surgery on contralateral side.  She did not bring it today.  She will bring it when we see her next week to get x-rays and suture removal.      Zack Healy DPM, FACFAS        Again, thank you for allowing me to participate in the care of your patient.        Sincerely,        Zack Healy DPM

## 2023-12-12 NOTE — PATIENT INSTRUCTIONS
We wish you continued good healing. If you have any questions or concerns, please do not hesitate to contact us at  [phone]    [employer] (secure e-mail communication and access to your chart) to send a message or to make an appointment.    Please remember to call and schedule a follow up appointment if one was recommended at your earliest convenience.     PODIATRY CLINIC HOURS  TELEPHONE NUMBER    Dr. Zack IRENEPPEMA FACFAS        Clinics:  [facility]   [place]  Tuesday 1PM-6PM  [place]  Wednesday 745AM-330PM  [place]  Monday 2nd,4th  830AM-4PM  Thursday/Friday 745AM-230PM     [place] APPOINTMENTS  [phone]    [place] APPOINTMENTS  [phone]          If you need a medication refill, please contact us you may need lab work and/or a follow up visit prior to your refill (i.e. Antifungal medications).  If MRI needed please call Imaging at [phone]   HOW DO I GET MY KNEE SCOOTER? Knee scooters can be picked up at ANY Medical Supply stores with your knee scooter Prescription.  OR  Bring your signed prescription to an [facility] Medical Equipment showroom.   Set up an appointment for your custom Orthotics. Call any Orthotics locations call [phone]

## 2023-12-13 NOTE — PROGRESS NOTES
Subjective:    Patient is 7 days postopp left first tarsometatarsal joint fusion and left Jackson bunionectomy bunionectomy procedure done on 12/5/23.  Patient is doing well, admits compliance, denies fevers, chills, nausea or vomiting.  Pain slowly getting better.  She has been nonweightbearing.  States she is feeling good now and she has no other new complaints.    Objective:    Dressings clean, dry and intact.  Incisions are dry, well coapted with no dehiscence or drainage.  Pulses are palpable, sensation to light touch is intact.  Normal postopp edema and ecchymosis.  No erythema on the opperative site.    Hallux in good alignment.  Dorsiflexion is almost normal.  Plantarflexion still somewhat limited.    Assessment: Postopp day 7 left first ray surgery    Plan:   New dressing placed on foot.  Continue ACE bandage and elevation.  Start gentle range of motion of first MTPJ.  Discussed her dorsiflexion is excellent and she will work on gently plantar flexing and showed her how to do this.  Any increase in erythema, edema, and pain patient to call me immediately.   Continue nonweightbearing.  She has a cam walker from past surgery on contralateral side.  She did not bring it today.  She will bring it when we see her next week to get x-rays and suture removal.      Zack Healy DPM, FACFAS

## 2023-12-14 ENCOUNTER — MYC MEDICAL ADVICE (OUTPATIENT)
Dept: PODIATRY | Facility: CLINIC | Age: 38
End: 2023-12-14
Payer: COMMERCIAL

## 2023-12-20 ENCOUNTER — OFFICE VISIT (OUTPATIENT)
Dept: PODIATRY | Facility: CLINIC | Age: 38
End: 2023-12-20
Payer: COMMERCIAL

## 2023-12-20 ENCOUNTER — ANCILLARY PROCEDURE (OUTPATIENT)
Dept: GENERAL RADIOLOGY | Facility: CLINIC | Age: 38
End: 2023-12-20
Attending: PODIATRIST
Payer: COMMERCIAL

## 2023-12-20 DIAGNOSIS — M21.612 BUNION, LEFT: Primary | ICD-10-CM

## 2023-12-20 DIAGNOSIS — M21.612 BUNION, LEFT: ICD-10-CM

## 2023-12-20 PROCEDURE — 73630 X-RAY EXAM OF FOOT: CPT | Mod: LT | Performed by: RADIOLOGY

## 2023-12-20 PROCEDURE — 99024 POSTOP FOLLOW-UP VISIT: CPT | Performed by: PODIATRIST

## 2023-12-20 NOTE — LETTER
12/20/2023         RE: Екатерина Ramon  [address] [address] [facility] [address]        Dear Colleague,    Thank you for referring your patient, Екатерина Ramon, to the [facility]. Please see a copy of my visit note below.    Subjective:    Patient is 15 days postopp left first tarsometatarsal joint fusion and left Jackson bunionectomy bunionectomy procedure done on 12/5/23.  Patient is doing well, admits compliance, denies fevers, chills, nausea or vomiting.  Pain slowly getting better.  She has been nonweightbearing.  States she has no other new complaints.    Objective:    Dressings clean, dry and intact.  Incisions are dry, well coapted with no dehiscence or drainage with suture removal.  Pulses are palpable, sensation to light touch is intact.  Normal postopp edema and ecchymosis.  No erythema on the opperative site.    Hallux in good alignment.  Dorsiflexion is almost normal.  Plantarflexion still somewhat limited.    X-ray revealed fusion site tight and hardware in place    Assessment: Postopp day 15 left first ray surgery    Plan:   X-rays taken today of left foot.  sutures removed.  Small amount of distal suture remaining.  Will continue to watch.  Continue range of motion of first MTPJ.  Patient will use a plantarflexed Cam walker to protect this but will not walk.  We gave her the 2-week warning.  Return to clinic in 4 weeks.    Zack Healy DPM, FACFAS        Again, thank you for allowing me to participate in the care of your patient.        Sincerely,        Zack Healy DPM

## 2023-12-20 NOTE — PROGRESS NOTES
Subjective:    Patient is 15 days postopp left first tarsometatarsal joint fusion and left Jackson bunionectomy bunionectomy procedure done on 12/5/23.  Patient is doing well, admits compliance, denies fevers, chills, nausea or vomiting.  Pain slowly getting better.  She has been nonweightbearing.  States she has no other new complaints.    Objective:    Dressings clean, dry and intact.  Incisions are dry, well coapted with no dehiscence or drainage with suture removal.  Pulses are palpable, sensation to light touch is intact.  Normal postopp edema and ecchymosis.  No erythema on the opperative site.    Hallux in good alignment.  Dorsiflexion is almost normal.  Plantarflexion still somewhat limited.    X-ray revealed fusion site tight and hardware in place    Assessment: Postopp day 15 left first ray surgery    Plan:   X-rays taken today of left foot.  sutures removed.  Small amount of distal suture remaining.  Will continue to watch.  Continue range of motion of first MTPJ.  Patient will use a plantarflexed Cam walker to protect this but will not walk.  We gave her the 2-week warning.  Return to clinic in 4 weeks.    Zack Healy DPM, FACFAS

## 2023-12-20 NOTE — PATIENT INSTRUCTIONS
We wish you continued good healing. If you have any questions or concerns, please do not hesitate to contact us at  [phone]    [employer] (secure e-mail communication and access to your chart) to send a message or to make an appointment.    Please remember to call and schedule a follow up appointment if one was recommended at your earliest convenience.     PODIATRY CLINIC HOURS  TELEPHONE NUMBER    Dr. Zack IRENEPPEMA FACFAS        Clinics:  [facility]   [place]  Tuesday 1PM-6PM  [place]  Wednesday 745AM-330PM  [place]  Monday 2nd,4th  830AM-4PM  Thursday/Friday 745AM-230PM     [place] APPOINTMENTS  [phone]    [place] APPOINTMENTS  [phone]          If you need a medication refill, please contact us you may need lab work and/or a follow up visit prior to your refill (i.e. Antifungal medications).  If MRI needed please call Imaging at [phone]   HOW DO I GET MY KNEE SCOOTER? Knee scooters can be picked up at ANY Medical Supply stores with your knee scooter Prescription.  OR  Bring your signed prescription to an [facility] Medical Equipment showroom.   Set up an appointment for your custom Orthotics. Call any Orthotics locations call [phone]

## 2024-01-10 ENCOUNTER — VIRTUAL VISIT (OUTPATIENT)
Dept: FAMILY MEDICINE | Facility: CLINIC | Age: 39
End: 2024-01-10
Payer: COMMERCIAL

## 2024-01-10 DIAGNOSIS — G47.9 SLEEP DISTURBANCE: Primary | ICD-10-CM

## 2024-01-10 PROCEDURE — 99214 OFFICE O/P EST MOD 30 MIN: CPT | Mod: 95

## 2024-01-10 ASSESSMENT — PATIENT HEALTH QUESTIONNAIRE - PHQ9
10. IF YOU CHECKED OFF ANY PROBLEMS, HOW DIFFICULT HAVE THESE PROBLEMS MADE IT FOR YOU TO DO YOUR WORK, TAKE CARE OF THINGS AT HOME, OR GET ALONG WITH OTHER PEOPLE: VERY DIFFICULT
SUM OF ALL RESPONSES TO PHQ QUESTIONS 1-9: 10
SUM OF ALL RESPONSES TO PHQ QUESTIONS 1-9: 10

## 2024-01-10 NOTE — PATIENT INSTRUCTIONS
"Try taking hydroxyzine or xanax doses in the evening  - take early to mid evening so you are less anxious leading up to bedtime.     Try \"sleep hygiene\" strategies - read handouts or look up online, I like using red towel over lamps, turning off overhead lighting to counteract blue light we are exposed to all day long.     Try melatonin supplement. Stick with low doses (1-3 mg), higher doses can have opposite effect     If no improvement in your sleep cycle in a few days of changing meds, schedule a lab only visit to check for anemia or other deficiencies that can cause restlessness/sleep disturbance.   "

## 2024-01-10 NOTE — PROGRESS NOTES
"Екатерина is a 38 year old who is being evaluated via a billable video visit.      How would you like to obtain your AVS? MyChart  If the video visit is dropped, the invitation should be resent by: Text to cell phone: [phone]  Will anyone else be joining your video visit? No          Assessment & Plan     Sleep disturbance  Chronic, exacerbated/worsening.  Discussed nonpharmaceutical sleep hygiene strategies.  Advised adjusting how she takes current antianxiety medications, try dose of hydroxyzine or Xanax in the evening or at bedtime.  Cautioned on avoiding routine use of these medications at bedtime.  Continue following with mental health provider. If sleep hygiene and med adjustments do not improve symptoms, she should schedule a lab only appointment to rule out deficiencies or thyroid.  - CBC with platelets and differential  - Iron and iron binding capacity  - Comprehensive metabolic panel (BMP + Alb, Alk Phos, ALT, AST, Total. Bili, TP)  - Magnesium  - TSH with free T4 reflex               BMI:   Estimated body mass index is 34.75 kg/m  as calculated from the following:    Height as of 11/27/23: 1.575 m (5' 2\").    Weight as of 12/5/23: 86.2 kg (190 lb).       See Patient Instructions    ESTUARDO Crowder CNP  [employer]    Ny Willams is a 38 year old, presenting for the following health issues:  Insomnia      1/10/2024    11:15 AM   Additional Questions   Roomed by Shaila       History of Present Illness       Reason for visit:  Sleep issues    She eats 0-1 servings of fruits and vegetables daily.She consumes 0 sweetened beverage(s) daily.She exercises with enough effort to increase her heart rate 9 or less minutes per day.  She exercises with enough effort to increase her heart rate 3 or less days per week. She is missing 1 dose(s) of medications per week.  She is not taking prescribed medications regularly due to remembering to take.               Review of " Systems   Constitutional, HEENT, cardiovascular, pulmonary, gi and gu systems are negative, except as otherwise noted.      Objective           Vitals:  No vitals were obtained today due to virtual visit.    Physical Exam   GENERAL: Healthy, alert and no distress  EYES: Eyes grossly normal to inspection.  No discharge or erythema, or obvious scleral/conjunctival abnormalities.  RESP: No audible wheeze, cough, or visible cyanosis.  No visible retractions or increased work of breathing.    SKIN: Visible skin clear. No significant rash, abnormal pigmentation or lesions.  NEURO: Cranial nerves grossly intact.  Mentation and speech appropriate for age.  PSYCH: Mentation appears normal, affect normal/bright, judgement and insight intact, normal speech and appearance well-groomed.    No results found for this or any previous visit (from the past 24 hour(s)).            Video-Visit Details    Type of service:  Video Visit     Originating Location (pt. Location): Home    Distant Location (provider location):  On-site  Platform used for Video Visit: [email or web address]       0

## 2024-01-17 ENCOUNTER — ANCILLARY PROCEDURE (OUTPATIENT)
Dept: GENERAL RADIOLOGY | Facility: CLINIC | Age: 39
End: 2024-01-17
Attending: PODIATRIST
Payer: COMMERCIAL

## 2024-01-17 ENCOUNTER — OFFICE VISIT (OUTPATIENT)
Dept: PODIATRY | Facility: CLINIC | Age: 39
End: 2024-01-17
Payer: COMMERCIAL

## 2024-01-17 VITALS — HEART RATE: 100 BPM | OXYGEN SATURATION: 97 %

## 2024-01-17 DIAGNOSIS — M21.612 BUNION, LEFT: ICD-10-CM

## 2024-01-17 DIAGNOSIS — M21.612 BUNION, LEFT: Primary | ICD-10-CM

## 2024-01-17 PROCEDURE — 73630 X-RAY EXAM OF FOOT: CPT | Mod: LT | Performed by: RADIOLOGY

## 2024-01-17 PROCEDURE — 99024 POSTOP FOLLOW-UP VISIT: CPT | Performed by: PODIATRIST

## 2024-01-17 NOTE — PROGRESS NOTES
Subjective:    Patient is 6 weeks 1 days postopp left first tarsometatarsal joint fusion and left Jackson bunionectomy bunionectomy procedure done on 12/5/23.  Patient is doing well, admits compliance, denies fevers, chills, nausea or vomiting.  Pain slowly getting better.  She has been nonweightbearing.  States she has slight numbness distal medial incision but otherwise no other new complaints.    Objective:    Incisions are dry, well healed.  Pulses are palpable, sensation to light touch is intact.  Normal postopp edema.  No erythema or ecchymosis on the opperative site.    Hallux in good alignment.  Dorsiflexion is almost normal.  Plantarflexion still somewhat limited.    X-ray revealed fusion site tight and hardware in place    Assessment: Postopp left first ray surgery    Plan:   X-rays taken today of left foot.  Discussed fusion site is tight and hardware is in place.  Patient will get more aggressive with range of motion especially plantarflexion.  We instructed her on how to do this.  Discussed physical therapy.  She declines at this time.  Will have her start walking in boot.  Will walk for 2 hours today.  If no pain or swelling will add 2 hours/day.  If patient notices pain and swelling will go back to nonweightbearing for 3 to 4 days and then try walking again.  In 3-1/2 weeks will start walking without cam walker wearing good supportive shoe.  Again she will break into shoe slowly letting pain be her guide similar as above.  Return to clinic in 4 weeks for final check.    Zack Healy DPM, FACFAS

## 2024-01-17 NOTE — LETTER
1/17/2024         RE: Екатерина Ramon  [address] [address] [address]        Dear Colleague,    Thank you for referring your patient, Екатерина Ramon, to the [facility]. Please see a copy of my visit note below.    Subjective:    Patient is 6 weeks 1 days postopp left first tarsometatarsal joint fusion and left Jackson bunionectomy bunionectomy procedure done on 12/5/23.  Patient is doing well, admits compliance, denies fevers, chills, nausea or vomiting.  Pain slowly getting better.  She has been nonweightbearing.  States she has slight numbness distal medial incision but otherwise no other new complaints.    Objective:    Incisions are dry, well healed.  Pulses are palpable, sensation to light touch is intact.  Normal postopp edema.  No erythema or ecchymosis on the opperative site.    Hallux in good alignment.  Dorsiflexion is almost normal.  Plantarflexion still somewhat limited.    X-ray revealed fusion site tight and hardware in place    Assessment: Postopp left first ray surgery    Plan:   X-rays taken today of left foot.  Discussed fusion site is tight and hardware is in place.  Patient will get more aggressive with range of motion especially plantarflexion.  We instructed her on how to do this.  Discussed physical therapy.  She declines at this time.  Will have her start walking in boot.  Will walk for 2 hours today.  If no pain or swelling will add 2 hours/day.  If patient notices pain and swelling will go back to nonweightbearing for 3 to 4 days and then try walking again.  In 3-1/2 weeks will start walking without cam walker wearing good supportive shoe.  Again she will break into shoe slowly letting pain be her guide similar as above.  Return to clinic in 4 weeks for final check.    Zack Healy DPM, FACFAS        Again, thank you for allowing me to participate in the care of your patient.        Sincerely,        Zack Healy DPM

## 2024-02-14 ENCOUNTER — ANCILLARY PROCEDURE (OUTPATIENT)
Dept: GENERAL RADIOLOGY | Facility: CLINIC | Age: 39
End: 2024-02-14
Attending: PODIATRIST
Payer: COMMERCIAL

## 2024-02-14 ENCOUNTER — OFFICE VISIT (OUTPATIENT)
Dept: PODIATRY | Facility: CLINIC | Age: 39
End: 2024-02-14
Payer: COMMERCIAL

## 2024-02-14 VITALS — WEIGHT: 190 LBS | BODY MASS INDEX: 34.75 KG/M2

## 2024-02-14 DIAGNOSIS — M21.612 BUNION, LEFT: ICD-10-CM

## 2024-02-14 DIAGNOSIS — M21.612 BUNION, LEFT: Primary | ICD-10-CM

## 2024-02-14 PROCEDURE — 99207 PR NO CHARGE LOS: CPT | Performed by: PODIATRIST

## 2024-02-14 PROCEDURE — 73630 X-RAY EXAM OF FOOT: CPT | Mod: LT | Performed by: RADIOLOGY

## 2024-02-14 NOTE — LETTER
2/14/2024         RE: Екатерина Ramon  [address] [address] [facility] [address] [facility] [address]        Dear Colleague,    Thank you for referring your patient, Екатерина Ramon, to the [facility]. Please see a copy of my visit note below.    Subjective:    Patient is 10 weeks 1 days postopp left first tarsometatarsal joint fusion and left Jackson bunionectomy bunionectomy procedure done on 12/5/23.  Patient continues to improve.  She is now walking in a shoe.  Has no other new complaints.    Objective:    Incision well-healed.  Pulses are palpable, sensation to light touch is intact.  Normal postopp edema.  No erythema or ecchymosis on the opperative site.    Hallux in good alignment.  Dorsiflexion is almost normal.  Plantarflexion still somewhat limited.    X-ray revealed fusion site tight and hardware in place.  Increased healing noted at fusion site.    Assessment: Postopp left first ray surgery    Plan:   X-rays taken today of left foot.  Discussed fusion site is tight and hardware is in place.  She has had interval healing at the fusion site.  Patient will get more aggressive with range of motion especially plantarflexion.  We instructed her on how to do this.  Discussed physical therapy.  She declines at this time.  Patient will call if she changes her mind.  Will slowly increase activities as tolerated.  Will return to see me only if having problems      Zack eHaly DPM, FACFAS        Again, thank you for allowing me to participate in the care of your patient.        Sincerely,        Zack Healy DPM

## 2024-02-14 NOTE — PROGRESS NOTES
Subjective:    Patient is 10 weeks 1 days postopp left first tarsometatarsal joint fusion and left Jackson bunionectomy bunionectomy procedure done on 12/5/23.  Patient continues to improve.  She is now walking in a shoe.  Has no other new complaints.    Objective:    Incision well-healed.  Pulses are palpable, sensation to light touch is intact.  Normal postopp edema.  No erythema or ecchymosis on the opperative site.    Hallux in good alignment.  Dorsiflexion is almost normal.  Plantarflexion still somewhat limited.    X-ray revealed fusion site tight and hardware in place.  Increased healing noted at fusion site.    Assessment: Postopp left first ray surgery    Plan:   X-rays taken today of left foot.  Discussed fusion site is tight and hardware is in place.  She has had interval healing at the fusion site.  Patient will get more aggressive with range of motion especially plantarflexion.  We instructed her on how to do this.  Discussed physical therapy.  She declines at this time.  Patient will call if she changes her mind.  Will slowly increase activities as tolerated.  Will return to see me only if having problems      Zack Healy DPM, FACFAS

## 2024-02-14 NOTE — PATIENT INSTRUCTIONS
We wish you continued good healing. If you have any questions or concerns, please do not hesitate to contact us at  [phone]    [employer] (secure e-mail communication and access to your chart) to send a message or to make an appointment.    Please remember to call and schedule a follow up appointment if one was recommended at your earliest convenience.     PODIATRY CLINIC HOURS  TELEPHONE NUMBER    Dr. Zack IRENEPPEMA FACFAS        Clinics:  [facility]   [place]  Tuesday 1PM-6PM  [place]  Wednesday 745AM-330PM  [place]  Monday 2nd,4th  830AM-4PM  Thursday/Friday 745AM-230PM     [place] APPOINTMENTS  [phone]    [place] APPOINTMENTS  [phone]          If you need a medication refill, please contact us you may need lab work and/or a follow up visit prior to your refill (i.e. Antifungal medications).  If MRI needed please call Imaging at [phone]   HOW DO I GET MY KNEE SCOOTER? Knee scooters can be picked up at ANY Medical Supply stores with your knee scooter Prescription.  OR  Bring your signed prescription to an [facility] showroom.   Set up an appointment for your custom Orthotics. Call any Orthotics locations call [phone]

## 2024-03-22 ENCOUNTER — PATIENT OUTREACH (OUTPATIENT)
Dept: CARE COORDINATION | Facility: CLINIC | Age: 39
End: 2024-03-22
Payer: COMMERCIAL

## 2024-03-27 ASSESSMENT — PATIENT HEALTH QUESTIONNAIRE - PHQ9
10. IF YOU CHECKED OFF ANY PROBLEMS, HOW DIFFICULT HAVE THESE PROBLEMS MADE IT FOR YOU TO DO YOUR WORK, TAKE CARE OF THINGS AT HOME, OR GET ALONG WITH OTHER PEOPLE: VERY DIFFICULT
SUM OF ALL RESPONSES TO PHQ QUESTIONS 1-9: 8
SUM OF ALL RESPONSES TO PHQ QUESTIONS 1-9: 8

## 2024-03-28 ENCOUNTER — VIRTUAL VISIT (OUTPATIENT)
Dept: FAMILY MEDICINE | Facility: CLINIC | Age: 39
End: 2024-03-28
Payer: COMMERCIAL

## 2024-03-28 DIAGNOSIS — G89.29 CHRONIC BILATERAL LOW BACK PAIN WITH BILATERAL SCIATICA: Primary | ICD-10-CM

## 2024-03-28 DIAGNOSIS — M54.41 CHRONIC BILATERAL LOW BACK PAIN WITH BILATERAL SCIATICA: Primary | ICD-10-CM

## 2024-03-28 DIAGNOSIS — M54.42 CHRONIC BILATERAL LOW BACK PAIN WITH BILATERAL SCIATICA: Primary | ICD-10-CM

## 2024-03-28 PROCEDURE — 99213 OFFICE O/P EST LOW 20 MIN: CPT | Mod: 95

## 2024-03-28 NOTE — PROGRESS NOTES
"Екатерина is a 38 year old who is being evaluated via a billable video visit.    How would you like to obtain your AVS? MyChart  If the video visit is dropped, the invitation should be resent by: Send to e-mail at: [email or web address]  Will anyone else be joining your video visit? No      Assessment & Plan     Chronic bilateral low back pain with bilateral sciatica  Chronic, stable but starting to flare. Injections have been very helpful in the past and pain relief lasts about 6 months.   - Pain Management  Referral              BMI  Estimated body mass index is 34.75 kg/m  as calculated from the following:    Height as of 11/27/23: 1.575 m (5' 2\").    Weight as of 2/14/24: 86.2 kg (190 lb).             Subjective   Екатерина is a 38 year old, presenting for the following health issues:  Referral        3/28/2024     3:55 PM   Additional Questions   Roomed by Wandy ROLLINS MA     HPI     Referral for back pain injection.    Please select a reason for your visit: Follow-up for health condition I've been seen for in the past   What health condition or conditions are you coming in for today? Back Pain   How many servings of fruits and vegetables do you eat daily? 2-3   On average, how many sweetened beverages do you drink each day (Examples: soda, juice, sweet tea, etc.  Do NOT count diet or artificially sweetened beverages)? 0   How many minutes a day do you exercise enough to make your heart beat faster? 30 to 60   How many days a week do you exercise enough to make your heart beat faster? 4   How many days per week do you miss taking your medication? 1   What makes it hard for you to take your medication every day? remembering to take   Your back pain is chronic   Where is your back pain located? right lower back    left lower back    right middle of back    left middle of back    right buttock    left buttock   How would you describe your back pain? sharp    shooting    stabbing   Where does your back pain spread? " right buttock    left buttock    right thigh    left thigh   Since you noticed your back pain, how has it changed? always present, but gets better and worse   Does your back pain interfere with your job? Yes   Since your last visit, have you tried any new treatment? No       Last one was 6 months ago.   No new injuries or symptoms just flare of chronic symptoms.  Had foot surgery, back to near normal activity level.              Review of Systems  Constitutional, HEENT, cardiovascular, pulmonary, gi and gu systems are negative, except as otherwise noted.      Objective    Vitals - Patient Reported  Pain Score: Severe Pain (7)  Pain Loc: Low Back (Bilateral)        Physical Exam   GENERAL: alert and no distress  EYES: Eyes grossly normal to inspection.  No discharge or erythema, or obvious scleral/conjunctival abnormalities.  RESP: No audible wheeze, cough, or visible cyanosis.    SKIN: Visible skin clear. No significant rash, abnormal pigmentation or lesions.  NEURO: Cranial nerves grossly intact.  Mentation and speech appropriate for age.  PSYCH: Appropriate affect, tone, and pace of words          Video-Visit Details    Type of service:  Video Visit   Originating Location (pt. Location): Home    Distant Location (provider location):  On-site  Platform used for Video Visit: Brittney  Signed Electronically by: ESTUARDO Crowder CNP

## 2024-04-05 ENCOUNTER — PATIENT OUTREACH (OUTPATIENT)
Dept: CARE COORDINATION | Facility: CLINIC | Age: 39
End: 2024-04-05
Payer: COMMERCIAL

## 2024-04-25 ENCOUNTER — RADIOLOGY INJECTION OFFICE VISIT (OUTPATIENT)
Dept: PALLIATIVE MEDICINE | Facility: CLINIC | Age: 39
End: 2024-04-25
Payer: COMMERCIAL

## 2024-04-25 VITALS — OXYGEN SATURATION: 98 % | DIASTOLIC BLOOD PRESSURE: 100 MMHG | HEART RATE: 88 BPM | SYSTOLIC BLOOD PRESSURE: 148 MMHG

## 2024-04-25 DIAGNOSIS — M54.16 LUMBAR RADICULOPATHY: ICD-10-CM

## 2024-04-25 DIAGNOSIS — M54.41 CHRONIC BILATERAL LOW BACK PAIN WITH BILATERAL SCIATICA: ICD-10-CM

## 2024-04-25 DIAGNOSIS — M54.42 CHRONIC BILATERAL LOW BACK PAIN WITH BILATERAL SCIATICA: ICD-10-CM

## 2024-04-25 DIAGNOSIS — G89.29 CHRONIC BILATERAL LOW BACK PAIN WITH BILATERAL SCIATICA: ICD-10-CM

## 2024-04-25 PROCEDURE — 62323 NJX INTERLAMINAR LMBR/SAC: CPT | Performed by: PAIN MEDICINE

## 2024-04-25 RX ADMIN — TRIAMCINOLONE ACETONIDE 40 MG: 40 INJECTION, SUSPENSION INTRA-ARTICULAR; INTRAMUSCULAR at 09:24

## 2024-04-25 ASSESSMENT — PAIN SCALES - GENERAL
PAINLEVEL: NO PAIN (0)
PAINLEVEL: EXTREME PAIN (8)

## 2024-04-25 NOTE — NURSING NOTE
Pre-procedure Intake  If YES to any questions or NO to having a   Please complete laminated checklist and leave on the computer keyboard for Provider, verbally inform provider if able.    For SCS Trial, RFA's or any sedation procedure:  Have you been fasting? NA  If yes, for how long?     Are you taking any any blood thinners such as Coumadin, Warfarin, Jantoven, Pradaxa Xarelto, Eliquis, Edoxaban, Enoxaparin, Lovenox, Heparin, Arixtra, Fondaparinux, or Fragmin? OR Antiplatelet medication such as Plavix, Brilinta, or Effient?   No   If yes, when did you take your last dose?     Do you take aspirin?  No  If cervical procedure, have you held aspirin for 6 days?   NA    Do you have any allergies to contrast dye, iodine, steroid and/or numbing medications?  NO    Are you currently taking antibiotics or have an active infection?  NO    Have you had a fever/elevated temperature within the past week? NO    Are you currently taking oral steroids? NO    Do you have a ? Yes    Are you pregnant or breastfeeding?  NO    Have you received the COVID-19 vaccine? Yes  If yes, was it your 1st, 2nd or only dose needed? 2nd dose and booster  Date of most recent vaccine: 09/09/2022    Notify provider and RNs if systolic BP >170, diastolic BP >100, P >100 or O2 sats < 90%

## 2024-04-25 NOTE — PATIENT INSTRUCTIONS
[facility]   Procedure Discharge Instructions    Today you saw:      Dr. Zia Alvarado,           You had an:  Epidural steroid injection   -lumbar    Be cautious when walking. Numbness and/or weakness in the lower extremities may occur for up to 6-8 hours after the procedure due to effect of the local anesthetic  Do not drive for 6 hours. The effect of the local anesthetic could slow your reflexes.   You may resume your regular activities after 24 hours  Avoid strenuous activity for the first 24 hours  You may shower, however avoid swimming, tub baths or hot tubs for 24 hours following your procedure  You may have a mild to moderate increase in pain for several days following the injection.  It may take up to 14 days for the steroid medication to start working although you may feel the effect as early as a few days after the procedure.     You may use ice packs for 10-15 minutes, 3 to 4 times a day at the injection site for comfort  Do not use heat to painful areas for 6 to 8 hours. This will give the local anesthetic time to wear off and prevent you from accidentally burning your skin.   Unless you have been directed to avoid the use of anti-inflammatory medications (NSAIDS), you may use medications such as ibuprofen, Aleve or Tylenol for pain control if needed.   If you were fasting, you may resume your normal diet and medications after the procedure  If you have diabetes, check your blood sugar more frequently than usual as your blood sugar may be higher than normal for 10-14 days following a steroid injection. Contact your doctor who manages your diabetes if your blood sugar is higher than usual  Possible side effects of steroids that you may experience include flushing, elevated blood pressure, increased appetite, mild headaches and restlessness.  All of these symptoms will get better with time.  If you experience any of the following, call the Pain Clinic during work hours  (Mon-Friday 8-4:30 pm) at [phone] or the Provider Line after hours at [phone]:  -Fever over 100 degree F  -Swelling, bleeding, redness, drainage, warmth at the injection site  -Progressive weakness or numbness in your legs or arms  -Loss of bowel or bladder function  -Unusual headache that is not relieved by Tylenol or other pain reliever  -Unusual new onset of pain that is not improving

## 2024-04-25 NOTE — NURSING NOTE
Discharge Information    IV Discontiued Time:  NA    Amount of Fluid Infused:  NA    Discharge Criteria = When patient returns to baseline or as per MD order    Consciousness:  Pt is fully awake    Circulation:  BP +/- 20% of pre-procedure level    Respiration:  Patient is able to breathe deeply    O2 Sat:  Patient is able to maintain O2 Sat >92% on room air    Activity:  Moves 4 extremities on command    Ambulation:  Patient is able to stand and walk or stand and pivot into wheelchair    Dressing:  Clean/dry or No Dressing    Notes:   Discharge instructions and AVS given to patient    Patient meets criteria for discharge?  YES    Admitted to PCU?  No    Responsible adult present to accompany patient home?  Yes    Signature/Title:    justice davenport RN  RN Care Coordinator  [facility]

## 2024-04-26 RX ORDER — TRIAMCINOLONE ACETONIDE 40 MG/ML
40 INJECTION, SUSPENSION INTRA-ARTICULAR; INTRAMUSCULAR ONCE
Status: COMPLETED | OUTPATIENT
Start: 2024-04-25 | End: 2024-04-25

## 2024-04-26 NOTE — PROGRESS NOTES
4/26/24  Pre procedure Diagnosis: lumbar radiculopathy    Post procedure Diagnosis: Same  Procedure performed: L5-S1 interlaminar epidural steroid injection   Anesthesia: none  Complications: none  Operators: Zia Alvarado MD     Indications:   Екатерина Ramon is a 38 year old female.  The patient has a history of bilateral low back pain radiating to the buttocks.  Examination shows negative slump.  she has tried conservative treatment including meds/PHYSICAL THERAPY /injections with benefit     MRI w  Options/alternatives, benefits and risks were discussed with the patient including but not limited to bleeding, infection, no pain relief, tissue trauma, exposure to radiation, reaction to medications, spinal cord injury, dural puncture, weakness, numbness and headache.  Questions were answered to her satisfaction and she wishes to proceed. Voluntary informed consent was obtained and signed.     Vitals were reviewed: Yes  Allergies were reviewed:  Yes   Medications were reviewed:  Yes   Pre-procedure pain score: 8/10    Procedure:  The patient's medical history, medications, and allergies were reviewed and reconciled.  After obtaining signed informed consent, the patient was brought into the procedure suite and was placed in a prone position on the procedure table.   A Pause for the Cause was performed.  Patient was prepped and draped in the usual sterile fashion.     The L 5-1 interspace was identified with AP fluoroscopy.  A total of 3ml of 1% lidocaine was used to anesthetize the skin and subcutaneous tissues for a  midline approach.    A 20gauge 4.5inch Touhy needle was advanced utilizing intermittent AP and Lateral fluoroscopy and air for loss of resistance.  The epidural space was encountered on the first pass without difficulties.  Aspiration for blood and CSF was negative.  Needle position was verified by injecting 1 ml of Omnipaque utilizing real-time fluoroscopy that showed good needle placement and  epidural spread without signs of intravascular or intrathecal uptake.  Omnipaque wasted:  9 ml.    Then, after repeated negative aspiration for blood or CSF, a combination of Kenalog 40 mg, Bupivicaine 0.25% 2 ml, diluted with 3 ml of normal saline to a total injectate volume of 6 ml was injected into the epidural space in a slow and incremental fashion and the needle was restyletted and withdrawn.  All injected medications were preservative free.    The injection site was cleaned and a sterile dressing was applied.    The patient tolerated the procedure well without complications and was taken to the recovery room for continued observation.    Images were saved to PACS.    Post-procedure pain score: 0/10  Follow-up includes:   -f/u with referring provider     Zia Alvarado MD  [facility]

## 2024-05-24 DIAGNOSIS — Z98.84 STATUS POST LAPAROSCOPIC SLEEVE GASTRECTOMY: ICD-10-CM

## 2024-05-24 DIAGNOSIS — E66.01 MORBID OBESITY (H): ICD-10-CM

## 2024-06-25 ENCOUNTER — MYC MEDICAL ADVICE (OUTPATIENT)
Dept: FAMILY MEDICINE | Facility: CLINIC | Age: 39
End: 2024-06-25
Payer: COMMERCIAL

## 2024-06-25 DIAGNOSIS — I10 HYPERTENSION GOAL BP (BLOOD PRESSURE) < 140/90: Primary | ICD-10-CM

## 2024-06-25 DIAGNOSIS — F41.1 GAD (GENERALIZED ANXIETY DISORDER): ICD-10-CM

## 2024-06-30 ENCOUNTER — HEALTH MAINTENANCE LETTER (OUTPATIENT)
Age: 39
End: 2024-06-30

## 2024-07-01 ENCOUNTER — VIRTUAL VISIT (OUTPATIENT)
Dept: FAMILY MEDICINE | Facility: CLINIC | Age: 39
End: 2024-07-01
Payer: COMMERCIAL

## 2024-07-01 DIAGNOSIS — F41.1 GAD (GENERALIZED ANXIETY DISORDER): Primary | ICD-10-CM

## 2024-07-01 DIAGNOSIS — E66.01 MORBID OBESITY (H): ICD-10-CM

## 2024-07-01 DIAGNOSIS — F33.2 SEVERE EPISODE OF RECURRENT MAJOR DEPRESSIVE DISORDER, WITHOUT PSYCHOTIC FEATURES (H): ICD-10-CM

## 2024-07-01 PROCEDURE — 99214 OFFICE O/P EST MOD 30 MIN: CPT | Mod: 95

## 2024-07-01 ASSESSMENT — ANXIETY QUESTIONNAIRES
7. FEELING AFRAID AS IF SOMETHING AWFUL MIGHT HAPPEN: NEARLY EVERY DAY
3. WORRYING TOO MUCH ABOUT DIFFERENT THINGS: NEARLY EVERY DAY
1. FEELING NERVOUS, ANXIOUS, OR ON EDGE: NEARLY EVERY DAY
2. NOT BEING ABLE TO STOP OR CONTROL WORRYING: NEARLY EVERY DAY
GAD7 TOTAL SCORE: 21
5. BEING SO RESTLESS THAT IT IS HARD TO SIT STILL: NEARLY EVERY DAY
6. BECOMING EASILY ANNOYED OR IRRITABLE: NEARLY EVERY DAY
GAD7 TOTAL SCORE: 21
8. IF YOU CHECKED OFF ANY PROBLEMS, HOW DIFFICULT HAVE THESE MADE IT FOR YOU TO DO YOUR WORK, TAKE CARE OF THINGS AT HOME, OR GET ALONG WITH OTHER PEOPLE?: EXTREMELY DIFFICULT
IF YOU CHECKED OFF ANY PROBLEMS ON THIS QUESTIONNAIRE, HOW DIFFICULT HAVE THESE PROBLEMS MADE IT FOR YOU TO DO YOUR WORK, TAKE CARE OF THINGS AT HOME, OR GET ALONG WITH OTHER PEOPLE: EXTREMELY DIFFICULT
7. FEELING AFRAID AS IF SOMETHING AWFUL MIGHT HAPPEN: NEARLY EVERY DAY
GAD7 TOTAL SCORE: 21
4. TROUBLE RELAXING: NEARLY EVERY DAY

## 2024-07-01 NOTE — LETTER
"July 19, 2024             To Whom It May Concern:     This letter serves as certification that Екатерина Ramon is an individual with a psychiatric disability, and is eligible to be considered for employment under the Schedule A hiring authority 5 .3102(u).     Thank you for your interest in considering this individual for employment. I may be contacted at ([phone]).                   ESTUARDO Crowder CNP       X :  ________________________________________            Note: Proof of disability is a requirement for noncompetitive consideration under the Schedule A, 5 CFR   213.3102(u), Excepted Service Authority. 5 CFR   213.3102(u)(3) states: \"Proof of disability. (i) An agency must require proof of an applicant's intellectual disability, severe physical disability, or psychiatric disability prior to making an appointment under this section. (ii) An agency may accept, as proof of disability, appropriate documentation (e.g., records, statements, or other appropriate information) issued by a licensed medical professional (e.g., a physician or other medical professional duly certified by a State, the [facility], or a [place] territory, to practice medicine); a licensed  (Sates or private); or any Federal agency, State agency, or an agency of the [facility] or a U.[place] territory that issues or provides disability benefits.\" According to the U.S. Office of Personnel Management, the above sample language meets the requirements for consideration under the Schedule A hiring authority.     "

## 2024-07-01 NOTE — PROGRESS NOTES
"Екатерина is a 38 year old who is being evaluated via a billable video visit.      How would you like to obtain your AVS? MyChart  If the video visit is dropped, the invitation should be resent by:   Will anyone else be joining your video visit? No      Assessment & Plan     BERENICE (generalized anxiety disorder)  Severe episode of recurrent major depressive disorder, without psychotic features (H)  Chronic, stable/unchanged. Her anxiety is severe and previously caused hypertensive emergency, migraines, panic attacks.  She is taking medications as prescribed. 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   Signed schedule A letter so patient can be hired to a prior job she was let go from, during a hiring freeze.   - mental health referral for therapy placed  - consider switching propranolol to ER rather than TID dosing.       Morbid obesity (H)  Chronic, stable/unchanged. No weight updated today due to virtual visit. Considered in decision making.             BMI  Estimated body mass index is 34.75 kg/m  as calculated from the following:    Height as of 11/27/23: 1.575 m (5' 2\").    Weight as of 2/14/24: 86.2 kg (190 lb).             Subjective   Екатерина is a 38 year old, presenting for the following health issues:  Forms        7/1/2024     3:17 PM   Additional Questions   Roomed by Wandy ROLLINS MA     History of Present Illness       Mental Health Follow-up:  Patient presents to follow-up on Depression & Anxiety.Patient's depression since last visit has been:  No change  The patient is not having other symptoms associated with depression.  Patient's anxiety since last visit has been:  Worse  The patient is having other symptoms associated with anxiety.  Any significant life events: job concerns  Patient is feeling anxious or having panic attacks.  Patient has no concerns about alcohol or drug use.    She eats 0-1 servings of fruits and vegetables daily.She consumes 0 sweetened " beverage(s) daily.She exercises with enough effort to increase her heart rate 30 to 60 minutes per day.  She exercises with enough effort to increase her heart rate 4 days per week. She is missing 1 dose(s) of medications per week.  She is not taking prescribed medications regularly due to remembering to take.     Update FMLA form.   Working currently - works in member benefits. Working from home is still working well for her.       Has been working with [employer], trying to appeal losing her job, she is wanting a Schedule A letter so she can get re[facility] at the [facility] ([facility] but there is currently a hiring freeze - she would be able to work around the hiring freeze with letter.     Meds for anxiety  60 mg duloxetine 1 a day,   10 mg buspirone  2 times a day,   20 mg propranolol 3 times a day,   10 mg hydroxyzine 1-3 tablets 3 times a day as needed, .  0.25 alprazolam 1-2 tablets twice a day as needed ( don t take it frequently 17 pills left have had it a while.)     Prior therapy every 1- 2 weeks. Hasn't done for a while.   She would like to get back into it.         9/12/2023     7:20 AM 10/20/2023     7:46 AM 7/1/2024     1:20 PM   BERENICE-7 SCORE   Total Score 19 (severe anxiety) 15 (severe anxiety) 21 (severe anxiety)   Total Score 19 15 21              1/10/2024    11:12 AM 3/27/2024     7:56 PM 7/1/2024     1:19 PM   PHQ   PHQ-9 Total Score 10 8    8 12   Q9: Thoughts of better off dead/self-harm past 2 weeks Not at all Not at all Not at all        Current Outpatient Medications   Medication Instructions    ALPRAZolam (XANAX) 0.25-0.5 mg, Oral, 2 TIMES DAILY PRN, 20 tabs to last 30 days    busPIRone (BUSPAR) 20 mg, Oral, 2 TIMES DAILY    DULoxetine (CYMBALTA) 20 MG capsule Take one cap by mouth daily WITH 40 mg cap for total daily dose 60 mg.    DULoxetine HCl 40 mg, Oral, SEE ADMIN INSTRUCTIONS, Take one cap by mouth WITH 20 mg cap for total daily dose 60 mg.     HYDROcodone-acetaminophen (NORCO) 5-325 MG tablet 1-2  tablets, Oral, EVERY 4 HOURS PRN    hydrOXYzine HCl (ATARAX) 10-30 mg, Oral, 3 TIMES DAILY PRN    hydrOXYzine HCl (ATARAX) 25-50 mg, Oral, EVERY 4 HOURS PRN    methocarbamol (ROBAXIN) 500 mg, Oral, 4 TIMES DAILY PRN    omeprazole (PRILOSEC) 20 mg, Oral, Daily before breakfast    propranolol (INDERAL) 20 mg, Oral, 3 TIMES DAILY    SUMAtriptan (IMITREX) 25-50 mg, Oral, AT ONSET OF HEADACHE    traZODone (DESYREL)  mg, Oral, AT BEDTIME PRN          Review of Systems  Constitutional, HEENT, cardiovascular, pulmonary, gi and gu systems are negative, except as otherwise noted.      Objective           Vitals:  No vitals were obtained today due to virtual visit.    Physical Exam   GENERAL: alert and no distress  EYES: Eyes grossly normal to inspection.  No discharge or erythema, or obvious scleral/conjunctival abnormalities.  RESP: No audible wheeze, cough, or visible cyanosis.    SKIN: Visible skin clear. No significant rash, abnormal pigmentation or lesions.  NEURO: Cranial nerves grossly intact.  Mentation and speech appropriate for age.  PSYCH: Appropriate affect, tone, and pace of words          Video-Visit Details    Type of service:  Video Visit   Originating Location (pt. Location): Home    Distant Location (provider location):  Off-site  Platform used for Video Visit: Brittney  Signed Electronically by: ESTUARDO Crowder CNP

## 2024-07-01 NOTE — PATIENT INSTRUCTIONS
Check with your  on the Schedule A letter - if this is related to disability designation on state or federal level, it would need to be completed by a specially trained provider. I believe you do this through [facility] services but I could connect you with a  if needed.     I return to the office on Wednesday this week, I will fill out FMLA forms the same information we sent in last year. If any questions I will contact you.     You are due for an annual exam whenever it is next convenient for you.     Thanks!    Ghanshyam

## 2024-07-02 NOTE — TELEPHONE ENCOUNTER
Routing My Chart message to PCP    Outlining documentation needed for FMLA      Diego Veronica, RN, BSN, PHN  [facility]

## 2024-07-19 PROBLEM — F33.9 MAJOR DEPRESSION, RECURRENT (H): Status: RESOLVED | Noted: 2023-06-01 | Resolved: 2024-07-19

## 2024-07-19 RX ORDER — PROPRANOLOL HCL 60 MG
60 CAPSULE, EXTENDED RELEASE 24HR ORAL DAILY
Qty: 30 CAPSULE | Refills: 2 | Status: SHIPPED | OUTPATIENT
Start: 2024-07-19

## 2024-07-19 NOTE — TELEPHONE ENCOUNTER
Signed schedule A letter, in letters dated 7/19/24. Please email to patient.     Per [employer] MD message oliviero I sent propranolol ER 60 mg to replace her 20 mg TID dose.

## 2024-07-27 ENCOUNTER — DOCUMENTATION ONLY (OUTPATIENT)
Dept: PSYCHOLOGY | Facility: CLINIC | Age: 39
End: 2024-07-27
Payer: COMMERCIAL

## 2024-07-27 NOTE — PROGRESS NOTES
Discharge Summary  Multiple Sessions    Client Name: Екатерина Ramon MRN#: [id number] YOB: 1985      Intake / Discharge Date:   Intake - 2/10/24  Discharge - 10/20/23      DSM5 Diagnoses: (Sustained by DSM5 Criteria Listed Above)  Diagnoses: 300.02 (F41.1) Generalized Anxiety Disorder  Psychosocial & Contextual Factors: Stress within important roles, stress in important relationships  Presenting Concern:  Pt reported struggling with generalized anxiety in various aspects of life. Pt worked on learning and implementing coping skills for anxiety.      Reason for Discharge:  Client did not return      Disposition at Time of Last Encounter:   Comments:   Pt appeared to be engaged and future focused.     Risk Management:   Client denies a history of suicidal ideation, suicide attempts, self-injurious behavior, homicidal ideation, homicidal behavior, and other safety concerns, and etc. Pt reports last instance of SI was when she was a late teen. Pt denies anything since. Pt denied wanting to make a safety plan during sessions.  Recommended that patient call 911 or go to the local ED should there be a change in any of these risk factors.      Referred To:  No referrals made or requested at time of discharge.        Aida Gifford, Psychotherapist Trainee, [facility]  7/27/24

## 2024-10-15 NOTE — TELEPHONE ENCOUNTER
Amos, completed atb.  Her mental status appears to fluctuate significantly.     Type of surgery: Left first tarsometatarsal joint fusion.  Left Jackson bunionectomy (Left)   CPT 77338, 74390  Bunion, left M21.612  [facility] of surgery:  [facility]  Date and time of surgery: 12/05/2023  Surgeon: SHERLYN  Pre-Op Appt Date: 12/04/2023  Post-Op Appt Date: 12/12/2023   Packet sent out: Yes  Pre-cert/Authorization completed: Pending to 45 days prior to surgery date to check pa need.    Date:  8/23/23    Carisa Corona  Financial Securing/Prior Auth Dept  [phone]

## 2024-10-18 ENCOUNTER — PATIENT OUTREACH (OUTPATIENT)
Dept: CARE COORDINATION | Facility: CLINIC | Age: 39
End: 2024-10-18
Payer: COMMERCIAL

## 2024-10-20 DIAGNOSIS — F41.1 GAD (GENERALIZED ANXIETY DISORDER): ICD-10-CM

## 2024-10-21 ENCOUNTER — OFFICE VISIT (OUTPATIENT)
Dept: URGENT CARE | Facility: URGENT CARE | Age: 39
End: 2024-10-21
Payer: COMMERCIAL

## 2024-10-21 ENCOUNTER — VIRTUAL VISIT (OUTPATIENT)
Dept: FAMILY MEDICINE | Facility: CLINIC | Age: 39
End: 2024-10-21
Payer: COMMERCIAL

## 2024-10-21 VITALS
BODY MASS INDEX: 39.4 KG/M2 | SYSTOLIC BLOOD PRESSURE: 144 MMHG | WEIGHT: 215.4 LBS | RESPIRATION RATE: 21 BRPM | HEART RATE: 108 BPM | TEMPERATURE: 98.9 F | OXYGEN SATURATION: 98 % | DIASTOLIC BLOOD PRESSURE: 86 MMHG

## 2024-10-21 DIAGNOSIS — R07.0 THROAT PAIN: ICD-10-CM

## 2024-10-21 DIAGNOSIS — G89.29 CHRONIC BILATERAL THORACIC BACK PAIN: ICD-10-CM

## 2024-10-21 DIAGNOSIS — G89.29 CHRONIC MIDLINE LOW BACK PAIN WITHOUT SCIATICA: Primary | ICD-10-CM

## 2024-10-21 DIAGNOSIS — M54.50 CHRONIC MIDLINE LOW BACK PAIN WITHOUT SCIATICA: Primary | ICD-10-CM

## 2024-10-21 DIAGNOSIS — B96.89 ACUTE BACTERIAL RHINOSINUSITIS: ICD-10-CM

## 2024-10-21 DIAGNOSIS — R03.0 ELEVATED BLOOD PRESSURE READING WITHOUT DIAGNOSIS OF HYPERTENSION: ICD-10-CM

## 2024-10-21 DIAGNOSIS — M54.6 CHRONIC BILATERAL THORACIC BACK PAIN: ICD-10-CM

## 2024-10-21 DIAGNOSIS — J01.90 ACUTE BACTERIAL RHINOSINUSITIS: ICD-10-CM

## 2024-10-21 DIAGNOSIS — H65.93 OME (OTITIS MEDIA WITH EFFUSION), BILATERAL: Primary | ICD-10-CM

## 2024-10-21 DIAGNOSIS — F41.1 GAD (GENERALIZED ANXIETY DISORDER): ICD-10-CM

## 2024-10-21 PROBLEM — R45.851 SUICIDAL IDEATION: Status: ACTIVE | Noted: 2024-08-01

## 2024-10-21 PROBLEM — F43.23 ADJUSTMENT DISORDER WITH MIXED ANXIETY AND DEPRESSED MOOD: Status: ACTIVE | Noted: 2024-08-01

## 2024-10-21 LAB
DEPRECATED S PYO AG THROAT QL EIA: NEGATIVE
GROUP A STREP BY PCR: NOT DETECTED

## 2024-10-21 PROCEDURE — 87635 SARS-COV-2 COVID-19 AMP PRB: CPT | Performed by: NURSE PRACTITIONER

## 2024-10-21 PROCEDURE — 99214 OFFICE O/P EST MOD 30 MIN: CPT | Performed by: NURSE PRACTITIONER

## 2024-10-21 PROCEDURE — 87651 STREP A DNA AMP PROBE: CPT | Performed by: NURSE PRACTITIONER

## 2024-10-21 PROCEDURE — 99207 PR NO CHARGE LOS: CPT | Mod: 95

## 2024-10-21 RX ORDER — HYDROXYZINE HYDROCHLORIDE 10 MG/1
10-30 TABLET, FILM COATED ORAL 3 TIMES DAILY PRN
Qty: 90 TABLET | Refills: 1 | Status: SHIPPED | OUTPATIENT
Start: 2024-10-21

## 2024-10-21 ASSESSMENT — PATIENT HEALTH QUESTIONNAIRE - PHQ9
SUM OF ALL RESPONSES TO PHQ QUESTIONS 1-9: 4
SUM OF ALL RESPONSES TO PHQ QUESTIONS 1-9: 4
10. IF YOU CHECKED OFF ANY PROBLEMS, HOW DIFFICULT HAVE THESE PROBLEMS MADE IT FOR YOU TO DO YOUR WORK, TAKE CARE OF THINGS AT HOME, OR GET ALONG WITH OTHER PEOPLE: SOMEWHAT DIFFICULT

## 2024-10-21 NOTE — PROGRESS NOTES
Assessment & Plan     1. OME (otitis media with effusion), bilateral    - amoxicillin-clavulanate (AUGMENTIN) 875-125 MG tablet; Take 1 tablet by mouth 2 times daily for 7 days.  Dispense: 14 tablet; Refill: 0    2. Acute bacterial rhinosinusitis    - amoxicillin-clavulanate (AUGMENTIN) 875-125 MG tablet; Take 1 tablet by mouth 2 times daily for 7 days.  Dispense: 14 tablet; Refill: 0    3. Throat pain    - Streptococcus A Rapid Screen w/Reflex to PCR - Clinic Collect  - Symptomatic COVID-19 Virus (Coronavirus) by PCR Nose  - Group A Streptococcus PCR Throat Swab    4. Elevated blood pressure reading without diagnosis of hypertension    Recommend treatment for otitis media and acute bacterial rhinosinusitis with Augmentin twice daily as directed with food discussed taking a probiotic not directly with antibiotic to help with bacterial and gut health.  Pending strep culture blood pressure reexamination was lower but still slightly elevated we will continue to monitor this at home.    Home care reviewed. Patient verbalized understanding; will monitor symptoms closely. Reviewed s/e to medications.   Follow up with primary care in 1 week if symptoms not improving.     Handout given from epic and reviewed.      ESTUARDO [facility]    Ny Willams is a 38 year old female who presents to clinic today for the following health issues:  Chief Complaint   Patient presents with    Throat Pain     Sore throat, cough, congestion, pain in both ears, diarrhea, sweats, body aches. Sx started Friday.        HPI    Patient presents to clinic with symptoms that have been worsening over the last several days.  States she is having bilateral ear pain, sinus congestion and headache malaise.  Evaded blood pressure in clinic denies headache, chest pain.  States that she usually runs high in clinic and then is normal at home    Review of Systems  Constitutional, HEENT, cardiovascular,  pulmonary, gi and gu systems are negative, except as otherwise noted.      Objective    BP (!) 182/136 (BP Location: Left arm, Cuff Size: Adult Regular)   Pulse 108   Temp 98.9  F (37.2  C) (Tympanic)   Resp 21   Wt 97.7 kg (215 lb 6.4 oz)   LMP  (LMP Unknown)   SpO2 98%   BMI 39.40 kg/m    Physical Exam   GENERAL: alert, no distress, and over weight  EYES: Eyes grossly normal to inspection, PERRL and conjunctivae and sclerae normal  HENT: normal cephalic/atraumatic, both ears: bulging membrane and mucopurulent effusion, nose and mouth without ulcers or lesions, nasal mucosa edematous , rhinorrhea yellow, oropharynx clear, oral mucous membranes moist, and sinuses: maxillary tenderness on bilateral  NECK: bilateral anterior cervical adenopathy, no asymmetry, masses, or scars, and thyroid normal to palpation  RESP: lungs clear to auscultation - no rales, rhonchi or wheezes  CV: regular rate and rhythm, normal S1 S2, no S3 or S4, no murmur, click or rub, no peripheral edema  ABDOMEN: soft, nontender, no hepatosplenomegaly, no masses and bowel sounds normal  MS: no gross musculoskeletal defects noted, no edema  SKIN: no suspicious lesions or rashes    Results for orders placed or performed in visit on 10/21/24   Streptococcus A Rapid Screen w/Reflex to PCR - Clinic Collect     Status: Normal    Specimen: Throat; Swab   Result Value Ref Range    Group A Strep antigen Negative Negative

## 2024-10-21 NOTE — PROGRESS NOTES
"Екатерина is a 38 year old who is being evaluated via a billable video visit.    How would you like to obtain your AVS? [employer]  If the video visit is dropped, the invitation should be resent by:   Will anyone else be joining your video visit? No      Assessment & Plan     Chronic bilateral thoracic back pain  Chronic midline low back pain without sciatica  Pain is currently stable, injections are helpful, pain control wears off after about 6 months. Last injection 4/26/24   - Pain Management  Referral    BERENICE (generalized anxiety disorder)  Chronic, stable. Continue current meds. Advised annual visit w psychiatry for med refills on duloxetine, buspar, xanax.   - hydrOXYzine HCl (ATARAX) 10 MG tablet  Dispense: 90 tablet; Refill: 1            BMI  Estimated body mass index is 34.75 kg/m  as calculated from the following:    Height as of 11/27/23: 1.575 m (5' 2\").    Weight as of 2/14/24: 86.2 kg (190 lb).         See Patient Instructions    Subjective   Екатерина is a 38 year old, presenting for the following health issues:  Referral (Back pain/)        10/21/2024    10:40 AM   Additional Questions   Roomed by Patient completed check in via [employer].       Video Start Time:  see below    History of Present Illness       Back Pain:  She presents for follow up of back pain. Patient's back pain is a chronic problem.  Location of back pain:  Right lower back, left lower back, right middle of back, left middle of back, right buttock and left buttock  Description of back pain: burning, dull ache, shooting and stabbing  Back pain spreads: right buttocks and left buttocks    Since patient first noticed back pain, pain is: unchanged  Does back pain interfere with her job:  Not applicable       She eats 2-3 servings of fruits and vegetables daily.She consumes 0 sweetened beverage(s) daily.She exercises with enough effort to increase her heart rate 30 to 60 minutes per day.  She exercises with enough effort to increase " her heart rate 4 days per week. She is missing 1 dose(s) of medications per week.  She is not taking prescribed medications regularly due to remembering to take.     Pt would like referral for back injections.             Review of Systems  Constitutional, HEENT, cardiovascular, pulmonary, gi and gu systems are negative, except as otherwise noted.      Objective           Vitals:  No vitals were obtained today due to virtual visit.    Physical Exam   GENERAL: alert and no distress  EYES: Eyes grossly normal to inspection.  No discharge or erythema, or obvious scleral/conjunctival abnormalities.  RESP: No audible wheeze, cough, or visible cyanosis.    SKIN: Visible skin clear. No significant rash, abnormal pigmentation or lesions.  NEURO: Cranial nerves grossly intact.  Mentation and speech appropriate for age.  PSYCH: Appropriate affect, tone, and pace of words          Video-Visit Details  Joined the call at 10/21/2024, 11:22:33 am.  Left the call at 10/21/2024, 11:29:58 am.  Type of service:  Video Visit   Video End Time:  Originating Location (pt. Location): Home    Distant Location (provider location):  On-site  Platform used for Video Visit: Brittney  Signed Electronically by: ESTUARDO Crowder CNP

## 2024-10-22 ENCOUNTER — TELEPHONE (OUTPATIENT)
Dept: PALLIATIVE MEDICINE | Facility: CLINIC | Age: 39
End: 2024-10-22
Payer: COMMERCIAL

## 2024-10-22 DIAGNOSIS — M54.16 LUMBAR RADICULOPATHY: Primary | ICD-10-CM

## 2024-10-22 LAB — SARS-COV-2 RNA RESP QL NAA+PROBE: NEGATIVE

## 2024-10-23 NOTE — TELEPHONE ENCOUNTER
"Screening Questions for Radiology Injections:    Injection to be done at which interventional clinic site? [facility]    If choosing [facility] for location, please inform patient:  \"[facility] is a Hospital based clinic. Before your visit, you should check with your insurance about how it covers the charges for facility services in a hospital-based clinic.     Procedure ordered by Zack    Procedure ordered? repeat L5-S1 interlaminar RAHEEM   Transforaminal Cervical RAHEEM - Send to [facility]) - No [facility] providers perform this procedure    What insurance would patient like us to bill for this procedure? BC Federal  IF SCHEDULING IN [place] PAIN OR SPINE PLEASE SCHEDULE AT LEAST 7-10 BUSINESS DAYS OUT SO A PA CAN BE OBTAINED  Worker's comp or MVA (motor vehicle accident) -Any injection DO NOT SCHEDULE and route to Ceasar Stallings.    [email or web address] insurance - For ALL INJECTIONS DO NOT SCHEDULE and route to Trinh Hilliard.     ALL BCBS, Hum[employer] and HP CIGNA - DO NOT SCHEDULE and route to Trinh Hilliard  MEDICA- ALL INJECTIONS- route to Trinh Hilliard    Is patient scheduled at [facility]? no   If YES, route every encounter to [facility] SPINE CENTER CARE NAVIGATION POOL [[phone]]    Is an  needed? No     Patient has a  home? (Review Grid) YES: ok    Any chance of pregnancy? NO   If YES, do NOT schedule and route to RN pool  - Dr. Valdez route to PM&R Nurse  [[id number]]      Is patient actively being treated for cancer or immunocompromised? No  If YES, do NOT schedule and route to RN pool/ Dr. Valdez's Team    Does the patient have a bleeding or clotting disorder? No   If YES, okay to schedule AND route to RN nurse / Dr. Valdez's Team   (For any patients with platelet count <100, RN must forward to provider)    Is patient taking any Blood Thinners OR Antiplatelet medication?  No   If hold needed, do NOT schedule, route to RN jeny/ Dr. Valdez's " Team  Examples:   Blood Thinners: (Coumadin, Warfarin, Jantoven, Pradaxa, Xarelto, Eliquis, Edoxaban, Enoxaparin, Lovenox, Heparin, Arixtra, Fondaparinux or Fragmin)  Antiplatelet Medications: (Plavix, Brilinta or Effient)     Is patient taking any aspirin products (includes Excedrin and Fiorinal)? No   If yes route to RN pool/ Dr. Valdez's Team - Do not schedule    Is patient taking any GLP-1 Antagonist (hold needed for sedation patients only) No   (semaglutide (Ozempic, Wegovy), dulaglutide (Trulicity), exenatide ER (Bydureon), tirzepatide (Mounjaro), Liraglutide (Saxenda, Victoza), semaglutide (Rybelsus), Terzepatide (Zepbound)  If YES, okay to schedule AND route to RN nurse / Dr. Valdez's Team      Any allergies to contrast dye, iodine, shellfish, or numbing and steroid medications? No  If YES, schedule and add allergy information to appointment notes AND route to the RN pool/ Dr. Valdez's Team  If RAHEEM and Contrast Dye / Iodine Allergy? DO NOT SCHEDULE, route to RN pool/ Dr. Valdez's Team  Allergies: Oxycodone, Lisinopril, and Zoloft     Does patient have an active infection or treated for one within the past week? Yes - Sinus and Ear Infection  Is patient currently taking any antibiotics or steroid medications?  Yes - Amoxicillian finish on Monday   For patients on chronic, preventative, or prophylactic antibiotics, procedures may be scheduled.   For patients on antibiotics for active or recent infection, schedule 4 days after completed.  For patients on steroid medications, schedule 4 days after completed.     Has the patient had a flu shot or any other vaccinations within the past 7 days? No  If yes, explain that for the vaccine to work best they need to:     wait 1 week before and 1 week after getting any Vaccine  wait 1 week before and 2 weeks after getting any Covid Vaccine   If patient has concerns about the timing, send to RN pool/ Dr. Valdez's Team    Does patient have an MRI/CT?  Not Applicable Include  Date and Check Procedure Scheduling Grid to see if required.  Was the MRI/CT done within the last 3 years?  Yes   If no route to RN Pool/ Dr. Valdez's Team  If yes, where was the MRI/CT done?    Refer to PACS Transmissions list for approved external locations and route to RN Pool High Priority/ Dr. Bulls Team  If MRI was not done at approved external location do NOT schedule and route to RN pool/ Dr. Bulls Team    If patient has an imaging disc, the injection MAY be scheduled but patient must bring disc to appt or appt will be cancelled.    Is patient able to transfer to a procedure table with minimal or no assistance? Yes   If no, do NOT schedule and route to RN Pool/ Dr. Valdez's Team    Procedure Specific Instructions:  If celiac plexus block, informed patient NPO for 6 hours and that it is okay to take medications with sips of water, especially blood pressure medications Not Applicable       If this is for a cervical procedure, informed patient that aspirin needs to be held for 6 days.   Not Applicable    Sedation, If Sedation is ordered for any procedure, patient must be NPO for 6 hours prior to procedure Not Applicable    If IV needed:  Do not schedule procedures requiring IV placement in the first appointment of the day or first appointment after lunch. Do NOT schedule at 0745, 0815 or 1245. ok  Instructed patient to arrive 30 minutes early for IV start if required. (Check Procedure Scheduling Grid)  Not Applicable    Reminders:  If you are started on any steroids or antibiotics between now and your appointment, you must contact us because the procedure may need to be cancelled.  Yes    As a reminder, receiving steroids can decrease your body's ability to fight infection.   Would you still like to move forward with scheduling the injection?  Yes    IV Sedation is not provided for procedures. If oral anti-anxiety medication is needed, the patient should request this from their referring  provider.    Instruct patient to arrive as directed prior to the scheduled appointment time:  If IV needed 30 minutes before appointment time     For patients 85 or older we recommend having an adult stay w/ them for the remainder of the day.     If the patient is Diabetic, remind them to bring their glucometer.      Does the patient have any questions?  TOMMY Stallings  [facility]

## 2024-10-23 NOTE — TELEPHONE ENCOUNTER
Ok to call pt to schedule repeat L5-S1 interlaminar RAHEEM  ___________________________________    Per the notes this is for a repeat L5-S1 interlaminar RAHEEM.  Order: Injections every 6 months with Dr. Alvarado  Per primary care provider's 10/21/2024 visit plan:               Back Pain:  She presents for follow up of back pain. Patient's back pain is a chronic problem.  Location of back pain:  Right lower back, left lower back, right middle of back, left middle of back, right buttock and left buttock  Description of back pain: burning, dull ache, shooting and stabbing  Back pain spreads: right buttocks and left buttocks     Since patient first noticed back pain, pain is: unchanged  Does back pain interfere with her job:  Not applicable      Assessment & Plan  Chronic bilateral thoracic back pain  Chronic midline low back pain without sciatica  Pain is currently stable, injections are helpful, pain control wears off after about 6 months. Last injection 4/26/24   - Pain Management  Referral      Cheryl RN-BSN  [facility]   [phone]

## 2024-10-24 ENCOUNTER — MYC MEDICAL ADVICE (OUTPATIENT)
Dept: FAMILY MEDICINE | Facility: CLINIC | Age: 39
End: 2024-10-24
Payer: COMMERCIAL

## 2024-10-24 ENCOUNTER — E-VISIT (OUTPATIENT)
Dept: URGENT CARE | Facility: CLINIC | Age: 39
End: 2024-10-24
Payer: COMMERCIAL

## 2024-10-24 DIAGNOSIS — B96.89 ACUTE BACTERIAL SINUSITIS: Primary | ICD-10-CM

## 2024-10-24 DIAGNOSIS — J01.90 ACUTE BACTERIAL SINUSITIS: Primary | ICD-10-CM

## 2024-10-24 PROCEDURE — 99421 OL DIG E/M SVC 5-10 MIN: CPT | Performed by: NURSE PRACTITIONER

## 2024-10-24 RX ORDER — CEFDINIR 300 MG/1
600 CAPSULE ORAL DAILY
Qty: 14 CAPSULE | Refills: 0 | Status: SHIPPED | OUTPATIENT
Start: 2024-10-24 | End: 2024-10-31

## 2024-10-24 RX ORDER — PROPRANOLOL HYDROCHLORIDE 60 MG/1
60 CAPSULE, EXTENDED RELEASE ORAL DAILY
Qty: 30 CAPSULE | Refills: 2 | Status: SHIPPED | OUTPATIENT
Start: 2024-10-24

## 2024-10-24 NOTE — PATIENT INSTRUCTIONS
Acute Sinusitis: Care Instructions  Overview     Acute sinusitis is an inflammation of the mucous membranes inside the nose and sinuses. Sinuses are the hollow spaces in your skull around the eyes and nose. Acute sinusitis often follows a cold. Acute sinusitis causes thick, discolored mucus that drains from the nose or down the back of the throat. It also can cause pain and pressure in your head and face along with a stuffy or blocked nose.  In most cases, sinusitis gets better on its own in 1 to 2 weeks. But some mild symptoms may last for several weeks. Sometimes antibiotics are needed if there is a bacterial infection.  Follow-up care is a key part of your treatment and safety. Be sure to make and go to all appointments, and call your doctor if you are having problems. It's also a good idea to know your test results and keep a list of the medicines you take.  How can you care for yourself at home?  Use saline (saltwater) nasal washes. This can help keep your nasal passages open and wash out mucus and allergens.  You can buy saline nose washes at a grocery store or drugstore. Follow the instructions on the package.  You can make your own at home. Add 1 teaspoon of non-iodized salt and 1 teaspoon of baking soda to 2 cups of distilled or boiled and cooled water. Fill a squeeze bottle or a nasal cleansing pot (such as a neti pot) with the nasal wash. Then put the tip into your nostril, and lean over the sink. With your mouth open, gently squirt the liquid. Repeat on the other side.  Try a decongestant nasal spray like oxymetazoline (Afrin). Do not use it for more than 3 days in a row. Using it for more than 3 days can make your congestion worse.  If needed, take an over-the-counter pain medicine, such as acetaminophen (Tylenol), ibuprofen (Advil, Motrin), or naproxen (Aleve). Read and follow all instructions on the label.  If the doctor prescribed antibiotics, take them as directed. Do not stop taking them just  "because you feel better. You need to take the full course of antibiotics.  Be careful when taking over-the-counter cold or flu medicines and Tylenol at the same time. Many of these medicines have acetaminophen, which is Tylenol. Read the labels to make sure that you are not taking more than the recommended dose. Too much acetaminophen (Tylenol) can be harmful.  Try a steroid nasal spray. It may help with your symptoms.  Breathe warm, moist air. You can use a steamy shower, a hot bath, or a sink filled with hot water. Avoid cold, dry air. Using a humidifier in your home may help. Follow the directions for cleaning the machine.  When should you call for help?   Call your doctor now or seek immediate medical care if:    You have new or worse swelling, redness, or pain in your face or around one or both of your eyes.     You have double vision or a change in your vision.     You have a high fever.     You have a severe headache and a stiff neck.     You have mental changes, such as feeling confused or much less alert.   Watch closely for changes in your health, and be sure to contact your doctor if:    You are not getting better as expected.   Where can you learn more?  Go to https://www.[employer].net/patiented  Enter I933 in the search box to learn more about \"Acute Sinusitis: Care Instructions.\"  Current as of: September 27, 2023  Content Version: 14.2 2024 [facility] [employer]   Care instructions adapted under license by your healthcare professional. If you have questions about a medical condition or this instruction, always ask your healthcare professional. [employer] disclaims any warranty or liability for your use of this information.    Thank you for choosing us for your care. I have placed an order for a prescription so that you can start treatment. Please stop the Augmentin and start cefdinir. If this does not improve your symptoms over next 48 hours please be seen in person. View your full " visit summary for details by clicking on the link below. Your pharmacist will able to address any questions you may have about the medication.     You can schedule an appointment right here in [employer], or call [phone]  If the visit is for the same symptoms as your eVisit, we'll refund the cost of your eVisit if seen within seven days.

## 2024-10-24 NOTE — TELEPHONE ENCOUNTER
RN replied to patient via [employer]. See message for details.     Diego Veronica RN, BSN, PHN  [facility]: [place]

## 2024-11-06 ENCOUNTER — RADIOLOGY INJECTION OFFICE VISIT (OUTPATIENT)
Dept: PALLIATIVE MEDICINE | Facility: CLINIC | Age: 39
End: 2024-11-06
Payer: COMMERCIAL

## 2024-11-06 VITALS — SYSTOLIC BLOOD PRESSURE: 150 MMHG | HEART RATE: 101 BPM | DIASTOLIC BLOOD PRESSURE: 117 MMHG | OXYGEN SATURATION: 98 %

## 2024-11-06 DIAGNOSIS — M54.6 CHRONIC BILATERAL THORACIC BACK PAIN: ICD-10-CM

## 2024-11-06 DIAGNOSIS — G89.29 CHRONIC MIDLINE LOW BACK PAIN WITHOUT SCIATICA: ICD-10-CM

## 2024-11-06 DIAGNOSIS — M54.50 CHRONIC MIDLINE LOW BACK PAIN WITHOUT SCIATICA: ICD-10-CM

## 2024-11-06 DIAGNOSIS — G89.29 CHRONIC BILATERAL THORACIC BACK PAIN: ICD-10-CM

## 2024-11-06 DIAGNOSIS — M54.16 LUMBAR RADICULOPATHY: ICD-10-CM

## 2024-11-06 PROCEDURE — 62323 NJX INTERLAMINAR LMBR/SAC: CPT | Performed by: PAIN MEDICINE

## 2024-11-06 RX ORDER — TRIAMCINOLONE ACETONIDE 40 MG/ML
40 INJECTION, SUSPENSION INTRA-ARTICULAR; INTRAMUSCULAR ONCE
Status: COMPLETED | OUTPATIENT
Start: 2024-11-06 | End: 2024-11-06

## 2024-11-06 RX ADMIN — TRIAMCINOLONE ACETONIDE 40 MG: 40 INJECTION, SUSPENSION INTRA-ARTICULAR; INTRAMUSCULAR at 15:08

## 2024-11-06 ASSESSMENT — ANXIETY QUESTIONNAIRES: GAD7 TOTAL SCORE: 11

## 2024-11-06 ASSESSMENT — PAIN SCALES - GENERAL: PAINLEVEL_OUTOF10: EXTREME PAIN (8)

## 2024-11-06 NOTE — PROGRESS NOTES
11/6/24  Pre procedure Diagnosis: lumbar radiculopathy    Post procedure Diagnosis: Same  Procedure performed: L5-S1 interlaminar epidural steroid injection   Anesthesia: none  Complications: none  Operators: Zia Alvarado MD     Indications:   Екатерина Ramon is a 39year old female.  The patient has a history of bilateral low back pain radiating to the buttocks.  Examination shows negative slump.  she has tried conservative treatment including meds/PHYSICAL THERAPY /injections with benefit     MRI w  Options/alternatives, benefits and risks were discussed with the patient including but not limited to bleeding, infection, no pain relief, tissue trauma, exposure to radiation, reaction to medications, spinal cord injury, dural puncture, weakness, numbness and headache.  Questions were answered to her satisfaction and she wishes to proceed. Voluntary informed consent was obtained and signed.     Vitals were reviewed: Yes  Allergies were reviewed:  Yes   Medications were reviewed:  Yes   Pre-procedure pain score: 8/10    Procedure:  The patient's medical history, medications, and allergies were reviewed and reconciled.  After obtaining signed informed consent, the patient was brought into the procedure suite and was placed in a prone position on the procedure table.   A Pause for the Cause was performed.  Patient was prepped and draped in the usual sterile fashion.     The L 5-1 interspace was identified with AP fluoroscopy.  A total of 3ml of 1% lidocaine was used to anesthetize the skin and subcutaneous tissues for a  midline approach.    A 20gauge 4.5inch Touhy needle was advanced utilizing intermittent AP and Lateral fluoroscopy and air for loss of resistance.  The epidural space was encountered on the first pass without difficulties.  Aspiration for blood and CSF was negative.  Needle position was verified by injecting 1 ml of Omnipaque utilizing real-time fluoroscopy that showed good needle placement and  epidural spread without signs of intravascular or intrathecal uptake.  Omnipaque wasted:  9 ml.    Then, after repeated negative aspiration for blood or CSF, a combination of Kenalog 40 mg, Bupivicaine 0.25% 2 ml, diluted with 3 ml of normal saline to a total injectate volume of 6 ml was injected into the epidural space in a slow and incremental fashion and the needle was restyletted and withdrawn.  All injected medications were preservative free.    The injection site was cleaned and a sterile dressing was applied.    The patient tolerated the procedure well without complications and was taken to the recovery room for continued observation.    Images were saved to PACS.    Post-procedure pain score: 0/10  Follow-up includes:   -f/u with referring provider     Zia Alvarado MD  [facility] [address]

## 2024-11-06 NOTE — PATIENT INSTRUCTIONS
[facility]   Procedure Discharge Instructions    Today you saw:    Dr. Zia Alvarado,         You had a(n):  Lumbar epidural steroid injection Interlaminar     Medications used for interlaminar RAHEEM: Lidocaine, Omnipaque, Kenalog, Bupivicaine, normal saline          Be cautious with walking. Numbness and/or weakness in the lower extremities may occur for up to 6-8 hours after the procedure due to effect of the local anesthetic  Do not drive for 6 hours. The effect of the local anesthetic could slow your reflexes.   You may resume your regular activities after 24 hours  Avoid strenuous activity for the first 24 hours  You may shower, however avoid swimming, tub baths or hot tubs for 24 hours following your procedure  You may have a mild to moderate increase in pain for several days following the injection.  It may take up to 14 days for the steroid medication to start working although you may feel the effect as early as a few days after the procedure.     You may use ice packs for 10-15 minutes, 3 to 4 times a day at the injection site for comfort  Do not use heat to painful areas for 6 to 8 hours. This will give the local anesthetic time to wear off and prevent you from accidentally burning your skin.   Unless you have been directed to avoid the use of anti-inflammatory medications (NSAIDS), you may use medications such as ibuprofen, Aleve or Tylenol for pain control if needed.   If you have diabetes, check your blood sugar more frequently than usual as your blood sugar may be higher than normal for 10-14 days following a steroid injection. Contact your doctor who manages your diabetes if your blood sugar is higher than usual  Possible side effects of steroids that you may experience include flushing, elevated blood pressure, increased appetite, mild headaches and restlessness.  All of these symptoms will get better with time.  If you experience any of the following, call the Pain  Clinic during work hours (Mon-Friday 8-4:30 pm) at [phone] or the Provider Line after hours at [phone]:  -Fever over 100 degree F  -Swelling, bleeding, redness, drainage, warmth at the injection site  -Progressive weakness or numbness in your legs  -Loss of bowel or bladder function  -Unusual headache not relieved by Tylenol or other pain reliever  -Unusual new onset of pain that is not improving

## 2024-11-06 NOTE — NURSING NOTE
Pre-procedure Intake  If YES to any questions or NO to having a   Please complete laminated checklist and leave on the computer keyboard for Provider, verbally inform provider if able.    For SCS Trial, RFA's or any sedation procedure:  Have you been fasting? NA  If yes, for how long? NA    Are you taking any any blood thinners such as Coumadin, Warfarin, Jantoven, Pradaxa Xarelto, Eliquis, Edoxaban, Enoxaparin, Lovenox, Heparin, Arixtra, Fondaparinux, or Fragmin? OR Antiplatelet medication such as Plavix, Brilinta, or Effient?   No   If yes, when did you take your last dose? NA    Do you take aspirin?  No  If cervical procedure, have you held aspirin for 6 days?   NA    Is the Pt taking any GLP-1 Antagonist (hold needed for sedation patients only)  (semaglutide (Ozempic, Wegovy), dulaglutide (Trulicity), exenatide ER (Bydureon), tirzepatide (Mounjaro), Liraglutide (Saxenda, Victoza), semaglutide (Rybelsus)     NA  If yes, when did you take your last dose? NA    Do you have any allergies to contrast dye, iodine, steroid and/or numbing medications?  NO    Are you currently taking antibiotics or have an active infection?  NO    Have you had a fever/elevated temperature within the past week? NO    Are you currently taking oral steroids? NO    Do you have a ? Yes    Are you pregnant or breastfeeding?  NO    Have you received any vaccinations in the last week? NO    Notify provider and RNs if systolic BP >170, diastolic BP >100, P >100 or O2 sats < 90%    Jerilyn Bender MA

## 2024-11-25 ENCOUNTER — E-VISIT (OUTPATIENT)
Dept: URGENT CARE | Facility: CLINIC | Age: 39
End: 2024-11-25
Payer: COMMERCIAL

## 2024-11-25 DIAGNOSIS — R09.81 NASAL CONGESTION: Primary | ICD-10-CM

## 2024-11-25 PROCEDURE — 99207 PR NON-BILLABLE SERV PER CHARTING: CPT | Performed by: FAMILY MEDICINE

## 2024-11-25 NOTE — PATIENT INSTRUCTIONS
Dear Екатерина Ramon,    Hi because you've already recently been treated with 2 different antibiotics for this same condition, we can't treat you adequately through an evisit questionnaire. Please schedule an in-person appointment as best option so an exam to check your ears can be done as well.  Alternatively if only virtual options work for you, you may also try a video visit.     We are sorry you are not feeling well. Based on the responses you provided, it is recommended that you be seen in-person in urgent care so we can better evaluate your symptoms. Please click here to find the nearest urgent care location to you.   You will not be charged for this Visit. Thank you for trusting us with your care.    Duyen Hernandez MD

## 2024-12-06 NOTE — PROGRESS NOTES
Dr Blackwell placed labs on pt's 11/06/24 office visit. Per notes pt was to obtain in a month. Pt had not obtained labs at this time. LMOM to remind pt to obtain labs at earliest convenience.   Екатерина is a 37 year old who is being evaluated via a billable telephnone visit.      How would you like to obtain your AVS? Mail a copy    Assessment & Plan     Panic attack  Chronic, exacerbated by work environment. If able to complete her work at home, reasonable to work from home until her anxiety is under control.  - continue psychiatry and counseling  - taking prn propranolol   - advised taking hydroxyzine prior to anxiety provoking events, bedtime.   - advised being careful with multiple sedating medications.     BERENICE (generalized anxiety disorder)  Recurrent major depressive disorder, in partial remission (H)  Chronic. Exacerbated. Working with psychiatry and therapy. On duloxetine, buspar scheduled.   - advised increasing propranolol to a scheduled medication taking 20 mg BID, can still take additional 20 mg 1-2 times daily if panic attack.     Morbid obesity (H)  Chronic, no updated weight due to virtual visit.     Hypertension goal BP (blood pressure) < 140/90  Chronic, no updated BP today due to virtual visit.   - advised increasing propranolol to schedule BID.       Ordering of each unique test  Prescription drug management  PATIENT INSTRUCTIONS  - send my forms for work  - schedule in-person appointment BP/annual exam  - change how you take propranolol, take 20 mg twice daily scheduled    No follow-ups on file.    ESTUARDO Crowder [facility]    Ny Willams is a 37 year old, presenting for the following health issues:  Anxiety and Forms      Anxiety    History of Present Illness       Mental Health Follow-up:  Patient presents to follow-up on Depression & Anxiety.Patient's depression since last visit has been:  Bad  The patient is not having other symptoms associated with depression.  Patient's anxiety since last visit has been:  Bad  The patient is having other symptoms associated with anxiety.  Any significant life events: job concerns  Patient is feeling  anxious or having panic attacks.  Patient has no concerns about alcohol or drug use.    She eats 2-3 servings of fruits and vegetables daily.She consumes 0 sweetened beverage(s) daily.She exercises with enough effort to increase her heart rate 30 to 60 minutes per day.  She exercises with enough effort to increase her heart rate 3 or less days per week.   She is taking medications regularly.    Today's PHQ-9         PHQ-9 Total Score: 12    PHQ-9 Q9 Thoughts of better off dead/self-harm past 2 weeks :   Not at all    How difficult have these problems made it for you to do your work, take care of things at home, or get along with other people: Extremely difficult  Today's BERENICE-7 Score: 16       Chronic anxiety - recurrent for a while.    Started job in October 2022. 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   Exposure to large groups, loud noises, no privacy triggers her panic attacks.   Restarted therapy, started propranolol and prn alprazolam.   Daily anxiety attacks: crying, sweating, short of breath. Has had before.     Chronic HTN - took BP in the past, had been well controlled.    Previous appointment     Current Outpatient Medications   Medication     ALPRAZolam (XANAX) 0.25 MG tablet PRN, doesn't need when working from home.     busPIRone (BUSPAR) 10 MG tablet - DAILY SCHEDULED     DULoxetine (CYMBALTA) 20 MG capsule - DAILY SCHEDULED     DULoxetine HCl 40 MG CPEP     hydrOXYzine (ATARAX) 10 MG tablet - PRN      methocarbamol (ROBAXIN) 500 MG tablet - PRN for back pain     omeprazole (PRILOSEC) 20 MG DR capsule - DAILY SCHEDULED     propranolol (INDERAL) 20 MG tablet - PRN     SUMAtriptan (IMITREX) 25 MG tablet - MIGRAINES PRN     traZODone (DESYREL) 50 MG tablet - PRN for sleep, hasn't needed lately.      No current facility-administered medications for this visit.     Psychiatry - Steph Wagner.           Review of  Systems   Psychiatric/Behavioral: The patient is nervous/anxious.       Constitutional, HEENT, cardiovascular, pulmonary, gi and gu systems are negative, except as otherwise noted.      Objective           Vitals:  No vitals were obtained today due to virtual visit.    Physical Exam   GENERAL: Healthy, alert and no distress  EYES: Eyes grossly normal to inspection.  No discharge or erythema, or obvious scleral/conjunctival abnormalities.  RESP: No audible wheeze, cough, or visible cyanosis.  No visible retractions or increased work of breathing.    SKIN: Visible skin clear. No significant rash, abnormal pigmentation or lesions.  NEURO: Cranial nerves grossly intact.  Mentation and speech appropriate for age.  PSYCH: Mentation appears normal, affect normal/bright, judgement and insight intact, normal speech and appearance well-groomed.    Office Visit on 12/27/2022   Component Date Value Ref Range Status     Vitamin B12 12/27/2022 288  232 - 1,245 pg/mL Final     Vitamin D, Total (25-Hydroxy) 12/27/2022 19 (L)  20 - 75 ug/L Final     Ferritin 12/27/2022 35  12 - 150 ng/mL Final     WBC Count 12/27/2022 6.8  4.0 - 11.0 10e3/uL Final     RBC Count 12/27/2022 4.15  3.80 - 5.20 10e6/uL Final     Hemoglobin 12/27/2022 13.1  11.7 - 15.7 g/dL Final     Hematocrit 12/27/2022 39.9  35.0 - 47.0 % Final     MCV 12/27/2022 96  78 - 100 fL Final     MCH 12/27/2022 31.6  26.5 - 33.0 pg Final     MCHC 12/27/2022 32.8  31.5 - 36.5 g/dL Final     RDW 12/27/2022 12.9  10.0 - 15.0 % Final     Platelet Count 12/27/2022 259  150 - 450 10e3/uL Final     Sodium 12/27/2022 138  133 - 144 mmol/L Final     Potassium 12/27/2022 3.5  3.4 - 5.3 mmol/L Final     Chloride 12/27/2022 106  94 - 109 mmol/L Final     Carbon Dioxide (CO2) 12/27/2022 27  20 - 32 mmol/L Final     Anion Gap 12/27/2022 5  3 - 14 mmol/L Final     Urea Nitrogen 12/27/2022 13  7 - 30 mg/dL Final     Creatinine 12/27/2022 0.60  0.52 - 1.04 mg/dL Final     Calcium 12/27/2022  8.5  8.5 - 10.1 mg/dL Final     Glucose 12/27/2022 109 (H)  70 - 99 mg/dL Final     GFR Estimate 12/27/2022 >90  >60 mL/min/1.73m2 Final    Effective December 21, 2021 eGFRcr in adults is calculated using the 2021 CKD-EPI creatinine equation which includes age and gender (Yanely mittal al., NEJM, DOI: 10.1056/GAHHek3928265)           Video-Visit Details    Type of service:  Telephone Visit     Originating Location (pt. Location): Home  Distant Location (provider location):  On-site  Platform used for Video Visit: Other: telephone

## 2024-12-30 NOTE — PROGRESS NOTES
"  Assessment & Plan     IUD check up  We discussed the new prescribing guidelines for Mirena and that is may remain in place for up to 8 years and she would like to hold off on replacement if not needing to be done today. Plan to return to clinic early Dec 2027 for replacement, sooner if needed.    Hypertension goal BP (blood pressure) < 140/90  Continue working with PCP for management. Monitor for red flag symptoms and needs emergent follow up if they develop.      Ny Willams is a 39 year old, presenting for the following health issues:  IUD (Check up)    HPI       IUD check up    Presented today initially for replacement of her Mirena IUD. Inserted 12/2019. No concerns or side effects with it at this time. Pap smear up to date. Blood pressure elevated today-patient denies concerning symptoms and states this is managed by her PCP.      Review of Systems  Constitutional, HEENT, cardiovascular, pulmonary, gi and gu systems are negative, except as otherwise noted.      Objective    BP (!) 167/110 (BP Location: Right arm, Patient Position: Sitting, Cuff Size: Adult Large)   Pulse 99   Ht 1.575 m (5' 2\")   Wt 100.8 kg (222 lb 3.2 oz)   SpO2 96%   BMI 40.64 kg/m    Body mass index is 40.64 kg/m .  Physical Exam   GENERAL: alert and no distress   (female): normal female external genitalia, normal urethral meatus, normal vaginal mucosa, normal appearing cervix. IUD strings visible and appropriate length.  MS: no gross musculoskeletal defects noted, no edema  SKIN: no suspicious lesions or rashes  PSYCH: mentation appears normal, affect normal/bright    Signed Electronically by: ESTUARDO Handley CNP    "

## 2024-12-31 ENCOUNTER — OFFICE VISIT (OUTPATIENT)
Dept: OBGYN | Facility: CLINIC | Age: 39
End: 2024-12-31
Payer: COMMERCIAL

## 2024-12-31 VITALS
HEIGHT: 62 IN | WEIGHT: 222.2 LBS | SYSTOLIC BLOOD PRESSURE: 167 MMHG | HEART RATE: 99 BPM | OXYGEN SATURATION: 96 % | DIASTOLIC BLOOD PRESSURE: 110 MMHG | BODY MASS INDEX: 40.89 KG/M2

## 2024-12-31 DIAGNOSIS — Z30.431 IUD CHECK UP: Primary | ICD-10-CM

## 2024-12-31 DIAGNOSIS — I10 HYPERTENSION GOAL BP (BLOOD PRESSURE) < 140/90: ICD-10-CM

## 2024-12-31 PROCEDURE — 99202 OFFICE O/P NEW SF 15 MIN: CPT | Performed by: NURSE PRACTITIONER

## 2024-12-31 PROCEDURE — 99459 PELVIC EXAMINATION: CPT | Performed by: NURSE PRACTITIONER

## 2024-12-31 PROCEDURE — G2211 COMPLEX E/M VISIT ADD ON: HCPCS | Performed by: NURSE PRACTITIONER

## 2024-12-31 NOTE — PATIENT INSTRUCTIONS
If you have any questions regarding your visit, Please contact your care team.     [facility]: [phone]  To Schedule an Appointment 24/7  Call: [phone]LEPH[facility]     Coleen Toro- APRN CNP      [facility]-Surgery Scheduler  [facility]gery Scheduler         Monday 7:30am-2:00pm    Tuesday 7:30am-4:00pm    Wednesday 7:30am-2:00pm    Thursday 7:30am-11:00am    Friday 7:30am-2:00pm [address] [address]  Phone- [phone]   Fax- [phone]     Imaging Scheduling all locations  [phone]    [facility]  [address] [place] [address]   [place] [address] [phone]         Urgent Care locations:  [facility]   Monday-Friday  10 am - 8 pm  Saturday and Sunday   9 am - 5 pm     [phone]   If you need a medication refill, please contact your pharmacy. Please allow 3 business days for your refill to be completed.  As always, Thank you for trusting us with your healthcare needs!      see additional instructions from your care team below

## 2025-02-26 ENCOUNTER — TELEPHONE (OUTPATIENT)
Dept: FAMILY MEDICINE | Facility: CLINIC | Age: 40
End: 2025-02-26
Payer: COMMERCIAL

## 2025-02-26 NOTE — TELEPHONE ENCOUNTER
Patient Quality Outreach    Patient is due for the following:   Physical Preventive Adult Physical    Action(s) Taken:   Schedule a Adult Preventative    Type of outreach:    Sent [employer] message.    Questions for provider review:    None           Shaila Gambino CMA  Chart routed to Care Team.

## 2025-03-17 ENCOUNTER — E-VISIT (OUTPATIENT)
Dept: FAMILY MEDICINE | Facility: CLINIC | Age: 40
End: 2025-03-17
Payer: COMMERCIAL

## 2025-03-17 ENCOUNTER — MYC MEDICAL ADVICE (OUTPATIENT)
Dept: FAMILY MEDICINE | Facility: CLINIC | Age: 40
End: 2025-03-17
Payer: COMMERCIAL

## 2025-03-17 DIAGNOSIS — F41.9 ANXIETY: Primary | ICD-10-CM

## 2025-03-17 PROCEDURE — 99207 PR NON-BILLABLE SERV PER CHARTING: CPT

## 2025-03-17 NOTE — PATIENT INSTRUCTIONS
Thank you for choosing us for your care. I have placed the below referral(s) for you:  Orders Placed This Encounter   Procedures     Mental Health  Referral, Adult       Please click the link for your After Visit Summary to view scheduling instructions for your referral. In most cases, you will be contacted within 2 business days to schedule. If you do not hear from a representative within that time, please call [phone] to be connected to a .

## 2025-03-26 ENCOUNTER — VIRTUAL VISIT (OUTPATIENT)
Dept: PSYCHOLOGY | Facility: CLINIC | Age: 40
End: 2025-03-26
Payer: COMMERCIAL

## 2025-03-26 ENCOUNTER — MYC MEDICAL ADVICE (OUTPATIENT)
Dept: FAMILY MEDICINE | Facility: CLINIC | Age: 40
End: 2025-03-26
Payer: COMMERCIAL

## 2025-03-26 DIAGNOSIS — F43.23 ADJUSTMENT DISORDER WITH MIXED ANXIETY AND DEPRESSED MOOD: Primary | ICD-10-CM

## 2025-03-26 DIAGNOSIS — F41.1 GENERALIZED ANXIETY DISORDER: ICD-10-CM

## 2025-03-26 PROCEDURE — 90847 FAMILY PSYTX W/PT 50 MIN: CPT | Mod: 95 | Performed by: MARRIAGE & FAMILY THERAPIST

## 2025-03-26 ASSESSMENT — COLUMBIA-SUICIDE SEVERITY RATING SCALE - C-SSRS
ATTEMPT SINCE LAST CONTACT: NO
TOTAL  NUMBER OF ABORTED OR SELF INTERRUPTED ATTEMPTS SINCE LAST CONTACT: NO
SUICIDE, SINCE LAST CONTACT: NO
2. HAVE YOU ACTUALLY HAD ANY THOUGHTS OF KILLING YOURSELF?: NO
TOTAL  NUMBER OF INTERRUPTED ATTEMPTS SINCE LAST CONTACT: NO
6. HAVE YOU EVER DONE ANYTHING, STARTED TO DO ANYTHING, OR PREPARED TO DO ANYTHING TO END YOUR LIFE?: NO

## 2025-03-26 NOTE — PROGRESS NOTES
[facility]   Mental Health & Addiction Services     Progress Note - Initial Visit    Patient  Name:  Екатерина Ramon Date: 3/26/25           Service Type: Family with client present     Visit Start Time: 903  Visit End Time:     Visit #:  1    Attendees: Client and adult son Chicho    Service Modality:  Video Visit:      Provider verified identity through the following two step process.  Patient provided:  Patient photo, Patient , and Patient address    Telemedicine Visit: The patient's condition can be safely assessed and treated via synchronous audio and visual telemedicine encounter.      Reason for Telemedicine Visit: Patient has requested telehealth visit    Originating Site (Patient Location): Patient's home    Distant Site (Provider Location): [facility]    Consent:  The patient/guardian has verbally consented to: the potential risks and benefits of telemedicine (video visit) versus in person care; bill my insurance or make self-payment for services provided; and responsibility for payment of non-covered services.     Patient would like the video invitation sent by:  My Chart    Mode of Communication:  Video Conference via [facility]    Distant Location (Provider):  On-site    As the provider I attest to compliance with applicable laws and regulations related to telemedicine.       DATA:   Interactive Complexity: No   Crisis: No     Presenting Concerns/  Current Stressors:   Екатерина and Chicho both present to family psychotherapy to address conflict between mother and son, with curfew and other various family routines that are not being followed.  Екатерина reported having many issues and arguments with her son, as he completes his senior year of high school and has had the birth of his first child Samantha in 2025.  She states that she continues to be anxious and frustrated that he comes home at 5 AM when the curfew is 1130 on the weekends.   Currently states that she deals with anxiety and depression and has had a traumatic relational history.  Chicho states that he deals with attention deficit hyperactivity disorder which impacts his schooling as well as his interactions with others.  Chicho states that he does not have a relationship with Dominique's mother.  The client states that she is very concerned that her son will not graduate, because he is failing 3 classes currently.  She described herself as trying to influence her son's choices and priorities, but Chicho disregards them most of the time or argues with her then defies her.  During the dialogue, Екатерина and Chicho were observed to have quick criticism and resentment dynamics, and both crossed their arms and pursed their lips multiple times during their exchanges.  They started to respond to therapeutic strategies of building empathy and understanding as well as finding different ways to communicate her thoughts and feelings versus putting them onto each other.  They both agreed that they would be willing to follow through with therapeutic suggestions:  Take 5 to 20-minute breaks whenever they engage in escalated conflict or conversation turns negative and content is within the first 3 minutes  Slow down the communication and tried to use the curiosity mindset versus attacking or defending  Communicate hopes and positive requests (can you help me understand?), versus critique or dialogue that uses negativity (Don't do __)      ASSESSMENT:  Mental Status Assessment:  Appearance:   Appropriate   Eye Contact:   Good   Psychomotor Behavior: Restless   Attitude:   Interested Guarded   Orientation:   Person Place Time Situation  Speech   Rate / Production: Emotional Talkative   Volume:  Normal   Mood:    Anxious  Irritable  Sad  Fearful  Affect:    Subdued  Worrisome   Thought Content:  Clear   Thought Form:  Coherent  [place]  Insight:    Fair     Assessments completed prior to this visit:  The  following assessments were completed by patient for this visit:  PHQ9:       10/20/2023     7:45 AM 11/27/2023     1:05 PM 1/10/2024    11:12 AM 3/27/2024     7:56 PM 7/1/2024     1:19 PM 10/21/2024    10:37 AM 3/25/2025    10:31 PM   PHQ-9 SCORE   PHQ-9 Total Score MyChart 11 (Moderate depression) 6 (Mild depression) 10 (Moderate depression) 8 (Mild depression) 12 (Moderate depression) 4 (Minimal depression) 12 (Moderate depression)   PHQ-9 Total Score 11 6    6 10 8    8 12 4 12        Patient-reported     GAD7:       2/10/2023     7:34 AM 2/26/2023     3:36 PM 3/13/2023    10:11 AM 6/1/2023     3:31 PM 9/12/2023     7:20 AM 10/20/2023     7:46 AM 7/1/2024     1:20 PM   BERENICE-7 SCORE   Total Score 19 (severe anxiety) 13 (moderate anxiety) 16 (severe anxiety) 16 (severe anxiety) 19 (severe anxiety) 15 (severe anxiety) 21 (severe anxiety)   Total Score 19 13 16 16 19 15 21     CAGE-AID:       11/4/2021     8:30 AM 1/21/2022     8:40 AM 3/25/2025    10:43 PM   CAGE-AID Total Score   Total Score 0 0 0    Total Score MyChart 0 (A total score of 2 or greater is considered clinically significant) 0 (A total score of 2 or greater is considered clinically significant) 0 (A total score of 2 or greater is considered clinically significant)       Patient-reported     PROMIS 10-Global Health (only subscores and total score):       12/30/2021     8:36 AM 1/21/2022     8:40 AM 1/31/2022     4:30 PM 1/27/2023    12:07 PM 2/10/2023     7:44 AM 9/12/2023     7:21 AM 3/25/2025    10:42 PM   PROMIS-10 Scores Only   Global Mental Health Score 10 8 11 8 8 8 8    Global Physical Health Score 11 9 12 13 13 11 12    PROMIS TOTAL - SUBSCORES 21 17 23 21 21 19 20        Patient-reported     [place] Suicide Severity Rating Scale (Lifetime/Recent)      11/4/2021     9:26 AM 1/24/2022     2:00 PM 2/10/2023     2:32 PM   [place] Suicide Severity Rating (Lifetime/Recent)   Q1 Wish to be Dead (Lifetime) No Yes    Comments  unknown    Q2  "Non-Specific Active Suicidal Thoughts (Lifetime) No Yes    RETIRED: 1. Wish to be Dead (Recent) No No    RETIRED: 2. Non-Specific Active Suicidal Thoughts (Recent) No No    3. Active Suicidal Ideation with any Methods (Not Plan) Without Intent to Act (Lifetime) No No    RETIRED: 3. Active Suicidal Ideation with any Methods (Not Plan) Without Intent to Act (Recent) No No    RETIRE: 4. Active Suicidal Ideation with Some Intent to Act, Without Specific Plan (Lifetime) No No    4. Active Suicidal Ideation with Some Intent to Act, Without Specific Plan (Recent) No No    RETIRE: 5. Active Suicidal Ideation with Specific Plan and Intent (Lifetime) No No    RETIRED: 5. Active Suicidal Ideation with Specific Plan and Intent (Recent) No No    1. Wish to be Dead (Lifetime)   Y   Wish to be Dead Description (Lifetime)   Pt reports having thoughts 17-18 years ago \"thought I had a horrible life even though I didn't\"   1. Wish to be Dead (Past 1 Month)   N   2. Non-Specific Active Suicidal Thoughts (Lifetime)   N   Most Severe Ideation Rating (Lifetime)   3   Frequency (Lifetime)   1   Duration (Lifetime)   2   Controllability (Past 1 Month)   1   Deterrents (Lifetime)   1   Reasons for Ideation (Lifetime)   4   Reasons for Ideation (Past 1 Month)   0   Actual Attempt (Lifetime)   Y   Total Number of Actual Attempts (Lifetime)   1   Actual Attempt Description (Lifetime)   Pt reports \"cutting my wrists and had a knife to my throat\"   Actual Attempt (Past 3 Months)   N   Has subject engaged in non-suicidal self-injurious behavior? (Lifetime)   Y   Has subject engaged in non-suicidal self-injurious behavior? (Past 3 Months)   N   Interrupted Attempts (Lifetime)   N   Aborted or Self-Interrupted Attempt (Lifetime)   N   Preparatory Acts or Behavior (Lifetime)   N   Most Recent Attempt Date   --   Potential Lethality Code (Most Recent Attempt)   1   Actual Lethality/Medical Damage Code (Most Lethal Attempt)   3   Potential Lethality " Code (Most Lethal Attempt)   1   Potential Lethality Code (Initial/First Attempt)   1   Calculated C-SSRS Risk Score (Lifetime/Recent)   Moderate Risk         Safety Issues and Plan for Safety and Risk Management:   [place] Suicide Severity Rating Scale (Short Version)      1/4/2021     6:51 AM 2/10/2023     2:32 PM 3/26/2025    10:21 AM   [place] Suicide Severity Rating (Short Version)   Over the past 2 weeks have you felt down, depressed, or hopeless? no     Over the past 2 weeks have you had thoughts of killing yourself? no     Have you ever attempted to kill yourself? no     2. Non-Specific Active Suicidal Thoughts (Since Last Contact)   N   Actual Attempt (Since Last Contact)   N   Has subject engaged in non-suicidal self-injurious behavior? (Since Last Contact)   N   Interrupted Attempts (Since Last Contact)   N   Aborted or Self-Interrupted Attempt (Since Last Contact)   N   Preparatory Acts or Behavior (Since Last Contact)   N   Suicide (Since Last Contact)   N   Actual Lethality/Medical Damage Code (Most Lethal Attempt)  3    Potential Lethality Code (Most Lethal Attempt)  1    Calculated C-SSRS Risk Score (Since Last Contact)   No Risk Indicated     Patient denies current fears or concerns for personal safety.  Patient denies current or recent suicidal ideation or behaviors.  Patient denies current or recent homicidal ideation or behaviors.  Patient denies current or recent self injurious behavior or ideation.  Patient denies other safety concerns.  Recommended that patient call 911 or go to the local ED should there be a change in any of these risk factors  Patient reports there are  did not disclose if there are firearms in the home .     Diagnostic Criteria:  Generalized Anxiety Disorder  A. Excessive anxiety and worry about a number of events or activities (such as work or school performance).   B. The person finds it difficult to control the worry.  C. Select 3 or more symptoms (required for  diagnosis). Only one item is required in children.   - Restlessness or feeling keyed up or on edge.    - Being easily fatigued.    - Irritability.    - Muscle tension.    - Sleep disturbance (difficulty falling or staying asleep, or restless unsatisfying sleep).   D. The focus of the anxiety and worry is not confined to features of an Axis I disorder.  E. The anxiety, worry, or physical symptoms cause clinically significant distress or impairment in social, occupational, or other important areas of functioning.   F. The disturbance is not due to the direct physiological effects of a substance (e.g., a drug of abuse, a medication) or a general medical condition (e.g., hyperthyroidism) and does not occur exclusively during a Mood Disorder, a Psychotic Disorder, or a Pervasive Developmental Disorder.    - The aformentioned symptoms began several year(s) ago and occurs 7 days per week and is experienced as moderate.  Adjustment Disorder  A. The development of emotional or behavioral symptoms in response to an identifiable stressor(s) occurring within 3 months of the onset of the stressor(s)  B. These symptoms or behaviors are clinically significant, as evidenced by one or both of the following:       - Marked distress that is out of proportion to the severity/intensity of the stressor (with consideration for external context & culture)       - Significant impairment in social, occupational, or other important areas of functioning  C. The stress-related disturbance does not meet criteria for another disorder & is not not an exacerbation of another mental disorder  D. The symptoms do not represent normal bereavement  E. Once the stressor or its consequences have terminated, the symptoms do not persist for more than an additional 6 months       * Adjustment Disorder with Mixed Anxiety and Depressed Mood: The predominant manifestation is a combination of depression and anxiety      DSM5 Diagnoses: (Sustained by DSM5  Criteria Listed Above)  309.28 (F43.23) adjustment disorder, with mixed anxiety and depressed mood (son nearing graduation and launching from the family home)  300.02 (F41.1) Generalized Anxiety Disorder  (History of major depressive disorder and dysthymic disorder which will be further assessed upon next session)      Psychosocial & Contextual Factors: Family launching of her only child, interpersonal relationship conflicts, family has grown with the birth of her first granddaughter    Intervention:  Educated on treatment planning and started identifying goals and interventions for treatment plan and family systems interventions using communication skills-building      Collateral Reports Completed:  Not Applicable      PLAN: (Homework, other):  1. Provider will continue Diagnostic Assessment.  Patient was given the following to do until next session:    Take 5 to 20-minute breaks whenever they engage in escalated conflict or conversation turns negative and content is within the first 3 minutes  Slow down the communication and tried to use the curiosity mindset versus attacking or defending  Communicate hopes and positive requests (can you help me understand?), versus critique or dialogue that uses negativity (Don't do __)    2. Provider recommended the following referrals: Ongoing outpatient behavioral health counseling through [facility]. .      3.  Suicide Risk and Safety Concerns were assessed for Екатерина Ramon.    Patient meets the following risk assessment and triage:   [place] Suicide Severity Rating Scale not completed do the following reason client denied current safety issues, but historical issues are on the record.  Family conflict during session prevented full assessment, client did not complete CSSR lifetime instrument      MARY ELLEN Sheldon  March 26, 2025      Answers submitted by the patient for this visit:  Patient Health Questionnaire (Submitted on 3/25/2025)  If you checked off  any problems, how difficult have these problems made it for you to do your work, take care of things at home, or get along with other people?: Very difficult  PHQ9 TOTAL SCORE: 12

## 2025-04-08 DIAGNOSIS — E66.01 MORBID OBESITY (H): ICD-10-CM

## 2025-04-08 DIAGNOSIS — Z98.84 STATUS POST LAPAROSCOPIC SLEEVE GASTRECTOMY: ICD-10-CM

## 2025-04-08 RX ORDER — OMEPRAZOLE 20 MG/1
20 CAPSULE, DELAYED RELEASE ORAL
Qty: 60 CAPSULE | Refills: 4 | Status: SHIPPED | OUTPATIENT
Start: 2025-04-08

## 2025-04-29 ASSESSMENT — PATIENT HEALTH QUESTIONNAIRE - PHQ9
SUM OF ALL RESPONSES TO PHQ QUESTIONS 1-9: 14
10. IF YOU CHECKED OFF ANY PROBLEMS, HOW DIFFICULT HAVE THESE PROBLEMS MADE IT FOR YOU TO DO YOUR WORK, TAKE CARE OF THINGS AT HOME, OR GET ALONG WITH OTHER PEOPLE: VERY DIFFICULT
SUM OF ALL RESPONSES TO PHQ QUESTIONS 1-9: 14

## 2025-04-29 ASSESSMENT — ANXIETY QUESTIONNAIRES
8. IF YOU CHECKED OFF ANY PROBLEMS, HOW DIFFICULT HAVE THESE MADE IT FOR YOU TO DO YOUR WORK, TAKE CARE OF THINGS AT HOME, OR GET ALONG WITH OTHER PEOPLE?: EXTREMELY DIFFICULT
GAD7 TOTAL SCORE: 21
1. FEELING NERVOUS, ANXIOUS, OR ON EDGE: NEARLY EVERY DAY
7. FEELING AFRAID AS IF SOMETHING AWFUL MIGHT HAPPEN: NEARLY EVERY DAY
6. BECOMING EASILY ANNOYED OR IRRITABLE: NEARLY EVERY DAY
7. FEELING AFRAID AS IF SOMETHING AWFUL MIGHT HAPPEN: NEARLY EVERY DAY
2. NOT BEING ABLE TO STOP OR CONTROL WORRYING: NEARLY EVERY DAY
5. BEING SO RESTLESS THAT IT IS HARD TO SIT STILL: NEARLY EVERY DAY
GAD7 TOTAL SCORE: 21
4. TROUBLE RELAXING: NEARLY EVERY DAY
GAD7 TOTAL SCORE: 21
3. WORRYING TOO MUCH ABOUT DIFFERENT THINGS: NEARLY EVERY DAY
IF YOU CHECKED OFF ANY PROBLEMS ON THIS QUESTIONNAIRE, HOW DIFFICULT HAVE THESE PROBLEMS MADE IT FOR YOU TO DO YOUR WORK, TAKE CARE OF THINGS AT HOME, OR GET ALONG WITH OTHER PEOPLE: EXTREMELY DIFFICULT

## 2025-04-30 ENCOUNTER — VIRTUAL VISIT (OUTPATIENT)
Dept: FAMILY MEDICINE | Facility: CLINIC | Age: 40
End: 2025-04-30
Payer: COMMERCIAL

## 2025-04-30 DIAGNOSIS — Z63.79 STRESSFUL LIFE EVENT AFFECTING FAMILY: ICD-10-CM

## 2025-04-30 DIAGNOSIS — F33.2 SEVERE EPISODE OF RECURRENT MAJOR DEPRESSIVE DISORDER, WITHOUT PSYCHOTIC FEATURES (H): Primary | ICD-10-CM

## 2025-04-30 DIAGNOSIS — F41.1 GAD (GENERALIZED ANXIETY DISORDER): ICD-10-CM

## 2025-04-30 DIAGNOSIS — I10 HYPERTENSION GOAL BP (BLOOD PRESSURE) < 140/90: ICD-10-CM

## 2025-04-30 PROCEDURE — 98006 SYNCH AUDIO-VIDEO EST MOD 30: CPT

## 2025-04-30 RX ORDER — PROPRANOLOL HYDROCHLORIDE 60 MG/1
60 CAPSULE, EXTENDED RELEASE ORAL DAILY
Qty: 30 CAPSULE | Refills: 2 | Status: SHIPPED | OUTPATIENT
Start: 2025-04-30

## 2025-04-30 RX ORDER — DULOXETIN HYDROCHLORIDE 60 MG/1
60 CAPSULE, DELAYED RELEASE ORAL DAILY
Qty: 90 CAPSULE | Refills: 0 | Status: SHIPPED | OUTPATIENT
Start: 2025-04-30

## 2025-04-30 RX ORDER — BUSPIRONE HYDROCHLORIDE 10 MG/1
20 TABLET ORAL 2 TIMES DAILY
Qty: 120 TABLET | Refills: 1 | Status: SHIPPED | OUTPATIENT
Start: 2025-04-30

## 2025-04-30 NOTE — PATIENT INSTRUCTIONS
Start propranolol - daily every morning.   After 2 weeks, continue propranolol and change how you take buspirone (aka buspar). Take buspar 20 mg (2 tabs) TWICE per day.     Follow up in 1 month.

## 2025-04-30 NOTE — PROGRESS NOTES
"Екатерина is a 39 year old who is being evaluated via a billable video visit.    How would you like to obtain your AVS? MyChart  If the video visit is dropped, the invitation should be resent by: Text to cell phone: [phone]  Will anyone else be joining your video visit? No      Assessment & Plan     BERENICE (generalized anxiety disorder)  - propranolol ER (INDERAL LA) 60 MG 24 hr capsule  Dispense: 30 capsule; Refill: 2  - Primary Care - Care Coordination Referral  - busPIRone (BUSPAR) 10 MG tablet  Dispense: 120 tablet; Refill: 1  - DULoxetine (CYMBALTA) 60 MG capsule  Dispense: 90 capsule; Refill: 0    Severe episode of recurrent major depressive disorder, without psychotic features (H)  - Primary Care - Care Coordination Referral  - DULoxetine (CYMBALTA) 60 MG capsule  Dispense: 90 capsule; Refill: 0    Stressful life event affecting family  - Primary Care - Care Coordination Referral      Hypertension goal BP (blood pressure) < 140/90   Restart propranolol, pt was only taking prn.     Start propranolol - daily every morning.   After 2 weeks, continue propranolol and change how you take buspirone (aka buspar). Take buspar 20 mg (2 tabs) TWICE per day.       BMI  Estimated body mass index is 40.64 kg/m  as calculated from the following:    Height as of 12/31/24: 1.575 m (5' 2\").    Weight as of 12/31/24: 100.8 kg (222 lb 3.2 oz).         Follow-up       Subjective   Екатерина is a 39 year old, presenting for the following health issues:   Follow Up and Weight Management      4/30/2025     7:58 AM   Additional Questions   Roomed by Shaila Goode Start Time:  see below    History of Present Illness       Mental Health Follow-up:  Patient presents to follow-up on Depression & Anxiety.Patient's depression since last visit has been:  Worse  The patient is not having other symptoms associated with depression.  Patient's anxiety since last visit has been:  Worse  The patient is not having other symptoms associated " with anxiety.  Any significant life events: relationship concerns, job concerns and financial concerns  Patient is feeling anxious or having panic attacks.  Patient has no concerns about alcohol or drug use.    Reason for visit:  Weight/anxiety/depression    She eats 2-3 servings of fruits and vegetables daily.She consumes 0 sweetened beverage(s) daily.She exercises with enough effort to increase her heart rate 20 to 29 minutes per day.  She exercises with enough effort to increase her heart rate 3 or less days per week. She is missing 2 dose(s) of medications per week.  She is not taking prescribed medications regularly due to remembering to take.        Anxiety is bad lately.   Started family therapy,   Weight normally fluctuates after gastric bypass was at 170. Maintained for a while.     Wt Readings from Last 5 Encounters:   12/31/24 100.8 kg (222 lb 3.2 oz)   10/21/24 97.7 kg (215 lb 6.4 oz)   02/14/24 86.2 kg (190 lb)   12/05/23 86.2 kg (190 lb)   11/27/23 86.2 kg (190 lb)        Duloxetine 60 MG daily.   Buspar - takes 2 tablets every morning.   Hydroxyzine - takes a couple times per month  Propranolol - not taking.  Sumatriptan - once every couple months.   Alprazolam - takes once every few months.            Review of Systems  Constitutional, HEENT, cardiovascular, pulmonary, gi and gu systems are negative, except as otherwise noted.      Objective           Vitals:  No vitals were obtained today due to virtual visit.    Physical Exam   GENERAL: alert and no distress  EYES: Eyes grossly normal to inspection.  No discharge or erythema, or obvious scleral/conjunctival abnormalities.  RESP: No audible wheeze, cough, or visible cyanosis.    SKIN: Visible skin clear. No significant rash, abnormal pigmentation or lesions.  NEURO: Cranial nerves grossly intact.  Mentation and speech appropriate for age.  PSYCH: Appropriate affect, tone, and pace of words          Video-Visit Details  Joined the call at 4/30/2025,  12:34:56 pm.  Left the call at 4/30/2025, 1:07:03 pm.  You were on the call for 32 minutes 6 seconds.  Type of service:  Video Visit   Video End Time: as above  Originating Location (pt. Location): Home    Distant Location (provider location):  Off-site  Platform used for Video Visit: Brittney  Signed Electronically by: ESTUARDO Crowder CNP

## 2025-05-06 ENCOUNTER — PATIENT OUTREACH (OUTPATIENT)
Dept: CARE COORDINATION | Facility: CLINIC | Age: 40
End: 2025-05-06
Payer: COMMERCIAL

## 2025-05-06 NOTE — PROGRESS NOTES
UT/Voicemail    Patient left VM 5/2 @12:43 returning initial call    Clinical Data: Care Coordinator Outreach    Outreach Documentation Number of Outreach Attempt   5/1/2025  10:08 AM 1   5/2/2025   8:33 AM 2   5/6/2025  10:22 AM 1       Left message on patient's voicemail with call back information and requested return call.      Plan:  Care Coordinator will try to reach patient again in 1-2 business days.    MIKE Menard  [place], [place] Val[place] and [facility]  [phone]

## 2025-05-06 NOTE — LETTER
[facility] CARE COORDINATION  [address] [address]    May 7, 2025    Екатерина Ramon  [address] [address] [address]      Dear Екатерина,    I am a clinic community health worker who works with ESTUARDO Crowder CNP with the [facility]. I have been trying to reach you recently to introduce Clinic Care Coordination. Below is a description of clinic care coordination and how I can further assist you.       The clinic care coordination team is made up of a registered nurse, , financial resource worker and community health worker who understand the health care system. The goal of clinic care coordination is to help you manage your health and improve access to the health care system. Our team works alongside your provider to assist you in determining your health and social needs. We can help you obtain health care and community resources, providing you with necessary information and education. We can work with you through any barriers and develop a care plan that helps coordinate and strengthen the communication between you and your care team.  Our services are voluntary and are offered without charge to you personally.    Please feel free to contact me with any questions or concerns regarding care coordination and what we can offer.      We are focused on providing you with the highest-quality healthcare experience possible.    Sincerely,     MIKE Menard, [place], [place] and [facility]  [phone]

## 2025-05-07 ENCOUNTER — PATIENT OUTREACH (OUTPATIENT)
Dept: CARE COORDINATION | Facility: CLINIC | Age: 40
End: 2025-05-07
Payer: COMMERCIAL

## 2025-05-07 NOTE — PROGRESS NOTES
Community Health Worker Initial Outreach    CHW Initial Information Gathering:  Referral Source: PCP  Preferred Hospital: [facility]  [phone]  Preferred Urgent Care: [facility] [phone]  Current living arrangement:: I live in a private home  Type of residence:: Private home - stairs  Community Resources: None  Supplies Currently Used at Home: None  Equipment Currently Used at Home: none  Informal Support system:: Family, Friends  No PCP office visit in Past Year: No  Transportation means:: Regular car  CHW Additional Questions  If ED/Hospital discharge, follow-up appointment scheduled as recommended?: N/A  Medication changes made following ED/Hospital discharge?: N/A  MyChart active?: Yes  Patient sent Social Drivers of Health questionnaire?: Yes    Patient accepts CC: Yes. Patient scheduled for assessment with CC SW on 5/12 at 11 am. Patient noted desire to discuss financial resources.     Gina Humphries, MIKE  [place], [place], [place]   [place] and [facility]  [phone]

## 2025-05-07 NOTE — PROGRESS NOTES
[facility]/Voicemail    Clinical Data: Care Coordinator Outreach    Outreach Documentation Number of Outreach Attempt   5/1/2025  10:08 AM 1   5/2/2025   8:33 AM 2   5/6/2025  10:22 AM 1   5/7/2025  10:02 AM 2       Left message on patient's voicemail with call back information and requested return call.      Plan: Care Coordinator will send care coordination introduction letter with care coordinator contact information and explanation of care coordination services via [employer]. Care Coordinator will do no further outreaches at this time.    MIKE Menard, [place], [place], [place] and [facility]  [phone]

## 2025-05-12 ENCOUNTER — PATIENT OUTREACH (OUTPATIENT)
Dept: NURSING | Facility: CLINIC | Age: 40
End: 2025-05-12
Payer: COMMERCIAL

## 2025-05-12 ASSESSMENT — ACTIVITIES OF DAILY LIVING (ADL): DEPENDENT_IADLS:: INDEPENDENT

## 2025-05-12 NOTE — PROGRESS NOTES
Clinic Care Coordination Contact    SALEEM initial phone assessment.  Patient reports she cannot find employment.  SW discussed [employer] resources, which can assist with updating resume, job research and career navigation skills.  SALEEM provided this # to patient ([phone]) she will call and discuss locations and classes for her job search.  Patient thanked SALEEM for the information, she reported nothing further was needed.  She stated she does not need assistance with basic needs at this time.      SALEEM will not intervene any further, will close to CC     Cortney Garcia, NISHIW, MSW   [facility]  Care Coordination  [facility] and [facility]  [phone]  5/12/2025 2:30 PM

## 2025-05-17 ENCOUNTER — MYC MEDICAL ADVICE (OUTPATIENT)
Dept: FAMILY MEDICINE | Facility: CLINIC | Age: 40
End: 2025-05-17
Payer: COMMERCIAL

## 2025-05-17 DIAGNOSIS — M54.6 CHRONIC BILATERAL THORACIC BACK PAIN: Primary | ICD-10-CM

## 2025-05-17 DIAGNOSIS — G89.29 CHRONIC BILATERAL THORACIC BACK PAIN: Primary | ICD-10-CM

## 2025-05-17 DIAGNOSIS — G89.29 CHRONIC MIDLINE LOW BACK PAIN WITHOUT SCIATICA: ICD-10-CM

## 2025-05-17 DIAGNOSIS — M54.50 CHRONIC MIDLINE LOW BACK PAIN WITHOUT SCIATICA: ICD-10-CM

## 2025-05-20 NOTE — TELEPHONE ENCOUNTER
Routing my chart message to PCP    Pt requesting referral for back injection. Referral only good for 6 months.     Sapna Patel RN

## 2025-05-28 ENCOUNTER — MYC MEDICAL ADVICE (OUTPATIENT)
Dept: FAMILY MEDICINE | Facility: CLINIC | Age: 40
End: 2025-05-28
Payer: COMMERCIAL

## 2025-05-28 NOTE — TELEPHONE ENCOUNTER
Routing to PCP.  Please see request below from patient.  Please advise.    FIDE Daily  [facility]

## 2025-05-29 ENCOUNTER — TELEPHONE (OUTPATIENT)
Dept: PALLIATIVE MEDICINE | Facility: CLINIC | Age: 40
End: 2025-05-29
Payer: COMMERCIAL

## 2025-05-29 DIAGNOSIS — M54.16 LUMBAR RADICULOPATHY: Primary | ICD-10-CM

## 2025-05-29 NOTE — TELEPHONE ENCOUNTER
"Injection Order (copy and paste all info under \"order questions\" section:   My clinical question is:   repeat previous injection if appropriate    Reason for Referral:  Procedure Order    Procedure:  Injection to be Determined by Pain Management Specialist    Patient Scheduling Instructions:  [facility] will call you to coordinate care as prescribed your provider. If you don t hear from a representative within 2 business days, please call [phone].    Additional Information:      Associated Diagnosis: Chronic bilateral thoracic back pain [M54.6, G89.29]  Chronic midline low back pain without sciatica [M54.50, G89.29]      Referring Provider: Ghanshyam Dixon APRN CNP in [facility]    Injection History (previous injections with pain, type and date): Yes, LIESI with Dr. Alvarado, 11/2024.    After Review please route to Pain Nurse Pool for nursing to triage.    Ijeoma Perez       [employer]  Pain Management    "

## 2025-06-03 NOTE — TELEPHONE ENCOUNTER
LVM for patient requesting return call to clinic 06/03/25  10:10 AM.     TBD injection order received:  Appears this order is intended  for repeat injection.     Order for dx: Chronic bilateral thoracic back pain [M54.6, G89.29]  Chronic midline low back pain without sciatica [M54.50, G89.29]     Per chart review: Patient appears to request repeat injection orders per MyChart conversation with referring provider.     Last injection noted 11/6/2024 L5-S1 ILESI with Dr. Alvarado    Contacted patient who identifies she appreciated significant (%) relief for 6 months.     Patient identifies she does not have any symptoms not present prior to last injection.     Most recent MRI: 9/03/2019 with [facility] imaging.     Routing to interventionist to identify if patient may schedule for repeat.     *Providers*    Please do not place additional orders.  Order is not needed for TBDs.   Route all correspondence to P Pain Nurse pool ([phone])  Do not route to individual staff    Mela Rush RN

## 2025-06-03 NOTE — TELEPHONE ENCOUNTER
"Screening Questions for Radiology Injections:    Injection to be done at which interventional clinic site? [facility]    If choosing [facility] for location, please inform patient:  \"[facility] is a Hospital based clinic. Before your visit, you should check with your insurance about how it covers the charges for facility services in a hospital-based clinic.     Procedure ordered by Ghanshyam Dixon APRN CNP     Procedure ordered? Repeat L5-S1 ILESI   Transforaminal Cervical RAHEEM - Send to [facility]) - No [facility] providers perform this procedure    What insurance would patient like us to bill for this procedure? BC Federal/Blue Plus  IF SCHEDULING IN [facility] PLEASE SCHEDULE AT LEAST 7-10 BUSINESS DAYS OUT SO A PA CAN BE OBTAINED  Worker's comp or MVA (motor vehicle accident) -Any injection DO NOT SCHEDULE and route to Ceasar Stallings.    [employer] insurance - For ALL INJECTIONS DO NOT SCHEDULE and route to Trinh Hilliard.     ALL BCBS, Hum[employer] and [employer] CIGNA - DO NOT SCHEDULE and route to Trinh Hilliard  MEDICA- ALL INJECTIONS- [facility]    Is patient scheduled at [facility]? No    If YES, route every encounter to [facility] SPINE CENTER CARE NAVIGATION POOL [[phone]]    Is an  needed? No     Patient has a  home? (Review Grid) YES: ok    Any chance of pregnancy? NO   If YES, do NOT schedule and route to RN pool  - Dr. Valdez route to PM&R Nurse  [[id number]]      Is patient actively being treated for cancer or immunocompromised? No  If YES, do NOT schedule and route to RN pool/ Dr. Valdez's Team    Does the patient have a bleeding or clotting disorder? No   If YES, okay to schedule AND route to RN nurse / Dr. Valdez's Team   (For any patients with platelet count <100, RN must forward to provider)    Is patient taking any Blood Thinners OR Antiplatelet medication?  No   If hold needed, do NOT schedule, route to RN pool/ " Dr. Valdez's Team  Examples:   Blood Thinners: (Coumadin, Warfarin, Jantoven, Pradaxa, Xarelto, Eliquis, Edoxaban, Enoxaparin, Lovenox, Heparin, Arixtra, Fondaparinux or Fragmin)  Antiplatelet Medications: (Plavix, Brilinta or Effient)     Is patient taking any aspirin products (includes: Aspirin 81 mg, Excedrin, and Fiorinal)? No.    If yes route to RN pool/ Dr. Valdez's Team - Do not schedule    Is patient taking any GLP-1 Antagonist (hold needed for sedation patients only) No   (semaglutide (Ozempic, Wegovy), dulaglutide (Trulicity), exenatide ER (Bydureon), tirzepatide (Mounjaro), Liraglutide (Saxenda, Victoza), semaglutide (Rybelsus), Terzepatide (Zepbound)  If YES, okay to schedule AND route to RN nurse / Dr. Valdez's Team      Any allergies to contrast dye, iodine, shellfish, or numbing and steroid medications? No  If YES, schedule and add allergy information to appointment notes AND route to the RN pool/ Dr. Valdez's Team  If RAHEEM and Contrast Dye / Iodine Allergy? DO NOT SCHEDULE, route to RN pool/ Dr. Valdez's Team  Allergies: Oxycodone, Lisinopril, and Zoloft     Does patient have an active infection or treated for one within the past week? No  Is patient currently taking any antibiotics or steroid medications?  No   For patients on chronic, preventative, or prophylactic antibiotics, procedures may be scheduled.   For patients on antibiotics for active or recent infection, schedule 4 days after completed.  For patients on steroid medications, schedule 4 days after completed.     Has the patient had a flu shot or any other vaccinations within the past 7 days? No  If yes, explain that for the vaccine to work best they need to:     wait 1 week before and 1 week after getting any Vaccine  wait 1 week before and 2 weeks after getting any Covid Vaccine   If patient has concerns about the timing, send to RN pool/ Dr. Valdez's Team    Does patient have an MRI/CT?  NO Include Date and Check Procedure Scheduling  Grid to see if required.  Was the MRI/CT done within the last 3 years?  No, last one was in 2019   If no route to RN Pool/ Dr. Valdez's Team  If yes, where was the MRI/CT done? [facility]   Refer to PACS Transmissions list for approved external locations and route to RN Pool High Priority/ Dr. Bulls Team  If MRI was not done at approved external location do NOT schedule and route to RN pool/ Dr. Bulls Team    If patient has an imaging disc, the injection MAY be scheduled but patient must bring disc to appt or appt will be cancelled.    Is patient able to transfer to a procedure table with minimal or no assistance? Yes   If no, do NOT schedule and route to RN Pool/ Dr. Valdez's Team    Procedure Specific Instructions:  If celiac plexus block, informed patient NPO for 6 hours and that it is okay to take medications with sips of water, especially blood pressure medications Not Applicable       If this is for a cervical procedure, informed patient that aspirin needs to be held for 6 days.   Not Applicable    Sedation, If Sedation is ordered for any procedure, patient must be NPO for 6 hours prior to procedure Not Applicable    If IV needed:  Do not schedule procedures requiring IV placement in the first appointment of the day or first appointment after lunch. Do NOT schedule at 0745, 0815 or 1245.     Instructed patient to arrive 30 minutes early for IV start if required. (Check Procedure Scheduling Grid)  Not Applicable    Reminders:  If you are started on any steroids or antibiotics between now and your appointment, you must contact us because the procedure may need to be cancelled.  Yes    As a reminder, receiving steroids can decrease your body's ability to fight infection.   Would you still like to move forward with scheduling the injection?  Yes    IV Sedation is not provided for procedures. If oral anti-anxiety medication is needed, the patient should request this from their referring provider.    Instruct patient  to arrive as directed prior to the scheduled appointment time:  If IV needed 30 minutes before appointment time     For patients 85 or older we recommend having an adult stay w/ them for the remainder of the day.     If the patient is Diabetic, remind them to bring their glucometer.    Dr. Segundo Pt's - Imaging Orders Needed   Please send all injections to RN Pool Not Applicable   Red Flags? Not Applicable    Does the patient have any questions?  NO  Ijeoma Perez  [facility]

## 2025-06-17 ENCOUNTER — TELEPHONE (OUTPATIENT)
Dept: FAMILY MEDICINE | Facility: CLINIC | Age: 40
End: 2025-06-17
Payer: COMMERCIAL

## 2025-06-17 NOTE — TELEPHONE ENCOUNTER
Patient Quality Outreach    Patient is due for the following:   Hypertension -  Hypertension follow-up visit  Physical Preventive Adult Physical  Chronic Opioid Use -  Urine Drug Screen, BERENICE-7, and PHQ-9    Action(s) Taken:   Schedule a Adult Preventative    Type of outreach:    Sent bettermarks message.    Questions for provider review:    None         Thuy Mccarthy CMA  Chart routed to Care Team.

## 2025-06-24 ENCOUNTER — RADIOLOGY INJECTION OFFICE VISIT (OUTPATIENT)
Dept: PALLIATIVE MEDICINE | Facility: CLINIC | Age: 40
End: 2025-06-24
Payer: COMMERCIAL

## 2025-06-24 VITALS — OXYGEN SATURATION: 98 % | DIASTOLIC BLOOD PRESSURE: 94 MMHG | HEART RATE: 77 BPM | SYSTOLIC BLOOD PRESSURE: 163 MMHG

## 2025-06-24 DIAGNOSIS — M54.6 CHRONIC BILATERAL THORACIC BACK PAIN: ICD-10-CM

## 2025-06-24 DIAGNOSIS — M54.50 CHRONIC MIDLINE LOW BACK PAIN WITHOUT SCIATICA: ICD-10-CM

## 2025-06-24 DIAGNOSIS — G89.29 CHRONIC BILATERAL THORACIC BACK PAIN: ICD-10-CM

## 2025-06-24 DIAGNOSIS — M54.16 LUMBAR RADICULOPATHY: ICD-10-CM

## 2025-06-24 DIAGNOSIS — G89.29 CHRONIC MIDLINE LOW BACK PAIN WITHOUT SCIATICA: ICD-10-CM

## 2025-06-24 PROCEDURE — 62323 NJX INTERLAMINAR LMBR/SAC: CPT | Performed by: PAIN MEDICINE

## 2025-06-24 RX ORDER — TRIAMCINOLONE ACETONIDE 40 MG/ML
40 INJECTION, SUSPENSION INTRA-ARTICULAR; INTRAMUSCULAR ONCE
Status: COMPLETED | OUTPATIENT
Start: 2025-06-24 | End: 2025-06-24

## 2025-06-24 RX ADMIN — TRIAMCINOLONE ACETONIDE 40 MG: 40 INJECTION, SUSPENSION INTRA-ARTICULAR; INTRAMUSCULAR at 09:58

## 2025-06-24 ASSESSMENT — PAIN SCALES - GENERAL
PAINLEVEL_OUTOF10: NO PAIN (0)
PAINLEVEL_OUTOF10: SEVERE PAIN (9)

## 2025-06-24 NOTE — NURSING NOTE
Pre-procedure Intake  If YES to any questions or NO to having a   Please complete laminated checklist and leave on the computer keyboard for Provider, verbally inform provider if able.    For SCS Trial, RFA's or any sedation procedure:  Have you been fasting? NA  If yes, for how long?     Are you taking any any blood thinners such as Coumadin, Warfarin, Jantoven, Pradaxa Xarelto, Eliquis, Edoxaban, Enoxaparin, Lovenox, Heparin, Arixtra, Fondaparinux, or Fragmin? OR Antiplatelet medication such as Plavix, Brilinta, or Effient?   No   If yes, when did you take your last dose?     Do you take aspirin?  No  If cervical procedure, have you held aspirin for 6 days?   NA    Is the Pt taking any GLP-1 Antagonist (hold needed for sedation patients only)  (semaglutide (Ozempic, Wegovy), dulaglutide (Trulicity), exenatide ER (Bydureon), tirzepatide (Mounjaro), Liraglutide (Saxenda, Victoza), semaglutide (Rybelsus)     NA  If yes, when did you take your last dose?     Do you have any allergies to contrast dye, iodine, steroid and/or numbing medications?  NO    Are you currently taking antibiotics or have an active infection?  NO    Have you had a fever/elevated temperature within the past week? NO    Are you currently taking oral steroids? NO    Do you have a ? Yes    Are you pregnant or breastfeeding?  NO    Have you received any vaccinations in the last week? NO    Notify provider and RNs if systolic BP >170, diastolic BP >100, P >100 or O2 sats < 90%     Latoya Rizo MA  [facility] [place]

## 2025-06-24 NOTE — PROGRESS NOTES
6/24/25  Pre procedure Diagnosis: lumbar radiculopathy    Post procedure Diagnosis: Same  Procedure performed: L5-S1 interlaminar epidural steroid injection   Anesthesia: none  Complications: none  Operators: Zia Alvarado MD     Indications:   Екатерина Ramon is a 39year old female.  The patient has a history of bilateral low back pain radiating to the buttocks.  Examination shows negative slump.  she has tried conservative treatment including meds/PHYSICAL THERAPY /injections with benefit     MRI w  Options/alternatives, benefits and risks were discussed with the patient including but not limited to bleeding, infection, no pain relief, tissue trauma, exposure to radiation, reaction to medications, spinal cord injury, dural puncture, weakness, numbness and headache.  Questions were answered to her satisfaction and she wishes to proceed. Voluntary informed consent was obtained and signed.     Vitals were reviewed: Yes  Allergies were reviewed:  Yes   Medications were reviewed:  Yes   Pre-procedure pain score: 9/10    Procedure:  The patient's medical history, medications, and allergies were reviewed and reconciled.  After obtaining signed informed consent, the patient was brought into the procedure suite and was placed in a prone position on the procedure table.   A Pause for the Cause was performed.  Patient was prepped and draped in the usual sterile fashion.     The L 5-1 interspace was identified with AP fluoroscopy.  A total of 3ml of 1% lidocaine was used to anesthetize the skin and subcutaneous tissues for a  midline approach.    A 20gauge 4.5inch Touhy needle was advanced utilizing intermittent AP and Lateral fluoroscopy and air for loss of resistance.  The epidural space was encountered on the first pass without difficulties.  Aspiration for blood and CSF was negative.  Needle position was verified by injecting 1 ml of Omnipaque utilizing real-time fluoroscopy that showed good needle placement and  epidural spread without signs of intravascular or intrathecal uptake.  Omnipaque wasted:  9 ml.    Then, after repeated negative aspiration for blood or CSF, a combination of Kenalog 40 mg, Bupivicaine 0.25% 2 ml, diluted with 3 ml of normal saline to a total injectate volume of 6 ml was injected into the epidural space in a slow and incremental fashion and the needle was restyletted and withdrawn.  All injected medications were preservative free.    The injection site was cleaned and a sterile dressing was applied.    The patient tolerated the procedure well without complications and was taken to the recovery room for continued observation.    Images were saved to PACS.    Post-procedure pain score: 0/10  Follow-up includes:   -f/u with referring provider     Zia Alvarado MD  [facility] [place]

## 2025-06-24 NOTE — PATIENT INSTRUCTIONS
[facility]   Procedure Discharge Instructions    Today you saw:    Dr. Zia Alvarado,         You had a(n):  Lumbar epidural steroid injection Interlaminar     Medications used for interlaminar RAHEEM: Lidocaine, Omnipaque, Kenalog, Bupivicaine, normal saline          Be cautious with walking. Numbness and/or weakness in the lower extremities may occur for up to 6-8 hours after the procedure due to effect of the local anesthetic  Do not drive for 6 hours. The effect of the local anesthetic could slow your reflexes.   You may resume your regular activities after 24 hours  Avoid strenuous activity for the first 24 hours  You may shower, however avoid swimming, tub baths or hot tubs for 24 hours following your procedure  You may have a mild to moderate increase in pain for several days following the injection.  It may take up to 14 days for the steroid medication to start working although you may feel the effect as early as a few days after the procedure.     You may use ice packs for 10-15 minutes, 3 to 4 times a day at the injection site for comfort  Do not use heat to painful areas for 6 to 8 hours. This will give the local anesthetic time to wear off and prevent you from accidentally burning your skin.   Unless you have been directed to avoid the use of anti-inflammatory medications (NSAIDS), you may use medications such as ibuprofen, Aleve or Tylenol for pain control if needed.   If you have diabetes, check your blood sugar more frequently than usual as your blood sugar may be higher than normal for 10-14 days following a steroid injection. Contact your doctor who manages your diabetes if your blood sugar is higher than usual  Possible side effects of steroids that you may experience include flushing, elevated blood pressure, increased appetite, mild headaches and restlessness.  All of these symptoms will get better with time.  If you experience any of the following, call the Pain  Clinic during work hours (Mon-Friday 8-4:30 pm) at [phone] or the Provider Line after hours at [phone]:  -Fever over 100 degree F  -Swelling, bleeding, redness, drainage, warmth at the injection site  -Progressive weakness or numbness in your legs  -Loss of bowel or bladder function  -Unusual headache not relieved by Tylenol or other pain reliever  -Unusual new onset of pain that is not improving

## 2025-06-24 NOTE — NURSING NOTE
Discharge Information    IV Discontiued Time:  NA    Amount of Fluid Infused:  NA    Discharge Criteria = When patient returns to baseline or as per MD order    Consciousness:  Pt is fully awake    Circulation:  BP +/- 20% of pre-procedure level    Respiration:  Patient is able to breathe deeply    O2 Sat:  Patient is able to maintain O2 Sat >92% on room air    Activity:  Moves 4 extremities on command    Ambulation:  Patient is able to stand and walk or stand and pivot into wheelchair    Dressing:  Clean/dry or No Dressing    Notes:   Discharge instructions and AVS given to patient    Patient meets criteria for discharge?  YES    Admitted to PCU?  No    Responsible adult present to accompany patient home?  Yes    Signature/Title:    Magaly Aguilar RN  RN Care Coordinator  [facility] [place]

## 2025-06-25 ENCOUNTER — TELEPHONE (OUTPATIENT)
Dept: PALLIATIVE MEDICINE | Facility: CLINIC | Age: 40
End: 2025-06-25
Payer: COMMERCIAL

## 2025-06-25 NOTE — TELEPHONE ENCOUNTER
[facility] Call Center    Phone Message    May a detailed message be left on voicemail: yes     Reason for Call: Form or Letter   Type or form/letter needing completion: Excused from work for today (06/25)  Provider: Zia Alvarado MD   Date form needed: 06/25/2025  Once completed: Send to pt via [employer] account     Patient is requesting a letter to excuse her from work today. Patient states she is still sore from the procedure yesterday. Requesting letter be sent to her [employer] account.    Action Taken: Message routed to:  Other: [facility]    Travel Screening: Not Applicable

## 2025-06-25 NOTE — LETTER
June 25, 2025      Екатерина JOHNSON Nylasondra  [address] [address] [facility] [facility] [address]        To Whom It May Concern:    Please excuse Екатерина ELIZABETH Ramon from work until 6/26/2025 due to procedure completed by this clinic on 6/24/2025.        Sincerely,        Zia Alvarado MD    Electronically signed

## 2025-07-09 ENCOUNTER — E-VISIT (OUTPATIENT)
Dept: URGENT CARE | Facility: CLINIC | Age: 40
End: 2025-07-09
Payer: COMMERCIAL

## 2025-07-09 DIAGNOSIS — R69 DIAGNOSIS UNKNOWN: Primary | ICD-10-CM

## 2025-07-09 PROCEDURE — 99207 PR NON-BILLABLE SERV PER CHARTING: CPT | Performed by: PHYSICIAN ASSISTANT

## 2025-07-10 NOTE — PATIENT INSTRUCTIONS
Dear Екатерина,    We are sorry you are not feeling well. Based on the responses you provided, it is recommended that you be seen in-person in clinic so we can better evaluate your symptoms. Please schedule this visit in [employer]. You will have a Schedule Now button in [employer] to help with scheduling this appointment. Otherwise, you can call [phone] to schedule an appointment.     You will not be charged for this eVisit. Thank you for trusting us with your care.     Anusha Key PA-C

## 2025-07-13 ENCOUNTER — HEALTH MAINTENANCE LETTER (OUTPATIENT)
Age: 40
End: 2025-07-13

## 2025-07-14 ENCOUNTER — PATIENT OUTREACH (OUTPATIENT)
Dept: CARE COORDINATION | Facility: CLINIC | Age: 40
End: 2025-07-14
Payer: COMMERCIAL

## 2025-07-28 ENCOUNTER — NURSE TRIAGE (OUTPATIENT)
Dept: FAMILY MEDICINE | Facility: CLINIC | Age: 40
End: 2025-07-28
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
## 2025-07-28 NOTE — TELEPHONE ENCOUNTER
Disposition: Patient fell on Saturday. Patient is having pain all over the body, landed directly on back from about 10-12 feet. Headed to the ED, Cornelius or Imelda with ride.     ROCKY Camarena  [facility]

## (undated) DEVICE — SU VICRYL 4-0 PS-2 18" UND J496H

## (undated) DEVICE — SYR 10ML LL W/O NDL

## (undated) DEVICE — SU VICRYL 3-0 PS-2 18" UND J497H

## (undated) DEVICE — LINEN TOWEL PACK X30 5481

## (undated) DEVICE — BNDG ELASTIC 4"X5YDS UNSTERILE 6611-40

## (undated) DEVICE — NDL 25GA 1.5" 305127

## (undated) DEVICE — BLADE SAW OSCIL/SAG STRK MICRO 9.0X31.0X0.38MM 2296-003-525

## (undated) DEVICE — ESU GROUND PAD ADULT W/CORD E7507

## (undated) DEVICE — NDL SPINAL 22GA 3.5" QUINCKE 405181

## (undated) DEVICE — DRAPE STERI TOWEL SM 1000

## (undated) DEVICE — NDL 19GA 1.5"

## (undated) DEVICE — BNDG ELASTIC 3"X5YDS UNSTERILE 6611-30

## (undated) DEVICE — BNDG KLING 3" 2232

## (undated) DEVICE — SOL WATER IRRIG 1000ML BOTTLE 07139-09

## (undated) DEVICE — Device

## (undated) DEVICE — ANTIFOG SOLUTION W/FOAM PAD 31142527

## (undated) DEVICE — LIGHT HANDLE X1 31140133

## (undated) DEVICE — SYSTEM CALIBRATION GASTRECTOMY SLEEVE VISIGI 3D 40FR 5240

## (undated) DEVICE — DRAPE C-ARM MINI 5423

## (undated) DEVICE — SU VICRYL 3-0 FS-2 36" J423-H

## (undated) DEVICE — GLOVE PROTEXIS BLUE W/NEU-THERA 7.5  2D73EB75

## (undated) DEVICE — STPL SKIN 35W ROTATING HEAD PRW35

## (undated) DEVICE — DRSG PRIMAPORE 02X3" 7133

## (undated) DEVICE — ENDO POUCH UNIVERSAL RETRIEVAL SYSTEM INZII 12/15MM CD004

## (undated) DEVICE — DRSG GAUZE 4X4" TRAY 6939

## (undated) DEVICE — GLOVE BIOGEL PI MICRO SZ 8.5 48585

## (undated) DEVICE — ENDO TROCAR SLEEVE KII ADV FIXATION 05X100MM CFS02

## (undated) DEVICE — PREP SKIN SCRUB TRAY 4461A

## (undated) DEVICE — BLADE SAW OSCILLATING STRYK MED 9.0X25X0.38MM 2296-003-111

## (undated) DEVICE — PREP CHLORAPREP 26ML TINTED ORANGE  260815

## (undated) DEVICE — SYR 30ML LL W/O NDL 302832

## (undated) DEVICE — ENDO TROCAR OPTICAL ACCESS KII Z-THRD 15X100MM C0R37

## (undated) DEVICE — CLIP APPLIER ENDO 5MM M/L LIGAMAX EL5ML

## (undated) DEVICE — LINEN TOWEL PACK X6 WHITE 5487

## (undated) DEVICE — GLOVE PROTEXIS W/NEU-THERA 7.5  2D73TE75

## (undated) DEVICE — SOL ADH LIQUID BENZOIN SWAB 0.6ML C1544

## (undated) DEVICE — BUR STRK ROUND 3.2X54MM FAST CUT 8 FLUTE 1608-006-149

## (undated) DEVICE — DRILL BIT QUICK COUPLING 2.5X110MM GOLD 310.25

## (undated) DEVICE — GOWN IMPERVIOUS BREATHABLE 2XL/XLONG

## (undated) DEVICE — PACK EXTREMITY SOP15EXFSD

## (undated) DEVICE — BNDG ELASTIC 3"X5YDS STERILE 6611-3S

## (undated) DEVICE — STPL RELOAD REG TISSUE ECHELON 60 X 3.6MM BLUE GST60B

## (undated) DEVICE — DRILL BIT SYN QUICK COUPLING 3.5X110MM 310.35

## (undated) DEVICE — DRSG KERLIX 4 1/2"X4YDS ROLL 6715

## (undated) DEVICE — NDL INSUFFLATION 13GA 150MM C2202

## (undated) DEVICE — SOL NACL 0.9% INJ 1000ML BAG 2B1324X

## (undated) DEVICE — PIN GUARD 10-1-001 101001PBX

## (undated) DEVICE — SOL NACL 0.9% IRRIG 1000ML BOTTLE 07138-09

## (undated) DEVICE — DRSG STERI STRIP 1/4X3" R1541

## (undated) DEVICE — DECANTER BAG 2002S

## (undated) DEVICE — ESU PENCIL SMOKE EVAC W/ROCKER SWITCH 0703-047-000

## (undated) DEVICE — GLOVE PROTEXIS W/NEU-THERA 8.5  2D73TE85

## (undated) DEVICE — COVER LIGHT HANDLE  HILL-ROM C LT-C02

## (undated) DEVICE — STPL POWERED ECHELON LONG 60MM PLEE60A

## (undated) DEVICE — ENDO TROCAR FIRST ENTRY KII FIOS ADV FIX 05X100MM CFF03

## (undated) DEVICE — SU ETHILON 4-0 FS-2 18" 662H

## (undated) DEVICE — ESU LIGASURE MARYLAND VESSEL LAP 44CM XLONG LF1944

## (undated) DEVICE — DECANTER VIAL 2006S

## (undated) RX ORDER — ACETAMINOPHEN 325 MG/1
TABLET ORAL
Status: DISPENSED
Start: 2023-12-05

## (undated) RX ORDER — ACETAMINOPHEN 325 MG/1
TABLET ORAL
Status: DISPENSED
Start: 2021-01-04

## (undated) RX ORDER — PROPOFOL 10 MG/ML
INJECTION, EMULSION INTRAVENOUS
Status: DISPENSED
Start: 2018-08-14

## (undated) RX ORDER — FENTANYL CITRATE 50 UG/ML
INJECTION, SOLUTION INTRAMUSCULAR; INTRAVENOUS
Status: DISPENSED
Start: 2021-01-04

## (undated) RX ORDER — CEFAZOLIN SODIUM 1 G/3ML
INJECTION, POWDER, FOR SOLUTION INTRAMUSCULAR; INTRAVENOUS
Status: DISPENSED
Start: 2018-08-14

## (undated) RX ORDER — ONDANSETRON 2 MG/ML
INJECTION INTRAMUSCULAR; INTRAVENOUS
Status: DISPENSED
Start: 2023-12-05

## (undated) RX ORDER — HYDROMORPHONE HYDROCHLORIDE 1 MG/ML
INJECTION, SOLUTION INTRAMUSCULAR; INTRAVENOUS; SUBCUTANEOUS
Status: DISPENSED
Start: 2021-01-04

## (undated) RX ORDER — BUPIVACAINE HYDROCHLORIDE 5 MG/ML
INJECTION, SOLUTION EPIDURAL; INTRACAUDAL
Status: DISPENSED
Start: 2021-01-04

## (undated) RX ORDER — FENTANYL CITRATE 50 UG/ML
INJECTION, SOLUTION INTRAMUSCULAR; INTRAVENOUS
Status: DISPENSED
Start: 2018-08-14

## (undated) RX ORDER — GABAPENTIN 300 MG/1
CAPSULE ORAL
Status: DISPENSED
Start: 2018-08-14

## (undated) RX ORDER — LABETALOL HYDROCHLORIDE 5 MG/ML
INJECTION, SOLUTION INTRAVENOUS
Status: DISPENSED
Start: 2021-01-04

## (undated) RX ORDER — DEXAMETHASONE SODIUM PHOSPHATE 4 MG/ML
INJECTION, SOLUTION INTRA-ARTICULAR; INTRALESIONAL; INTRAMUSCULAR; INTRAVENOUS; SOFT TISSUE
Status: DISPENSED
Start: 2018-08-14

## (undated) RX ORDER — PROPOFOL 10 MG/ML
INJECTION, EMULSION INTRAVENOUS
Status: DISPENSED
Start: 2023-12-05

## (undated) RX ORDER — LIDOCAINE HYDROCHLORIDE 10 MG/ML
INJECTION, SOLUTION EPIDURAL; INFILTRATION; INTRACAUDAL; PERINEURAL
Status: DISPENSED
Start: 2023-12-05

## (undated) RX ORDER — BUPIVACAINE HYDROCHLORIDE 5 MG/ML
INJECTION, SOLUTION PERINEURAL
Status: DISPENSED
Start: 2023-12-05

## (undated) RX ORDER — FENTANYL CITRATE 50 UG/ML
INJECTION, SOLUTION INTRAMUSCULAR; INTRAVENOUS
Status: DISPENSED
Start: 2023-12-05

## (undated) RX ORDER — ACETAMINOPHEN 325 MG/1
TABLET ORAL
Status: DISPENSED
Start: 2018-08-14

## (undated) RX ORDER — HYDRALAZINE HYDROCHLORIDE 20 MG/ML
INJECTION INTRAMUSCULAR; INTRAVENOUS
Status: DISPENSED
Start: 2021-01-04

## (undated) RX ORDER — CEFAZOLIN SODIUM 2 G/100ML
INJECTION, SOLUTION INTRAVENOUS
Status: DISPENSED
Start: 2018-08-14

## (undated) RX ORDER — BUPIVACAINE HYDROCHLORIDE 5 MG/ML
INJECTION, SOLUTION EPIDURAL; INTRACAUDAL
Status: DISPENSED
Start: 2018-08-14

## (undated) RX ORDER — CEFAZOLIN SODIUM 1 G/3ML
INJECTION, POWDER, FOR SOLUTION INTRAMUSCULAR; INTRAVENOUS
Status: DISPENSED
Start: 2023-12-05

## (undated) RX ORDER — ONDANSETRON 2 MG/ML
INJECTION INTRAMUSCULAR; INTRAVENOUS
Status: DISPENSED
Start: 2018-08-14

## (undated) RX ORDER — KETOROLAC TROMETHAMINE 30 MG/ML
INJECTION, SOLUTION INTRAMUSCULAR; INTRAVENOUS
Status: DISPENSED
Start: 2018-08-14

## (undated) RX ORDER — CEFAZOLIN SODIUM 2 G/100ML
INJECTION, SOLUTION INTRAVENOUS
Status: DISPENSED
Start: 2021-01-04